# Patient Record
Sex: MALE | Race: WHITE | Employment: OTHER | URBAN - METROPOLITAN AREA
[De-identification: names, ages, dates, MRNs, and addresses within clinical notes are randomized per-mention and may not be internally consistent; named-entity substitution may affect disease eponyms.]

---

## 2019-02-08 ENCOUNTER — HOSPITAL ENCOUNTER (EMERGENCY)
Age: 74
Discharge: HOME | End: 2019-02-09
Payer: MEDICARE

## 2019-02-08 ENCOUNTER — HOSPITAL ENCOUNTER (OUTPATIENT)
Dept: MRI IMAGING | Age: 74
Discharge: HOME | End: 2019-02-08
Attending: FAMILY MEDICINE
Payer: MEDICARE

## 2019-02-08 DIAGNOSIS — I63.9 CEREBROVASCULAR ACCIDENT (CVA) DUE TO OCCLUSION (HCC): Primary | ICD-10-CM

## 2019-02-08 DIAGNOSIS — R20.0 NUMBNESS AND TINGLING IN LEFT HAND: ICD-10-CM

## 2019-02-08 DIAGNOSIS — Q67.0 FACIAL ASYMMETRY: ICD-10-CM

## 2019-02-08 DIAGNOSIS — R20.2 NUMBNESS AND TINGLING IN LEFT HAND: ICD-10-CM

## 2019-02-08 LAB
ALBUMIN SERPL-MCNC: 3.8 G/DL (ref 3.2–4.6)
ALBUMIN/GLOB SERPL: 1.1 {RATIO}
ALP SERPL-CCNC: 98 U/L (ref 50–136)
ALT SERPL-CCNC: 19 U/L (ref 12–65)
ANION GAP SERPL CALC-SCNC: 9 MMOL/L
AST SERPL-CCNC: 18 U/L (ref 15–37)
BASOPHILS # BLD: 0.1 K/UL (ref 0–0.2)
BASOPHILS NFR BLD: 1 % (ref 0–2)
BILIRUB SERPL-MCNC: 0.5 MG/DL (ref 0.2–1.1)
BUN SERPL-MCNC: 13 MG/DL (ref 8–23)
CALCIUM SERPL-MCNC: 8.4 MG/DL (ref 8.3–10.4)
CHLORIDE SERPL-SCNC: 106 MMOL/L (ref 98–107)
CO2 SERPL-SCNC: 28 MMOL/L (ref 21–32)
CREAT SERPL-MCNC: 1.1 MG/DL (ref 0.8–1.5)
DIFFERENTIAL METHOD BLD: NORMAL
EOSINOPHIL # BLD: 0.2 K/UL (ref 0–0.8)
EOSINOPHIL NFR BLD: 3 % (ref 0.5–7.8)
ERYTHROCYTE [DISTWIDTH] IN BLOOD BY AUTOMATED COUNT: 14 % (ref 11.9–14.6)
GLOBULIN SER CALC-MCNC: 3.5 G/DL (ref 2.3–3.5)
GLUCOSE SERPL-MCNC: 125 MG/DL (ref 65–100)
HCT VFR BLD AUTO: 45.7 % (ref 41.1–50.3)
HGB BLD-MCNC: 15 G/DL (ref 13.6–17.2)
IMM GRANULOCYTES # BLD AUTO: 0 K/UL (ref 0–0.5)
IMM GRANULOCYTES NFR BLD AUTO: 0 % (ref 0–5)
LYMPHOCYTES # BLD: 2.5 K/UL (ref 0.5–4.6)
LYMPHOCYTES NFR BLD: 34 % (ref 13–44)
MCH RBC QN AUTO: 29.2 PG (ref 26.1–32.9)
MCHC RBC AUTO-ENTMCNC: 32.8 G/DL (ref 31.4–35)
MCV RBC AUTO: 88.9 FL (ref 79.6–97.8)
MONOCYTES # BLD: 0.7 K/UL (ref 0.1–1.3)
MONOCYTES NFR BLD: 9 % (ref 4–12)
NEUTS SEG # BLD: 3.8 K/UL (ref 1.7–8.2)
NEUTS SEG NFR BLD: 53 % (ref 43–78)
NRBC # BLD: 0 K/UL (ref 0–0.2)
PLATELET # BLD AUTO: 252 K/UL (ref 150–450)
PMV BLD AUTO: 11.2 FL (ref 9.4–12.3)
POTASSIUM SERPL-SCNC: 3.4 MMOL/L (ref 3.5–5.1)
PROT SERPL-MCNC: 7.3 G/DL
RBC # BLD AUTO: 5.14 M/UL (ref 4.23–5.6)
SODIUM SERPL-SCNC: 143 MMOL/L (ref 136–145)
WBC # BLD AUTO: 7.2 K/UL (ref 4.3–11.1)

## 2019-02-08 PROCEDURE — 85025 COMPLETE CBC W/AUTO DIFF WBC: CPT

## 2019-02-08 PROCEDURE — 99284 EMERGENCY DEPT VISIT MOD MDM: CPT

## 2019-02-08 PROCEDURE — 93005 ELECTROCARDIOGRAM TRACING: CPT | Performed by: EMERGENCY MEDICINE

## 2019-02-08 PROCEDURE — 70551 MRI BRAIN STEM W/O DYE: CPT

## 2019-02-08 PROCEDURE — 80053 COMPREHEN METABOLIC PANEL: CPT

## 2019-02-09 VITALS
HEART RATE: 83 BPM | DIASTOLIC BLOOD PRESSURE: 91 MMHG | HEIGHT: 69 IN | SYSTOLIC BLOOD PRESSURE: 192 MMHG | WEIGHT: 259 LBS | RESPIRATION RATE: 20 BRPM | OXYGEN SATURATION: 97 % | BODY MASS INDEX: 38.36 KG/M2 | TEMPERATURE: 98.5 F

## 2019-02-09 LAB
ATRIAL RATE: 83 BPM
CALCULATED P AXIS, ECG09: 32 DEGREES
CALCULATED R AXIS, ECG10: -2 DEGREES
CALCULATED T AXIS, ECG11: 60 DEGREES
DIAGNOSIS, 93000: NORMAL
P-R INTERVAL, ECG05: 174 MS
Q-T INTERVAL, ECG07: 400 MS
QRS DURATION, ECG06: 76 MS
QTC CALCULATION (BEZET), ECG08: 470 MS
VENTRICULAR RATE, ECG03: 83 BPM

## 2019-02-09 RX ORDER — ASPIRIN 325 MG
325 TABLET ORAL DAILY
Qty: 10 TAB | Refills: 0 | Status: SHIPPED | OUTPATIENT
Start: 2019-02-09 | End: 2019-02-12 | Stop reason: SDUPTHER

## 2019-02-09 NOTE — ED NOTES
I have reviewed discharge instructions with the patient and spouse. The patient and spouse verbalized understanding. Patient left ED via Discharge Method: ambulatory to Home with spouse. Opportunity for questions and clarification provided. Patient given 1 scripts. To continue your aftercare when you leave the hospital, you may receive an automated call from our care team to check in on how you are doing. This is a free service and part of our promise to provide the best care and service to meet your aftercare needs.  If you have questions, or wish to unsubscribe from this service please call [phone]. Thank you for Choosing our [facility].

## 2019-02-09 NOTE — DISCHARGE INSTRUCTIONS
Patient Education        Learning About an Ischemic Stroke  What is an ischemic stroke? An ischemic (say \"oqd-HJX-apwq\") stroke occurs when a blood clot blocks a blood vessel in the brain. This means that blood cannot flow to some part of the brain. Without blood and the oxygen it carries, this part of the brain starts to die. The part of the body controlled by the damaged area of the brain cannot work properly. This is different from a hemorrhagic (say \"ifv-bdi-EP-jick\") stroke, which happens when a blood vessel in the brain has burst open or has started to leak. The brain damage from a stroke starts within minutes. Quick treatment can help limit damage to the brain and make recovery more likely. People who have had a stroke may have a hard time talking, understanding things, and making decisions. They may have to relearn daily activities, such as how to eat, bathe, and dress. How well someone recovers from a stroke depends on how quickly the person gets to the hospital, where in the brain the stroke happened, and how severe it was. Training and therapy also make a difference in how well people recover. What are the symptoms? If you have any of these symptoms, call 911 or other emergency services right away:  · You have symptoms of a stroke. These may include:  ? Sudden numbness, tingling, weakness, or loss of movement in your face, arm, or leg, especially on only one side of your body. ? Sudden vision changes. ? Sudden trouble speaking. ? Sudden confusion or trouble understanding simple statements. ? Sudden problems with walking or balance. ? A sudden, severe headache that is different from past headaches. See your doctor if you have symptoms that seem like a stroke, even if they go away quickly. You may have had a transient ischemic attack (TIA), sometimes called a mini-stroke. A TIA is a warning that a stroke may happen soon. Getting early treatment for a TIA can help prevent a stroke.   What causes an ischemic stroke? An ischemic stroke is caused by a blood clot that blocks blood flow in the brain. The most common causes of blood clots include:  · Hardening of the arteries (atherosclerosis). This is caused by high blood pressure, diabetes, high cholesterol, or smoking. · Atrial fibrillation. How is ischemic stroke treated? Emergency treatment may be done to restore blood flow to the brain using medicine or a procedure. You may take medicines to help prevent another stroke. Ask your doctor if a stroke rehab program is right for you. How can you prevent another stroke? · Work with your doctor to treat any health problems you have. High blood pressure, high cholesterol, atrial fibrillation, and diabetes all raise your chances of having a stroke. · Be safe with medicines. Take your medicine exactly as prescribed. Call your doctor if you think you are having a problem with your medicine. · Have a healthy lifestyle. ? Do not smoke or allow others to smoke around you. If you need help quitting, talk to your doctor about stop-smoking programs and medicines. These can increase your chances of quitting for good. Smoking makes a stroke more likely. ? Limit alcohol to 2 drinks a day for men and 1 drink a day for women. ? Lose weight if you need to. A healthy weight will help you keep your heart and body healthy. ? Be active. Ask your doctor what type and level of activity is safe for you.  ? Eat heart-healthy foods, like fruits, vegetables, and high-fiber foods. Follow-up care is a key part of your treatment and safety. Be sure to make and go to all appointments, and call your doctor if you are having problems. It's also a good idea to know your test results and keep a list of the medicines you take. Where can you learn more? Go to http://jase-princess.info/. Enter D161 in the search box to learn more about \"Learning About an Ischemic Stroke. \"  Current as of: September 26, 2018  Content Version: 11.9  © 8828-2235 [employer]. Care instructions adapted under license by [employer] (which disclaims liability or warranty for this information). If you have questions about a medical condition or this instruction, always ask your healthcare professional. [address] any warranty or liability for your use of this information.

## 2019-02-09 NOTE — ED PROVIDER NOTES
42-year-old male complaining of numbness to left hand also left perioral numbness. Onset of the symptoms were January 17, 2019. P sent him for an MRI today findings of subacute infarct. Patient was instructed by his physician to go to the emergency department. The history is provided by the patient. Numbness This is a new problem. The current episode started more than 1 week ago. The problem has not changed since onset. There was no focality noted. Pertinent negatives include no focal weakness, no slurred speech, no memory loss, no auditory change and no mental status change. There has been no fever. Pertinent negatives include no altered mental status, no confusion and no headaches. Associated medical issues do not include trauma. Past Medical History:  
Diagnosis Date  Essential hypertension, benign 1/3/2014  Mixed hyperlipidemia 1/3/2014 No past surgical history on file. No family history on file. Social History Socioeconomic History  Marital status:  Spouse name: Not on file  Number of children: Not on file  Years of education: Not on file  Highest education level: Not on file Social Needs  Financial resource strain: Not on file  Food insecurity - worry: Not on file  Food insecurity - inability: Not on file  Transportation needs - medical: Not on file  Transportation needs - non-medical: Not on file Occupational History  Not on file Tobacco Use  Smoking status: Never Smoker  Smokeless tobacco: Never Used Substance and Sexual Activity  Alcohol use: Not on file  Drug use: Not on file  Sexual activity: Not on file Other Topics Concern  Not on file Social History Narrative  Not on file ALLERGIES: Patient has no known allergies. Review of Systems Constitutional: Negative. Negative for activity change. HENT: Negative. Eyes: Negative. Respiratory: Negative. Cardiovascular: Negative. Gastrointestinal: Negative. Genitourinary: Negative. Musculoskeletal: Negative. Skin: Negative. Neurological: Positive for numbness. Negative for focal weakness and headaches. Psychiatric/Behavioral: Negative. Negative for confusion and memory loss. All other systems reviewed and are negative. Vitals:  
 02/08/19 2132 BP: (!) 199/99 Pulse: 82 Resp: 18 SpO2: 98% Weight: 117.5 kg (259 lb) Height: 5' 9\" (1.753 m) Physical Exam  
Constitutional: He is oriented to person, place, and time. He appears well-developed and well-nourished. No distress. HENT:  
Head: Normocephalic and atraumatic. Right Ear: External ear normal.  
Left Ear: External ear normal.  
Nose: Nose normal.  
Mouth/Throat: Oropharynx is clear and moist. No oropharyngeal exudate. Eyes: Conjunctivae and EOM are normal. Pupils are equal, round, and reactive to light. Right eye exhibits no discharge. Left eye exhibits no discharge. No scleral icterus. Neck: Normal range of motion. Neck supple. No JVD present. No tracheal deviation present. Cardiovascular: Normal rate, regular rhythm and intact distal pulses. Pulmonary/Chest: Effort normal and breath sounds normal. No stridor. No respiratory distress. He has no wheezes. He exhibits no tenderness. Abdominal: Soft. Bowel sounds are normal. He exhibits no distension and no mass. There is no tenderness. Musculoskeletal: Normal range of motion. He exhibits no edema or tenderness. Neurological: He is alert and oriented to person, place, and time. No cranial nerve deficit. Skin: Skin is warm and dry. No rash noted. He is not diaphoretic. No erythema. No pallor. Psychiatric: He has a normal mood and affect. His behavior is normal. Thought content normal.  
Nursing note and vitals reviewed. MDM Number of Diagnoses or Management Options Cerebrovascular accident (CVA) due to occlusion [facility]):  
 Diagnosis management comments: Minimal to no strength deficits. Subacute right thalamic infarct seen on mRI. Discussed with hospitalist for possible admission due to these non-acute finding outpatient workup was recommended by the hospitalist. 
 
  
Amount and/or Complexity of Data Reviewed Clinical lab tests: reviewed and ordered Tests in the radiology section of CPT®: ordered and reviewed Tests in the medicine section of CPT®: ordered and reviewed Risk of Complications, Morbidity, and/or Mortality Presenting problems: moderate Diagnostic procedures: moderate Management options: moderate Patient Progress Patient progress: stable Procedures

## 2019-02-09 NOTE — ED TRIAGE NOTES
Pt having  Left hand numbness x 1 month, constant x 1 week, had scheduled mri and md said to go to ed after getting results, pt has no change n0 symptoms

## 2019-02-11 NOTE — PROGRESS NOTES
Patient was sent to the emergency room to be evaluated for this he will follow-up here in the office as he needs to

## 2019-02-12 PROBLEM — I63.9 CEREBROVASCULAR ACCIDENT (CVA) DUE TO OCCLUSION (HCC): Status: ACTIVE | Noted: 2019-02-12

## 2019-02-19 ENCOUNTER — HOSPITAL ENCOUNTER (OUTPATIENT)
Dept: ULTRASOUND IMAGING | Age: 74
Discharge: HOME | End: 2019-02-19
Attending: FAMILY MEDICINE
Payer: MEDICARE

## 2019-02-19 DIAGNOSIS — I63.9 CEREBROVASCULAR ACCIDENT (CVA) DUE TO OCCLUSION (HCC): ICD-10-CM

## 2019-02-19 PROCEDURE — 93880 EXTRACRANIAL BILAT STUDY: CPT

## 2019-02-22 PROBLEM — E11.9 CONTROLLED TYPE 2 DIABETES MELLITUS WITHOUT COMPLICATION, WITHOUT LONG-TERM CURRENT USE OF INSULIN (HCC): Status: ACTIVE | Noted: 2019-02-22

## 2019-07-02 PROBLEM — R20.2 NUMBNESS AND TINGLING IN LEFT HAND: Status: ACTIVE | Noted: 2019-07-02

## 2019-07-02 PROBLEM — E66.01 SEVERE OBESITY (HCC): Status: ACTIVE | Noted: 2019-07-02

## 2019-07-02 PROBLEM — I63.81 RIGHT THALAMIC INFARCTION (HCC): Status: ACTIVE | Noted: 2019-07-02

## 2019-07-02 PROBLEM — R20.0 NUMBNESS AND TINGLING IN LEFT HAND: Status: ACTIVE | Noted: 2019-07-02

## 2020-06-08 ENCOUNTER — APPOINTMENT (OUTPATIENT)
Dept: MRI IMAGING | Age: 75
DRG: 066 | End: 2020-06-08
Attending: PSYCHIATRY & NEUROLOGY
Payer: MEDICARE

## 2020-06-08 ENCOUNTER — APPOINTMENT (OUTPATIENT)
Dept: CT IMAGING | Age: 75
DRG: 066 | End: 2020-06-08
Attending: EMERGENCY MEDICINE
Payer: MEDICARE

## 2020-06-08 ENCOUNTER — HOSPITAL ENCOUNTER (INPATIENT)
Age: 75
LOS: 6 days | Discharge: NURSING, REHAB OR LONG-TERM CARE | DRG: 066 | End: 2020-06-14
Attending: EMERGENCY MEDICINE | Admitting: INTERNAL MEDICINE
Payer: MEDICARE

## 2020-06-08 DIAGNOSIS — E11.9 CONTROLLED TYPE 2 DIABETES MELLITUS WITHOUT COMPLICATION, WITHOUT LONG-TERM CURRENT USE OF INSULIN (HCC): ICD-10-CM

## 2020-06-08 DIAGNOSIS — I63.9 ACUTE CVA (CEREBROVASCULAR ACCIDENT) (HCC): Primary | ICD-10-CM

## 2020-06-08 DIAGNOSIS — E78.2 MIXED HYPERLIPIDEMIA: ICD-10-CM

## 2020-06-08 DIAGNOSIS — E66.01 SEVERE OBESITY (HCC): ICD-10-CM

## 2020-06-08 DIAGNOSIS — R42 DIZZINESS: ICD-10-CM

## 2020-06-08 DIAGNOSIS — R33.9 URINARY RETENTION: ICD-10-CM

## 2020-06-08 DIAGNOSIS — I63.9 CEREBROVASCULAR ACCIDENT (CVA) DUE TO OCCLUSION (HCC): ICD-10-CM

## 2020-06-08 DIAGNOSIS — I10 ESSENTIAL HYPERTENSION, BENIGN: ICD-10-CM

## 2020-06-08 LAB
ALBUMIN SERPL-MCNC: 2.9 G/DL (ref 3.2–4.6)
ALBUMIN/GLOB SERPL: 0.7 {RATIO} (ref 1.2–3.5)
ALP SERPL-CCNC: 107 U/L (ref 50–136)
ALT SERPL-CCNC: 16 U/L (ref 12–65)
ANION GAP SERPL CALC-SCNC: 6 MMOL/L (ref 7–16)
AST SERPL-CCNC: 22 U/L (ref 15–37)
BASOPHILS # BLD: 0 K/UL (ref 0–0.2)
BASOPHILS NFR BLD: 1 % (ref 0–2)
BILIRUB SERPL-MCNC: 0.6 MG/DL (ref 0.2–1.1)
BUN SERPL-MCNC: 13 MG/DL (ref 8–23)
CALCIUM SERPL-MCNC: 8.1 MG/DL (ref 8.3–10.4)
CHLORIDE SERPL-SCNC: 108 MMOL/L (ref 98–107)
CO2 SERPL-SCNC: 27 MMOL/L (ref 21–32)
CREAT SERPL-MCNC: 0.96 MG/DL (ref 0.8–1.5)
DIFFERENTIAL METHOD BLD: NORMAL
EOSINOPHIL # BLD: 0.1 K/UL (ref 0–0.8)
EOSINOPHIL NFR BLD: 1 % (ref 0.5–7.8)
ERYTHROCYTE [DISTWIDTH] IN BLOOD BY AUTOMATED COUNT: 13.6 % (ref 11.9–14.6)
GLOBULIN SER CALC-MCNC: 3.9 G/DL (ref 2.3–3.5)
GLUCOSE BLD STRIP.AUTO-MCNC: 122 MG/DL (ref 65–100)
GLUCOSE BLD STRIP.AUTO-MCNC: 202 MG/DL (ref 65–100)
GLUCOSE SERPL-MCNC: 188 MG/DL (ref 65–100)
HCT VFR BLD AUTO: 44.7 % (ref 41.1–50.3)
HGB BLD-MCNC: 14.8 G/DL (ref 13.6–17.2)
IMM GRANULOCYTES # BLD AUTO: 0 K/UL (ref 0–0.5)
IMM GRANULOCYTES NFR BLD AUTO: 0 % (ref 0–5)
LYMPHOCYTES # BLD: 1.4 K/UL (ref 0.5–4.6)
LYMPHOCYTES NFR BLD: 17 % (ref 13–44)
MCH RBC QN AUTO: 30 PG (ref 26.1–32.9)
MCHC RBC AUTO-ENTMCNC: 33.1 G/DL (ref 31.4–35)
MCV RBC AUTO: 90.7 FL (ref 79.6–97.8)
MONOCYTES # BLD: 0.5 K/UL (ref 0.1–1.3)
MONOCYTES NFR BLD: 6 % (ref 4–12)
NEUTS SEG # BLD: 6.4 K/UL (ref 1.7–8.2)
NEUTS SEG NFR BLD: 75 % (ref 43–78)
NRBC # BLD: 0 K/UL (ref 0–0.2)
PLATELET # BLD AUTO: 229 K/UL (ref 150–450)
PMV BLD AUTO: 11.5 FL (ref 9.4–12.3)
POTASSIUM SERPL-SCNC: 3.9 MMOL/L (ref 3.5–5.1)
PROT SERPL-MCNC: 6.8 G/DL (ref 6.3–8.2)
RBC # BLD AUTO: 4.93 M/UL (ref 4.23–5.6)
SODIUM SERPL-SCNC: 141 MMOL/L (ref 136–145)
TROPONIN-HIGH SENSITIVITY: 8.7 PG/ML (ref 0–14)
WBC # BLD AUTO: 8.4 K/UL (ref 4.3–11.1)

## 2020-06-08 PROCEDURE — 74011636637 HC RX REV CODE- 636/637: Performed by: INTERNAL MEDICINE

## 2020-06-08 PROCEDURE — 84484 ASSAY OF TROPONIN QUANT: CPT

## 2020-06-08 PROCEDURE — 85025 COMPLETE CBC W/AUTO DIFF WBC: CPT

## 2020-06-08 PROCEDURE — 70496 CT ANGIOGRAPHY HEAD: CPT

## 2020-06-08 PROCEDURE — 81003 URINALYSIS AUTO W/O SCOPE: CPT

## 2020-06-08 PROCEDURE — 80053 COMPREHEN METABOLIC PANEL: CPT

## 2020-06-08 PROCEDURE — 82962 GLUCOSE BLOOD TEST: CPT

## 2020-06-08 PROCEDURE — 74011250636 HC RX REV CODE- 250/636: Performed by: INTERNAL MEDICINE

## 2020-06-08 PROCEDURE — 74011000258 HC RX REV CODE- 258: Performed by: EMERGENCY MEDICINE

## 2020-06-08 PROCEDURE — 93005 ELECTROCARDIOGRAM TRACING: CPT | Performed by: EMERGENCY MEDICINE

## 2020-06-08 PROCEDURE — 99223 1ST HOSP IP/OBS HIGH 75: CPT | Performed by: PSYCHIATRY & NEUROLOGY

## 2020-06-08 PROCEDURE — 74011250637 HC RX REV CODE- 250/637: Performed by: INTERNAL MEDICINE

## 2020-06-08 PROCEDURE — 65270000029 HC RM PRIVATE

## 2020-06-08 PROCEDURE — 70450 CT HEAD/BRAIN W/O DYE: CPT

## 2020-06-08 PROCEDURE — 74011636320 HC RX REV CODE- 636/320: Performed by: EMERGENCY MEDICINE

## 2020-06-08 PROCEDURE — 70551 MRI BRAIN STEM W/O DYE: CPT

## 2020-06-08 PROCEDURE — 92610 EVALUATE SWALLOWING FUNCTION: CPT

## 2020-06-08 PROCEDURE — 99284 EMERGENCY DEPT VISIT MOD MDM: CPT

## 2020-06-08 PROCEDURE — 97530 THERAPEUTIC ACTIVITIES: CPT

## 2020-06-08 PROCEDURE — 51702 INSERT TEMP BLADDER CATH: CPT

## 2020-06-08 PROCEDURE — 97161 PT EVAL LOW COMPLEX 20 MIN: CPT

## 2020-06-08 RX ORDER — ENOXAPARIN SODIUM 100 MG/ML
40 INJECTION SUBCUTANEOUS EVERY 24 HOURS
Status: DISCONTINUED | OUTPATIENT
Start: 2020-06-08 | End: 2020-06-14 | Stop reason: HOSPADM

## 2020-06-08 RX ORDER — CLOPIDOGREL BISULFATE 75 MG/1
75 TABLET ORAL DAILY
Status: DISCONTINUED | OUTPATIENT
Start: 2020-06-09 | End: 2020-06-14 | Stop reason: HOSPADM

## 2020-06-08 RX ORDER — ACETAMINOPHEN 325 MG/1
650 TABLET ORAL
Status: DISCONTINUED | OUTPATIENT
Start: 2020-06-08 | End: 2020-06-14 | Stop reason: HOSPADM

## 2020-06-08 RX ORDER — SODIUM CHLORIDE 0.9 % (FLUSH) 0.9 %
10 SYRINGE (ML) INJECTION
Status: COMPLETED | OUTPATIENT
Start: 2020-06-08 | End: 2020-06-08

## 2020-06-08 RX ORDER — SODIUM CHLORIDE 9 MG/ML
75 INJECTION, SOLUTION INTRAVENOUS CONTINUOUS
Status: DISCONTINUED | OUTPATIENT
Start: 2020-06-08 | End: 2020-06-09

## 2020-06-08 RX ORDER — INSULIN LISPRO 100 [IU]/ML
INJECTION, SOLUTION INTRAVENOUS; SUBCUTANEOUS
Status: DISCONTINUED | OUTPATIENT
Start: 2020-06-08 | End: 2020-06-14 | Stop reason: HOSPADM

## 2020-06-08 RX ORDER — GUAIFENESIN 100 MG/5ML
81 LIQUID (ML) ORAL DAILY
Status: DISCONTINUED | OUTPATIENT
Start: 2020-06-09 | End: 2020-06-14 | Stop reason: HOSPADM

## 2020-06-08 RX ORDER — SODIUM CHLORIDE 0.9 % (FLUSH) 0.9 %
5-40 SYRINGE (ML) INJECTION EVERY 8 HOURS
Status: DISCONTINUED | OUTPATIENT
Start: 2020-06-08 | End: 2020-06-14 | Stop reason: HOSPADM

## 2020-06-08 RX ORDER — ATORVASTATIN CALCIUM 40 MG/1
40 TABLET, FILM COATED ORAL DAILY
Status: DISCONTINUED | OUTPATIENT
Start: 2020-06-09 | End: 2020-06-09

## 2020-06-08 RX ORDER — CLOPIDOGREL BISULFATE 75 MG/1
300 TABLET ORAL ONCE
Status: COMPLETED | OUTPATIENT
Start: 2020-06-08 | End: 2020-06-08

## 2020-06-08 RX ORDER — LORATADINE 10 MG/1
10 TABLET ORAL DAILY
Status: DISCONTINUED | OUTPATIENT
Start: 2020-06-09 | End: 2020-06-14 | Stop reason: HOSPADM

## 2020-06-08 RX ORDER — SODIUM CHLORIDE 0.9 % (FLUSH) 0.9 %
5-40 SYRINGE (ML) INJECTION AS NEEDED
Status: DISCONTINUED | OUTPATIENT
Start: 2020-06-08 | End: 2020-06-14 | Stop reason: HOSPADM

## 2020-06-08 RX ORDER — DOXAZOSIN 4 MG/1
8 TABLET ORAL 2 TIMES DAILY
Status: DISCONTINUED | OUTPATIENT
Start: 2020-06-08 | End: 2020-06-14 | Stop reason: HOSPADM

## 2020-06-08 RX ADMIN — Medication 10 ML: at 09:10

## 2020-06-08 RX ADMIN — ENOXAPARIN SODIUM 40 MG: 40 INJECTION SUBCUTANEOUS at 15:11

## 2020-06-08 RX ADMIN — CLOPIDOGREL BISULFATE 300 MG: 75 TABLET ORAL at 15:12

## 2020-06-08 RX ADMIN — INSULIN LISPRO 4 UNITS: 100 INJECTION, SOLUTION INTRAVENOUS; SUBCUTANEOUS at 17:27

## 2020-06-08 RX ADMIN — DOXAZOSIN 8 MG: 4 TABLET ORAL at 15:10

## 2020-06-08 RX ADMIN — SODIUM CHLORIDE 75 ML/HR: 9 INJECTION, SOLUTION INTRAVENOUS at 15:01

## 2020-06-08 RX ADMIN — IOPAMIDOL 50 ML: 755 INJECTION, SOLUTION INTRAVENOUS at 09:10

## 2020-06-08 RX ADMIN — Medication 10 ML: at 14:30

## 2020-06-08 RX ADMIN — SODIUM CHLORIDE 100 ML: 900 INJECTION, SOLUTION INTRAVENOUS at 09:10

## 2020-06-08 RX ADMIN — DOXAZOSIN 8 MG: 4 TABLET ORAL at 22:34

## 2020-06-08 NOTE — PROGRESS NOTES
06/08/20 1628   NIH Stroke Scale   Interval Other (comment)  (admit to floor)   LOC 0   LOC Questions 0   LOC Commands 0   Best Gaze 0   Visual 0   Facial Palsy 1   Motor Right Arm 0   Motor Left Arm 0   Motor Right Leg 0   Motor Left Leg 0   Limb Ataxia 0   Sensory 0   Best Language 0   Dysarthria 0   Extinction and Inattention 0   Total 1   Dual [facility] with Mikki Garay, RN

## 2020-06-08 NOTE — PROGRESS NOTES
Problem: Mobility Impaired (Adult and Pediatric)  Goal: *Acute Goals and Plan of Care (Insert Text)  Description: STG:  (1.)Mr. Lani Castanon will move from supine to sit and sit to supine , scoot up and down and roll side to side with SUPERVISION within 3 treatment day(s). (2.)Mr. Lani Castanon will transfer from bed to chair and chair to bed with MINIMAL ASSIST using the least restrictive device within 3 treatment day(s). (3.)Mr. Lani Castanon will ambulate with MINIMAL ASSIST for 50 feet with the least restrictive device within 3 treatment day(s). (4.)Mr. Lani Castanon will perform standing static and dynamic balance activities x 15 minutes with MINIMAL ASSIST to improve safety within 3 day(s). LTG:  (1.)Mr. Lani Castanon will move from supine to sit and sit to supine , scoot up and down and roll side to side in bed with MODIFIED INDEPENDENCE within 7 treatment day(s). (2.)Mr. Lani Castanon will transfer from bed to chair and chair to bed with STAND BY ASSIST using the least restrictive device within 7 treatment day(s). (3.)Mr. Lani Castanon will ambulate with STAND BY ASSIST for 150 feet with the least restrictive device within 7 treatment day(s). (4.)Mr. Lani Castanon will perform standing static and dynamic balance activities x 15 minutes with STAND BY ASSIST to improve safety within 7 day(s). (5.)Mr. Lani Castanon will ascend and descend 5 stairs using 0-2 hand rail(s) with STAND BY ASSIST to improve functional mobility and safety within 7 day(s).   ________________________________________________________________________________________________     Outcome: Progressing Towards Goal     PHYSICAL THERAPY: Initial Assessment, Daily Note, and PM 6/8/2020  INPATIENT: PT Visit Days : 1  [place] / [place] / Product Type: Medicare /       NAME/AGE/GENDER: Gabriela Villanueva is a 69 y.o. male   PRIMARY DIAGNOSIS: Stroke (cerebrum) ([facility] [address] [I63.9] Cerebrovascular accident (CVA) due to occlusion [facility]) Cerebrovascular accident (CVA) due to occlusion [facility])        ICD-10: Treatment Diagnosis:    Generalized Muscle Weakness (M62.81)  Difficulty in walking, Not elsewhere classified (R26.2)  Dizziness and Giddiness (R42)   Precaution/Allergies:  Patient has no known allergies. ASSESSMENT:     Mr. Melvin Ansari is a 69 y.o. male admitted for CVA workup. Per MRI: \"Acute to early subacute infarcts in the inferior right cerebellar hemisphere and right dorsolateral medulla. \" He lives with spouse in a single story home and typically ambulates and performs ADLs independently at baseline. He is able to drive (and was driving Friday/Saturday despite dizziness symptoms), although they progressed to the point of not being able to drive. He is supine on contact, agreeable to physical therapy evaluation and treatment this PM. BP monitored throughout as patient described \"dizziness\" as lightheadedness. He required CGA to transfer to sitting, BP taken in sitting and found to be 221/68, assisted back to supine and found to be 185/75 and continued evaluation with functional strength testing of bridging in bed with decreased strength noted RLE vs. LLE. Patient reports double and blurred vision which is new (improved with one eye closed). Treatment initiated to include bed mobility and rolling for repositioning. RN alerted to double vision  and elevated BP in sitting, heading into room to provide patient with BP meds. Kelly Peña is currently functioning below his baseline and would benefit from skilled PT during acute care stay to maximize safety and independence with functional mobility.     Position BP Heartrate   Supine 181/73 54   Sitting 221/68 66   Post Activity (supine) 185/75 54       This section established at most recent assessment   PROBLEM LIST (Impairments causing functional limitations):  Decreased Strength  Decreased ADL/Functional Activities  Decreased Transfer Abilities  Decreased Ambulation Ability/Technique  Decreased Balance  Decreased Activity Tolerance  Decreased Knowledge of Precautions  Decreased [place] with Home Exercise Program   INTERVENTIONS PLANNED: (Benefits and precautions of physical therapy have been discussed with the patient.)  Balance Exercise  Bed Mobility  Family Education  Gait Training  Home Exercise Program (HEP)  Neuromuscular Re-education/Strengthening  Therapeutic Activites  Therapeutic Exercise/Strengthening  Transfer Training     TREATMENT PLAN: Frequency/Duration: 3 times a week for duration of hospital stay  Rehabilitation Potential For Stated Goals: Good     REHAB RECOMMENDATIONS (at time of discharge pending progress):    Placement: It is my opinion, based on this patient's performance to date, that Mr. Melvin Ansari may benefit from participating in 1-2 additional therapy sessions in order to continue to assess for rehab potential and then make recommendation for disposition at discharge. Equipment:   None at this time              HISTORY:   History of Present Injury/Illness (Reason for Referral):  Kelly Peña is a 69 y.o. male who came to ER due to inbility to urinate since last evening and dizziness since 3 days ago. 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 He says that he takes aspirin 325 mg po q day \"as needed\". Three days ago, he felt dizzy when trying to get up from bed. He felt weak. He rested and over the course of the day, he felt better. He was able to drive for the past 2 days. Last night, he started to have trouble urinating. He has had this before but not to the point that it got worse. This morning, he could not urinate at all. He came to ER and Mena's catheter was placed and urine of 800 mL amount was obtained. No fever. No shaking. No chills. No head injury. No loss of consciousness no abnormal movement. Code stroke was activated in ER. Patient is out of window for tPA treatment.  CT was done with showed findings below. wife states that right eyelid seems more swollen than the other eye. Hospitalist service is requested to admit the patient. Past Medical History/Comorbidities:   Mr. Venu Witt  has a past medical history of Essential hypertension, benign (1/3/2014), Mixed hyperlipidemia (1/3/2014), Numbness and tingling in left hand (7/2/2019), and Right thalamic infarction ([address] (7/2/2019). Mr. Venu Witt  has no past surgical history on file. Social History/Living Environment:   Home Environment: Private residence  # Steps to Enter: 0  One/Two Story Residence: One story  Living Alone: No  Support Systems: Family member(s)  Patient Expects to be Discharged to[de-identified] Private residence  Current DME Used/Available at Home: None  Prior Level of Function/Work/Activity:  He lives with spouse in a single story home and typically ambulates and performs ADLs independently at baseline. He is able to drive (and was driving Friday/Saturday despite dizziness symptoms), although they progressed to the point of not being able to drive. Number of Personal Factors/Comorbidities that affect the Plan of Care: 3+: HIGH COMPLEXITY   EXAMINATION:   Most Recent Physical Functioning:   Gross Assessment:  AROM: Within functional limits  PROM: Within functional limits  Strength: Generally decreased, functional               Posture:  Posture (WDL): Exceptions to WDL  Posture Assessment: Forward head, Rounded shoulders  Balance:  Sitting: Impaired  Sitting - Static: Good (unsupported)  Sitting - Dynamic: Fair (occasional)(+) Bed Mobility:  Rolling: Contact guard assistance  Supine to Sit: Contact guard assistance  Sit to Supine: Contact guard assistance  Scooting: Contact guard assistance  Wheelchair Mobility:     Transfers:     Gait:            Body Structures Involved:  Nerves  Heart  Muscles Body Functions Affected:  Sensory/Pain  Cardio  Neuromusculoskeletal  Movement Related Activities and Participation Affected:   Mobility  Self Care  Domestic Life  Interpersonal Interactions and Relationships  Community, Social and [place] [employer]   Number of elements that affect the Plan of Care: 4+: HIGH COMPLEXITY   CLINICAL PRESENTATION:   Presentation: Stable and uncomplicated: LOW COMPLEXITY   CLINICAL DECISION MAKIN [facility] Inpatient Short Form  How much difficulty does the patient currently have. .. Unable A Lot A Little None   1. Turning over in bed (including adjusting bedclothes, sheets and blankets)? [] 1   [] 2   [x] 3   [] 4   2. Sitting down on and standing up from a chair with arms ( e.g., wheelchair, bedside commode, etc.)   [x] 1   [] 2   [] 3   [] 4   3. Moving from lying on back to sitting on the side of the bed? [] 1   [] 2   [x] 3   [] 4   How much help from another person does the patient currently need. .. Total A Lot A Little None   4. Moving to and from a bed to a chair (including a wheelchair)? [] 1   [x] 2   [] 3   [] 4   5. Need to walk in hospital room? [] 1   [x] 2   [] 3   [] 4   6. Climbing 3-5 steps with a railing? [] 1   [x] 2   [] 3   [] 4   © , Trustees of [address] [place] [address] [address] [address], under license to [employer]. All rights reserved      Score:  Initial: 13 Most Recent: X (Date: -- )    Interpretation of Tool:  Represents activities that are increasingly more difficult (i.e. Bed mobility, Transfers, Gait). Medical Necessity:     Patient demonstrates   good   rehab potential due to higher previous functional level. Reason for Services/Other Comments:  Patient   continues to require modification of therapeutic interventions to increase complexity of exercises  .    Use of outcome tool(s) and clinical judgement create a POC that gives a: Clear prediction of patient's progress: LOW COMPLEXITY            TREATMENT:   (In addition to Assessment/Re-Assessment sessions the following treatments were rendered)   Pre-treatment Symptoms/Complaints:  none  Pain: Initial: Pain Intensity 1: 0  Post Session:  0     Therapeutic Activity: (    8 minutes): Therapeutic activities including Bed transfers to improve mobility, strength, and activity tolerance . Required minimal cues   to promote coordination of bilateral, lower extremity(s). Braces/Orthotics/Lines/Etc:   O2 Device: Room air  Treatment/Session Assessment:    Response to Treatment:  Patient experienced elevated BP with mobility. Interdisciplinary Collaboration:   Physical Therapist  Registered Nurse  After treatment position/precautions:   Supine in bed  Bed/Chair-wheels locked  Bed in low position  Call light within reach  RN notified  Family at bedside  Nurse at bedside   Compliance with Program/Exercises: Will assess as treatment progresses  Recommendations/Intent for next treatment session: \"Next visit will focus on advancements to more challenging activities and reduction in assistance provided\".   Total Treatment Duration:  PT Patient Time In/Time Out  Time In: 1438  Time Out: 1511    Christina Lara PT, DPT

## 2020-06-08 NOTE — ED NOTES
TRANSFER - OUT REPORT:    Verbal report given to Rica Gruber RN(name) on Chasity Gupta  being transferred to 703(unit) for routine progression of care       Report consisted of patients Situation, Background, Assessment and   Recommendations(SBAR). Information from the following report(s) ED Summary was reveiwed with the receiving nurse. Opportunity for questions and clarification was provided.       Ischemic Stroke without Activase/TIA    BP Parameters: Less Than 220/120 for 24 hours, then consult MD for parameters    Controlled With: None    Dysphagia Screen Completed: Yes: Fail  Dysphagia Screening  Vocal Quality/Secretions: Normal  History of Dysphagia: No  O2 Saturation: Normal  Alertness: Normal  Pre-Swallow Assessment Score: 0  Purees: (!) Coughing/throat clearing     NIH Stroke Scale Complete: Yes: 2    Frequency of Vital Signs: Every 2 hours    Frequency of Neuro Checks: Every 2 hours

## 2020-06-08 NOTE — DISCHARGE INSTRUCTIONS
Stroke: After Your Visit     Your Care Instructions     Risk factors for stroke include being overweight, smoking, and sedentary lifestyle. This means that the blood flow to a part of your brain was blocked for some time, which damages the nerve cells in that part of the brain. The part of your body controlled by that part of your brain may not function properly now. The brain is an amazing organ that can heal itself to some degree. The stroke you had damaged part of your brain, but other parts of your brain may take over in some way for the damaged areas. You have already started this process. Going home may be hard for you and your family. The more you can try to do for yourself, the better. Remember to take each day one at a time. Follow-up care is a key part of your treatment and safety. Be sure to make and go to all appointments, and call your doctor if you are having problems. Its also a good idea to know your test results and keep a list of the medicines you take. How can you care for yourself at home? Enter a stroke rehabilitation (rehab) program, if your doctor recommends it. Physical, speech, and occupational therapies can help you manage bathing, dressing, eating, and other basics of daily living. Eat a heart-healthy diet that is low in cholesterol, saturated fat, and salt. Eat lots of fresh fruits and vegetables and foods high in fiber. Increase your activities slowly. Take short rest breaks when you get tired. Gradually increase the amount you walk. Start out by walking a little more than you did the day before. Do not drive until your doctor says it is okay. It is normal to feel sad or depressed after a stroke. If the blues last, talk to your doctor. If you are having problems with urine leakage, go to the bathroom at regular times, including when you first wake up and at bedtime. Also, limit fluids after dinner.   If you are constipated, drink plenty of fluids, enough so that your urine is light yellow or clear like water. If you have kidney, heart, or liver disease and have to limit fluids, talk with your doctor before you increase the amount of fluids you drink. Set up a regular time for using the toilet. If you continue to have constipation, your doctor may suggest using a bulking agent, such as Metamucil, or a stool softener, laxative, or enema. Medicines  Take your medicines exactly as prescribed. Call your doctor if you think you are having a problem with your medicine. You may be taking several medicines. ACE (angiotensin-converting enzyme) inhibitors, angiotensin II receptor blockers (ARBs), beta-blockers, diuretics (water pills), and calcium channel blockers control your blood pressure. Statins help lower cholesterol. Your doctor may also prescribe medicines for depression, pain, sleep problems, anxiety, or agitation. If your doctor has given you medicine that prevents blood clots, such as warfarin (Coumadin), aspirin combined with extended-release dipyridamole (Aggrenox), clopidogrel (Plavix), or aspirin to prevent another stroke, you should:  Tell your dentist, pharmacist, and other health professionals that you take these medicines. Watch for unusual bruising or bleeding, such as blood in your urine, red or black stools, or bleeding from your nose or gums. Get regular blood tests to check your clotting time if you are taking Coumadin. Wear medical alert jewelry that says you take blood thinners. You can buy this at most drugstores. Do not take any over-the-counter medicines or herbal products without talking to your doctor first.  If you take birth control pills or hormone replacement therapy, talk to your doctor about whether they are right for you. For family members and caregivers  Make the home safe. Set up a room so that your loved one does not have to climb stairs. Be sure the bathroom is on the same floor.  Move throw rugs and furniture that could cause falls, and make sure that the lighting is good. Put grab bars and seats in tubs and showers. Find out what your loved one can do and what he or she needs help with. Try not to do things for your loved one that your loved one can do on his or her own. Help him or her learn and practice new skills. Visit and talk with your loved one often. Try doing activities together that you both enjoy, such as playing cards or board games. Keep in touch with your loved one's friends as much as you can, and encourage them to visit. Take care of yourself. Do not try to do everything yourself. Ask other family members to help. Eat well, get enough rest, and take time to do things that you enjoy. Keep up with your own doctor visits, and make sure to take your medicines regularly. Get out of the house as much as you can. Join a local support group. Find out if you qualify for home health care visits to help with rehab or for adult day care. When should you call for help? Call 911 anytime you think you may need emergency care. For example, call if:  You have signs of another stroke. These may include:  Sudden numbness, paralysis, or weakness in your face, arm, or leg, especially on only one side of your body. New problems with walking or balance. Sudden vision changes. Drooling or slurred speech. New problems speaking or understanding simple statements, or you feel confused. A sudden, severe headache that is different from past headaches. Call 911 even if these symptoms go away in a few minutes. You cough up blood. You vomit blood or what looks like coffee grounds. You pass maroon or very bloody stools. Call your doctor now or seek immediate medical care if:  You have new bruises or blood spots under your skin. You have a nosebleed. Your gums bleed when you brush your teeth. You have blood in your urine. Your stools are black and tarlike or have streaks of blood.   You have vaginal bleeding when you are not having your period, or heavy period bleeding. You have new symptoms that may be related to your stroke, such as falls or trouble swallowing. Watch closely for changes in your health, and be sure to contact your doctor if you have any problems. Where can you learn more? Go to [employer]    Enter C294  in the search box to learn more about \"Stroke: After Your Visit\". © 0587-9716 [employer], [employer]orporated. Care instructions adapted under license by [facility] (which disclaims liability or warranty for this information). This care instruction is for use with your licensed healthcare professional. If you have questions about a medical condition or this instruction, always ask your healthcare professional. Abhishek Mccord any warranty or liability for your use of this information.

## 2020-06-08 NOTE — PROGRESS NOTES
Update     I received a text message from Dr. Sharron Chopra that patient had right cerebellum/medullary CVA.

## 2020-06-08 NOTE — H&P
History and Physical    Patient: Mindy Schulz MRN: [id number]  SSN: [id number]    YOB: 1945  Age: 69 y.o. Sex: male      Subjective:      Mindy Schulz is a 69 y.o. male who came to ER due to inbility to urinate since last evening and dizziness since 3 days ago. 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 He says that he takes aspirin 325 mg po q day \"as needed\". Three days ago, he felt dizzy when trying to get up from bed. He felt weak. He rested and over the course of the day, he felt better. He was able to drive for the past 2 days. Last night, he started to have trouble urinating. He has had this before but not to the point that it got worse. This morning, he could not urinate at all. He came to ER and Mena's catheter was placed and urine of 800 mL amount was obtained. No fever. No shaking. No chills. No head injury. No loss of consciousness no abnormal movement. Code stroke was activated in ER. Patient is out of window for tPA treatment. CT was done with showed findings below. wife states that right eyelid seems more swollen than the other eye. Hospitalist service is requested to admit the patient. Past Medical History:   Diagnosis Date    Essential hypertension, benign 1/3/2014    Mixed hyperlipidemia 1/3/2014    Numbness and tingling in left hand 7/2/2019    Right thalamic infarction ([facility] [address] 7/2/2019     No past surgical history on file. No family history on file. Social History     Tobacco Use    Smoking status: Never Smoker    Smokeless tobacco: Never Used   Substance Use Topics    Alcohol use: Not on file      Prior to Admission medications    Medication Sig Start Date End Date Taking? Authorizing Provider   mupirocin (BACTROBAN) 2 % ointment Apply  to affected area daily.  1/15/20   Dragan Bailey MD   doxazosin (CARDURA) 8 mg tablet Take 1 Tab by mouth two (2) times a day. 10/10/19   Ho Aquino MD   atorvastatin (LIPITOR) 40 mg tablet Take 1 Tab by mouth daily. 10/10/19   Ho Aquino MD   loratadine (CLARITIN) 10 mg tablet Take 10 mg by mouth daily. Provider, Historical      Family history   No hereditary conditions. No Known Allergies    Review of Systems:    10-point review of systems is negative except what is mentioned in the present illness section. Objective:     Vitals:    06/08/20 0732   BP: 198/86   Pulse: (!) 57   Resp: 16   Temp: 97.9 °F (36.6 °C)   SpO2: 93%   Weight: 123.8 kg (273 lb)   Height: 5' 9.5\" (1.765 m)        Physical Exam:    General:                    The patient is a pleasant elderly male in no acute respiratory distress. he is lying flat iin bed and talking well. Wife is at bedside. Head:                                   Normocephalic/atraumatic. Eyes:                                   right eyelid and surrounding area is more swollen than the left eye. ENT:                                    External auricular and nasal exam within normal limits. Questionable left facial palsy. Mucous membranes are dry. Neck:                                   Supple, non-tender, no JVD. Lungs:                       Clear to auscultation bilaterally without wheezes or crackles. No respiratory distress or accessory muscle use. Heart:                                  Regular rate and rhythm, without murmurs, rubs, or gallops. Abdomen:                  Soft, non-tender, distended due to truncal obesity with normoactive bowel sounds. Genitourinary:           No tenderness over the bladder or bilateral CVAs. Extremities:               Without clubbing, cyanosis, or edema. Skin:                                    Normal color, texture, and turgor. No rashes, lesions, or jaundice.   Pulses:                      Radial and dorsalis pedis pulses present 2+ bilaterally. Capillary refill <2s. Neurologic:                CN II-XII grossly intact and symmetrical.                                               Moving all four extremities well with no focal deficits. Psychiatric:                Pleasant demeanor, appropriate affect. Alert and oriented x 3    Lab and data    Recent Results (from the past 24 hour(s))   EKG, 12 LEAD, INITIAL    Collection Time: 06/08/20  7:36 AM   Result Value Ref Range    Ventricular Rate 57 BPM    Atrial Rate 214 BPM    QRS Duration 78 ms    Q-T Interval 450 ms    QTC Calculation (Bezet) 438 ms    Calculated R Axis 2 degrees    Calculated T Axis 25 degrees    Diagnosis       !! AGE AND GENDER SPECIFIC ECG ANALYSIS !!  Junctional rhythm  Inferior infarct (cited on or before 08-FEB-2019)  Abnormal ECG  When compared with ECG of 08-JUN-2020 07:34,  No significant change was found     CBC WITH AUTOMATED DIFF    Collection Time: 06/08/20  7:37 AM   Result Value Ref Range    WBC 8.4 4.3 - 11.1 K/uL    RBC 4.93 4.23 - 5.6 M/uL    HGB 14.8 13.6 - 17.2 g/dL    HCT 44.7 41.1 - 50.3 %    MCV 90.7 79.6 - 97.8 FL    MCH 30.0 26.1 - 32.9 PG    MCHC 33.1 31.4 - 35.0 g/dL    RDW 13.6 11.9 - 14.6 %    PLATELET 550 501 - 023 K/uL    MPV 11.5 9.4 - 12.3 FL    ABSOLUTE NRBC 0.00 0.0 - 0.2 K/uL    DF AUTOMATED      NEUTROPHILS 75 43 - 78 %    LYMPHOCYTES 17 13 - 44 %    MONOCYTES 6 4.0 - 12.0 %    EOSINOPHILS 1 0.5 - 7.8 %    BASOPHILS 1 0.0 - 2.0 %    IMMATURE GRANULOCYTES 0 0.0 - 5.0 %    ABS. NEUTROPHILS 6.4 1.7 - 8.2 K/UL    ABS. LYMPHOCYTES 1.4 0.5 - 4.6 K/UL    ABS. MONOCYTES 0.5 0.1 - 1.3 K/UL    ABS. EOSINOPHILS 0.1 0.0 - 0.8 K/UL    ABS. BASOPHILS 0.0 0.0 - 0.2 K/UL    ABS. IMM.  GRANS. 0.0 0.0 - 0.5 K/UL   METABOLIC PANEL, COMPREHENSIVE    Collection Time: 06/08/20  7:37 AM   Result Value Ref Range    Sodium 141 136 - 145 mmol/L    Potassium 3.9 3.5 - 5.1 mmol/L    Chloride 108 (H) 98 - 107 mmol/L    CO2 27 21 - 32 mmol/L    Anion gap 6 (L) 7 - 16 mmol/L    Glucose 188 (H) 65 - 100 mg/dL    BUN 13 8 - 23 MG/DL    Creatinine 0.96 0.8 - 1.5 MG/DL    GFR est AA >60 >60 ml/min/1.73m2    GFR est non-AA >60 >60 ml/min/1.73m2    Calcium 8.1 (L) 8.3 - 10.4 MG/DL    Bilirubin, total 0.6 0.2 - 1.1 MG/DL    ALT (SGPT) 16 12 - 65 U/L    AST (SGOT) 22 15 - 37 U/L    Alk. phosphatase 107 50 - 136 U/L    Protein, total 6.8 6.3 - 8.2 g/dL    Albumin 2.9 (L) 3.2 - 4.6 g/dL    Globulin 3.9 (H) 2.3 - 3.5 g/dL    A-G Ratio 0.7 (L) 1.2 - 3.5     TROPONIN-HIGH SENSITIVITY    Collection Time: 06/08/20  7:37 AM   Result Value Ref Range    Troponin-High Sensitivity 8.7 0 - 14 pg/mL       CT head   6-8-2020  IMPRESSION:  Negative for acute intracranial abnormality. Chronic changes. CTA head neck   6-8-2020  Impression:     1. There is decreased opacification of the right PICA approximately 1 cm from  its origin. Possible occlusion or high-grade stenosis. Consider MRI for further  evaluation. 2.  Generalized atherosclerotic changes with intracranial stenoses as follows. High-grade stenosis involving a right M2/M3 branch in the sylvian fissure. Moderate stenosis in the mid left M1 segment. Multifocal moderate stenoses in  the proximal right PCA. No high-grade stenosis or occlusion in the carotid or  vertebral arteries. 3.  Right thyroid mass measuring up to 5.2 cm. Recommend follow-up thyroid  ultrasound. .     I have reviewed ECG myself.         Assessment:     Hospital Problems  Date Reviewed: 5/1/2020          Codes Class Noted POA    Urinary retention ICD-10-CM: R33.9  ICD-9-CM: 788.20  6/8/2020 Unknown        Stroke (cerebrum) ([facility] [address] ICD-10-CM: I63.9  ICD-9-CM: 434.91  6/8/2020 Unknown        Severe obesity ([facility] [address] ICD-10-CM: E66.01  ICD-9-CM: 278.01  7/2/2019 Yes        Controlled type 2 diabetes mellitus without complication, without long-term current use of insulin ([facility] ICD-10-CM: E11.9  ICD-9-CM: 250.00  2/22/2019 Yes        * (Principal) Cerebrovascular accident (CVA) due to occlusion [facility]) ICD-10-CM: I63.9  ICD-9-CM: 434.91  2/12/2019 Yes        Essential hypertension, benign ICD-10-CM: I10  ICD-9-CM: 401.1  1/3/2014 Yes        Mixed hyperlipidemia ICD-10-CM: E78.2  ICD-9-CM: 272.2  1/3/2014 Yes              Plan:     Acute stroke   As evidenced by CTA   Admit to medical floor. Neuro and vital signs monitoring. IV fluid   Aspirin   Statin   Physical therapy  Occupational therapy. Symptomatic treatments     Hypertension  Monitor blood pressure and manage accordingly. Continue home medications. Diabetes mellitus type 2  Monitor blood sugar. Cover with insulin sliding scale accordingly. Urinary retention   Will consult urology   Keep Mena's catheter now. Patient requires hospital stay as an in-patient and anticipated stay is more than 2 midnights due to the serious nature of the illness. Healthcare power of  is wife. I have discussed with patient regarding advance directive. Patient would like to have a DNR status. Wife is the witness and concurs. I have discussed the plan of care with patient and spouse at bedside.     DVT prophylaxis : SCD     Signed By: Tesfaye Silva MD     June 8, 2020

## 2020-06-08 NOTE — PROGRESS NOTES
SPEECH PATHOLOGY NOTE:    Speech therapy consult received and appreciated. Patient off floor at this time. Will re-attempt at later time/date.       [address] CCC-SLP

## 2020-06-08 NOTE — CONSULTS
Consult    Patient: Gabriela Villanueva MRN: [id number]     YOB: 1945  Age: 69 y.o. Sex: male      Subjective:      Gabriela Villanueva is a 69 y.o. male who is being seen for suspected stroke. The patient was last known well on Friday. He was having difficulty with balance, double vision, and right facial droop. Onset of symptoms was acute. Symptoms have been progressing so he came to the emergency department. His facial droop is localized to the right. No associated fevers. No headache. He is having difficulty with ambulation. Duration of symptoms has been approximately 72 hours. He denies history of stroke. Nothing is made his symptoms better or worse. No obvious triggers. Past Medical History:   Diagnosis Date    Essential hypertension, benign 1/3/2014    Mixed hyperlipidemia 1/3/2014    Numbness and tingling in left hand 7/2/2019    Right thalamic infarction ([facility] [address] 7/2/2019     No past surgical history on file. No family history on file. Social History     Tobacco Use    Smoking status: Never Smoker    Smokeless tobacco: Never Used   Substance Use Topics    Alcohol use: Not on file      Current Outpatient Medications   Medication Sig Dispense Refill    mupirocin (BACTROBAN) 2 % ointment Apply  to affected area daily. 22 g 0    doxazosin (CARDURA) 8 mg tablet Take 1 Tab by mouth two (2) times a day. 180 Tab 1    atorvastatin (LIPITOR) 40 mg tablet Take 1 Tab by mouth daily. 90 Tab 3    loratadine (CLARITIN) 10 mg tablet Take 10 mg by mouth daily. No Known Allergies    Review of Systems:  CONSTITUTIONAL: No weight loss, fever, chills, weakness or fatigue. HEENT: Eyes: No visual loss, but positive double vision. Ears, Nose, Throat: No hearing loss, sneezing, congestion, runny nose or sore throat. SKIN: No rash or itching. CARDIOVASCULAR: No chest pain, chest pressure or chest discomfort. No palpitations or edema.   RESPIRATORY: No shortness of breath, cough or sputum. GASTROINTESTINAL: No anorexia, nausea, vomiting or diarrhea. No abdominal pain or blood. GENITOURINARY: no burning with urination. NEUROLOGICAL: No headache, dizziness, syncope, paralysis, numbness or tingling in the extremities. No change in bowel or bladder control. But positive ataxia  MUSCULOSKELETAL: No muscle, back pain, joint pain or stiffness. HEMATOLOGIC: No anemia, bleeding or bruising. LYMPHATICS: No enlarged nodes. No history of splenectomy. PSYCHIATRIC: No history of depression or anxiety. ENDOCRINOLOGIC: No reports of sweating, cold or heat intolerance. No polyuria or polydipsia. ALLERGIES: No history of asthma, hives, eczema or rhinitis. Objective:     Vitals:    06/08/20 0732   BP: 198/86   Pulse: (!) 57   Resp: 16   Temp: 97.9 °F (36.6 °C)   SpO2: 93%   Weight: 273 lb (123.8 kg)   Height: 5' 9.5\" (1.765 m)        Physical Exam:  General - Well developed, well nourished, in no apparent distress. Pleasant and conversant. HEENT - Normocephalic, atraumatic. Conjunctiva, tympanic membranes, and oropharynx are clear. Neck - Supple without masses, no bruits   Cardiovascular - Regular rate and rhythm. Normal S1, S2 without murmurs, rubs, or gallops. Lungs - Clear to auscultation. Abdomen - Soft, nontender with normal bowel sounds. Extremities - Peripheral pulses intact. No edema and no rashes. Neurological examination - Comprehension, attention , memory and reasoning are intact. Language and speech are normal. On cranial nerve examination pupils are equal round and reactive to light. Fundoscopic examination is normal. Visual acuity is adequate. Visual fields are full to finger confrontation. Extraocular motility shows nystagmus during pursuits. Face is asymmetric with right facial droop. Hearing is intact to finger rustle bilaterally. Motor examination - There is normal muscle tone and bulk. Power is full throughout.  Muscle stretch reflexes are normoactive and there are no pathological reflexes present. Sensation is intact to light touch, pinprick, vibration and proprioception in all extremities. Cerebellar examination shows nystagmus and ataxia with finger-to-nose testing, worse in the left. He cannot ambulate due to unsteadiness. NIHSS   NIHSS Score: 1  1a-Level of Consciousness 0  1b-What is Month/Age 0  1c-Open/Close Eyes&Hand 0  2 -Best Gaze 0  3 -Visual Fields 0  4 -Facial Palsy 0  5a-Motor-Left Arm 0  5b-Motor-Right Arm 0  6a-Motor-Left Leg 0  6b-Motor-Right Leg 0  7 -Limb Ataxia 1  8 -Sensory 0  9 -Best Language 0  10-Dysarthria 0  11-Extinction/Inattention 0    Lab Results   Component Value Date/Time    Cholesterol, total 140 10/03/2018 09:52 AM    HDL Cholesterol 32 (L) 10/03/2018 09:52 AM    LDL, calculated 84 10/03/2018 09:52 AM    VLDL, calculated 24 10/03/2018 09:52 AM    Triglyceride 122 10/03/2018 09:52 AM        Lab Results   Component Value Date/Time    Hemoglobin A1c 6.2 (H) 10/03/2018 09:52 AM    Hemoglobin A1c (POC) 7.1 11/17/2017 09:29 AM        CT Results (most recent): Personally Reviewed   Results from Hospital Encounter encounter on 06/08/20   CTA HEAD NECK W WO CONT    Narrative Title:  CT arteriogram of the neck and head. Indication: Cerebrovascular accident (CVA). Dizziness. .    Technique: Axial images of the neck and head were obtained after the uneventful   administration of intravenous iodinated contrast media. Contrast was used to  best identify the arterial structures. Images were reviewed on a separate, free  standing, three-dimensional workstation as per the referring physicians request.       All stenosis percentages derived by comparing the narrowest segment with the  distal Internal Carotid Artery luminal diameter, as described in the [place]  American Symptomatic Carotid Endarterectomy Trial (NASCET) criteria.      All CT scans at this facility are performed using dose reduction/dose modulation  techniques, as appropriate the performed exam, including the following:   Automated Exposure Control; Adjustment of the mA and/or kV according to patient  size (this includes techniques or standardized protocols for targeted exams  where dose is matched to indication/reason for exam); and Use of Iterative  Reconstruction Technique. Comparison: None. Findings:     Lungs:  No focal consolidation or pleural effusions. No suspicious pulmonary  nodules. .    Bones:  No osseous destruction. . Multilevel degenerative changes with facet  arthropathy in the cervical spine. There is advanced degenerative disc disease  present at C5-C6. Paranasal sinuses:  Mucus retention cysts versus polyps in the bilateral  maxillary sinuses. .    Brain:  No midline shift. No enhancing mass lesion or hydrocephalus. .    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 There is a left-sided thyroid mass measuring 2 cm. .      Dural venous sinuses:  Patent. Aortic arch:  Nonaneurysmal. Mild calcific atherosclerosis. .    Right brachiocephalic artery:  No significant stenosis or occlusion. .  . Right subclavian artery:  No significant stenosis or occlusion. .  . Left subclavian artery:  No significant stenosis or occlusion. .  . Right common carotid artery:  No significant stenosis or occlusion. .  . Right external carotid artery:  No significant stenosis or occlusion. .  . Right internal carotid artery:  No significant stenosis or occlusion. . Mild  calcified atherosclerosis at the carotid bulb. Vascular calcifications along the  carotid siphon. .     Left common carotid artery: No significant stenosis or occlusion. .  . Left external carotid artery:  No significant stenosis or occlusion. .  . Left internal carotid artery:  No significant stenosis or occlusion. .  Mild  calcified atherosclerosis at the carotid bulb. Vascular calcifications along the  carotid siphon. .     Right vertebral artery:  No significant stenosis or occlusion in the right  vertebral artery. .. There is decreased opacification of the right PICA  approximately 1 cm from its origin as seen on image 387. Left vertebral artery:  No significant stenosis or occlusion. .   Basilar artery:  No significant stenosis, occlusion, or aneurysm. .      Right middle cerebral artery:  The right M1 segment is without stenosis or  occlusion. There is a high-grade stenosis involving a right M2/M3 branch in the  sylvian fissure as seen on image 477. Right anterior cerebral artery:  No significant stenosis, occlusion, or  aneurysm. Nilsa Salen Anterior communicating artery: No significant stenosis, occlusion, or aneurysm. Nilsa Devikan Left middle cerebral artery: Moderate stenosis in the mid left M1 segment  without focal occlusion or thrombus. .    Left anterior cerebral artery:  No significant stenosis, occlusion, or  aneurysm. .      Right posterior communicating artery: No significant stenosis, occlusion, or  aneurysm. Nilsa Salen Left posterior communicating artery:  No significant stenosis, occlusion, or  aneurysm. .      Right posterior cerebral artery:  Multifocal moderate stenoses in the proximal  right PCA. Nilsa Fortunen Left posterior cerebral artery:  No significant stenosis, occlusion, or  aneurysm. .        Impression Impression:    1. There is decreased opacification of the right PICA approximately 1 cm from  its origin. Possible occlusion or high-grade stenosis. Consider MRI for further  evaluation. 2.  Generalized atherosclerotic changes with intracranial stenoses as follows. High-grade stenosis involving a right M2/M3 branch in the sylvian fissure. Moderate stenosis in the mid left M1 segment. Multifocal moderate stenoses in  the proximal right PCA. No high-grade stenosis or occlusion in the carotid or  vertebral arteries. 3.  Right thyroid mass measuring up to 5.2 cm. Recommend follow-up thyroid  ultrasound. .           Results for orders placed or performed during the hospital encounter of 06/08/20   EKG, 12 LEAD, INITIAL   Result Value Ref Range    Ventricular Rate 57 BPM    Atrial Rate 214 BPM    QRS Duration 78 ms    Q-T Interval 450 ms    QTC Calculation (Bezet) 438 ms    Calculated R Axis 2 degrees    Calculated T Axis 25 degrees    Diagnosis       !! AGE AND GENDER SPECIFIC ECG ANALYSIS !!  Junctional rhythm  Inferior infarct (cited on or before 08-FEB-2019)  Abnormal ECG  When compared with ECG of 08-JUN-2020 07:34,  No significant change was found          MRI Results (most recent): Personally Reviewed  Results from Hospital Encounter encounter on 02/08/19   MRI BRAIN WO CONT    Narrative MRI BRAIN WITHOUT CONTRAST 2/8/2019    HISTORY: 73-year-old male with numbness in lips jaw and tongue since 1/17/2019. Facial melanoma. TECHNIQUE: Sagittal and axial T1-weighted, axial T2-weighted, axial and coronal  FLAIR, axial T2-weighted gradient-echo, axial diffusion weighted images with ADC  maps of the brain. COMPARISON: None available    FINDINGS: There is a small focus of restricted diffusion in the right thalamus. There is no associated hemorrhage or mass effect. On the T2-weighted and FLAIR sequences, there are scattered white matter  hyperintensities. This pattern may be present with chronic small vessel ischemic  disease or with migraine headaches. Mucosal thickening is present in the right  maxillary sinus. There is no hydrocephalus, intra-axial mass, or abnormal extra-axial fluid  collection. There is likely an old lacunar infarct in the right hypothalamus. Punctate foci  of signal loss are present on the T2*GRE sequence in the anterior right frontal  lobe. These are likely hemosiderin deposits, potentially from an old injury. Impression IMPRESSION:    1. Acute to subacute right thalamic lacunar infarct. 2. White matter findings compatible with chronic small vessel ischemic disease.       The technologist called results to Dr. Vanessa Quiroga at 2110 instructed him to refer  the patient to the emergency department. Most recent Echo  No results found for this visit on 06/08/20. Assessment:     1. Acute ischemic stroke    It is highly probable that the patient has an acute ischemic stroke. His symptoms localize to the pontomedullary region of the brainstem, on the right. CTA shows multiple stenoses in various vessels (see report). Plan:     · Start ASA 81 mg daily. Will likely load with Plavix   · Neurochecks Q4H  · BMRI ordered   · Bedside swallow test   · Labs: A1c, FLP  · Telemetry and echocardiogram with bubble study  · PT/OT/ST  · Lovenox or heparin for DVT ppx   · BP management - allow permissive HTN to 220/120, treat with labetalol 10 mg IV or nicardipine gtt  · Depression Screening       Signed By: Gayla Batista DO     June 8, 2020      Medical decision making: A high degree of medical decision making was made for this patient. He has a life-threatening condition, acute ischemic stroke. I reviewed the patient's labs, MRI of the brain, CTA of the head and neck, CT scan of the head. I coordinated care with the patient's emergency department doctor.

## 2020-06-08 NOTE — PROGRESS NOTES
TRANSFER - IN REPORT:    Verbal report received from April RN on Leandra Pyle  being received from ER for routine progression of care      Report consisted of patients Situation, Background, Assessment and   Recommendations(SBAR). Information from the following report(s) SBAR was reviewed with the receiving nurse. Opportunity for questions and clarification was provided. Assessment completed upon patients arrival to unit and care assumed.

## 2020-06-08 NOTE — PROGRESS NOTES
Primary Nurse Michaela Santana RN and Anyi Hui RN performed a dual skin assessment on this patient No impairment noted  Asif score is 15

## 2020-06-08 NOTE — PROGRESS NOTES
Problem: Dysphagia (Adult)  Goal: *Acute Goals and Plan of Care (Insert Text)  Description: LTG: Patient will tolerate least restrictive diet without overt signs or symptoms of airway compromise. STG: Patient will tolerate regular diet and thin liquids without overt signs or symptoms of airway compromise. STG: Patient will participate in modified barium swallow study as clinically indicated. Outcome: Progressing Towards Goal  STG: Patient will participate in cognitive linguistic evaluation if deemed appropriate pending imaging      SPEECH LANGUAGE PATHOLOGY: DYSPHAGIA- Initial Assessment    NAME/AGE/GENDER: Eduardo Rodriguez is a 69 y.o. male  DATE: 6/8/2020  PRIMARY DIAGNOSIS: [facility] [I63.9]       ICD-10: Treatment Diagnosis: R13.13 Dysphagia, Pharyngeal Phase    RECOMMENDATIONS   DIET:    PO:  Regular   Liquids:  regular thin    MEDICATIONS: With liquid     ASPIRATION PRECAUTIONS  · Slow rate of intake  · Small bites/sips  · Upright at 90 degrees during meal     COMPENSATORY STRATEGIES/MODIFICATIONS  · Small sips and bites  · Slow rate     EDUCATION:  · Recommendations discussed with Nursing  · Family  · Patient     CONTINUATION OF SKILLED SERVICES/MEDICAL NECESSITY:   Patient is expected to demonstrate progress in  swallow strength, swallow timeliness, swallow function, diet tolerance, and swallow safety in order to  improve swallow safety, work toward diet advancement, and decrease aspiration risk.  Patient continues to require skilled intervention due to dysphagia. RECOMMENDATIONS for CONTINUED SPEECH THERAPY:   YES: Anticipate need for ongoing speech therapy during this hospitalization and at next level of care.        ASSESSMENT   Patient presents with functional oropharyngeal swallow during evaluation, but complaints of apple sauce and banana sticking (pointing to upper esophagus) this AM. No overt s/sx airway compromise and no s/sx concerning for reduced pharyngeal clearance at bedside. Recommend continue regular diet and thin liquids. Meds with liquid rinse. Will follow up for diet tolerance and cognitive linguistic evaluation if indicated pending MRI. REHABILITATION POTENTIAL FOR STATED GOALS: Excellent    PLAN    FREQUENCY/DURATION: Continue to follow patient 2 times a week for duration of hospital stay to address above goals. - Recommendations for next treatment session: Next treatment will address diet tolerance     SUBJECTIVE   Upright in bed. Wife present. History of Present Injury/Illness: Mr. Avi Fuentes  has a past medical history of Essential hypertension, benign (1/3/2014), Mixed hyperlipidemia (1/3/2014), Numbness and tingling in left hand (7/2/2019), and Right thalamic infarction ([facility] [address] (7/2/2019). . He also  has no past surgical history on file. Problem List:  (Impairments causing functional limitations):  1. Possible pharyngeal dysphagia    Previous Dysphagia: YES patient failed STAND for throat clearing/coughing with puree. Patient also reports sticking sensation with banana brought in by his wife. Diet Prior to Evaluation: regular diet/thin liquids. Orientation:   Person  Place  Time  Situation    Pain: Pain Scale 1: Numeric (0 - 10)  Pain Intensity 1: 0    Cognitive-Linguistic Screen:   Speech Production:   o WNL   Expressive Language:  o WNL     Receptive Language:  o WNL     Cognition:   o Denies changes but may need full evaluation pending MRI    OBJECTIVE   Oral Motor:   · Labial: No impairment  · Dentition: Intact  · Oral Hygiene: Adequate  · Lingual: No impairment    Swallow evaluation:  Patient presented with thin liquid via cup and straw, puree, mixed, and solid consistencies. Audible swallow with all consistencies. Appropriate oral prep with all textures. Timely swallow initiation. Double swallow upon palpation. Adequate oral clearing. No overt signs or symptoms of airway compromise observed with liquid or solid textures.  Mild throat clear with coarse solids x1 that appeared due to dry consistency. INTERDISCIPLINARY COLLABORATION: Registered Nurse  PRECAUTIONS/ALLERGIES: Patient has no known allergies. Tool Used: Dysphagia Outcome and Severity Scale (ADINA)    Score Comments   Normal Diet  [] 7 With no strategies or extra time needed   Functional Swallow  [x] 6 May have mild oral or pharyngeal delay   Mild Dysphagia  [] 5 Which may require one diet consistency restricted    Mild-Moderate Dysphagia  [] 4 With 1-2 diet consistencies restricted   Moderate Dysphagia  [] 3 With 2 or more diet consistencies restricted   Moderate-Severe Dysphagia  [] 2 With partial PO strategies (trials with ST only)   Severe Dysphagia  [] 1 With inability to tolerate any PO safely      Score:  Initial: 6 Most Recent: x (Date 06/08/20 )   Interpretation of Tool: The Dysphagia Outcome and Severity Scale (ADINA) is a simple, easy-to-use, 7-point scale developed to systematically rate the functional severity of dysphagia based on objective assessment and make recommendations for diet level, independence level, and type of nutrition. Current Medications:   No current facility-administered medications on file prior to encounter. Current Outpatient Medications on File Prior to Encounter   Medication Sig Dispense Refill    mupirocin (BACTROBAN) 2 % ointment Apply  to affected area daily. 22 g 0    doxazosin (CARDURA) 8 mg tablet Take 1 Tab by mouth two (2) times a day. 180 Tab 1    atorvastatin (LIPITOR) 40 mg tablet Take 1 Tab by mouth daily. 90 Tab 3    loratadine (CLARITIN) 10 mg tablet Take 10 mg by mouth daily.          After treatment position/precautions:  · Upright in bed  · RN notified  · Call light within reach    Total Treatment Duration:   Time In: 9113  Time Out: [address] [facility] MEDICO DEL [facility], [facility], [address] [place] [address]

## 2020-06-08 NOTE — ED TRIAGE NOTES
Pt arrives via [facility] ems from home. Pt caleld out for dizziness since Friday. Pt states he got dizzy this morning and lowered himself to the ground. Pt had stroke one year ago and faical droop right side is a deficit. Pt a/o x 4. Pt denies pain. Pt describes dizziness as off balanced. Pt states he last urinated yesterday afternoon. Pt states he feels the need to go but is not able to urinate.  EKG SB denies cardiac hx 160/110 50's 100%RA Pt presents with 20G LAC.

## 2020-06-08 NOTE — ED PROVIDER NOTES
[facility] Geronimo Michaud is a 69 y.o. male seen on 6/8/2020 at 8:32 AM in the [facility] EMERGENCY DEPT in room ER05/05. Chief Complaint   Patient presents with    Urinary Retention     HPI: 56-year-old male presenting to the emergency department complaining of dizziness. Patient states that he woke up Friday morning with dizziness. He states that initially it was manageable. He would be able to move around, having occasional dizziness that was worse with standing up. On Saturday it seemed to worsen some, but they both note that when he would sit in a recliner he seemed better. However he would have to hold onto the walls and towel rack in sink to be able to move across through the bathroom without falling. Today he states he was driving and became so acutely off balance that he had to stop driving. He notes that his dizziness is now constant, whether he is laying or sitting moving or sitting still. He feels like he is going to fall over. He denies nausea or vomiting. He has had some droop in his right eyelid which his wife notes is possibly new. However on my exam he has some right-sided facial weakness. When asked, his wife states that she thinks that that could be old, but is not definitely sure. He denies numbness or tingling. He does have a history of old stroke with some persistent numbness in his left fingertips. Patient also notes that he feels like he has been unable to urinate since yesterday evening. He feels pressure low in his abdomen. Historian: patient, wife    REVIEW OF SYSTEMS     Review of Systems   Constitutional: Negative for fatigue and fever. HENT: Negative. Eyes: Negative. Respiratory: Negative for cough, chest tightness, shortness of breath and wheezing. Cardiovascular: Negative for chest pain. Gastrointestinal: Negative for abdominal distention, abdominal pain, diarrhea, nausea and vomiting.    Genitourinary: Negative for dysuria, flank pain, frequency, genital sores and urgency. Musculoskeletal: Negative. Skin: Negative for rash. Neurological: Positive for dizziness and light-headedness. Negative for seizures, syncope, speech difficulty, weakness, numbness and headaches. All other systems reviewed and are negative. PAST MEDICAL HISTORY     Past Medical History:   Diagnosis Date    Essential hypertension, benign 1/3/2014    Mixed hyperlipidemia 1/3/2014    Numbness and tingling in left hand 7/2/2019    Right thalamic infarction ([address] 7/2/2019     No past surgical history on file. Social History     Socioeconomic History    Marital status:      Spouse name: Not on file    Number of children: Not on file    Years of education: Not on file    Highest education level: Not on file   Tobacco Use    Smoking status: Never Smoker    Smokeless tobacco: Never Used     Prior to Admission Medications   Prescriptions Last Dose Informant Patient Reported? Taking?   atorvastatin (LIPITOR) 40 mg tablet   No No   Sig: Take 1 Tab by mouth daily. doxazosin (CARDURA) 8 mg tablet   No No   Sig: Take 1 Tab by mouth two (2) times a day. loratadine (CLARITIN) 10 mg tablet   Yes No   Sig: Take 10 mg by mouth daily. mupirocin (BACTROBAN) 2 % ointment   No No   Sig: Apply  to affected area daily. Facility-Administered Medications: None     No Known Allergies     PHYSICAL EXAM       Vitals:    06/08/20 0732   BP: 198/86   Pulse: (!) 57   Resp: 16   Temp: 97.9 °F (36.6 °C)   SpO2: 93%    Vital signs were reviewed. Physical Exam  Vitals signs reviewed. Constitutional:       General: He is not in acute distress. Appearance: He is well-developed. He is not diaphoretic. HENT:      Head: Normocephalic and atraumatic. Eyes:      General: No visual field deficit. Extraocular Movements:      Right eye: Nystagmus present. Left eye: Nystagmus present.       Pupils: Pupils are equal, round, and reactive to light.   Neck:      Musculoskeletal: Normal range of motion and neck supple. Cardiovascular:      Rate and Rhythm: Normal rate and regular rhythm. Heart sounds: Normal heart sounds. No murmur. No friction rub. No gallop. Pulmonary:      Effort: Pulmonary effort is normal. No respiratory distress. Breath sounds: Normal breath sounds. No wheezing. Abdominal:      General: Bowel sounds are normal. There is no distension. Palpations: Abdomen is soft. There is no mass. Tenderness: There is no abdominal tenderness. There is no guarding or rebound. Musculoskeletal: Normal range of motion. General: No tenderness or deformity. Skin:     General: Skin is warm. Findings: No erythema or rash. Neurological:      Mental Status: He is alert and oriented to person, place, and time. GCS: GCS eye subscore is 4. GCS verbal subscore is 5. GCS motor subscore is 6. Cranial Nerves: Facial asymmetry (R sided facial weakness) present. No dysarthria. Sensory: Sensation is intact. No sensory deficit. Motor: Motor function is intact. No weakness, tremor, atrophy or abnormal muscle tone. Coordination: Coordination abnormal. Finger-Nose-Finger Test abnormal (when finger is in L side of visual field). Heel to [facility] Test normal.      Comments: Unable to test gait or Romberg as pt is unable to sit up or stand due to severe dizziness, frontalis muscles are equal and symmetric   Psychiatric:         Behavior: Behavior normal.         Thought Content:  Thought content normal.          MEDICAL DECISION MAKING     MDM  Number of Diagnoses or Management Options     Amount and/or Complexity of Data Reviewed  Clinical lab tests: ordered and reviewed  Tests in the radiology section of CPT®: ordered and reviewed  Review and summarize past medical records: yes  Discuss the patient with other providers: yes    Risk of Complications, Morbidity, and/or Mortality  Presenting problems: high  Diagnostic procedures: high  Management options: high      Procedures    ED Course: Onset of symptoms 3 days ago, the patient is not a candidate for TPA or endovascular treatment. However I do have high suspicion for cerebellar stroke. Also the patient, by history, appears to have some urinary retention. Mena catheter will be placed. Will obtain CT and CTA head and neck    8:39 AM  Mena placed, the patient immediately had 800 mL of urine out with improvement in his discomfort. 8:52 AM  Neurology consulted, in room at this time evaluating the patient. Disposition:  Admit to the hospitalist service  Diagnosis:  Acute cva  ____________________________________________________________________  A portion of this note was generated using voice recognition dictation software. While the note has been reviewed for accuracy, please note certain words and phrases may not be transcribed as intended and some grammatical and/or typographical errors may be present.

## 2020-06-08 NOTE — PROGRESS NOTES
Problem: Falls - Risk of  Goal: *Absence of Falls  Description: Document Mar Blood Fall Risk and appropriate interventions in the flowsheet.   Outcome: Progressing Towards Goal  Note: Fall Risk Interventions:  Mobility Interventions: OT consult for ADLs, Patient to call before getting OOB, PT Consult for mobility concerns, PT Consult for assist device competence, Utilize walker, cane, or other assistive device         Medication Interventions: Assess postural VS orthostatic hypotension, Patient to call before getting OOB    Elimination Interventions: Call light in reach, Patient to call for help with toileting needs, Toileting schedule/hourly rounds              Problem: Patient Education: Go to Patient Education Activity  Goal: Patient/Family Education  Outcome: Progressing Towards Goal

## 2020-06-09 LAB
ATRIAL RATE: 214 BPM
CALCULATED R AXIS, ECG10: 2 DEGREES
CALCULATED T AXIS, ECG11: 25 DEGREES
CHOLEST SERPL-MCNC: 161 MG/DL
DIAGNOSIS, 93000: NORMAL
EST. AVERAGE GLUCOSE BLD GHB EST-MCNC: 177 MG/DL
GLUCOSE BLD STRIP.AUTO-MCNC: 127 MG/DL (ref 65–100)
GLUCOSE BLD STRIP.AUTO-MCNC: 147 MG/DL (ref 65–100)
GLUCOSE BLD STRIP.AUTO-MCNC: 153 MG/DL (ref 65–100)
GLUCOSE BLD STRIP.AUTO-MCNC: 179 MG/DL (ref 65–100)
HBA1C MFR BLD: 7.8 % (ref 4.8–6)
HDLC SERPL-MCNC: 31 MG/DL (ref 40–60)
HDLC SERPL: 5.2 {RATIO}
LDLC SERPL CALC-MCNC: 108.6 MG/DL
LIPID PROFILE,FLP: ABNORMAL
Q-T INTERVAL, ECG07: 450 MS
QRS DURATION, ECG06: 78 MS
QTC CALCULATION (BEZET), ECG08: 438 MS
TRIGL SERPL-MCNC: 107 MG/DL (ref 35–150)
VENTRICULAR RATE, ECG03: 57 BPM
VLDLC SERPL CALC-MCNC: 21.4 MG/DL (ref 6–23)

## 2020-06-09 PROCEDURE — 82962 GLUCOSE BLOOD TEST: CPT

## 2020-06-09 PROCEDURE — 74011250636 HC RX REV CODE- 250/636: Performed by: HOSPITALIST

## 2020-06-09 PROCEDURE — 74011000250 HC RX REV CODE- 250

## 2020-06-09 PROCEDURE — 36415 COLL VENOUS BLD VENIPUNCTURE: CPT

## 2020-06-09 PROCEDURE — 99232 SBSQ HOSP IP/OBS MODERATE 35: CPT | Performed by: PSYCHIATRY & NEUROLOGY

## 2020-06-09 PROCEDURE — 92526 ORAL FUNCTION THERAPY: CPT

## 2020-06-09 PROCEDURE — 65270000029 HC RM PRIVATE

## 2020-06-09 PROCEDURE — 74011000250 HC RX REV CODE- 250: Performed by: HOSPITALIST

## 2020-06-09 PROCEDURE — 92523 SPEECH SOUND LANG COMPREHEN: CPT

## 2020-06-09 PROCEDURE — 74011250637 HC RX REV CODE- 250/637: Performed by: PSYCHIATRY & NEUROLOGY

## 2020-06-09 PROCEDURE — 80061 LIPID PANEL: CPT

## 2020-06-09 PROCEDURE — C8929 TTE W OR WO FOL WCON,DOPPLER: HCPCS

## 2020-06-09 PROCEDURE — 83036 HEMOGLOBIN GLYCOSYLATED A1C: CPT

## 2020-06-09 PROCEDURE — 74011250637 HC RX REV CODE- 250/637: Performed by: INTERNAL MEDICINE

## 2020-06-09 PROCEDURE — 74011250636 HC RX REV CODE- 250/636: Performed by: INTERNAL MEDICINE

## 2020-06-09 PROCEDURE — 74011636637 HC RX REV CODE- 636/637: Performed by: INTERNAL MEDICINE

## 2020-06-09 PROCEDURE — 97166 OT EVAL MOD COMPLEX 45 MIN: CPT

## 2020-06-09 PROCEDURE — 74011250637 HC RX REV CODE- 250/637: Performed by: HOSPITALIST

## 2020-06-09 PROCEDURE — 97535 SELF CARE MNGMENT TRAINING: CPT

## 2020-06-09 RX ORDER — ATORVASTATIN CALCIUM 80 MG/1
80 TABLET, FILM COATED ORAL
Status: DISCONTINUED | OUTPATIENT
Start: 2020-06-09 | End: 2020-06-14 | Stop reason: HOSPADM

## 2020-06-09 RX ADMIN — INSULIN LISPRO 2 UNITS: 100 INJECTION, SOLUTION INTRAVENOUS; SUBCUTANEOUS at 22:17

## 2020-06-09 RX ADMIN — SODIUM CHLORIDE 75 ML/HR: 9 INJECTION, SOLUTION INTRAVENOUS at 06:34

## 2020-06-09 RX ADMIN — LORATADINE 10 MG: 10 TABLET ORAL at 09:13

## 2020-06-09 RX ADMIN — CLOPIDOGREL BISULFATE 75 MG: 75 TABLET ORAL at 09:13

## 2020-06-09 RX ADMIN — ENOXAPARIN SODIUM 40 MG: 40 INJECTION SUBCUTANEOUS at 14:56

## 2020-06-09 RX ADMIN — DOXAZOSIN 8 MG: 4 TABLET ORAL at 09:13

## 2020-06-09 RX ADMIN — Medication 5 ML: at 22:19

## 2020-06-09 RX ADMIN — INSULIN LISPRO 2 UNITS: 100 INJECTION, SOLUTION INTRAVENOUS; SUBCUTANEOUS at 11:48

## 2020-06-09 RX ADMIN — DOXAZOSIN 8 MG: 4 TABLET ORAL at 22:15

## 2020-06-09 RX ADMIN — ATORVASTATIN CALCIUM 80 MG: 80 TABLET, FILM COATED ORAL at 22:15

## 2020-06-09 RX ADMIN — PERFLUTREN 1 ML: 6.52 INJECTION, SUSPENSION INTRAVENOUS at 13:00

## 2020-06-09 RX ADMIN — ASPIRIN 81 MG 81 MG: 81 TABLET ORAL at 09:13

## 2020-06-09 NOTE — PROGRESS NOTES
History and Physical    Patient: Keshav Sanchez MRN: [id number]  SSN: [id number]    YOB: 1945  Age: 69 y.o. Sex: male      Subjective:      Keshav Sanchez is a 69 y.o. male who came to ER due to inbility to urinate since last evening and dizziness since 3 days ago. 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 He says that he takes aspirin 325 mg po q day \"as needed\". Three days ago, he felt dizzy when trying to get up from bed. He felt weak. He rested and over the course of the day, he felt better. He was able to drive for the past 2 days. Last night, he started to have trouble urinating. He has had this before but not to the point that it got worse. This morning, he could not urinate at all. He came to ER and Mena's catheter was placed and urine of 800 mL amount was obtained. No fever. No shaking. No chills. No head injury. No loss of consciousness no abnormal movement. Code stroke was activated in ER. Patient is out of window for tPA treatment. CT was done with showed findings below. wife states that right eyelid seems more swollen than the other eye. Hospitalist service is requested to admit the patient. 06/09 patient reports feeling better this morning. No headache. No tingling numbness or weakness. Past Medical History:   Diagnosis Date    Essential hypertension, benign 1/3/2014    Mixed hyperlipidemia 1/3/2014    Numbness and tingling in left hand 7/2/2019    Right thalamic infarction ([facility] [address] 7/2/2019     No past surgical history on file. No family history on file. Social History     Tobacco Use    Smoking status: Never Smoker    Smokeless tobacco: Never Used   Substance Use Topics    Alcohol use: Not on file      Prior to Admission medications    Medication Sig Start Date End Date Taking?  Authorizing Provider   mupirocin (BACTROBAN) 2 % ointment Apply  to affected area daily. 1/15/20   Hillary Murrieta MD   doxazosin (CARDURA) 8 mg tablet Take 1 Tab by mouth two (2) times a day. 10/10/19   Sanjuana Rubalcava MD   atorvastatin (LIPITOR) 40 mg tablet Take 1 Tab by mouth daily. 10/10/19   Sanjuaan Rubalcava MD   loratadine (CLARITIN) 10 mg tablet Take 10 mg by mouth daily. Provider, Historical      Family history   No hereditary conditions. No Known Allergies    Review of Systems:    10-point review of systems is negative except what is mentioned in the present illness section. Objective:     Vitals:    06/09/20 0000 06/09/20 0400 06/09/20 0800 06/09/20 1200   BP: 149/74 (!) 176/94 184/61 176/72   Pulse: 62 (!) 58 (!) 55 60   Resp: 20 20 20 19   Temp: 98 °F (36.7 °C) 98.1 °F (36.7 °C) 98 °F (36.7 °C) 97.9 °F (36.6 °C)   SpO2: 91% 92% 94% 93%   Weight:       Height:            Physical Exam:    General:                    The patient is a pleasant elderly male in no acute respiratory distress. he is lying flat iin bed and talking well. Wife is at bedside. Head:                                   Normocephalic/atraumatic. Eyes:                                   right eyelid and surrounding area is more swollen than the left eye. ENT:                                    External auricular and nasal exam within normal limits. Questionable left facial palsy. Mucous membranes are dry. Neck:                                   Supple, non-tender, no JVD. Lungs:                       Clear to auscultation bilaterally without wheezes or crackles. No respiratory distress or accessory muscle use. Heart:                                  Regular rate and rhythm, without murmurs, rubs, or gallops. Abdomen:                  Soft, non-tender, distended due to truncal obesity with normoactive bowel sounds. Genitourinary:           No tenderness over the bladder or bilateral CVAs.   Extremities: Without clubbing, cyanosis, or edema. Skin:                                    Normal color, texture, and turgor. No rashes, lesions, or jaundice. Pulses:                      Radial and dorsalis pedis pulses present 2+ bilaterally. Capillary refill <2s. Neurologic:                CN II-XII grossly intact and symmetrical.                                               Moving all four extremities well with no focal deficits. Psychiatric:                Pleasant demeanor, appropriate affect. Alert and oriented x 3    Lab and data    Recent Results (from the past 24 hour(s))   GLUCOSE, POC    Collection Time: 06/08/20  4:33 PM   Result Value Ref Range    Glucose (POC) 202 (H) 65 - 100 mg/dL   GLUCOSE, POC    Collection Time: 06/08/20 10:39 PM   Result Value Ref Range    Glucose (POC) 122 (H) 65 - 100 mg/dL   LIPID PANEL    Collection Time: 06/09/20  6:05 AM   Result Value Ref Range    LIPID PROFILE          Cholesterol, total 161 <200 MG/DL    Triglyceride 107 35 - 150 MG/DL    HDL Cholesterol 31 (L) 40 - 60 MG/DL    LDL, calculated 108.6 (H) <100 MG/DL    VLDL, calculated 21.4 6.0 - 23.0 MG/DL    CHOL/HDL Ratio 5.2     HEMOGLOBIN A1C WITH EAG    Collection Time: 06/09/20  6:05 AM   Result Value Ref Range    Hemoglobin A1c 7.8 (H) 4.8 - 6.0 %    Est. average glucose 177 mg/dL   GLUCOSE, POC    Collection Time: 06/09/20  7:28 AM   Result Value Ref Range    Glucose (POC) 127 (H) 65 - 100 mg/dL   GLUCOSE, POC    Collection Time: 06/09/20 11:46 AM   Result Value Ref Range    Glucose (POC) 179 (H) 65 - 100 mg/dL       CT head   6-8-2020  IMPRESSION:  Negative for acute intracranial abnormality. Chronic changes. CTA head neck   6-8-2020  Impression:     1. There is decreased opacification of the right PICA approximately 1 cm from  its origin. Possible occlusion or high-grade stenosis. Consider MRI for further  evaluation.   2.  Generalized atherosclerotic changes with intracranial stenoses as follows. High-grade stenosis involving a right M2/M3 branch in the sylvian fissure. Moderate stenosis in the mid left M1 segment. Multifocal moderate stenoses in  the proximal right PCA. No high-grade stenosis or occlusion in the carotid or  vertebral arteries. 3.  Right thyroid mass measuring up to 5.2 cm. Recommend follow-up thyroid  ultrasound. .     I have reviewed ECG myself. Assessment:     Hospital Problems  Date Reviewed: 5/1/2020          Codes Class Noted POA    Urinary retention ICD-10-CM: R33.9  ICD-9-CM: 788.20  6/8/2020 Unknown        Stroke (cerebrum) ([facility] ICD-10-CM: I63.9  ICD-9-CM: 434.91  6/8/2020 Unknown        Severe obesity ([facility] ICD-10-CM: E66.01  ICD-9-CM: 278.01  7/2/2019 Yes        Controlled type 2 diabetes mellitus without complication, without long-term current use of insulin ([facility] ICD-10-CM: E11.9  ICD-9-CM: 250.00  2/22/2019 Yes        * (Principal) Cerebrovascular accident (CVA) due to occlusion [facility]) ICD-10-CM: I63.9  ICD-9-CM: 434.91  2/12/2019 Yes        Essential hypertension, benign ICD-10-CM: I10  ICD-9-CM: 401.1  1/3/2014 Yes        Mixed hyperlipidemia ICD-10-CM: E78.2  ICD-9-CM: 272.2  1/3/2014 Yes              Plan:     Acute inferior right cerebellar and right dorsolateral medulla stroke POA  Neuro and vital signs monitoring. IV fluid   Aspirin, Plavix (DAPT for 90 days, then monotherapy)   Statin   Physical therapy  Occupational therapy. Symptomatic treatments     Hypertension  Monitor blood pressure and manage accordingly. Continue home medications. Diabetes mellitus type 2  Monitor blood sugar. Cover with insulin sliding scale accordingly. Urinary retention   Will consult urology   Keep Mena catheter for now. PT eval pending. May need home health on dc. Healthcare power of  is wife. I have discussed with patient regarding advance directive. Patient would like to have a DNR status.   Wife is the witness and concurs. I have discussed the plan of care with patient and spouse at bedside.     DVT prophylaxis : SCD     Signed By: Ismael Gleason MD     June 9, 2020

## 2020-06-09 NOTE — PROGRESS NOTES
Neurology Daily Progress Note     Assessment:     69year old male with acute ischemic stroke, right cerebellar hemisphere and right dorsolateral medulla. Likely secondary to intracranial atherosclerosis. Will continue DAPT with aspirin and Plavix for 90 days, then switch to monotherapy. Plan:     · Continue aspirin 81 mg and Plavix 75 mg daily for 90 days, then switch to monotherapy   · Continue statin   · Neurochecks Q4H  · Telemetry and echocardiogram with bubble study  · PT/OT/ST  · DVT prophylaxis   · BP management - normotensive, with long-term goal <130/80  · Smoking cessation if applicable   · Diabetes education if applicable   · Depression Screening prior to discharge      Subjective: Interval history:    Patient reports that symptoms are improved. He did have one episode of dizziness and nausea earlier this morning. MRI demonstrates acute/SA infarcts within the inferior right cerebellar hemisphere and right dorsolateral medulla. CTA demonstrates possible PICA occlusion or high grade stenosis and generalized atherosclerotic changes. History:    Keshav Sanchez is a 69 y.o. male who is being seen for stroke. Review of systems negative with exception of pertinent positives and negatives noted above.        Objective:     Vitals:    06/08/20 0732 06/09/20 0000 06/09/20 0400 06/09/20 0725   BP:  149/74 (!) 176/94 184/61   Pulse:  62 (!) 58 (!) 55   Resp:  20 20 20   Temp:  98 °F (36.7 °C) 98.1 °F (36.7 °C) 98 °F (36.7 °C)   SpO2:  91% 92% 94%   Weight: 273 lb (123.8 kg)      Height: 5' 9.5\" (1.765 m)             Current Facility-Administered Medications:     atorvastatin (LIPITOR) tablet 80 mg, 80 mg, Oral, QHS, Sergio Perez MD    sodium chloride (NS) flush 5-40 mL, 5-40 mL, IntraVENous, Q8H, Celia Townsend MD, 10 mL at 06/08/20 1430    sodium chloride (NS) flush 5-40 mL, 5-40 mL, IntraVENous, PRN, Celia Townsend MD    0.9% sodium chloride infusion, 75 mL/hr, IntraVENous, Jaspreet Irving MD, Last Rate: 75 mL/hr at 06/09/20 0634, 75 mL/hr at 06/09/20 0634    acetaminophen (TYLENOL) tablet 650 mg, 650 mg, Oral, Q6H PRN, Celia Townsend MD    doxazosin (CARDURA) tablet 8 mg, 8 mg, Oral, BID, Celia Townsend MD, 8 mg at 06/09/20 0913    loratadine (CLARITIN) tablet 10 mg, 10 mg, Oral, DAILY, Celia Townsend MD, 10 mg at 06/09/20 0913    aspirin chewable tablet 81 mg, 81 mg, Oral, DAILY, Derick Pastor DO, 81 mg at 06/09/20 0913    insulin lispro (HUMALOG) injection, , SubCUTAneous, AC&HS, Celia Townsend MD, Stopped at 06/08/20 2200    enoxaparin (LOVENOX) injection 40 mg, 40 mg, SubCUTAneous, Q24H, Celia Townsend MD, 40 mg at 06/08/20 1511    clopidogreL (PLAVIX) tablet 75 mg, 75 mg, Oral, DAILY, Celia Townsend MD, 75 mg at 06/09/20 2303    Recent Results (from the past 12 hour(s))   GLUCOSE, POC    Collection Time: 06/08/20 10:39 PM   Result Value Ref Range    Glucose (POC) 122 (H) 65 - 100 mg/dL   LIPID PANEL    Collection Time: 06/09/20  6:05 AM   Result Value Ref Range    LIPID PROFILE          Cholesterol, total 161 <200 MG/DL    Triglyceride 107 35 - 150 MG/DL    HDL Cholesterol 31 (L) 40 - 60 MG/DL    LDL, calculated 108.6 (H) <100 MG/DL    VLDL, calculated 21.4 6.0 - 23.0 MG/DL    CHOL/HDL Ratio 5.2     HEMOGLOBIN A1C WITH EAG    Collection Time: 06/09/20  6:05 AM   Result Value Ref Range    Hemoglobin A1c 7.8 (H) 4.8 - 6.0 %    Est. average glucose 177 mg/dL   GLUCOSE, POC    Collection Time: 06/09/20  7:28 AM   Result Value Ref Range    Glucose (POC) 127 (H) 65 - 100 mg/dL     MRI Results (most recent):  Results from [place] encounter on 06/08/20   MRI BRAIN WO CONT    Narrative MRI BRAIN WITHOUT CONTRAST 6/8/2020    HISTORY: 53-year-old male with dizziness, gait abnormality and right-sided  facial weakness.     TECHNIQUE: Sagittal and axial T1-weighted, axial T2-weighted, axial and coronal  FLAIR, axial T2-weighted gradient-echo, axial diffusion weighted images with ADC  maps of the brain. COMPARISON: 2/8/2019    FINDINGS: There are multiple foci of restricted diffusion within the inferior  right cerebellar hemisphere and within the right dorsolateral aspect of the  medulla. These areas are not FLAIR hyperintense. There is no acute intracranial hemorrhage, hydrocephalus, intra-axial mass, or  extra-axial hematoma. On the T2-weighted and FLAIR sequences, there are scattered punctate white  matter hyperintensities compatible with mild chronic small vessel ischemic  disease. Minimal nodular mucosal thickening is present in the maxillary sinuses. There are old left cerebellar lacunar infarcts and there is likely an old right  hypothalamic lacunar infarct. Old hemosiderin deposits present in the anterior right frontal lobe, possibly  from remote head trauma. Impression IMPRESSION: Acute to early subacute infarcts in the inferior right cerebellar  hemisphere and right dorsolateral medulla. 12         CT Results (most recent):  Results from Hospital Encounter encounter on 06/08/20   CTA HEAD NECK W WO CONT    Narrative Title:  CT arteriogram of the neck and head. Indication: Cerebrovascular accident (CVA). Dizziness. .    Technique: Axial images of the neck and head were obtained after the uneventful   administration of intravenous iodinated contrast media. Contrast was used to  best identify the arterial structures. Images were reviewed on a separate, free  standing, three-dimensional workstation as per the referring physicians request.       All stenosis percentages derived by comparing the narrowest segment with the  distal Internal Carotid Artery luminal diameter, as described in the [place]  American Symptomatic Carotid Endarterectomy Trial (NASCET) criteria.      All CT scans at this facility are performed using dose reduction/dose modulation  techniques, as appropriate the performed exam, including the following:   Automated Exposure Control; Adjustment of the mA and/or kV according to patient  size (this includes techniques or standardized protocols for targeted exams  where dose is matched to indication/reason for exam); and Use of Iterative  Reconstruction Technique. Comparison: None. Findings:     Lungs:  No focal consolidation or pleural effusions. No suspicious pulmonary  nodules. .    Bones:  No osseous destruction. . Multilevel degenerative changes with facet  arthropathy in the cervical spine. There is advanced degenerative disc disease  present at C5-C6. Paranasal sinuses:  Mucus retention cysts versus polyps in the bilateral  maxillary sinuses. .    Brain:  No midline shift. No enhancing mass lesion or hydrocephalus. .    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 There is a left-sided thyroid mass measuring 2 cm. .      Dural venous sinuses:  Patent. Aortic arch:  Nonaneurysmal. Mild calcific atherosclerosis. .    Right brachiocephalic artery:  No significant stenosis or occlusion. .  . Right subclavian artery:  No significant stenosis or occlusion. .  . Left subclavian artery:  No significant stenosis or occlusion. .  . Right common carotid artery:  No significant stenosis or occlusion. .  . Right external carotid artery:  No significant stenosis or occlusion. .  . Right internal carotid artery:  No significant stenosis or occlusion. . Mild  calcified atherosclerosis at the carotid bulb. Vascular calcifications along the  carotid siphon. .     Left common carotid artery: No significant stenosis or occlusion. .  . Left external carotid artery:  No significant stenosis or occlusion. .  . Left internal carotid artery:  No significant stenosis or occlusion. .  Mild  calcified atherosclerosis at the carotid bulb. Vascular calcifications along the  carotid siphon. .     Right vertebral artery:  No significant stenosis or occlusion in the right  vertebral artery. .. There is decreased opacification of the right PICA  approximately 1 cm from its origin as seen on image 387. Left vertebral artery:  No significant stenosis or occlusion. .   Basilar artery:  No significant stenosis, occlusion, or aneurysm. .      Right middle cerebral artery:  The right M1 segment is without stenosis or  occlusion. There is a high-grade stenosis involving a right M2/M3 branch in the  sylvian fissure as seen on image 477. Right anterior cerebral artery:  No significant stenosis, occlusion, or  aneurysm. Kathrene Bolk Anterior communicating artery: No significant stenosis, occlusion, or aneurysm. Kathrene Bolk Left middle cerebral artery: Moderate stenosis in the mid left M1 segment  without focal occlusion or thrombus. .    Left anterior cerebral artery:  No significant stenosis, occlusion, or  aneurysm. .      Right posterior communicating artery: No significant stenosis, occlusion, or  aneurysm. Kathrene Bolk Left posterior communicating artery:  No significant stenosis, occlusion, or  aneurysm. .      Right posterior cerebral artery:  Multifocal moderate stenoses in the proximal  right PCA. Kathrene Bolk Left posterior cerebral artery:  No significant stenosis, occlusion, or  aneurysm. .        Impression Impression:    1. There is decreased opacification of the right PICA approximately 1 cm from  its origin. Possible occlusion or high-grade stenosis. Consider MRI for further  evaluation. 2.  Generalized atherosclerotic changes with intracranial stenoses as follows. High-grade stenosis involving a right M2/M3 branch in the sylvian fissure. Moderate stenosis in the mid left M1 segment. Multifocal moderate stenoses in  the proximal right PCA. No high-grade stenosis or occlusion in the carotid or  vertebral arteries. 3.  Right thyroid mass measuring up to 5.2 cm. Recommend follow-up thyroid  ultrasound. .           Physical Exam:  General - Well developed, well nourished, in no apparent distress. Pleasant and conversent. HEENT - Normocephalic, atraumatic. Conjunctiva are clear. Neck - Supple without masses  Extremities - Peripheral pulses intact. No edema and no rashes. Neurological examination - Comprehension, attention , memory and reasoning are intact. Language and speech are normal. On cranial nerve examination pupils are equal round and reactive to light. Fundoscopic examination is normal. Visual acuity is adequate. Visual fields are full to finger confrontation. Extraocular motility shows nystagmus during pursuits. Face is asymmetric with right facial droop. Hearing is intact to finger rustle bilaterally. Motor examination - There is normal muscle tone and bulk. Power is full throughout. Muscle stretch reflexes are normoactive and there are no pathological reflexes present. Sensation is intact to light touch, pinprick, vibration and proprioception in all extremities. Cerebellar examination shows nystagmus and ataxia with finger-to-nose testing, worse in the left.      Signed By: Marilee Newman NP     June 9, 2020

## 2020-06-09 NOTE — PROGRESS NOTES
06/09/20 0734   NIH Stroke Scale   Interval Other (comment)  (Dual NIH with Junaid Wilkinson RN )   LOC 0   LOC Questions 0   LOC Commands 0   Best Gaze 0   Visual 0   Facial Palsy 1   Motor Right Arm 0   Motor Left Arm 0   Motor Right Leg 0   Motor Left Leg 0   Limb Ataxia 0   Sensory 0   Best Language 0   Dysarthria 0   Extinction and Inattention 0   Total 1

## 2020-06-09 NOTE — CONSULTS
[address] Urology Consult Note                                           06/09/20     Patient: Kelly Peña  MRN: [id number]    Admission Date:  6/8/2020, 1  Admission Diagnosis: Stroke (cerebrum) [facility]) [I63.9]  Reason for Consult: Urinary Retention     ASSESSMENT: 69 y.o.  male with a recent CVA and urinary retention who is admitted to the hospital for management of the CVA. New urinary retention likely related to the CVA. PLAN:  -Recommend keeping cochran in place until 6/16/20 and then attempting void trial.  This can be done at patient's rehab facility. If he is unable to void, then rehab facility can replace cochran and he will follow up with urology.  -Continue cardura as prescribed  -Will arrange urology follow up in 2-3 weeks for symptom/PVR check.   -Will sign off. Call with questions. __________________________________________________________________________________    HPI:     Kelly Peña is a 69 y.o. male with a recent CVA and urinary retention who is admitted to the hospital for management of the CVA. New urinary retention likely related to the CVA. He denies prior issues with voiding, urinary retention, hematuria, UTIs or other urologic concerns. No history of  surgeries. No history of BPH. States urinary retention started suddenly around the time of his CVA symptoms. He is slowly gaining strength back currently in his upper extremities but is unable to ambulate normally at this time. He is on Cardura 8 mg every day for HTN. Past Medical History:  Past Medical History:   Diagnosis Date    Essential hypertension, benign 1/3/2014    Mixed hyperlipidemia 1/3/2014    Numbness and tingling in left hand 7/2/2019    Right thalamic infarction [facility]) 7/2/2019       Past Surgical History:  No past surgical history on file. Medications:  No current facility-administered medications on file prior to encounter.       Current Outpatient Medications on File Prior to Encounter   Medication Sig Dispense Refill    mupirocin (BACTROBAN) 2 % ointment Apply  to affected area daily. 22 g 0    doxazosin (CARDURA) 8 mg tablet Take 1 Tab by mouth two (2) times a day. 180 Tab 1    atorvastatin (LIPITOR) 40 mg tablet Take 1 Tab by mouth daily. 90 Tab 3    loratadine (CLARITIN) 10 mg tablet Take 10 mg by mouth daily. Allergies:  No Known Allergies    Social History:  Social History     Socioeconomic History    Marital status:      Spouse name: Not on file    Number of children: Not on file    Years of education: Not on file    Highest education level: Not on file   Occupational History    Not on file   Social Needs    Financial resource strain: Not on file    Food insecurity     Worry: Not on file     Inability: Not on file    Transportation needs     Medical: Not on file     Non-medical: Not on file   Tobacco Use    Smoking status: Never Smoker    Smokeless tobacco: Never Used   Substance and Sexual Activity    Alcohol use: Not on file    Drug use: Not on file    Sexual activity: Not on file   Lifestyle    Physical activity     Days per week: Not on file     Minutes per session: Not on file    Stress: Not on file   Relationships    Social connections     Talks on phone: Not on file     Gets together: Not on file     Attends [employer] service: Not on file     Active member of club or organization: Not on file     Attends meetings of clubs or organizations: Not on file     Relationship status: Not on file    Intimate partner violence     Fear of current or ex partner: Not on file     Emotionally abused: Not on file     Physically abused: Not on file     Forced sexual activity: Not on file   Other Topics Concern    Not on file   Social History Narrative    Not on file       Family History:  No family history on file.     ROS:  Review of Systems - General ROS: negative for - chills, fatigue or fever  Respiratory ROS: no cough, shortness of breath, or wheezing  Cardiovascular ROS: no chest pain or dyspnea on exertion  Gastrointestinal ROS: no abdominal pain, change in bowel habits, or black or bloody stools  Musculoskeletal ROS: negative  negative for - muscular weakness    Vitals:  Visit Vitals  /62 (BP 1 Location: Right arm, BP Patient Position: At rest)   Pulse 60   Temp 97.8 °F (36.6 °C)   Resp 19   Ht 5' 9.5\" (1.765 m)   Wt 273 lb (123.8 kg)   SpO2 94%   BMI 39.74 kg/m²       Intake/Output:    Intake/Output Summary (Last 24 hours) at 6/9/2020 1919  Last data filed at 6/9/2020 1450  Gross per 24 hour   Intake 160 ml   Output 1300 ml   Net -1140 ml        Physical Exam:   Visit Vitals  /62 (BP 1 Location: Right arm, BP Patient Position: At rest)   Pulse 60   Temp 97.8 °F (36.6 °C)   Resp 19   Ht 5' 9.5\" (1.765 m)   Wt 273 lb (123.8 kg)   SpO2 94%   BMI 39.74 kg/m²        GENERAL: No acute distress, Awake, Alert, Oriented X 3  HEENT: Trachea midline, No gross visual or auditory impairments  CARDIAC: regular rate and rhythm  CHEST AND LUNG: Easy work of breathing  ABDOMEN: soft, non tender, non-distended, positive bowel sounds, no organomegaly, no palpable masses, no guarding, no rebound tenderness  : Mena in place draining clear yellow urine. SKIN: No rash, no erythema, no lacerations or abrasions, no ecchymosis    Lab/Radiology/Other Diagnostic Tests:  Recent Labs     06/08/20  0737   HGB 14.8   HCT 44.7   WBC 8.4      K 3.9   *   CO2 27   BUN 13   *   CA 8.1*   AST 22   ALT 16         Philip A. Reid Meckel, M.D.     [facility]  [facility] [address], [address]  Phone: [phone]  Fax: [phone]

## 2020-06-09 NOTE — PROGRESS NOTES
Problem: Dysphagia (Adult)  Goal: *Acute Goals and Plan of Care (Insert Text)  Description: LTG: Patient will tolerate least restrictive diet without overt signs or symptoms of airway compromise. MET 6/9/20. STG: Patient will tolerate regular diet and thin liquids without overt signs or symptoms of airway compromise. MET 6/9/20. STG: Patient will participate in modified barium swallow study as clinically indicated. Not indicated/discontinued 6/9/20. Outcome: Progressing Towards Goal    Problem: Neurolinguistics Impaired (Adult)  Goal: *Acute Goals and Plan of Care (Insert Text)  Description: LTG: Patient will increase neuro-linguistic abilities to increase safety and awareness in functional living environment   STG: Patient will participate in moderate level word finding task with 80% accuracy  STG: Patient will solve mathematical problems with 90% accuracy given minimal cueing   STG: Patient will complete moderately complex reasoning tasks with 90% accuracy given min-mod cues. STG: Patient will complete short term memory tasks with 90% accuracy given min-mod cues.    STG: Patient will utilize memory compensatory strategies to improve recall of functional list/message with 90% accuracy given minimal assistance    Outcome: Progressing Towards Goal      SPEECH LANGUAGE PATHOLOGY: DYSPHAGIA AND SPEECH-LANGUAGE/COGNITION: Initial Assessment and Daily Note    NAME/AGE/GENDER: Sully Juarez is a 69 y.o. male  DATE: 6/9/2020  PRIMARY DIAGNOSIS: [facility] [I63.9]       ICD-10: Treatment Diagnosis: R13.12 Dysphagia, Oropharyngeal Phase  R41.841 Cognitive-Communication Deficit    RECOMMENDATIONS   DIET:   continue prescribed diet  PO:  Regular  Liquids:  regular thin     MEDICATIONS: With liquid     ASPIRATION PRECAUTIONS  Slow rate of intake  Small bites/sips  Upright at 90 degrees during meal     COMPENSATORY STRATEGIES/MODIFICATIONS  Remain upright at least 30 minutes after meals EDUCATION:  Recommendations discussed with Family  Patient     CONTINUATION OF SKILLED SERVICES/MEDICAL NECESSITY:  Dysphagia goals met and discontinued. Patient is expected to demonstrate progress in expressive communication and cognitive ability to increase independence with activities of daily living and increase communication with family/caregivers. Patient continues to require skilled intervention due to cognitive linguistic deficits. RECOMMENDATIONS for CONTINUED SPEECH THERAPY: YES: Anticipate need for ongoing speech therapy during this hospitalization and at next level of care. ASSESSMENT   Swallowing: Patient presents with oropharyngeal swallow functional that is WNL. No oral or pharyngeal dysphagia symptoms identified. ? Possible esophageal dysphagia as patient reports intermittent globus sensation in upper chest. May benefit from GI consult per MD discretion during hospitalization or as an outpatient if symptoms persist. Dysphagia goals met and discontinued. Patient presents with mild cognitive linguistic impairment characterized by decreased short term memory, specific word finding, and reasoning skills. Basic word finding intact; however, difficulty with word retrieval during divergent naming task. Mild-moderate difficulty completing delayed recall task and with reasoning skills for math. Recommend ongoing cognitive linguistic treatment during acute hospitalization and at next level of care as patient is currently functioning below cognitive linguistic baseline. REHABILITATION POTENTIAL FOR STATED GOALS: Excellent    PLAN    FREQUENCY/DURATION: Continue to follow patient 2 times a week for duration of hospital stay to address above goals. - Recommendations for next treatment session: Next treatment will address cognitive linguistic treatment     SUBJECTIVE   Alert upright in bed with wife at bedside.  Patient reports he is retired, but independent with all ADLs including driving, financial management, and medication management. History of Present Injury/Illness: Mr. Jose Daniel Liang  has a past medical history of Essential hypertension, benign (1/3/2014), Mixed hyperlipidemia (1/3/2014), Numbness and tingling in left hand (2019), and Right thalamic infarction ([facility] (2019). . He also  has no past surgical history on file. Problem List:  (Impairments causing functional limitations):  Oropharyngeal dysphagia- no symptoms identified  Cognitive linguistic impairment        Orientation:   Person  Place  Time  Situation    Pain: Pain Scale 1: Numeric (0 - 10)  Pain Intensity 1: 9    OBJECTIVE       Swallow treatment   Patient consumed thin by straw/serial sips and solid consistency cracker without overt s/sx airway comproimse. Functional oral prep time and oral clearance with all trials. Delayed coughing approximately 2 minutes after intake with patient reporting mild globus sensation in upper esophagus. Cognitive Linguistic Assessment :  Duran Cognitive Assessment       Raw Score: 23/30             Visuospatial/Executive functionin/5             Orientation:              Naming: 3/3             Memory: 3/5             Language: 2/3            Attention: 3/6            Abstraction:       INTERDISCIPLINARY COLLABORATION: Registered Nurse  PRECAUTIONS/ALLERGIES: Patient has no known allergies.      Tool Used: Dysphagia Outcome and Severity Scale (ADINA)    Score Comments   Normal Diet  [] 7 With no strategies or extra time needed   Functional Swallow  [] 6 May have mild oral or pharyngeal delay   Mild Dysphagia  [] 5 Which may require one diet consistency restricted    Mild-Moderate Dysphagia  [] 4 With 1-2 diet consistencies restricted   Moderate Dysphagia  [] 3 With 2 or more diet consistencies restricted   Moderate-Severe Dysphagia  [] 2 With partial PO strategies (trials with ST only)   Severe Dysphagia  [] 1 With inability to tolerate any PO safely      Score: Initial: 6 Most Recent: 7 (Date 06/09/20 )   Interpretation of Tool: The Dysphagia Outcome and Severity Scale (ADINA) is a simple, easy-to-use, 7-point scale developed to systematically rate the functional severity of dysphagia based on objective assessment and make recommendations for diet level, independence level, and type of nutrition. Tool Used: MODIFIED PITO SCALE (mRS)   Score   No Symptoms  [] 0   No significant disability despite symptoms; able to carry out all usual duties and activities  [] 1   Slight disability; unable to carry out all previous activities but able to look after own affairs without assistance. [] 2   Moderate disability; requiring some help but able to walk without assistance  [x] 3   Moderately severe disability; unable to walk without assistance and unable to attend to own bodily needs without assistance  [] 4   Severe disability; bedridden, incontinent, and requiring constant nursing care and attention  [] 5      Score:  Initial: 3    Interpretation of Tool: The Modified Pito Scale is a 7-point scaled used to quantify level of disability as it relates to a patient's functional abilities. Current Medications:   No current facility-administered medications on file prior to encounter. Current Outpatient Medications on File Prior to Encounter   Medication Sig Dispense Refill    mupirocin (BACTROBAN) 2 % ointment Apply  to affected area daily. 22 g 0    doxazosin (CARDURA) 8 mg tablet Take 1 Tab by mouth two (2) times a day. 180 Tab 1    atorvastatin (LIPITOR) 40 mg tablet Take 1 Tab by mouth daily. 90 Tab 3    loratadine (CLARITIN) 10 mg tablet Take 10 mg by mouth daily.          SAFETY:  After treatment position/precautions:  Upright in bed  RN at bedside  Call light within reach    Total Treatment Duration:   Time In: 0336  Time Out: [address], [id number] [facility]

## 2020-06-09 NOTE — PROGRESS NOTES
Problem: Self Care Deficits Care Plan (Adult)  Goal: *Acute Goals and Plan of Care (Insert Text)  Description:   1. Patient will complete lower body bathing and dressing with Supervision and adaptive equipment as needed. 2. Patient will complete toileting with Supervision and adaptive equipment as needed. 3. Patient will complete bed mobility with Supervision and no verbal cue for placement of UE to assist.  4. Patient will complete functional transfers with Supervision and adaptive equipment as needed. 5. Patient will complete standing ADL with Supervision and good static and dynamic standing balance. Timeframe: 7 visits      Outcome: Progressing Towards Goal     OCCUPATIONAL THERAPY: Initial Assessment, Daily Note, and PM 6/9/2020  INPATIENT: OT Visit Days: 1  [place] / [place] / Product Type: Medicare /      NAME/AGE/GENDER: Leandra Pyle is a 69 y.o. male   PRIMARY DIAGNOSIS:  Stroke (cerebrum) ([facility] [I63.9] Cerebrovascular accident (CVA) due to occlusion [facility]) Cerebrovascular accident (CVA) due to occlusion ([facility]     ICD-10: Treatment Diagnosis:    · Generalized Muscle Weakness (M62.81)  · Difficulty in walking, Not elsewhere classified (R26.2)  · Other abnormalities of gait and mobility (R26.89)   Precautions/Allergies:    Patient has no known allergies. ASSESSMENT:     Mr. Pipe Shaw is a 69 y.o. male admitted for CVA workup and per MRI, pt found to have \"Acute to early subacute infarcts in the inferior right cerebellar hemisphere and right dorsolateral medulla. \" At baseline, patient lives with spouse in one story home. Pt is typically Mod I to independent for ADLs, with pt endorsing use of chair (not shower chair) to complete bathing in seated. Pt reports Mod I to independence for IADLs, including driving. Pt uses no DME for functional mobility but does endorse \"furniture walking. \" Pt does have prior history of CVA that occurred approximately one year prior with residual R sided facial droop. Renata Patricia found supine in bed and agreeable to OT evaluation. Reports no pain prior to or following functional mobility. Per chart review and interdisciplinary collaboration with PCT, pt with elevated Bp following functional transfer to seated when working with PT, thus Bp monitored and documented throughout. Pt requires Min A for supine to sit. Seated edge of bed, pt observed to have fair static and dynamic seated balance with lean to L side. Pt provided with moderate verbal and visual cueing, as well as minimal manual cueing to lean to R side and orient to midline. BUE WFL for ROM, strength, and coordination. BUE sensation to light touch intact. Pt reports no numbness or tingling at this time. Pt observed to have slight droop in R eyelid secondary to prior CVA. Pt reports increased need to perform bowel movement. Pt requires CGA to scoot to edge of bed and Min A with use of RW for sit to stand. Pt observed to have fair static and fair dynamic standing balance to begin, but presents with increased lean to L side and reports beginning to feel dizzy and requires Min A x 2 for stand to sit. Following decrease in dizziness, pt requires Min A x 2 (for safety) with RW for sit to stand and to begin transfer to [facility]. Pt with increased dizziness and requires Min A x 2 to complete transfer to [facility]. Following completion of bowel movement, pt requires Min A with increased time for sit to stand. Pt requires Total A for wiping with pt able to assist with gown management. Pt with increased dizziness, significant lean to left side, and poor dynamic standing balance during performance of wiping, resulting in Max A x 2 for stand to sit. Following rest break, pt required Min A x 2 for sit to stand and to complete stand pivot transfer from [facility] to bed. Pt was observed to have increased impulsivity when completing transfer. Pt required Min A for sit to supine.  Pt observed to have increase in droop of R eyelid and reports feeling slight tingling on R side of face and slight numbness and tingling in L 1st and 2nd digit. RN notified and in room to further assess. BUE ROM, strength, and coordination assessed and still WFL. Pt also reports that numbness/tingling in L hand is something that he has experienced in the past. Pt informed to call RN if numbness and tingling increase, as well as if pt notices decline in BUE ROM and strength. Pt left supine in bed with all needs met and within reach with wife seated adjacent. Bp  Supine                            165/62  Seated edge of bed      162/87  Standing                         153/55  Seated on BSC              150/71  Seated on BSC 2           131/68    Gabriela Villanueva is currently functioning below baseline for ADLs and functional mobility and would benefit from acute OT to increase independence and safety with ADLs an functional mobility. Will follow for duration of hospital stay to address the above goals. Patient and spouse were educated on role and plan of care of occupational therapy. This section established at most recent assessment   PROBLEM LIST (Impairments causing functional limitations):  1. Decreased ADL/Functional Activities  2. Decreased Transfer Abilities  3. Decreased Ambulation Ability/Technique  4. Decreased Balance  5. Decreased Activity Tolerance  6. Increased Fatigue   INTERVENTIONS PLANNED: (Benefits and precautions of occupational therapy have been discussed with the patient.)  1. Activities of daily living training  2. Adaptive equipment training  3. Balance training  4. Clothing management  5. Neuromuscular re-eduation  6. Re-evaluation  7. Therapeutic activity  8. Therapeutic exercise     TREATMENT PLAN: Frequency/Duration: Follow patient 3x/week to address above goals.   Rehabilitation Potential For Stated Goals: [facility] (at time of discharge pending progress):    Placement: It is my opinion, based on this patient's performance to date, that Mr. Harry Galarza may benefit from intensive therapy at a [address] [place] [address] after discharge due to the functional deficits listed above that are likely to improve with skilled rehabilitation and concerns that he/she may be unsafe to be unsupervised at home due to decreased independence, safety, balance, and activity tolerance for performance of ADLs and functional mobility. If dizziness and loss of balance following functional transfers and functional standing ADLs subsides, pt may be appropriate to return home with home health OT. Equipment:    TBD. If pt dizziness subside and pt is safe to return home pt may benefit from shower chair. OCCUPATIONAL PROFILE AND HISTORY:   History of Present Injury/Illness (Reason for Referral):  See H & P  Past Medical History/Comorbidities:   Mr. Harry Galarza  has a past medical history of Essential hypertension, benign (1/3/2014), Mixed hyperlipidemia (1/3/2014), Numbness and tingling in left hand (7/2/2019), and Right thalamic infarction ([facility] (7/2/2019). Mr. Harry Galarza  has no past surgical history on file. Social History/Living Environment:   Home Environment: Private residence  # Steps to Enter: 0  One/Two Story Residence: One story  Living Alone: No  Support Systems: Spouse/Significant Other/Partner  Patient Expects to be Discharged to[de-identified] Private residence  Current DME Used/Available at Home: None  Tub or Shower Type: Shower  Prior Level of Function/Work/Activity:  At baseline, patient lives with spouse in one story home. Pt is typically Mod I to independent for ADLs, with pt endorsing use of chair (not shower chair) to complete bathing in seated. Pt reports Mod I to independence for IADLs, including driving. Pt uses no DME for functional mobility but does endorse \"furniture walking. \" Pt does have prior history of CVA that occurred approximately one year prior with residual R sided facial droop. Personal Factors:          Sex:  male        Age:  69 y.o. Number of Personal Factors/Comorbidities that affect the Plan of Care: Extensive review of physical, cognitive, and psychosocial performance (3+):  HIGH COMPLEXITY   ASSESSMENT OF OCCUPATIONAL PERFORMANCE[de-identified]   Activities of Daily Living:   Basic ADLs (From Assessment) Complex ADLs (From Assessment)   Feeding: Setup, Stand-by assistance  Oral Facial Hygiene/Grooming: Setup, Stand-by assistance  Bathing: Moderate assistance  Upper Body Dressing: Setup, Contact guard assistance  Lower Body Dressing: Moderate assistance  Toileting: Maximum assistance Instrumental ADL  Meal Preparation: Total assistance  Homemaking: Total assistance   Grooming/Bathing/Dressing Activities of Daily Living                 Toileting  Toileting Assistance: Maximum assistance  Bowel Hygiene: Total assistance (dependent)  Clothing Management: Maximum assistance     Functional Transfers  Toilet Transfer : Minimum assistance     Bed/Mat Mobility  Supine to Sit: Minimum assistance  Sit to Supine: Minimum assistance  Sit to Stand: Minimum assistance  Stand to Sit: Minimum assistance  Scooting: Contact guard assistance     Most Recent Physical Functioning:   Gross Assessment:  AROM: Within functional limits  Strength: Within functional limits  Coordination: Within functional limits               Posture:  Posture (WDL): Exceptions to WDL  Posture Assessment:  Forward head, Rounded shoulders  Balance:  Sitting: Impaired  Sitting - Static: Fair (occasional)  Sitting - Dynamic: Fair (occasional)  Standing: Impaired  Standing - Static: Fair  Standing - Dynamic : Fair;Poor Bed Mobility:  Supine to Sit: Minimum assistance  Sit to Supine: Minimum assistance  Scooting: Contact guard assistance  Wheelchair Mobility:     Transfers:  Sit to Stand: Minimum assistance  Stand to Sit: Minimum assistance            Patient Vitals for the past 6 hrs:   BP BP Patient Position SpO2 Pulse   20 1200 176/72 At rest 93 % 60   20 1511 165/62 At rest  60   20 1600 165/62 At rest 94 % 60       Mental Status  Neurologic State: Alert  Orientation Level: Oriented X4  Cognition: Follows commands  Perception: Appears intact  Perseveration: No perseveration noted  Safety/Judgement: Good awareness of safety precautions                          Physical Skills Involved:  1. Balance  2. Activity Tolerance  3. Sensation  4. Gross Motor Control Cognitive Skills Affected (resulting in the inability to perform in a timely and safe manner):  1. Executive Function (pt with increased impulsivity) Psychosocial Skills Affected:  1. Habits/Routines  2. Environmental Adaptation   Number of elements that affect the Plan of Care: 5+:  HIGH COMPLEXITY   CLINICAL DECISION MAKIN[address] [place] [address] AM-PAC 6 Clicks   Daily Activity Inpatient Short Form  How much help from another person does the patient currently need. .. Total A Lot A Little None   1. Putting on and taking off regular lower body clothing? [] 1   [x] 2   [] 3   [] 4   2. Bathing (including washing, rinsing, drying)? [] 1   [x] 2   [] 3   [] 4   3. Toileting, which includes using toilet, bedpan or urinal?   [] 1   [x] 2   [] 3   [] 4   4. Putting on and taking off regular upper body clothing? [] 1   [] 2   [x] 3   [] 4   5. Taking care of personal grooming such as brushing teeth? [] 1   [] 2   [x] 3   [] 4   6. Eating meals? [] 1   [] 2   [x] 3   [] 4   © , Trustees of [address] [place] [address], under license to [employer]. All rights reserved      Score:  Initial: 15, completed, 2020 Most Recent: X (Date: -- )    Interpretation of Tool:  Represents activities that are increasingly more difficult (i.e. Bed mobility, Transfers, Gait). Medical Necessity:     · Skilled intervention continues to be required due to decreased independence, safety, balance, and activity tolerance for ADLs and functional mobility. .  Reason for Services/Other Comments:  · Patient continues to require skilled intervention due to medical complications and patient unable to attend/participate in therapy as expected. Use of outcome tool(s) and clinical judgement create a POC that gives a: MODERATE COMPLEXITY         TREATMENT:   (In addition to Assessment/Re-Assessment sessions the following treatments were rendered)     Pre-treatment Symptoms/Complaints:  Pt with no complaints at rest; does report increased dizziness following functional transfers/standing ADLs. Pain: Initial:   Pain Intensity 1: 0  Post Session:  Unchanged     Self Care: (25 minutes): Procedure(s) utilized to improve and/or restore self-care/home management as related to toileting. Required minimal to maximal visual, verbal, manual and tactile cueing to facilitate activities of daily living skills. Pt reports increased need to perform bowel movement. Pt requires CGA to scoot to edge of bed and Min A with use of RW for sit to stand. Pt observed to have fair static and fair dynamic standing balance to begin, but presents with increased lean to L side and reports beginning to feel dizzy and requires Min A x 2 for stand to sit. Following decrease in dizziness, pt requires Min A x 2 (for safety) with RW for sit to stand and to begin transfer to [facility]. Pt with increased dizziness and requires Min A x 2 to complete transfer to [facility]. Following completion of bowel movement, pt requires Min A with increased time for sit to stand. Pt requires Total A for wiping with pt able to assist with gown management. Pt with increased dizziness, significant lean to left side, and poor dynamic standing balance during performance of wiping, resulting in Max A x 2 for stand to sit. Following rest break, pt required Min A x 2 for sit to stand and to complete stand pivot transfer from [facility] to bed.     Braces/Orthotics/Lines/Etc:   · IV  · cochran catheter  · O2 Device: Room air  Treatment/Session Assessment: · Response to Treatment:  Pt with increased dizziness and decrease in Bp following functional transfers or performance of standing ADLs. · Interdisciplinary Collaboration:   o Physical Therapist  o Occupational Therapist  o Registered Nurse  o Certified Nursing Assistant/Patient Care Technician  · After treatment position/precautions:   o Supine in bed  o Bed/Chair-wheels locked  o Bed in low position  o Call light within reach  o RN notified  o Family at bedside   · Compliance with Program/Exercises: Compliant most of the time, Will assess as treatment progresses. · Recommendations/Intent for next treatment session: \"Next visit will focus on advancements to more challenging activities and reduction in assistance provided\".   Total Treatment Duration:  OT Patient Time In/Time Out  Time In: 1511  Time Out: 1555     JEEVAN Mina/LUPE

## 2020-06-09 NOTE — PROGRESS NOTES
Problem: Falls - Risk of  Goal: *Absence of Falls  Description: Document Alma Lucas Fall Risk and appropriate interventions in the flowsheet. Outcome: Progressing Towards Goal  Note: Fall Risk Interventions:  Mobility Interventions: Patient to call before getting OOB, Communicate number of staff needed for ambulation/transfer, Bed/chair exit alarm         Medication Interventions: Bed/chair exit alarm, Patient to call before getting OOB, Teach patient to arise slowly    Elimination Interventions: Bed/chair exit alarm, Call light in reach, Patient to call for help with toileting needs, Toilet paper/wipes in reach, Toileting schedule/hourly rounds    History of Falls Interventions: Bed/chair exit alarm         Problem: Patient Education: Go to Patient Education Activity  Goal: Patient/Family Education  Outcome: Progressing Towards Goal     Problem: Pressure Injury - Risk of  Goal: *Prevention of pressure injury  Description: Document Asif Scale and appropriate interventions in the flowsheet. Outcome: Progressing Towards Goal  Note: Pressure Injury Interventions:  Sensory Interventions: Assess changes in LOC, Discuss PT/OT consult with provider, Pressure redistribution bed/mattress (bed type)    Moisture Interventions: Absorbent underpads, Apply protective barrier, creams and emollients, Check for incontinence Q2 hours and as needed, Internal/External urinary devices, Limit adult briefs, Maintain skin hydration (lotion/cream), Moisture barrier, Offer toileting Q_hr, Minimize layers    Activity Interventions: Increase time out of bed, Pressure redistribution bed/mattress(bed type), PT/OT evaluation    Mobility Interventions: HOB 30 degrees or less, Pressure redistribution bed/mattress (bed type), PT/OT evaluation, Turn and reposition approx.  every two hours(pillow and wedges)    Nutrition Interventions: Document food/fluid/supplement intake                     Problem: Patient Education: Go to Patient Education Activity  Goal: Patient/Family Education  Outcome: Progressing Towards Goal     Problem: Diabetes Self-Management  Goal: *Disease process and treatment process  Description: Define diabetes and identify own type of diabetes; list 3 options for treating diabetes. Outcome: Progressing Towards Goal  Goal: *Incorporating nutritional management into lifestyle  Description: Describe effect of type, amount and timing of food on blood glucose; list 3 methods for planning meals. Outcome: Progressing Towards Goal  Goal: *Incorporating physical activity into lifestyle  Description: State effect of exercise on blood glucose levels. Outcome: Progressing Towards Goal  Goal: *Developing strategies to promote health/change behavior  Description: Define the ABC's of diabetes; identify appropriate screenings, schedule and personal plan for screenings. Outcome: Progressing Towards Goal  Goal: *Using medications safely  Description: State effect of diabetes medications on diabetes; name diabetes medication taking, action and side effects. Outcome: Progressing Towards Goal  Goal: *Monitoring blood glucose, interpreting and using results  Description: Identify recommended blood glucose targets  and personal targets. Outcome: Progressing Towards Goal  Goal: *Prevention, detection, treatment of acute complications  Description: List symptoms of hyper- and hypoglycemia; describe how to treat low blood sugar and actions for lowering  high blood glucose level. Outcome: Progressing Towards Goal  Goal: *Prevention, detection and treatment of chronic complications  Description: Define the natural course of diabetes and describe the relationship of blood glucose levels to long term complications of diabetes.   Outcome: Progressing Towards Goal  Goal: *Developing strategies to address psychosocial issues  Description: Describe feelings about living with diabetes; identify support needed and support network  Outcome: Progressing Towards Goal  Goal: *Patient Specific Goal (EDIT GOAL, INSERT TEXT)  Outcome: Progressing Towards Goal     Problem: Patient Education: Go to Patient Education Activity  Goal: Patient/Family Education  Outcome: Progressing Towards Goal

## 2020-06-09 NOTE — PROGRESS NOTES
SPEECH PATHOLOGY NOTE:    Attempted to see patient for diet tolerance and cognitive linguistic assessment this PM; however, patient about to have echo. Will check back at later time/date as patient is available and as schedule permits.       Rama Jo MS, CCC-SLP

## 2020-06-09 NOTE — PROGRESS NOTES
06/09/20 4414   NIH Stroke Scale   Interval Other (comment)  (Dual SHift CHange, ANA Boykin)   LOC 0   LOC Questions 0   LOC Commands 0   Best Gaze 0   Visual 0   Facial Palsy 1   Motor Right Arm 0   Motor Left Arm 0   Motor Right Leg 0   Motor Left Leg 0   Limb Ataxia 0   Sensory 0   Best Language 0   Dysarthria 0   Extinction and Inattention 0   Total 1

## 2020-06-09 NOTE — PROGRESS NOTES
Initial visit by  to convey care and concern and to explore spiritual needs. Offered spiritual interventions including prayer during the visit. Chaplains remain available for follow-up care.      Jerson Robbins 70  Board Certified

## 2020-06-09 NOTE — PROGRESS NOTES
Pt is a 70 yo male admitted due to a CVA. SW met with pt and spouse to discuss dc planning. Per hospital protocol, SW wore mask and maintained appropriate social distance; no direct physical contact. Pt confirmed that he was independent with his ADL's PTA. Pt drives and does not use DME. Demographics and insurance confirmed. SW explained the options for rehab depending on home situation, transportation and need. PT has evaluated the pt but no recommendations yet for therapy needs at dc. Pt/spouse prefer to discuss more specifics about dc needs once therapy has made recommendations. SW provided them with a list of subacute rehab facilities in the event one is needed. If HH is needed, pt expressed no preference of agency and was agreeable to [facility] [place]. Pt does not have a PPD or COVID test ordered. SW to continue to follow to assist as needed. Care Management Interventions  PCP Verified by CM: Yes(Virtual visit)  Last Visit to PCP: 05/01/20  Mode of Transport at Discharge:  Other (see comment)  Transition of Care Consult (CM Consult): Discharge Planning  Discharge Durable Medical Equipment: No  Physical Therapy Consult: Yes  Occupational Therapy Consult: Yes  Speech Therapy Consult: Yes  Current Support Network: Own Home, Lives with Spouse(pt and spouse maintain separate residences but now spouse is off for the summer so pt can stay with her (or vice versa) if needed)  Confirm Follow Up Transport: Family  Discharge Location  Discharge Placement: Unable to determine at this time

## 2020-06-10 LAB
GLUCOSE BLD STRIP.AUTO-MCNC: 117 MG/DL (ref 65–100)
GLUCOSE BLD STRIP.AUTO-MCNC: 125 MG/DL (ref 65–100)
GLUCOSE BLD STRIP.AUTO-MCNC: 139 MG/DL (ref 65–100)
GLUCOSE BLD STRIP.AUTO-MCNC: 166 MG/DL (ref 65–100)

## 2020-06-10 PROCEDURE — 97530 THERAPEUTIC ACTIVITIES: CPT

## 2020-06-10 PROCEDURE — 74011250637 HC RX REV CODE- 250/637: Performed by: HOSPITALIST

## 2020-06-10 PROCEDURE — 97112 NEUROMUSCULAR REEDUCATION: CPT

## 2020-06-10 PROCEDURE — 74011250637 HC RX REV CODE- 250/637: Performed by: PSYCHIATRY & NEUROLOGY

## 2020-06-10 PROCEDURE — 74011636637 HC RX REV CODE- 636/637: Performed by: INTERNAL MEDICINE

## 2020-06-10 PROCEDURE — 74011250637 HC RX REV CODE- 250/637: Performed by: INTERNAL MEDICINE

## 2020-06-10 PROCEDURE — 82962 GLUCOSE BLOOD TEST: CPT

## 2020-06-10 PROCEDURE — 65270000029 HC RM PRIVATE

## 2020-06-10 PROCEDURE — 74011250636 HC RX REV CODE- 250/636: Performed by: INTERNAL MEDICINE

## 2020-06-10 RX ADMIN — ENOXAPARIN SODIUM 40 MG: 40 INJECTION SUBCUTANEOUS at 14:59

## 2020-06-10 RX ADMIN — DOXAZOSIN 8 MG: 4 TABLET ORAL at 22:09

## 2020-06-10 RX ADMIN — ASPIRIN 81 MG 81 MG: 81 TABLET ORAL at 08:12

## 2020-06-10 RX ADMIN — ATORVASTATIN CALCIUM 80 MG: 80 TABLET, FILM COATED ORAL at 22:09

## 2020-06-10 RX ADMIN — DOXAZOSIN 8 MG: 4 TABLET ORAL at 08:12

## 2020-06-10 RX ADMIN — Medication 5 ML: at 22:17

## 2020-06-10 RX ADMIN — Medication 5 ML: at 06:17

## 2020-06-10 RX ADMIN — INSULIN LISPRO 2 UNITS: 100 INJECTION, SOLUTION INTRAVENOUS; SUBCUTANEOUS at 11:13

## 2020-06-10 RX ADMIN — Medication 2 SPRAY: at 16:19

## 2020-06-10 RX ADMIN — CLOPIDOGREL BISULFATE 75 MG: 75 TABLET ORAL at 08:11

## 2020-06-10 RX ADMIN — LORATADINE 10 MG: 10 TABLET ORAL at 08:11

## 2020-06-10 RX ADMIN — Medication 5 ML: at 14:59

## 2020-06-10 NOTE — PROGRESS NOTES
06/10/20 1919   NIH Stroke Scale   Interval Other (comment)  (DUal shift change,RN)   LOC 0   LOC Questions 0   LOC Commands 0   Best Gaze 0   Visual 0   Facial Palsy 1   Motor Right Arm 0   Motor Left Arm 0   Motor Right Leg 0   Motor Left Leg 0   Limb Ataxia 0   Sensory 0   Best Language 0   Dysarthria 0   Extinction and Inattention 0   Total 1

## 2020-06-10 NOTE — PROGRESS NOTES
History and Physical    Patient: Gabriela Villanueva MRN: [id number]  SSN: [id number]    YOB: 1945  Age: 69 y.o. Sex: male      Subjective:      Gabriela Villanueva is a 69 y.o. male who came to ER due to inbility to urinate since last evening and dizziness since 3 days ago. 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 He says that he takes aspirin 325 mg po q day \"as needed\". Three days ago, he felt dizzy when trying to get up from bed. He felt weak. He rested and over the course of the day, he felt better. He was able to drive for the past 2 days. Last night, he started to have trouble urinating. He has had this before but not to the point that it got worse. This morning, he could not urinate at all. He came to ER and Mena's catheter was placed and urine of 800 mL amount was obtained. No fever. No shaking. No chills. No head injury. No loss of consciousness no abnormal movement. Code stroke was activated in ER. Patient is out of window for tPA treatment. CT was done with showed findings below. wife states that right eyelid seems more swollen than the other eye. Hospitalist service is requested to admit the patient. 06/09 patient reports feeling better this morning. No headache. No tingling numbness or weakness. 06/10 patient reported he felt dizzy this morning. Particularly worse with activity. No weakness tingling or numbness. Past Medical History:   Diagnosis Date    Essential hypertension, benign 1/3/2014    Mixed hyperlipidemia 1/3/2014    Numbness and tingling in left hand 7/2/2019    Right thalamic infarction ([facility] [address] 7/2/2019     No past surgical history on file. No family history on file.   Social History     Tobacco Use    Smoking status: Never Smoker    Smokeless tobacco: Never Used   Substance Use Topics    Alcohol use: Not on file      Prior to Admission medications Medication Sig Start Date End Date Taking? Authorizing Provider   mupirocin (BACTROBAN) 2 % ointment Apply  to affected area daily. 1/15/20   Kaleigh Yepez MD   doxazosin (CARDURA) 8 mg tablet Take 1 Tab by mouth two (2) times a day. 10/10/19   Gricelda Mart MD   atorvastatin (LIPITOR) 40 mg tablet Take 1 Tab by mouth daily. 10/10/19   Gricelda Mart MD   loratadine (CLARITIN) 10 mg tablet Take 10 mg by mouth daily. Provider, Historical      Family history   No hereditary conditions. No Known Allergies    Review of Systems:    10-point review of systems is negative except what is mentioned in the present illness section. Objective:     Vitals:    06/09/20 2000 06/10/20 0000 06/10/20 0400 06/10/20 0800   BP: 192/78 157/89 160/77 148/84   Pulse: 67 67 62 74   Resp: 20 20 20 18   Temp: 97.9 °F (36.6 °C) 98.3 °F (36.8 °C) 98.2 °F (36.8 °C) 98.2 °F (36.8 °C)   SpO2: 92% 95% 92% 92%   Weight:       Height:            Physical Exam:    General:                    The patient is a pleasant elderly male in no acute respiratory distress. he is lying flat in bed and talking well. Head:                                   Normocephalic/atraumatic. Eyes:                                   right eyelid and surrounding area is more swollen than the left eye. ENT:                                    External auricular and nasal exam within normal limits. Questionable left facial palsy. Mucous membranes are dry. Neck:                                   Supple, non-tender, no JVD. Lungs:                       Clear to auscultation bilaterally without wheezes or crackles. No respiratory distress or accessory muscle use. Heart:                                  Regular rate and rhythm, without murmurs, rubs, or gallops.   Abdomen:                  Soft, non-tender, distended due to truncal obesity with normoactive bowel sounds. Genitourinary:           No tenderness over the bladder or bilateral CVAs. Extremities:               Without clubbing, cyanosis, or edema. Skin:                                    Normal color, texture, and turgor. No rashes, lesions, or jaundice. Pulses:                      Radial and dorsalis pedis pulses present 2+ bilaterally. Capillary refill <2s. Neurologic:                CN II-XII grossly intact and symmetrical.                                               Moving all four extremities well with no focal deficits. Gait Not tested due to dizziness. Psychiatric:                Pleasant demeanor, appropriate affect. Alert and oriented x 3    Lab and data    Recent Results (from the past 24 hour(s))   GLUCOSE, POC    Collection Time: 06/09/20  4:28 PM   Result Value Ref Range    Glucose (POC) 147 (H) 65 - 100 mg/dL   GLUCOSE, POC    Collection Time: 06/09/20  9:17 PM   Result Value Ref Range    Glucose (POC) 153 (H) 65 - 100 mg/dL   GLUCOSE, POC    Collection Time: 06/10/20  7:26 AM   Result Value Ref Range    Glucose (POC) 139 (H) 65 - 100 mg/dL   GLUCOSE, POC    Collection Time: 06/10/20 11:12 AM   Result Value Ref Range    Glucose (POC) 166 (H) 65 - 100 mg/dL   GLUCOSE, POC    Collection Time: 06/10/20  3:53 PM   Result Value Ref Range    Glucose (POC) 125 (H) 65 - 100 mg/dL       CT head   6-8-2020  IMPRESSION:  Negative for acute intracranial abnormality. Chronic changes. CTA head neck   6-8-2020  Impression:     1. There is decreased opacification of the right PICA approximately 1 cm from  its origin. Possible occlusion or high-grade stenosis. Consider MRI for further  evaluation. 2.  Generalized atherosclerotic changes with intracranial stenoses as follows. High-grade stenosis involving a right M2/M3 branch in the sylvian fissure. Moderate stenosis in the mid left M1 segment. Multifocal moderate stenoses in  the proximal right PCA.  No high-grade stenosis or occlusion in the carotid or  vertebral arteries. 3.  Right thyroid mass measuring up to 5.2 cm. Recommend follow-up thyroid  ultrasound. .     I have reviewed ECG myself. Assessment:     Hospital Problems  Date Reviewed: 5/1/2020          Codes Class Noted POA    Urinary retention ICD-10-CM: R33.9  ICD-9-CM: 788.20  6/8/2020 Unknown        Stroke (cerebrum) ([facility] ICD-10-CM: I63.9  ICD-9-CM: 434.91  6/8/2020 Unknown        Severe obesity ([facility] [address] ICD-10-CM: E66.01  ICD-9-CM: 278.01  7/2/2019 Yes        Controlled type 2 diabetes mellitus without complication, without long-term current use of insulin ([facility] ICD-10-CM: E11.9  ICD-9-CM: 250.00  2/22/2019 Yes        * (Principal) Cerebrovascular accident (CVA) due to occlusion [facility]) ICD-10-CM: I63.9  ICD-9-CM: 434.91  2/12/2019 Yes        Essential hypertension, benign ICD-10-CM: I10  ICD-9-CM: 401.1  1/3/2014 Yes        Mixed hyperlipidemia ICD-10-CM: E78.2  ICD-9-CM: 272.2  1/3/2014 Yes              Plan:     Acute inferior right cerebellar and right dorsolateral medulla stroke POA  Neuro and vital signs monitoring. Aspirin, Plavix (DAPT for 90 days, then monotherapy)   Statin   Physical therapy  Occupational therapy. Symptomatic treatments     Hypertension  Monitor blood pressure and manage accordingly. Add Amlodipine 5mg. Diabetes mellitus type 2  Monitor blood sugar. Cover with insulin sliding scale accordingly. Urinary retention   Urology consulted. Plan to continue Mena until 06/16. Then attempt voiding trial.  Outpatient follow-up with urology in 2 to 3 weeks. PT eval pending. May need home health vs SNF on dc. Healthcare power of  is wife. Code status: DNR     I have discussed the plan of care with patient and spouse.      DVT prophylaxis : SCD     Signed By: Bobo Garrett MD     Aniya 10, 2020

## 2020-06-10 NOTE — PROGRESS NOTES
06/10/20 0725   NIH Stroke Scale   Interval Other (comment)  (Dual [facility] with Paulina PEREZ )   LOC 0   LOC Questions 0   LOC Commands 0   Best Gaze 0   Visual 0   Facial Palsy 1   Motor Right Arm 0   Motor Left Arm 0   Motor Right Leg 0   Motor Left Leg 0   Limb Ataxia 0   Sensory 0   Best Language 0   Dysarthria 0   Extinction and Inattention 0   Total 1

## 2020-06-10 NOTE — PROGRESS NOTES
Problem: Mobility Impaired (Adult and Pediatric)  Goal: *Acute Goals and Plan of Care (Insert Text)  Description: STG:  (1.)Mr. Hong Cain will move from supine to sit and sit to supine , scoot up and down and roll side to side with SUPERVISION within 3 treatment day(s). (2.)Mr. Hong Cain will transfer from bed to chair and chair to bed with MINIMAL ASSIST using the least restrictive device within 3 treatment day(s). (3.)Mr. Hong Cain will ambulate with MINIMAL ASSIST for 50 feet with the least restrictive device within 3 treatment day(s). (4.)Mr. Hong Cain will perform standing static and dynamic balance activities x 15 minutes with MINIMAL ASSIST to improve safety within 3 day(s). LTG:  (1.)Mr. Hong Cain will move from supine to sit and sit to supine , scoot up and down and roll side to side in bed with MODIFIED INDEPENDENCE within 7 treatment day(s). (2.)Mr. Hong Cain will transfer from bed to chair and chair to bed with STAND BY ASSIST using the least restrictive device within 7 treatment day(s). (3.)Mr. Hong Cain will ambulate with STAND BY ASSIST for 150 feet with the least restrictive device within 7 treatment day(s). (4.)Mr. Hong Cain will perform standing static and dynamic balance activities x 15 minutes with STAND BY ASSIST to improve safety within 7 day(s). (5.)Mr. Hong Cain will ascend and descend 5 stairs using 0-2 hand rail(s) with STAND BY ASSIST to improve functional mobility and safety within 7 day(s).   ________________________________________________________________________________________________     Outcome: Progressing Towards Goal     PHYSICAL THERAPY: Daily Note and PM 6/10/2020  INPATIENT: PT Visit Days : 2  [place] / [place] / Product Type: Medicare /       NAME/AGE/GENDER: Palmira Gunter is a 69 y.o. male   PRIMARY DIAGNOSIS: Stroke (cerebrum) ([facility] [address] [I63.9] Cerebrovascular accident (CVA) due to occlusion [facility] Cerebrovascular accident (CVA) due to occlusion [facility])       ICD-10: Treatment Diagnosis:    · Generalized Muscle Weakness (M62.81)  · Difficulty in walking, Not elsewhere classified (R26.2)  · Dizziness and Giddiness (R42)   Precaution/Allergies:  Patient has no known allergies. ASSESSMENT:     Mr. Manfred Mcdonald is a 69 y.o. male admitted for CVA workup. Per MRI: \"Acute to early subacute infarcts in the inferior right cerebellar hemisphere and right dorsolateral medulla. \" He lives with spouse in a single story home and typically ambulates and performs ADLs independently at baseline. He is able to drive (and was driving Friday/Saturday despite dizziness symptoms), although they progressed to the point of not being able to drive. The patient is supine in bed upon contact and agreeable/eager to PT treatment. He performed bed mobility and supine to sit with min A and verbal cues for technique. He demonstrated good sitting balance at the edge of the bed during neuro re-education with cues for head turns, looking, and balance challenges. BP taken in sitting: 160/79. RN stated BP had been stable and does not seem to be associated with dizziness. He then performed sit to stand with min A and verbal cues for hand placement and technique. He stood with the RW for ~1 min before reporting onset of dizziness and returning to sitting suddenly and to left sidelying despite max A and max cues. After a few minutes in sidelying, patient reported dizziness had passed and returned to sitting with min A. Again he was able to sit without dizziness for several minutes. The patient performed sit to stand again with the same. Returning to sitting with the report of dizziness, but this time able to maintain sitting with mod A until dizziness passed. The patient then stood a final time and ambulated 2' to the recliner chair with min Ax2 and sat with min A. He reported dizziness only after he had returned to sitting in the chair, which passed quickly.   The patient was then positioned for comfort with needs in reach and patient education on return to bed later and notifying nursing. Overall the patient is making good progress toward therapy goals as indicated by increased activity tolerance. Will continue skilled PT treatment as patient is still below functional baseline. This section established at most recent assessment   PROBLEM LIST (Impairments causing functional limitations):  1. Decreased Strength  2. Decreased ADL/Functional Activities  3. Decreased Transfer Abilities  4. Decreased Ambulation Ability/Technique  5. Decreased Balance  6. Decreased Activity Tolerance  7. Decreased Knowledge of Precautions  8. Decreased [place] with Home Exercise Program   INTERVENTIONS PLANNED: (Benefits and precautions of physical therapy have been discussed with the patient.)  1. Balance Exercise  2. Bed Mobility  3. Family Education  4. Gait Training  5. Home Exercise Program (HEP)  6. Neuromuscular Re-education/Strengthening  7. Therapeutic Activites  8. Therapeutic Exercise/Strengthening  9. Transfer Training     TREATMENT PLAN: Frequency/Duration: 3 times a week for duration of hospital stay  Rehabilitation Potential For Stated Goals: Good     REHAB RECOMMENDATIONS (at time of discharge pending progress):    Placement: It is my opinion, based on this patient's performance to date, that Mr. Leandra Washburn may benefit from intensive therapy at an [address] [place] [address] after discharge due to a probable need for multiple therapy disciplines and potential to make ongoing and sustainable functional improvement that is of practical value. .  Equipment:    None at this time              HISTORY:   History of Present Injury/Illness (Reason for Referral):  Scooter Rodrigez is a 69 y.o. male who came to ER due to inbility to urinate since last evening and dizziness since 3 days ago.       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 He says that he takes aspirin 325 mg po q day \"as needed\". Three days ago, he felt dizzy when trying to get up from bed. He felt weak. He rested and over the course of the day, he felt better. He was able to drive for the past 2 days. Last night, he started to have trouble urinating. He has had this before but not to the point that it got worse. This morning, he could not urinate at all. He came to ER and Mena's catheter was placed and urine of 800 mL amount was obtained. No fever. No shaking. No chills. No head injury. No loss of consciousness no abnormal movement. Code stroke was activated in ER. Patient is out of window for tPA treatment. CT was done with showed findings below. wife states that right eyelid seems more swollen than the other eye. Hospitalist service is requested to admit the patient. Past Medical History/Comorbidities:   Mr. Peg Zamora  has a past medical history of Essential hypertension, benign (1/3/2014), Mixed hyperlipidemia (1/3/2014), Numbness and tingling in left hand (7/2/2019), and Right thalamic infarction ([facility] [address] (7/2/2019). Mr. Peg Zamora  has no past surgical history on file. Social History/Living Environment:   Home Environment: Private residence  # Steps to Enter: 0  One/Two Story Residence: One story  Living Alone: No  Support Systems: Spouse/Significant Other/Partner  Patient Expects to be Discharged to[de-identified] Private residence  Current DME Used/Available at Home: None  Tub or Shower Type: Shower  Prior Level of Function/Work/Activity:  He lives with spouse in a single story home and typically ambulates and performs ADLs independently at baseline. He is able to drive (and was driving Friday/Saturday despite dizziness symptoms), although they progressed to the point of not being able to drive.      Number of Personal Factors/Comorbidities that affect the Plan of Care: 3+: HIGH COMPLEXITY   EXAMINATION:   Most Recent Physical Functioning:   Gross Assessment:                  Posture:     Balance:  Sitting: Impaired  Sitting - Static: Good (unsupported)  Sitting - Dynamic: Fair (occasional)  Standing: Impaired  Standing - Static: Fair;Good(Occasional Poor due to dizziness)  Standing - Dynamic : Fair(occasionally poor with strong \"fall\" to the left) Bed Mobility:  Rolling: Contact guard assistance  Supine to Sit: Minimum assistance  Scooting: Contact guard assistance  Wheelchair Mobility:     Transfers:  Sit to Stand: Minimum assistance  Stand to Sit: Minimum assistance  Gait:     Base of Support: Widened;Center of gravity altered  Speed/Landy: Slow;Shuffled  Step Length: Right shortened;Left shortened  Gait Abnormalities: Decreased step clearance;Trunk sway increased  Distance (ft): 2 Feet (ft)  Assistive Device: Walker, rolling;Gait belt  Ambulation - Level of Assistance: Minimal assistance;Assist x2  Interventions: Verbal cues; Safety awareness training;Manual cues; Tactile cues      Body Structures Involved:  1. Nerves  2. Heart  3. Muscles Body Functions Affected:  1. Sensory/Pain  2. Cardio  3. Neuromusculoskeletal  4. Movement Related Activities and Participation Affected:  1. Mobility  2. Self Care  3. Domestic Life  4. Interpersonal Interactions and Relationships  5. Community, Social and [place]   Number of elements that affect the Plan of Care: 4+: HIGH COMPLEXITY   CLINICAL PRESENTATION:   Presentation: Stable and uncomplicated: LOW COMPLEXITY   CLINICAL DECISION MAKIN [facility] Inpatient Short Form  How much difficulty does the patient currently have. .. Unable A Lot A Little None   1. Turning over in bed (including adjusting bedclothes, sheets and blankets)? [] 1   [] 2   [x] 3   [] 4   2. Sitting down on and standing up from a chair with arms ( e.g., wheelchair, bedside commode, etc.)   [x] 1   [] 2   [] 3   [] 4   3. Moving from lying on back to sitting on the side of the bed?    [] 1   [] 2 [x] 3   [] 4   How much help from another person does the patient currently need. .. Total A Lot A Little None   4. Moving to and from a bed to a chair (including a wheelchair)? [] 1   [x] 2   [] 3   [] 4   5. Need to walk in hospital room? [] 1   [x] 2   [] 3   [] 4   6. Climbing 3-5 steps with a railing? [] 1   [x] 2   [] 3   [] 4   © 2007, Trustees of [address] [place] [address] [address] [id number], under license to [employer]. All rights reserved      Score:  Initial: 13 Most Recent: X (Date: -- )    Interpretation of Tool:  Represents activities that are increasingly more difficult (i.e. Bed mobility, Transfers, Gait). Medical Necessity:     · Patient demonstrates   · good  ·  rehab potential due to higher previous functional level. Reason for Services/Other Comments:  · Patient   · continues to require modification of therapeutic interventions to increase complexity of exercises  · . Use of outcome tool(s) and clinical judgement create a POC that gives a: Clear prediction of patient's progress: LOW COMPLEXITY            TREATMENT:   (In addition to Assessment/Re-Assessment sessions the following treatments were rendered)   Pre-treatment Symptoms/Complaints:  none  Pain: Initial:   Pain Intensity 1: 0  Post Session:  0     Therapeutic Activity: (    25 minutes): Therapeutic activities including Bed transfers, bed mobility, chair transfers, sit to stand transfer training, and ambulation on level surfaces to improve mobility, strength, and activity tolerance . Required minimal cues Verbal cues; Safety awareness training;Manual cues; Tactile cues to promote coordination of bilateral, lower extremity(s). Neuromuscular Re-education: ( 15 min):  Exercise/activities including sitting balance/standing balance, and patient education for maintaining midline improve balance, coordination, kinesthetic sense, posture and proprioception.   Required minimal visual, verbal, manual and tactile cues to promote static and dynamic balance in standing. Braces/Orthotics/Lines/Etc:   · O2 Device: Room air  Treatment/Session Assessment:    · Response to Treatment:  Improved, still dizzy  · Interdisciplinary Collaboration:   o Physical Therapy Assistant  o Registered Nurse  o Rehabilitation Attendant  · After treatment position/precautions:   o Up in chair  o Bed/Chair-wheels locked  o Bed in low position  o Call light within reach  o RN notified   · Compliance with Program/Exercises: Will assess as treatment progresses  · Recommendations/Intent for next treatment session: \"Next visit will focus on advancements to more challenging activities and reduction in assistance provided\".   Total Treatment Duration:  PT Patient Time In/Time Out  Time In: 1543  Time Out: [address], [facility]

## 2020-06-10 NOTE — PROGRESS NOTES
Problem: Falls - Risk of  Goal: *Absence of Falls  Description: Document Alma Lucas Fall Risk and appropriate interventions in the flowsheet. Outcome: Progressing Towards Goal  Note: Fall Risk Interventions:  Mobility Interventions: Bed/chair exit alarm, Communicate number of staff needed for ambulation/transfer, Patient to call before getting OOB         Medication Interventions: Bed/chair exit alarm, Teach patient to arise slowly, Patient to call before getting OOB    Elimination Interventions: Bed/chair exit alarm, Call light in reach, Patient to call for help with toileting needs, Toilet paper/wipes in reach, Toileting schedule/hourly rounds    History of Falls Interventions: Bed/chair exit alarm         Problem: Patient Education: Go to Patient Education Activity  Goal: Patient/Family Education  Outcome: Progressing Towards Goal     Problem: Pressure Injury - Risk of  Goal: *Prevention of pressure injury  Description: Document Asif Scale and appropriate interventions in the flowsheet. Outcome: Progressing Towards Goal  Note: Pressure Injury Interventions:  Sensory Interventions: Assess changes in LOC, Discuss PT/OT consult with provider, Pressure redistribution bed/mattress (bed type)    Moisture Interventions: Absorbent underpads    Activity Interventions: Increase time out of bed, Pressure redistribution bed/mattress(bed type), PT/OT evaluation    Mobility Interventions: Float heels, HOB 30 degrees or less, Pressure redistribution bed/mattress (bed type), PT/OT evaluation, Turn and reposition approx.  every two hours(pillow and wedges)    Nutrition Interventions: Document food/fluid/supplement intake                     Problem: Patient Education: Go to Patient Education Activity  Goal: Patient/Family Education  Outcome: Progressing Towards Goal     Problem: Diabetes Self-Management  Goal: *Disease process and treatment process  Description: Define diabetes and identify own type of diabetes; list 3 options for treating diabetes. Outcome: Progressing Towards Goal  Goal: *Incorporating nutritional management into lifestyle  Description: Describe effect of type, amount and timing of food on blood glucose; list 3 methods for planning meals. Outcome: Progressing Towards Goal  Goal: *Incorporating physical activity into lifestyle  Description: State effect of exercise on blood glucose levels. Outcome: Progressing Towards Goal  Goal: *Developing strategies to promote health/change behavior  Description: Define the ABC's of diabetes; identify appropriate screenings, schedule and personal plan for screenings. Outcome: Progressing Towards Goal  Goal: *Using medications safely  Description: State effect of diabetes medications on diabetes; name diabetes medication taking, action and side effects. Outcome: Progressing Towards Goal  Goal: *Monitoring blood glucose, interpreting and using results  Description: Identify recommended blood glucose targets  and personal targets. Outcome: Progressing Towards Goal  Goal: *Prevention, detection, treatment of acute complications  Description: List symptoms of hyper- and hypoglycemia; describe how to treat low blood sugar and actions for lowering  high blood glucose level. Outcome: Progressing Towards Goal  Goal: *Prevention, detection and treatment of chronic complications  Description: Define the natural course of diabetes and describe the relationship of blood glucose levels to long term complications of diabetes.   Outcome: Progressing Towards Goal  Goal: *Developing strategies to address psychosocial issues  Description: Describe feelings about living with diabetes; identify support needed and support network  Outcome: Progressing Towards Goal  Goal: *Patient Specific Goal (EDIT GOAL, INSERT TEXT)  Outcome: Progressing Towards Goal     Problem: Patient Education: Go to Patient Education Activity  Goal: Patient/Family Education  Outcome: Progressing Towards Goal     Problem: Patient Education: Go to Patient Education Activity  Goal: Patient/Family Education  Outcome: Progressing Towards Goal

## 2020-06-11 LAB
ANION GAP SERPL CALC-SCNC: 3 MMOL/L (ref 7–16)
BASOPHILS # BLD: 0 K/UL (ref 0–0.2)
BASOPHILS NFR BLD: 1 % (ref 0–2)
BUN SERPL-MCNC: 15 MG/DL (ref 8–23)
CALCIUM SERPL-MCNC: 8 MG/DL (ref 8.3–10.4)
CHLORIDE SERPL-SCNC: 110 MMOL/L (ref 98–107)
CO2 SERPL-SCNC: 31 MMOL/L (ref 21–32)
CREAT SERPL-MCNC: 0.97 MG/DL (ref 0.8–1.5)
DIFFERENTIAL METHOD BLD: NORMAL
EOSINOPHIL # BLD: 0.2 K/UL (ref 0–0.8)
EOSINOPHIL NFR BLD: 2 % (ref 0.5–7.8)
ERYTHROCYTE [DISTWIDTH] IN BLOOD BY AUTOMATED COUNT: 13.8 % (ref 11.9–14.6)
GLUCOSE BLD STRIP.AUTO-MCNC: 134 MG/DL (ref 65–100)
GLUCOSE BLD STRIP.AUTO-MCNC: 137 MG/DL (ref 65–100)
GLUCOSE BLD STRIP.AUTO-MCNC: 161 MG/DL (ref 65–100)
GLUCOSE BLD STRIP.AUTO-MCNC: 181 MG/DL (ref 65–100)
GLUCOSE SERPL-MCNC: 141 MG/DL (ref 65–100)
HCT VFR BLD AUTO: 41.6 % (ref 41.1–50.3)
HGB BLD-MCNC: 13.6 G/DL (ref 13.6–17.2)
IMM GRANULOCYTES # BLD AUTO: 0 K/UL (ref 0–0.5)
IMM GRANULOCYTES NFR BLD AUTO: 1 % (ref 0–5)
LYMPHOCYTES # BLD: 1.5 K/UL (ref 0.5–4.6)
LYMPHOCYTES NFR BLD: 19 % (ref 13–44)
MCH RBC QN AUTO: 30.2 PG (ref 26.1–32.9)
MCHC RBC AUTO-ENTMCNC: 32.7 G/DL (ref 31.4–35)
MCV RBC AUTO: 92.2 FL (ref 79.6–97.8)
MONOCYTES # BLD: 0.7 K/UL (ref 0.1–1.3)
MONOCYTES NFR BLD: 9 % (ref 4–12)
NEUTS SEG # BLD: 5.6 K/UL (ref 1.7–8.2)
NEUTS SEG NFR BLD: 70 % (ref 43–78)
NRBC # BLD: 0 K/UL (ref 0–0.2)
PLATELET # BLD AUTO: 222 K/UL (ref 150–450)
PMV BLD AUTO: 11.7 FL (ref 9.4–12.3)
POTASSIUM SERPL-SCNC: 3.4 MMOL/L (ref 3.5–5.1)
RBC # BLD AUTO: 4.51 M/UL (ref 4.23–5.6)
SODIUM SERPL-SCNC: 144 MMOL/L (ref 136–145)
WBC # BLD AUTO: 8 K/UL (ref 4.3–11.1)

## 2020-06-11 PROCEDURE — 36415 COLL VENOUS BLD VENIPUNCTURE: CPT

## 2020-06-11 PROCEDURE — 80048 BASIC METABOLIC PNL TOTAL CA: CPT

## 2020-06-11 PROCEDURE — 85025 COMPLETE CBC W/AUTO DIFF WBC: CPT

## 2020-06-11 PROCEDURE — 74011250636 HC RX REV CODE- 250/636: Performed by: INTERNAL MEDICINE

## 2020-06-11 PROCEDURE — 74011636637 HC RX REV CODE- 636/637: Performed by: INTERNAL MEDICINE

## 2020-06-11 PROCEDURE — 65270000029 HC RM PRIVATE

## 2020-06-11 PROCEDURE — 82962 GLUCOSE BLOOD TEST: CPT

## 2020-06-11 PROCEDURE — 74011250637 HC RX REV CODE- 250/637: Performed by: PSYCHIATRY & NEUROLOGY

## 2020-06-11 PROCEDURE — 99221 1ST HOSP IP/OBS SF/LOW 40: CPT | Performed by: PHYSICAL MEDICINE & REHABILITATION

## 2020-06-11 PROCEDURE — 92507 TX SP LANG VOICE COMM INDIV: CPT

## 2020-06-11 PROCEDURE — 74011250637 HC RX REV CODE- 250/637: Performed by: INTERNAL MEDICINE

## 2020-06-11 PROCEDURE — 74011250637 HC RX REV CODE- 250/637: Performed by: HOSPITALIST

## 2020-06-11 PROCEDURE — 74011000302 HC RX REV CODE- 302: Performed by: HOSPITALIST

## 2020-06-11 PROCEDURE — 86580 TB INTRADERMAL TEST: CPT | Performed by: HOSPITALIST

## 2020-06-11 RX ORDER — AMLODIPINE BESYLATE 5 MG/1
5 TABLET ORAL DAILY
Status: DISCONTINUED | OUTPATIENT
Start: 2020-06-11 | End: 2020-06-14 | Stop reason: HOSPADM

## 2020-06-11 RX ORDER — POTASSIUM CHLORIDE 20 MEQ/1
40 TABLET, EXTENDED RELEASE ORAL
Status: COMPLETED | OUTPATIENT
Start: 2020-06-11 | End: 2020-06-11

## 2020-06-11 RX ADMIN — LORATADINE 10 MG: 10 TABLET ORAL at 10:15

## 2020-06-11 RX ADMIN — DOXAZOSIN 8 MG: 4 TABLET ORAL at 10:14

## 2020-06-11 RX ADMIN — POTASSIUM CHLORIDE 40 MEQ: 20 TABLET, EXTENDED RELEASE ORAL at 10:16

## 2020-06-11 RX ADMIN — ENOXAPARIN SODIUM 40 MG: 40 INJECTION SUBCUTANEOUS at 15:07

## 2020-06-11 RX ADMIN — TUBERCULIN PURIFIED PROTEIN DERIVATIVE 5 UNITS: 5 INJECTION, SOLUTION INTRADERMAL at 10:24

## 2020-06-11 RX ADMIN — DOXAZOSIN 8 MG: 4 TABLET ORAL at 21:38

## 2020-06-11 RX ADMIN — ATORVASTATIN CALCIUM 80 MG: 80 TABLET, FILM COATED ORAL at 21:38

## 2020-06-11 RX ADMIN — INSULIN LISPRO 2 UNITS: 100 INJECTION, SOLUTION INTRAVENOUS; SUBCUTANEOUS at 12:18

## 2020-06-11 RX ADMIN — AMLODIPINE BESYLATE 5 MG: 5 TABLET ORAL at 15:08

## 2020-06-11 RX ADMIN — Medication 10 ML: at 15:08

## 2020-06-11 RX ADMIN — Medication 10 ML: at 21:48

## 2020-06-11 RX ADMIN — ASPIRIN 81 MG 81 MG: 81 TABLET ORAL at 10:14

## 2020-06-11 RX ADMIN — Medication 10 ML: at 06:35

## 2020-06-11 RX ADMIN — CLOPIDOGREL BISULFATE 75 MG: 75 TABLET ORAL at 10:15

## 2020-06-11 RX ADMIN — INSULIN LISPRO 2 UNITS: 100 INJECTION, SOLUTION INTRAVENOUS; SUBCUTANEOUS at 17:26

## 2020-06-11 NOTE — PROGRESS NOTES
06/11/20 0713   NIH Stroke Scale   Interval Other (comment)  (Dual NIH with Myla George RN)   LOC 0   LOC Questions 0   LOC Commands 0   Best Gaze 0   Visual 0   Facial Palsy 1   Motor Right Arm 0   Motor Left Arm 0   Motor Right Leg 0   Motor Left Leg 0   Limb Ataxia 0   Sensory 0   Best Language 0   Dysarthria 0   Extinction and Inattention 0   Total 1

## 2020-06-11 NOTE — PROGRESS NOTES
NIH performed with Jose Cruz Barlow RN     06/11/20 1945   NIH Stroke Scale   Interval Other (comment)   LOC 0   LOC Questions 0   LOC Commands 0   Best Gaze 0   Visual 0   Facial Palsy 0   Motor Right Arm 0   Motor Left Arm 0   Motor Right Leg 0   Motor Left Leg 0   Limb Ataxia 0   Sensory 0   Best Language 0   Dysarthria 0   Extinction and Inattention 0   Total 0

## 2020-06-11 NOTE — PROGRESS NOTES
History and Physical    Patient: Scooter Rodrigez MRN: [id number]  SSN: [id number]    YOB: 1945  Age: 69 y.o. Sex: male      Subjective:      Scooter Rodrigez is a 69 y.o. male who came to ER due to inbility to urinate since last evening and dizziness since 3 days ago. 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 He says that he takes aspirin 325 mg po q day \"as needed\". Three days ago, he felt dizzy when trying to get up from bed. He felt weak. He rested and over the course of the day, he felt better. He was able to drive for the past 2 days. Last night, he started to have trouble urinating. He has had this before but not to the point that it got worse. This morning, he could not urinate at all. He came to ER and Mena's catheter was placed and urine of 800 mL amount was obtained. No fever. No shaking. No chills. No head injury. No loss of consciousness no abnormal movement. Code stroke was activated in ER. Patient is out of window for tPA treatment. CT was done with showed findings below. wife states that right eyelid seems more swollen than the other eye. Hospitalist service is requested to admit the patient. 06/09 patient reports feeling better this morning. No headache. No tingling numbness or weakness. 06/10 patient reported he felt dizzy this morning. Particularly worse with activity. No weakness tingling or numbness. 06/11 patient states he feels better today. No tingling numbness or weakness of extremities. Dizziness improved. No chest pain no palpitations. Past Medical History:   Diagnosis Date    Essential hypertension, benign 1/3/2014    Mixed hyperlipidemia 1/3/2014    Numbness and tingling in left hand 7/2/2019    Right thalamic infarction ([facility] [address] 7/2/2019     No past surgical history on file. No family history on file.   Social History     Tobacco Use    Smoking status: Never Smoker    Smokeless tobacco: Never Used   Substance Use Topics    Alcohol use: Not on file      Prior to Admission medications    Medication Sig Start Date End Date Taking? Authorizing Provider   mupirocin (BACTROBAN) 2 % ointment Apply  to affected area daily. 1/15/20   Mai Tomas MD   doxazosin (CARDURA) 8 mg tablet Take 1 Tab by mouth two (2) times a day. 10/10/19   Des Ramos MD   atorvastatin (LIPITOR) 40 mg tablet Take 1 Tab by mouth daily. 10/10/19   Des Ramos MD   loratadine (CLARITIN) 10 mg tablet Take 10 mg by mouth daily. Provider, Historical      Family history   No hereditary conditions. No Known Allergies    Review of Systems:    10-point review of systems is negative except what is mentioned in the present illness section. Objective:     Vitals:    06/11/20 0400 06/11/20 0800 06/11/20 1030 06/11/20 1200   BP: 161/80 178/79 160/79 156/79   Pulse: 75 72  69   Resp: 20 18  18   Temp: 98.8 °F (37.1 °C) 98 °F (36.7 °C)  97.7 °F (36.5 °C)   SpO2: 92% 92%  91%   Weight:       Height:            Physical Exam:    General:                    The patient is a pleasant elderly male in no acute respiratory distress. he is lying flat in bed and talking well. Head:                                   Normocephalic/atraumatic. Eyes:                                   right eyelid and surrounding area is more swollen than the left eye. ENT:                                    External auricular and nasal exam within normal limits. Questionable left facial palsy. Mucous membranes are dry. Neck:                                   Supple, non-tender, no JVD. Lungs:                       Clear to auscultation bilaterally without wheezes or crackles. No respiratory distress or accessory muscle use.   Heart:                                  Regular rate and rhythm, without murmurs, rubs, or gallops. Abdomen:                  Soft, non-tender, distended due to truncal obesity with normoactive bowel sounds. Genitourinary:           No tenderness over the bladder or bilateral CVAs. Extremities:               Without clubbing, cyanosis, or edema. Skin:                                    Normal color, texture, and turgor. No rashes, lesions, or jaundice. Pulses:                      Radial and dorsalis pedis pulses present 2+ bilaterally. Capillary refill <2s. Neurologic:                CN II-XII grossly intact and symmetrical.                                               Moving all four extremities well with no focal deficits. Gait Not tested due to dizziness. Psychiatric:                Pleasant demeanor, appropriate affect. Alert and oriented x 3    Lab and data    Recent Results (from the past 24 hour(s))   GLUCOSE, POC    Collection Time: 06/10/20  3:53 PM   Result Value Ref Range    Glucose (POC) 125 (H) 65 - 100 mg/dL   GLUCOSE, POC    Collection Time: 06/10/20 10:16 PM   Result Value Ref Range    Glucose (POC) 117 (H) 65 - 100 mg/dL   CBC WITH AUTOMATED DIFF    Collection Time: 06/11/20  5:49 AM   Result Value Ref Range    WBC 8.0 4.3 - 11.1 K/uL    RBC 4.51 4.23 - 5.6 M/uL    HGB 13.6 13.6 - 17.2 g/dL    HCT 41.6 41.1 - 50.3 %    MCV 92.2 79.6 - 97.8 FL    MCH 30.2 26.1 - 32.9 PG    MCHC 32.7 31.4 - 35.0 g/dL    RDW 13.8 11.9 - 14.6 %    PLATELET 898 749 - 583 K/uL    MPV 11.7 9.4 - 12.3 FL    ABSOLUTE NRBC 0.00 0.0 - 0.2 K/uL    DF AUTOMATED      NEUTROPHILS 70 43 - 78 %    LYMPHOCYTES 19 13 - 44 %    MONOCYTES 9 4.0 - 12.0 %    EOSINOPHILS 2 0.5 - 7.8 %    BASOPHILS 1 0.0 - 2.0 %    IMMATURE GRANULOCYTES 1 0.0 - 5.0 %    ABS. NEUTROPHILS 5.6 1.7 - 8.2 K/UL    ABS. LYMPHOCYTES 1.5 0.5 - 4.6 K/UL    ABS. MONOCYTES 0.7 0.1 - 1.3 K/UL    ABS. EOSINOPHILS 0.2 0.0 - 0.8 K/UL    ABS. BASOPHILS 0.0 0.0 - 0.2 K/UL    ABS. IMM.  GRANS. 0.0 0.0 - 0.5 K/UL   METABOLIC PANEL, BASIC    Collection Time: 06/11/20  5:49 AM   Result Value Ref Range    Sodium 144 136 - 145 mmol/L    Potassium 3.4 (L) 3.5 - 5.1 mmol/L    Chloride 110 (H) 98 - 107 mmol/L    CO2 31 21 - 32 mmol/L    Anion gap 3 (L) 7 - 16 mmol/L    Glucose 141 (H) 65 - 100 mg/dL    BUN 15 8 - 23 MG/DL    Creatinine 0.97 0.8 - 1.5 MG/DL    GFR est AA >60 >60 ml/min/1.73m2    GFR est non-AA >60 >60 ml/min/1.73m2    Calcium 8.0 (L) 8.3 - 10.4 MG/DL   GLUCOSE, POC    Collection Time: 06/11/20  7:15 AM   Result Value Ref Range    Glucose (POC) 134 (H) 65 - 100 mg/dL   GLUCOSE, POC    Collection Time: 06/11/20 11:10 AM   Result Value Ref Range    Glucose (POC) 161 (H) 65 - 100 mg/dL       CT head   6-8-2020  IMPRESSION:  Negative for acute intracranial abnormality. Chronic changes. CTA head neck   6-8-2020  Impression:     1. There is decreased opacification of the right PICA approximately 1 cm from  its origin. Possible occlusion or high-grade stenosis. Consider MRI for further  evaluation. 2.  Generalized atherosclerotic changes with intracranial stenoses as follows. High-grade stenosis involving a right M2/M3 branch in the sylvian fissure. Moderate stenosis in the mid left M1 segment. Multifocal moderate stenoses in  the proximal right PCA. No high-grade stenosis or occlusion in the carotid or  vertebral arteries. 3.  Right thyroid mass measuring up to 5.2 cm. Recommend follow-up thyroid  ultrasound. .     I have reviewed ECG myself.         Assessment:     Hospital Problems  Date Reviewed: 5/1/2020          Codes Class Noted POA    Urinary retention ICD-10-CM: R33.9  ICD-9-CM: 788.20  6/8/2020 Unknown        Stroke (cerebrum) ([facility] [address] ICD-10-CM: I63.9  ICD-9-CM: 434.91  6/8/2020 Unknown        Severe obesity ([facility] [address] ICD-10-CM: E66.01  ICD-9-CM: 278.01  7/2/2019 Yes        Controlled type 2 diabetes mellitus without complication, without long-term current use of insulin ([facility] ICD-10-CM: E11.9  ICD-9-CM: 250.00  2/22/2019 Yes        * (Principal) Cerebrovascular accident (CVA) due to occlusion [facility]) ICD-10-CM: I63.9  ICD-9-CM: 434.91  2/12/2019 Yes        Essential hypertension, benign ICD-10-CM: I10  ICD-9-CM: 401.1  1/3/2014 Yes        Mixed hyperlipidemia ICD-10-CM: E78.2  ICD-9-CM: 272.2  1/3/2014 Yes              Plan:     Acute inferior right cerebellar and right dorsolateral medulla stroke POA  Neuro and vital signs monitoring. Aspirin, Plavix (DAPT for 90 days, then monotherapy)   Statin   Physical therapy  Occupational therapy. Symptomatic treatments     Hypertension  Monitor blood pressure and manage accordingly. Add Amlodipine 5mg. Diabetes mellitus type 2  Monitor blood sugar. Cover with insulin sliding scale accordingly. Urinary retention   Urology consulted. Plan to continue Mena until 06/16. Then attempt voiding trial.  Outpatient follow-up with urology in 2 to 3 weeks. PT eval pending. May need home health vs SNF on dc. Healthcare power of  is wife. Code status: DNR     I have discussed the plan of care with patient and spouse.      DVT prophylaxis : SCD     Signed By: Jeny Raygoza MD     June 11, 2020

## 2020-06-11 NOTE — CONSULTS
PM&R Rehab Consult    Subjective:     Date of Consultation:  June 11, 2020    Referring Physician: Dr Anneliese Gay    Patient is a 69 y.o. male who is being seen for rehab recommendations s/p Cerebellar stroke    Principal Problem:    Cerebrovascular accident (CVA) due to occlusion ([address] (2/12/2019)    Active Problems:    Essential hypertension, benign (1/3/2014)      Mixed hyperlipidemia (1/3/2014)      Controlled type 2 diabetes mellitus without complication, without long-term current use of insulin ([address] (2/22/2019)      Severe obesity ([address] (7/2/2019)      Urinary retention (6/8/2020)      Stroke (cerebrum) ([address] (6/8/2020)    HPI; Mr Isis Myers is a pleasant, previously functionally independent , right hand dominant 68 yo male with a PMH of HTN, HLD, DM2, obesity,hx of right thalmic stroke in July 2019 with numbness and tingling of the left hand who presented to [facility] ED on 6/8 with inability to void wince the previous evening as well as a 3d hx of dizziness and lack of balance. Three days prior, he felt dizzy when trying to get up from bed. He felt weak. He rested and over the course of the day, he felt better. He was able to drive for the past 2 days prior to presentation. In the ED , a cochran was placed with an 800ml yield of urine. Code stroke was activated in ER. Patient is out of window for tPA treatment. CT was done with showed findings consistent with Moderate stenosis in the mid left M1 segment. Multifocal moderate stenoses in the proximal right PCA. Incidentally, a Right thyroid mass measuring up to 5.2 cm was identified. MRI confirmed an acute ischemic stroke, right cerebellar hemisphere and right dorsolateral medulla. Per Neuro, thought likely secondary to intracranial atherosclerosis. Plan is to continue DAPT with aspirin and Plavix for 90 days, then switch to monotherapy. He is on HI intensity statin, with Lipitor 80mg dalily. Pts hgbA1c elevated at 7.8 indicating poor blood sugar control at home. . 6. HDL 31.  2D ECHO; Systolic function was normal. Ejection fraction was estimated in the range of 55 % to 60 %. There were no regional wall motion abnormalities. Contrast injection was performed. There was no right-to-leftshunt, with provocative maneuvers to increase right atrial pressure. He was seen in consultation by Dr Reid Meckel with regards to urinary retention. This is thought to be related to the stroke itself. Plan is to keep cochran in until 6/16 and perform a voiding trial. He is on cardura. Pt can f/u as an outpt. wife stated that right eyelid seemed more swollen than the other eye.  this is improving. Denies pain or drainage. No vision loss. + diplopia at times    Functionally, with OT  Feeding: Setup, Stand-by assistance  Oral Facial Hygiene/Grooming: Setup, Stand-by assistance  Bathing: Moderate assistance  Upper Body Dressing: Setup, Contact guard assistance  Lower Body Dressing: Moderate assistance  Toileting: Maximum assistance    Per PT  Posture:  Balance:  Sitting: Impaired  Sitting - Static: Good (unsupported)  Sitting - Dynamic: Fair (occasional)  Standing: Impaired  Standing - Static: Fair;Good(Occasional Poor due to dizziness)  Standing - Dynamic : Fair(occasionally poor with strong \"fall\" to the left) Bed Mobility:  Rolling: Contact guard assistance  Supine to Sit: Minimum assistance  Scooting: Contact guard assistance  Wheelchair Mobility:  Transfers:  Sit to Stand: Minimum assistance  Stand to Sit: Minimum assistance  Gait:  Base of Support: Widened;Center of gravity altered  Speed/Landy: Slow;Shuffled  Step Length: Right shortened;Left shortened  Gait Abnormalities: Decreased step clearance;Trunk sway increased  Distance (ft): 2 Feet (ft)  Assistive Device: Walker, rolling;Gait belt  Ambulation - Level of Assistance: Minimal assistance;Assist x2  Interventions: Verbal cues; Safety awareness training;Manual cues; Tactile cues       Past Medical History:   Diagnosis Date    Essential hypertension, benign 1/3/2014    Mixed hyperlipidemia 1/3/2014    Numbness and tingling in left hand 7/2/2019    Right thalamic infarction ([address] 7/2/2019      No family history on file. Social History     Tobacco Use    Smoking status: Never Smoker    Smokeless tobacco: Never Used   Substance Use Topics    Alcohol use: Not on file   Home Environment: Private residence  # Steps to Enter: 0  One/Two Story Residence: One story  Living Alone: No  Support Systems: Spouse/Significant Other/Partner  Patient Expects to be Discharged to[de-identified] Private residence  Current DME Used/Available at Home: None  Tub or Shower Type: Shower  Prior Level of Function/Work/Activity:  At baseline, patient lives with spouse in one story home. Pt is typically Mod I to independent for ADLs, with pt endorsing use of chair (not shower chair) to complete bathing in seated. Pt reports Mod I to independence for IADLs, including driving. Pt uses no DME for functional mobility but does endorse \"furniture walking. \" Pt does have prior history of CVA that occurred approximately one year prior with residual L sided facial droop. No past surgical history on file. Prior to Admission medications    Medication Sig Start Date End Date Taking? Authorizing Provider   mupirocin (BACTROBAN) 2 % ointment Apply  to affected area daily. 1/15/20   Rex Duggan MD   doxazosin (CARDURA) 8 mg tablet Take 1 Tab by mouth two (2) times a day. 10/10/19   Sultana Hogan MD   atorvastatin (LIPITOR) 40 mg tablet Take 1 Tab by mouth daily. 10/10/19   Sultana Hogan MD   loratadine (CLARITIN) 10 mg tablet Take 10 mg by mouth daily.     Provider, Historical     No Known Allergies     Review of Systems:  A comprehensive review of systems was negative except for: Eyes: positive for visual disturbance and irritation  Gastrointestinal: positive for constipation  Genitourinary: positive for hesitancy and urinary retention  Musculoskeletal: positive for stiff joints and muscle weakness  Neurological: positive for dizziness, paresthesia, coordination problems, gait problems and weakness    Objective:     Vitals:  Blood pressure 160/79, pulse 72, temperature 98 °F (36.7 °C), resp. rate 18, height 5' 9.5\" (1.765 m), weight 273 lb (123.8 kg), SpO2 92 %. Temp (24hrs), Av.4 °F (36.9 °C), Min:98 °F (36.7 °C), Max:98.8 °F (37.1 °C)      Intake and Output:   1901 -  0700  In: -   Out: 9742 [[id number]]    Physical Exam:  General:  Alert, oriented and mood affect appropriate   Lungs:   Clear to auscultation bilaterally. Heart:  Regular rate and rhythm, S1, S2 stable, no murmur, click, rub or gallop. Abdomen:   Soft, non-tender. Bowel sounds present. No masses,  No organomegaly. obese   Genitourinary: cochran catheter   Neuro Muscular: PERRLA EOMI, horizontal nystagmus when looking to the right  Right facial droop is appreciated  Nl muscle bulk and tone. 5/5 throughout  No dysmetria  + ataxia L>R  Follows commands, speech clear     Skin:  No rashes, lesions, or signs/symptoms or infection.  No edema       Labs/Studies:  Recent Results (from the past 72 hour(s))   GLUCOSE, POC    Collection Time: 20  4:33 PM   Result Value Ref Range    Glucose (POC) 202 (H) 65 - 100 mg/dL   GLUCOSE, POC    Collection Time: 20 10:39 PM   Result Value Ref Range    Glucose (POC) 122 (H) 65 - 100 mg/dL   LIPID PANEL    Collection Time: 20  6:05 AM   Result Value Ref Range    LIPID PROFILE          Cholesterol, total 161 <200 MG/DL    Triglyceride 107 35 - 150 MG/DL    HDL Cholesterol 31 (L) 40 - 60 MG/DL    LDL, calculated 108.6 (H) <100 MG/DL    VLDL, calculated 21.4 6.0 - 23.0 MG/DL    CHOL/HDL Ratio 5.2     HEMOGLOBIN A1C WITH EAG    Collection Time: 20  6:05 AM   Result Value Ref Range    Hemoglobin A1c 7.8 (H) 4.8 - 6.0 %    Est. average glucose 177 mg/dL   GLUCOSE, POC    Collection Time: 20  7:28 AM   Result Value Ref Range    Glucose (POC) 127 (H) 65 - 100 mg/dL   GLUCOSE, POC    Collection Time: 06/09/20 11:46 AM   Result Value Ref Range    Glucose (POC) 179 (H) 65 - 100 mg/dL   GLUCOSE, POC    Collection Time: 06/09/20  4:28 PM   Result Value Ref Range    Glucose (POC) 147 (H) 65 - 100 mg/dL   GLUCOSE, POC    Collection Time: 06/09/20  9:17 PM   Result Value Ref Range    Glucose (POC) 153 (H) 65 - 100 mg/dL   GLUCOSE, POC    Collection Time: 06/10/20  7:26 AM   Result Value Ref Range    Glucose (POC) 139 (H) 65 - 100 mg/dL   GLUCOSE, POC    Collection Time: 06/10/20 11:12 AM   Result Value Ref Range    Glucose (POC) 166 (H) 65 - 100 mg/dL   GLUCOSE, POC    Collection Time: 06/10/20  3:53 PM   Result Value Ref Range    Glucose (POC) 125 (H) 65 - 100 mg/dL   GLUCOSE, POC    Collection Time: 06/10/20 10:16 PM   Result Value Ref Range    Glucose (POC) 117 (H) 65 - 100 mg/dL   CBC WITH AUTOMATED DIFF    Collection Time: 06/11/20  5:49 AM   Result Value Ref Range    WBC 8.0 4.3 - 11.1 K/uL    RBC 4.51 4.23 - 5.6 M/uL    HGB 13.6 13.6 - 17.2 g/dL    HCT 41.6 41.1 - 50.3 %    MCV 92.2 79.6 - 97.8 FL    MCH 30.2 26.1 - 32.9 PG    MCHC 32.7 31.4 - 35.0 g/dL    RDW 13.8 11.9 - 14.6 %    PLATELET 389 904 - 315 K/uL    MPV 11.7 9.4 - 12.3 FL    ABSOLUTE NRBC 0.00 0.0 - 0.2 K/uL    DF AUTOMATED      NEUTROPHILS 70 43 - 78 %    LYMPHOCYTES 19 13 - 44 %    MONOCYTES 9 4.0 - 12.0 %    EOSINOPHILS 2 0.5 - 7.8 %    BASOPHILS 1 0.0 - 2.0 %    IMMATURE GRANULOCYTES 1 0.0 - 5.0 %    ABS. NEUTROPHILS 5.6 1.7 - 8.2 K/UL    ABS. LYMPHOCYTES 1.5 0.5 - 4.6 K/UL    ABS. MONOCYTES 0.7 0.1 - 1.3 K/UL    ABS. EOSINOPHILS 0.2 0.0 - 0.8 K/UL    ABS. BASOPHILS 0.0 0.0 - 0.2 K/UL    ABS. IMM.  GRANS. 0.0 0.0 - 0.5 K/UL   METABOLIC PANEL, BASIC    Collection Time: 06/11/20  5:49 AM   Result Value Ref Range    Sodium 144 136 - 145 mmol/L    Potassium 3.4 (L) 3.5 - 5.1 mmol/L    Chloride 110 (H) 98 - 107 mmol/L    CO2 31 21 - 32 mmol/L    Anion gap 3 (L) 7 - 16 mmol/L    Glucose 141 (H) 65 - 100 mg/dL BUN 15 8 - 23 MG/DL    Creatinine 0.97 0.8 - 1.5 MG/DL    GFR est AA >60 >60 ml/min/1.73m2    GFR est non-AA >60 >60 ml/min/1.73m2    Calcium 8.0 (L) 8.3 - 10.4 MG/DL   GLUCOSE, POC    Collection Time: 06/11/20  7:15 AM   Result Value Ref Range    Glucose (POC) 134 (H) 65 - 100 mg/dL       Functional Assessment:  Functional Assessment  Fall in Past 12 Months: No  Decline in Gait/Transfer/Balance: Yes (comment)  Decline in Capacity to Feed/Dress/Bathe: Yes (comment)  Developmental Delay: No  Chewing/Swallowing Problems: No  Difficulty with Secretions: No  Speech Slurred/Thick/Garbled: No       Speech Assessment:    Dysphagia Screening  Vocal Quality/Secretions: Normal  History of Dysphagia: No  O2 Saturation: Normal  Alertness: Normal  Pre-Swallow Assessment Score: 0  Purees: No difficulty noted  Water by Cup: No difficulty noted  Water by Straw: No difficulty noted                Ambulation:  Activity and Safety  Activity Level: Up with Assistance  Ambulate: No (Comment)  Activity: In bed, Talking on phone  Activity Assistance: Partial (two people)  Weight Bearing Status: WBAT (Weight Bearing as Tolerated)  Mode of Transportation: Wheelchair, IV equipment  Repositioned: Head of bed elevated (degrees)  Patient Turned: Turns self  Head of Bed Elevated: HOB 30  Activity Response:  Tolerated well  Assistive Device: Fall prevention device  Safety Measures: Bed/Chair alarm on, Bed/Chair-Wheels locked, Bed in low position, Call light within reach, Fall prevention (comment), Gripper socks, Side rails X 3     Impression/Plan:     Principal Problem:    Cerebrovascular accident (CVA) due to occlusion ([address] (2/12/2019)    Active Problems:    Essential hypertension, benign (1/3/2014)      Mixed hyperlipidemia (1/3/2014)      Controlled type 2 diabetes mellitus without complication, without long-term current use of insulin ([address] (2/22/2019)      Severe obesity ([address] (7/2/2019)      Urinary retention (6/8/2020)      Stroke (cerebrum) ([facility] (6/8/2020)      Right PCA moderate stenosis, Acute to early subacute infarcts in the inferior right cerebellar hemisphere and right dorsolateral medulla. Recommendations: Continue Acute Rehab Program  Coordination of rehab/medical care  Counseling of PM & R care issues management  Monitoring and management of medical conditions per plan of care/orders   -hi risk for stroke given hx of poorly controlled DM, hyperlipidemia, HTN, hx of prior CVA. -excellent rehab potential. Will benefit greatly form an interdisc aggressive rehab approach to maximize functional mobility and self care with the goal of returning home with his supportive wife  -cont DAPT x 90d, hi intensity statin  -dietary education re; HLD, HTN andDM  -cont PT/OT/ST  - [facility] Friday pending medical clearance.   Discussion with patient/Staff  Reviewed Therapies/Labs/Meds/Records  Thank you for this consultation  Michael Peters MD  Medical director [facility]

## 2020-06-11 NOTE — PROGRESS NOTES
Problem: Dysphagia (Adult)  Goal: *Acute Goals and Plan of Care (Insert Text)  Description: LTG: Patient will tolerate least restrictive diet without overt signs or symptoms of airway compromise. MET 6/9/20. STG: Patient will tolerate regular diet and thin liquids without overt signs or symptoms of airway compromise. MET 6/9/20. STG: Patient will participate in modified barium swallow study as clinically indicated. Not indicated/discontinued 6/9/20. Outcome: Progressing Towards Goal    Problem: Neurolinguistics Impaired (Adult)  Goal: *Acute Goals and Plan of Care (Insert Text)  Description: LTG: Patient will increase neuro-linguistic abilities to increase safety and awareness in functional living environment   STG: Patient will participate in moderate level word finding task with 80% accuracy  STG: Patient will solve mathematical problems with 90% accuracy given minimal cueing   STG: Patient will complete moderately complex reasoning tasks with 90% accuracy given min-mod cues. STG: Patient will complete short term memory tasks with 90% accuracy given min-mod cues.    STG: Patient will utilize memory compensatory strategies to improve recall of functional list/message with 90% accuracy given minimal assistance    Outcome: Progressing Towards Goal      SPEECH LANGUAGE PATHOLOGY: DYSPHAGIA AND SPEECH-LANGUAGE/COGNITION: Initial Assessment and Daily Note    NAME/AGE/GENDER: Willy Napier is a 69 y.o. male  DATE: 6/11/2020  PRIMARY DIAGNOSIS: Stroke (ce[facility]br[facility]) ([facility] [I63.9]      ICD-10: Treatment Diagnosis: R13.12 Dysphagia, Oropharyngeal Phase  R41.841 Cognitive-Communication Deficit    RECOMMENDATIONS   DIET:    continue prescribed diet   PO:  Regular   Liquids:  regular thin     MEDICATIONS: With liquid     ASPIRATION PRECAUTIONS  · Slow rate of intake  · Small bites/sips  · Upright at 90 degrees during meal     COMPENSATORY STRATEGIES/MODIFICATIONS  · Remain upright at least 30 minutes after meals EDUCATION:  · Recommendations discussed with Family  · Patient     CONTINUATION OF SKILLED SERVICES/MEDICAL NECESSITY:   Dysphagia goals met and discontinued.  Patient is expected to demonstrate progress in expressive communication and cognitive ability to increase independence with activities of daily living and increase communication with family/caregivers.  Patient continues to require skilled intervention due to cognitive linguistic deficits. RECOMMENDATIONS for CONTINUED SPEECH THERAPY: YES: Anticipate need for ongoing speech therapy during this hospitalization and at next level of care. ASSESSMENT   Pt did well with daily problem solving and STM tasks this date. REHABILITATION POTENTIAL FOR STATED GOALS: Excellent    PLAN    FREQUENCY/DURATION: Continue to follow patient 2 times a week for duration of hospital stay to address above goals. - Recommendations for next treatment session: Next treatment will address cognitive linguistic treatment     SUBJECTIVE   Cooperative. Pt stated, \"I'm going to therapy tomorrow-upstairs\". History of Present Injury/Illness: Mr. Leticia Baca  has a past medical history of Essential hypertension, benign (1/3/2014), Mixed hyperlipidemia (1/3/2014), Numbness and tingling in left hand (2019), and Right thalamic infarction ([facility] (2019). . He also  has no past surgical history on file. Problem List:  (Impairments causing functional limitations):  1. Oropharyngeal dysphagia- no symptoms identified  2. Cognitive linguistic impairment      Pain: Pain Scale 1: Numeric (0 - 10)  Pain Intensity 1: 0    OBJECTIVE     Neurolinguistic:  Daily problem solvin%. STM:  Recalled 3/5 words from [facility] yesterday. Knew 1 of the other words was a flower but stated o[place] vs [place]. Also recalled and described all the visuospatial/executive function tasks from the [facility].       INTERDISCIPLINARY COLLABORATION: Registered Nurse  PRECAUTIONS/ALLERGIES: Patient has no known allergies. Tool Used: Dysphagia Outcome and Severity Scale (ADINA)    Score Comments   Normal Diet  [] 7 With no strategies or extra time needed   Functional Swallow  [] 6 May have mild oral or pharyngeal delay   Mild Dysphagia  [] 5 Which may require one diet consistency restricted    Mild-Moderate Dysphagia  [] 4 With 1-2 diet consistencies restricted   Moderate Dysphagia  [] 3 With 2 or more diet consistencies restricted   Moderate-Severe Dysphagia  [] 2 With partial PO strategies (trials with ST only)   Severe Dysphagia  [] 1 With inability to tolerate any PO safely      Score:  Initial: 6 Most Recent: 7 (Date  )   Interpretation of Tool: The Dysphagia Outcome and Severity Scale (ADINA) is a simple, easy-to-use, 7-point scale developed to systematically rate the functional severity of dysphagia based on objective assessment and make recommendations for diet level, independence level, and type of nutrition. Tool Used: MODIFIED PITO SCALE (mRS)   Score   No Symptoms  [] 0   No significant disability despite symptoms; able to carry out all usual duties and activities  [] 1   Slight disability; unable to carry out all previous activities but able to look after own affairs without assistance. [] 2   Moderate disability; requiring some help but able to walk without assistance  [x] 3   Moderately severe disability; unable to walk without assistance and unable to attend to own bodily needs without assistance  [] 4   Severe disability; bedridden, incontinent, and requiring constant nursing care and attention  [] 5      Score:  Initial: 3    Interpretation of Tool: The Modified Pito Scale is a 7-point scaled used to quantify level of disability as it relates to a patient's functional abilities. Current Medications:   No current facility-administered medications on file prior to encounter.       Current Outpatient Medications on File Prior to Encounter   Medication Sig Dispense Refill    mupirocin (BACTROBAN) 2 % ointment Apply  to affected area daily. 22 g 0    doxazosin (CARDURA) 8 mg tablet Take 1 Tab by mouth two (2) times a day. 180 Tab 1    atorvastatin (LIPITOR) 40 mg tablet Take 1 Tab by mouth daily. 90 Tab 3    loratadine (CLARITIN) 10 mg tablet Take 10 mg by mouth daily.          SAFETY:  After treatment position/precautions:  · Upright in bed  · RN at bedside  · Call light within reach    Total Treatment Duration:   Time In: 2848  Time Out: [address] CCC-SLP

## 2020-06-11 NOTE — PROGRESS NOTES
Pt has been evaluated by physiatrist and accepted for admission to [facility]. Pt can transfer on Friday if he is medically cleared for discharge. VM message left with pt's spouse notifying her of this information. SW remains available to assist as needed.

## 2020-06-12 LAB
GLUCOSE BLD STRIP.AUTO-MCNC: 133 MG/DL (ref 65–100)
GLUCOSE BLD STRIP.AUTO-MCNC: 134 MG/DL (ref 65–100)
GLUCOSE BLD STRIP.AUTO-MCNC: 155 MG/DL (ref 65–100)
GLUCOSE BLD STRIP.AUTO-MCNC: 187 MG/DL (ref 65–100)
MM INDURATION POC: 0 MM (ref 0–5)
PPD POC: NEGATIVE NEGATIVE

## 2020-06-12 PROCEDURE — 74011250636 HC RX REV CODE- 250/636: Performed by: INTERNAL MEDICINE

## 2020-06-12 PROCEDURE — 82962 GLUCOSE BLOOD TEST: CPT

## 2020-06-12 PROCEDURE — 99232 SBSQ HOSP IP/OBS MODERATE 35: CPT | Performed by: PHYSICIAN ASSISTANT

## 2020-06-12 PROCEDURE — 74011250637 HC RX REV CODE- 250/637: Performed by: HOSPITALIST

## 2020-06-12 PROCEDURE — 97530 THERAPEUTIC ACTIVITIES: CPT

## 2020-06-12 PROCEDURE — 74011250637 HC RX REV CODE- 250/637: Performed by: INTERNAL MEDICINE

## 2020-06-12 PROCEDURE — 97110 THERAPEUTIC EXERCISES: CPT

## 2020-06-12 PROCEDURE — 65270000029 HC RM PRIVATE

## 2020-06-12 PROCEDURE — 97112 NEUROMUSCULAR REEDUCATION: CPT

## 2020-06-12 PROCEDURE — 74011636637 HC RX REV CODE- 636/637: Performed by: INTERNAL MEDICINE

## 2020-06-12 PROCEDURE — 74011250637 HC RX REV CODE- 250/637: Performed by: PSYCHIATRY & NEUROLOGY

## 2020-06-12 RX ORDER — CLOPIDOGREL BISULFATE 75 MG/1
75 TABLET ORAL DAILY
Qty: 30 TAB | Refills: 0 | Status: ON HOLD
Start: 2020-06-13 | End: 2020-06-30 | Stop reason: SDUPTHER

## 2020-06-12 RX ORDER — INSULIN LISPRO 100 [IU]/ML
INJECTION, SOLUTION INTRAVENOUS; SUBCUTANEOUS
Qty: 1 VIAL | Refills: 0 | Status: SHIPPED
Start: 2020-06-12 | End: 2020-07-01

## 2020-06-12 RX ORDER — ATORVASTATIN CALCIUM 80 MG/1
80 TABLET, FILM COATED ORAL
Qty: 30 TAB | Refills: 0 | Status: SHIPPED
Start: 2020-06-12 | End: 2020-07-01

## 2020-06-12 RX ORDER — GUAIFENESIN 100 MG/5ML
81 LIQUID (ML) ORAL DAILY
Qty: 30 TAB | Refills: 0 | Status: ON HOLD
Start: 2020-06-13 | End: 2020-06-30 | Stop reason: SDUPTHER

## 2020-06-12 RX ORDER — AMLODIPINE BESYLATE 5 MG/1
5 TABLET ORAL DAILY
Qty: 30 TAB | Refills: 0 | Status: SHIPPED
Start: 2020-06-13 | End: 2020-07-01

## 2020-06-12 RX ADMIN — ENOXAPARIN SODIUM 40 MG: 40 INJECTION SUBCUTANEOUS at 15:58

## 2020-06-12 RX ADMIN — Medication 10 ML: at 16:11

## 2020-06-12 RX ADMIN — INSULIN LISPRO 2 UNITS: 100 INJECTION, SOLUTION INTRAVENOUS; SUBCUTANEOUS at 12:31

## 2020-06-12 RX ADMIN — ATORVASTATIN CALCIUM 80 MG: 80 TABLET, FILM COATED ORAL at 21:18

## 2020-06-12 RX ADMIN — ASPIRIN 81 MG 81 MG: 81 TABLET ORAL at 09:05

## 2020-06-12 RX ADMIN — AMLODIPINE BESYLATE 5 MG: 5 TABLET ORAL at 09:05

## 2020-06-12 RX ADMIN — ACETAMINOPHEN 650 MG: 325 TABLET, FILM COATED ORAL at 09:05

## 2020-06-12 RX ADMIN — Medication 5 ML: at 21:21

## 2020-06-12 RX ADMIN — DOXAZOSIN 8 MG: 4 TABLET ORAL at 09:05

## 2020-06-12 RX ADMIN — Medication 10 ML: at 06:25

## 2020-06-12 RX ADMIN — DOXAZOSIN 8 MG: 4 TABLET ORAL at 21:18

## 2020-06-12 RX ADMIN — CLOPIDOGREL BISULFATE 75 MG: 75 TABLET ORAL at 09:05

## 2020-06-12 RX ADMIN — INSULIN LISPRO 2 UNITS: 100 INJECTION, SOLUTION INTRAVENOUS; SUBCUTANEOUS at 16:11

## 2020-06-12 RX ADMIN — LORATADINE 10 MG: 10 TABLET ORAL at 09:05

## 2020-06-12 NOTE — PROGRESS NOTES
[facility] requires a resulted COVID test prior to admission. Test ordered yesterday and has not yet resulted. Pt can transfer to [facility] once the test has resulted and he is medically cleared for discharge.

## 2020-06-12 NOTE — DISCHARGE SUMMARY
Hospitalist Discharge Summary     Admit Date:  2020  7:28 AM   Name:  Chasity Gupat   Age:  69 y.o.  :  1945   MRN:  [id number]   PCP:  Shala Ghosh MD  Treatment Team: Attending Provider: Luis Carlos Wills MD; Utilization Review: Leonard Aguirre; Care Manager: Adrien Worley LMSW; Consulting Provider: Meron Christiansen MD; Charge Nurse: Maico Hdz; Physical Therapy Assistant: Yanci Brandon PTA    Problem List for this Hospitalization:  Hospital Problems as of 2020 Date Reviewed: 2020          Codes Class Noted - Resolved POA    Urinary retention ICD-10-CM: R33.9  ICD-9-CM: 788.20  2020 - Present Unknown        Stroke (cerebrum) ([facility] ICD-10-CM: I63.9  ICD-9-CM: 434.91  2020 - Present Unknown        Severe obesity ([facility] ICD-10-CM: E66.01  ICD-9-CM: 278.01  2019 - Present Yes        Controlled type 2 diabetes mellitus without complication, without long-term current use of insulin ([facility] ICD-10-CM: E11.9  ICD-9-CM: 250.00  2019 - Present Yes        * (Principal) Cerebrovascular accident (CVA) due to occlusion [facility]) ICD-10-CM: I63.9  ICD-9-CM: 434.91  2019 - Present Yes        Essential hypertension, benign ICD-10-CM: I10  ICD-9-CM: 401.1  1/3/2014 - Present Yes        Mixed hyperlipidemia ICD-10-CM: E78.2  ICD-9-CM: 272.2  1/3/2014 - Present Yes                Admission HPI from 2020: This is a 69 y.o. male who came to ER due to inbility to urinate since last evening and dizziness since 3 days ago.      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 He says that he takes aspirin 325 mg po q day \"as needed\".      Three days ago, he felt dizzy when trying to get up from bed. He felt weak. He rested and over the course of the day, he felt better. He was able to drive for the past 2 days.      Last night, he started to have trouble urinating.  He has had this before but not to the point that it got worse. This morning, he could not urinate at all. He came to ER and Cochran's catheter was placed and urine of 800 mL amount was obtained.      No fever. No shaking. No chills. No head injury. No loss of consciousness no abnormal movement.      Code stroke was activated in ER. Patient is out of window for tPA treatment. CT was done with showed findings below.      wife states that right eyelid seems more swollen than the other eye.       Hospitalist service is requested to admit the patient. Hospital Course:    During the hospital admission, the following problems were taken care of:    Acute inferior right cerebellar and right dorsolateral medulla stroke POA  Neurology consulted. Aspirin, Plavix (DAPT for 90 days, then monotherapy)   Statin   Physical therapy  Occupational therapy. Symptomatic treatments      Hypertension  Amlodipine 5mg.      Diabetes mellitus type 2  Monitor blood sugar. Cover with insulin sliding scale accordingly.       Urinary retention   Urology consulted. Plan to continue Cochran until 06/16. Then attempt voiding trial.If he is unable to void, then rehab facility can replace cochran and he will need follow up with urology in 2 to 3 weeks. Disposition: Rehab Facility  Activity: as tolerated  Diet: DIET DIABETIC WITH OPTIONS Consistent Carb 1200kcal; Regular    Follow up instructions, discharge meds at bottom of this note. Plan was discussed with the pt. All questions answered. Patient was stable at time of discharge. Patient will call a physician or return if any concerns. Diagnostic Imaging/Tests:   Mri Brain Wo Cont    Result Date: 6/8/2020  MRI BRAIN WITHOUT CONTRAST 6/8/2020 HISTORY: 43-year-old male with dizziness, gait abnormality and right-sided facial weakness. TECHNIQUE: Sagittal and axial T1-weighted, axial T2-weighted, axial and coronal FLAIR, axial T2-weighted gradient-echo, axial diffusion weighted images with ADC maps of the brain.    COMPARISON: 2/8/2019 FINDINGS: There are multiple foci of restricted diffusion within the inferior right cerebellar hemisphere and within the right dorsolateral aspect of the medulla. These areas are not FLAIR hyperintense. There is no acute intracranial hemorrhage, hydrocephalus, intra-axial mass, or extra-axial hematoma. On the T2-weighted and FLAIR sequences, there are scattered punctate white matter hyperintensities compatible with mild chronic small vessel ischemic disease. Minimal nodular mucosal thickening is present in the maxillary sinuses. There are old left cerebellar lacunar infarcts and there is likely an old right hypothalamic lacunar infarct. Old hemosiderin deposits present in the anterior right frontal lobe, possibly from remote head trauma. IMPRESSION: Acute to early subacute infarcts in the inferior right cerebellar hemisphere and right dorsolateral medulla. [facility]    Result Date: 6/8/2020  CT HEAD WITHOUT CONTRAST. INDICATION: Dizziness. COMPARISON: Brain MRI from February 2019  TECHNIQUE:   5 mm axial scans from the skull base to the vertex. Our CT scanners use one or more of the following:  Automated exposure control, adjustment of the mA and or kV according to patient size, iterative reconstruction. FINDINGS:  No acute intraparenchymal hemorrhage or abnormal extra-axial fluid collection. The ventricles are normal size. Mild white matter low attenuation is present, nonspecific, likely chronic small vessel disease. Scattered white matter low attenuation is present, nonspecific, likely chronic small vessel disease. No mass effect. Prominent perivascular space right basal ganglia. Included portion of the paranasal sinuses and orbits grossly unremarkable. IMPRESSION:  Negative for acute intracranial abnormality. Chronic changes. Cta Head Neck W Wo Cont    Result Date: 6/8/2020  Title:  CT arteriogram of the neck and head. Indication: Cerebrovascular accident (CVA). Dizziness. Echo [place] Technique: Axial images of the neck and head were obtained after the uneventful administration of intravenous iodinated contrast media. Contrast was used to best identify the arterial structures. Images were reviewed on a separate, free standing, three-dimensional workstation as per the referring physicians request.  All stenosis percentages derived by comparing the narrowest segment with the distal Internal Carotid Artery luminal diameter, as described in the [place] [employer] Symptomatic Carotid Endarterectomy Trial (NASCET) criteria. All CT scans at this facility are performed using dose reduction/dose modulation techniques, as appropriate the performed exam, including the following: Automated Exposure Control; Adjustment of the mA and/or kV according to patient size (this includes techniques or standardized protocols for targeted exams where dose is matched to indication/reason for exam); and Use of Iterative Reconstruction Technique. Comparison: None. Findings: Lungs:  No focal consolidation or pleural effusions. No suspicious pulmonary nodules. .  Bones:  No osseous destruction. . Multilevel degenerative changes with facet arthropathy in the cervical spine. There is advanced degenerative disc disease present at C5-C6. Paranasal sinuses:  Mucus retention cysts versus polyps in the bilateral maxillary sinuses. .  Brain:  No midline shift. No enhancing mass lesion or hydrocephalus. .  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 There is a left-sided thyroid mass measuring 2 cm. .  Dural venous sinuses:  Patent. Aortic arch:  Nonaneurysmal. Mild calcific atherosclerosis. .  Right brachiocephalic artery:  No significant stenosis or occlusion. .  . Right subclavian artery:  No significant stenosis or occlusion. .  . Left subclavian artery:  No significant stenosis or occlusion. .  . Right common carotid artery:  No significant stenosis or occlusion. .  .   Right external carotid artery:  No significant stenosis or occlusion. .  . Right internal carotid artery:  No significant stenosis or occlusion. . Mild calcified atherosclerosis at the carotid bulb. Vascular calcifications along the carotid siphon. . Left common carotid artery: No significant stenosis or occlusion. .  . Left external carotid artery:  No significant stenosis or occlusion. .  . Left internal carotid artery:  No significant stenosis or occlusion. .  Mild calcified atherosclerosis at the carotid bulb. Vascular calcifications along the carotid siphon. . Right vertebral artery:  No significant stenosis or occlusion in the right vertebral artery. .. There is decreased opacification of the right PICA approximately 1 cm from its origin as seen on image 387. Left vertebral artery:  No significant stenosis or occlusion. . Basilar artery:  No significant stenosis, occlusion, or aneurysm. .  Right middle cerebral artery:  The right M1 segment is without stenosis or occlusion. There is a high-grade stenosis involving a right M2/M3 branch in the sylvian fissure as seen on image 477. Right anterior cerebral artery:  No significant stenosis, occlusion, or aneurysm. Darlynn Magic Anterior communicating artery: No significant stenosis, occlusion, or aneurysm. Darlynn Magic Left middle cerebral artery: Moderate stenosis in the mid left M1 segment without focal occlusion or thrombus. .  Left anterior cerebral artery:  No significant stenosis, occlusion, or aneurysm. .  Right posterior communicating artery: No significant stenosis, occlusion, or aneurysm. Darlynn Magic Left posterior communicating artery:  No significant stenosis, occlusion, or aneurysm. .  Right posterior cerebral artery:  Multifocal moderate stenoses in the proximal right PCA. Darlynn Magic Left posterior cerebral artery:  No significant stenosis, occlusion, or aneurysm. .      Impression: 1. There is decreased opacification of the right PICA approximately 1 cm from its origin. Possible occlusion or high-grade stenosis.  Consider MRI for further evaluation. 2.  Generalized atherosclerotic changes with intracranial stenoses as follows. High-grade stenosis involving a right M2/M3 branch in the sylvian fissure. Moderate stenosis in the mid left M1 segment. Multifocal moderate stenoses in the proximal right PCA. No high-grade stenosis or occlusion in the carotid or vertebral arteries. 3.  Right thyroid mass measuring up to 5.2 cm. Recommend follow-up thyroid ultrasound. .       Echocardiogram results:  Results for orders placed or performed during the hospital encounter of 20   2D ECHO COMPLETE ADULT (TTE) W OR WO CONTR    Narrative    [facility]  [address] [facility], [address]  [phone]    Transthoracic Echocardiogram  2D, M-mode, Doppler, and Color Doppler    Patient: Irina Manrique  MR #: [id number]  : [id number]  Age: 69 years  Gender: Male  Study date: 2020  Account #: [de-identified]  Height: 69.7 in  Weight: 272.4 lb  BSA: 2.37 mï¾²  Status:Routine  Location: [facility]  BP: 184/ 61    Allergies: NO KNOWN ALLERGENS    Sonographer:  Radha Hyde RD  Group:  [id number] Cardiology  Referring Physician:  Tamara  Reading Physician:  Monica Goddard MD    INDICATIONS: Stroke. *Poor parasternal windows. *    PROCEDURE: This was a routine study. A transthoracic echocardiogram was  performed. The study included complete 2D imaging, M-mode, complete spectral  Doppler, and color Doppler. Intravenous contrast (Definity) was administered. Intravenous contrast (agitated saline) was administered. Echocardiographic  views were limited by poor acoustic window availability. The parasternal   window  was low, thus no M-mode measurements were recorded. Image quality was   adequate. LEFT VENTRICLE: Size was normal. Systolic function was normal. Ejection  fraction was estimated in the range of 55 % to 60 %. There were no regional  wall motion abnormalities. Wall thickness was mildly increased. The E/e'   ratio  was 15.6. Diastolic function was indeterminate. RIGHT VENTRICLE: The size was normal. Systolic function was normal. The  tricuspid jet envelope definition was inadequate for estimation of RV   systolic  pressure. LEFT ATRIUM: Size was normal.    ATRIAL SEPTUM: Contrast injection was performed. There was no right-to-left  shunt, with provocative maneuvers to increase right atrial pressure. RIGHT ATRIUM: Size was normal.    SYSTEMIC VEINS: IVC: The inferior vena cava was not well visualized. AORTIC VALVE: The valve was probably trileaflet. The valve was not well  visualized. There was no evidence for stenosis. There was no insufficiency. MITRAL VALVE: Valve structure was normal. There was no evidence for stenosis. There was no regurgitation. TRICUSPID VALVE: The valve structure was normal. There was no evidence for  stenosis. There was trace regurgitation. PULMONIC VALVE: Not well visualized. There was no evidence for stenosis. There  was no insufficiency. PERICARDIUM: There was no pericardial effusion. AORTA: The root exhibited normal size. SUMMARY:    -  Left ventricle: Systolic function was normal. Ejection fraction was  estimated in the range of 55 % to 60 %. There were no regional wall motion  abnormalities. Wall thickness was mildly increased. The E/e' ratio was 15.6. Diastolic function was indeterminate. -  Atrial septum: Contrast injection was performed. There was no   right-to-left  shunt, with provocative maneuvers to increase right atrial pressure.     SYSTEM MEASUREMENT TABLES    2D mode  AoR Diam (2D): 2.9 cm  Left Atrium Systolic Volume Index; Method of Disks, Biplane; 2D mode;: 31.3  ml/m2  IVS/LVPW (2D): 1  IVSd (2D): 1.2 cm  LVIDd (2D): 5.2 cm  LVIDs (2D): 2.9 cm  LVOT Area (2D): 3.5 cm2  LVPWd (2D): 1.2 cm    Tissue Doppler Imaging  LV Peak Early Lind Tissue Jorge; Lateral MA (TDI): 6.6 cm/s  LV Peak Early Lind Tissue Jorge; Medial MA (TDI): 8.2 cm/s    Unspecified Scan Mode  Peak Grad; Mean; Antegrade Flow: 15 mm[Hg]  Vmax; Antegrade Flow: 185 cm/s  LVOT Diam: 2.1 cm  MV Peak Jorge/LV Peak Tissue Jorge E-Wave; Lateral MA: 17.3  MV Peak Jorge/LV Peak Tissue Jorge E-Wave; Medial MA: 13.9    Prepared and signed by    Daniel Redding MD  Signed 09-Jun-2020 16:45:37         Procedures done this admission:  * No surgery found *    All Micro Results     Procedure Component Value Units Date/Time    EMERGENT DISEASE PANEL [[id number]] Collected:  06/11/20 1817    Order Status:  Completed Updated:  06/11/20 1926    EMERGENT DISEASE PANEL [[id number]]     Order Status:  Canceled           Labs: Results:       BMP, Mg, Phos Recent Labs     06/11/20  0549      K 3.4*   *   CO2 31   AGAP 3*   BUN 15   CREA 0.97   CA 8.0*   *      CBC Recent Labs     06/11/20  0549   WBC 8.0   RBC 4.51   HGB 13.6   HCT 41.6      GRANS 70   LYMPH 19   EOS 2   MONOS 9   BASOS 1   IG 1   ANEU 5.6   ABL 1.5   LEYT 0.2   ABM 0.7   ABB 0.0   AIG 0.0      LFT No results for input(s): ALT, TBIL, AP, TP, ALB, GLOB, AGRAT in the last 72 hours.     No lab exists for component: SGOT, GPT   Cardiac Testing No results found for: BNPP, BNP, CPK, RCK1, RCK2, RCK3, RCK4, CKMB, CKNDX, CKND1, TROPT, TROIQ   Coagulation Tests No results found for: PTP, INR, APTT, INREXT   A1c Lab Results   Component Value Date/Time    Hemoglobin A1c 7.8 (H) 06/09/2020 06:05 AM    Hemoglobin A1c 6.2 (H) 10/03/2018 09:52 AM    Hemoglobin A1c 7.9 (H) 06/11/2018 10:24 AM      Lipid Panel Lab Results   Component Value Date/Time    Cholesterol, total 161 06/09/2020 06:05 AM    HDL Cholesterol 31 (L) 06/09/2020 06:05 AM    LDL, calculated 108.6 (H) 06/09/2020 06:05 AM    VLDL, calculated 21.4 06/09/2020 06:05 AM    Triglyceride 107 06/09/2020 06:05 AM    CHOL/HDL Ratio 5.2 06/09/2020 06:05 AM      Thyroid Panel Lab Results   Component Value Date/Time    TSH 2.750 12/07/2016 09:49 AM    TSH 2.450 11/19/2015 10:24 AM Most Recent UA No results found for: COLOR, APPRN, REFSG, DOMINIC, PROTU, GLUCU, KETU, BILU, BLDU, UROU, RUSSELL, LEUKU, WBCU, RBCU, UEPI, BACTU, CASTS, UCRY, MUCUS, UCOM     No Known Allergies  Immunization History   Administered Date(s) Administered    Influenza High Dose Vaccine PF 11/12/2016    Influenza Vaccine (Tri) Adjuvanted 10/10/2019    TB Skin Test (PPD) Intradermal 06/11/2020    Zoster Vaccine, Live 12/13/2016       All Labs from Last 24 Hrs:  Recent Results (from the past 24 hour(s))   GLUCOSE, POC    Collection Time: 06/11/20 11:10 AM   Result Value Ref Range    Glucose (POC) 161 (H) 65 - 100 mg/dL   GLUCOSE, POC    Collection Time: 06/11/20  3:50 PM   Result Value Ref Range    Glucose (POC) 181 (H) 65 - 100 mg/dL   GLUCOSE, POC    Collection Time: 06/11/20  9:43 PM   Result Value Ref Range    Glucose (POC) 137 (H) 65 - 100 mg/dL   GLUCOSE, POC    Collection Time: 06/12/20  7:16 AM   Result Value Ref Range    Glucose (POC) 133 (H) 65 - 100 mg/dL       Current Med List in Hospital:   Current Facility-Administered Medications   Medication Dose Route Frequency    amLODIPine (NORVASC) tablet 5 mg  5 mg Oral DAILY    sodium chloride (OCEAN) 0.65 % nasal squeeze bottle 2 Spray  2 Spray Both Nostrils Q2H PRN    atorvastatin (LIPITOR) tablet 80 mg  80 mg Oral QHS    sodium chloride (NS) flush 5-40 mL  5-40 mL IntraVENous Q8H    sodium chloride (NS) flush 5-40 mL  5-40 mL IntraVENous PRN    acetaminophen (TYLENOL) tablet 650 mg  650 mg Oral Q6H PRN    doxazosin (CARDURA) tablet 8 mg  8 mg Oral BID    loratadine (CLARITIN) tablet 10 mg  10 mg Oral DAILY    aspirin chewable tablet 81 mg  81 mg Oral DAILY    insulin lispro (HUMALOG) injection   SubCUTAneous AC&HS    enoxaparin (LOVENOX) injection 40 mg  40 mg SubCUTAneous Q24H    clopidogreL (PLAVIX) tablet 75 mg  75 mg Oral DAILY       Discharge Exam:  Patient Vitals for the past 24 hrs:   Temp Pulse Resp BP SpO2   06/12/20 0800 97.9 °F (36.6 °C) 74 19 149/74 90 %   06/12/20 0400 98.2 °F (36.8 °C) 71 20 159/78 90 %   06/12/20 0000 98.2 °F (36.8 °C) 67 20 156/67 94 %   06/11/20 2000 98.3 °F (36.8 °C) 69 20 182/73 95 %   06/11/20 1600 98.3 °F (36.8 °C) 67 20 175/77 92 %   06/11/20 1200 97.7 °F (36.5 °C) 69 18 156/79 91 %     Oxygen Therapy  O2 Sat (%): 90 % (06/12/20 0800)  Pulse via Oximetry: 56 beats per minute (06/08/20 1200)  O2 Device: Room air (06/08/20 0732)    Intake/Output Summary (Last 24 hours) at 6/12/2020 1034  Last data filed at 6/12/2020 0441  Gross per 24 hour   Intake 240 ml   Output 1450 ml   Net -1210 ml       *Note that automatically entered I/Os may not be accurate; dependent on patient compliance with collection and accurate  by assistants. General:    Well nourished. Alert. Eyes:   Normal sclerae. Extraocular movements intact. ENT:  Normocephalic, atraumatic. Moist mucous membranes  CV:   Regular rate and rhythm. No murmur, rub, or gallop. Lungs:  Clear to auscultation bilaterally. No wheezing, rhonchi, or rales. Abdomen: Soft, nontender, nondistended. Bowel sounds normal.   Extremities: Warm and dry. No cyanosis or edema. Neurologic: AAO X 3, CN II-XII grossly intact. Strength 5/5 B/L Upper and lower extremities. Sensation intact. Skin:     No rashes or jaundice. Psych:  Normal mood and affect. Discharge Info:   Current Discharge Medication List      START taking these medications    Details   amLODIPine (NORVASC) 5 mg tablet Take 1 Tab by mouth daily for 30 days. Qty: 30 Tab, Refills: 0      aspirin 81 mg chewable tablet Take 1 Tab by mouth daily for 30 days. Qty: 30 Tab, Refills: 0      clopidogreL (PLAVIX) 75 mg tab Take 1 Tab by mouth daily for 30 days.   Qty: 30 Tab, Refills: 0      insulin lispro (HUMALOG) 100 unit/mL injection INITIATE INSULIN CORRECTIVE PROTOCOL:  Normal Insulin Sensitivity   For Blood Sugar (mg/dL) of:     Less than 150 =   0 units           150 -199 =   2 units  200 -249 = 4 units  250 -299 =   6 units  300 -349 =   8 units  350 and above = 10 units and Call Physician     Initiate Hypoglycemia protocol if blood glucose is <70 mg/dL Fast Acting - Administer Immediately - or within 15 minutes of start of meal, if mealtime coverage. Qty: 1 Vial, Refills: 0         CONTINUE these medications which have CHANGED    Details   atorvastatin (LIPITOR) 80 mg tablet Take 1 Tab by mouth nightly for 30 days. Qty: 30 Tab, Refills: 0         CONTINUE these medications which have NOT CHANGED    Details   mupirocin (BACTROBAN) 2 % ointment Apply  to affected area daily. Qty: 22 g, Refills: 0    Associated Diagnoses: Skin cyst; Folliculitis      doxazosin (CARDURA) 8 mg tablet Take 1 Tab by mouth two (2) times a day. Qty: 180 Tab, Refills: 1    Associated Diagnoses: Essential hypertension, benign      loratadine (CLARITIN) 10 mg tablet Take 10 mg by mouth daily. Follow Up Orders: Follow-up Appointments   Procedures    FOLLOW UP VISIT Appointment in: 3 - 5 Days F/u with PCP in 3-5 days F/u with Urology in 2 weeks if pt fails voiding trial on 06/16     F/u with PCP in 3-5 days  F/u with Urology in 2 weeks if pt fails voiding trial on 06/16     Standing Status:   Standing     Number of Occurrences:   1     Order Specific Question:   Appointment in     Answer:   3 - 5 Days       Follow-up Information     Follow up With Specialties Details Why Contact Info    Jose Elias Peralta MD [address] [place] [facility]  [phone]            Time spent in patient discharge planning and coordination 42 minutes.     Signed:  Jeny Raygoza MD

## 2020-06-12 NOTE — PROGRESS NOTES
06/12/20 1906   NIH Stroke Scale   Interval Other (comment)   LOC 0   LOC Questions 0   LOC Commands 0   Best Gaze 0   Visual 0   Facial Palsy 0   Motor Right Arm 0   Motor Left Arm 0   Motor Right Leg 0   Motor Left Leg 0   Limb Ataxia 0   Sensory 0   Best Language 0   Dysarthria 0   Extinction and Inattention 0   Total 0

## 2020-06-12 NOTE — PROGRESS NOTES
Jw Ponce MD  Medical Director  [address] [facility], [address] [facility]  Tel: [phone]       Physical Medicine & Rehab Consult Progress Note      Patient: Veronica Bustillos Date: 6/8/2020  Admit Diagnosis: Stroke (cerebrum) [facility]) [I63.9]    Recommendations:   Continue acute rehabilitation program  Coordination of rehab / medical care  Counseling of PM&R care issues management    Acute ischemic stroke, right cerebellar hemisphere, right dorsolateral medulla    History / Subjective / Complaint:     Patient seen and examined, interim EMR reviewed. Patient provides good description of events prompting ED encounter, hospital admission. States wife wants to be clear that his left lower lip lag is from previous skin cancer excision and resulting scarring. Participating in acute therapies, eager to begin in [facility]. Awaiting COVID-19 results.     No Known Allergies  Current Facility-Administered Medications   Medication Dose Route Frequency    amLODIPine (NORVASC) tablet 5 mg  5 mg Oral DAILY    sodium chloride (OCEAN) 0.65 % nasal squeeze bottle 2 Spray  2 Spray Both Nostrils Q2H PRN    atorvastatin (LIPITOR) tablet 80 mg  80 mg Oral QHS    sodium chloride (NS) flush 5-40 mL  5-40 mL IntraVENous Q8H    sodium chloride (NS) flush 5-40 mL  5-40 mL IntraVENous PRN    acetaminophen (TYLENOL) tablet 650 mg  650 mg Oral Q6H PRN    doxazosin (CARDURA) tablet 8 mg  8 mg Oral BID    loratadine (CLARITIN) tablet 10 mg  10 mg Oral DAILY    aspirin chewable tablet 81 mg  81 mg Oral DAILY    insulin lispro (HUMALOG) injection   SubCUTAneous AC&HS    enoxaparin (LOVENOX) injection 40 mg  40 mg SubCUTAneous Q24H    clopidogreL (PLAVIX) tablet 75 mg  75 mg Oral DAILY       Objective:     Vitals:  Patient Vitals for the past 8 hrs:   BP Temp Pulse Resp SpO2   06/12/20 1600 149/75 97 °F (36.1 °C) 65 18 95 %   06/12/20 1200 160/75 97.9 °F (36.6 °C) 68 18 90 % Intake and Output:  06/10 1901 - 06/12 0700  In: 240 [P.O.:240]  Out: 1950 [Urine:1950]    Physical Exam:  General: Alert, oriented, cooperative WM patient in no acute distress   Skin:  Warm, moist with texture appropriate for age  Eyes:   Sclera are clear, extraocular muscles intact  HENT:  Normocephalic; nares patent; neck supple; trachea midline  Respiratory: Lungs clear to auscultation bilaterally, breathing is non-labored   Chest:  Symmetric throughout one respiratory excursion; no supraclavicular lymphadenopathy  CV:  Regular rate and rhythm, soft rolling BRIAN  Abdomen: Soft, non-tender, protuberant but non-distended; bowel sounds normoactive   Extremities: No cyanosis, clubbing or edema  Neurological: EOM intact, tongue midline, mild right facial droop, left lower lip lag with grin, facial symmetry noted to cheek puff / brow furrow, symmetric shoulder shrug, no drift, no dysmetria, mild coordination issues with JAK, heel-to-shin; B UE and B LE strength 5/5 throughout; speech clear, thoughts cogent, follows commands          Pain 1  Pain Scale 1: Numeric (0 - 10) (06/11/20 2000)  Pain Intensity 1: 0 (06/11/20 2000)  Patient Stated Pain Goal: 0 (06/11/20 2000)     Functional Assessment:  Gross Assessment  AROM: Within functional limits (06/09/20 1511)  PROM: Within functional limits (06/08/20 1600)  Strength: Within functional limits (06/09/20 1511)  Coordination: Within functional limits (06/09/20 1511)     Gait  Base of Support: Widened;Center of gravity altered (06/10/20 1543)  Speed/Landy: Slow;Shuffled (06/10/20 1543)  Step Length: Right shortened;Left shortened (06/10/20 1543)  Gait Abnormalities: Decreased step clearance;Trunk sway increased (06/10/20 1543)  Ambulation - Level of Assistance: Minimal assistance;Assist x2 (06/10/20 1543)  Distance (ft): 2 Feet (ft) (06/10/20 1543)  Assistive Device: [place], rolling;Gait belt (06/10/20 1543)  Interventions: Verbal cues; Safety awareness training;Manual cues;Tactile cues (06/10/20 1543)     Bed Mobility  Rolling: Contact guard assistance (06/10/20 1543)  Supine to Sit: Moderate assistance (06/12/20 1400)  Sit to Supine: Minimum assistance (06/09/20 1511)  Scooting: Contact guard assistance (06/12/20 1400)     Balance  Sitting: Impaired (06/10/20 1543)  Sitting - Static: Good (unsupported) (06/12/20 1400)  Sitting - Dynamic: Fair (occasional) (06/12/20 1400)  Standing: Impaired (06/10/20 1543)  Standing - Static: Fair (06/12/20 1400)  Standing - Dynamic : Fair (06/12/20 1400)     Toileting  Toileting Assistance: Maximum assistance (06/09/20 1511)  Bowel Hygiene: Total assistance (dependent) (06/09/20 1511)  Clothing Management: Maximum assistance (06/09/20 1511)     Bed/Mat Mobility  Rolling: Contact guard assistance (06/10/20 1543)  Supine to Sit: Moderate assistance (06/12/20 1400)  Sit to Supine: Minimum assistance (06/09/20 1511)  Sit to Stand: Minimum assistance; Moderate assistance (06/12/20 1400)  Stand to Sit: Minimum assistance (06/12/20 1400)  Scooting: Contact guard assistance (06/12/20 1400)     Labs/Studies:  Recent Results (from the past 72 hour(s))   GLUCOSE, POC    Collection Time: 06/09/20  9:17 PM   Result Value Ref Range    Glucose (POC) 153 (H) 65 - 100 mg/dL   GLUCOSE, POC    Collection Time: 06/10/20  7:26 AM   Result Value Ref Range    Glucose (POC) 139 (H) 65 - 100 mg/dL   GLUCOSE, POC    Collection Time: 06/10/20 11:12 AM   Result Value Ref Range    Glucose (POC) 166 (H) 65 - 100 mg/dL   GLUCOSE, POC    Collection Time: 06/10/20  3:53 PM   Result Value Ref Range    Glucose (POC) 125 (H) 65 - 100 mg/dL   GLUCOSE, POC    Collection Time: 06/10/20 10:16 PM   Result Value Ref Range    Glucose (POC) 117 (H) 65 - 100 mg/dL   CBC WITH AUTOMATED DIFF    Collection Time: 06/11/20  5:49 AM   Result Value Ref Range    WBC 8.0 4.3 - 11.1 K/uL    RBC 4.51 4.23 - 5.6 M/uL    HGB 13.6 13.6 - 17.2 g/dL    HCT 41.6 41.1 - 50.3 %    MCV 92.2 79.6 - 97.8 FL MCH 30.2 26.1 - 32.9 PG    MCHC 32.7 31.4 - 35.0 g/dL    RDW 13.8 11.9 - 14.6 %    PLATELET 963 604 - 238 K/uL    MPV 11.7 9.4 - 12.3 FL    ABSOLUTE NRBC 0.00 0.0 - 0.2 K/uL    DF AUTOMATED      NEUTROPHILS 70 43 - 78 %    LYMPHOCYTES 19 13 - 44 %    MONOCYTES 9 4.0 - 12.0 %    EOSINOPHILS 2 0.5 - 7.8 %    BASOPHILS 1 0.0 - 2.0 %    IMMATURE GRANULOCYTES 1 0.0 - 5.0 %    ABS. NEUTROPHILS 5.6 1.7 - 8.2 K/UL    ABS. LYMPHOCYTES 1.5 0.5 - 4.6 K/UL    ABS. MONOCYTES 0.7 0.1 - 1.3 K/UL    ABS. EOSINOPHILS 0.2 0.0 - 0.8 K/UL    ABS. BASOPHILS 0.0 0.0 - 0.2 K/UL    ABS. IMM.  GRANS. 0.0 0.0 - 0.5 K/UL   METABOLIC PANEL, BASIC    Collection Time: 06/11/20  5:49 AM   Result Value Ref Range    Sodium 144 136 - 145 mmol/L    Potassium 3.4 (L) 3.5 - 5.1 mmol/L    Chloride 110 (H) 98 - 107 mmol/L    CO2 31 21 - 32 mmol/L    Anion gap 3 (L) 7 - 16 mmol/L    Glucose 141 (H) 65 - 100 mg/dL    BUN 15 8 - 23 MG/DL    Creatinine 0.97 0.8 - 1.5 MG/DL    GFR est AA >60 >60 ml/min/1.73m2    GFR est non-AA >60 >60 ml/min/1.73m2    Calcium 8.0 (L) 8.3 - 10.4 MG/DL   GLUCOSE, POC    Collection Time: 06/11/20  7:15 AM   Result Value Ref Range    Glucose (POC) 134 (H) 65 - 100 mg/dL   GLUCOSE, POC    Collection Time: 06/11/20 11:10 AM   Result Value Ref Range    Glucose (POC) 161 (H) 65 - 100 mg/dL   GLUCOSE, POC    Collection Time: 06/11/20  3:50 PM   Result Value Ref Range    Glucose (POC) 181 (H) 65 - 100 mg/dL   GLUCOSE, POC    Collection Time: 06/11/20  9:43 PM   Result Value Ref Range    Glucose (POC) 137 (H) 65 - 100 mg/dL   GLUCOSE, POC    Collection Time: 06/12/20  7:16 AM   Result Value Ref Range    Glucose (POC) 133 (H) 65 - 100 mg/dL   GLUCOSE, POC    Collection Time: 06/12/20 11:22 AM   Result Value Ref Range    Glucose (POC) 155 (H) 65 - 100 mg/dL   GLUCOSE, POC    Collection Time: 06/12/20  3:32 PM   Result Value Ref Range    Glucose (POC) 187 (H) 65 - 100 mg/dL        Assessment:     Principal Problem:    Cerebrovascular accident (CVA) due to occlusion ([facility] (2/12/2019)    Active Problems:    Essential hypertension, benign (1/3/2014)      Mixed hyperlipidemia (1/3/2014)      Controlled type 2 diabetes mellitus without complication, without long-term current use of insulin ([facility] (2/22/2019)      Severe obesity ([address] (7/2/2019)      Urinary retention (6/8/2020)      Stroke (cerebrum) ([facility] (6/8/2020)        Plan / Recommendations / Medical Decision Making:     Recommendations:   Continue acute rehabilitation program  Coordination of rehab / medical care  Counseling of PM&R care issues management  Monitoring and management of medical conditions per plan of care / orders    · Patient appropriate candidate for [facility]   · Awaiting negative COVID-19 status    Discussion with Family / Caregiver / Staff    Reviewed Therapies / Lissa Porteous / Medications / Records        Frandy Fajardo PA-C  Physician Assistant with [address].  Lise Byrnes MD, Medical Director

## 2020-06-12 NOTE — PROGRESS NOTES
Problem: Mobility Impaired (Adult and Pediatric)  Goal: *Acute Goals and Plan of Care (Insert Text)  Description: STG:  (1.)Mr. Chrys Cranker will move from supine to sit and sit to supine , scoot up and down and roll side to side with SUPERVISION within 3 treatment day(s). (2.)Mr. Chrys Cranker will transfer from bed to chair and chair to bed with MINIMAL ASSIST using the least restrictive device within 3 treatment day(s). (3.)Mr. Chrys Cranker will ambulate with MINIMAL ASSIST for 50 feet with the least restrictive device within 3 treatment day(s). (4.)Mr. Chrys Cranker will perform standing static and dynamic balance activities x 15 minutes with MINIMAL ASSIST to improve safety within 3 day(s). LTG:  (1.)Mr. Chrys Cranker will move from supine to sit and sit to supine , scoot up and down and roll side to side in bed with MODIFIED INDEPENDENCE within 7 treatment day(s). (2.)Mr. Chrys Cranker will transfer from bed to chair and chair to bed with STAND BY ASSIST using the least restrictive device within 7 treatment day(s). (3.)Mr. Chrys Cranker will ambulate with STAND BY ASSIST for 150 feet with the least restrictive device within 7 treatment day(s). (4.)Mr. Chrys Cranker will perform standing static and dynamic balance activities x 15 minutes with STAND BY ASSIST to improve safety within 7 day(s). (5.)Mr. Chrys Cranker will ascend and descend 5 stairs using 0-2 hand rail(s) with STAND BY ASSIST to improve functional mobility and safety within 7 day(s).   ________________________________________________________________________________________________     Outcome: Progressing Towards Goal     PHYSICAL THERAPY: Daily Note and PM 6/12/2020  INPATIENT: PT Visit Days : 3  [place] / [place] / Product Type: Medicare /       NAME/AGE/GENDER: Aria Genera is a 69 y.o. male   PRIMARY DIAGNOSIS: Stroke (cerebrum) ([facility] [I63.9] Cerebrovascular accident (CVA) due to occlusion [facility]) Cerebrovascular accident (CVA) due to occlusion [facility])       ICD-10: Treatment Diagnosis:    · Generalized Muscle Weakness (M62.81)  · Difficulty in walking, Not elsewhere classified (R26.2)  · Dizziness and Giddiness (R42)   Precaution/Allergies:  Patient has no known allergies. ASSESSMENT:     Mr. Elizabeth Jackson is a 69 y.o. male admitted for CVA workup. Per MRI: \"Acute to early subacute infarcts in the inferior right cerebellar hemisphere and right dorsolateral medulla. \" He lives with spouse in a single story home and typically ambulates and performs ADLs independently at baseline. He is able to drive (and was driving Friday/Saturday despite dizziness symptoms), although they progressed to the point of not being able to drive. The patient is supine in bed upon contact and agreeable to PT treatment. He performed bed mobility and supine to sit with min/mod A and verbal cues for technique. He demonstrated good sitting balance at the edge of the bed. Stood and transferred to  [facility] with min a x 2. From [facility] he stood to  and worked on  neuro re-education with cues for head turns, looking up and down, marching in place and other balance challenges. BSC was moved and chair pulled up behind pt. He sat and after a minute to rest, patient performed therapeutic strengthening exercises to B LE as listed below to improve endurance, balance and functional strength for transfers, gait, coordination and overall mobility. Patient required cues to perform exercises correctly. He reported minimal dizziness which passed quickly. The patient was then positioned for comfort with needs in reach in chair. Overall the patient is making good progress toward therapy goals as indicated by increased activity tolerance. Will continue skilled PT treatment as patient is still below functional baseline. This section established at most recent assessment   PROBLEM LIST (Impairments causing functional limitations):  1. Decreased Strength  2.  Decreased ADL/Functional Activities  3. Decreased Transfer Abilities  4. Decreased Ambulation Ability/Technique  5. Decreased Balance  6. Decreased Activity Tolerance  7. Decreased Knowledge of Precautions  8. Decreased [place] with Home Exercise Program   INTERVENTIONS PLANNED: (Benefits and precautions of physical therapy have been discussed with the patient.)  1. Balance Exercise  2. Bed Mobility  3. Family Education  4. Gait Training  5. Home Exercise Program (HEP)  6. Neuromuscular Re-education/Strengthening  7. Therapeutic Activites  8. Therapeutic Exercise/Strengthening  9. Transfer Training     TREATMENT PLAN: Frequency/Duration: 3 times a week for duration of hospital stay  Rehabilitation Potential For Stated Goals: Good     REHAB RECOMMENDATIONS (at time of discharge pending progress):    Placement: It is my opinion, based on this patient's performance to date, that Mr. Jose Daniel Liang may benefit from intensive therapy at an [address] [place] [address] after discharge due to a probable need for multiple therapy disciplines and potential to make ongoing and sustainable functional improvement that is of practical value. .  Equipment:    None at this time              HISTORY:   History of Present Injury/Illness (Reason for Referral):  Chani Hussein is a 69 y.o. male who came to ER due to inbility to urinate since last evening and dizziness since 3 days ago. 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 He says that he takes aspirin 325 mg po q day \"as needed\". Three days ago, he felt dizzy when trying to get up from bed. He felt weak. He rested and over the course of the day, he felt better. He was able to drive for the past 2 days. Last night, he started to have trouble urinating. He has had this before but not to the point that it got worse. This morning, he could not urinate at all.  He came to ER and Mena's catheter was placed and urine of 800 mL amount was obtained. No fever. No shaking. No chills. No head injury. No loss of consciousness no abnormal movement. Code stroke was activated in ER. Patient is out of window for tPA treatment. CT was done with showed findings below. wife states that right eyelid seems more swollen than the other eye. Hospitalist service is requested to admit the patient. Past Medical History/Comorbidities:   Mr. Miguel Melendez  has a past medical history of Essential hypertension, benign (1/3/2014), Mixed hyperlipidemia (1/3/2014), Numbness and tingling in left hand (7/2/2019), and Right thalamic infarction ([facility] (7/2/2019). Mr. Miguel Melendez  has no past surgical history on file. Social History/Living Environment:   Home Environment: Private residence  # Steps to Enter: 0  One/Two Story Residence: One story  Living Alone: No  Support Systems: Spouse/Significant Other/Partner  Patient Expects to be Discharged to[de-identified] Private residence  Current DME Used/Available at Home: None  Tub or Shower Type: Shower  Prior Level of Function/Work/Activity:  He lives with spouse in a single story home and typically ambulates and performs ADLs independently at baseline. He is able to drive (and was driving Friday/Saturday despite dizziness symptoms), although they progressed to the point of not being able to drive. Number of Personal Factors/Comorbidities that affect the Plan of Care: 3+: HIGH COMPLEXITY   EXAMINATION:   Most Recent Physical Functioning:   Gross Assessment:                  Posture:     Balance:  Sitting - Static: Good (unsupported)  Sitting - Dynamic: Fair (occasional)  Standing - Static: Fair  Standing - Dynamic : Fair Bed Mobility:  Supine to Sit: Moderate assistance  Scooting: Contact guard assistance  Wheelchair Mobility:     Transfers:  Sit to Stand: Minimum assistance; Moderate assistance  Stand to Sit: Minimum assistance  Gait:            Body Structures Involved:  1. Nerves  2. Heart  3.  Muscles Body Functions Affected:  1. Sensory/Pain  2. Cardio  3. Neuromusculoskeletal  4. Movement Related Activities and Participation Affected:  1. Mobility  2. Self Care  3. Domestic Life  4. Interpersonal Interactions and Relationships  5. Community, Social and [place]   Number of elements that affect the Plan of Care: 4+: HIGH COMPLEXITY   CLINICAL PRESENTATION:   Presentation: Stable and uncomplicated: LOW COMPLEXITY   CLINICAL DECISION MAKIN [facility] Inpatient Short Form  How much difficulty does the patient currently have. .. Unable A Lot A Little None   1. Turning over in bed (including adjusting bedclothes, sheets and blankets)? [] 1   [] 2   [x] 3   [] 4   2. Sitting down on and standing up from a chair with arms ( e.g., wheelchair, bedside commode, etc.)   [x] 1   [] 2   [] 3   [] 4   3. Moving from lying on back to sitting on the side of the bed? [] 1   [] 2   [x] 3   [] 4   How much help from another person does the patient currently need. .. Total A Lot A Little None   4. Moving to and from a bed to a chair (including a wheelchair)? [] 1   [x] 2   [] 3   [] 4   5. Need to walk in hospital room? [] 1   [x] 2   [] 3   [] 4   6. Climbing 3-5 steps with a railing? [] 1   [x] 2   [] 3   [] 4   © , Trustees of [address] [place] [address], under license to [employer]. All rights reserved      Score:  Initial: 13 Most Recent: X (Date: -- )    Interpretation of Tool:  Represents activities that are increasingly more difficult (i.e. Bed mobility, Transfers, Gait). Medical Necessity:     · Patient demonstrates   · good  ·  rehab potential due to higher previous functional level. Reason for Services/Other Comments:  · Patient   · continues to require modification of therapeutic interventions to increase complexity of exercises  · .    Use of outcome tool(s) and clinical judgement create a POC that gives a: Clear prediction of patient's progress: LOW COMPLEXITY TREATMENT:   (In addition to Assessment/Re-Assessment sessions the following treatments were rendered)   Pre-treatment Symptoms/Complaints:  none  Pain: Initial:      Post Session:  0     Therapeutic Activity: (    20 minutes): Therapeutic activities including Bed transfers, bed mobility, chair transfers, sit to stand transfer training, and ambulation on level surfaces to improve mobility, strength, and activity tolerance . Required minimal cues   to promote coordination of bilateral, lower extremity(s). Neuromuscular Re-education: ( 10 min):  Exercise/activities including sitting balance/standing balance, and patient education for maintaining midline improve balance, coordination, kinesthetic sense, posture and proprioception. Required minimal visual, verbal, manual and tactile cues to promote static and dynamic balance in standing. Therapeutic Exercise: (  10 min):  Exercises per grid below to improve mobility, strength and balance. Required min visual and verbal cues to promote proper body alignment and promote proper body posture. Date:  6/12/20 Date:   Date:     Activity/Exercise Seated Parameters Parameters Parameters   Heel raises X 20 B     Toe raises X 20 B     LAQ's X 20 B     Hip Flex X 20 B     Hip ABD X 20 B                             Braces/Orthotics/Lines/Etc:   · O2 Device: Room air  Treatment/Session Assessment:    · Response to Treatment:  Improved. Very pleasant  · Interdisciplinary Collaboration:   o Physical Therapy Assistant  o Registered Nurse  o Rehabilitation Attendant  · After treatment position/precautions:   o Up in chair  o Bed/Chair-wheels locked  o Bed in low position  o Call light within reach  o RN notified   · Compliance with Program/Exercises: Will assess as treatment progresses  · Recommendations/Intent for next treatment session: \"Next visit will focus on advancements to more challenging activities and reduction in assistance provided\".   Total Treatment Duration:  PT Patient Time In/Time Out  Time In: 1342  Time Out: [address], PTA

## 2020-06-12 NOTE — PROGRESS NOTES
06/12/20 0738   NIH Stroke Scale   Interval Other (comment)  (Dual NIH with Al RN)   LOC 0   LOC Questions 0   LOC Commands 0   Best Gaze 0   Visual 0   Facial Palsy 0   Motor Right Arm 0   Motor Left Arm 0   Motor Right Leg 0   Motor Left Leg 0   Limb Ataxia 0   Sensory 0   Best Language 0   Dysarthria 0   Extinction and Inattention 0   Total 0

## 2020-06-13 LAB
GLUCOSE BLD STRIP.AUTO-MCNC: 133 MG/DL (ref 65–100)
GLUCOSE BLD STRIP.AUTO-MCNC: 140 MG/DL (ref 65–100)
GLUCOSE BLD STRIP.AUTO-MCNC: 147 MG/DL (ref 65–100)
GLUCOSE BLD STRIP.AUTO-MCNC: 150 MG/DL (ref 65–100)
GLUCOSE BLD STRIP.AUTO-MCNC: 152 MG/DL (ref 65–100)
MAGNESIUM SERPL-MCNC: 2.3 MG/DL (ref 1.8–2.4)
POTASSIUM SERPL-SCNC: 3.8 MMOL/L (ref 3.5–5.1)

## 2020-06-13 PROCEDURE — 82962 GLUCOSE BLOOD TEST: CPT

## 2020-06-13 PROCEDURE — 74011250636 HC RX REV CODE- 250/636: Performed by: INTERNAL MEDICINE

## 2020-06-13 PROCEDURE — 74011250637 HC RX REV CODE- 250/637: Performed by: INTERNAL MEDICINE

## 2020-06-13 PROCEDURE — 74011250637 HC RX REV CODE- 250/637: Performed by: PSYCHIATRY & NEUROLOGY

## 2020-06-13 PROCEDURE — 74011250637 HC RX REV CODE- 250/637: Performed by: HOSPITALIST

## 2020-06-13 PROCEDURE — 83735 ASSAY OF MAGNESIUM: CPT

## 2020-06-13 PROCEDURE — 84132 ASSAY OF SERUM POTASSIUM: CPT

## 2020-06-13 PROCEDURE — 36415 COLL VENOUS BLD VENIPUNCTURE: CPT

## 2020-06-13 PROCEDURE — 65270000029 HC RM PRIVATE

## 2020-06-13 RX ORDER — HYDRALAZINE HYDROCHLORIDE 20 MG/ML
10 INJECTION INTRAMUSCULAR; INTRAVENOUS
Status: DISCONTINUED | OUTPATIENT
Start: 2020-06-13 | End: 2020-06-14 | Stop reason: HOSPADM

## 2020-06-13 RX ADMIN — DOXAZOSIN 8 MG: 4 TABLET ORAL at 08:38

## 2020-06-13 RX ADMIN — ASPIRIN 81 MG 81 MG: 81 TABLET ORAL at 08:37

## 2020-06-13 RX ADMIN — ACETAMINOPHEN 650 MG: 325 TABLET, FILM COATED ORAL at 08:38

## 2020-06-13 RX ADMIN — ENOXAPARIN SODIUM 40 MG: 40 INJECTION SUBCUTANEOUS at 15:45

## 2020-06-13 RX ADMIN — ATORVASTATIN CALCIUM 80 MG: 80 TABLET, FILM COATED ORAL at 22:11

## 2020-06-13 RX ADMIN — Medication 10 ML: at 15:46

## 2020-06-13 RX ADMIN — DOXAZOSIN 8 MG: 4 TABLET ORAL at 22:11

## 2020-06-13 RX ADMIN — Medication 5 ML: at 06:00

## 2020-06-13 RX ADMIN — CLOPIDOGREL BISULFATE 75 MG: 75 TABLET ORAL at 08:38

## 2020-06-13 RX ADMIN — AMLODIPINE BESYLATE 5 MG: 5 TABLET ORAL at 08:38

## 2020-06-13 RX ADMIN — LORATADINE 10 MG: 10 TABLET ORAL at 08:38

## 2020-06-13 RX ADMIN — Medication 5 ML: at 22:12

## 2020-06-13 NOTE — PROGRESS NOTES
History and Physical    Patient: Leyla Donis MRN: [id number]  SSN: [id number]    YOB: 1945  Age: 69 y.o. Sex: male      Subjective:      Leyla Donis is a 69 y.o. male who came to ER due to inbility to urinate since last evening and dizziness since 3 days ago. 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 He says that he takes aspirin 325 mg po q day \"as needed\". Three days ago, he felt dizzy when trying to get up from bed. He felt weak. He rested and over the course of the day, he felt better. He was able to drive for the past 2 days. Last night, he started to have trouble urinating. He has had this before but not to the point that it got worse. This morning, he could not urinate at all. He came to ER and Mena's catheter was placed and urine of 800 mL amount was obtained. No fever. No shaking. No chills. No head injury. No loss of consciousness no abnormal movement. Code stroke was activated in ER. Patient is out of window for tPA treatment. CT was done with showed findings below. wife states that right eyelid seems more swollen than the other eye. Hospitalist service is requested to admit the patient. 06/09 patient reports feeling better this morning. No headache. No tingling numbness or weakness. 06/10 patient reported he felt dizzy this morning. Particularly worse with activity. No weakness tingling or numbness. 06/11 patient states he feels better today. No tingling numbness or weakness of extremities. Dizziness improved. No chest pain no palpitations. 06/13 patient is doing well this morning. He states that his dizziness has significantly improved since admission. No tingling numbness or weakness of extremities. He had bowel movement yesterday.     Past Medical History:   Diagnosis Date    Essential hypertension, benign 1/3/2014    Mixed hyperlipidemia 1/3/2014  Numbness and tingling in left hand 7/2/2019    Right thalamic infarction ([address] 7/2/2019     No past surgical history on file. No family history on file. Social History     Tobacco Use    Smoking status: Never Smoker    Smokeless tobacco: Never Used   Substance Use Topics    Alcohol use: Not on file      Prior to Admission medications    Medication Sig Start Date End Date Taking? Authorizing Provider   atorvastatin (LIPITOR) 80 mg tablet Take 1 Tab by mouth nightly for 30 days. 6/12/20 7/12/20 Yes Amparo Perez MD   amLODIPine (NORVASC) 5 mg tablet Take 1 Tab by mouth daily for 30 days. 6/13/20 7/13/20 Yes Amparo Perez MD   aspirin 81 mg chewable tablet Take 1 Tab by mouth daily for 30 days. 6/13/20 7/13/20 Yes Amparo Perez MD   clopidogreL (PLAVIX) 75 mg tab Take 1 Tab by mouth daily for 30 days. 6/13/20 7/13/20 Yes Amparo Perez MD   insulin lispro (HUMALOG) 100 unit/mL injection INITIATE INSULIN CORRECTIVE PROTOCOL:  Normal Insulin Sensitivity   For Blood Sugar (mg/dL) of:     Less than 150 =   0 units           150 -199 =   2 units  200 -249 =   4 units  250 -299 =   6 units  300 -349 =   8 units  350 and above = 10 units and Call Physician     Initiate Hypoglycemia protocol if blood glucose is <70 mg/dL Fast Acting - Administer Immediately - or within 15 minutes of start of meal, if mealtime coverage. 6/12/20  Yes Amparo Perez MD   mupirocin (BACTROBAN) 2 % ointment Apply  to affected area daily. 1/15/20   Steve Ellis MD   doxazosin (CARDURA) 8 mg tablet Take 1 Tab by mouth two (2) times a day. 10/10/19   Jose Elias Peralta MD   atorvastatin (LIPITOR) 40 mg tablet Take 1 Tab by mouth daily. 10/10/19   Jose Elias Peralta MD   loratadine (CLARITIN) 10 mg tablet Take 10 mg by mouth daily. Provider, Historical      Family history   No hereditary conditions. No Known Allergies    Review of Systems:    10-point review of systems is negative except what is mentioned in the present illness section. Objective:     Vitals:    06/12/20 2040 06/13/20 0000 06/13/20 0400 06/13/20 0800   BP: 167/72 173/74 141/70 156/83   Pulse:  64 64 61   Resp:  17 17 16   Temp:  97.9 °F (36.6 °C) 97.8 °F (36.6 °C) 97.8 °F (36.6 °C)   SpO2:  93% 93% 90%   Weight:       Height:            Physical Exam:    General:                    The patient is a pleasant elderly male in no acute respiratory distress. he is lying flat in bed and talking well. Head:                                   Normocephalic/atraumatic. Eyes:                                   right eyelid and surrounding area is more swollen than the left eye. ENT:                                    External auricular and nasal exam within normal limits. Questionable left facial palsy. Mucous membranes are dry. Neck:                                   Supple, non-tender, no JVD. Lungs:                       Clear to auscultation bilaterally without wheezes or crackles. No respiratory distress or accessory muscle use. Heart:                                  Regular rate and rhythm, without murmurs, rubs, or gallops. Abdomen:                  Soft, non-tender, distended due to truncal obesity with normoactive bowel sounds. Genitourinary:           No tenderness over the bladder or bilateral CVAs. Extremities:               Without clubbing, cyanosis, or edema. Skin:                                    Normal color, texture, and turgor. No rashes, lesions, or jaundice. Pulses:                      Radial and dorsalis pedis pulses present 2+ bilaterally. Capillary refill <2s. Neurologic:                CN II-XII grossly intact and symmetrical.                                               Moving all four extremities well with no focal deficits. Gait Not tested due to dizziness.    Psychiatric:                Pleasant demeanor, appropriate affect. Alert and oriented x 3    Lab and data    Recent Results (from the past 24 hour(s))   GLUCOSE, POC    Collection Time: 06/12/20 11:22 AM   Result Value Ref Range    Glucose (POC) 155 (H) 65 - 100 mg/dL   GLUCOSE, POC    Collection Time: 06/12/20  3:32 PM   Result Value Ref Range    Glucose (POC) 187 (H) 65 - 100 mg/dL   GLUCOSE, POC    Collection Time: 06/12/20  9:03 PM   Result Value Ref Range    Glucose (POC) 134 (H) 65 - 100 mg/dL   GLUCOSE, POC    Collection Time: 06/13/20  6:56 AM   Result Value Ref Range    Glucose (POC) 133 (H) 65 - 100 mg/dL   POTASSIUM    Collection Time: 06/13/20  8:19 AM   Result Value Ref Range    Potassium 3.8 3.5 - 5.1 mmol/L   MAGNESIUM    Collection Time: 06/13/20  8:19 AM   Result Value Ref Range    Magnesium 2.3 1.8 - 2.4 mg/dL       CT head   6-8-2020  IMPRESSION:  Negative for acute intracranial abnormality. Chronic changes. CTA head neck   6-8-2020  Impression:     1. There is decreased opacification of the right PICA approximately 1 cm from  its origin. Possible occlusion or high-grade stenosis. Consider MRI for further  evaluation. 2.  Generalized atherosclerotic changes with intracranial stenoses as follows. High-grade stenosis involving a right M2/M3 branch in the sylvian fissure. Moderate stenosis in the mid left M1 segment. Multifocal moderate stenoses in  the proximal right PCA. No high-grade stenosis or occlusion in the carotid or  vertebral arteries. 3.  Right thyroid mass measuring up to 5.2 cm. Recommend follow-up thyroid  ultrasound. .     I have reviewed ECG myself.         Assessment:     Hospital Problems  Date Reviewed: 5/1/2020          Codes Class Noted POA    Urinary retention ICD-10-CM: R33.9  ICD-9-CM: 788.20  6/8/2020 Unknown        Stroke (cerebrum) ([facility] [address] ICD-10-CM: I63.9  ICD-9-CM: 434.91  6/8/2020 Unknown        Severe obesity ([facility] [address] ICD-10-CM: E66.01  ICD-9-CM: 278.01  7/2/2019 Yes        Controlled type 2 diabetes mellitus without complication, without long-term current use of insulin ([facility] [address] ICD-10-CM: E11.9  ICD-9-CM: 250.00  2/22/2019 Yes        * (Principal) Cerebrovascular accident (CVA) due to occlusion [facility]) ICD-10-CM: I63.9  ICD-9-CM: 434.91  2/12/2019 Yes        Essential hypertension, benign ICD-10-CM: I10  ICD-9-CM: 401.1  1/3/2014 Yes        Mixed hyperlipidemia ICD-10-CM: E78.2  ICD-9-CM: 272.2  1/3/2014 Yes              Plan:     Acute inferior right cerebellar and right dorsolateral medulla stroke POA  Neuro and vital signs monitoring. Aspirin, Plavix (DAPT for 90 days, then monotherapy)   Statin   Physical therapy  Occupational therapy. Symptomatic treatments     Hypertension  Monitor blood pressure and manage accordingly. Add Amlodipine 5mg. Diabetes mellitus type 2  Monitor blood sugar. Cover with insulin sliding scale accordingly. Urinary retention   Urology consulted. Plan to continue Mena until 06/16. Then attempt voiding trial.  If unsuccessful replace Mena catheter and outpatient follow-up with urology in 2 to 3 weeks. Patient was accepted to inpatient rehab. Currently awaiting COVID test results. Healthcare power of  is wife. Code status: DNR     I have discussed the plan of care with patient and spouse.      DVT prophylaxis : SCD     Signed By: Antonette Beasley MD     June 13, 2020

## 2020-06-13 NOTE — PROGRESS NOTES
Problem: Falls - Risk of  Goal: *Absence of Falls  Description: Document Ervin Carpio Fall Risk and appropriate interventions in the flowsheet. Outcome: Progressing Towards Goal  Note: Fall Risk Interventions:  Mobility Interventions: Bed/chair exit alarm, Communicate number of staff needed for ambulation/transfer, OT consult for ADLs, Patient to call before getting OOB, PT Consult for mobility concerns         Medication Interventions: Bed/chair exit alarm, Patient to call before getting OOB, Teach patient to arise slowly    Elimination Interventions: Bed/chair exit alarm, Call light in reach, Stay With Me (per policy), Patient to call for help with toileting needs, Toilet paper/wipes in reach, Toileting schedule/hourly rounds    History of Falls Interventions: Bed/chair exit alarm         Problem: Patient Education: Go to Patient Education Activity  Goal: Patient/Family Education  Outcome: Progressing Towards Goal     Problem: Pressure Injury - Risk of  Goal: *Prevention of pressure injury  Description: Document Asif Scale and appropriate interventions in the flowsheet.   Outcome: Progressing Towards Goal  Note: Pressure Injury Interventions:  Sensory Interventions: Assess changes in LOC, Float heels, Keep linens dry and wrinkle-free, Maintain/enhance activity level, Minimize linen layers, Monitor skin under medical devices, Pressure redistribution bed/mattress (bed type)    Moisture Interventions: Absorbent underpads    Activity Interventions: Increase time out of bed, Pressure redistribution bed/mattress(bed type), PT/OT evaluation    Mobility Interventions: HOB 30 degrees or less, Pressure redistribution bed/mattress (bed type), PT/OT evaluation    Nutrition Interventions: Document food/fluid/supplement intake                     Problem: Patient Education: Go to Patient Education Activity  Goal: Patient/Family Education  Outcome: Progressing Towards Goal     Problem: Patient Education: Go to Patient Education Activity  Goal: Patient/Family Education  Outcome: Progressing Towards Goal     Problem: Diabetes Self-Management  Goal: *Disease process and treatment process  Description: Define diabetes and identify own type of diabetes; list 3 options for treating diabetes. Outcome: Progressing Towards Goal  Goal: *Incorporating nutritional management into lifestyle  Description: Describe effect of type, amount and timing of food on blood glucose; list 3 methods for planning meals. Outcome: Progressing Towards Goal  Goal: *Incorporating physical activity into lifestyle  Description: State effect of exercise on blood glucose levels. Outcome: Progressing Towards Goal  Goal: *Developing strategies to promote health/change behavior  Description: Define the ABC's of diabetes; identify appropriate screenings, schedule and personal plan for screenings. Outcome: Progressing Towards Goal  Goal: *Using medications safely  Description: State effect of diabetes medications on diabetes; name diabetes medication taking, action and side effects. Outcome: Progressing Towards Goal  Goal: *Monitoring blood glucose, interpreting and using results  Description: Identify recommended blood glucose targets  and personal targets. Outcome: Progressing Towards Goal  Goal: *Prevention, detection, treatment of acute complications  Description: List symptoms of hyper- and hypoglycemia; describe how to treat low blood sugar and actions for lowering  high blood glucose level. Outcome: Progressing Towards Goal  Goal: *Prevention, detection and treatment of chronic complications  Description: Define the natural course of diabetes and describe the relationship of blood glucose levels to long term complications of diabetes.   Outcome: Progressing Towards Goal  Goal: *Developing strategies to address psychosocial issues  Description: Describe feelings about living with diabetes; identify support needed and support network  Outcome: Progressing Towards Goal  Goal: *Insulin pump training  Outcome: Progressing Towards Goal  Goal: *Sick day guidelines  Outcome: Progressing Towards Goal  Goal: *Patient Specific Goal (EDIT GOAL, INSERT TEXT)  Outcome: Progressing Towards Goal     Problem: Patient Education: Go to Patient Education Activity  Goal: Patient/Family Education  Outcome: Progressing Towards Goal     Problem: Ischemic Stroke: Discharge Outcomes  Goal: *Verbalizes anxiety and depression are reduced or absent  Outcome: Progressing Towards Goal  Goal: *Verbalize understanding of risk factor modification(Stroke Metric)  Outcome: Progressing Towards Goal  Goal: *Hemodynamically stable  Outcome: Progressing Towards Goal  Goal: *Absence of aspiration pneumonia  Outcome: Progressing Towards Goal  Goal: *Aware of needed dietary changes  Outcome: Progressing Towards Goal  Goal: *Verbalize understanding of prescribed medications including anti-coagulants, anti-lipid, and/or anti-platelets(Stroke Metric)  Outcome: Progressing Towards Goal  Goal: *Tolerating diet  Outcome: Progressing Towards Goal  Goal: *Aware of follow-up diagnostics related to anticoagulants  Outcome: Progressing Towards Goal  Goal: *Ability to perform ADLs and demonstrates progressive mobility and function  Outcome: Progressing Towards Goal  Goal: *Absence of DVT(Stroke Metric)  Outcome: Progressing Towards Goal  Goal: *Absence of aspiration  Outcome: Progressing Towards Goal  Goal: *Optimal pain control at patient's stated goal  Outcome: Progressing Towards Goal  Goal: *Home safety concerns addressed  Outcome: Progressing Towards Goal  Goal: *Describes available resources and support systems  Outcome: Progressing Towards Goal  Goal: *Verbalizes understanding of activation of EMS(911) for stroke symptoms(Stroke Metric)  Outcome: Progressing Towards Goal  Goal: *Understands and describes signs and symptoms to report to providers(Stroke Metric)  Outcome: Progressing Towards Goal  Goal: *Neurolgocially stable (absence of additional neurological deficits)  Outcome: Progressing Towards Goal  Goal: *Verbalizes importance of follow-up with primary care physician(Stroke Metric)  Outcome: Progressing Towards Goal  Goal: *Smoking cessation discussed,if applicable(Stroke Metric)  Outcome: Progressing Towards Goal  Goal: *Depression screening completed(Stroke Metric)  Outcome: Progressing Towards Goal     Problem: Patient Education: Go to Patient Education Activity  Goal: Patient/Family Education  Outcome: Progressing Towards Goal     Problem: Patient Education: Go to Patient Education Activity  Goal: Patient/Family Education  Outcome: Progressing Towards Goal

## 2020-06-13 NOTE — PROGRESS NOTES
06/13/20 1917   NIH Stroke Scale   Interval Other (comment)   LOC 0   LOC Questions 0   LOC Commands 0   Best Gaze 0   Visual 0   Facial Palsy 0   Motor Right Arm 0   Motor Left Arm 0   Motor Right Leg 0   Motor Left Leg 0   Limb Ataxia 0   Sensory 0   Best Language 0   Dysarthria 0   Extinction and Inattention 0   Total 0

## 2020-06-13 NOTE — PROGRESS NOTES
Notified Hospitalist that pt BP is 190/98. Received order to recheck in both arms. BP is LUE is 167/72 and BP is RUE is 182/77. No new orders received.

## 2020-06-13 NOTE — PROGRESS NOTES
06/13/20 0701   NIH Stroke Scale   Interval Other (comment)   LOC 0   LOC Questions 0   LOC Commands 0   Best Gaze 0   Visual 0   Facial Palsy 0   Motor Right Arm 0   Motor Left Arm 0   Motor Right Leg 0   Motor Left Leg 0   Limb Ataxia 0   Sensory 0   Best Language 0   Dysarthria 0   Extinction and Inattention 0   Total 0   dual nih with al RN

## 2020-06-14 ENCOUNTER — HOSPITAL ENCOUNTER (INPATIENT)
Age: 75
LOS: 17 days | Discharge: HOME HEALTH | DRG: 057 | End: 2020-07-01
Attending: PHYSICAL MEDICINE & REHABILITATION | Admitting: PHYSICAL MEDICINE & REHABILITATION
Payer: MEDICARE

## 2020-06-14 VITALS
DIASTOLIC BLOOD PRESSURE: 79 MMHG | HEIGHT: 70 IN | TEMPERATURE: 98.3 F | WEIGHT: 273 LBS | SYSTOLIC BLOOD PRESSURE: 154 MMHG | HEART RATE: 78 BPM | RESPIRATION RATE: 17 BRPM | OXYGEN SATURATION: 95 % | BODY MASS INDEX: 39.08 KG/M2

## 2020-06-14 DIAGNOSIS — I63.9 CEREBROVASCULAR ACCIDENT (CVA) DUE TO OCCLUSION (HCC): ICD-10-CM

## 2020-06-14 DIAGNOSIS — E66.01 SEVERE OBESITY (HCC): ICD-10-CM

## 2020-06-14 DIAGNOSIS — R33.8 ACUTE URINARY RETENTION: ICD-10-CM

## 2020-06-14 DIAGNOSIS — R31.0 GROSS HEMATURIA: ICD-10-CM

## 2020-06-14 DIAGNOSIS — G46.4: ICD-10-CM

## 2020-06-14 DIAGNOSIS — E78.2 MIXED HYPERLIPIDEMIA: ICD-10-CM

## 2020-06-14 DIAGNOSIS — R42 DIZZINESS: ICD-10-CM

## 2020-06-14 DIAGNOSIS — R33.9 URINARY RETENTION: ICD-10-CM

## 2020-06-14 DIAGNOSIS — I10 ESSENTIAL HYPERTENSION, BENIGN: ICD-10-CM

## 2020-06-14 DIAGNOSIS — I63.9 CEREBELLAR STROKE (HCC): ICD-10-CM

## 2020-06-14 DIAGNOSIS — I63.81 RIGHT THALAMIC INFARCTION (HCC): ICD-10-CM

## 2020-06-14 DIAGNOSIS — E11.9 CONTROLLED TYPE 2 DIABETES MELLITUS WITHOUT COMPLICATION, WITHOUT LONG-TERM CURRENT USE OF INSULIN (HCC): ICD-10-CM

## 2020-06-14 LAB
EMERGENT DISEASE PANEL, EDPR: NOT DETECTED
GLUCOSE BLD STRIP.AUTO-MCNC: 141 MG/DL (ref 65–100)
GLUCOSE BLD STRIP.AUTO-MCNC: 142 MG/DL (ref 65–100)
GLUCOSE BLD STRIP.AUTO-MCNC: 150 MG/DL (ref 65–100)
MM INDURATION POC: 0 MM (ref 0–5)
PPD POC: NEGATIVE NEGATIVE

## 2020-06-14 PROCEDURE — 74011250636 HC RX REV CODE- 250/636: Performed by: PHYSICIAN ASSISTANT

## 2020-06-14 PROCEDURE — 82962 GLUCOSE BLOOD TEST: CPT

## 2020-06-14 PROCEDURE — 74011250637 HC RX REV CODE- 250/637: Performed by: PHYSICIAN ASSISTANT

## 2020-06-14 PROCEDURE — 99223 1ST HOSP IP/OBS HIGH 75: CPT | Performed by: PHYSICIAN ASSISTANT

## 2020-06-14 PROCEDURE — 74011250637 HC RX REV CODE- 250/637: Performed by: HOSPITALIST

## 2020-06-14 PROCEDURE — 74011636637 HC RX REV CODE- 636/637: Performed by: INTERNAL MEDICINE

## 2020-06-14 PROCEDURE — 94762 N-INVAS EAR/PLS OXIMTRY CONT: CPT

## 2020-06-14 PROCEDURE — 65310000000 HC RM PRIVATE REHAB

## 2020-06-14 PROCEDURE — 74011250637 HC RX REV CODE- 250/637: Performed by: INTERNAL MEDICINE

## 2020-06-14 PROCEDURE — 74011250637 HC RX REV CODE- 250/637: Performed by: PSYCHIATRY & NEUROLOGY

## 2020-06-14 RX ORDER — ENOXAPARIN SODIUM 100 MG/ML
40 INJECTION SUBCUTANEOUS EVERY 24 HOURS
Status: CANCELLED | OUTPATIENT
Start: 2020-06-14

## 2020-06-14 RX ORDER — CLOPIDOGREL BISULFATE 75 MG/1
75 TABLET ORAL DAILY
Status: DISCONTINUED | OUTPATIENT
Start: 2020-06-15 | End: 2020-07-01 | Stop reason: HOSPADM

## 2020-06-14 RX ORDER — AMLODIPINE BESYLATE 5 MG/1
5 TABLET ORAL DAILY
Status: CANCELLED | OUTPATIENT
Start: 2020-06-15

## 2020-06-14 RX ORDER — GUAIFENESIN 100 MG/5ML
81 LIQUID (ML) ORAL DAILY
Status: DISCONTINUED | OUTPATIENT
Start: 2020-06-15 | End: 2020-07-01 | Stop reason: HOSPADM

## 2020-06-14 RX ORDER — ACETAMINOPHEN 325 MG/1
650 TABLET ORAL
Status: CANCELLED | OUTPATIENT
Start: 2020-06-14

## 2020-06-14 RX ORDER — FACIAL-BODY WIPES
10 EACH TOPICAL DAILY PRN
Status: DISCONTINUED | OUTPATIENT
Start: 2020-06-14 | End: 2020-07-01 | Stop reason: HOSPADM

## 2020-06-14 RX ORDER — ENOXAPARIN SODIUM 100 MG/ML
40 INJECTION SUBCUTANEOUS EVERY 24 HOURS
Status: DISCONTINUED | OUTPATIENT
Start: 2020-06-14 | End: 2020-06-25

## 2020-06-14 RX ORDER — CLOPIDOGREL BISULFATE 75 MG/1
75 TABLET ORAL DAILY
Status: CANCELLED | OUTPATIENT
Start: 2020-06-15

## 2020-06-14 RX ORDER — DOXAZOSIN 4 MG/1
8 TABLET ORAL 2 TIMES DAILY
Status: DISCONTINUED | OUTPATIENT
Start: 2020-06-14 | End: 2020-07-01 | Stop reason: HOSPADM

## 2020-06-14 RX ORDER — GUAIFENESIN 100 MG/5ML
81 LIQUID (ML) ORAL DAILY
Status: CANCELLED | OUTPATIENT
Start: 2020-06-15

## 2020-06-14 RX ORDER — INSULIN LISPRO 100 [IU]/ML
INJECTION, SOLUTION INTRAVENOUS; SUBCUTANEOUS
Status: CANCELLED | OUTPATIENT
Start: 2020-06-14

## 2020-06-14 RX ORDER — ACETAMINOPHEN 325 MG/1
650 TABLET ORAL
Status: DISCONTINUED | OUTPATIENT
Start: 2020-06-14 | End: 2020-07-01 | Stop reason: HOSPADM

## 2020-06-14 RX ORDER — LORATADINE 10 MG/1
10 TABLET ORAL DAILY
Status: DISCONTINUED | OUTPATIENT
Start: 2020-06-15 | End: 2020-07-01 | Stop reason: HOSPADM

## 2020-06-14 RX ORDER — ATORVASTATIN CALCIUM 80 MG/1
80 TABLET, FILM COATED ORAL
Status: CANCELLED | OUTPATIENT
Start: 2020-06-14

## 2020-06-14 RX ORDER — POLYETHYLENE GLYCOL 3350 17 G/17G
17 POWDER, FOR SOLUTION ORAL DAILY
Status: DISCONTINUED | OUTPATIENT
Start: 2020-06-15 | End: 2020-07-01 | Stop reason: HOSPADM

## 2020-06-14 RX ORDER — AMLODIPINE BESYLATE 5 MG/1
5 TABLET ORAL DAILY
Status: DISCONTINUED | OUTPATIENT
Start: 2020-06-15 | End: 2020-06-24

## 2020-06-14 RX ORDER — LORATADINE 10 MG/1
10 TABLET ORAL DAILY
Status: CANCELLED | OUTPATIENT
Start: 2020-06-15

## 2020-06-14 RX ORDER — ADHESIVE BANDAGE
30 BANDAGE TOPICAL DAILY PRN
Status: DISCONTINUED | OUTPATIENT
Start: 2020-06-14 | End: 2020-07-01 | Stop reason: HOSPADM

## 2020-06-14 RX ORDER — DOXAZOSIN 4 MG/1
8 TABLET ORAL 2 TIMES DAILY
Status: CANCELLED | OUTPATIENT
Start: 2020-06-14

## 2020-06-14 RX ORDER — BISACODYL 5 MG
5 TABLET, DELAYED RELEASE (ENTERIC COATED) ORAL DAILY PRN
Status: DISCONTINUED | OUTPATIENT
Start: 2020-06-14 | End: 2020-07-01 | Stop reason: HOSPADM

## 2020-06-14 RX ORDER — ATORVASTATIN CALCIUM 80 MG/1
80 TABLET, FILM COATED ORAL
Status: DISCONTINUED | OUTPATIENT
Start: 2020-06-14 | End: 2020-07-01 | Stop reason: HOSPADM

## 2020-06-14 RX ORDER — INSULIN LISPRO 100 [IU]/ML
INJECTION, SOLUTION INTRAVENOUS; SUBCUTANEOUS
Status: DISCONTINUED | OUTPATIENT
Start: 2020-06-14 | End: 2020-06-27

## 2020-06-14 RX ORDER — OXYMETAZOLINE HCL 0.05 %
2 SPRAY, NON-AEROSOL (ML) NASAL
Status: COMPLETED | OUTPATIENT
Start: 2020-06-14 | End: 2020-06-18

## 2020-06-14 RX ADMIN — AMLODIPINE BESYLATE 5 MG: 5 TABLET ORAL at 08:46

## 2020-06-14 RX ADMIN — ASPIRIN 81 MG 81 MG: 81 TABLET ORAL at 08:46

## 2020-06-14 RX ADMIN — LORATADINE 10 MG: 10 TABLET ORAL at 08:46

## 2020-06-14 RX ADMIN — DOXAZOSIN 8 MG: 4 TABLET ORAL at 08:46

## 2020-06-14 RX ADMIN — ACETAMINOPHEN 650 MG: 325 TABLET, FILM COATED ORAL at 08:46

## 2020-06-14 RX ADMIN — INSULIN LISPRO 2 UNITS: 100 INJECTION, SOLUTION INTRAVENOUS; SUBCUTANEOUS at 07:46

## 2020-06-14 RX ADMIN — ACETAMINOPHEN 650 MG: 325 TABLET, FILM COATED ORAL at 21:03

## 2020-06-14 RX ADMIN — ATORVASTATIN CALCIUM 80 MG: 80 TABLET, FILM COATED ORAL at 21:03

## 2020-06-14 RX ADMIN — ENOXAPARIN SODIUM 40 MG: 40 INJECTION SUBCUTANEOUS at 15:27

## 2020-06-14 RX ADMIN — DOXAZOSIN MESYLATE 8 MG: 4 TABLET ORAL at 21:03

## 2020-06-14 RX ADMIN — CLOPIDOGREL BISULFATE 75 MG: 75 TABLET ORAL at 08:46

## 2020-06-14 RX ADMIN — OXYMETAZOLINE HCL 2 SPRAY: 0.05 SPRAY NASAL at 21:04

## 2020-06-14 NOTE — PROGRESS NOTES
Patient is up for discharge to the 9th floor today. CM spoke with the 9th floor who advised that the patient will be coming to room 908 and to have him there by 11:00. CM relayed information to the  on the 7th floor. Care Management Interventions  PCP Verified by CM: Yes(Virtual visit)  Last Visit to PCP: 05/01/20  Mode of Transport at Discharge:  Other (see comment)  Transition of Care Consult (CM Consult): Discharge Planning  Discharge Durable Medical Equipment: No  Physical Therapy Consult: Yes  Occupational Therapy Consult: Yes  Speech Therapy Consult: Yes  Current Support Network: Own Home, Lives with Spouse(pt and spouse maintain separate residences but now spouse is off for the summer so pt can stay with her (or vice versa) if needed)  Confirm Follow Up Transport: Family  The Plan for Transition of Care is Related to the Following Treatment Goals : Acute rehab to improve pt's functional abilities for a safe transition to home  The Patient and/or Patient Representative was Provided with a Choice of Provider and Agrees with the Discharge Plan?: Yes  Discharge Location  Discharge Placement: Rehab hospital/unit acute([facility])

## 2020-06-14 NOTE — H&P
Imer Coleman MD  Medical Director  [address] [facility], [address] [facility]  Tel: [phone]       Admission History and Physical Exam  [address] Date: 6/14/2020  Primary Care Provider: Zachary Alejandro MD  Specialty Group / Referring Service: Hospitalist / Neurology    Chief Complaint : \"I am ready to get better. 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) 7/2/2019       Date of Evaluation: June 14, 2020    HPI: Rhina Cushing is a right-hand dominant 69 y.o. white male patient at [facility] who was admitted to [address] [place] [address] on 6/14/2020 by Imer Coleman MD of Physical Medicine and Rehabilitation service with below-mentioned medical history. HPI: Mr. Sharmaine Boxer is a pleasant, previously functionally independent male with PMH of HTN, HL, DM 2, obesity, right thalamic stroke 7/2019, left carpal tunnel syndrome. He presented to the ED 6/8/2020 with urinary retention and 3d history of dizziness and uncoordination. ED placed Mena catheter, yielding 800 mL urine. Code S was activated, patient outside TPA treatment window. Head CT without acute changes.  CTA with generalized atherosclerotic changes and intracranial stenoses involving right M2/M3 (high-grade), mid left M1 (moderate), proximal right PCA (moderate) and concern for occlusion/high-grade stenosis of right PICA. Patient was admitted for medical management of acute stroke, urinary retention and chronic medical issues; specialists including Neurology and Urology were consulted. Brain MRI confirmed inferior right cerebellar hemisphere and right dorsolateral medulla infarcts. DAPT was initiated with Neurology recommendation to switch to monotherapy at 90 days. 2D TTE with normal systolic function, EF 76-36%, no wall motion abnormalities, no PFO. Urology felt urinary retention was related to stroke and recommended Mena remain until 6/16, when patient can undergo voiding trial. Physical and occupational therapies were initiated, and patient started to mobilize. He was found to have significant mobility and self-care impairments. Physical Medicine and Rehabilitation service was consulted, and patient was initially evaluated by Dr. Andrea Turner on 6/11. She determined he would benefit from comprehensive acute inpatient rehabilitation, continued close medical supervision and management prior to returning home. During reevaluation yesterday, patient showed improvement from initial consult 2 days prior but still showed significant functional deficits. We recommend inpatient hospital rehabilitation as reasonable and necessary due to ongoing acute medical issues which have not changed since initial pre-admission evaluation. We reviewed the preadmission screening and have approved the patient for admission to the [facility]. Attending service cleared patient for transfer to rehab today. COVID-19 testing resulted negative. Patient continues to have ongoing medical issues outlined above requiring physician / PA medical supervision and has functional deficits which would benefit from continued intensive therapies.        Current Level of Function: (evaluated by acute therapy staff, with bed mobility, transfers, balance personally observed post-admission in the IRF setting minutes prior to submission of document) Patient with fair static and dynamic standing balance, requires moderate assistance with supine to sit and sit to stand. Patient requires moderate assistance with bathing and lower body dressing, maximum assistance with toileting, total assistance with bowel hygiene. Prior Level of Function / Home Situation:   Home Environment: Private residence  # Steps to Enter: 0  One/Two Story Residence: One story  Living Alone: No  Support Systems: Spouse/Significant Other/Partner  Patient Expects to be Discharged to[de-identified] Private residence  Current DME Used/Available at Home: None  Tub or Shower Type: Shower      Previous Level of Function / Work / Activity:   At baseline, patient lives with spouse in one story home. Pt is typically Mod I to independent for ADLs, with pt endorsing use of chair (not shower chair) to complete bathing in seated. Pt reports Mod I to independence for IADLs, including driving. Pt uses no DME for functional mobility but does endorse \"furniture walking. \" Pt does have prior history of CVA that occurred 7/2019 with residual R sided facial droop. Past Medical History:   Diagnosis Date    Essential hypertension, benign 1/3/2014    Mixed hyperlipidemia 1/3/2014    Numbness and tingling in left hand 7/2/2019    Right thal[address] [address] 7/2/2019      Past surgical history:  · Basal cell carcinoma excision, face/neck (11/2015)  · Nasal fracture repair (7/2015)  · Unspecified nose surgery (1985)    No Known Allergies     No family history on file. Social History     Tobacco Use    Smoking status: Never Smoker    Smokeless tobacco: Never Used   Substance Use Topics    Alcohol use: Not on file      Prior to Admission Medications   Prescriptions Last Dose Informant Patient Reported? Taking? amLODIPine (NORVASC) 5 mg tablet 6/14/2020 at Unknown time  No Yes   Sig: Take 1 Tab by mouth daily for 30 days.    aspirin 81 mg chewable tablet 6/14/2020 at Unknown time  No Yes   Sig: Take 1 Tab by mouth daily for 30 days. atorvastatin (LIPITOR) 80 mg tablet 6/14/2020 at Unknown time  No Yes   Sig: Take 1 Tab by mouth nightly for 30 days. clopidogreL (PLAVIX) 75 mg tab 6/14/2020 at Unknown time  No Yes   Sig: Take 1 Tab by mouth daily for 30 days. doxazosin (CARDURA) 8 mg tablet 6/14/2020 at Unknown time  No Yes   Sig: Take 1 Tab by mouth two (2) times a day. insulin lispro (HUMALOG) 100 unit/mL injection 6/14/2020 at Unknown time  No Yes   Sig: INITIATE INSULIN CORRECTIVE PROTOCOL:  Normal Insulin Sensitivity   For Blood Sugar (mg/dL) of:     Less than 150 =   0 units           150 -199 =   2 units  200 -249 =   4 units  250 -299 =   6 units  300 -349 =   8 units  350 and above = 10 units and Call Physician     Initiate Hypoglycemia protocol if blood glucose is <70 mg/dL Fast Acting - Administer Immediately - or within 15 minutes of start of meal, if mealtime coverage. loratadine (CLARITIN) 10 mg tablet Unknown at Unknown time  Yes No   Sig: Take 10 mg by mouth daily. mupirocin (BACTROBAN) 2 % ointment Unknown at Unknown time  No No   Sig: Apply  to affected area daily.       Facility-Administered Medications: None     Current Facility-Administered Medications   Medication Dose Route Frequency    acetaminophen (TYLENOL) tablet 650 mg  650 mg Oral Q6H PRN    [START ON 6/15/2020] amLODIPine (NORVASC) tablet 5 mg  5 mg Oral DAILY    [START ON 6/15/2020] aspirin chewable tablet 81 mg  81 mg Oral DAILY    atorvastatin (LIPITOR) tablet 80 mg  80 mg Oral QHS    [START ON 6/15/2020] clopidogreL (PLAVIX) tablet 75 mg  75 mg Oral DAILY    doxazosin (CARDURA) tablet 8 mg  8 mg Oral BID    enoxaparin (LOVENOX) injection 40 mg  40 mg SubCUTAneous Q24H    insulin lispro (HUMALOG) injection   SubCUTAneous AC&HS    [START ON 6/15/2020] loratadine (CLARITIN) tablet 10 mg  10 mg Oral DAILY    sodium chloride (OCEAN) 0.65 % nasal squeeze bottle 2 Spray  2 Spray Both Nostrils Q2H PRN    pneumococcal 23-valent (PNEUMOVAX 23) injection 0.5 mL  0.5 mL IntraMUSCular PRIOR TO DISCHARGE       Review of Systems:  Admits: malaise; Denies: fevers, chills, sweats, fatigue, anorexia, weight loss   Admits: right diplopia, blurred vision, irritation; Denies: loss of vision, eye pain, photophobia   Denies: hearing loss, ringing in the ears, earache, epistaxis   Denies: chest pain, palpitations, syncope, orthopnea, paroxysmal nocturnal dyspnea, claudication   Denies: dysphagia, odynophagia, nausea, vomiting, diarrhea, constipation, abdominal pain, jaundice, melena   Admits: urinary retention; Denies: frequency, dysuria, nocturia, urinary incontinence, stones, hematuria   Denies: polydipsia / polyuria, skin changes, temperature intolerance, unexpected weight gain   Admits: joint stiffness, muscle weakness; Denies: back pain, joint pain, joint swelling, muscle pain   Denies: bleeding problems, blood transfusions, bruising, pallor, swollen lymph nodes   Admits: dizziness when sitting from lying c/w orthostatic hypotension, previous right thalamic stroke 7/2019; left radial hand / distal forearm dysesthesia, uncoordination, gait problems, generalized weakness; Denies: headache, dysarthria, seizure      Objective:     Visit Vitals  /63   Pulse 75   Temp 97.9 °F (36.6 °C)   Resp 16   SpO2 93%      Intake and Output:  No intake/output data recorded. Physical Exam:   General:  Alert, appears stated age, appropriately oriented. No acute distress. HEENT:  Normocephalic, EOM intact, mild right facial droop. Nares patent, oral mucosa moist without lesions. Lungs:  Clear to auscultation bilaterally. Respirations even and unlabored. Heart:  Regular rate and underlying rhythm. No appreciable murmur. Genitourinary: Mena catheter in place. Abdomen:  Soft, non-tender, obese and very protuberant but not distended. Bowel sounds normoactive.   No obvious masses or organomegaly, but proper exam hindered by obesity. Neuromuscular:  Normal muscle bulk and tone, strength 5/5 throughout. No dysmetria, no drift, JAK intact. Follows commands, speech clear, thoughts cogent. Skin:  Intact, dry, good skin turgor, age-related changes present. No edema. Psych: Patient was oriented to person, place, and time. Without hallucinations, abnormal affect or abnormal behaviors during the examination.           Recent Results (from the past 72 hour(s))   GLUCOSE, POC    Collection Time: 06/11/20  3:50 PM   Result Value Ref Range    Glucose (POC) 181 (H) 65 - 100 mg/dL   EMERGENT DISEASE PANEL    Collection Time: 06/11/20  6:17 PM   Result Value Ref Range    Emergent disease panel Not detected NOTD     GLUCOSE, POC    Collection Time: 06/11/20  9:43 PM   Result Value Ref Range    Glucose (POC) 137 (H) 65 - 100 mg/dL   GLUCOSE, POC    Collection Time: 06/12/20  7:16 AM   Result Value Ref Range    Glucose (POC) 133 (H) 65 - 100 mg/dL   PLEASE READ & DOCUMENT PPD TEST IN 24 HRS    Collection Time: 06/12/20  9:00 AM   Result Value Ref Range    PPD Negative Negative    mm Induration 0 0 - 5 mm   GLUCOSE, POC    Collection Time: 06/12/20 11:22 AM   Result Value Ref Range    Glucose (POC) 155 (H) 65 - 100 mg/dL   GLUCOSE, POC    Collection Time: 06/12/20  3:32 PM   Result Value Ref Range    Glucose (POC) 187 (H) 65 - 100 mg/dL   GLUCOSE, POC    Collection Time: 06/12/20  9:03 PM   Result Value Ref Range    Glucose (POC) 134 (H) 65 - 100 mg/dL   GLUCOSE, POC    Collection Time: 06/13/20  6:56 AM   Result Value Ref Range    Glucose (POC) 133 (H) 65 - 100 mg/dL   POTASSIUM    Collection Time: 06/13/20  8:19 AM   Result Value Ref Range    Potassium 3.8 3.5 - 5.1 mmol/L   MAGNESIUM    Collection Time: 06/13/20  8:19 AM   Result Value Ref Range    Magnesium 2.3 1.8 - 2.4 mg/dL   GLUCOSE, POC    Collection Time: 06/13/20 11:10 AM   Result Value Ref Range    Glucose (POC) 147 (H) 65 - 100 mg/dL   GLUCOSE, POC    Collection Time: 06/13/20 4:50 PM   Result Value Ref Range    Glucose (POC) 150 (H) 65 - 100 mg/dL   GLUCOSE, POC    Collection Time: 06/13/20  9:30 PM   Result Value Ref Range    Glucose (POC) 140 (H) 65 - 100 mg/dL   GLUCOSE, POC    Collection Time: 06/13/20 10:13 PM   Result Value Ref Range    Glucose (POC) 152 (H) 65 - 100 mg/dL   GLUCOSE, POC    Collection Time: 06/14/20  7:29 AM   Result Value Ref Range    Glucose (POC) 150 (H) 65 - 100 mg/dL   PLEASE READ & DOCUMENT PPD TEST IN 72 HRS    Collection Time: 06/14/20 11:00 AM   Result Value Ref Range    PPD Negative Negative    mm Induration 0 0 - 5 mm         Condition on Admission: Good        Assessment:     The Post Assessment Physician Evaluation (ROE) found the current functional status to be comparable with the pre-admission screening. The patient is a good candidate for acute inpatient rehabilitation. Nothing since the pre-admission screen has changed that determination. Rehabilitation Plan  The patient has shown the ability to tolerate and benefit from 3 hours of therapy daily and is being admitted to a comprehensive acute inpatient rehabilitation program consisting of at least 3 hours of combined physical and occupational therapies. - Begin intensive Physical Therapy for a minimum of 1.5 hours a day, at least 5 out of 7 days per week to address bed mobility, transfers, ambulation, strengthening, balance, and endurance. - Begin intensive Occupational Therapy for a minimum of 1.5 hours a day, at least 5 out of 7 days per week to address ADLs (bathing, LE dressing, toileting) and adaptive equipment as needed. - Continue Speech Therapy for: dysarthria, impaired communication skills. The patient will also require 24-hour skilled rehabilitation nursing for bowel and bladder management, skin care for decubitus ulcer prevention, pain management and ongoing medication administration.         Plan / Recommendations / Medical Decision Making:     Continue daily physician / PA medical management:    New ischemic stroke, right cerebellar hemisphere, right dorsolateral medulla; secondary stroke prevention - DAPT, high-intensity statin (Lipitor), BP management    Hypertension / hypotension - patient on doxazosin 8mg twice daily from PCP, now additionally on amlodipine 5mg. /63, P 75 on admission here; patient gives pretty clear history of brief orthostatic hypotension symptoms when sitting from supine prior to meals. Staff to perform OH maneuvers, may hold amlodipine. Diabetes mellitus - uncontrolled / poor glycemic control, HgbA1c 7.8% (6/9/2020). Will require daily, close FSG monitoring and medication adjustment to optimize glycemic control in setting of acute illness and hospitalization. POC blood glucose around 130s-150s. Currently on Humalog SSI; will monitor and manage. Urinary retention - cochran catheter in place since ED 6/8; Urologist suspects urinary retention part of stroke syndrome. Catheter remains until 6/16, at which time it will be removed for voiding trial.    Hyperlipidemia - on high-intensity statin. Electrolyte abnormality - previous hypoK+, current BMP normal; monitor. Pain control - stable, mild-to-moderate joint symptoms intermittently, reasonably well controlled by PRN meds. Will require regular pain assessment and comprenhensive pain management. Bowel program - at risk for constipation due to lack of mobility. MiraLAX daily for regularity, Meghan-Colace for stool softener. PRN MOM, bisacodyl suppository or tablets for constipation. Depression - no previous history; at risk of post-stroke depression. Monitor. Pneumonia prophylaxis - incentive spirometer every hour while awake. DVT risk / DVT prophylaxis - will require daily physician / PA exam to assess for signs and symptoms as patient is at increased risk for of thromboembolism. Mobilize as tolerated.  Sequential pneumatic compression devices (SCDs) when in bed; thigh-high or knee-high thromboembolic deterrent hose when out of bed. On DAPT; Lovenox subcutaneously daily. Wound care - monitor general skin wound status daily per staff and physician / PA. At risk for failure. Will require 24/7 rehab nursing. No current wounds. GERD - at times may need antacids, Maalox prn. The patient's prognosis for significant practical improvement within a reasonable period of time appears good and the estimated length of stay is 10 days; patient is expected to return home with spouse / family supervision and continued rehabilitation with home health therapy. Given the patient's complex neurologic / medical condition, risks of further medical complications include but are not limited to: thromboembolism / pulmonary embolism, skin breakdown, pneumonia due to decreased mobility, CVA, MI, cardiac arrhythmias due to HTN, postural hypotension. For these ongoing medical issues, rehabilitation services could not be safely provided at a lower level of care such as a skilled nursing facility or nursing home. Signed By: Le Cristina PA-C    June 14, 2020      Physician Assistant with [facility]  Lilian Karimi MD, Medical Director

## 2020-06-14 NOTE — DISCHARGE SUMMARY
Hospitalist Discharge Summary     Admit Date:  2020  7:28 AM   Name:  Jigna Jane   Age:  69 y.o.  :  1945   MRN:  [id number]   PCP:  Asuncion Hickey MD  Treatment Team: Attending Provider: Rock Shaw MD; Utilization Review: Santos Walsh; Care Manager: Lopez Leonardo LMSW; Consulting Provider: Kesha Dwyer MD; Charge Nurse: Kaci Wong    Problem List for this Hospitalization:  Hospital Problems as of 2020 Date Reviewed: 2020          Codes Class Noted - Resolved POA    Urinary retention ICD-10-CM: R33.9  ICD-9-CM: 788.20  2020 - Present Unknown        Stroke (cerebr[facility]) ([facility] ICD-10-CM: I63.9  ICD-9-CM: 434.91  2020 - Present Unknown        Severe obesity ([facility] ICD-10-CM: E66.01  ICD-9-CM: 278.01  2019 - Present Yes        Controlled type 2 diabetes mellitus without complication, without long-term current use of insulin ([facility] ICD-10-CM: E11.9  ICD-9-CM: 250.00  2019 - Present Yes        * (Principal) Cerebrovascular accident (CVA) due to occlusion [facility]) ICD-10-CM: I63.9  ICD-9-CM: 434.91  2019 - Present Yes        Essential hypertension, benign ICD-10-CM: I10  ICD-9-CM: 401.1  1/3/2014 - Present Yes        Mixed hyperlipidemia ICD-10-CM: E78.2  ICD-9-CM: 272.2  1/3/2014 - Present Yes                Admission HPI from 2020: This is a 69 y.o. male who came to ER due to inbility to urinate since last evening and dizziness since 3 days ago.      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 He says that he takes aspirin 325 mg po q day \"as needed\".      Three days ago, he felt dizzy when trying to get up from bed. He felt weak. He rested and over the course of the day, he felt better. He was able to drive for the past 2 days.      Last night, he started to have trouble urinating. He has had this before but not to the point that it got worse.  This morning, he could not urinate at all. He came to ER and Cochran's catheter was placed and urine of 800 mL amount was obtained.      No fever. No shaking. No chills. No head injury. No loss of consciousness no abnormal movement.      Code stroke was activated in ER. Patient is out of window for tPA treatment. CT was done with showed findings below.      wife states that right eyelid seems more swollen than the other eye.       Hospitalist service is requested to admit the patient. Hospital Course:    During the hospital admission, the following problems were taken care of:    Acute inferior right cerebellar and right dorsolateral medulla stroke POA  Neurology consulted. Aspirin, Plavix (DAPT for 90 days, then monotherapy)   Statin   Physical therapy  Occupational therapy. Symptomatic treatments      Hypertension  Amlodipine 5mg.      Diabetes mellitus type 2  Monitor blood sugar. Cover with insulin sliding scale accordingly.       Urinary retention   Urology consulted. Plan to continue Cochran until 06/16. Then attempt voiding trial.If he is unable to void, then rehab facility can replace cochran and he will need follow up with urology in 2 to 3 weeks. COVID test negative and pt discharged to Inpatient rehab today myesha stable condition. Disposition: Rehab Facility  Activity: as tolerated  Diet: DIET DIABETIC WITH OPTIONS Consistent Carb 1200kcal; Regular    Follow up instructions, discharge meds at bottom of this note. Plan was discussed with the pt. All questions answered. Patient was stable at time of discharge. Patient will call a physician or return if any concerns. Diagnostic Imaging/Tests:   Mri Brain Wo Cont    Result Date: 6/8/2020  MRI BRAIN WITHOUT CONTRAST 6/8/2020 HISTORY: 72-year-old male with dizziness, gait abnormality and right-sided facial weakness.  TECHNIQUE: Sagittal and axial T1-weighted, axial T2-weighted, axial and coronal FLAIR, axial T2-weighted gradient-echo, axial diffusion weighted images with ADC maps of the brain. COMPARISON: 2/8/2019 FINDINGS: There are multiple foci of restricted diffusion within the inferior right cerebellar hemisphere and within the right dorsolateral aspect of the medulla. These areas are not FLAIR hyperintense. There is no acute intracranial hemorrhage, hydrocephalus, intra-axial mass, or extra-axial hematoma. On the T2-weighted and FLAIR sequences, there are scattered punctate white matter hyperintensities compatible with mild chronic small vessel ischemic disease. Minimal nodular mucosal thickening is present in the maxillary sinuses. There are old left cerebellar lacunar infarcts and there is likely an old right hypothalamic lacunar infarct. Old hemosiderin deposits present in the anterior right frontal lobe, possibly from remote head trauma. IMPRESSION: Acute to early subacute infarcts in the inferior right cerebellar hemisphere and right dorsolateral medulla. [facility]    Result Date: 6/8/2020  CT HEAD WITHOUT CONTRAST. INDICATION: Dizziness. COMPARISON: Brain MRI from February 2019  TECHNIQUE:   5 mm axial scans from the skull base to the vertex. Our CT scanners use one or more of the following:  Automated exposure control, adjustment of the mA and or kV according to patient size, iterative reconstruction. FINDINGS:  No acute intraparenchymal hemorrhage or abnormal extra-axial fluid collection. The ventricles are normal size. Mild white matter low attenuation is present, nonspecific, likely chronic small vessel disease. Scattered white matter low attenuation is present, nonspecific, likely chronic small vessel disease. No mass effect. Prominent perivascular space right basal ganglia. Included portion of the paranasal sinuses and orbits grossly unremarkable. IMPRESSION:  Negative for acute intracranial abnormality. Chronic changes. Cta Head Neck W Wo Cont    Result Date: 6/8/2020  Title:  CT arteriogram of the neck and head.   Indication: Cerebrovascular accident (CVA). Dizziness. . Technique: Axial images of the neck and head were obtained after the uneventful administration of intravenous iodinated contrast media. Contrast was used to best identify the arterial structures. Images were reviewed on a separate, free standing, three-dimensional workstation as per the referring physicians request.  All stenosis percentages derived by comparing the narrowest segment with the distal Internal Carotid Artery luminal diameter, as described in the [place] [employer] Symptomatic Carotid Endarterectomy Trial (NASCET) criteria. All CT scans at this facility are performed using dose reduction/dose modulation techniques, as appropriate the performed exam, including the following: Automated Exposure Control; Adjustment of the mA and/or kV according to patient size (this includes techniques or standardized protocols for targeted exams where dose is matched to indication/reason for exam); and Use of Iterative Reconstruction Technique. Comparison: None. Findings: Lungs:  No focal consolidation or pleural effusions. No suspicious pulmonary nodules. .  Bones:  No osseous destruction. . Multilevel degenerative changes with facet arthropathy in the cervical spine. There is advanced degenerative disc disease present at C5-C6. Paranasal sinuses:  Mucus retention cysts versus polyps in the bilateral maxillary sinuses. .  Brain:  No midline shift. No enhancing mass lesion or hydrocephalus. .  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 There is a left-sided thyroid mass measuring 2 cm. .  Dural venous sinuses:  Patent. Aortic arch:  Nonaneurysmal. Mild calcific atherosclerosis. .  Right brachiocephalic artery:  No significant stenosis or occlusion. .  . Right subclavian artery:  No significant stenosis or occlusion. .  . Left subclavian artery:  No significant stenosis or occlusion. .  .   Right common carotid artery:  No significant stenosis or occlusion. .  . Right external carotid artery:  No significant stenosis or occlusion. .  . Right internal carotid artery:  No significant stenosis or occlusion. . Mild calcified atherosclerosis at the carotid bulb. Vascular calcifications along the carotid siphon. . Left common carotid artery: No significant stenosis or occlusion. .  . Left external carotid artery:  No significant stenosis or occlusion. .  . Left internal carotid artery:  No significant stenosis or occlusion. .  Mild calcified atherosclerosis at the carotid bulb. Vascular calcifications along the carotid siphon. . Right vertebral artery:  No significant stenosis or occlusion in the right vertebral artery. .. There is decreased opacification of the right PICA approximately 1 cm from its origin as seen on image 387. Left vertebral artery:  No significant stenosis or occlusion. . Basilar artery:  No significant stenosis, occlusion, or aneurysm. .  Right middle cerebral artery:  The right M1 segment is without stenosis or occlusion. There is a high-grade stenosis involving a right M2/M3 branch in the sylvian fissure as seen on image 477. Right anterior cerebral artery:  No significant stenosis, occlusion, or aneurysm. [facility] Anterior communicating artery: No significant stenosis, occlusion, or aneurysm. [facility] Left middle cerebral artery: Moderate stenosis in the mid left M1 segment without focal occlusion or thrombus. .  Left anterior cerebral artery:  No significant stenosis, occlusion, or aneurysm. .  Right posterior communicating artery: No significant stenosis, occlusion, or aneurysm. [facility] Left posterior communicating artery:  No significant stenosis, occlusion, or aneurysm. .  Right posterior cerebral artery:  Multifocal moderate stenoses in the proximal right PCA. [facility] Left posterior cerebral artery:  No significant stenosis, occlusion, or aneurysm. .      Impression: 1. There is decreased opacification of the right PICA approximately 1 cm from its origin.  Possible occlusion or high-grade stenosis. Consider MRI for further evaluation. 2.  Generalized atherosclerotic changes with intracranial stenoses as follows. High-grade stenosis involving a right M2/M3 branch in the sylvian fissure. Moderate stenosis in the mid left M1 segment. Multifocal moderate stenoses in the proximal right PCA. No high-grade stenosis or occlusion in the carotid or vertebral arteries. 3.  Right thyroid mass measuring up to 5.2 cm. Recommend follow-up thyroid ultrasound. .       Echocardiogram results:  Results for orders placed or performed during the hospital encounter of 20   2D ECHO COMPLETE ADULT (TTE) W OR WO CONTR    Narrative    Trip  [address] [facility], [address] [facility]  [phone]    Transthoracic Echocardiogram  2D, M-mode, Doppler, and Color Doppler    Patient: Maria L Quinonez  MR #: [id number]  : [id number]  Age: 69 years  Gender: Male  Study date: 2020  Account #: [de-identified]  Height: 69.7 in  Weight: 272.4 lb  BSA: 2.37 mï¾²  Status:Routine  Location: 703  BP: 184/ 61    Allergies: NO KNOWN ALLERGENS    Sonographer:  Inez Anne RD  Group:  [id number] Cardiology  Referring Physician:  Kate Correa  Reading Physician:  Nikolay Shine MD    INDICATIONS: Stroke. *Poor parasternal windows. *    PROCEDURE: This was a routine study. A transthoracic echocardiogram was  performed. The study included complete 2D imaging, M-mode, complete spectral  Doppler, and color Doppler. Intravenous contrast (Definity) was administered. Intravenous contrast (agitated saline) was administered. Echocardiographic  views were limited by poor acoustic window availability. The parasternal   window  was low, thus no M-mode measurements were recorded. Image quality was   adequate. LEFT VENTRICLE: Size was normal. Systolic function was normal. Ejection  fraction was estimated in the range of 55 % to 60 %.  There were no regional  wall motion abnormalities. Wall thickness was mildly increased. The E/e'   ratio  was 15.6. Diastolic function was indeterminate. RIGHT VENTRICLE: The size was normal. Systolic function was normal. The  tricuspid jet envelope definition was inadequate for estimation of RV   systolic  pressure. LEFT ATRIUM: Size was normal.    ATRIAL SEPTUM: Contrast injection was performed. There was no right-to-left  shunt, with provocative maneuvers to increase right atrial pressure. RIGHT ATRIUM: Size was normal.    SYSTEMIC VEINS: IVC: The inferior vena cava was not well visualized. AORTIC VALVE: The valve was probably trileaflet. The valve was not well  visualized. There was no evidence for stenosis. There was no insufficiency. MITRAL VALVE: Valve structure was normal. There was no evidence for stenosis. There was no regurgitation. TRICUSPID VALVE: The valve structure was normal. There was no evidence for  stenosis. There was trace regurgitation. PULMONIC VALVE: Not well visualized. There was no evidence for stenosis. There  was no insufficiency. PERICARDIUM: There was no pericardial effusion. AORTA: The root exhibited normal size. SUMMARY:    -  Left ventricle: Systolic function was normal. Ejection fraction was  estimated in the range of 55 % to 60 %. There were no regional wall motion  abnormalities. Wall thickness was mildly increased. The E/e' ratio was 15.6. Diastolic function was indeterminate. -  Atrial septum: Contrast injection was performed. There was no   right-to-left  shunt, with provocative maneuvers to increase right atrial pressure.     SYSTEM MEASUREMENT TABLES    2D mode  AoR Diam (2D): 2.9 cm  Left Atrium Systolic Volume Index; Method of Disks, Biplane; 2D mode;: 31.3  ml/m2  IVS/LVPW (2D): 1  IVSd (2D): 1.2 cm  LVIDd (2D): 5.2 cm  LVIDs (2D): 2.9 cm  LVOT Area (2D): 3.5 cm2  LVPWd (2D): 1.2 cm    Tissue Doppler Imaging  LV Peak Early Lind Tissue Jorge; Lateral MA (TDI): 6.6 cm/s  LV Peak Early Lind Tissue Jorge; Medial MA (TDI): 8.2 cm/s    Unspecified Scan Mode  Peak Grad; Mean; Antegrade Flow: 15 mm[Hg]  Vmax; Antegrade Flow: 185 cm/s  LVOT Diam: 2.1 cm  MV Peak Jorge/LV Peak Tissue Jorge E-Wave; Lateral MA: 17.3  MV Peak Jorge/LV Peak Tissue Jorge E-Wave; Medial MA: 13.9    Prepared and signed by    Joseph Rodriguez MD  Signed 09-Jun-2020 16:45:37         Procedures done this admission:  * No surgery found *    All Micro Results     Procedure Component Value Units Date/Time    EMERGENT DISEASE PANEL [[id number]] Collected:  06/11/20 1817    Order Status:  Completed Specimen:  Not Specified Updated:  06/14/20 2719     Emergent disease panel Not detected        Comment: Performed at [address]  06/14/20, [facility]         EMERGENT DISEASE PANEL [[id number]]     Order Status:  Canceled           Labs: Results:       BMP, Mg, Phos Recent Labs     06/13/20  0819   K 3.8   MG 2.3      CBC No results for input(s): WBC, RBC, HGB, HCT, PLT, GRANS, LYMPH, EOS, MONOS, BASOS, IG, ANEU, ABL, LETY, ABM, ABB, AIG, HGBEXT, HCTEXT, PLTEXT, HGBEXT, HCTEXT, PLTEXT in the last 72 hours. LFT No results for input(s): ALT, TBIL, AP, TP, ALB, GLOB, AGRAT in the last 72 hours.     No lab exists for component: SGOT, GPT   Cardiac Testing No results found for: BNPP, BNP, CPK, RCK1, RCK2, RCK3, RCK4, CKMB, CKNDX, CKND1, TROPT, TROIQ   Coagulation Tests No results found for: PTP, INR, APTT, INREXT, INREXT   A1c Lab Results   Component Value Date/Time    Hemoglobin A1c 7.8 (H) 06/09/2020 06:05 AM    Hemoglobin A1c 6.2 (H) 10/03/2018 09:52 AM    Hemoglobin A1c 7.9 (H) 06/11/2018 10:24 AM      Lipid Panel Lab Results   Component Value Date/Time    Cholesterol, total 161 06/09/2020 06:05 AM    HDL Cholesterol 31 (L) 06/09/2020 06:05 AM    LDL, calculated 108.6 (H) 06/09/2020 06:05 AM    VLDL, calculated 21.4 06/09/2020 06:05 AM    Triglyceride 107 06/09/2020 06:05 AM    CHOL/HDL Ratio 5.2 06/09/2020 06:05 AM Thyroid Panel Lab Results   Component Value Date/Time    TSH 2.750 12/07/2016 09:49 AM    TSH 2.450 11/19/2015 10:24 AM        Most Recent UA No results found for: COLOR, APPRN, REFSG, DOMINIC, PROTU, GLUCU, KETU, BILU, BLDU, UROU, RUSSELL, LEUKU, WBCU, RBCU, UEPI, BACTU, CASTS, UCRY, MUCUS, UCOM     No Known Allergies  Immunization History   Administered Date(s) Administered    Influenza High Dose Vaccine PF 11/12/2016    Influenza Vaccine (Tri) Adjuvanted 10/10/2019    TB Skin Test (PPD) Intradermal 06/11/2020    Zoster Vaccine, Live 12/13/2016       All Labs from Last 24 Hrs:  Recent Results (from the past 24 hour(s))   GLUCOSE, POC    Collection Time: 06/13/20 11:10 AM   Result Value Ref Range    Glucose (POC) 147 (H) 65 - 100 mg/dL   GLUCOSE, POC    Collection Time: 06/13/20  4:50 PM   Result Value Ref Range    Glucose (POC) 150 (H) 65 - 100 mg/dL   GLUCOSE, POC    Collection Time: 06/13/20  9:30 PM   Result Value Ref Range    Glucose (POC) 140 (H) 65 - 100 mg/dL   GLUCOSE, POC    Collection Time: 06/13/20 10:13 PM   Result Value Ref Range    Glucose (POC) 152 (H) 65 - 100 mg/dL   GLUCOSE, POC    Collection Time: 06/14/20  7:29 AM   Result Value Ref Range    Glucose (POC) 150 (H) 65 - 100 mg/dL       Current Med List in Hospital:   Current Facility-Administered Medications   Medication Dose Route Frequency    hydrALAZINE (APRESOLINE) 20 mg/mL injection 10 mg  10 mg IntraVENous Q6H PRN    amLODIPine (NORVASC) tablet 5 mg  5 mg Oral DAILY    sodium chloride (OCEAN) 0.65 % nasal squeeze bottle 2 Spray  2 Spray Both Nostrils Q2H PRN    atorvastatin (LIPITOR) tablet 80 mg  80 mg Oral QHS    sodium chloride (NS) flush 5-40 mL  5-40 mL IntraVENous Q8H    sodium chloride (NS) flush 5-40 mL  5-40 mL IntraVENous PRN    acetaminophen (TYLENOL) tablet 650 mg  650 mg Oral Q6H PRN    doxazosin (CARDURA) tablet 8 mg  8 mg Oral BID    loratadine (CLARITIN) tablet 10 mg  10 mg Oral DAILY    aspirin chewable tablet 81 mg  81 mg Oral DAILY    insulin lispro (HUMALOG) injection   SubCUTAneous AC&HS    enoxaparin (LOVENOX) injection 40 mg  40 mg SubCUTAneous Q24H    clopidogreL (PLAVIX) tablet 75 mg  75 mg Oral DAILY       Discharge Exam:  Patient Vitals for the past 24 hrs:   Temp Pulse Resp BP SpO2   06/14/20 0800 98.3 °F (36.8 °C) 78 17 154/79 95 %   06/14/20 0400 98.3 °F (36.8 °C) 75 17 159/74 94 %   06/14/20 0000 97.9 °F (36.6 °C) 67 17 165/73 95 %   06/13/20 2000 98 °F (36.7 °C) 71 18 171/76 97 %   06/13/20 1600 97.9 °F (36.6 °C) 64 20 155/73 96 %   06/13/20 1200 98.1 °F (36.7 °C) (!) 59 16 155/69 93 %     Oxygen Therapy  O2 Sat (%): 95 % (06/14/20 0800)  Pulse via Oximetry: 56 beats per minute (06/08/20 1200)  O2 Device: Room air (06/08/20 0732)    Intake/Output Summary (Last 24 hours) at 6/14/2020 1025  Last data filed at 6/14/2020 0327  Gross per 24 hour   Intake    Output 1100 ml   Net -1100 ml       *Note that automatically entered I/Os may not be accurate; dependent on patient compliance with collection and accurate  by assistants. General:    Well nourished. Alert. Eyes:   Normal sclerae. Extraocular movements intact. ENT:  Normocephalic, atraumatic. Moist mucous membranes  CV:   Regular rate and rhythm. No murmur, rub, or gallop. Lungs:  Clear to auscultation bilaterally. No wheezing, rhonchi, or rales. Abdomen: Soft, nontender, nondistended. Bowel sounds normal.   Extremities: Warm and dry. No cyanosis or edema. Neurologic: AAO X 3, CN II-XII grossly intact. Strength 5/5 B/L Upper and lower extremities. Sensation intact. Skin:     No rashes or jaundice. Psych:  Normal mood and affect. Discharge Info:   Current Discharge Medication List      START taking these medications    Details   amLODIPine (NORVASC) 5 mg tablet Take 1 Tab by mouth daily for 30 days. Qty: 30 Tab, Refills: 0      aspirin 81 mg chewable tablet Take 1 Tab by mouth daily for 30 days.   Qty: 30 Tab, Refills: 0 clopidogreL (PLAVIX) 75 mg tab Take 1 Tab by mouth daily for 30 days. Qty: 30 Tab, Refills: 0      insulin lispro (HUMALOG) 100 unit/mL injection INITIATE INSULIN CORRECTIVE PROTOCOL:  Normal Insulin Sensitivity   For Blood Sugar (mg/dL) of:     Less than 150 =   0 units           150 -199 =   2 units  200 -249 =   4 units  250 -299 =   6 units  300 -349 =   8 units  350 and above = 10 units and Call Physician     Initiate Hypoglycemia protocol if blood glucose is <70 mg/dL Fast Acting - Administer Immediately - or within 15 minutes of start of meal, if mealtime coverage. Qty: 1 Vial, Refills: 0         CONTINUE these medications which have CHANGED    Details   atorvastatin (LIPITOR) 80 mg tablet Take 1 Tab by mouth nightly for 30 days. Qty: 30 Tab, Refills: 0         CONTINUE these medications which have NOT CHANGED    Details   mupirocin (BACTROBAN) 2 % ointment Apply  to affected area daily. Qty: 22 g, Refills: 0    Associated Diagnoses: Skin cyst; Folliculitis      doxazosin (CARDURA) 8 mg tablet Take 1 Tab by mouth two (2) times a day. Qty: 180 Tab, Refills: 1    Associated Diagnoses: Essential hypertension, benign      loratadine (CLARITIN) 10 mg tablet Take 10 mg by mouth daily. Follow Up Orders: Follow-up Appointments   Procedures    FOLLOW UP VISIT Appointment in: 3 - 5 Days F/u with PCP in 3-5 days F/u with Urology in 2 weeks if pt fails voiding trial on 06/16     F/u with PCP in 3-5 days  F/u with Urology in 2 weeks if pt fails voiding trial on 06/16     Standing Status:   Standing     Number of Occurrences:   1     Order Specific Question:   Appointment in     Answer:   3 - 5 Days       Follow-up Information     Follow up With Specialties Details Why Contact Info    Asuncion Hickey MD [address] [place] [facility]  [phone]            Time spent in patient discharge planning and coordination 39 minutes.     Signed:  Phoenix Mario MD

## 2020-06-14 NOTE — PROGRESS NOTES
06/14/20 0711   NIH Stroke Scale   Interval Other (comment)  (dual NIH with Martin QUICK, RN )   LOC 0   LOC Questions 0   LOC Commands 0   Best Gaze 0   Visual 0   Facial Palsy 0   Motor Right Arm 0   Motor Left Arm 0   Motor Right Leg 0   Motor Left Leg 0   Limb Ataxia 0   Sensory 0   Best Language 0   Dysarthria 0   Extinction and Inattention 0   Total 0

## 2020-06-14 NOTE — PROGRESS NOTES
Problem: Falls - Risk of  Goal: *Absence of Falls  Description: Document Patrick Fleming Fall Risk and appropriate interventions in the flowsheet.   Outcome: Progressing Towards Goal  Note: Fall Risk Interventions:  Mobility Interventions: OT consult for ADLs, Patient to call before getting OOB, PT Consult for mobility concerns, PT Consult for assist device competence, Utilize walker, cane, or other assistive device         Medication Interventions: Patient to call before getting OOB    Elimination Interventions: Call light in reach, Patient to call for help with toileting needs, Toileting schedule/hourly rounds              Problem: Patient Education: Go to Patient Education Activity  Goal: Patient/Family Education  Outcome: Progressing Towards Goal

## 2020-06-14 NOTE — PROGRESS NOTES
Primary Nurse Yobany Meyer RN and Dionisio Hernandez RN performed a dual skin assessment on this patient No impairment noted except reddened area on sacrum.   Aisf score is 19

## 2020-06-14 NOTE — PROGRESS NOTES
TRANSFER - IN REPORT:    Verbal report received from Al RN on Silvia Roth  being received from 7th floor for routine progression of care      Report consisted of patients Situation, Background, Assessment and   Recommendations(SBAR). Information from the following report(s) SBAR was reviewed with the receiving nurse. Opportunity for questions and clarification was provided. Assessment completed upon patients arrival to unit and care assumed.

## 2020-06-15 ENCOUNTER — PATIENT OUTREACH (OUTPATIENT)
Dept: CASE MANAGEMENT | Age: 75
End: 2020-06-15

## 2020-06-15 ENCOUNTER — APPOINTMENT (OUTPATIENT)
Dept: GENERAL RADIOLOGY | Age: 75
DRG: 057 | End: 2020-06-15
Attending: PHYSICAL MEDICINE & REHABILITATION
Payer: MEDICARE

## 2020-06-15 LAB
GLUCOSE BLD STRIP.AUTO-MCNC: 142 MG/DL (ref 65–100)
GLUCOSE BLD STRIP.AUTO-MCNC: 152 MG/DL (ref 65–100)
GLUCOSE BLD STRIP.AUTO-MCNC: 167 MG/DL (ref 65–100)
GLUCOSE BLD STRIP.AUTO-MCNC: 182 MG/DL (ref 65–100)

## 2020-06-15 PROCEDURE — 97530 THERAPEUTIC ACTIVITIES: CPT

## 2020-06-15 PROCEDURE — 74011250637 HC RX REV CODE- 250/637: Performed by: PHYSICIAN ASSISTANT

## 2020-06-15 PROCEDURE — 97535 SELF CARE MNGMENT TRAINING: CPT

## 2020-06-15 PROCEDURE — 92523 SPEECH SOUND LANG COMPREHEN: CPT

## 2020-06-15 PROCEDURE — 74011250637 HC RX REV CODE- 250/637: Performed by: PHYSICAL MEDICINE & REHABILITATION

## 2020-06-15 PROCEDURE — 97116 GAIT TRAINING THERAPY: CPT

## 2020-06-15 PROCEDURE — 97110 THERAPEUTIC EXERCISES: CPT

## 2020-06-15 PROCEDURE — 71045 X-RAY EXAM CHEST 1 VIEW: CPT

## 2020-06-15 PROCEDURE — 74011250636 HC RX REV CODE- 250/636: Performed by: PHYSICAL MEDICINE & REHABILITATION

## 2020-06-15 PROCEDURE — 97162 PT EVAL MOD COMPLEX 30 MIN: CPT

## 2020-06-15 PROCEDURE — 65310000000 HC RM PRIVATE REHAB

## 2020-06-15 PROCEDURE — 82962 GLUCOSE BLOOD TEST: CPT

## 2020-06-15 PROCEDURE — 74011636637 HC RX REV CODE- 636/637: Performed by: PHYSICIAN ASSISTANT

## 2020-06-15 PROCEDURE — 74011250636 HC RX REV CODE- 250/636: Performed by: PHYSICIAN ASSISTANT

## 2020-06-15 PROCEDURE — 99232 SBSQ HOSP IP/OBS MODERATE 35: CPT | Performed by: PHYSICAL MEDICINE & REHABILITATION

## 2020-06-15 PROCEDURE — 97166 OT EVAL MOD COMPLEX 45 MIN: CPT

## 2020-06-15 RX ORDER — AMOXICILLIN 250 MG
1 CAPSULE ORAL DAILY
Status: DISCONTINUED | OUTPATIENT
Start: 2020-06-15 | End: 2020-07-01 | Stop reason: HOSPADM

## 2020-06-15 RX ORDER — MECLIZINE HYDROCHLORIDE 25 MG/1
25 TABLET ORAL
Status: DISCONTINUED | OUTPATIENT
Start: 2020-06-15 | End: 2020-07-01 | Stop reason: HOSPADM

## 2020-06-15 RX ORDER — HYDROCORTISONE 1 %
CREAM (GRAM) TOPICAL 2 TIMES DAILY
Status: DISCONTINUED | OUTPATIENT
Start: 2020-06-15 | End: 2020-06-17

## 2020-06-15 RX ORDER — ONDANSETRON 4 MG/1
4 TABLET, ORALLY DISINTEGRATING ORAL
Status: DISCONTINUED | OUTPATIENT
Start: 2020-06-15 | End: 2020-07-01 | Stop reason: HOSPADM

## 2020-06-15 RX ADMIN — AMLODIPINE BESYLATE 5 MG: 5 TABLET ORAL at 08:01

## 2020-06-15 RX ADMIN — ACETAMINOPHEN 650 MG: 325 TABLET, FILM COATED ORAL at 22:15

## 2020-06-15 RX ADMIN — CLOPIDOGREL BISULFATE 75 MG: 75 TABLET ORAL at 08:01

## 2020-06-15 RX ADMIN — ANTI-PRURITIC: 1 CREAM TOPICAL at 16:59

## 2020-06-15 RX ADMIN — ASPIRIN 81 MG 81 MG: 81 TABLET ORAL at 08:01

## 2020-06-15 RX ADMIN — INSULIN LISPRO 2 UNITS: 100 INJECTION, SOLUTION INTRAVENOUS; SUBCUTANEOUS at 12:44

## 2020-06-15 RX ADMIN — MAGNESIUM HYDROXIDE 30 ML: 2400 SUSPENSION ORAL at 10:19

## 2020-06-15 RX ADMIN — DOXAZOSIN MESYLATE 8 MG: 4 TABLET ORAL at 08:01

## 2020-06-15 RX ADMIN — ATORVASTATIN CALCIUM 80 MG: 80 TABLET, FILM COATED ORAL at 22:16

## 2020-06-15 RX ADMIN — ONDANSETRON 4 MG: 4 TABLET, ORALLY DISINTEGRATING ORAL at 10:19

## 2020-06-15 RX ADMIN — MECLIZINE HYDROCHLORIDE 25 MG: 25 TABLET ORAL at 14:03

## 2020-06-15 RX ADMIN — OXYMETAZOLINE HCL 2 SPRAY: 0.05 SPRAY NASAL at 22:00

## 2020-06-15 RX ADMIN — INSULIN LISPRO 4 UNITS: 100 INJECTION, SOLUTION INTRAVENOUS; SUBCUTANEOUS at 16:30

## 2020-06-15 RX ADMIN — POLYETHYLENE GLYCOL 3350 17 G: 17 POWDER, FOR SOLUTION ORAL at 08:01

## 2020-06-15 RX ADMIN — MECLIZINE HYDROCHLORIDE 25 MG: 25 TABLET ORAL at 22:15

## 2020-06-15 RX ADMIN — DOXAZOSIN MESYLATE 8 MG: 4 TABLET ORAL at 22:15

## 2020-06-15 RX ADMIN — SENNOSIDES AND DOCUSATE SODIUM 1 TABLET: 8.6; 5 TABLET ORAL at 08:12

## 2020-06-15 RX ADMIN — LORATADINE 10 MG: 10 TABLET ORAL at 08:01

## 2020-06-15 RX ADMIN — ENOXAPARIN SODIUM 40 MG: 40 INJECTION SUBCUTANEOUS at 16:56

## 2020-06-15 RX ADMIN — INSULIN LISPRO 2 UNITS: 100 INJECTION, SOLUTION INTRAVENOUS; SUBCUTANEOUS at 22:21

## 2020-06-15 NOTE — PROGRESS NOTES
OT Daily Note  Time In 1116   Time Out 1204     Subjective: \"I'm on oxygen now and feel better. \"  Pain: not expressed  Education: continued OT eval  Interdisciplinary Communication: with PT regarding pt's OT session  Precautions: fall risk, cochran, O2 on 1.5 L   Patient Vitals for the past 12 hrs:   Temp Pulse Resp BP SpO2   06/15/20 0930 -- 73 -- 133/68 --   06/15/20 0740 98.3 °F (36.8 °C) 77 18 (!) 160/93 91 %         Mobility   Score Comments   Sit to Stand 4: Supervision or touching A    Transfer Assist 4: Supervision or touching A Transfer Type: SPT   Equipment: Rolling Walker   Comments: CGA, assist to manage O2 cord and cochran     Self-Care Daily Assessment   Pt completed SPT using grab bars with CGA from wc <> raised toilet seat with cues for sequencing transfer. Pt required total assist for toileting hygiene and clothing management while pt stood with UB support on grab bars. Pt was able to have BM and noted he had not had one since Friday. Pt reporting relief in abdomen following toileting. Pt washed hands while seated at sink with supervision. Cognition Daily Assessment   Pt completed IRF-ANIYAH with a score of 12 out of 15. Pt was able to recall 2 out of 3 items without cueing for short term memory testing. Pt was unable to recall 3rd item despite cueing. Pt reports increased \"fogginess\" and decreased short term memory and processing following CVA. Pt also with raspy voice quality this session. Coordination Daily Assessment   Pt completed 9 Hole Peg test with the following results:  R (dominant) hand: 43 seconds  Norm: 25 seconds  Indication: Impaired from previous stroke    L hand: 48 seconds  Norm: 26 seconds  Indication: Impaired from current stroke  *result of L hand may be inaccurate due to second testing after pt dropped peg under table. Assessment: Pt with continued nausea and dizziness during transfers. Pt is inconsistent with is report of vision.  Completed vision screen with intact tracking. Pt reporting no double vision or blurriness at the current time, however, increased diplopia and blurry vision over the weekend. Pt with impaired [facility] and would benefit from coordination activities. Plan: Continue OT POC with focus on ADL/IADL skills, functional transfers, functional mobility, coordination, strength, static and dynamic balance, and activity tolerance to maximize safety and independence with ADLs and functional transfers.      Antonio Mcdermott, OTR/L  6/15/2020

## 2020-06-15 NOTE — PROGRESS NOTES
OT Daily Note  Time In 1346   Time Out 1442     Subjective: \"I was able to eat my whole lunch after all. \"  Pain: not expressed  Education: cognitive and visual perceptual activities  Interdisciplinary Communication: with PT during handoff  Precautions: fall risk, 1 L of O2  Patient Vitals for the past 12 hrs:   Temp Pulse Resp BP SpO2   06/15/20 0930 -- 73 -- 133/68 --   06/15/20 0740 98.3 °F (36.8 °C) 77 18 (!) 160/93 91 %       Mobility   Score Comments   Sit to Stand 4: Supervision or touching A Min assist   Transfer Assist 4: Supervision or touching A Transfer Type: SPT   Equipment: Rolling Walker   Comments: CGA     Cognition Daily Assessment   Patient completed medication management simulation to sort \"pills\" (beads) from 4 medication bottles into pillboxes (AM and PM respectively) according to printed instructions on pill bottles. Patient completed 2/4 medications accurately, required mod cues to problem solve twice daily and prn prescriptions. Patient completed [facility] task to replace beads into medication bottles according to bead color upon completion of task. Coordination Daily Assessment   Pt completed bilateral coordination and visual perceptual task replicating moderate complexity visual design onto peg board using large colorful pegs. Pt with 1 error and able to self correct provided extra time and without cuing. Pt used dominant R hand as fine motor assist and L hand as stabilizing assist on peg board. Pt with appropriate hand strength during task. Pt noting minimal blurriness initially to be able to see visual design and holes in pegboard, however, functionally able to compensate for diplopia and problem solve appropriately.       Strengthening Daily Assessment   Pt completed the following exercises with 7 lb arelis to promote UB strength, activity tolerance, and shoulder stabilization for integration into functional reaching activities:  Exercise Reps Comments   Protraction/Retraction 20 Abduction/Adduction 20    Vs 20    Pt demonstrated good quality during all exercises. Assessment: Making good progress. Pt's O2 remained at 95% on 1 L of O2 throughout session with no drops. Pt tolerating nausea well after meal.   Plan: Continue OT POC with focus on ADL/IADL skills, functional transfers, functional mobility, coordination, strength, static and dynamic balance, and activity tolerance to maximize safety and independence with ADLs and functional transfers.      Antonio Mcdermott, OTR/L  6/15/2020

## 2020-06-15 NOTE — PROGRESS NOTES
CM met with patient at bedside. Patient is alert and oriented. Patient and spouse maintain separate residence, but wife can assist patient at discharge. He lives in one level home, 1STE. Independent in ADL's before admission, driving, no DME at home. Prescriptions affordable with insurance. Demographics, PCP and insurance confirmed by patient. CM will continue to follow.      Care Management Interventions  PCP Verified by CM: Yes(Dr. Aliya Pisano, Virtual Visit in May)  Current Support Network: Own Home, Lives Alone(patient and spouse live separately)  The Patient and/or Patient Representative was Provided with a Choice of Provider and Agrees with the Discharge Plan?: Yes  Freedom of Choice List was Provided with Basic Dialogue that Supports the Patient's Individualized Plan of Care/Goals, Treatment Preferences and Shares the Quality Data Associated with the Providers?: Yes  [place] Resource Information Provided?: No(Medicare [employer])  Discharge Location  Discharge Placement: Home with family assistance'

## 2020-06-15 NOTE — PROGRESS NOTES
This note will not be viewable in [address] [facility] outreach postponed for 7 days due to discharge to [address] [place] [address].

## 2020-06-15 NOTE — PROGRESS NOTES
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 Vss. No acute complaints. PT, OT well tolerated. Steady gains made. No new barrier to progress noted. Hypoxia. Said he doesn't like CPAP bc the \"buzzer goes off too much\". Per nursing, if pt turns onto side, he desats. Pt asympt. Denies cp or sob. Denies SHAH. He is worried about his bowels today. No bm x 2-3d.  Normally goes every other day. + flatus  Objective:     Current Facility-Administered Medications   Medication Dose Route Frequency    senna-docusate (PERICOLACE) 8.6-50 mg per tablet 1 Tab  1 Tab Oral DAILY    ondansetron (ZOFRAN ODT) tablet 4 mg  4 mg Oral Q6H PRN    acetaminophen (TYLENOL) tablet 650 mg  650 mg Oral Q6H PRN    amLODIPine (NORVASC) tablet 5 mg  5 mg Oral DAILY    aspirin chewable tablet 81 mg  81 mg Oral DAILY    atorvastatin (LIPITOR) tablet 80 mg  80 mg Oral QHS    clopidogreL (PLAVIX) tablet 75 mg  75 mg Oral DAILY    doxazosin (CARDURA) tablet 8 mg  8 mg Oral BID    enoxaparin (LOVENOX) injection 40 mg  40 mg SubCUTAneous Q24H    insulin lispro (HUMALOG) injection   SubCUTAneous AC&HS    loratadine (CLARITIN) tablet 10 mg  10 mg Oral DAILY    sodium chloride (OCEAN) 0.65 % nasal squeeze bottle 2 Spray  2 Spray Both Nostrils Q2H PRN    pneumococcal 23-valent (PNEUMOVAX 23) injection 0.5 mL  0.5 mL IntraMUSCular PRIOR TO DISCHARGE    oxymetazoline (AFRIN) 0.05 % nasal spray 2 Spray  2 Spray Both Nostrils QHS    polyethylene glycol (MIRALAX) packet 17 g  17 g Oral DAILY    bisacodyL (DULCOLAX) tablet 5 mg  5 mg Oral DAILY PRN    Or    bisacodyL (DULCOLAX) suppository 10 mg  10 mg Rectal DAILY PRN    Or    magnesium hydroxide (MILK OF MAGNESIA) 400 mg/5 mL oral suspension 30 mL  30 mL Oral DAILY PRN       Review of Systems:   Denies chest pain, shortness of breath, cough, headache, visual problems, abdominal pain, dysuria, calf pain. Pertinent positives are as noted in the HPI, ROS unremarkable otherwise. Increased blurred vision and dizziness with positional changes. + diplopia in the right eye  + nausea when sitting up  Visit Vitals  /68 (BP Patient Position: Sitting)   Pulse 73   Temp 98.3 °F (36.8 °C)   Resp 18   Ht 5' 9\" (1.753 m)   Wt 271 lb 12.8 oz (123.3 kg)   SpO2 91%   BMI 40.14 kg/m²        Physical Exam:   General: Alert and age appropriately oriented. No acute cardiorespiratory distress. HEENT: Normocephalic, no scleral icterus. Oral mucosa moist without cyanosis. Lungs: Clear to auscultation bilaterally. Respiration even and unlabored. Heart: Regular rate and rhythm, S1, S2. No murmurs, clicks, rub or gallops. Abdomen: Soft, non-tender, mildly distended. Bowel sounds normoactive. No organomegaly. Genitourinary: Deferred. Neuromuscular:      LUE grossly 4/5, vs right 4+  LEs grossly 4- prox, intact distally  No drift or dysmetria  fcs well   Skin/extremity: No rashes, no erythema. No calf tenderness B LE. No edema                                                                              Functional Assessment:          Balance  Sitting - Static: Good (unsupported) (06/15/20 1200)  Sitting - Dynamic: Fair (occasional) (06/15/20 1200)  Standing - Static: Constant support; Fair (06/15/20 1200)                     Juan Ramon Madhu Fall Risk Assessment:  Juan Ramon Madhu Fall Risk  Mobility: Ambulates or transfers with assist devices or assistance (06/15/20 0700)  Mobility Interventions: Utilize walker, cane, or other assistive device (06/14/20 1945)  Mentation: Alert, oriented x 3 (06/15/20 0700)  Medication: Patient receiving anticonvulsants, sedatives(tranquilizers), psychotropics or hypnotics, hypoglycemics, narcotics, sleep aids, antihypertensives, laxatives, or diuretics (06/15/20 0700)  Medication Interventions: Patient to call before getting OOB (06/14/20 1945)  Elimination: Needs assistance with toileting (06/15/20 0700)  Elimination Interventions: Call light in reach (06/14/20 1945)  Prior Fall History: No (06/15/20 0700)  Total Score: 3 (06/15/20 0700)  High Fall Risk: Yes (06/15/20 0700)     Speech Assessment:         Ambulation:  Gait  Distance (ft): 5 Feet (ft) (06/15/20 1200)  Assistive Device: Gait belt;Walker, rolling (06/15/20 1200)     Labs/Studies:  Recent Results (from the past 72 hour(s))   GLUCOSE, POC    Collection Time: 06/12/20  3:32 PM   Result Value Ref Range    Glucose (POC) 187 (H) 65 - 100 mg/dL   GLUCOSE, POC    Collection Time: 06/12/20  9:03 PM   Result Value Ref Range    Glucose (POC) 134 (H) 65 - 100 mg/dL   GLUCOSE, POC    Collection Time: 06/13/20  6:56 AM   Result Value Ref Range    Glucose (POC) 133 (H) 65 - 100 mg/dL   POTASSIUM    Collection Time: 06/13/20  8:19 AM   Result Value Ref Range    Potassium 3.8 3.5 - 5.1 mmol/L   MAGNESIUM    Collection Time: 06/13/20  8:19 AM   Result Value Ref Range    Magnesium 2.3 1.8 - 2.4 mg/dL   GLUCOSE, POC    Collection Time: 06/13/20 11:10 AM   Result Value Ref Range    Glucose (POC) 147 (H) 65 - 100 mg/dL   GLUCOSE, POC    Collection Time: 06/13/20  4:50 PM   Result Value Ref Range    Glucose (POC) 150 (H) 65 - 100 mg/dL   GLUCOSE, POC    Collection Time: 06/13/20  9:30 PM   Result Value Ref Range    Glucose (POC) 140 (H) 65 - 100 mg/dL   GLUCOSE, POC    Collection Time: 06/13/20 10:13 PM   Result Value Ref Range    Glucose (POC) 152 (H) 65 - 100 mg/dL   GLUCOSE, POC    Collection Time: 06/14/20  7:29 AM   Result Value Ref Range    Glucose (POC) 150 (H) 65 - 100 mg/dL   PLEASE READ & DOCUMENT PPD TEST IN 72 HRS    Collection Time: 06/14/20 11:00 AM   Result Value Ref Range    PPD Negative Negative    mm Induration 0 0 - 5 mm   GLUCOSE, POC    Collection Time: 06/14/20  4:59 PM   Result Value Ref Range    Glucose (POC) 141 (H) 65 - 100 mg/dL   GLUCOSE, POC    Collection Time: 06/14/20  8:19 PM   Result Value Ref Range    Glucose (POC) 142 (H) 65 - 100 mg/dL   GLUCOSE, POC    Collection Time: 06/15/20  7:07 AM   Result Value Ref Range    Glucose (POC) 142 (H) 65 - 100 mg/dL   GLUCOSE, POC    Collection Time: 06/15/20 11:15 AM   Result Value Ref Range    Glucose (POC) 152 (H) 65 - 100 mg/dL       Assessment:     Problem List as of 6/15/2020 Date Reviewed: 5/1/2020          Codes Class Noted - Resolved    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  ICD-9-CM: 434.91  6/14/2020 - Present        Cerebellar stroke ([facility] ICD-10-CM: I63.9  ICD-9-CM: 434.91  6/14/2020 - Present        Urinary retention ICD-10-CM: R33.9  ICD-9-CM: 788.20  6/8/2020 - Present        Stroke (cerebrum) (HCC) ICD-10-CM: I63.9  ICD-9-CM: 434.91  6/8/2020 - Present        Severe obesity ([facility] ICD-10-CM: E66.01  ICD-9-CM: 278.01  7/2/2019 - Present        Right thalamic infarction [facility]) ICD-10-CM: I63.9  ICD-9-CM: 434.91  7/2/2019 - Present        Numbness and tingling in left hand ICD-10-CM: R20.0, R20.2  ICD-9-CM: 782.0  7/2/2019 - Present        Controlled type 2 diabetes mellitus without complication, without long-term current use of insulin ([facility] ICD-10-CM: E11.9  ICD-9-CM: 250.00  2/22/2019 - Present        Cerebrovascular accident (CVA) due to occlusion [facility]) ICD-10-CM: I63.9  ICD-9-CM: 434.91  2/12/2019 - Present        Essential hypertension, benign ICD-10-CM: I10  ICD-9-CM: 401.1  1/3/2014 - Present        Mixed hyperlipidemia ICD-10-CM: E78.2  ICD-9-CM: 272.2  1/3/2014 - Present            Plan / Recommendations / Medical Decision Making:      Continue daily physician / PA medical management:     New ischemic stroke, right cerebellar hemisphere, right dorsolateral medulla; secondary stroke prevention - DAPT, high-intensity statin (Lipitor), BP management  -dizziness, nausea, intermittent diplopia; prn meclizine     Hypertension / hypotension - patient on doxazosin 8mg twice daily from PCP, now additionally on amlodipine 5mg. /63, P 75 on admission here; patient gives pretty clear history of brief orthostatic hypotension symptoms when sitting from supine prior to meals. Staff to perform OH maneuvers, may hold amlodipine.       Diabetes mellitus - uncontrolled / poor glycemic control, HgbA1c 7.8% (6/9/2020). Will require daily, close FSG monitoring and medication adjustment to optimize glycemic control in setting of acute illness and hospitalization. POC blood glucose around 130s-150s. Currently on Humalog SSI; will monitor and manage.  -6/15 fair control at this time. Consider low dose antihyperglycemic     Urinary retention - cochran catheter in place since ED 6/8; Urologist suspects urinary retention part of stroke syndrome. Catheter remains until 6/16, at which time it will be removed for voiding trial.  -pt on Cardura 8mg. Check UA     Hyperlipidemia - on high-intensity statin.     Electrolyte abnormality - previous hypoK+, current BMP normal; monitor.     Pain control - stable, mild-to-moderate joint symptoms intermittently, reasonably well controlled by PRN meds. Will require regular pain assessment and comprenhensive pain management.     Bowel program - at risk for constipation due to lack of mobility. MiraLAX daily for regularity, Meghan-Colace for stool softener prn. PRN MOM, bisacodyl suppository or tablets for constipation.  -6/15 normally has bm every other day. Will add daily senna.      Depression - no previous history; at risk of post-stroke depression. Monitor.     Pneumonia prophylaxis - incentive spirometer every hour while awake. Newly diagnosed NELL; pt sets alarm off if not supine. If he rolls side to side , he desats. CXR shows atelectasis; enc IS     DVT risk / DVT prophylaxis - will require daily physician / PA exam to assess for signs and symptoms as patient is at increased risk for of thromboembolism. Mobilize as tolerated. Sequential pneumatic compression devices (SCDs) when in bed; thigh-high or knee-high thromboembolic deterrent hose when out of bed. On DAPT; Lovenox subcutaneously daily.      Wound care - monitor general skin wound status daily per staff and physician / PA. At risk for failure. Will require 24/7 rehab nursing. No current wounds.     GERD - at times may need antacids, Maalox prn.            Time spent was 25 minutes with over 1/2 in direct patient care/examination, consultation and coordination of care.      Signed By: Magy Nichole MD     Aniya 15, 2020

## 2020-06-15 NOTE — PROGRESS NOTES
This note will not be viewable in [address] Opened chart r/t [facility] outreach, patient noted to be current inpatient at this time. CC will be removed from care team and the episode will be closed.

## 2020-06-15 NOTE — PROGRESS NOTES
[facility] OCCUPATIONAL THERAPY INITIAL EVALUATION    Time In: 0830  Time Out: 4190    Precautions: Falls and Mena    Pain: Patient had no complaint of pain. History of Presenting Illness (per previous reports): Tamiko Campbell is a right-hand dominant 69 y.o. white male patient at [facility] who was admitted to [address] [place] [address] on 6/14/2020 by Magy Nichole MD of Physical Medicine and Rehabilitation service with below-mentioned medical history.     HPI: Mr. Charles Inman is a pleasant, previously functionally independent male with PMH of HTN, HL, DM 2, obesity, right thalamic stroke 7/2019, left carpal tunnel syndrome. He presented to the ED 6/8/2020 with urinary retention and 3d history of dizziness and uncoordination. ED placed Mena catheter, yielding 800 mL urine. Code S was activated, patient outside TPA treatment window. Head CT without acute changes. CTA with generalized atherosclerotic changes and intracranial stenoses involving right M2/M3 (high-grade), mid left M1 (moderate), proximal right PCA (moderate) and concern for occlusion/high-grade stenosis of right PICA. Patient was admitted for medical management of acute stroke, urinary retention and chronic medical issues; specialists including Neurology and Urology were consulted. Brain MRI confirmed inferior right cerebellar hemisphere and right dorsolateral medulla infarcts. DAPT was initiated with Neurology recommendation to switch to monotherapy at 90 days. 2D TTE with normal systolic function, EF 49-66%, no wall motion abnormalities, no PFO. Urology felt urinary retention was related to stroke and recommended Mena remain until 6/16, when patient can undergo voiding trial. Physical and occupational therapies were initiated, and patient started to mobilize. He was found to have significant mobility and self-care impairments.  Physical Medicine and Rehabilitation service was consulted, and patient was initially evaluated by  Raul Tate on 6/11. She determined he would benefit from comprehensive acute inpatient rehabilitation, continued close medical supervision and management prior to returning home. Past Medical History/ Co-morbidities:   Past Medical History:   Diagnosis Date    Essential hypertension, benign 1/3/2014    Mixed hyperlipidemia 1/3/2014    Numbness and tingling in left hand 7/2/2019    Right thalamic infarction [facility]) 7/2/2019       Patient's Goal: \"to get rid of my dizziness. \"    Previous Level of Function: Pt reporting previously mod I with ADLs, driving, and ambulating in community without a device    [address] Private residence   Lives Alone No   [address]   Current DME None   Lift Chair     Stairs to Enter 0      Rails        W/C Ramp     Interior Steps       Upper Extremity Function   [facility] Impaired Decreased, but pre-morbid   [facility] Intact Intact   Light Touch       Sharp/Dull Discrimination       Hot/Cold       Proprioception       Stereognosis       9 Hole Peg Test          [facility]   General Evalutaion       Shoulder Flexion 4 4+   Shoulder Extension  4 4   Shoulder Abduction 4 4+   Shoulder Adduction 4 4   Elbow Flexion 4 4+   Elbow Extension  4 4+    4 4+   0/5 No palpable muscle contraction  1/5 Palpable muscle contraction, no joint movement  2-/5 Less than full range of motion in gravity eliminated position  2/5 Able to complete full range of motion in gravity eliminated position  2+/5 Able to initiate movement against gravity  3-/5 More than half but not full range of motion against gravity  3/5 Able to complete full range of motion against gravity  3+/5 Completes full range of motion against gravity with minimal resistance  4-/5 Completes full range of motion against gravity with minimal-moderate resistance  4/5 Completes full range of motion against gravity with moderate resistance  4+/5 Completes full range of motion against gravity with moderate-maximum resistance  5/5 Completes full range of motion against gravity with maximum resistance      Functional Mobility   Score Comments   Rolling Not Tested: Not attempted due to environmental concerns: Time Side: Bilateral     Supine to Sit 3: Partial/Moderate A    Sit to Supine 4: Supervision or touching A Min assist   Sit to Stand 4: Supervision or touching A Min assist   Transfer Assist 4: Supervision or touching A Transfer Type: SPT   Equipment: N/A   Comments: min assist      Activities of Daily Living    Score Comments   Eating Not Tested: Not attempted due to environmental concerns: Time    Oral Hyigene 5: S/U or clean-up assist    Bathing 3: Partial/Moderate A Type of Shower: Shower  Position: Supported sitting and Standing PRN   Adaptive  Equipment: W/C  Comments: Assist to wash bottom in stance and LLE   Upper Body  Dressing 4: Supervision or touching A Items Applied: Pullover  Position: Supported Sitting  Comments: assist to pull down posteriorly   Lower Body Dressing 2: Substantial/Maximal A Items Applied: Underwear and Elastic pants  Position: Supported sitting and Standing PRN  Adaptive Equipment: W/C  Comments: Assist to manage cochran catheter, to thread LLE through clothing, and for clothing management over waist   Donning/[place] Footwear 1: Dependent Items Applied: Socks  Adaptive Equipment: N/A  Comments: Toilet Transfer Not Tested: Not attempted due to environmental concerns: Time Transfer Type: SPT   Equipment: N/A   Comments: Toileting Hygiene Not Tested: Not attempted due to environmental concerns: Time Output: 75 ml of urine via cochran catheter  Comments:      Cognition: Pt was alert and oriented x4 upon arrival. Will complete IRF-ANIYAH in future session. Pt verbalizes deficits and appropriately reports symptoms of dizziness, nausea, and needs.      Vision/Perception: Pt reports increased blurriness and double vision when looking in superior visual fields such as at the TV. Pt notes blurry vision is both at rest and when he has a dizzy spell. Double vision appears related to R eye. Formal testing to be completed in future session. Session: Pt was friendly and engaging when greeted at bedside. Pt participated in bath from seated wc position at sink. See quality indicators above. Pt reports he lives with his wife and is retired. Pt notes increased \"fogginess\" cognitively, affecting his memory and processing. Pt had previous stroke in 2019 affecting his RUE. Reports increased nausea and dizziness upon transfers and even while at rest. Completed MMT, ROM, ADL, and informal OT interview with details noted above. Pt also states he has carpal tunnel syndrome in L hand. Interdisciplinary Communication: Collaborated with PT and nursing for current level of function, plan of care, and measures to assure safety during their stay. Updated board with current level of function to increase carryover between disciplines. Patient/Family Education: Patient was/were educated On the role of OT, On POC and On IRC expectations. Problem List: [address] [place], Activity Tolerance, Visual Perceptual , Safety Awareness, Strength, Pain, Sitting Balance, Standing Balance and Cognition    Functional Limitations: ADL, IADL, Functional Transfers and Functional Mobility    Goals: Please see Care Plan    OT order received and chart reviewed. OT orders have been acknowledged. Patient will benefit from skilled OT services to address ADL, functional transfers, UE strength, UE coordination, balance, cognition and activity tolerance to maximize functional performance with daily self-care tasks and functional mobility. Treatment is likely to include ADL, Balance, Strength, Activity tolerance, Neuro-muscular re-education, Visual perceptual, Cognitive, DME, AE, Family  and Safety awareness training to increase independence with self-care.  Patient will be seen for 1.5-2 hours of skilled OT services 5-6 days a week as appropriate. Initate POC.      Antonio Anna, OTR/L  6/15/2020

## 2020-06-15 NOTE — PROGRESS NOTES
INITIAL COGNITIVE ASSESSMENT     06/15/20 1127   Time Spent With Patient   Time In 1038   Time Out 1118   Mental Status   Neurologic State Alert   Orientation Level Oriented X4   Cognition Memory loss;Decreased attention/concentration   Perception Appears intact   Perseveration No perseveration noted   Safety/Judgement Awareness of environment   Psychosocial   Patient Behaviors Calm; Cooperative   Pt Identified Daily Priority Clinical issues (comment)   Reading Comprehension   Pre-Morbid Reading Status Literate   Sentence No Impairment        06/15/20 1128   Verbal Expression   Primary Mode of Expression Verbal   Automatic Speech Task No impairment   Naming No impairment   Sentence Formulation No impairment   Conversation No impairment   Speech Characteristics Word retrieval  (decreased divergent naming)   Interfering Components Attention   Oral-Motor Structure/Motor Speech   Dysarthric Characteristics None   Neuro-Linguistics/Cognitive Function   Reasoning  Divergent thinking;Executive function;Deductive reasoning   Organizational Sequencing   Attention  Divided attention   SCCAN percentiles   Oral expression 84th   Orientation 100th   Memory 79th   Speech Comprehension 92nd   Attention 56th    Problem [place] 83rd     Patient participated with a cognitive assessment with the Scales of Cognitive and Communicative Ability for Neurorehabilitation. Patient was living with his wife and independent with ADLs prior to CVA. Dysphonia with hoarse vocal quality noted during conversational speech. Reports being \"pretty sharp\" prior to CVA and noting increased difficulty with recall since admission. Breakdown of results outlined above with attention most significantly impaired followed by short-term memory. Mild deficit noted in the areas of problem solving and oral expression due to decreased divergent naming. Orientation and speech comprehension are grossly within normal limits.   Recommend continued therapy to address cognition.     [place] MS, CCC-SLP

## 2020-06-15 NOTE — PROGRESS NOTES
Problem: Falls - Risk of  Goal: *Absence of Falls  Description: Document Donata Carrel Fall Risk and appropriate interventions in the flowsheet.   Outcome: Progressing Towards Goal  Note: Fall Risk Interventions:  Mobility Interventions: Utilize walker, cane, or other assistive device         Medication Interventions: Patient to call before getting OOB    Elimination Interventions: Call light in reach              Problem: Patient Education: Go to Patient Education Activity  Goal: Patient/Family Education  Outcome: Progressing Towards Goal

## 2020-06-15 NOTE — PROGRESS NOTES
PHYSICAL THERAPY DAILY NOTE  Time In: 5100  Time Out: 1345  Patient Seen For: PM;Gait training;Patient education; Therapeutic exercise;Transfer training; Wheelchair mobility    Subjective: I didn't think I would eat lunch but I did. Pt stated that he was feeling nauseous prior to lunch. Objective: Other (comment)(fall risk)   Vital Signs:  Visit Vitals  /68 (BP Patient Position: Sitting)   Pulse 73   Temp 98.3 °F (36.8 °C)   Resp 18   Ht 5' 9\" (1.753 m)   Wt 123.3 kg (271 lb 12.8 oz)   SpO2 91%   BMI 40.14 kg/m²       Pain level: 0/10  Pain location: no pain   Pain interventions: Medication per order, rest, positioning,relaxation,   Patient education:Bed mobility training,transfer training, gait training, fall precautions, activity pacing, w/c mobility and parts management    Interdisciplinary Communication: RN stated that pt is now on O2 at 1L. GROSS ASSESSMENT Daily Assessment    AROM: Within functional limits  Strength: Generally decreased, functional       COGNITION Daily Assessment    Pt was able to follow commands 100% of time with increased time needed due to dizziness. BED/MAT MOBILITY Daily Assessment    Rolling Right : 0 (Not tested)  Rolling Left : 0 (Not tested)  Supine to Sit : 0 (Not tested)  Sit to Supine : 0 (Not tested)       TRANSFERS Daily Assessment   Pt requires verbal cues and increased time. Pt was assisted from recliner to W/C using roller walker with minX1 assist and verbal cues for sequencing. Transfer Type: SPT with walker  Transfer Assistance : 4 (Minimal assistance)  Sit to Stand Assistance: Minimal assistance  Car Transfers: Not tested       GAIT Daily Assessment    Amount of Assistance: 4 (Minimal assistance)  Distance (ft): 25 Feet (ft)  Assistive Device: Gait belt;Walker, rolling   Pt was able to ambulate 15ft X1 & 25ft X1 using roller walker with min X1 assist for safety due to increase c/o dizziness.  Pt was noted to have some slight ataxic movements with R LE during ambulation. STEPS or STAIRS Daily Assessment    Steps/Stairs Ambulated (#): 0       BALANCE Daily Assessment    Sitting - Static: Good (unsupported)  Sitting - Dynamic: Fair (occasional)  Standing - Static: Constant support; Fair       WHEELCHAIR MOBILITY Daily Assessment    Able to Propel (ft): 50 feet  Curbs/Ramps Assist Required (FIM Score): 0 (Not tested)  Wheelchair Setup Assist Required : 5 (Supervision/setup)  Wheelchair Management: Manages left brake;Manages right brake       LOWER EXTREMITY EXERCISES Daily Assessment    Extremity: Both  Exercise Type #1: Standing lower extremity strengthening  Sets Performed: 1  Reps Performed: 15  Level of Assist: Contact guard assistance  Exercise Type #2: Other (comment)(NuStep x10 min level 2)          Assessment: Pt demonstrates increased dizziness with change of positions. Pt had two episodes of increased dizziness and c/o nausea. RN was present during one episode and aware of pt's complaints. Dizziness usually dissipates after short period of sitting. Overall pt tolerated increased activity well. O2 sat stayed above 93% during activity with no c/o SOB. Pt will benefit from continued skilled PT to maximize functional mobility. Patient return to room at end of treatment and remained up in wheelchair with needs in reach. Plan of Care: Continue with POC and progress as tolerated.        Jameel Méndez, PT  6/15/2020

## 2020-06-15 NOTE — PROGRESS NOTES
PHYSICAL THERAPY EXAMINATION    Patient Name: Harley Calloway  Patient Age: 69 y.o.    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]  Urinary retention [R33.9] <principal problem not specified>    Precautions at Admission: Other (comment)(Fall risk )    Therapy Diagnosis:   Difficulty with bed mobility  [x]     Difficulty with functional transfers  [x]     Difficulty with ambulation  [x]     Difficulty with stair negotiations  [x]       Problem List:    Decreased strength B LE  [x]     Decreased strength trunk/core  [x]     Decreased AROM   []     Decreased PROM  []    Decreased endurance  [x]     Decreased balance sitting  [x]     Decreased balance standing  [x]     Pain   []     Slow ambulation velocity  [x]    Decreased coordination  [x]    Decreased safety awareness  [x]      Functional Limitations:   Decreased independence with bed mobility  [x]     Decreased independence with functional transfers  [x]     Decreased independence with ambulation  [x]     Decreased independence with stair negotiation  [x]       Previous Functional Level: Pt reporting previously mod I with ADLs, driving, and ambulating in community without a device    Home Environment: Home Environment: Private residence  # Steps to Enter: 0  One/Two Story Residence: One story  Living Alone: No  Support Systems: Family member(s)  Patient Expects to be Discharged to[de-identified] Private residence  Current DME Used/Available at Home: None  Tub or Shower Type: Shower         Outcome Measures:   Vital Signs:  Visit Vitals  /68 (BP Patient Position: Sitting)   Pulse 73   Temp 98.3 °F (36.8 °C)   Resp 18   Ht 5' 9\" (1.753 m)   Wt 123.3 kg (271 lb 12.8 oz)   SpO2 91%   BMI 40.14 kg/m²       Pain level: 0/10  Pain interventions: Rest, pain medication as needed, & positioning. Patient education:Bed mobility training,transfer training, gait training, fall precautions, activity pacing, precautions, w/c mobility and parts management      Interdisciplinary Communication: Spoke with OT about pt's potential and prior level of function. RN stated that pt was going to be placed on O2. Cognition: Pt is alert and oriented X4. Pt was able to follow commands 100% of time. MMT Initial Asssessment   Right Lower Extremity Left Lower Extremity   Hip Flexion 4+ 5   Knee Extension 4+ 5   Knee Flexion 4 4+   Ankle Dorsiflexion 4+ 5   0/5 No palpable muscle contraction  1/5 Palpable muscle contraction, no joint movement  2-/5 Less than full range of motion in gravity eliminated position  2/5 Able to complete full range of motion in gravity eliminated position  2+/5 Able to initiate movement against gravity  3-/5 More than half but not full range of motion against gravity  3/5 Able to complete full range of motion against gravity  3+/5 Completes full range of motion against gravity with minimal resistance  4-/5 Completes full range of motion against gravity with minimal-moderate resistance  4/5 Completes full range of motion against gravity with moderate resistance  4+/5 Completes full range of motion against gravity with moderate-maximum resistance  5/5 Completes full range of motion against gravity with maximum resistance     AROM: WFL    PRIMARY MODE OF LOCOMOTION: limited ambulation due to increase dizziness.        BED/CHAIR/WHEELCHAIR TRANSFERS Initial Assessment   Rolling Right 4 (Minimal assistance)   Rolling Left 0 (Not tested)   Supine to Sit 4 (Minimal assistance)   Sit to Stand Minimal assistance   Sit to Supine 4 (Minimal assistance) Transfer Type SPT with walker   Comments  Pt required increased time due to feeling dizzy. Car Transfer  Not Tested   Car Type         WHEELCHAIR MOBILITY/MANAGEMENT Initial Assessment   Able to Propel 100 feet   W/C Assistance     Curbs/ramps assistance required     Wheelchair set up assistance required 4 (Minimal assistance)   Wheelchair management Manages left brake, Manages right brake       WALKING INDEPENDENCE Initial Assessment   Assistive device Gait belt, Walker, rolling   Ambulation assistance - level surface 4 (Minimal assistance)   Distance 5 Feet (ft)   Comments Pt c/o dizziness with ambulation and was unable to continue with ambulation. O2 sat dropped to 80% with ambulation.     Ambulation assistance - unlevel surface 0 (Not tested)       STEPS/STAIRS Initial Assessment   Steps/Stairs ambulated 0   Stairs Assistance     Rail Use     Comments     Curbs/Ramps         QUALITY INDICATOR ASSIST COMMENTS   Roll right (&return to back) 3: Partial/Moderate A    Roll left (& return to back) 3: Partial/Moderate A    Supine to sit 3: Partial/Moderate A    Sit to stand 3: Partial/Moderate A    Chair/bed-to-chair transfer 3: Partial/Moderate A    Walk 10 feet Not Tested: Not attempted due to increased dizziness    Walk 50 feet with 2 turns Not Tested: Not attempted due to medical concerns    Walk 150 feet Not Tested: Not attempted due to medical concerns    Walk 10 feet on uneven  Not Tested: Not attempted due to medical concerns    1 step/curb Not Tested: Not attempted due to medical concerns    4 steps Not Tested: Not attempted due to medical concerns    12 steps Not Tested: Not attempted due to medical concerns     object Not Tested: Not attempted due to dizziness and safety    Wheel 50' w/2 turns Not Tested: Not attempted due to endurance    Wheel 150' Not Tested: Not attempted due to endurance    Car Transfer Not Tested: Not attempted due to medical concerns                 PHYSICAL THERAPY PLAN OF CARE    Therapy Diagnosis:   Please see table above    Order received from MD for physical therapy services and chart reviewed. Pt to be seen at least 5 times per week for at least 1.5 hours of physical therapy per day for 10 days. Thank you for the referral.    LTGs: Please refer to POC for details. Pt would benefit from skilled physical therapy in order to improve independent functional mobility within the home. Interventions may include range of motion (AROM, PROM B LE/trunk), motor function (B LE/trunk strengthening/coordination), activity tolerance (vitals, oxygen saturation levels), bed mobility training, balance activities, gait training (progressive ambulation program), and functional transfer training. Patient return to room at end of treatment and remained up in wheelchair with needs in reach. Pt c/o increased dizziness during treatment requiring increased rest breaks. O2 sat was monitored during treatment with decrease O2 sat noted with increased activity. Please see IRC; Interdisciplinary Eval, Care Plan, and Patient Education for further information regarding physical therapy examination and plan of care.      Alesha Carter, PT  6/15/2020

## 2020-06-16 LAB
APPEARANCE UR: ABNORMAL
BACTERIA URNS QL MICRO: ABNORMAL /HPF
BILIRUB UR QL: NEGATIVE
COLOR UR: YELLOW
EPI CELLS #/AREA URNS HPF: ABNORMAL /HPF
GLUCOSE BLD STRIP.AUTO-MCNC: 133 MG/DL (ref 65–100)
GLUCOSE BLD STRIP.AUTO-MCNC: 135 MG/DL (ref 65–100)
GLUCOSE BLD STRIP.AUTO-MCNC: 147 MG/DL (ref 65–100)
GLUCOSE BLD STRIP.AUTO-MCNC: 188 MG/DL (ref 65–100)
GLUCOSE UR STRIP.AUTO-MCNC: NEGATIVE MG/DL
HGB UR QL STRIP: ABNORMAL
KETONES UR QL STRIP.AUTO: NEGATIVE MG/DL
LEUKOCYTE ESTERASE UR QL STRIP.AUTO: ABNORMAL
NITRITE UR QL STRIP.AUTO: NEGATIVE
OTHER OBSERVATIONS,UCOM: ABNORMAL
PH UR STRIP: 5.5 [PH] (ref 5–9)
PROT UR STRIP-MCNC: 30 MG/DL
RBC #/AREA URNS HPF: ABNORMAL /HPF
SP GR UR REFRACTOMETRY: 1.02 (ref 1–1.02)
UROBILINOGEN UR QL STRIP.AUTO: 1 EU/DL (ref 0.2–1)
WBC URNS QL MICRO: ABNORMAL /HPF

## 2020-06-16 PROCEDURE — 77030040830 HC CATH URETH FOL MDII -A

## 2020-06-16 PROCEDURE — 97116 GAIT TRAINING THERAPY: CPT

## 2020-06-16 PROCEDURE — 97110 THERAPEUTIC EXERCISES: CPT

## 2020-06-16 PROCEDURE — 74011250637 HC RX REV CODE- 250/637: Performed by: PHYSICAL MEDICINE & REHABILITATION

## 2020-06-16 PROCEDURE — 77010033678 HC OXYGEN DAILY

## 2020-06-16 PROCEDURE — 97530 THERAPEUTIC ACTIVITIES: CPT

## 2020-06-16 PROCEDURE — 99232 SBSQ HOSP IP/OBS MODERATE 35: CPT | Performed by: PHYSICAL MEDICINE & REHABILITATION

## 2020-06-16 PROCEDURE — 74011000258 HC RX REV CODE- 258: Performed by: PHYSICAL MEDICINE & REHABILITATION

## 2020-06-16 PROCEDURE — 74011636637 HC RX REV CODE- 636/637: Performed by: PHYSICIAN ASSISTANT

## 2020-06-16 PROCEDURE — 94760 N-INVAS EAR/PLS OXIMETRY 1: CPT

## 2020-06-16 PROCEDURE — 81001 URINALYSIS AUTO W/SCOPE: CPT

## 2020-06-16 PROCEDURE — 65310000000 HC RM PRIVATE REHAB

## 2020-06-16 PROCEDURE — 74011250637 HC RX REV CODE- 250/637: Performed by: PHYSICIAN ASSISTANT

## 2020-06-16 PROCEDURE — 97535 SELF CARE MNGMENT TRAINING: CPT

## 2020-06-16 PROCEDURE — 74011250636 HC RX REV CODE- 250/636: Performed by: PHYSICAL MEDICINE & REHABILITATION

## 2020-06-16 PROCEDURE — 87088 URINE BACTERIA CULTURE: CPT

## 2020-06-16 PROCEDURE — 92507 TX SP LANG VOICE COMM INDIV: CPT

## 2020-06-16 PROCEDURE — 87086 URINE CULTURE/COLONY COUNT: CPT

## 2020-06-16 PROCEDURE — 87186 SC STD MICRODIL/AGAR DIL: CPT

## 2020-06-16 PROCEDURE — 82962 GLUCOSE BLOOD TEST: CPT

## 2020-06-16 PROCEDURE — 74011250636 HC RX REV CODE- 250/636: Performed by: PHYSICIAN ASSISTANT

## 2020-06-16 RX ORDER — SULFAMETHOXAZOLE AND TRIMETHOPRIM 800; 160 MG/1; MG/1
1 TABLET ORAL EVERY 12 HOURS
Status: DISPENSED | OUTPATIENT
Start: 2020-06-16 | End: 2020-06-21

## 2020-06-16 RX ORDER — GLIPIZIDE 5 MG/1
2.5 TABLET ORAL
Status: DISCONTINUED | OUTPATIENT
Start: 2020-06-17 | End: 2020-06-19

## 2020-06-16 RX ADMIN — Medication 1 EACH: at 21:15

## 2020-06-16 RX ADMIN — INSULIN LISPRO 2 UNITS: 100 INJECTION, SOLUTION INTRAVENOUS; SUBCUTANEOUS at 21:14

## 2020-06-16 RX ADMIN — CEFTRIAXONE SODIUM 1 G: 1 INJECTION, POWDER, FOR SOLUTION INTRAMUSCULAR; INTRAVENOUS at 20:25

## 2020-06-16 RX ADMIN — ENOXAPARIN SODIUM 40 MG: 40 INJECTION SUBCUTANEOUS at 14:37

## 2020-06-16 RX ADMIN — DOXAZOSIN MESYLATE 8 MG: 4 TABLET ORAL at 20:21

## 2020-06-16 RX ADMIN — MECLIZINE HYDROCHLORIDE 25 MG: 25 TABLET ORAL at 08:24

## 2020-06-16 RX ADMIN — LORATADINE 10 MG: 10 TABLET ORAL at 08:18

## 2020-06-16 RX ADMIN — SENNOSIDES AND DOCUSATE SODIUM 1 TABLET: 8.6; 5 TABLET ORAL at 08:18

## 2020-06-16 RX ADMIN — OXYMETAZOLINE HCL 2 SPRAY: 0.05 SPRAY NASAL at 22:00

## 2020-06-16 RX ADMIN — POLYETHYLENE GLYCOL 3350 17 G: 17 POWDER, FOR SOLUTION ORAL at 08:18

## 2020-06-16 RX ADMIN — AMLODIPINE BESYLATE 5 MG: 5 TABLET ORAL at 08:18

## 2020-06-16 RX ADMIN — CLOPIDOGREL BISULFATE 75 MG: 75 TABLET ORAL at 08:18

## 2020-06-16 RX ADMIN — ASPIRIN 81 MG 81 MG: 81 TABLET ORAL at 08:18

## 2020-06-16 RX ADMIN — SULFAMETHOXAZOLE AND TRIMETHOPRIM 1 TABLET: 800; 160 TABLET ORAL at 17:33

## 2020-06-16 RX ADMIN — ATORVASTATIN CALCIUM 80 MG: 80 TABLET, FILM COATED ORAL at 20:21

## 2020-06-16 RX ADMIN — DOXAZOSIN MESYLATE 8 MG: 4 TABLET ORAL at 08:18

## 2020-06-16 NOTE — PROGRESS NOTES
Time In 0701   Time Out 0751     Mobility   Score Comments   Supine to Sit 4: Supervision or touching A supervision   Sit to Stand 4: Supervision or touching A CGA > SBA   Transfer Assist 4: Supervision or touching A Transfer Type: SPT   Equipment: Rolling Walker   Comments: CGA     Activities of Daily Living    Score Comments   Eating 6: Independent    Oral Hyigene 6: Independent    Bathing 4: Supervision or touching A Type of Shower: Shower  Position: Supported sitting and Standing PRN   Adaptive  Equipment: Tub Transfer Bench and Grab Bars  Comments: Cues to cross legs to wash B feet, CGA in stance for washing bottom   Upper Body  Dressing 5: S/U or clean-up assist Items Applied: Pullover  Position: Supported Sitting  Comments:    Lower Body Dressing 4: Supervision or touching A Items Applied: Underwear and Elastic pants  Position: Supported sitting and Standing PRN  Adaptive Equipment: W/C  Comments: CGA for standing balance with partial assist pulling pants up posteriorly    Donning/[place] Footwear 3: Partial/Moderate A Items Applied: Socks and Slip-on shoes  Adaptive Equipment: N/A  Comments: Assist with L sock and shoe   Education  ADL and transfer techniques       S: \"My ankles seem more swollen today. \" Agreeable to therapy. Patient was able to ambulate ~7 feet using a RW with CGA. Pain not expressed. Pt noted dizziness when leaning forward with dressing. Patient Vitals for the past 12 hrs:   SpO2   06/16/20 0754 97 %       Collaborated with RN regarding patient performance and POC. Patient tolerated session well, but activity tolerance, balance, functional mobility, strength, coordination, cognition are still below baseline and require skilled facilitation to successfully and safely complete ADLs and transfers. Patient ended session in recliner with breakfast, call remote, and phone within reach.        Antonio Mcdermott, DANNYR/L  6/16/2020

## 2020-06-16 NOTE — PROGRESS NOTES
PHYSICAL THERAPY DAILY NOTE  Time In: 1346  Time Out: 1426  Patient Seen For: PM;Patient education; Therapeutic exercise;Transfer training    Subjective: Pt reports he's doing well this afternoon after lunch. Says his wife is coming in later today. Objective: Other (comment)(fall risk)  GROSS ASSESSMENT Daily Assessment    AROM: Within functional limits  PROM: Within functional limits  Strength: Generally decreased, functional  Coordination: Generally decreased, functional       COGNITION Daily Assessment    Pt is alert and oriented and following commands appropriately 100% of the time. Pt communicates verbally. Pt has a pleasant affect. Pt is willing to participate in therapy. Pt demonstrates decreased safety with transfers. BED/MAT MOBILITY Daily Assessment    Rolling Right : 0 (Not tested)  Rolling Left : 0 (Not tested)  Supine to Sit : 4 (Minimal assistance)  Sit to Supine : 4 (Minimal assistance)       TRANSFERS Daily Assessment   SPT/LPT without device training with mod cues for arm placement and pivot direction from WC to mat Transfer Type: SPT without device  Transfer Assistance : 3 (Moderate assistance )  Sit to Stand Assistance: Minimal assistance  Car Transfers: Not tested       GAIT Daily Assessment    Amount of Assistance: 0 (Not tested)  Distance (ft): 0 Feet (ft)  Assistive Device: Gait belt;Walker, rolling       STEPS or STAIRS Daily Assessment    Steps/Stairs Ambulated (#): 0  Level of Assist : 0 (Not tested)       BALANCE Daily Assessment    Sitting - Static: Good (unsupported)  Sitting - Dynamic: Fair (occasional)  Standing - Static: Constant support; Fair  Standing - Dynamic : Impaired       WHEELCHAIR MOBILITY Daily Assessment    Able to Propel (ft): 0 feet  Curbs/Ramps Assist Required (FIM Score): 0 (Not tested)  Wheelchair Setup Assist Required : 5 (Supervision/setup)  Wheelchair Management: Manages left brake;Manages right brake     Therapeutic Interventions:  LTR 2 x 10  Simulated leg press 2 x 12 with exercise ball  NuStep level 1 x 12 min -- 93-94% after 5 min; 97% after 12 min  Transfer training    /73   Pulse 71   Temp 98.5 °F (36.9 °C)   Resp 16   Ht 5' 9\" (1.753 m)   Wt 123.3 kg (271 lb 12.8 oz)   SpO2 94%   BMI 40.14 kg/m²      Pain level: no complaints  Pain location: N/A  Pain interventions: N/A    Patient education: Pt educated on how to transfer using SPT/LPT without a device. Interdisciplinary Communication: Collaborated with RN regarding weaning pt off of oxygen trial during PT session     Pt left in bedside chair in room with call bell and needs in reach. Pt in NAD. Assessment: Pt tolerated therapy well today. Pt demonstrates good activity tolerance and performance during exercises this afternoon. Pt not on supplemental oxygen this afternoon to assess response to activity. Pt's oxygen levels stayed above 93% throughout the session with no reports of SOB or evidence of dyspnea. PT discussed oxygen levels with pt's RN. Pt would benefit from skilled physical therapy to maximize independence, increase activity tolerance, and improve functional mobility. Plan of Care: Continue with POC and progress as tolerated.       Lupe Gross, PT  6/16/2020

## 2020-06-16 NOTE — PROGRESS NOTES
Mena catheter inserted with return of bloody colored urine to bedside drainage bag, tolerated well. Dr. Camden Emerson notified of change in color of urine. Order received for Rocephin 1 gm IV x 1 dose.

## 2020-06-16 NOTE — PROGRESS NOTES
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 Vss. No acute complaints. PT, OT well tolerated. Steady gains made. No new barrier to progress noted. + BM, pt much more comfortable. No complaints.  Good spirits  Objective:     Current Facility-Administered Medications   Medication Dose Route Frequency    senna-docusate (PERICOLACE) 8.6-50 mg per tablet 1 Tab  1 Tab Oral DAILY    ondansetron (ZOFRAN ODT) tablet 4 mg  4 mg Oral Q6H PRN    meclizine (ANTIVERT) tablet 25 mg  25 mg Oral Q6H PRN    hydrocortisone (CORTAID) 1 % cream   Topical BID    acetaminophen (TYLENOL) tablet 650 mg  650 mg Oral Q6H PRN    amLODIPine (NORVASC) tablet 5 mg  5 mg Oral DAILY    aspirin chewable tablet 81 mg  81 mg Oral DAILY    atorvastatin (LIPITOR) tablet 80 mg  80 mg Oral QHS    clopidogreL (PLAVIX) tablet 75 mg  75 mg Oral DAILY    doxazosin (CARDURA) tablet 8 mg  8 mg Oral BID    enoxaparin (LOVENOX) injection 40 mg  40 mg SubCUTAneous Q24H    insulin lispro (HUMALOG) injection   SubCUTAneous AC&HS    loratadine (CLARITIN) tablet 10 mg  10 mg Oral DAILY    sodium chloride (OCEAN) 0.65 % nasal squeeze bottle 2 Spray  2 Spray Both Nostrils Q2H PRN    pneumococcal 23-valent (PNEUMOVAX 23) injection 0.5 mL  0.5 mL IntraMUSCular PRIOR TO DISCHARGE    oxymetazoline (AFRIN) 0.05 % nasal spray 2 Spray  2 Spray Both Nostrils QHS    polyethylene glycol (MIRALAX) packet 17 g  17 g Oral DAILY    bisacodyL (DULCOLAX) tablet 5 mg  5 mg Oral DAILY PRN    Or    bisacodyL (DULCOLAX) suppository 10 mg  10 mg Rectal DAILY PRN    Or    magnesium hydroxide (MILK OF MAGNESIA) 400 mg/5 mL oral suspension 30 mL  30 mL Oral DAILY PRN       Review of Systems:   Denies chest pain, shortness of breath, cough, headache, visual problems, abdominal pain, dysuria, calf pain. Pertinent positives are as noted in the HPI, ROS unremarkable otherwise. Visit Vitals  /73   Pulse 71   Temp 98.5 °F (36.9 °C)   Resp 16   Ht 5' 9\" (1.753 m)   Wt 271 lb 12.8 oz (123.3 kg)   SpO2 94%   BMI 40.14 kg/m²        Physical Exam:   General: Alert and age appropriately oriented. No acute cardiorespiratory distress. HEENT: Normocephalic, no scleral icterus. Oral mucosa moist without cyanosis. Lungs: Clear to auscultation bilaterally. Respiration even and unlabored. Heart: Regular rate and rhythm, S1, S2. No murmurs, clicks, rub or gallops. Abdomen: Soft, non-tender, not distended. Bowel sounds normoactive. No organomegaly. Genitourinary: Deferred. Neuromuscular:      LUE grossly 4/5, vs right 4+  LEs grossly 4- prox, intact distally  No drift or dysmetria  fcs well   Skin/extremity: No rashes, no erythema. No calf tenderness B LE. No edema                                                                              Functional Assessment:  Gross Assessment  AROM: Within functional limits (06/15/20 1400)  Strength: Generally decreased, functional (06/15/20 1400)       Balance  Sitting - Static: Good (unsupported) (06/15/20 1400)  Sitting - Dynamic: Fair (occasional) (06/15/20 1400)  Standing - Static: Constant support; Fair (06/15/20 1400)                     Marco Peterson Fall Risk Assessment:  Marco Peterson Fall Risk  Mobility: Ambulates or transfers with assist devices or assistance (06/15/20 1940)  Mobility Interventions: Patient to call before getting OOB; Strengthening exercises (ROM-active/passive); Utilize walker, cane, or other assistive device (06/15/20 1940)  Mentation: Alert, oriented x 3 (06/15/20 1940)  Medication: Patient receiving anticonvulsants, sedatives(tranquilizers), psychotropics or hypnotics, hypoglycemics, narcotics, sleep aids, antihypertensives, laxatives, or diuretics (06/15/20 1940)  Medication Interventions: Patient to call before getting OOB; Teach patient to arise slowly (06/15/20 1940)  Elimination: Needs assistance with toileting (06/15/20 1940)  Elimination Interventions: Call light in reach; Patient to call for help with toileting needs; Toileting schedule/hourly rounds (06/15/20 1940)  Prior Fall History: No (06/15/20 1940)  History of Falls Interventions: Door open when patient unattended;Utilize gait belt for transfer/ambulation (06/15/20 1940)  Total Score: 3 (06/15/20 1940)  High Fall Risk: Yes (06/15/20 1940)     Speech Assessment:         Ambulation:  Gait  Distance (ft): 25 Feet (ft) (06/15/20 1400)  Assistive Device: Gait belt;Walker, rolling (06/15/20 1400)     Labs/Studies:  Recent Results (from the past 72 hour(s))   GLUCOSE, POC    Collection Time: 06/13/20 11:10 AM   Result Value Ref Range    Glucose (POC) 147 (H) 65 - 100 mg/dL   GLUCOSE, POC    Collection Time: 06/13/20  4:50 PM   Result Value Ref Range    Glucose (POC) 150 (H) 65 - 100 mg/dL   GLUCOSE, POC    Collection Time: 06/13/20  9:30 PM   Result Value Ref Range    Glucose (POC) 140 (H) 65 - 100 mg/dL   GLUCOSE, POC    Collection Time: 06/13/20 10:13 PM   Result Value Ref Range    Glucose (POC) 152 (H) 65 - 100 mg/dL   GLUCOSE, POC    Collection Time: 06/14/20  7:29 AM   Result Value Ref Range    Glucose (POC) 150 (H) 65 - 100 mg/dL   PLEASE READ & DOCUMENT PPD TEST IN 72 HRS    Collection Time: 06/14/20 11:00 AM   Result Value Ref Range    PPD Negative Negative    mm Induration 0 0 - 5 mm   GLUCOSE, POC    Collection Time: 06/14/20  4:59 PM   Result Value Ref Range    Glucose (POC) 141 (H) 65 - 100 mg/dL   GLUCOSE, POC    Collection Time: 06/14/20  8:19 PM   Result Value Ref Range    Glucose (POC) 142 (H) 65 - 100 mg/dL   GLUCOSE, POC    Collection Time: 06/15/20  7:07 AM   Result Value Ref Range    Glucose (POC) 142 (H) 65 - 100 mg/dL   GLUCOSE, POC    Collection Time: 06/15/20 11:15 AM   Result Value Ref Range    Glucose (POC) 152 (H) 65 - 100 mg/dL   GLUCOSE, POC    Collection Time: 06/15/20  3:51 PM   Result Value Ref Range    Glucose (POC) 182 (H) 65 - 100 mg/dL   GLUCOSE, POC    Collection Time: 06/15/20 10:15 PM   Result Value Ref Range    Glucose (POC) 167 (H) 65 - 100 mg/dL   URINALYSIS W/ RFLX MICROSCOPIC    Collection Time: 06/16/20  5:57 AM   Result Value Ref Range    Color YELLOW      Appearance CLOUDY      Specific gravity 1.022 1.001 - 1.023      pH (UA) 5.5 5.0 - 9.0      Protein 30 (A) NEG mg/dL    Glucose Negative mg/dL    Ketone Negative NEG mg/dL    Bilirubin Negative NEG      Blood LARGE (A) NEG      Urobilinogen 1.0 0.2 - 1.0 EU/dL    Nitrites Negative NEG      Leukocyte Esterase MODERATE (A) NEG     GLUCOSE, POC    Collection Time: 06/16/20  7:50 AM   Result Value Ref Range    Glucose (POC) 147 (H) 65 - 100 mg/dL       Assessment:     Problem List as of 6/16/2020 Date Reviewed: 5/1/2020          Codes Class Noted - Resolved    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  ICD-9-CM: 434.91  6/14/2020 - Present        Cerebellar stroke (HCC) ICD-10-CM: I63.9  ICD-9-CM: 434.91  6/14/2020 - Present        Urinary retention ICD-10-CM: R33.9  ICD-9-CM: 788.20  6/8/2020 - Present        Stroke (cerebrum) (HCC) ICD-10-CM: I63.9  ICD-9-CM: 434.91  6/8/2020 - Present        Severe obesity ([address] ICD-10-CM: E66.01  ICD-9-CM: 278.01  7/2/2019 - Present        Right thalamic infarction [facility]) ICD-10-CM: I63.9  ICD-9-CM: 434.91  7/2/2019 - Present        Numbness and tingling in left hand ICD-10-CM: R20.0, R20.2  ICD-9-CM: 782.0  7/2/2019 - Present        Controlled type 2 diabetes mellitus without complication, without long-term current use of insulin ([facility] ICD-10-CM: E11.9  ICD-9-CM: 250.00  2/22/2019 - Present Cerebrovascular accident (CVA) due to occlusion [facility]) ICD-10-CM: I63.9  ICD-9-CM: 434.91  2/12/2019 - Present        Essential hypertension, benign ICD-10-CM: I10  ICD-9-CM: 401.1  1/3/2014 - Present        Mixed hyperlipidemia ICD-10-CM: E78.2  ICD-9-CM: 272.2  1/3/2014 - Present                   Plan / Recommendations / Medical Decision Making:      Continue daily physician / PA medical management:     New ischemic stroke, right cerebellar hemisphere, right dorsolateral medulla; secondary stroke prevention - DAPT, high-intensity statin (Lipitor), BP management  -dizziness, nausea, intermittent diplopia; prn meclizine     Hypertension / hypotension - patient on doxazosin 8mg twice daily from PCP, now additionally on amlodipine 5mg. /63, P 75 on admission here; patient gives pretty clear history of brief orthostatic hypotension symptoms when sitting from supine prior to meals. Staff to perform OH maneuvers, may hold amlodipine.        Diabetes mellitus - uncontrolled / poor glycemic control, HgbA1c 7.8% (6/9/2020). Will require daily, close FSG monitoring and medication adjustment to optimize glycemic control in setting of acute illness and hospitalization. POC blood glucose around 130s-150s. Currently on Humalog SSI; will monitor and manage.  -6/15 fair control at this time. Consider low dose antihyperglycemic  -6/16 bs 147 this a.m., 167 at bedtime, 182 at supper, 152 at lunch; start glucotrol 2.5 mg     Urinary retention - cochran catheter in place since ED 6/8; Urologist suspects urinary retention part of stroke syndrome. Catheter remains until 6/16, at which time it will be removed for voiding trial.  -pt on Cardura 8mg. Check UA; lg blood, lg leuk; pt didn't have micro done.  Asymptomatic  Cochran discontinued this a.m; await results     Hyperlipidemia - on high-intensity statin.     Electrolyte abnormality - previous hypoK+, current BMP normal; monitor.     Pain control - stable, mild-to-moderate joint symptoms intermittently, reasonably well controlled by PRN meds. Will require regular pain assessment and comprenhensive pain management.     Bowel program - at risk for constipation due to lack of mobility. MiraLAX daily for regularity, Meghan-Colace for stool softener prn. PRN MOM, bisacodyl suppository or tablets for constipation.  -6/15 normally has bm every other day. Will add daily senna.   -6/16 good results     Depression - no previous history; at risk of post-stroke depression. Monitor.     Pneumonia prophylaxis - incentive spirometer every hour while awake.     Newly diagnosed NELL; pt sets alarm off if not supine. If he rolls side to side , he desats. CXR shows atelectasis; enc IS  -6/16 not tolerating CPAP; O2 NC at noc at 1L, sats 91-98%     DVT risk / DVT prophylaxis - will require daily physician / PA exam to assess for signs and symptoms as patient is at increased risk for of thromboembolism. Mobilize as tolerated. Sequential pneumatic compression devices (SCDs) when in bed; thigh-high or knee-high thromboembolic deterrent hose when out of bed. On DAPT; Lovenox subcutaneously daily.      Wound care - monitor general skin wound status daily per staff and physician / PA. At risk for failure. Will require 24/7 rehab nursing. No current wounds.     GERD - at times may need antacids, Maalox prn.                    Time spent was 25 minutes with over 1/2 in direct patient care/examination, consultation and coordination of care.      Signed By: Becky Lott MD     June 16, 2020

## 2020-06-16 NOTE — PROGRESS NOTES
Patient unable to void, in/out cath with return of 300 mls of dark yellow hazy/malodorous urine. Dr. Jordi David notified, order received to place urinary catheter.

## 2020-06-16 NOTE — PROGRESS NOTES
OT Daily Note  Time In 1116   Time Out 1209     Subjective: \"My dizziness and nausea is a lot better after taking that medication. \"  Pain: not expressed  Education: visual strategies  Interdisciplinary Communication: NA  Precautions: fall risk  Patient Vitals for the past 12 hrs:   Temp Pulse Resp BP SpO2   06/16/20 0813 98.5 °F (36.9 °C) 71 16 131/73 94 %   06/16/20 0754 -- -- -- -- 97 %         Mobility   Score Comments   Sit to Stand 4: Supervision or touching A CGA   Transfer Assist 4: Supervision or touching A Transfer Type: SPT   Equipment: Rolling Walker   Comments: CGA     Self-Care Daily Assessment   Toilet transfer: [facility]  Toileting: max assist for toileting hygiene and assist pulling clothes up posteriorly. Small BM noted but unable to void at this time. Pt washed hands at sink while seated in  with supervision. Visual-Perceptual Daily Assessment   Pt completed numbers portion of [employer] board working on visual perceptual skills and bilateral hand coordination skills. Pt with 90% accuracy in ordering numbers 1-60, with 6 cues required throughout task. Pt omitted 1 number with min assist to problem solve correcting error. Pt wore eye patch on unaffected L eye during task working on strengthening R oculomotor muscles. Pt noting resolved diplopia with eye patch. Discussed eye patch wear, purpose, and benefits. Assessment: Decreased nausea and dizziness throughout session. Improving strength during transfers. Plan: Continue OT POC with focus on ADL/IADL skills, functional transfers, functional mobility, coordination, strength, static and dynamic balance, and activity tolerance to maximize safety and independence with ADLs and functional transfers.      JEEVAN Olivia/LUPE  6/16/2020

## 2020-06-16 NOTE — PROGRESS NOTES
PHYSICAL THERAPY DAILY NOTE  Time In: 0830  Time Out: 4000  Patient Seen For: AM;Gait training;Patient education; Therapeutic exercise    Subjective: Pt denies reports of pain, but complains of mild dizziness. States he took something for dizziness right before PT session. Agreeable to therapy. Objective: Other (comment)(fall risk); 1 L of oxygen via nasal cannula    GROSS ASSESSMENT Daily Assessment    AROM: Within functional limits  PROM: Within functional limits  Strength: Generally decreased, functional  Coordination: Generally decreased, functional       COGNITION Daily Assessment    Pt is alert and oriented and following commands appropriately >90% of the time. Pt communicates verbally. Pt has a pleasant affect. Pt is willing to participate in therapy. Pt demonstrates decreased safety awareness with transfers and gait at times. BED/MAT MOBILITY Daily Assessment    Rolling Right : 0 (Not tested)  Rolling Left : 0 (Not tested)  Supine to Sit : 4 (Minimal assistance)  Sit to Supine : 4 (Minimal assistance)       TRANSFERS Daily Assessment   Increased time & effort to complete with min cueing for safety Transfer Type: SPT with walker  Transfer Assistance : 4 (Minimal assistance)  Sit to Stand Assistance: Minimal assistance  Car Transfers: Not tested       GAIT Daily Assessment   Ambulated 175 ft x 1; 60 ft x 1 with RW    Much improved endurance & tolerance compared to yesterday's session(s)    Needs cueing to stay within RW   Amount of Assistance: 4 (Contact guard assistance)  Distance (ft): 175 Feet (ft)  Assistive Device: Gait belt;Walker, rolling       STEPS or STAIRS Daily Assessment    Steps/Stairs Ambulated (#): 0  Level of Assist : 0 (Not tested)       BALANCE Daily Assessment    Sitting - Static: Good (unsupported)  Sitting - Dynamic: Fair (occasional)  Standing - Static: Constant support; Fair  Standing - Dynamic : Impaired       WHEELCHAIR MOBILITY Daily Assessment    Able to Propel (ft): 150 feet  Curbs/Ramps Assist Required (FIM Score): 0 (Not tested)  Wheelchair Setup Assist Required : 5 (Supervision/setup)  Wheelchair Management: Manages left brake;Manages right brake     Therapeutic Interventions:  Glute sets 2 x 10 x 3 sec  Ankle pumps 2 x 20 B  Isometric hip ADD 2 x 10 x 5 sec  SAQ 2 x 10 x 2 sec B  Supine hip ABD 2 x 12 B  Moto[employer] level 1 x 10 min  Gait training with RW    /73   Pulse 71   Temp 98.5 °F (36.9 °C)   Resp 16   Ht 5' 9\" (1.753 m)   Wt 123.3 kg (271 lb 12.8 oz)   SpO2 94%   BMI 40.14 kg/m²      Gait training   Trial 1: 60 ft; 97% oxygen sat; 81 bpm  Trial 2: 175 ft; 96% oxygen sat; 85 bpm     Pain level: no complaints  Pain location: N/A  Pain interventions: N/A    Patient education: Pt educated on how to perform exercises with appropriate technique. Interdisciplinary Communication: Collaborated with OT regarding pt's progress     Pt left in [facility] in room with call bell and needs in reach. Pt in NAD. Assessment: Pt tolerated therapy well today. Pt demonstrates improved activity tolerance and endurance compared to yesterday's session. Pt demonstrated no LOB during transfers or gait, but complained of \"vague lightheadedness\" throughout the session. He reported mild dizziness and fatigue were his reasons for needing to take a rest break following gait training. Pt is engaged and participates fully in therapy. Pt would benefit from skilled physical therapy to maximize independence, increase activity tolerance, and improve functional mobility. Plan of Care: Continue with POC and progress as tolerated.       Gema Galvan, PT  6/16/2020

## 2020-06-16 NOTE — PROGRESS NOTES
06/16/20 1112   Time Spent With Patient   Time In 1030   Time Out 1111   Patient Seen For: AM   Mental Status   Neurologic State Alert   Orientation Level Oriented X4   Cognition Memory loss;Decreased attention/concentration   Safety/Judgement Home safety        06/16/20 1113   Verbal Organization Exercises   Functional Sequencing Accuracy (%) 25 %  (1 of 4 written sequencing tasks completed independently)  Required moderate cues to determine errors and rearrange in the appropriate order  Skipped a step leaving it blank    Neuro-Linguistic Exercises   Visual Problem Solving Impaired; answering questions related to a menu and completing basic calculations with 70% accuracy   Verbal Organization Impaired   Memory Impaired; recalled the four activities related to sequencing task with cues to write them down and categorize  Recalled treating therapists names with association strategy   Attention  Impaired     Patient participated with cognitive therapy. Reading comprehension and problem solving task to sequence five written steps to complete an activity completed 1 of 4 independently. Required moderate cues to determine errors and rearrange in the appropriate order. Also with decreased attention noted with patient skipping over steps. Patient reports eating many of his meal outside of the home. Functional reading and reasoning task to answer questions related to a menu and complete basic calculations completed with 70% accuracy and additional time needed. Recalled the four activities related to [id number] task from the beginning of the session with initial cues to write them down and categorize. Recalled treating therapists names with use of association strategy. Recommend continued therapy to address attention, memory, and problem solving.     Zay Michelle MS, CCC-SLP

## 2020-06-16 NOTE — PROGRESS NOTES
Urine for urinalysis and culture sent to lab(obtained from [facility] using asceptic technique). Mena d/c'd per order Dr Delisa Christensen. Encouraged to drink fluids. urinal placed at bedside.

## 2020-06-17 LAB
ANION GAP SERPL CALC-SCNC: 9 MMOL/L (ref 7–16)
BUN SERPL-MCNC: 17 MG/DL (ref 8–23)
CALCIUM SERPL-MCNC: 8 MG/DL (ref 8.3–10.4)
CHLORIDE SERPL-SCNC: 108 MMOL/L (ref 98–107)
CO2 SERPL-SCNC: 26 MMOL/L (ref 21–32)
CREAT SERPL-MCNC: 1.06 MG/DL (ref 0.8–1.5)
ERYTHROCYTE [DISTWIDTH] IN BLOOD BY AUTOMATED COUNT: 13.9 % (ref 11.9–14.6)
GLUCOSE BLD STRIP.AUTO-MCNC: 124 MG/DL (ref 65–100)
GLUCOSE BLD STRIP.AUTO-MCNC: 135 MG/DL (ref 65–100)
GLUCOSE BLD STRIP.AUTO-MCNC: 200 MG/DL (ref 65–100)
GLUCOSE BLD STRIP.AUTO-MCNC: 93 MG/DL (ref 65–100)
GLUCOSE SERPL-MCNC: 135 MG/DL (ref 65–100)
HCT VFR BLD AUTO: 38.5 % (ref 41.1–50.3)
HGB BLD-MCNC: 12.8 G/DL (ref 13.6–17.2)
MCH RBC QN AUTO: 30.7 PG (ref 26.1–32.9)
MCHC RBC AUTO-ENTMCNC: 33.2 G/DL (ref 31.4–35)
MCV RBC AUTO: 92.3 FL (ref 79.6–97.8)
NRBC # BLD: 0 K/UL (ref 0–0.2)
PLATELET # BLD AUTO: 257 K/UL (ref 150–450)
PMV BLD AUTO: 11.6 FL (ref 9.4–12.3)
POTASSIUM SERPL-SCNC: 4.1 MMOL/L (ref 3.5–5.1)
RBC # BLD AUTO: 4.17 M/UL (ref 4.23–5.6)
SODIUM SERPL-SCNC: 143 MMOL/L (ref 136–145)
WBC # BLD AUTO: 8.3 K/UL (ref 4.3–11.1)

## 2020-06-17 PROCEDURE — 92507 TX SP LANG VOICE COMM INDIV: CPT

## 2020-06-17 PROCEDURE — 97530 THERAPEUTIC ACTIVITIES: CPT

## 2020-06-17 PROCEDURE — 74011636637 HC RX REV CODE- 636/637: Performed by: PHYSICIAN ASSISTANT

## 2020-06-17 PROCEDURE — 80048 BASIC METABOLIC PNL TOTAL CA: CPT

## 2020-06-17 PROCEDURE — 74011250637 HC RX REV CODE- 250/637: Performed by: PHYSICIAN ASSISTANT

## 2020-06-17 PROCEDURE — 97110 THERAPEUTIC EXERCISES: CPT

## 2020-06-17 PROCEDURE — 0T9B70Z DRAINAGE OF BLADDER WITH DRAINAGE DEVICE, VIA NATURAL OR ARTIFICIAL OPENING: ICD-10-PCS | Performed by: NURSE PRACTITIONER

## 2020-06-17 PROCEDURE — 77030018836 HC SOL IRR NACL ICUM -A

## 2020-06-17 PROCEDURE — 74011250636 HC RX REV CODE- 250/636: Performed by: PHYSICAL MEDICINE & REHABILITATION

## 2020-06-17 PROCEDURE — 65310000000 HC RM PRIVATE REHAB

## 2020-06-17 PROCEDURE — 85027 COMPLETE CBC AUTOMATED: CPT

## 2020-06-17 PROCEDURE — 74011250637 HC RX REV CODE- 250/637: Performed by: PHYSICAL MEDICINE & REHABILITATION

## 2020-06-17 PROCEDURE — 99233 SBSQ HOSP IP/OBS HIGH 50: CPT | Performed by: PHYSICAL MEDICINE & REHABILITATION

## 2020-06-17 PROCEDURE — 82962 GLUCOSE BLOOD TEST: CPT

## 2020-06-17 PROCEDURE — 51700 IRRIGATION OF BLADDER: CPT

## 2020-06-17 PROCEDURE — 97535 SELF CARE MNGMENT TRAINING: CPT

## 2020-06-17 PROCEDURE — 36415 COLL VENOUS BLD VENIPUNCTURE: CPT

## 2020-06-17 RX ORDER — HYDROCORTISONE 1 %
CREAM (GRAM) TOPICAL
Status: DISCONTINUED | OUTPATIENT
Start: 2020-06-17 | End: 2020-07-01 | Stop reason: HOSPADM

## 2020-06-17 RX ADMIN — AMLODIPINE BESYLATE 5 MG: 5 TABLET ORAL at 08:34

## 2020-06-17 RX ADMIN — LORATADINE 10 MG: 10 TABLET ORAL at 08:33

## 2020-06-17 RX ADMIN — MECLIZINE HYDROCHLORIDE 25 MG: 25 TABLET ORAL at 21:52

## 2020-06-17 RX ADMIN — SENNOSIDES AND DOCUSATE SODIUM 1 TABLET: 8.6; 5 TABLET ORAL at 08:33

## 2020-06-17 RX ADMIN — ATORVASTATIN CALCIUM 80 MG: 80 TABLET, FILM COATED ORAL at 21:52

## 2020-06-17 RX ADMIN — SULFAMETHOXAZOLE AND TRIMETHOPRIM 1 TABLET: 800; 160 TABLET ORAL at 05:33

## 2020-06-17 RX ADMIN — GLIPIZIDE 2.5 MG: 5 TABLET ORAL at 08:34

## 2020-06-17 RX ADMIN — ASPIRIN 81 MG 81 MG: 81 TABLET ORAL at 08:34

## 2020-06-17 RX ADMIN — OXYMETAZOLINE HCL 2 SPRAY: 0.05 SPRAY NASAL at 22:00

## 2020-06-17 RX ADMIN — SULFAMETHOXAZOLE AND TRIMETHOPRIM 1 TABLET: 800; 160 TABLET ORAL at 17:44

## 2020-06-17 RX ADMIN — DOXAZOSIN MESYLATE 8 MG: 4 TABLET ORAL at 08:34

## 2020-06-17 RX ADMIN — DOXAZOSIN MESYLATE 8 MG: 4 TABLET ORAL at 21:52

## 2020-06-17 RX ADMIN — INSULIN LISPRO 4 UNITS: 100 INJECTION, SOLUTION INTRAVENOUS; SUBCUTANEOUS at 17:44

## 2020-06-17 NOTE — PROGRESS NOTES
Cochran catheter clotted off, irrigated with NS x 4 times, with several ( too many to count) small-medium blood clots returned to cochran catheter. Cochran catheter now patent/returning bloody colored urine to bedside bag. Will continue to monitor. Patient sitting up in bed eating breakfast, no complaints voiced.

## 2020-06-17 NOTE — PROGRESS NOTES
PHYSICAL THERAPY DAILY NOTE  Time In: 1310  Time Out: 0272  Patient Seen For: PM;Patient education; Therapeutic exercise    Subjective: Pt reports he feels sick and tired. Says he had \"high hopes\" for today in regards to therapy. Objective: Other (comment)(fall risk)    COGNITION Daily Assessment    Pt is alert and oriented and following commands appropriately >80% of the time. Pt communicates verbally. Pt has a pleasant affect. Pt is willing to participate in therapy. Pt demonstrates the need for min cueing for proper technique and performance of exercises. BED/MAT MOBILITY Daily Assessment    Rolling Right : 0 (Not tested)  Rolling Left : 0 (Not tested)  Supine to Sit : 0 (Not tested)  Sit to Supine : 0 (Not tested)       TRANSFERS Daily Assessment    Transfer Assistance : 0 (Not tested)  Car Transfers: Not tested       GAIT Daily Assessment    Amount of Assistance: 0 (Not tested)  Distance (ft): 0 Feet (ft)       STEPS or STAIRS Daily Assessment    Steps/Stairs Ambulated (#): 0  Level of Assist : 0 (Not tested)       BALANCE Daily Assessment    Sitting - Static: Not tested  Sitting - Dynamic: Not tested  Standing - Static: Not tested       [facility] MOBILITY Daily Assessment    Able to Propel (ft): 0 feet  Curbs/Ramps Assist Required (FIM Score): 0 (Not tested)  Wheelchair Setup Assist Required : 0 (Not tested)     Therapeutic Interventions:  Isometric hip ADD 2 x 10 x 3 sec  Glute sets 2 x 10 x 3 sec  Heel slides 2 x 10 B  HL clam shells GTB 3 x 8    /73   Pulse 71   Temp 98 °F (36.7 °C)   Resp 16   Ht 5' 9\" (1.753 m)   Wt 123.3 kg (271 lb 12.8 oz)   SpO2 96%   BMI 40.14 kg/m²      Pain level: 2/10  Pain location: low back  Pain interventions: changed positions    Patient education: Pt educated on how to perform exercises with appropriate technique. Interdisciplinary Communication: Collaborated with RN regarding pt's status     Pt left in bed with call bell and needs in reach.  Pt in NAD. Assessment: Pt tolerated therapy fairly today. Pt demonstrates fatigue and lethargy this afternoon. Pt and OT reported pt feeling ill & dizzy when attempting to sit EOB earlier in the afternoon. Pt is disappointed with how he is feeling today and was hoping to do better in therapies. Awaiting urology consult to better understand pt's presentation. Pt would benefit from skilled physical therapy to maximize independence, increase activity tolerance, and improve functional mobility. Plan of Care: Continue with POC and progress as tolerated.       Sandip Galarza, PT  6/17/2020

## 2020-06-17 NOTE — PROGRESS NOTES
Mena catheter irrigated with 150 ml of NS using asceptic technique per order of Dr Seema Abbott. 225 ml of zainab blood returned with medium sized clots noted. Pt denies pain or discomfort during and after procedure.

## 2020-06-17 NOTE — PROGRESS NOTES
Resting in bed, no distress noted. Mena catheter patent/returning cherry colored blood to bedside drainage bag, CBI at moderate gtt. Call bell within reach, hourly rounds completed this shift.

## 2020-06-17 NOTE — PROGRESS NOTES
Time In 0955   Time Out 1120     Mobility   Score Comments   Supine to Sit 4: Supervision or touching A Min assist   Sit to Supine 4: Supervision or touching A SBA     Activities of Daily Living    Score Comments   Eating 6: Independent    Oral Hyigene 5: S/U or clean-up assist    Bathing 4: Supervision or touching A Type of Shower: Bath Pack  Position: Supine and sidelying   Adaptive  Equipment: N/A  Comments: assist to wash bottom in sidelying   Upper Body  Dressing 5: S/U or clean-up assist Items Applied: Pullover  Position: Supported Sitting  Comments:    Lower Body Dressing Not Tested: Not attempted due to environmental concerns: RN requesting gown due to cochran care Items Applied: gown  Position: Supine  Adaptive Equipment: N/A  Comments:    Education  Bed bath techniques and log rolling       S: \"I feel dizzy again. \" Agreeable to therapy. Patient was completed bed bath due to increased pain and fatigue related to cochran catheter bleeding. Pt requesting to have BM on toilet, completed supine > sit transfer with min assist for trunk coming upright but pt noting severe dizziness and lightheadedness and requested to lay back down. Pt remained in bed with HOB at ~60 degrees while completed grooming tasks. Assist to apply hydrocortisone to back. See chart above for ADL quality indicator scores. Pt completed 50% of shape portion of Gravitant. Pt with visual perceptual deficits noted to rotate pieces and to identify differences in shape sizing. Max cues throughout with assist to break down into one shape at a time working from R > L side of board. Activity terminated due time and pt reporting increased dizziness, HOB lowered to ~30 degrees for resting position and with patient reporting relief. Pain indicated but not quantified on pain scale.   Patient Vitals for the past 12 hrs:   Temp Pulse Resp BP SpO2   06/17/20 0808 98 °F (36.7 °C) 71 16 129/73 96 %       Collaborated with RN and IP team regarding patient performance and POC. Patient tolerated session well, but activity tolerance, balance, functional mobility, strength, coordination, cognition are still below baseline and require skilled facilitation to successfully and safely complete ADLs and transfers. Patient ended session in bed with call remote and phone within reach.        Antonio Mcdermott, OTR/LUPE  6/17/2020

## 2020-06-17 NOTE — PROGRESS NOTES
In bed resting quietly. Pt expelled two large blood clots around the Mena catheter now clots noted at groin area. Meghan care given. Mena remains patent, draining moderate amounts of bloody urine. Will continue to monitor for patency. VSS. Sats at 91% RA. O2 replaced at 1 liter via n/c overnight.

## 2020-06-17 NOTE — PROGRESS NOTES
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 Vss. No acute complaints. PT, OT well tolerated. Steady gains made. No new barrier to progress noted. Gross /zainab hematuria when cochran placed late yesterday afternoon. Cochran was removed yesterday a.m and pt unable to void. CIC with no difficulty but small amt of blood noted then.  Denies pain, f/c, light headedness  Objective:     Current Facility-Administered Medications   Medication Dose Route Frequency    glipiZIDE (GLUCOTROL) tablet 2.5 mg  2.5 mg Oral ACB    trimethoprim-sulfamethoxazole (BACTRIM DS, SEPTRA DS) 160-800 mg per tablet 1 Tab  1 Tab Oral Q12H    lip protectant (BLISTEX) ointment 1 Each  1 Each Topical PRN    senna-docusate (PERICOLACE) 8.6-50 mg per tablet 1 Tab  1 Tab Oral DAILY    ondansetron (ZOFRAN ODT) tablet 4 mg  4 mg Oral Q6H PRN    meclizine (ANTIVERT) tablet 25 mg  25 mg Oral Q6H PRN    hydrocortisone (CORTAID) 1 % cream   Topical BID    acetaminophen (TYLENOL) tablet 650 mg  650 mg Oral Q6H PRN    amLODIPine (NORVASC) tablet 5 mg  5 mg Oral DAILY    aspirin chewable tablet 81 mg  81 mg Oral DAILY    atorvastatin (LIPITOR) tablet 80 mg  80 mg Oral QHS    [Held by provider] clopidogreL (PLAVIX) tablet 75 mg  75 mg Oral DAILY    doxazosin (CARDURA) tablet 8 mg  8 mg Oral BID    [Held by provider] enoxaparin (LOVENOX) injection 40 mg  40 mg SubCUTAneous Q24H    insulin lispro (HUMALOG) injection   SubCUTAneous AC&HS    loratadine (CLARITIN) tablet 10 mg  10 mg Oral DAILY    sodium chloride (OCEAN) 0.65 % nasal squeeze bottle 2 Spray  2 Spray Both Nostrils Q2H PRN    pneumococcal 23-valent (PNEUMOVAX 23) injection 0.5 mL  0.5 mL IntraMUSCular PRIOR TO DISCHARGE    oxymetazoline (AFRIN) 0.05 % nasal spray 2 Spray  2 Spray Both Nostrils QHS    polyethylene glycol (MIRALAX) packet 17 g  17 g Oral DAILY    bisacodyL (DULCOLAX) tablet 5 mg  5 mg Oral DAILY PRN    Or    bisacodyL (DULCOLAX) suppository 10 mg  10 mg Rectal DAILY PRN    Or    magnesium hydroxide (MILK OF MAGNESIA) 400 mg/5 mL oral suspension 30 mL  30 mL Oral DAILY PRN       Review of Systems:   Denies chest pain, shortness of breath, cough, headache, visual problems, abdominal pain, dysuria, calf pain. Pertinent positives are as noted in the HPI, ROS unremarkable otherwise.   + urinary retention \"look at that blood in my bag\"  Visit Vitals  /69 (BP 1 Location: Left arm, BP Patient Position: Supine)   Pulse 71   Temp 98.4 °F (36.9 °C)   Resp 16   Ht 5' 9\" (1.753 m)   Wt 271 lb 12.8 oz (123.3 kg)   SpO2 91%   BMI 40.14 kg/m²        Physical Exam:   General: Alert and age appropriately oriented. No acute cardiorespiratory distress. HEENT: Normocephalic, no scleral icterus. Oral mucosa moist without cyanosis. Lungs: Clear to auscultation bilaterally. Respiration even and unlabored. Heart: Regular rate and rhythm, S1, S2. No murmurs, clicks, rub or gallops. Abdomen: Soft, non-tender, mildly distended. Bowel sounds normoactive. No organomegaly. Genitourinary: Mena with zainab hematuria; few clots   Neuromuscular:      LUE grossly 4/5, vs right 4+  LEs grossly 4- prox, intact distally  No drift or dysmetria  fcs well; dec attn, dec recall/memory   Skin/extremity: No rashes, no erythema. No calf tenderness B LE.   No edema                                                                              Functional Assessment:  Gross Assessment  AROM: Within functional limits (06/16/20 1100)  PROM: Within functional limits (06/16/20 1100)  Strength: Generally decreased, functional (06/16/20 1100)  Coordination: Generally decreased, functional (06/16/20 1100)       Balance  Sitting - Static: Good (unsupported) (06/16/20 1500)  Sitting - Dynamic: Fair (occasional) (06/16/20 1500)  Standing - Static: Constant support; Fair (06/16/20 1500)  Standing - Dynamic : Impaired (06/16/20 1500)                     Wendy Hammre Fall Risk Assessment:  Wendy Hammer Fall Risk  Mobility: Ambulates or transfers with assist devices or assistance (06/16/20 1948)  Mobility Interventions: Patient to call before getting OOB (06/16/20 0800)  Mentation: Alert, oriented x 3 (06/16/20 1948)  Medication: Patient receiving anticonvulsants, sedatives(tranquilizers), psychotropics or hypnotics, hypoglycemics, narcotics, sleep aids, antihypertensives, laxatives, or diuretics (06/16/20 1948)  Medication Interventions: Patient to call before getting OOB (06/16/20 0800)  Elimination: Needs assistance with toileting (06/16/20 1948)  Elimination Interventions: Patient to call for help with toileting needs (06/16/20 0800)  Prior Fall History: No (06/16/20 1948)  History of Falls Interventions: Door open when patient unattended;Evaluate medications/consider consulting pharmacy (06/16/20 0800)  Total Score: 3 (06/16/20 1948)  High Fall Risk: Yes (06/16/20 1948)     Speech Assessment:         Ambulation:  Gait  Distance (ft): 0 Feet (ft) (06/16/20 1500)  Assistive Device: Gait belt;Walker, rolling (06/16/20 1100)     Labs/Studies:  Recent Results (from the past 72 hour(s))   GLUCOSE, POC    Collection Time: 06/14/20  7:29 AM   Result Value Ref Range    Glucose (POC) 150 (H) 65 - 100 mg/dL   PLEASE READ & DOCUMENT PPD TEST IN 72 HRS    Collection Time: 06/14/20 11:00 AM   Result Value Ref Range    PPD Negative Negative    mm Induration 0 0 - 5 mm   GLUCOSE, POC    Collection Time: 06/14/20  4:59 PM   Result Value Ref Range    Glucose (POC) 141 (H) 65 - 100 mg/dL   GLUCOSE, POC Collection Time: 06/14/20  8:19 PM   Result Value Ref Range    Glucose (POC) 142 (H) 65 - 100 mg/dL   GLUCOSE, POC    Collection Time: 06/15/20  7:07 AM   Result Value Ref Range    Glucose (POC) 142 (H) 65 - 100 mg/dL   GLUCOSE, POC    Collection Time: 06/15/20 11:15 AM   Result Value Ref Range    Glucose (POC) 152 (H) 65 - 100 mg/dL   GLUCOSE, POC    Collection Time: 06/15/20  3:51 PM   Result Value Ref Range    Glucose (POC) 182 (H) 65 - 100 mg/dL   GLUCOSE, POC    Collection Time: 06/15/20 10:15 PM   Result Value Ref Range    Glucose (POC) 167 (H) 65 - 100 mg/dL   URINALYSIS W/ RFLX MICROSCOPIC    Collection Time: 06/16/20  5:57 AM   Result Value Ref Range    Color YELLOW      Appearance CLOUDY      Specific gravity 1.022 1.001 - 1.023      pH (UA) 5.5 5.0 - 9.0      Protein 30 (A) NEG mg/dL    Glucose Negative mg/dL    Ketone Negative NEG mg/dL    Bilirubin Negative NEG      Blood LARGE (A) NEG      Urobilinogen 1.0 0.2 - 1.0 EU/dL    Nitrites Negative NEG      Leukocyte Esterase MODERATE (A) NEG      WBC 5-10 0 /hpf    RBC 20-50 0 /hpf    Epithelial cells 0-3 0 /hpf    Bacteria 3+ (H) 0 /hpf    Other observations moderate amount of sperm present    CULTURE, URINE    Collection Time: 06/16/20  5:58 AM   Result Value Ref Range    Special Requests: NO SPECIAL REQUESTS      Culture result: (A)       >100,000 COLONIES/mL GRAM NEGATIVE RODS IDENTIFICATION AND SUSCEPTIBILITY TO FOLLOW   GLUCOSE, POC    Collection Time: 06/16/20  7:50 AM   Result Value Ref Range    Glucose (POC) 147 (H) 65 - 100 mg/dL   GLUCOSE, POC    Collection Time: 06/16/20 11:32 AM   Result Value Ref Range    Glucose (POC) 133 (H) 65 - 100 mg/dL   GLUCOSE, POC    Collection Time: 06/16/20  3:44 PM   Result Value Ref Range    Glucose (POC) 135 (H) 65 - 100 mg/dL   GLUCOSE, POC    Collection Time: 06/16/20  8:53 PM   Result Value Ref Range    Glucose (POC) 188 (H) 65 - 100 mg/dL       Assessment:     Problem List as of 6/17/2020 Date Reviewed: 5/1/2020          Codes Class Noted - Resolved    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 ICD-10-CM: I63.9  ICD-9-CM: 434.91  6/14/2020 - Present        Urinary retention ICD-10-CM: R33.9  ICD-9-CM: 788.20  6/8/2020 - Present        Stroke (cerebrum) ([facility] ICD-10-CM: I63.9  ICD-9-CM: 434.91  6/8/2020 - Present        Severe obesity ([facility] ICD-10-CM: E66.01  ICD-9-CM: 278.01  7/2/2019 - Present        Right thalamic infarction [facility]) ICD-10-CM: I63.9  ICD-9-CM: 434.91  7/2/2019 - Present        Numbness and tingling in left hand ICD-10-CM: R20.0, R20.2  ICD-9-CM: 782.0  7/2/2019 - Present        Controlled type 2 diabetes mellitus without complication, without long-term current use of insulin ([facility] ICD-10-CM: E11.9  ICD-9-CM: 250.00  2/22/2019 - Present        Cerebrovascular accident (CVA) due to occlusion [facility]) ICD-10-CM: I63.9  ICD-9-CM: 434.91  2/12/2019 - Present        Essential hypertension, benign ICD-10-CM: I10  ICD-9-CM: 401.1  1/3/2014 - Present        Mixed hyperlipidemia ICD-10-CM: E78.2  ICD-9-CM: 272.2  1/3/2014 - Present                     Plan / Recommendations / Medical Decision Making:      Continue daily physician / PA medical management:     New ischemic stroke, right cerebellar hemisphere, right dorsolateral medulla; secondary stroke prevention - DAPT, high-intensity statin (Lipitor), BP management  -dizziness, nausea, intermittent diplopia; prn meclizine     Hypertension / hypotension - patient on doxazosin 8mg twice daily from PCP, now additionally on amlodipine 5mg. /63, P 75 on admission here; patient gives pretty clear history of brief orthostatic hypotension symptoms when sitting from supine prior to meals. Staff to perform OH maneuvers, may hold amlodipine.  -6/17 bp 142/69; no orthostatics; cont norvasc and cardura        Diabetes mellitus - uncontrolled / poor glycemic control, HgbA1c 7.8% (6/9/2020). Will require daily, close FSG monitoring and medication adjustment to optimize glycemic control in setting of acute illness and hospitalization. POC blood glucose around 130s-150s. Currently on Humalog SSI; will monitor and manage.  -6/15 fair control at this time. Consider low dose antihyperglycemic  -6/16 bs 147 this a.m., 167 at bedtime, 182 at supper, 152 at lunch; start glucotrol 2.5 mg  -6/17 124 this a.m; 188 at bedtime, 135 with supper, 133 with lunch; cont Glucotrol 2.5 mg, increase to 5mg if bs not consistently below 120-130     Urinary retention - cochran catheter in place since ED 6/8; Urologist suspects urinary retention part of stroke syndrome. Catheter remains until 6/16, at which time it will be removed for voiding trial.  -pt on Cardura 8mg. Check UA; lg blood, lg leuk; pt didn't have micro done. Asymptomatic  Cochran discontinued this a.m; await results  -6/17 cochran replaced due to hematuria and inability to void. Small amt of hematuria after one CIC; when cochran replaced; zainab blood, continues this a.m. lg amt of blood. Check CBC, irrigated without improvement, Urology Consulted. ? Continuous irrigation/3-way cochran. ?cysto; hold plavix and Lovenox. No hx of retention or hematuria.; +>100K gram neg rods s/p one dose Rocephing 1g IV, on septra, day 2; await ID/sens.      Hyperlipidemia - on high-intensity statin.     Electrolyte abnormality - previous hypoK+, current BMP normal; monitor.     Pain control - stable, mild-to-moderate joint symptoms intermittently, reasonably well controlled by PRN meds. Will require regular pain assessment and comprenhensive pain management.     Bowel program - at risk for constipation due to lack of mobility. MiraLAX daily for regularity, Meghan-Colace for stool softener prn. PRN MOM, bisacodyl suppository or tablets for constipation.  -6/15 normally has bm every other day.  Will add daily senna.   -6/16 good results     Depression - no previous history; at risk of post-stroke depression. Monitor.     Pneumonia prophylaxis - incentive spirometer every hour while awake.     Newly diagnosed NELL; pt sets alarm off if not supine. If he rolls side to side , he desats. CXR shows atelectasis; enc IS  -6/16 not tolerating CPAP; O2 NC at noc at 1L, sats 91-98%  -6/17 weaned off O2 during day yesterday. Will get nocturnal pulse ox study closer to dc. Reports no hx of NELL , wife reported no snoring but breaths \"heavy\"     DVT risk / DVT prophylaxis - will require daily physician / PA exam to assess for signs and symptoms as patient is at increased risk for of thromboembolism. Mobilize as tolerated. Sequential pneumatic compression devices (SCDs) when in bed; thigh-high or knee-high thromboembolic deterrent hose when out of bed. On DAPT; Lovenox subcutaneously daily.   -6/17 hold Lovenox due to zainab hematuria     Wound care - monitor general skin wound status daily per staff and physician / PA. At risk for failure. Will require 24/7 rehab nursing. No current wounds.     GERD - at times may need antacids, Maalox prn.           Time spent was 35 minutes with over 1/2 in direct patient care/examination, consultation and coordination of care.      Signed By: Nori Montez MD     June 17, 2020

## 2020-06-17 NOTE — PROGRESS NOTES
PHYSICAL THERAPY DAILY NOTE  Time In: 0831  Time Out: 0063  Patient Seen For: AM;Patient education; Therapeutic exercise    Subjective: Pt reports he's unable to tell if he's in pain or not. Says he's tired because he did not get any sleep last night. Objective: Other (comment)(fall risk) 2 L oxygen via nasal cannula    Pt presents with catheter bag filled with hematuria. RN reports having to irrigate pt every so often due to coagulation of blood. COGNITION Daily Assessment    Pt is alert and oriented and following commands appropriately >90% of the time. Pt communicates verbally. Pt has a pleasant affect. Pt is willing to participate in therapy. Pt demonstrates the need for min-mod cueing for proper performance during exercises. BED/MAT MOBILITY Daily Assessment    Rolling Right : 0 (Not tested)  Rolling Left : 0 (Not tested)  Supine to Sit : 0 (Not tested)  Sit to Supine : 0 (Not tested)       TRANSFERS Daily Assessment            GAIT Daily Assessment    Amount of Assistance: 0 (Not tested)  Distance (ft): 0 Feet (ft)       STEPS or STAIRS Daily Assessment    Steps/Stairs Ambulated (#): 0  Level of Assist : 0 (Not tested)       BALANCE Daily Assessment    Sitting - Static: Not tested  Sitting - Dynamic: Not tested  Standing - Static: Not tested       [facility] MOBILITY Daily Assessment    Able to Propel (ft): 0 feet  Curbs/Ramps Assist Required (FIM Score): 0 (Not tested)  Wheelchair Setup Assist Required : 5 (Supervision/setup)     Therapeutic Interventions: Ankle pumps 2 x 20 B  Isometric hip ADD 3 x 10 x 3 sec  Glute sets 2 x 15 x 3 sec  SAQ 3 x 10 B    /73   Pulse 71   Temp 98 °F (36.7 °C)   Resp 16   Ht 5' 9\" (1.753 m)   Wt 123.3 kg (271 lb 12.8 oz)   SpO2 96%   BMI 40.14 kg/m²      Pain level: no complaints  Pain location: N/A  Pain interventions: N/A    Patient education: Pt educated on how to perform exercises with appropriate technique.     Interdisciplinary Communication: Collaborated with RN and MD regarding pt's hematuria     Pt left in bed with call bell and needs in reach. Pt in NAD. Assessment: Pt tolerated therapy fairly today. Pt demonstrates good performance of exercises this morning; however, he reports feeling \"drained and tired\". Pt was scheduled for back-to-back sessions this AM; however, pt voiced he would not be able to tolerate due to fatigue. Pt's RN reported speaking with urology about a consult for the pt. Pt would benefit from skilled physical therapy to maximize independence, increase activity tolerance, and improve functional mobility. Plan of Care: Continue with POC and progress as tolerated.       Maritza Wu, PT  6/17/2020

## 2020-06-17 NOTE — PROGRESS NOTES
06/17/20 1147   Time Spent With Patient   Time In 1117   Time Out 1146   Patient Seen For: AM   Mental Status   Neurologic State Alert   Cognition Memory loss;Decreased attention/concentration;Poor safety awareness   Perseveration Perseverates during conversation   Safety/Judgement Home safety; Fall prevention        06/17/20 1147   Neuro-Linguistic Exercises   Verbal Problem Solving Impaired; 60% accuracy with functional money management with decreased attention and recall of instructions   Memory Impaired; required increased time to recall names of familiar staff   Attention  Impaired; calendar activity   Decreased attention and immediate recall to determine availability and what/where to write verbal instructions related to appointments/plans     Patient participated with cognitive therapy. Reports feeling tired with very little sleep last night and becoming nauseated earlier today but agreeable to therapy while in the bed. Completed functional money management at 60% accuracy with decreased attention and recall of instructions. Decreased insight into extent of difficulty with the task. Discussed cognitive processed impacted by CVA related to attention and short-term memory and how this will impact ability to complete ADLs at home. Calendar activity to determine availability and immediate recall to determine what/where to write information completed with 80% accuracy with perseveration on previous information. Recommend continued therapy to address attention, problem solving, and memory.     Adwoa Yang MS, CCC-SLP

## 2020-06-17 NOTE — CONSULTS
Urology Consult    Subjective:     Date of Consultation:  June 17, 2020    Referring Physician: Salbador Dick MD    Reason for Consultation:  Urinary retention/gross hematuria    History of Present Illness:   Marissa Silva is a 69 y.o.male seen 6/9 in consultation by Dr. Jack Schwartz for urinary retention after CVA. No past urological hx. On cardura for BP. Plan was to keep cochran until 6/16. Cochran removed yesterday and pt unable to void. I&O cath done and upon catheter removal, blood was noted in tubing. Pt still unable to void and indwelling cochran placed with no resistance and no immediate bleeding. Shortly after, urine became blood-tinged (dark blood/clots). Nurses have been irrigating catheter with return of dark clots. Pt has had 900 ml UOP. Plavix/asa currently held. 22F Jeaneth hematuria catheter placed with no difficulty, catheter irrigated with return of dark clots, urine now flowing and is light pink. CBI started at medium rate. Past Medical History:   Diagnosis Date    Essential hypertension, benign 1/3/2014    Mixed hyperlipidemia 1/3/2014    Numbness and tingling in left hand 7/2/2019    Right thalamic infarction ([address] 7/2/2019      No past surgical history on file. No family history on file. Social History     Tobacco Use    Smoking status: Never Smoker    Smokeless tobacco: Never Used   Substance Use Topics    Alcohol use: Not on file     No Known Allergies   Prior to Admission medications    Medication Sig Start Date End Date Taking? Authorizing Provider   atorvastatin (LIPITOR) 80 mg tablet Take 1 Tab by mouth nightly for 30 days. 6/12/20 7/12/20 Yes Jose Perez MD   amLODIPine (NORVASC) 5 mg tablet Take 1 Tab by mouth daily for 30 days. 6/13/20 7/13/20 Yes Jose Perez MD   aspirin 81 mg chewable tablet Take 1 Tab by mouth daily for 30 days. 6/13/20 7/13/20 Yes Jose Perez MD   clopidogreL (PLAVIX) 75 mg tab Take 1 Tab by mouth daily for 30 days.  6/13/20 20 Yes Ala, Mardell Hammans, MD   insulin lispro (HUMALOG) 100 unit/mL injection INITIATE INSULIN CORRECTIVE PROTOCOL:  Normal Insulin Sensitivity   For Blood Sugar (mg/dL) of:     Less than 150 =   0 units           150 -199 =   2 units  200 -249 =   4 units  250 -299 =   6 units  300 -349 =   8 units  350 and above = 10 units and Call Physician     Initiate Hypoglycemia protocol if blood glucose is <70 mg/dL Fast Acting - Administer Immediately - or within 15 minutes of start of meal, if mealtime coverage. 20  Yes Ala, Mardell Hammans, MD   doxazosin (CARDURA) 8 mg tablet Take 1 Tab by mouth two (2) times a day. 10/10/19  Yes Alex Partida MD   mupirocin (BACTROBAN) 2 % ointment Apply  to affected area daily. 1/15/20   Sharifa Orantes MD   loratadine (CLARITIN) 10 mg tablet Take 10 mg by mouth daily. Provider, Historical         Review of Systems:  A comprehensive review of systems was negative except for that written in the HPI. Objective:     Patient Vitals for the past 8 hrs:   BP Temp Pulse Resp SpO2   20 0808 129/73 98 °F (36.7 °C) 71 16 96 %     Temp (24hrs), Av °F (36.7 °C), Min:97.7 °F (36.5 °C), Max:98.4 °F (36.9 °C)      Intake and Output:   06/15 1901 -  0700  In: 411 [P.O.:720]  Out: 2479 [Urine:3175]    Physical Exam:            General:    alert, cooperative, no distress                     Skin:  no rash or abnormalities                HEENT:  PERRLA        Throat/Neck:  neck supple                     Lungs:  clear to auscultation bilaterally      Cardiovascular:  RRR, S1 S2             Abdomen[de-identified]  soft, non-tender             :  cochran in place, urine clear/pink, penis uncircumcised           Extremities:  peripheral pulses 2+ and symmetric       Assessment:     Active Problems:    Urinary retention (2020)      Dorsolateral medullary syndrome (2020)      Cerebellar stroke ([facility] [address] (2020)      Gross hematuria/urinary retention    Plan:     Continue CBI.   Titrate drip to keep clear and manually irrigate as needed for clot retention. Will observe urine color overnight. Pt will need cystoscopy in the future. Thank you for the opportunity to assist in the care of this patient.             Signed By: Brigette Folwer NP

## 2020-06-17 NOTE — PROGRESS NOTES
OT Daily Note  Time In 1359   Time Out 1423     Subjective: \"I felt pretty bad all morning. \"  Pain: indicated but not rated on pain scale  Education: visual perceptual strategies  Interdisciplinary Communication: with RN regarding cochran check  Precautions: fall risk  Patient Vitals for the past 12 hrs:   Temp Pulse Resp BP SpO2   06/17/20 0808 98 °F (36.7 °C) 71 16 129/73 96 %         Visual-Perceptual Daily Assessment   Pt completed remainder of visual perceptual task utilizing [employer] Pt required only 3 verbal cues throughout task for appropriate orientation of shapes. Pt with improved perceptual, problem solving, and alertness during task. Pt noting that this morning he felt very groggy. Feels better after lunch but upon attempting to sit EOB, noting severe dizziness and transitioned to sitting with HOB elevated to ~60 degrees. Pt completed task with improved perception and did not require task broken down into 1-step actions. Assessment: Continued fatigue and dizziness upon change in position, however, improved problem solving and perception during visual integration task. Plan: Continue OT POC with focus on ADL/IADL skills, functional transfers, functional mobility, coordination, strength, static and dynamic balance, and activity tolerance to maximize safety and independence with ADLs and functional transfers.      JEEVAN Olivia/LUPE  6/17/2020

## 2020-06-17 NOTE — PROGRESS NOTES
Problem: Diabetes Self-Management  Goal: *Disease process and treatment process  Description: Define diabetes and identify own type of diabetes; list 3 options for treating diabetes. Outcome: Progressing Towards Goal  Goal: *Incorporating nutritional management into lifestyle  Description: Describe effect of type, amount and timing of food on blood glucose; list 3 methods for planning meals. Outcome: Progressing Towards Goal  Goal: *Incorporating physical activity into lifestyle  Description: State effect of exercise on blood glucose levels. Outcome: Progressing Towards Goal  Goal: *Developing strategies to promote health/change behavior  Description: Define the ABC's of diabetes; identify appropriate screenings, schedule and personal plan for screenings. Outcome: Progressing Towards Goal  Goal: *Using medications safely  Description: State effect of diabetes medications on diabetes; name diabetes medication taking, action and side effects. Outcome: Progressing Towards Goal  Goal: *Monitoring blood glucose, interpreting and using results  Description: Identify recommended blood glucose targets  and personal targets. Outcome: Progressing Towards Goal  Goal: *Prevention, detection, treatment of acute complications  Description: List symptoms of hyper- and hypoglycemia; describe how to treat low blood sugar and actions for lowering  high blood glucose level. Outcome: Progressing Towards Goal  Goal: *Prevention, detection and treatment of chronic complications  Description: Define the natural course of diabetes and describe the relationship of blood glucose levels to long term complications of diabetes.   Outcome: Progressing Towards Goal  Goal: *Developing strategies to address psychosocial issues  Description: Describe feelings about living with diabetes; identify support needed and support network  Outcome: Progressing Towards Goal  Goal: *Sick day guidelines  Outcome: Progressing Towards Goal     Problem: Patient Education: Go to Patient Education Activity  Goal: Patient/Family Education  Outcome: Progressing Towards Goal     Problem: Falls - Risk of  Goal: *Absence of Falls  Description: Document Ananya Parmar Fall Risk and appropriate interventions in the flowsheet.   Outcome: Progressing Towards Goal  Note: Fall Risk Interventions:  Mobility Interventions: Patient to call before getting OOB         Medication Interventions: Patient to call before getting OOB    Elimination Interventions: Patient to call for help with toileting needs    History of Falls Interventions: Door open when patient unattended         Problem: Patient Education: Go to Patient Education Activity  Goal: Patient/Family Education  Outcome: Progressing Towards Goal     Problem: Inpatient Rehab (Adult)  Goal: *LTG: Avoids injury/falls 100% of time related to deficits  Outcome: Progressing Towards Goal  Goal: *LTG: Avoids infection 100% of time related to deficits  Outcome: Progressing Towards Goal  Goal: *LTG: Verbalize understanding of diagnosis and risk factors for recurring stroke  Outcome: Progressing Towards Goal  Goal: *LTG: Absence of DVT during hospitalization  Outcome: Progressing Towards Goal  Goal: *LTG: Maintains Skin Integrity With No Evidence of Pressure Injury 100% of Time  Outcome: Progressing Towards Goal  Goal: Interventions  Outcome: Progressing Towards Goal     Problem: Patient Education: Go to Patient Education Activity  Goal: Patient/Family Education  Outcome: Progressing Towards Goal     Problem: Infection - Risk of, Urinary Catheter-Associated Urinary Tract Infection  Goal: *Absence of infection signs and symptoms  Outcome: Progressing Towards Goal

## 2020-06-18 LAB
BACTERIA SPEC CULT: ABNORMAL
GLUCOSE BLD STRIP.AUTO-MCNC: 120 MG/DL (ref 65–100)
GLUCOSE BLD STRIP.AUTO-MCNC: 151 MG/DL (ref 65–100)
GLUCOSE BLD STRIP.AUTO-MCNC: 169 MG/DL (ref 65–100)
GLUCOSE BLD STRIP.AUTO-MCNC: 174 MG/DL (ref 65–100)
HCT VFR BLD AUTO: 38.7 % (ref 41.1–50.3)
HGB BLD-MCNC: 12.1 G/DL (ref 13.6–17.2)
SERVICE CMNT-IMP: ABNORMAL

## 2020-06-18 PROCEDURE — 82962 GLUCOSE BLOOD TEST: CPT

## 2020-06-18 PROCEDURE — 97110 THERAPEUTIC EXERCISES: CPT

## 2020-06-18 PROCEDURE — 74011250636 HC RX REV CODE- 250/636: Performed by: PHYSICAL MEDICINE & REHABILITATION

## 2020-06-18 PROCEDURE — 74011250637 HC RX REV CODE- 250/637: Performed by: PHYSICAL MEDICINE & REHABILITATION

## 2020-06-18 PROCEDURE — 97535 SELF CARE MNGMENT TRAINING: CPT

## 2020-06-18 PROCEDURE — 74011250637 HC RX REV CODE- 250/637: Performed by: PHYSICIAN ASSISTANT

## 2020-06-18 PROCEDURE — 77030018836 HC SOL IRR NACL ICUM -A

## 2020-06-18 PROCEDURE — 97530 THERAPEUTIC ACTIVITIES: CPT

## 2020-06-18 PROCEDURE — 92507 TX SP LANG VOICE COMM INDIV: CPT

## 2020-06-18 PROCEDURE — 85018 HEMOGLOBIN: CPT

## 2020-06-18 PROCEDURE — 99232 SBSQ HOSP IP/OBS MODERATE 35: CPT | Performed by: PHYSICAL MEDICINE & REHABILITATION

## 2020-06-18 PROCEDURE — 65310000000 HC RM PRIVATE REHAB

## 2020-06-18 PROCEDURE — 36415 COLL VENOUS BLD VENIPUNCTURE: CPT

## 2020-06-18 PROCEDURE — 74011636637 HC RX REV CODE- 636/637: Performed by: PHYSICIAN ASSISTANT

## 2020-06-18 RX ADMIN — AMLODIPINE BESYLATE 5 MG: 5 TABLET ORAL at 08:10

## 2020-06-18 RX ADMIN — LORATADINE 10 MG: 10 TABLET ORAL at 08:10

## 2020-06-18 RX ADMIN — INSULIN LISPRO 2 UNITS: 100 INJECTION, SOLUTION INTRAVENOUS; SUBCUTANEOUS at 12:41

## 2020-06-18 RX ADMIN — MECLIZINE HYDROCHLORIDE 25 MG: 25 TABLET ORAL at 08:10

## 2020-06-18 RX ADMIN — GLIPIZIDE 2.5 MG: 5 TABLET ORAL at 08:11

## 2020-06-18 RX ADMIN — ATORVASTATIN CALCIUM 80 MG: 80 TABLET, FILM COATED ORAL at 21:22

## 2020-06-18 RX ADMIN — DOXAZOSIN MESYLATE 8 MG: 4 TABLET ORAL at 21:22

## 2020-06-18 RX ADMIN — ASPIRIN 81 MG 81 MG: 81 TABLET ORAL at 08:11

## 2020-06-18 RX ADMIN — ACETAMINOPHEN 650 MG: 325 TABLET, FILM COATED ORAL at 21:22

## 2020-06-18 RX ADMIN — OXYMETAZOLINE HCL 2 SPRAY: 0.05 SPRAY NASAL at 22:00

## 2020-06-18 RX ADMIN — INSULIN LISPRO 2 UNITS: 100 INJECTION, SOLUTION INTRAVENOUS; SUBCUTANEOUS at 21:23

## 2020-06-18 RX ADMIN — SULFAMETHOXAZOLE AND TRIMETHOPRIM 1 TABLET: 800; 160 TABLET ORAL at 08:11

## 2020-06-18 RX ADMIN — SULFAMETHOXAZOLE AND TRIMETHOPRIM 1 TABLET: 800; 160 TABLET ORAL at 21:23

## 2020-06-18 RX ADMIN — DOXAZOSIN MESYLATE 8 MG: 4 TABLET ORAL at 08:11

## 2020-06-18 RX ADMIN — SENNOSIDES AND DOCUSATE SODIUM 1 TABLET: 8.6; 5 TABLET ORAL at 08:10

## 2020-06-18 RX ADMIN — INSULIN LISPRO 2 UNITS: 100 INJECTION, SOLUTION INTRAVENOUS; SUBCUTANEOUS at 17:36

## 2020-06-18 NOTE — PROGRESS NOTES
Milton Lazaro MD  Medical Director  [address] [facility], [address] [facility]  Tel: [phone]       [facility] PROGRESS NOTE    Jared Lanza  Admit Date: 6/14/2020  Admit Diagnosis:   Cerebellar stroke ([address] [I63.9]; Dorsolateral medullary syndrome [G46.4]; Urinary retention [R33.9]; Cerebellar stroke ([address] [I63.9]; Dorsolateral medullary syndrome [G46.4]; Urinary retention [R33.9]    Subjective     Patient seen and examined. Vss. No acute complaints. PT, OT well tolerated. Steady gains made. No new barrier to progress noted. Says he feels better today. Requiring 2L O2, especially at noc. Denies hx of NELL or snoring. Has CBI going.   Objective:     Current Facility-Administered Medications   Medication Dose Route Frequency    hydrocortisone (CORTAID) 1 % cream   Topical BID PRN    glipiZIDE (GLUCOTROL) tablet 2.5 mg  2.5 mg Oral ACB    trimethoprim-sulfamethoxazole (BACTRIM DS, SEPTRA DS) 160-800 mg per tablet 1 Tab  1 Tab Oral Q12H    lip protectant (BLISTEX) ointment 1 Each  1 Each Topical PRN    senna-docusate (PERICOLACE) 8.6-50 mg per tablet 1 Tab  1 Tab Oral DAILY    ondansetron (ZOFRAN ODT) tablet 4 mg  4 mg Oral Q6H PRN    meclizine (ANTIVERT) tablet 25 mg  25 mg Oral Q6H PRN    acetaminophen (TYLENOL) tablet 650 mg  650 mg Oral Q6H PRN    amLODIPine (NORVASC) tablet 5 mg  5 mg Oral DAILY    aspirin chewable tablet 81 mg  81 mg Oral DAILY    atorvastatin (LIPITOR) tablet 80 mg  80 mg Oral QHS    [Held by provider] clopidogreL (PLAVIX) tablet 75 mg  75 mg Oral DAILY    doxazosin (CARDURA) tablet 8 mg  8 mg Oral BID    [Held by provider] enoxaparin (LOVENOX) injection 40 mg  40 mg SubCUTAneous Q24H    insulin lispro (HUMALOG) injection   SubCUTAneous AC&HS    loratadine (CLARITIN) tablet 10 mg  10 mg Oral DAILY    sodium chloride (OCEAN) 0.65 % nasal squeeze bottle 2 Spray  2 Spray Both Nostrils Q2H PRN    pneumococcal 23-valent (PNEUMOVAX 23) injection 0.5 mL  0.5 mL IntraMUSCular PRIOR TO DISCHARGE    oxymetazoline (AFRIN) 0.05 % nasal spray 2 Spray  2 Spray Both Nostrils QHS    polyethylene glycol (MIRALAX) packet 17 g  17 g Oral DAILY    bisacodyL (DULCOLAX) tablet 5 mg  5 mg Oral DAILY PRN    Or    bisacodyL (DULCOLAX) suppository 10 mg  10 mg Rectal DAILY PRN    Or    magnesium hydroxide (MILK OF MAGNESIA) 400 mg/5 mL oral suspension 30 mL  30 mL Oral DAILY PRN       Review of Systems:   Denies chest pain, shortness of breath, cough, headache, visual problems, abdominal pain,calf pain. Pertinent positives are as noted in the HPI, ROS unremarkable otherwise. Visit Vitals  /89 (BP 1 Location: Right arm)   Pulse 79   Temp 98.1 °F (36.7 °C)   Resp 16   Ht 5' 9\" (1.753 m)   Wt 271 lb 12.8 oz (123.3 kg)   SpO2 94%   BMI 40.14 kg/m²        Physical Exam:   General: Alert and age appropriately oriented. No acute cardiorespiratory distress. HEENT: Normocephalic, no scleral icterus. Oral mucosa moist without cyanosis. Lungs: Clear to auscultation bilaterally. Dec bases  Respiration even and unlabored. Heart: Regular rate and rhythm, S1, S2. No murmurs, clicks, rub or gallops. Abdomen: Soft, non-tender, not distended. Bowel sounds normoactive. No organomegaly. obese   Genitourinary: Deferred. Neuromuscular:      LUE grossly 4/5, vs right 4+  LEs grossly 4- prox, intact distally  No drift or dysmetria  fcs well; dec attn, dec recall/memory   Skin/extremity: No rashes, no erythema. No calf tenderness B LE.   Trace edema                                                                              Functional Assessment:  Gross Assessment  AROM: Within functional limits (06/16/20 1100)  PROM: Within functional limits (06/16/20 1100)  Strength: Generally decreased, functional (06/16/20 1100)  Coordination: Generally decreased, functional (06/16/20 1100)       Balance  Sitting - Static: Not tested (06/17/20 1500)  Sitting - Dynamic: Not tested (06/17/20 1500)  Standing - Static: Not tested (06/17/20 1500)  Standing - Dynamic : Impaired (06/16/20 1500)                     Juan Ramon Madhu Fall Risk Assessment:  Juan Ramon Madhu Fall Risk  Mobility: Ambulates or transfers with assist devices or assistance (06/18/20 0700)  Mobility Interventions: Utilize walker, cane, or other assistive device (06/17/20 2009)  Mentation: Alert, oriented x 3 (06/17/20 2009)  Medication: Patient receiving anticonvulsants, sedatives(tranquilizers), psychotropics or hypnotics, hypoglycemics, narcotics, sleep aids, antihypertensives, laxatives, or diuretics (06/17/20 2009)  Medication Interventions: Teach patient to arise slowly (06/17/20 2009)  Elimination: Needs assistance with toileting (06/17/20 2009)  Elimination Interventions: Bed/chair exit alarm;Call light in reach (06/17/20 2009)  Prior Fall History: No (06/17/20 2009)  History of Falls Interventions: Door open when patient unattended (06/17/20 0800)  Total Score: 3 (06/17/20 2009)  High Fall Risk: Yes (06/17/20 2009)     Speech Assessment:         Ambulation:  Gait  Distance (ft): 0 Feet (ft) (06/17/20 1000)  Assistive Device: Gait belt;Walker, rolling (06/16/20 1100)     Labs/Studies:  Recent Results (from the past 72 hour(s))   GLUCOSE, POC    Collection Time: 06/15/20 11:15 AM   Result Value Ref Range    Glucose (POC) 152 (H) 65 - 100 mg/dL   GLUCOSE, POC    Collection Time: 06/15/20  3:51 PM   Result Value Ref Range    Glucose (POC) 182 (H) 65 - 100 mg/dL   GLUCOSE, POC    Collection Time: 06/15/20 10:15 PM   Result Value Ref Range    Glucose (POC) 167 (H) 65 - 100 mg/dL   URINALYSIS W/ RFLX MICROSCOPIC    Collection Time: 06/16/20  5:57 AM   Result Value Ref Range    Color YELLOW      Appearance CLOUDY      Specific gravity 1.022 1.001 - 1.023      pH (UA) 5.5 5.0 - 9.0      Protein 30 (A) NEG mg/dL    Glucose Negative mg/dL    Ketone Negative NEG mg/dL    Bilirubin Negative NEG      Blood LARGE (A) NEG      Urobilinogen 1.0 0.2 - 1.0 EU/dL    Nitrites Negative NEG      Leukocyte Esterase MODERATE (A) NEG      WBC 5-10 0 /hpf    RBC 20-50 0 /hpf    Epithelial cells 0-3 0 /hpf    Bacteria 3+ (H) 0 /hpf    Other observations moderate amount of sperm present    CULTURE, URINE    Collection Time: 06/16/20  5:58 AM   Result Value Ref Range    Special Requests: NO SPECIAL REQUESTS      Culture result: >100,000 COLONIES/mL ESCHERICHIA COLI (A)         Susceptibility    Escherichia coli - TRISTA     Trimeth-Sulfamethoxa <=0.5/9.5 Susceptible ug/mL     Ampicillin ($) <=2 Susceptible ug/mL     Gentamicin ($) <=1 Susceptible ug/mL     Tobramycin ($) 1 Susceptible ug/mL     Ampicillin/sulbactam ($) 4/2 Susceptible ug/mL     Cefazolin ($) 2 Susceptible ug/mL     Ceftriaxone ($) <=0.5 Susceptible ug/mL     Cefoxitin <=4 Susceptible ug/mL     Aztreonam ($$$$) <=1 Susceptible ug/mL     Cefepime ($$) <=0.5 Susceptible ug/mL     Piperacillin/Tazobac ($) <=2/4 Susceptible ug/mL     Nitrofurantoin <=16 Susceptible ug/mL   GLUCOSE, POC    Collection Time: 06/16/20  7:50 AM   Result Value Ref Range    Glucose (POC) 147 (H) 65 - 100 mg/dL   GLUCOSE, POC    Collection Time: 06/16/20 11:32 AM   Result Value Ref Range    Glucose (POC) 133 (H) 65 - 100 mg/dL   GLUCOSE, POC    Collection Time: 06/16/20  3:44 PM   Result Value Ref Range    Glucose (POC) 135 (H) 65 - 100 mg/dL   GLUCOSE, POC    Collection Time: 06/16/20  8:53 PM   Result Value Ref Range    Glucose (POC) 188 (H) 65 - 100 mg/dL   CBC W/O DIFF    Collection Time: 06/17/20  6:54 AM   Result Value Ref Range    WBC 8.3 4.3 - 11.1 K/uL    RBC 4.17 (L) 4.23 - 5.6 M/uL    HGB 12.8 (L) 13.6 - 17.2 g/dL    HCT 38.5 (L) 41.1 - 50.3 %    MCV 92.3 79.6 - 97.8 FL    MCH 30.7 26.1 - 32.9 PG    MCHC 33.2 31.4 - 35.0 g/dL    RDW 13.9 11.9 - 14.6 %    PLATELET 475 129 - 416 K/uL    MPV 11.6 9.4 - 12.3 FL    ABSOLUTE NRBC 0.00 0.0 - 0.2 K/uL   METABOLIC PANEL, BASIC    Collection Time: 06/17/20  6:54 AM   Result Value Ref Range    Sodium 143 136 - 145 mmol/L    Potassium 4.1 3.5 - 5.1 mmol/L    Chloride 108 (H) 98 - 107 mmol/L    CO2 26 21 - 32 mmol/L    Anion gap 9 7 - 16 mmol/L    Glucose 135 (H) 65 - 100 mg/dL    BUN 17 8 - 23 MG/DL    Creatinine 1.06 0.8 - 1.5 MG/DL    GFR est AA >60 >60 ml/min/1.73m2    GFR est non-AA >60 >60 ml/min/1.73m2    Calcium 8.0 (L) 8.3 - 10.4 MG/DL   GLUCOSE, POC    Collection Time: 06/17/20  7:06 AM   Result Value Ref Range    Glucose (POC) 124 (H) 65 - 100 mg/dL   GLUCOSE, POC    Collection Time: 06/17/20 11:06 AM   Result Value Ref Range    Glucose (POC) 93 65 - 100 mg/dL   GLUCOSE, POC    Collection Time: 06/17/20  4:19 PM   Result Value Ref Range    Glucose (POC) 200 (H) 65 - 100 mg/dL   GLUCOSE, POC    Collection Time: 06/17/20  8:22 PM   Result Value Ref Range    Glucose (POC) 135 (H) 65 - 100 mg/dL   HGB & HCT    Collection Time: 06/18/20  5:56 AM   Result Value Ref Range    HGB 12.1 (L) 13.6 - 17.2 g/dL    HCT 38.7 (L) 41.1 - 50.3 %   GLUCOSE, POC    Collection Time: 06/18/20  6:32 AM   Result Value Ref Range    Glucose (POC) 120 (H) 65 - 100 mg/dL       Assessment:     Problem List as of 6/18/2020 Date Reviewed: 5/1/2020          Codes Class Noted - Resolved    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  ICD-9-CM: 434.91  6/14/2020 - Present        Cerebellar stroke (HCC) ICD-10-CM: I63.9  ICD-9-CM: 434.91  6/14/2020 - Present        Urinary retention ICD-10-CM: R33.9  ICD-9-CM: 788.20  6/8/2020 - Present        Stroke (cerebrum) (HCC) ICD-10-CM: I63.9  ICD-9-CM: 434.91  6/8/2020 - Present        Severe obesity (HCC) ICD-10-CM: E66.01  ICD-9-CM: 278.01  7/2/2019 - Present        Right thalamic infarction [facility]) ICD-10-CM: I63.9  ICD-9-CM: 434.91  7/2/2019 - Present        Numbness and tingling in left hand ICD-10-CM: R20.0, R20.2  ICD-9-CM: 782.0  7/2/2019 - Present        Controlled type 2 diabetes mellitus without complication, without long-term current use of insulin ([facility] [address] ICD-10-CM: E11.9  ICD-9-CM: 250.00  2/22/2019 - Present        Cerebrovascular accident (CVA) due to occlusion [facility]) ICD-10-CM: I63.9  ICD-9-CM: 434.91  2/12/2019 - Present        Essential hypertension, benign ICD-10-CM: I10  ICD-9-CM: 401.1  1/3/2014 - Present        Mixed hyperlipidemia ICD-10-CM: E78.2  ICD-9-CM: 272.2  1/3/2014 - Present                    Plan / Recommendations / Medical Decision Making:      Continue daily physician / PA medical management:     New ischemic stroke, right cerebellar hemisphere, right dorsolateral medulla; secondary stroke prevention - DAPT, high-intensity statin (Lipitor), BP management  -dizziness, nausea, intermittent diplopia; prn meclizine     Hypertension / hypotension - patient on doxazosin 8mg twice daily from PCP, now additionally on amlodipine 5mg. /63, P 75 on admission here; patient gives pretty clear history of brief orthostatic hypotension symptoms when sitting from supine prior to meals. Staff to perform OH maneuvers, may hold amlodipine.  -6/17 bp 142/69; no orthostatics; cont norvasc and cardura  -6/18 bp 152/89 (129-165), no reported orthostatics; cont current meds        Diabetes mellitus - uncontrolled / poor glycemic control, HgbA1c 7.8% (6/9/2020). Will require daily, close FSG monitoring and medication adjustment to optimize glycemic control in setting of acute illness and hospitalization. POC blood glucose around 130s-150s. Currently on Humalog SSI; will monitor and manage.  -6/15 fair control at this time.  Consider low dose antihyperglycemic  -6/16 bs 147 this a.m., 167 at bedtime, 182 at supper, 152 at lunch; start glucotrol 2.5 mg  -6/17 124 this a.m; 188 at bedtime, 135 with supper, 133 with lunch; cont Glucotrol 2.5 mg, increase to 5mg if bs not consistently below 120-130  -6/18 bs 120 this a.m, 135 last pm, 200 at supper, 93 at lunch, still not consistent     Urinary retention - cochran catheter in place since ED 6/8; Urologist suspects urinary retention part of stroke syndrome. Catheter remains until 6/16, at which time it will be removed for voiding trial.  -pt on Cardura 8mg. Check UA; lg blood, lg leuk; pt didn't have micro done. Asymptomatic  Cochran discontinued this a.m; await results  -6/17 cochran replaced due to hematuria and inability to void. Small amt of hematuria after one CIC; when cochran replaced; zainab blood, continues this a.m. lg amt of blood. Check CBC, irrigated without improvement, Urology Consulted. ? Continuous irrigation/3-way cochran. ?cysto; hold plavix and Lovenox. No hx of retention or hematuria.; +>100K gram neg rods s/p one dose Rocephing 1g IV, on septra, day 2; await ID/sens.   -E coli UTI; sensitive to Septra, day 3/7 . CIB per Urology. Appreciate assistance. hgb down one gram but stable 12.1; urine is getting pinker ; no clots noted in bag. continue     Hyperlipidemia - on high-intensity statin.     Electrolyte abnormality - previous hypoK+, current BMP normal; monitor.     Pain control - stable, mild-to-moderate joint symptoms intermittently, reasonably well controlled by PRN meds. Will require regular pain assessment and comprenhensive pain management.     Bowel program - at risk for constipation due to lack of mobility. MiraLAX daily for regularity, Meghan-Colace for stool softener prn. PRN MOM, bisacodyl suppository or tablets for constipation.  -6/15 normally has bm every other day. Will add daily senna.   -6/16 good results  -no complaints     Depression - no previous history; at risk of post-stroke depression. Monitor.     Pneumonia prophylaxis - incentive spirometer every hour while awake.     Newly diagnosed NELL; pt sets alarm off if not supine. If he rolls side to side , he desats. CXR shows atelectasis; enc IS  -6/16 not tolerating CPAP; O2 NC at noc at 1L, sats 91-98%  -6/17 weaned off O2 during day yesterday. Will get nocturnal pulse ox study closer to dc.  Reports no hx of NELL , wife reported no snoring but breaths \"heavy\"     DVT risk / DVT prophylaxis - will require daily physician / PA exam to assess for signs and symptoms as patient is at increased risk for of thromboembolism. Mobilize as tolerated. Sequential pneumatic compression devices (SCDs) when in bed; thigh-high or knee-high thromboembolic deterrent hose when out of bed. On DAPT; Lovenox subcutaneously daily.   -6/17 hold Lovenox due to zainab hematuria     Wound care - monitor general skin wound status daily per staff and physician / PA. At risk for failure. Will require 24/7 rehab nursing. No current wounds.     GERD - at times may need antacids, Maalox prn.                    Time spent was 25 minutes with over 1/2 in direct patient care/examination, consultation and coordination of care.      Signed By: Parris Zepeda MD     June 18, 2020

## 2020-06-18 NOTE — PROGRESS NOTES
Problem: Falls - Risk of  Goal: *Absence of Falls  Description: Document Donata Carrel Fall Risk and appropriate interventions in the flowsheet.   Outcome: Progressing Towards Goal  Note: Fall Risk Interventions:  Mobility Interventions: Utilize walker, cane, or other assistive device         Medication Interventions: Teach patient to arise slowly    Elimination Interventions: Bed/chair exit alarm, Call light in reach    History of Falls Interventions: Door open when patient unattended         Problem: Patient Education: Go to Patient Education Activity  Goal: Patient/Family Education  Outcome: Progressing Towards Goal

## 2020-06-18 NOTE — PROGRESS NOTES
The patient's urine is currently clear on minimal CBI but the nurses had to irrigate out a few clots earlier today. We will continue CBI another 24 hours.

## 2020-06-18 NOTE — PROGRESS NOTES
PHYSICAL THERAPY DAILY NOTE  Time In: 1033  Time Out: 4910  Patient Seen For: AM;Patient education; Therapeutic exercise;Transfer training    Subjective: Pt reports he's been feeling dizzy and that his RN gave him meds around 8:00 this morning. Pt states that he wishes he could do more, but says he doesn't think he can leave the bedside at this time. Objective: Other (comment)(fall risk)    COGNITION Daily Assessment    Pt is alert and oriented and following commands appropriately >80% of the time. Pt communicates verbally. Pt has a pleasant affect. Pt is willing to participate in therapy. Pt demonstrates decreased safety awareness with transfers at times. BED/MAT MOBILITY Daily Assessment    Rolling Right : 0 (Not tested)  Rolling Left : 0 (Not tested)  Supine to Sit : 4 (Minimal assistance)  Sit to Supine : 5 (Supervision)       TRANSFERS Daily Assessment   Improved to CGA from min A with sit <> stand at RW    Sit <> stand with RW x 3 Transfer Assistance : 0 (Not tested)  Sit to Stand Assistance: Contact guard assistance  Car Transfers: Not tested       GAIT Daily Assessment    Amount of Assistance: 0 (Not tested)       STEPS or STAIRS Daily Assessment    Level of Assist : 0 (Not tested)       BALANCE Daily Assessment    Sitting - Static: Good (unsupported)  Sitting - Dynamic: Fair (occasional)  Standing - Static: Fair  Standing - Dynamic : Impaired       WHEELCHAIR MOBILITY Daily Assessment    Curbs/Ramps Assist Required (FIM Score): 0 (Not tested)  Wheelchair Setup Assist Required : 0 (Not tested)     Therapeutic Interventions:  Sit / stand EOB x 3  Glute sets 2 x 10 x 3  Iso hip ADD 2 x 12 x 3  Assessing BP / catheter blockage    /52 (BP 1 Location: Right arm, BP Patient Position: Sitting; Other (comment)) Comment (BP Patient Position): EOB  Pulse 79   Temp 98.1 °F (36.7 °C)   Resp 16   Ht 5' 9\" (1.753 m)   Wt 123.3 kg (271 lb 12.8 oz)   SpO2 94%   BMI 40.14 kg/m²      Extended / Orthostatic Vitals:  Patient Position 2: Standing  BP 2: 115/66  Pulse 2: 75    Pain level: mild complaints of burning sensation  Pain location: genitals  Pain interventions: called in RN to assess catheter    Patient education: Pt educated on potential for OH to be reason of his dizziness. Educated on how to perform exercises with appropriate technique. Interdisciplinary Communication: Collaborated with RN regarding pt's status and blocked catheter drain     Pt left in bed with call bell and needs in reach. Pt in NAD. Assessment: Pt tolerated therapy fairly today. Pt reports significant bouts of dizziness when transferring to sitting and standing EOB. Pt did not exhibit OH after assessing BP with changes in position, but continued to report 8/10 dizziness when transitioning to standing and 4/10 when sitting EOB. Pt reported a \"burning sensation\" near the beginning of the session, which was likely due to pt's blocked catheter drain. PT called in RN to assess situation and was able to flush catheter tubing. Around 10 min later, the catheter tube was blocked again, which PT called in RN a second time to assist. After flushing, pt stated the burning sensation was relieved. Pt states that he wishes he could do more during the therapy sessions and hates that he's sidelined in the bed. PT will discuss with MD/PA about pt's recurrent bouts of dizziness. Pt would benefit from skilled physical therapy to maximize independence, increase activity tolerance, and improve functional mobility. Plan of Care: Continue with POC and progress as tolerated.       Lonny Leyva, PT  6/18/2020

## 2020-06-18 NOTE — PROGRESS NOTES
Time In 0743   Time Out 0832     Mobility   Score Comments   Rolling to Right 4: Supervision or touching A S   Rolling to Left 4: Supervision or touching A S     Activities of Daily Living    Score Comments   Eating 6: Independent    Oral [place]ig[place] 5: S/U or clean-up assist    Bathing 4: Supervision or touching A Type of Shower: Bath Pack  Position: Supine and sidelying   Adaptive  Equipment: Long Handled Sponge  Comments: Assist to wash bottom in sidelying. Able to reach B feet using LHS   Upper Body  Dressing 5: S/U or clean-up assist Items Applied: Pullover  Position: semi fowlers  Comments:    Lower Body Dressing Not Tested: Not attempted due to environmental concerns: Equipment Items Applied: NA  Position: Supine  Adaptive Equipment: N/A  Comments:    [place] Footwear 1: Dependent Items Applied: Socks  Adaptive Equipment: N/A  Comments:    Education  ADL training from bed       S: \"I feel a little better today than I did yesterday. \" Agreeable to therapy. Patient completed ADLs with quality indicators reflected in the chart above. RN drained cochran with details recorded in flowsheet. Pain not expressed. Pt reporting increased dizziness when sitting upright in bed. RN provided meds. Patient Vitals for the past 12 hrs:   Temp Pulse Resp BP SpO2   06/18/20 0727 98.1 °F (36.7 °C) 79 16 152/89 94 %       Collaborated with RN regarding patient performance and POC. Patient tolerated session well, but activity tolerance, balance, functional mobility, strength, coordination, cognition are still below baseline and require skilled facilitation to successfully and safely complete ADLs and transfers. Patient ended session in bed with call remote and phone within reach.        Antonio Mcdermott OTR/LUPE  6/18/2020

## 2020-06-18 NOTE — PROGRESS NOTES
Slept at long intervals. Mena with pink tinged urine. Some clots noted. CBI slow drip. O2 @ 2L NC. Antivert given for dizziness. No c/o of pain.

## 2020-06-18 NOTE — PROGRESS NOTES
PHYSICAL THERAPY DAILY NOTE  Time In: 2184  Time Out: 1512  Patient Seen For: PM;Patient education; Therapeutic exercise    Subjective: Pt agreeable to therapy this afternoon. Says he's been having discomfort due to the catheter. Objective: Other (comment)(fall risk)    COGNITION Daily Assessment    Pt is alert and oriented and following commands appropriately 100% of the time. Pt communicates verbally. Pt has a pleasant affect. Pt is willing to participate in therapy. BED/MAT MOBILITY Daily Assessment    Rolling Right : 0 (Not tested)  Rolling Left : 0 (Not tested)  Supine to Sit : 0 (Not tested)  Sit to Supine : 5 (Supervision)       TRANSFERS Daily Assessment   Increased time & effort to complete with cues needed at times for safety Transfer Type: SPT without device  Transfer Assistance : 4 (Minimal assistance)  Sit to Stand Assistance: Contact guard assistance  Car Transfers: Not tested       GAIT Daily Assessment    Amount of Assistance: 0 (Not tested)       STEPS or STAIRS Daily Assessment    Level of Assist : 0 (Not tested)       BALANCE Daily Assessment    Sitting - Static: Good (unsupported)  Sitting - Dynamic: Fair (occasional)  Standing - Static: Fair  Standing - Dynamic : Impaired       WHEELCHAIR MOBILITY Daily Assessment    Curbs/Ramps Assist Required (FIM Score): 0 (Not tested)  Wheelchair Setup Assist Required : 0 (Not tested)     Therapeutic Interventions:  Heel raises x 20  Toe raises x 20  Seated marches 2 x 10 B 1.5#  LAQ 1.5# 2 x 10 B  HS curls GTB 2 x 10 B  Attempted [employer], but could not tolerate due to pain    /52 (BP 1 Location: Right arm, BP Patient Position: Sitting; Other (comment)) Comment (BP Patient Position): EOB  Pulse 79   Temp 98.1 °F (36.7 °C)   Resp 16   Ht 5' 9\" (1.753 m)   Wt 123.3 kg (271 lb 12.8 oz)   SpO2 94%   BMI 40.14 kg/m²      Pain level: 8-9/10  Pain location: genitals (with movement due to catheter)  Pain interventions: changed activity/position; discussed with RN    Patient education: Pt educated on how to perform exercises with appropriate technique. Interdisciplinary Communication: Collaborated with RN regarding pt's complaints of pain; spoke with MD about pt's dizziness     Pt left in bed with call bell and needs in reach. Pt in NAD and pt's wife & RN present in room. Assessment: Pt tolerated therapy fairly today. Pt was not able to tolerate several exercises this PM due to significant discomfort with the catheter insert with movement. Pt also reported moderate amounts of dizziness while seated in his WC without changing positions, so pt was brought back to room to lie supine in the bed to assess if the position changed aided with dizziness. PT spoke with RN and MD about pt's dizziness, as well as pt's discomfort with the catheter. Pt would benefit from skilled physical therapy to maximize independence, increase activity tolerance, and improve functional mobility. Plan of Care: Continue with POC and progress as tolerated.       Russ Madrid, PT  6/18/2020

## 2020-06-18 NOTE — PROGRESS NOTES
OT Daily Note  Time In 1340   Time Out 1430     Pain: Patient had no complaint of pain. Functional Mobility      Score Comments   Supine to Sit 4: Supervision or touching A S   Sit to Stand 4: Supervision or touching A CGA   Transfer Assist 4: Supervision or touching A Transfer Type: SPT   Equipment: N/A   Comments: CGA        Self-Care   Pt donned shirt with S. Cueing to pull down in the front. Pt was removed from O2 with saturation remaining in low 90s when spot checked throughout the session. Pt donned eye patch on R eye to strengthen L eye with good ability. Pt reports double vision is decreasing, but the world is still blurry. Activity Tolerance   Pt completed 5 minutes on the ergometer frontwards and backwards with moderate resistance to increase UB strength and activity tolerance for integration into ADL. Pt completed prolonged shoulder stabilization task to promote UB strength and activity tolerance for integration into functional mobility. Education   O2 level     Interdisciplinary Communication: ANA Tineo and Dr. Rula Pritchard about removing irrigation. Plan: Continue with POC. Pt was left with PT Jack.      Onelia Major OTR/L  6/18/2020

## 2020-06-18 NOTE — PROGRESS NOTES
06/18/20 1206   Time Spent With Patient   Time In 1116   Time Out 1154   Patient Seen For: AM   Mental Status   Neurologic State Alert   Orientation Level Oriented X4   Cognition Memory loss;Decreased attention/concentration   Safety/Judgement Home safety        06/18/20 1208   Organization Exercises   Visual reasoning  related to [facility] schedule 85% accuracy   Written Functional Sequencing Accuracy (%  80 % with a field of 4  Decreased attention with patient at times stating the correct answer and then marking a different one  Lost track of the concept and wrote \"1\" next to all 4 of the steps midway through the task  Overall, less cues to complete versus last attempt though   Neuro-Linguistic Exercises   Verbal Problem Solving Impaired   Verbal Organization Impaired   Memory Impaired; pt did recall discussion and activity completed with ST previous session   Attention  Impaired; answering questions related to a medication label 6/8 independently     Patient participated with cognitive therapy. Reports feeling much better compared to yesterday. Visual reasoning to answer questions related to [facility] schedule completed with 85% accuracy. Improved ability to complete basic written sequencing task in a field of 4 compared with previous attempt at 80% accuracy but still with decreased attention at times and verbal cues needed. Patient lost track of the concept midway through and began to mauricio \"1\" next to all of the choices without recognizing until ST provided cues. Answering questions related to a medication label 6/8 with additional time. Recommend continued therapy to address cognitive function and reach highest level of independence at discharge.     Dmitri Amaral MS, CCC-SLP

## 2020-06-19 LAB
GLUCOSE BLD STRIP.AUTO-MCNC: 118 MG/DL (ref 65–100)
GLUCOSE BLD STRIP.AUTO-MCNC: 126 MG/DL (ref 65–100)
GLUCOSE BLD STRIP.AUTO-MCNC: 139 MG/DL (ref 65–100)
GLUCOSE BLD STRIP.AUTO-MCNC: 89 MG/DL (ref 65–100)

## 2020-06-19 PROCEDURE — 99232 SBSQ HOSP IP/OBS MODERATE 35: CPT | Performed by: PHYSICIAN ASSISTANT

## 2020-06-19 PROCEDURE — 74011250636 HC RX REV CODE- 250/636: Performed by: PHYSICAL MEDICINE & REHABILITATION

## 2020-06-19 PROCEDURE — 97530 THERAPEUTIC ACTIVITIES: CPT

## 2020-06-19 PROCEDURE — 74011250637 HC RX REV CODE- 250/637: Performed by: PHYSICIAN ASSISTANT

## 2020-06-19 PROCEDURE — 97535 SELF CARE MNGMENT TRAINING: CPT

## 2020-06-19 PROCEDURE — 97112 NEUROMUSCULAR REEDUCATION: CPT

## 2020-06-19 PROCEDURE — 92507 TX SP LANG VOICE COMM INDIV: CPT

## 2020-06-19 PROCEDURE — 97110 THERAPEUTIC EXERCISES: CPT

## 2020-06-19 PROCEDURE — 74011250637 HC RX REV CODE- 250/637: Performed by: PHYSICAL MEDICINE & REHABILITATION

## 2020-06-19 PROCEDURE — 82962 GLUCOSE BLOOD TEST: CPT

## 2020-06-19 PROCEDURE — 65310000000 HC RM PRIVATE REHAB

## 2020-06-19 RX ORDER — GLIPIZIDE 5 MG/1
5 TABLET ORAL
Status: DISCONTINUED | OUTPATIENT
Start: 2020-06-20 | End: 2020-07-01 | Stop reason: HOSPADM

## 2020-06-19 RX ORDER — SCOLOPAMINE TRANSDERMAL SYSTEM 1 MG/1
1 PATCH, EXTENDED RELEASE TRANSDERMAL
Status: DISCONTINUED | OUTPATIENT
Start: 2020-06-19 | End: 2020-07-01 | Stop reason: HOSPADM

## 2020-06-19 RX ADMIN — LORATADINE 10 MG: 10 TABLET ORAL at 08:23

## 2020-06-19 RX ADMIN — GLIPIZIDE 2.5 MG: 5 TABLET ORAL at 08:23

## 2020-06-19 RX ADMIN — SULFAMETHOXAZOLE AND TRIMETHOPRIM 1 TABLET: 800; 160 TABLET ORAL at 08:23

## 2020-06-19 RX ADMIN — DOXAZOSIN MESYLATE 8 MG: 4 TABLET ORAL at 08:23

## 2020-06-19 RX ADMIN — AMLODIPINE BESYLATE 5 MG: 5 TABLET ORAL at 08:23

## 2020-06-19 RX ADMIN — SENNOSIDES AND DOCUSATE SODIUM 1 TABLET: 8.6; 5 TABLET ORAL at 08:23

## 2020-06-19 RX ADMIN — MECLIZINE HYDROCHLORIDE 25 MG: 25 TABLET ORAL at 10:54

## 2020-06-19 RX ADMIN — ATORVASTATIN CALCIUM 80 MG: 80 TABLET, FILM COATED ORAL at 21:18

## 2020-06-19 RX ADMIN — ASPIRIN 81 MG 81 MG: 81 TABLET ORAL at 08:23

## 2020-06-19 RX ADMIN — MECLIZINE HYDROCHLORIDE 25 MG: 25 TABLET ORAL at 05:22

## 2020-06-19 RX ADMIN — DOXAZOSIN MESYLATE 8 MG: 4 TABLET ORAL at 21:18

## 2020-06-19 RX ADMIN — POLYETHYLENE GLYCOL 3350 17 G: 17 POWDER, FOR SOLUTION ORAL at 08:23

## 2020-06-19 RX ADMIN — SULFAMETHOXAZOLE AND TRIMETHOPRIM 1 TABLET: 800; 160 TABLET ORAL at 21:18

## 2020-06-19 NOTE — PROGRESS NOTES
Patient given antivert 25 mg po in preparation for therapy . Patient in no distress. 3 way cochran remain patent. CBI dripping at slow steady rate. Contents in cochran pain clear yellow.

## 2020-06-19 NOTE — PROGRESS NOTES
06/19/20 1128   Time Spent With Patient   Time In 0920   Time Out 1000   Patient Seen For: AM   Mental Status   Neurologic State Alert   Orientation Level Oriented to person;Oriented X4   Cognition Memory loss;Decreased attention/concentration   Perseveration No perseveration noted   Safety/Judgement Home safety        06/19/20 1129   Attention Exercises Performed   Accuracy (%) 75 % accuracy   Neuro-Linguistic Exercises   Memory Impaired   Attention  Impaired; category inclusion with a word list of 4 completed with 75% accuracy   Immediate recall   Reading comprehension Answering yes/no questions related to a brief paragraph with 80% accuracy   Recall of moderate level directions to a destination 60% accuracy with use of compensatory memory strategy to write down information; cues to only write key words      Patient participated with cognitive therapy. Category inclusion with a word list of four completed with 75% accuracy. Answering yes/no questions from paragraph level reading material completed with 80% accuracy. 60% accuracy with attention and immediate recall to follow directions to reach a destination with use of compensatory memory strategies to write down information. Required cues to write down key words only to improve efficiency with the task. Patient's short-term memory for recall of tasks completed previous session and discussions fairly intact but with decreased attention and immediate recall a barrier. Recommend continued therapy to address cognitive function and increase independence and safety at discharge.     Alma Holbrook MS, CCC-SLP

## 2020-06-19 NOTE — PROGRESS NOTES
Veronique Ortega MD  Medical Director  [address] [facility], [address] 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 Unable to continue therapy due to dizziness this AM, reports same symptoms as before but worse than episodes of last 2d. O2 sats dropping into high 80s during PT, patient symptomatic, supplemental O2 applied. Expresses dismay and mild frustration that \"I'm not improving like everyone else up here. \" Has had more issues with catheter: sleep interrupted by flushing overnight, Stat-lock tether with too-short tubing tugging penis with stationary bike. Encouraged him to notify staff of these issues, any other complaints. Teary, states, \"I don't want to be a burden. \" Much reassurance given. Scopolamine patch applied.     Objective:     Current Facility-Administered Medications   Medication Dose Route Frequency    scopolamine (TRANSDERM-SCOP) 1 mg over 3 days 1 Patch  1 Patch TransDERmal Q72H    hydrocortisone (CORTAID) 1 % cream   Topical BID PRN    glipiZIDE (GLUCOTROL) tablet 2.5 mg  2.5 mg Oral ACB    trimethoprim-sulfamethoxazole (BACTRIM DS, SEPTRA DS) 160-800 mg per tablet 1 Tab  1 Tab Oral Q12H    lip protectant (BLISTEX) ointment 1 Each  1 Each Topical PRN    senna-docusate (PERICOLACE) 8.6-50 mg per tablet 1 Tab  1 Tab Oral DAILY    ondansetron (ZOFRAN ODT) tablet 4 mg  4 mg Oral Q6H PRN    meclizine (ANTIVERT) tablet 25 mg  25 mg Oral Q6H PRN    acetaminophen (TYLENOL) tablet 650 mg  650 mg Oral Q6H PRN    amLODIPine (NORVASC) tablet 5 mg  5 mg Oral DAILY    aspirin chewable tablet 81 mg  81 mg Oral DAILY    atorvastatin (LIPITOR) tablet 80 mg  80 mg Oral QHS    [Held by provider] clopidogreL (PLAVIX) tablet 75 mg  75 mg Oral DAILY    doxazosin (CARDURA) tablet 8 mg  8 mg Oral BID    [Held by provider] enoxaparin (LOVENOX) injection 40 mg  40 mg SubCUTAneous Q24H    insulin lispro (HUMALOG) injection   SubCUTAneous AC&HS    loratadine (CLARITIN) tablet 10 mg  10 mg Oral DAILY    sodium chloride (OCEAN) 0.65 % nasal squeeze bottle 2 Spray  2 Spray Both Nostrils Q2H PRN    pneumococcal 23-valent (PNEUMOVAX 23) injection 0.5 mL  0.5 mL IntraMUSCular PRIOR TO DISCHARGE    polyethylene glycol (MIRALAX) packet 17 g  17 g Oral DAILY    bisacodyL (DULCOLAX) tablet 5 mg  5 mg Oral DAILY PRN    Or    bisacodyL (DULCOLAX) suppository 10 mg  10 mg Rectal DAILY PRN    Or    magnesium hydroxide (MILK OF MAGNESIA) 400 mg/5 mL oral suspension 30 mL  30 mL Oral DAILY PRN       Review of Systems:   Denies chest pain, shortness of breath, cough, headache, visual problems, abdominal pain, dysuria, calf pain. Pertinent positives are as noted in the HPI, ROS unremarkable otherwise. Visit Vitals  /75 (BP Patient Position: At rest)   Pulse 65   Temp 97.8 °F (36.6 °C)   Resp 18   Ht 5' 9\" (1.753 m)   Wt 271 lb 12.8 oz (123.3 kg)   SpO2 95%   BMI 40.14 kg/m²        Physical Exam:   General: Alert, appropriately oriented. Teary at times, no acute cardiorespiratory distress. HEENT: Normocephalic, EOM intact. Oral mucosa moist.   Lungs: Clear to auscultation bilaterally. Respirations even and unlabored. Heart: Regular rate and underlying rhythm. No appreciable murmurs. Abdomen: Soft, non-tender, obese and very protuberant but not distended. Bowel sounds normoactive. Genitourinary: Deferred. Neuromuscular:      No pronator drift, no dysmetria. UE biceps 4-/5 (R), 4+/5 (L), remaining UE 4/5 and symmetric. Left B LE off footrest against gravity, HF 4-/5 (B). Skin/extremity: No rashes, no erythema. Calves soft, non-tender B LE. Functional Assessment:  Gross Assessment  AROM: Within functional limits (06/16/20 1100)  PROM: Within functional limits (06/16/20 1100)  Strength: Generally decreased, functional (06/16/20 1100)  Coordination: Generally decreased, functional (06/16/20 1100)       Balance  Sitting - Static: Good (unsupported) (06/18/20 1500)  Sitting - Dynamic: Fair (occasional) (06/18/20 1500)  Standing - Static: Fair (06/18/20 1500)  Standing - Dynamic : Impaired (06/18/20 1500)           Patrick Fleming Fall Risk Assessment:  Patrick Fleming Fall Risk  Mobility: Ambulates or transfers with assist devices or assistance (06/19/20 0741)  Mobility Interventions: Patient to call before getting OOB;Utilize walker, cane, or other assistive device (06/19/20 0741)  Mentation: Alert, oriented x 3 (06/19/20 0741)  Medication: Patient receiving anticonvulsants, sedatives(tranquilizers), psychotropics or hypnotics, hypoglycemics, narcotics, sleep aids, antihypertensives, laxatives, or diuretics (06/19/20 0741)  Medication Interventions: Patient to call before getting OOB (06/19/20 0741)  Elimination: Needs assistance with toileting (06/19/20 0741)  Elimination Interventions: Call light in reach; Patient to call for help with toileting needs; Toileting schedule/hourly rounds (06/19/20 0741)  Prior Fall History: No (06/19/20 0741)  History of Falls Interventions: Door open when patient unattended (06/19/20 0741)  Total Score: 3 (06/19/20 0741)  High Fall Risk: Yes (06/19/20 0741)     Ambulation:  Gait  Distance (ft): 0 Feet (ft) (06/17/20 1000)  Assistive Device: Gait belt;Walker, rolling (06/16/20 1100)     Labs/Studies:  Recent Results (from the past 72 hour(s))   GLUCOSE, POC    Collection Time: 06/16/20 11:32 AM   Result Value Ref Range    Glucose (POC) 133 (H) 65 - 100 mg/dL   GLUCOSE, POC    Collection Time: 06/16/20  3:44 PM   Result Value Ref Range Glucose (POC) 135 (H) 65 - 100 mg/dL   GLUCOSE, POC    Collection Time: 06/16/20  8:53 PM   Result Value Ref Range    Glucose (POC) 188 (H) 65 - 100 mg/dL   CBC W/O DIFF    Collection Time: 06/17/20  6:54 AM   Result Value Ref Range    WBC 8.3 4.3 - 11.1 K/uL    RBC 4.17 (L) 4.23 - 5.6 M/uL    HGB 12.8 (L) 13.6 - 17.2 g/dL    HCT 38.5 (L) 41.1 - 50.3 %    MCV 92.3 79.6 - 97.8 FL    MCH 30.7 26.1 - 32.9 PG    MCHC 33.2 31.4 - 35.0 g/dL    RDW 13.9 11.9 - 14.6 %    PLATELET 117 475 - 031 K/uL    MPV 11.6 9.4 - 12.3 FL    ABSOLUTE NRBC 0.00 0.0 - 0.2 K/uL   METABOLIC PANEL, BASIC    Collection Time: 06/17/20  6:54 AM   Result Value Ref Range    Sodium 143 136 - 145 mmol/L    Potassium 4.1 3.5 - 5.1 mmol/L    Chloride 108 (H) 98 - 107 mmol/L    CO2 26 21 - 32 mmol/L    Anion gap 9 7 - 16 mmol/L    Glucose 135 (H) 65 - 100 mg/dL    BUN 17 8 - 23 MG/DL    Creatinine 1.06 0.8 - 1.5 MG/DL    GFR est AA >60 >60 ml/min/1.73m2    GFR est non-AA >60 >60 ml/min/1.73m2    Calcium 8.0 (L) 8.3 - 10.4 MG/DL   GLUCOSE, POC    Collection Time: 06/17/20  7:06 AM   Result Value Ref Range    Glucose (POC) 124 (H) 65 - 100 mg/dL   GLUCOSE, POC    Collection Time: 06/17/20 11:06 AM   Result Value Ref Range    Glucose (POC) 93 65 - 100 mg/dL   GLUCOSE, POC    Collection Time: 06/17/20  4:19 PM   Result Value Ref Range    Glucose (POC) 200 (H) 65 - 100 mg/dL   GLUCOSE, POC    Collection Time: 06/17/20  8:22 PM   Result Value Ref Range    Glucose (POC) 135 (H) 65 - 100 mg/dL   HGB & HCT    Collection Time: 06/18/20  5:56 AM   Result Value Ref Range    HGB 12.1 (L) 13.6 - 17.2 g/dL    HCT 38.7 (L) 41.1 - 50.3 %   GLUCOSE, POC    Collection Time: 06/18/20  6:32 AM   Result Value Ref Range    Glucose (POC) 120 (H) 65 - 100 mg/dL   GLUCOSE, POC    Collection Time: 06/18/20 11:28 AM   Result Value Ref Range    Glucose (POC) 151 (H) 65 - 100 mg/dL   GLUCOSE, POC    Collection Time: 06/18/20  4:01 PM   Result Value Ref Range    Glucose (POC) 169 (H) 65 - 100 mg/dL   GLUCOSE, POC    Collection Time: 06/18/20  8:35 PM   Result Value Ref Range    Glucose (POC) 174 (H) 65 - 100 mg/dL   GLUCOSE, POC    Collection Time: 06/19/20  6:35 AM   Result Value Ref Range    Glucose (POC) 126 (H) 65 - 100 mg/dL       Assessment:     Problem List as of 6/19/2020 Date Reviewed: 5/1/2020          Codes Class Noted - Resolved    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  ICD-9-CM: 434.91  6/14/2020 - Present        Cerebellar stroke ([facility] ICD-10-CM: I63.9  ICD-9-CM: 434.91  6/14/2020 - Present        Urinary retention ICD-10-CM: R33.9  ICD-9-CM: 788.20  6/8/2020 - Present        Stroke (cerebrum) (HCC) ICD-10-CM: I63.9  ICD-9-CM: 434.91  6/8/2020 - Present        Severe obesity ([facility] ICD-10-CM: E66.01  ICD-9-CM: 278.01  7/2/2019 - Present        Right thalamic infarction [facility]) ICD-10-CM: I63.9  ICD-9-CM: 434.91  7/2/2019 - Present        Numbness and tingling in left hand ICD-10-CM: R20.0, R20.2  ICD-9-CM: 782.0  7/2/2019 - Present        Controlled type 2 diabetes mellitus without complication, without long-term current use of insulin ([facility] ICD-10-CM: E11.9  ICD-9-CM: 250.00  2/22/2019 - Present        Cerebrovascular accident (CVA) due to occlusion [facility]) ICD-10-CM: I63.9  ICD-9-CM: 434.91  2/12/2019 - Present        Essential hypertension, benign ICD-10-CM: I10  ICD-9-CM: 401.1  1/3/2014 - Present        Mixed hyperlipidemia ICD-10-CM: E78.2  ICD-9-CM: 272.2  1/3/2014 - Present              Plan / Recommendations / Medical Decision Making:     Continue daily physician / PA medical management:    New ischemic stroke, right cerebellar hemisphere, right dorsolateral medulla; secondary stroke prevention - DAPT, high-intensity statin (Lipitor), BP management.  -dizziness, nausea, intermittent diplopia; prn meclizine  -6/19 scopolamine patch applied     Hypertension / hypotension - patient on doxazosin 8mg twice daily from PCP, now additionally on amlodipine 5mg.  /63, P 75 on admission here; patient gives pretty clear history of brief orthostatic hypotension symptoms when sitting from supine prior to meals. Staff to perform OH maneuvers, may hold amlodipine.  -6/17 bp 142/69; no orthostatics; cont norvasc and cardura  -6/18 bp 152/89 (129-165), no reported orthostatics; cont current meds     Diabetes mellitus - uncontrolled / poor glycemic control, HgbA1c 7.8% (6/9/2020). Will require daily, close FSG monitoring and medication adjustment to optimize glycemic control in setting of acute illness and hospitalization. POC blood glucose around 130s-150s. Currently on Humalog SSI; will monitor and manage.  -6/15 fair control at this time. Consider low dose antihyperglycemic  -6/16 bs 147 this a.m., 167 at bedtime, 182 at supper, 152 at lunch; start glucotrol 2.5 mg  -6/17 124 this a.m; 188 at bedtime, 135 with supper, 133 with lunch; cont Glucotrol 2.5 mg, increase to 5mg if bs not consistently below 120-130  -6/18 bs 120 this a.m, 135 last pm, 200 at supper, 93 at lunch, still not consistent  -6/19 -174, increase glipizide to 5mg     Urinary retention - cochran catheter in place since ED 6/8; Urologist suspects urinary retention part of stroke syndrome. Catheter remains until 6/16, at which time it will be removed for voiding trial.  -pt on Cardura 8mg. Check UA; lg blood, lg leuk; pt didn't have micro done. Asymptomatic  Cochran discontinued this a.m; await results  -6/17 cochran replaced due to hematuria and inability to void. Small amt of hematuria after one CIC; when cochran replaced; zainab blood, continues this a.m. lg amt of blood. Check CBC, irrigated without improvement, Urology Consulted. ? Continuous irrigation/3-way cochran. ?cysto; hold plavix and Lovenox. No hx of retention or hematuria.; +>100K gram neg rods s/p one dose Rocephing 1g IV, on septra, day 2; await ID/sens.   -E coli UTI; sensitive to Septra, day 3/7. CIB per Urology, appreciate assistance.  Hgb down one gram but stable 12.1; urine is getting pinker; no clots noted in bag. Continue  -6/19 Urologist D/Cing cochran irrigation; will ask NP to address Stat-lock / tethering issue.     Hyperlipidemia - on high-intensity statin.     Electrolyte abnormality - previous hypoK+, current BMP normal; monitor.     Pain control - stable, mild-to-moderate joint symptoms intermittently, reasonably well controlled by PRN meds. Will require regular pain assessment and comprenhensive pain management.     Bowel program - at risk for constipation due to lack of mobility. MiraLAX daily for regularity, Mgehan-Colace for stool softener prn. PRN MOM, bisacodyl suppository or tablets for constipation.  -6/15 normally has bm every other day. Will add daily senna.   -6/16 good results  -no complaints     Depression - no previous history; at risk of post-stroke depression. Monitor.     Pneumonia prophylaxis - incentive spirometer every hour while awake.     Newly diagnosed NELL; pt sets alarm off if not supine. If he rolls side to side , he desats. CXR shows atelectasis; enc IS  -6/16 not tolerating CPAP; O2 NC at noc at 1L, sats 91-98%  -6/17 weaned off O2 during day yesterday. Will get nocturnal pulse ox study closer to dc. Reports no hx of NELL , wife reported no snoring but breaths \"heavy\"  -6/19 required O2 during PT     DVT risk / DVT prophylaxis - will require daily physician / PA exam to assess for signs and symptoms as patient is at increased risk for of thromboembolism. Mobilize as tolerated. Sequential pneumatic compression devices (SCDs) when in bed; thigh-high or knee-high thromboembolic deterrent hose when out of bed. On DAPT; Lovenox subcutaneously daily.   -6/17 hold Lovenox due to zainab hematuria     Wound care - monitor general skin wound status daily per staff and physician / PA. At risk for failure. Will require 24/7 rehab nursing. No current wounds.     GERD - at times may need antacids, Maalox prn.           Time spent was 25 minutes with over 1/2 in direct patient care/examination, consultation and coordination of care. Signed By: Earnestine Felder PA-C    June 19, 2020      Physician Assistant with [facility]  Lilian Marin MD, Medical Director

## 2020-06-19 NOTE — PROGRESS NOTES
Problem: Falls - Risk of  Goal: *Absence of Falls  Description: Document Leticia Dear Fall Risk and appropriate interventions in the flowsheet.   Outcome: Progressing Towards Goal  Note: Fall Risk Interventions:  Mobility Interventions: Patient to call before getting OOB, Utilize walker, cane, or other assistive device         Medication Interventions: Patient to call before getting OOB    Elimination Interventions: Call light in reach, Patient to call for help with toileting needs, Toileting schedule/hourly rounds    History of Falls Interventions: Door open when patient unattended         Problem: Patient Education: Go to Patient Education Activity  Goal: Patient/Family Education  Outcome: Progressing Towards Goal

## 2020-06-19 NOTE — PROGRESS NOTES
Time In 0745   Time Out 0833     Mobility   Score Comments   Sit to Stand 4: Supervision or touching A SBA   Transfer Assist 4: Supervision or touching A Transfer Type: SPT   Equipment: Rolling Walker   Comments: CGA     Activities of Daily Living    Score Comments   Eating 6: Independent    Oral Hyigene 5: S/U or clean-up assist    Bathing 4: Supervision or touching A Type of Shower: Shower  Position: Standing PRN and Unsupported Sitting   Adaptive  Equipment: Tub Transfer Bench and Grab Bars  Comments: Assist to wash bottom in stance   Upper Body  Dressing 5: S/U or clean-up assist Items Applied: Pullover  Position: Supported Sitting  Comments:    Lower Body Dressing 4: Supervision or touching A Items Applied: Underwear and Elastic pants  Position: Supported sitting and Standing PRN  Adaptive Equipment: RW and W/C  Comments: Assist to manage cochran catheter through clothing, steady assist in stance for clothing management    Donning/[place] Footwear 1: Dependent Items Applied: Socks  Adaptive Equipment: N/A  Comments: Toilet Transfer 4: Supervision or touching A Transfer Type: SPT   Equipment: Rolling Walker   Comments: CGA   Toileting Hygiene 2: Substantial/Maximal A Output: BM  Comments: Assist to help pull clothes over hips, assist for hygiene   Education  Cochran management during ADLs       S: \"I didn't take my miralax last night, do you think I should have? \" Agreeable to therapy. Patient was able to ambulate ~10 feet using a RW with CGA. Pain indicated in cochran insertion site, removed from leg tape and re-taped. Notified RN. Patient Vitals for the past 12 hrs:   Temp Pulse Resp BP SpO2   06/19/20 0715 97.8 °F (36.6 °C) 65 18 166/75 95 %       Collaborated with RN and PT regarding patient performance and POC.    Patient tolerated session well, but activity tolerance, balance, functional mobility, strength, coordination, cognition are still below baseline and require skilled facilitation to successfully and safely complete ADLs and transfers. Patient ended session in wc with PT assuming care.         Antonio Mcdermott, OTR/L  6/19/2020

## 2020-06-19 NOTE — PROGRESS NOTES
OT Daily Note  Time In 1115   Time Out 1200     Pain: Patient had no complaint of pain. Functional Mobility      Score Comments   Sit to Stand 4: Supervision or touching A CGA   Transfer Assist 4: Supervision or touching A Transfer Type: SPT   Equipment: N/A   Comments: CGA        Activity Tolerance   Pt completed 5 minutes on the ergometer frontwards and with moderate resistance to increase UB strength and activity tolerance for integration into ADL. Neuro-Muscular Re-Education   Went over gaze stabilization and saccades exercise with just L eye and B eyes with good performance. Completed sit to stand. Pt stood for 2 minutes holding onto bar. Took BP. Pt does not appear to be orthostatic 133/72 sitting, 125/55 standing after 90 seconds, and 116/61 after sitting for a minute. Education   Cerebellum part of the brain. Interdisciplinary Communication: PT Jack on performance    Plan: Continue with POC. Pt was left in recliner with all needs within reach.      Armando Staley OTR/L  6/19/2020

## 2020-06-19 NOTE — PROGRESS NOTES
Problem: Falls - Risk of  Goal: *Absence of Falls  Description: Document [facility] Fall Risk and appropriate interventions in the flowsheet.   Outcome: Progressing Towards Goal  Note: Fall Risk Interventions:  Mobility Interventions: Communicate number of staff needed for ambulation/transfer, OT consult for ADLs, Patient to call before getting OOB, PT Consult for mobility concerns, Strengthening exercises (ROM-active/passive), Utilize walker, cane, or other assistive device         Medication Interventions: Assess postural VS orthostatic hypotension, Evaluate medications/consider consulting pharmacy, Patient to call before getting OOB, Teach patient to arise slowly    Elimination Interventions: Bed/chair exit alarm, Call light in reach, Patient to call for help with toileting needs, Urinal in reach    History of Falls Interventions: Door open when patient unattended

## 2020-06-19 NOTE — PROGRESS NOTES
PHYSICAL THERAPY DAILY NOTE  Time In: 2976  Time Out: 3418  Patient Seen For: AM;Patient education;Transfer training; Therapeutic exercise    Subjective: Pt reports he's doing well this morning. Agreeable to therapy. Objective: Other (comment)(fall risk); 2 L oxygen via nasal cannula    COGNITION Daily Assessment    Pt is alert and oriented and following commands appropriately 100% of the time. Pt communicates verbally. Pt has a pleasant affect. Pt is willing to participate in therapy.        BED/MAT MOBILITY Daily Assessment    Rolling Right : 0 (Not tested)  Rolling Left : 0 (Not tested)  Supine to Sit : 4 (Minimal assistance)  Sit to Supine : 5 (Supervision)       TRANSFERS Daily Assessment   LPT and SPT with min A from WC <> bed    Cues for hand placement for transfer and to use arm rests Transfer Type: SPT without device  Transfer Assistance : 4 (Minimal assistance)  Sit to Stand Assistance: Contact guard assistance  Car Transfers: Not tested       GAIT Daily Assessment    Amount of Assistance: 0 (Not tested)       STEPS or STAIRS Daily Assessment    Level of Assist : 0 (Not tested)       BALANCE Daily Assessment    Sitting - Static: Good (unsupported)  Sitting - Dynamic: Fair (occasional)  Standing - Static: Fair  Standing - Dynamic : Impaired       WHEELCHAIR MOBILITY Daily Assessment    Curbs/Ramps Assist Required (FIM Score): 0 (Not tested)  Wheelchair Setup Assist Required : 0 (Not tested)     Therapeutic Interventions:  NuStep level 1 x 10 min  Aiding with pt's reports of dizziness and nausea    /75 (BP Patient Position: At rest)   Pulse 65   Temp 97.8 °F (36.6 °C)   Resp 18   Ht 5' 9\" (1.753 m)   Wt 123.3 kg (271 lb 12.8 oz)   SpO2 95%   BMI 40.14 kg/m²      88% oxygen sat on room air - seated; immediately after bike; 8/10 dizziness  93-95% oxygen sat 2 L via NC - supine on mat 5 min post-activity  Continuous complaints of dizziness and nausea for remainder of session    Pain level: no complaints  Pain location: N/A  Pain interventions: N/A    Patient education: Pt educated on potential causes of his nausea and dizziness. Interdisciplinary Communication: Collaborated with PA and RN regarding pt's dizziness and nausea    Pt left in bedside chair with call bell and needs in reach. Pt in NAD. Assessment: Pt tolerated therapy poorly today. Pt continues to report significant bouts of dizziness and nausea both with positional changes and static postures. Pt tolerated exercises well at the beginning of the session on room air, but immediately following the activity, pt reported significant dizziness and demonstrated 88% oxygen sat. Pt moved to sitting in WC and to supine on mat without changes in reports of dizziness. PT placed pt back on 2 L oxygen via NC, which improved his oxygen readings. PT collaborated with PA and RN, which supplied pt with patch to reduce dizziness. Pt unable to tolerate activities without continuous reports of dizziness. Pt is very hard on himself and is worried he's not \"performing as well as others\" in the [facility]. Pt encouraged from PT and PA that he's doing his best and that he'll continue to improve. Pt would benefit from skilled physical therapy to maximize independence, increase activity tolerance, and improve functional mobility. Plan of Care: Continue with POC and progress as tolerated.       Aaron Contreras, PT  6/19/2020

## 2020-06-19 NOTE — PROGRESS NOTES
PHYSICAL THERAPY DAILY NOTE  Time In: 1030  Time Out: 5132  Patient Seen For: AM;Balance activities; Patient education; Therapeutic exercise;Transfer training    Subjective: Pt reports he's still feeling dizzy this morning and that he doesn't think the patch has started working fully yet. Objective: Other (comment)(fall risk); 2 L oxygen via NC    COGNITION Daily Assessment    Pt is alert and oriented and following commands appropriately >80% of the time. Pt communicates verbally. Pt has a pleasant affect. Pt is willing to participate in therapy.        BED/MAT MOBILITY Daily Assessment    Rolling Right : 0 (Not tested)  Rolling Left : 0 (Not tested)  Supine to Sit : 0 (Not tested)  Sit to Supine : 0 (Not tested)       TRANSFERS Daily Assessment   Increased time & effort to complete Transfer Type: SPT without device  Transfer Assistance : 4 (Minimal assistance)  Sit to Stand Assistance: Minimal assistance  Car Transfers: Not tested       GAIT Daily Assessment    Amount of Assistance: 0 (Not tested)       STEPS or STAIRS Daily Assessment    Level of Assist : 0 (Not tested)       BALANCE Daily Assessment   Min A for standing balance while PA was assessing and intervening with pt's catheter tubing Sitting - Static: Good (unsupported)  Sitting - Dynamic: Fair (occasional)  Standing - Static: Fair  Standing - Dynamic : Impaired       WHEELCHAIR MOBILITY Daily Assessment    Curbs/Ramps Assist Required (FIM Score): 0 (Not tested)  Wheelchair Setup Assist Required : 0 (Not tested)     Therapeutic Interventions:  Standing balance  Glute sets 2 x 10 x 3  LAQ 2 x 10 B  Ankle pumps x 20 B  Seated iso hip ADD 3 x 8 x 5  Seated hip ABD 2 x 10 x 3    /75 (BP Patient Position: At rest)   Pulse 65   Temp 97.8 °F (36.6 °C)   Resp 18   Ht 5' 9\" (1.753 m)   Wt 123.3 kg (271 lb 12.8 oz)   SpO2 95%   BMI 40.14 kg/m²      Pain level: no complaints  Pain location: N/A  Pain interventions: N/A    Patient education: Pt educated on how to perform exercises with appropriate technique. Interdisciplinary Communication: Collaborated with PA regarding pt's leaking catheter and RN regarding pt's reports of dizziness     Pt left in bedside chair with call bell and needs in reach. Pt in NAD. Assessment: Pt tolerated therapy well today. Pt continues to report bouts of dizziness and nausea. Pt was given scopolamine patch around 9:10 AM this morning, so may not have taken full effect by 10:30 AM for second session of PT. After standing to assess tolerance and possibility of walking pt, pt stated that he needed to sit down due to dizziness. (RN was informed and gave pt Antivert around 11:00 AM.) While standing, pt reported that his catheter was leaking and that his PA was supposed to be returning soon to help fix it. PT collaborated with PA to assess catheter site and informed RN. Pt performed exercises within his tolerance well this morning. PT will continue to progress as appropriate with pt's reports of dizziness & nausea playing a significant role in pt's progression towards gains in functional mobility. Pt would benefit from skilled physical therapy to maximize independence, increase activity tolerance, and improve functional mobility. Plan of Care: Continue with POC and progress as tolerated.       Elif Guajardo, PT  6/19/2020

## 2020-06-20 LAB
GLUCOSE BLD STRIP.AUTO-MCNC: 123 MG/DL (ref 65–100)
GLUCOSE BLD STRIP.AUTO-MCNC: 125 MG/DL (ref 65–100)
GLUCOSE BLD STRIP.AUTO-MCNC: 127 MG/DL (ref 65–100)
GLUCOSE BLD STRIP.AUTO-MCNC: 84 MG/DL (ref 65–100)

## 2020-06-20 PROCEDURE — 97110 THERAPEUTIC EXERCISES: CPT

## 2020-06-20 PROCEDURE — 74011250637 HC RX REV CODE- 250/637: Performed by: PHYSICIAN ASSISTANT

## 2020-06-20 PROCEDURE — 82962 GLUCOSE BLOOD TEST: CPT

## 2020-06-20 PROCEDURE — 51798 US URINE CAPACITY MEASURE: CPT

## 2020-06-20 PROCEDURE — 77030019905 HC CATH URETH INTMIT MDII -A

## 2020-06-20 PROCEDURE — 74011250637 HC RX REV CODE- 250/637: Performed by: PHYSICAL MEDICINE & REHABILITATION

## 2020-06-20 PROCEDURE — 65310000000 HC RM PRIVATE REHAB

## 2020-06-20 PROCEDURE — 97530 THERAPEUTIC ACTIVITIES: CPT

## 2020-06-20 RX ADMIN — ASPIRIN 81 MG 81 MG: 81 TABLET ORAL at 08:37

## 2020-06-20 RX ADMIN — AMLODIPINE BESYLATE 5 MG: 5 TABLET ORAL at 08:37

## 2020-06-20 RX ADMIN — SULFAMETHOXAZOLE AND TRIMETHOPRIM 1 TABLET: 800; 160 TABLET ORAL at 22:09

## 2020-06-20 RX ADMIN — DOXAZOSIN MESYLATE 8 MG: 4 TABLET ORAL at 22:08

## 2020-06-20 RX ADMIN — ATORVASTATIN CALCIUM 80 MG: 80 TABLET, FILM COATED ORAL at 22:09

## 2020-06-20 RX ADMIN — GLIPIZIDE 5 MG: 5 TABLET ORAL at 08:37

## 2020-06-20 RX ADMIN — POLYETHYLENE GLYCOL 3350 17 G: 17 POWDER, FOR SOLUTION ORAL at 08:36

## 2020-06-20 RX ADMIN — SENNOSIDES AND DOCUSATE SODIUM 1 TABLET: 8.6; 5 TABLET ORAL at 08:37

## 2020-06-20 RX ADMIN — LORATADINE 10 MG: 10 TABLET ORAL at 08:37

## 2020-06-20 RX ADMIN — DOXAZOSIN MESYLATE 8 MG: 4 TABLET ORAL at 08:37

## 2020-06-20 RX ADMIN — SULFAMETHOXAZOLE AND TRIMETHOPRIM 1 TABLET: 800; 160 TABLET ORAL at 08:37

## 2020-06-20 NOTE — PROGRESS NOTES
Problem: Falls - Risk of  Goal: *Absence of Falls  Description: Document Rebecca Ramos Fall Risk and appropriate interventions in the flowsheet.   Outcome: Progressing Towards Goal  Note: Fall Risk Interventions:  Mobility Interventions: Communicate number of staff needed for ambulation/transfer, Patient to call before getting OOB, PT Consult for mobility concerns, PT Consult for assist device competence, Strengthening exercises (ROM-active/passive), Utilize walker, cane, or other assistive device         Medication Interventions: Evaluate medications/consider consulting pharmacy, Patient to call before getting OOB, Teach patient to arise slowly    Elimination Interventions: Call light in reach, Patient to call for help with toileting needs, Toilet paper/wipes in reach    History of Falls Interventions: Consult care management for discharge planning, Door open when patient unattended

## 2020-06-20 NOTE — PROGRESS NOTES
Sitting up in recliner, wife at bedside. No complaints voiced. Call bell within reach, hourly rounds completed this shift.

## 2020-06-20 NOTE — PROGRESS NOTES
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 Unable to continue therapy due to dizziness this AM, reports same symptoms as before but worse than episodes of last 2d. O2 sats dropping into high 80s during PT, patient symptomatic, supplemental O2 applied. Expresses dismay and mild frustration that \"I'm not improving like everyone else up here. \" Has had more issues with catheter: sleep interrupted by flushing overnight, Stat-lock tether with too-short tubing tugging penis with stationary bike. Encouraged him to notify staff of these issues, any other complaints. Teary, states, \"I don't want to be a burden. \" Much reassurance given. Scopolamine patch applied. Patient seen and examined. Reports decreased dizziness and improving mobility over the last few days. Requesting to have cochran d/samuel. Denies pain, lightheadedness, palpations, SOB or nausea. Participating in therapy.      Objective:     Current Facility-Administered Medications   Medication Dose Route Frequency    scopolamine (TRANSDERM-SCOP) 1 mg over 3 days 1 Patch  1 Patch TransDERmal Q72H    glipiZIDE (GLUCOTROL) tablet 5 mg  5 mg Oral ACB    hydrocortisone (CORTAID) 1 % cream   Topical BID PRN    trimethoprim-sulfamethoxazole (BACTRIM DS, SEPTRA DS) 160-800 mg per tablet 1 Tab  1 Tab Oral Q12H    lip protectant (BLISTEX) ointment 1 Each  1 Each Topical PRN    senna-docusate (PERICOLACE) 8.6-50 mg per tablet 1 Tab  1 Tab Oral DAILY    ondansetron (ZOFRAN ODT) tablet 4 mg  4 mg Oral Q6H PRN  meclizine (ANTIVERT) tablet 25 mg  25 mg Oral Q6H PRN    acetaminophen (TYLENOL) tablet 650 mg  650 mg Oral Q6H PRN    amLODIPine (NORVASC) tablet 5 mg  5 mg Oral DAILY    aspirin chewable tablet 81 mg  81 mg Oral DAILY    atorvastatin (LIPITOR) tablet 80 mg  80 mg Oral QHS    [Held by provider] clopidogreL (PLAVIX) tablet 75 mg  75 mg Oral DAILY    doxazosin (CARDURA) tablet 8 mg  8 mg Oral BID    [Held by provider] enoxaparin (LOVENOX) injection 40 mg  40 mg SubCUTAneous Q24H    insulin lispro (HUMALOG) injection   SubCUTAneous AC&HS    loratadine (CLARITIN) tablet 10 mg  10 mg Oral DAILY    sodium chloride (OCEAN) 0.65 % nasal squeeze bottle 2 Spray  2 Spray Both Nostrils Q2H PRN    pneumococcal 23-valent (PNEUMOVAX 23) injection 0.5 mL  0.5 mL IntraMUSCular PRIOR TO DISCHARGE    polyethylene glycol (MIRALAX) packet 17 g  17 g Oral DAILY    bisacodyL (DULCOLAX) tablet 5 mg  5 mg Oral DAILY PRN    Or    bisacodyL (DULCOLAX) suppository 10 mg  10 mg Rectal DAILY PRN    Or    magnesium hydroxide (MILK OF MAGNESIA) 400 mg/5 mL oral suspension 30 mL  30 mL Oral DAILY PRN       Review of Systems:   Denies chest pain, shortness of breath, cough, headache, visual problems, abdominal pain, dysuria, calf pain. Pertinent positives are as noted in the HPI, ROS unremarkable otherwise. Visit Vitals  /72   Pulse 73   Temp 98.4 °F (36.9 °C)   Resp 18   Ht 5' 9\" (1.753 m)   Wt 123.3 kg (271 lb 12.8 oz)   SpO2 96%   BMI 40.14 kg/m²        Physical Exam:   General: Alert, appropriately oriented. Teary at times, no acute cardiorespiratory distress. HEENT: Normocephalic, EOM intact. Oral mucosa moist.   Lungs: Clear to auscultation bilaterally. Respirations even and unlabored. Heart: Regular rate and underlying rhythm. No appreciable murmurs. Abdomen: Soft, non-tender, obese and very protuberant but not distended. Bowel sounds normoactive.    Genitourinary:  cochran clear, no hematuria Neuromuscular:      No pronator drift, no dysmetria. UE biceps 4-/5 (R), 4+/5 (L), remaining UE 4/5 and symmetric. Left B LE off footrest against gravity, HF 4-/5 (B). Skin/extremity: No rashes, no erythema. Calves soft, non-tender B LE. Functional Assessment:  Gross Assessment  AROM: Within functional limits (06/16/20 1100)  PROM: Within functional limits (06/16/20 1100)  Strength: Generally decreased, functional (06/16/20 1100)  Coordination: Generally decreased, functional (06/16/20 1100)       Balance  Sitting - Static: Good (unsupported) (06/19/20 1200)  Sitting - Dynamic: Fair (occasional) (06/19/20 1200)  Standing - Static: Fair (06/19/20 1200)  Standing - Dynamic : Impaired (06/19/20 1200)           [facility] Fall Risk Assessment:  [facility] Fall Risk  Mobility: Ambulates or transfers with assist devices or assistance (06/20/20 0319)  Mobility Interventions: Communicate number of staff needed for ambulation/transfer;Patient to call before getting OOB;PT Consult for mobility concerns;PT Consult for assist device competence;Strengthening exercises (ROM-active/passive); Utilize walker, cane, or other assistive device (06/20/20 0319)  Mentation: Alert, oriented x 3 (06/20/20 0319)  Medication: Patient receiving anticonvulsants, sedatives(tranquilizers), psychotropics or hypnotics, hypoglycemics, narcotics, sleep aids, antihypertensives, laxatives, or diuretics (06/20/20 0319)  Medication Interventions: Evaluate medications/consider consulting pharmacy; Patient to call before getting OOB; Teach patient to arise slowly (06/20/20 0319)  Elimination: Needs assistance with toileting (06/20/20 0319)  Elimination Interventions: Call light in reach; Patient to call for help with toileting needs; Toilet paper/wipes in reach (06/20/20 0319)  Prior Fall History: No (06/20/20 0319)  History of Falls Interventions: Consult care management for discharge planning;Door open when patient unattended (06/20/20 0319)  Total Score: 3 (06/20/20 0319)  High Fall Risk: Yes (06/20/20 0319)     Ambulation:  Gait  Distance (ft): 0 Feet (ft) (06/17/20 1000)  Assistive Device: Gait belt;Walker, rolling (06/16/20 1100)     Labs/Studies:  Recent Results (from the past 72 hour(s))   GLUCOSE, POC    Collection Time: 06/17/20 11:06 AM   Result Value Ref Range    Glucose (POC) 93 65 - 100 mg/dL   GLUCOSE, POC    Collection Time: 06/17/20  4:19 PM   Result Value Ref Range    Glucose (POC) 200 (H) 65 - 100 mg/dL   GLUCOSE, POC    Collection Time: 06/17/20  8:22 PM   Result Value Ref Range    Glucose (POC) 135 (H) 65 - 100 mg/dL   HGB & HCT    Collection Time: 06/18/20  5:56 AM   Result Value Ref Range    HGB 12.1 (L) 13.6 - 17.2 g/dL    HCT 38.7 (L) 41.1 - 50.3 %   GLUCOSE, POC    Collection Time: 06/18/20  6:32 AM   Result Value Ref Range    Glucose (POC) 120 (H) 65 - 100 mg/dL   GLUCOSE, POC    Collection Time: 06/18/20 11:28 AM   Result Value Ref Range    Glucose (POC) 151 (H) 65 - 100 mg/dL   GLUCOSE, POC    Collection Time: 06/18/20  4:01 PM   Result Value Ref Range    Glucose (POC) 169 (H) 65 - 100 mg/dL   GLUCOSE, POC    Collection Time: 06/18/20  8:35 PM   Result Value Ref Range    Glucose (POC) 174 (H) 65 - 100 mg/dL   GLUCOSE, POC    Collection Time: 06/19/20  6:35 AM   Result Value Ref Range    Glucose (POC) 126 (H) 65 - 100 mg/dL   GLUCOSE, POC    Collection Time: 06/19/20 11:41 AM   Result Value Ref Range    Glucose (POC) 118 (H) 65 - 100 mg/dL   GLUCOSE, POC    Collection Time: 06/19/20  4:58 PM   Result Value Ref Range    Glucose (POC) 89 65 - 100 mg/dL   GLUCOSE, POC    Collection Time: 06/19/20  8:27 PM   Result Value Ref Range    Glucose (POC) 139 (H) 65 - 100 mg/dL   GLUCOSE, POC    Collection Time: 06/20/20  7:32 AM   Result Value Ref Range    Glucose (POC) 127 (H) 65 - 100 mg/dL       Assessment:     Problem List as of 6/20/2020 Date Reviewed: 5/1/2020          Codes Class Noted - Resolved    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  ICD-9-CM: 434.91  6/14/2020 - Present        Cerebellar stroke ([facility] ICD-10-CM: I63.9  ICD-9-CM: 434.91  6/14/2020 - Present        Urinary retention ICD-10-CM: R33.9  ICD-9-CM: 788.20  6/8/2020 - Present        Stroke (cerebrum) ([facility] ICD-10-CM: I63.9  ICD-9-CM: 434.91  6/8/2020 - Present        Severe obesity ([facility] ICD-10-CM: E66.01  ICD-9-CM: 278.01  7/2/2019 - Present        Right thalamic infarction [facility]) ICD-10-CM: I63.9  ICD-9-CM: 434.91  7/2/2019 - Present        Numbness and tingling in left hand ICD-10-CM: R20.0, R20.2  ICD-9-CM: 782.0  7/2/2019 - Present        Controlled type 2 diabetes mellitus without complication, without long-term current use of insulin ([facility] ICD-10-CM: E11.9  ICD-9-CM: 250.00  2/22/2019 - Present        Cerebrovascular accident (CVA) due to occlusion [facility]) ICD-10-CM: I63.9  ICD-9-CM: 434.91  2/12/2019 - Present        Essential hypertension, benign ICD-10-CM: I10  ICD-9-CM: 401.1  1/3/2014 - Present        Mixed hyperlipidemia ICD-10-CM: E78.2  ICD-9-CM: 272.2  1/3/2014 - Present              Plan / Recommendations / Medical Decision Making:     Continue daily physician / PA medical management:    New ischemic stroke, right cerebellar hemisphere, right dorsolateral medulla; secondary stroke prevention - DAPT, high-intensity statin (Lipitor), BP management.  -dizziness, nausea, intermittent diplopia; prn meclizine  -6/19 scopolamine patch applied     Hypertension / hypotension - patient on doxazosin 8mg twice daily from PCP, now additionally on amlodipine 5mg. /63, P 75 on admission here; patient gives pretty clear history of brief orthostatic hypotension symptoms when sitting from supine prior to meals.  Staff to perform OH maneuvers, may hold amlodipine.  -6/17 bp 142/69; no orthostatics; cont norvasc and cardura  -6/18 bp 152/89 (129-165), no reported orthostatics; cont current meds  -6/20 BP - 146/72, allow for permissive HTN     Diabetes mellitus - uncontrolled / poor glycemic control, HgbA1c 7.8% (6/9/2020). Will require daily, close FSG monitoring and medication adjustment to optimize glycemic control in setting of acute illness and hospitalization. POC blood glucose around 130s-150s. Currently on Humalog SSI; will monitor and manage.  -6/15 fair control at this time. Consider low dose antihyperglycemic  -6/16 bs 147 this a.m., 167 at bedtime, 182 at supper, 152 at lunch; start glucotrol 2.5 mg  -6/17 124 this a.m; 188 at bedtime, 135 with supper, 133 with lunch; cont Glucotrol 2.5 mg, increase to 5mg if bs not consistently below 120-130  -6/18 bs 120 this a.m, 135 last pm, 200 at supper, 93 at lunch, still not consistent  -6/19 -174, increase glipizide to 5mg  - 6/20 BG-127, BG levels < 140      Urinary retention - cochran catheter in place since ED 6/8; Urologist suspects urinary retention part of stroke syndrome. Catheter remains until 6/16, at which time it will be removed for voiding trial.  -pt on Cardura 8mg. Check UA; lg blood, lg leuk; pt didn't have micro done. Asymptomatic  Cochran discontinued this a.m; await results  -6/17 cochran replaced due to hematuria and inability to void. Small amt of hematuria after one CIC; when cochran replaced; zainab blood, continues this a.m. lg amt of blood. Check CBC, irrigated without improvement, Urology Consulted. ? Continuous irrigation/3-way cochran. ?cysto; hold plavix and Lovenox. No hx of retention or hematuria.; +>100K gram neg rods s/p one dose Rocephing 1g IV, on septra, day 2; await ID/sens.   -E coli UTI; sensitive to Septra, day 3/7. CIB per Urology, appreciate assistance. Hgb down one gram but stable 12.1; urine is getting pinker; no clots noted in bag. Continue  -6/19 Urologist D/Cing cochran irrigation; will ask NP to address Stat-lock / tethering issue. - 6/20 hematuria resolved, will d/c cochran, begin voiding trial, bladder scan monitoring.  If unable to void, will replace with 22 gauge coude cath     Hyperlipidemia - on high-intensity statin.     Electrolyte abnormality - previous hypoK+, current BMP normal; monitor.     Pain control - stable, mild-to-moderate joint symptoms intermittently, reasonably well controlled by PRN meds. Will require regular pain assessment and comprenhensive pain management.     Bowel program - at risk for constipation due to lack of mobility. MiraLAX daily for regularity, Meghan-Colace for stool softener prn. PRN MOM, bisacodyl suppository or tablets for constipation.  -6/15 normally has bm every other day. Will add daily senna.   -6/16 good results  -no complaints     Depression - no previous history; at risk of post-stroke depression. Monitor.     Pneumonia prophylaxis - incentive spirometer every hour while awake.     Newly diagnosed NELL; pt sets alarm off if not supine. If he rolls side to side , he desats. CXR shows atelectasis; enc IS  -6/16 not tolerating CPAP; O2 NC at noc at 1L, sats 91-98%  -6/17 weaned off O2 during day yesterday. Will get nocturnal pulse ox study closer to dc. Reports no hx of NELL , wife reported no snoring but breaths \"heavy\"  -6/19 required O2 during PT     DVT risk / DVT prophylaxis - will require daily physician / PA exam to assess for signs and symptoms as patient is at increased risk for of thromboembolism. Mobilize as tolerated. Sequential pneumatic compression devices (SCDs) when in bed; thigh-high or knee-high thromboembolic deterrent hose when out of bed. On DAPT; Lovenox subcutaneously daily.   -6/17 hold Lovenox due to zainab hematuria     Wound care - monitor general skin wound status daily per staff and physician / PA. At risk for failure. Will require 24/7 rehab nursing. No current wounds.     GERD - at times may need antacids, Maalox prn. Time spent was 25 minutes with over 1/2 in direct patient care/examination, consultation and coordination of care.      Signed By: Renita Rios MD Mel-C    June 20, 2020      Physician Assistant with [facility]  Lilian Larose MD, Medical Director

## 2020-06-20 NOTE — PROGRESS NOTES
PHYSICAL THERAPY DAILY NOTE  Time In: 1332  Time Out: 1421  Patient Seen For: PM;Balance activities;Gait training;Patient education; Therapeutic exercise;Transfer training; Wheelchair mobility    Subjective: Pt stated that he felt better yesterday after they put his patch on. Pt stated that he still feels dizzy today. Objective: Other (comment)(fall risk)   Visit Vitals  /72   Pulse 73   Temp 98.4 °F (36.9 °C)   Resp 18   Ht 5' 9\" (1.753 m)   Wt 123.3 kg (271 lb 12.8 oz)   SpO2 96%   BMI 40.14 kg/m²   Pain level: 0/10  Pain interventions: no pain intervention needed during session today. Patient education:Bed mobility training,transfer training, gait training, fall precautions, activity pacing, w/c mobility and parts management  Interdisciplinary Communication: Spoke with RN about pt's c/o feeling dizzy this afternoon. GROSS ASSESSMENT Daily Assessment    AROM: Within functional limits       COGNITION Daily Assessment    Alert and oriented. Pt is able to consistently follow commands. Pt is very cheerful and agreeable to treatment. BED/MAT MOBILITY Daily Assessment    Rolling Right : 0 (Not tested)  Rolling Left : 0 (Not tested)  Supine to Sit : 0 (Not tested)  Sit to Supine : 0 (Not tested)       TRANSFERS Daily Assessment   Worked on sit<>stand x10 reps with CGA with no increase c/o dizziness. Pt did require multiple rest breaks during activity. Transfer Type: SPT with walker  Transfer Assistance : 4 (Minimal assistance)  Sit to Stand Assistance: Minimal assistance  Car Transfers: Not tested       GAIT Daily Assessment   Pt was unable to ambulate further due to increase c/o dizziness with ambulation. Pt continues to demonstrate ataxic gt pattern.   Amount of Assistance: 4 (Minimal assistance)  Distance (ft): 15 Feet (ft)  Assistive Device: Walker, rolling       STEPS or STAIRS Daily Assessment    Steps/Stairs Ambulated (#): 0  Level of Assist : 0 (Not tested)       BALANCE Daily Assessment   Pt worked on standing static and dynamic balance X10 min activity with fair endurance and no increase c/o dizziness. Sitting - Static: Good (unsupported)  Sitting - Dynamic: Good (unsupported)  Standing - Static: Constant support;Good  Standing - Dynamic : Impaired       WHEELCHAIR MOBILITY Daily Assessment   Verbal cues to navigate obstacles and perform turns. Able to Propel (ft): 100 feet  Curbs/Ramps Assist Required (FIM Score): 0 (Not tested)  Wheelchair Setup Assist Required : 4 (Minimal assistance)  Wheelchair Management: Manages left brake;Manages right brake       LOWER EXTREMITY EXERCISES Daily Assessment   NuStep x10 min level 2 Extremity: Both  Exercise Type #1: Seated lower extremity strengthening  Sets Performed: 1  Reps Performed: 15  Level of Assist: Stand-by assistance          Assessment: Pt tolerated treatment well with increase c/o dizziness with ambulation. Pt voiced his frustration with not being able to ambulate without increase dizziness. Pt stated that he feels this is limiting his progress. RN was made aware of patient's increase dizziness. Pt will benefit from continued skilled PT at this time. Patient returned to room at end of treatment and assisted to recliner with LEs elevated and with needs in reach. Wife was present in room. Plan of Care: Continue with POC and progress as tolerated.        Jh Michel, PT  6/20/2020

## 2020-06-21 LAB
GLUCOSE BLD STRIP.AUTO-MCNC: 111 MG/DL (ref 65–100)
GLUCOSE BLD STRIP.AUTO-MCNC: 111 MG/DL (ref 65–100)
GLUCOSE BLD STRIP.AUTO-MCNC: 145 MG/DL (ref 65–100)
GLUCOSE BLD STRIP.AUTO-MCNC: 151 MG/DL (ref 65–100)

## 2020-06-21 PROCEDURE — 65310000000 HC RM PRIVATE REHAB

## 2020-06-21 PROCEDURE — 74011250637 HC RX REV CODE- 250/637: Performed by: PHYSICIAN ASSISTANT

## 2020-06-21 PROCEDURE — 77030019905 HC CATH URETH INTMIT MDII -A

## 2020-06-21 PROCEDURE — 82962 GLUCOSE BLOOD TEST: CPT

## 2020-06-21 PROCEDURE — 77030040830 HC CATH URETH FOL MDII -A

## 2020-06-21 PROCEDURE — 74011636637 HC RX REV CODE- 636/637: Performed by: PHYSICIAN ASSISTANT

## 2020-06-21 PROCEDURE — 74011250637 HC RX REV CODE- 250/637: Performed by: PHYSICAL MEDICINE & REHABILITATION

## 2020-06-21 RX ORDER — CLONIDINE HYDROCHLORIDE 0.1 MG/1
0.1 TABLET ORAL
Status: DISCONTINUED | OUTPATIENT
Start: 2020-06-21 | End: 2020-07-01 | Stop reason: HOSPADM

## 2020-06-21 RX ADMIN — DOXAZOSIN MESYLATE 8 MG: 4 TABLET ORAL at 19:43

## 2020-06-21 RX ADMIN — GLIPIZIDE 5 MG: 5 TABLET ORAL at 09:56

## 2020-06-21 RX ADMIN — DOXAZOSIN MESYLATE 8 MG: 4 TABLET ORAL at 09:56

## 2020-06-21 RX ADMIN — SENNOSIDES AND DOCUSATE SODIUM 1 TABLET: 8.6; 5 TABLET ORAL at 09:56

## 2020-06-21 RX ADMIN — ASPIRIN 81 MG 81 MG: 81 TABLET ORAL at 09:56

## 2020-06-21 RX ADMIN — INSULIN LISPRO 2 UNITS: 100 INJECTION, SOLUTION INTRAVENOUS; SUBCUTANEOUS at 21:14

## 2020-06-21 RX ADMIN — ATORVASTATIN CALCIUM 80 MG: 80 TABLET, FILM COATED ORAL at 19:42

## 2020-06-21 RX ADMIN — LORATADINE 10 MG: 10 TABLET ORAL at 09:56

## 2020-06-21 RX ADMIN — AMLODIPINE BESYLATE 5 MG: 5 TABLET ORAL at 09:56

## 2020-06-21 RX ADMIN — ACETAMINOPHEN 650 MG: 325 TABLET, FILM COATED ORAL at 00:47

## 2020-06-21 NOTE — PROGRESS NOTES
Problem: Diabetes Self-Management  Goal: *Disease process and treatment process  Description: Define diabetes and identify own type of diabetes; list 3 options for treating diabetes. Outcome: Progressing Towards Goal  Goal: *Incorporating nutritional management into lifestyle  Description: Describe effect of type, amount and timing of food on blood glucose; list 3 methods for planning meals. Outcome: Progressing Towards Goal  Goal: *Incorporating physical activity into lifestyle  Description: State effect of exercise on blood glucose levels. Outcome: Progressing Towards Goal  Goal: *Developing strategies to promote health/change behavior  Description: Define the ABC's of diabetes; identify appropriate screenings, schedule and personal plan for screenings. Outcome: Progressing Towards Goal  Goal: *Using medications safely  Description: State effect of diabetes medications on diabetes; name diabetes medication taking, action and side effects. Outcome: Progressing Towards Goal  Goal: *Monitoring blood glucose, interpreting and using results  Description: Identify recommended blood glucose targets  and personal targets. Outcome: Progressing Towards Goal  Goal: *Prevention, detection, treatment of acute complications  Description: List symptoms of hyper- and hypoglycemia; describe how to treat low blood sugar and actions for lowering  high blood glucose level. Outcome: Progressing Towards Goal  Goal: *Prevention, detection and treatment of chronic complications  Description: Define the natural course of diabetes and describe the relationship of blood glucose levels to long term complications of diabetes.   Outcome: Progressing Towards Goal  Goal: *Developing strategies to address psychosocial issues  Description: Describe feelings about living with diabetes; identify support needed and support network  Outcome: Progressing Towards Goal  Goal: *Sick day guidelines  Outcome: Progressing Towards Goal     Problem: Patient Education: Go to Patient Education Activity  Goal: Patient/Family Education  Outcome: Progressing Towards Goal     Problem: Falls - Risk of  Goal: *Absence of Falls  Description: Document Murraynaveed Ramireznce Fall Risk and appropriate interventions in the flowsheet.   Outcome: Progressing Towards Goal  Note: Fall Risk Interventions:  Mobility Interventions: Communicate number of staff needed for ambulation/transfer, Patient to call before getting OOB         Medication Interventions: Evaluate medications/consider consulting pharmacy    Elimination Interventions: Call light in reach    History of Falls Interventions: Door open when patient unattended         Problem: Patient Education: Go to Patient Education Activity  Goal: Patient/Family Education  Outcome: Progressing Towards Goal     Problem: Inpatient Rehab (Adult)  Goal: *LTG: Avoids injury/falls 100% of time related to deficits  Outcome: Progressing Towards Goal  Goal: *LTG: Avoids infection 100% of time related to deficits  Outcome: Progressing Towards Goal  Goal: *LTG: Verbalize understanding of diagnosis and risk factors for recurring stroke  Outcome: Progressing Towards Goal  Goal: *LTG: Absence of DVT during hospitalization  Outcome: Progressing Towards Goal  Goal: *LTG: Maintains Skin Integrity With No Evidence of Pressure Injury 100% of Time  Outcome: Progressing Towards Goal  Goal: Interventions  Outcome: Progressing Towards Goal     Problem: Patient Education: Go to Patient Education Activity  Goal: Patient/Family Education  Outcome: Progressing Towards Goal

## 2020-06-21 NOTE — PROGRESS NOTES
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 Unable to continue therapy due to dizziness this AM, reports same symptoms as before but worse than episodes of last 2d. O2 sats dropping into high 80s during PT, patient symptomatic, supplemental O2 applied. Expresses dismay and mild frustration that \"I'm not improving like everyone else up here. \" Has had more issues with catheter: sleep interrupted by flushing overnight, Stat-lock tether with too-short tubing tugging penis with stationary bike. Encouraged him to notify staff of these issues, any other complaints. Teary, states, \"I don't want to be a burden. \" Much reassurance given. Scopolamine patch applied. Patient seen and examined. Unsuccessful voiding trial yesterday/today. Cathed for 640, 722 ml with hematuria returning. Reports bladder fullness sensation. No pain complaints. Denies lightheadedness, palpations, SOB, nausea, constipation or dysuria. Participating in therapy.      Objective:     Current Facility-Administered Medications   Medication Dose Route Frequency    scopolamine (TRANSDERM-SCOP) 1 mg over 3 days 1 Patch  1 Patch TransDERmal Q72H    glipiZIDE (GLUCOTROL) tablet 5 mg  5 mg Oral ACB    hydrocortisone (CORTAID) 1 % cream   Topical BID PRN    lip protectant (BLISTEX) ointment 1 Each  1 Each Topical PRN    senna-docusate (PERICOLACE) 8.6-50 mg per tablet 1 Tab  1 Tab Oral DAILY    ondansetron (ZOFRAN ODT) tablet 4 mg  4 mg Oral Q6H PRN    meclizine (ANTIVERT) tablet 25 mg  25 mg Oral Q6H PRN    acetaminophen (TYLENOL) tablet 650 mg  650 mg Oral Q6H PRN    amLODIPine (NORVASC) tablet 5 mg  5 mg Oral DAILY    aspirin chewable tablet 81 mg  81 mg Oral DAILY    atorvastatin (LIPITOR) tablet 80 mg  80 mg Oral QHS    [Held by provider] clopidogreL (PLAVIX) tablet 75 mg  75 mg Oral DAILY    doxazosin (CARDURA) tablet 8 mg  8 mg Oral BID    [Held by provider] enoxaparin (LOVENOX) injection 40 mg  40 mg SubCUTAneous Q24H    insulin lispro (HUMALOG) injection   SubCUTAneous AC&HS    loratadine (CLARITIN) tablet 10 mg  10 mg Oral DAILY    sodium chloride (OCEAN) 0.65 % nasal squeeze bottle 2 Spray  2 Spray Both Nostrils Q2H PRN    pneumococcal 23-valent (PNEUMOVAX 23) injection 0.5 mL  0.5 mL IntraMUSCular PRIOR TO DISCHARGE    polyethylene glycol (MIRALAX) packet 17 g  17 g Oral DAILY    bisacodyL (DULCOLAX) tablet 5 mg  5 mg Oral DAILY PRN    Or    bisacodyL (DULCOLAX) suppository 10 mg  10 mg Rectal DAILY PRN    Or    magnesium hydroxide (MILK OF MAGNESIA) 400 mg/5 mL oral suspension 30 mL  30 mL Oral DAILY PRN     Review of Systems:   Denies chest pain, shortness of breath, cough, headache, visual problems, abdominal pain, dysuria, calf pain. Pertinent positives are as noted in the HPI, ROS unremarkable otherwise. Visit Vitals  /75   Pulse 61   Temp 97.7 °F (36.5 °C)   Resp 18   Ht 5' 9\" (1.753 m)   Wt 123.3 kg (271 lb 12.8 oz)   SpO2 93%   BMI 40.14 kg/m²        Physical Exam:   General: Alert, appropriately oriented. Teary at times, no acute cardiorespiratory distress. HEENT: Normocephalic, EOM intact. Oral mucosa moist.   Lungs: Clear to auscultation bilaterally. Respirations even and unlabored. Heart: Regular rate and underlying rhythm. No appreciable murmurs. Abdomen: Soft, non-tender, obese and very protuberant but not distended. Bowel sounds normoactive. Genitourinary:  cochran, hematuria +   Neuromuscular:      No pronator drift, no dysmetria. UE biceps 4-/5 (R), 4+/5 (L), remaining UE 4/5 and symmetric. Left B LE off footrest against gravity, HF 4-/5 (B). Skin/extremity: No rashes, no erythema. Calves soft, non-tender B LE.                                                                                Functional Assessment:  Gross Assessment  AROM: Within functional limits (06/20/20 1400)  PROM: Within functional limits (06/16/20 1100)  Strength: Generally decreased, functional (06/16/20 1100)  Coordination: Generally decreased, functional (06/16/20 1100)       Balance  Sitting - Static: Good (unsupported) (06/20/20 1400)  Sitting - Dynamic: Good (unsupported) (06/20/20 1400)  Standing - Static: Constant support;Good (06/20/20 1400)  Standing - Dynamic : Impaired (06/20/20 1400)           Ananya Parmar Fall Risk Assessment:  Ananya Parmar Fall Risk  Mobility: Ambulates or transfers with assist devices or assistance (06/21/20 0911)  Mobility Interventions: Communicate number of staff needed for ambulation/transfer;Patient to call before getting OOB (06/21/20 0911)  Mentation: Alert, oriented x 3 (06/21/20 0911)  Medication: Patient receiving anticonvulsants, sedatives(tranquilizers), psychotropics or hypnotics, hypoglycemics, narcotics, sleep aids, antihypertensives, laxatives, or diuretics (06/21/20 0911)  Medication Interventions: Evaluate medications/consider consulting pharmacy (06/21/20 0911)  Elimination: Needs assistance with toileting (06/21/20 0911)  Elimination Interventions: Call light in reach (06/21/20 0911)  Prior Fall History: No (06/21/20 0911)  History of Falls Interventions: Door open when patient unattended (06/21/20 0911)  Total Score: 3 (06/21/20 0911)  High Fall Risk: Yes (06/21/20 0911)     Ambulation:  Gait  Distance (ft): 15 Feet (ft) (06/20/20 1400)  Assistive Device: Walker, rolling (06/20/20 1400)     Labs/Studies:  Recent Results (from the past 72 hour(s))   GLUCOSE, POC    Collection Time: 06/18/20 11:28 AM   Result Value Ref Range    Glucose (POC) 151 (H) 65 - 100 mg/dL   GLUCOSE, POC    Collection Time: 06/18/20  4:01 PM   Result Value Ref Range    Glucose (POC) 169 (H) 65 - 100 mg/dL   GLUCOSE, POC    Collection Time: 06/18/20  8:35 PM   Result Value Ref Range    Glucose (POC) 174 (H) 65 - 100 mg/dL   GLUCOSE, POC    Collection Time: 06/19/20  6:35 AM   Result Value Ref Range    Glucose (POC) 126 (H) 65 - 100 mg/dL   GLUCOSE, POC    Collection Time: 06/19/20 11:41 AM   Result Value Ref Range    Glucose (POC) 118 (H) 65 - 100 mg/dL   GLUCOSE, POC    Collection Time: 06/19/20  4:58 PM   Result Value Ref Range    Glucose (POC) 89 65 - 100 mg/dL   GLUCOSE, POC    Collection Time: 06/19/20  8:27 PM   Result Value Ref Range    Glucose (POC) 139 (H) 65 - 100 mg/dL   GLUCOSE, POC    Collection Time: 06/20/20  7:32 AM   Result Value Ref Range    Glucose (POC) 127 (H) 65 - 100 mg/dL   GLUCOSE, POC    Collection Time: 06/20/20 11:31 AM   Result Value Ref Range    Glucose (POC) 125 (H) 65 - 100 mg/dL   GLUCOSE, POC    Collection Time: 06/20/20  4:38 PM   Result Value Ref Range    Glucose (POC) 84 65 - 100 mg/dL   GLUCOSE, POC    Collection Time: 06/20/20  9:17 PM   Result Value Ref Range    Glucose (POC) 123 (H) 65 - 100 mg/dL   GLUCOSE, POC    Collection Time: 06/21/20  7:28 AM   Result Value Ref Range    Glucose (POC) 111 (H) 65 - 100 mg/dL       Assessment:     Problem List as of 6/21/2020 Date Reviewed: 5/1/2020          Codes Class Noted - Resolved    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  ICD-9-CM: 434.91  6/14/2020 - Present        Cerebellar stroke ([facility] ICD-10-CM: I63.9  ICD-9-CM: 434.91  6/14/2020 - Present        Urinary retention ICD-10-CM: R33.9  ICD-9-CM: 788.20  6/8/2020 - Present        Stroke (cerebrum) ([facility]) ICD-10-CM: I63.9  ICD-9-CM: 434.91  6/8/2020 - Present        Severe obesity ([facility] ICD-10-CM: E66.01  ICD-9-CM: 278.01  7/2/2019 - Present        Right thalamic infarction ([facility] ICD-10-CM: I63.9  ICD-9-CM: 434.91  7/2/2019 - Present        Numbness and tingling in left hand ICD-10-CM: R20.0, R20.2  ICD-9-CM: 782.0  7/2/2019 - Present        Controlled type 2 diabetes mellitus without complication, without long-term current use of insulin (HCC) ICD-10-CM: E11.9  ICD-9-CM: 250.00  2/22/2019 - Present        Cerebrovascular accident (CVA) due to occlusion [facility]) ICD-10-CM: I63.9  ICD-9-CM: 434.91  2/12/2019 - Present        Essential hypertension, benign ICD-10-CM: I10  ICD-9-CM: 401.1  1/3/2014 - Present        Mixed hyperlipidemia ICD-10-CM: E78.2  ICD-9-CM: 272.2  1/3/2014 - Present              Plan / Recommendations / Medical Decision Making:     Continue daily physician / PA medical management:    New ischemic stroke, right cerebellar hemisphere, right dorsolateral medulla; secondary stroke prevention - DAPT, high-intensity statin (Lipitor), BP management.  -dizziness, nausea, intermittent diplopia; prn meclizine  -6/19 scopolamine patch applied     Hypertension / hypotension - patient on doxazosin 8mg twice daily from PCP, now additionally on amlodipine 5mg. /63, P 75 on admission here; patient gives pretty clear history of brief orthostatic hypotension symptoms when sitting from supine prior to meals. Staff to perform OH maneuvers, may hold amlodipine.  -6/17 bp 142/69; no orthostatics; cont norvasc and cardura  -6/18 bp 152/89 (129-165), no reported orthostatics; cont current meds  -6/21 BP - 158/75, continues to fluctuate     Diabetes mellitus - uncontrolled / poor glycemic control, HgbA1c 7.8% (6/9/2020). Will require daily, close FSG monitoring and medication adjustment to optimize glycemic control in setting of acute illness and hospitalization. POC blood glucose around 130s-150s. Currently on Humalog SSI; will monitor and manage.  -6/15 fair control at this time.  Consider low dose antihyperglycemic  -6/16 bs 147 this a.m., 167 at bedtime, 182 at supper, 152 at lunch; start glucotrol 2.5 mg  -6/17 124 this a.m; 188 at bedtime, 135 with supper, 133 with lunch; cont Glucotrol 2.5 mg, increase to 5mg if bs not consistently below 120-130  -6/18 bs 120 this a.m, 135 last pm, 200 at supper, 93 at lunch, still not consistent  -6/19 -174, increase glipizide to 5mg  - 6/21 BG-111, BG levels < 130      Urinary retention - cochran catheter in place since ED 6/8; Urologist suspects urinary retention part of stroke syndrome. Catheter remains until 6/16, at which time it will be removed for voiding trial.  -pt on Cardura 8mg. Check UA; lg blood, lg leuk; pt didn't have micro done. Asymptomatic  Cochran discontinued this a.m; await results  -6/17 cochran replaced due to hematuria and inability to void. Small amt of hematuria after one CIC; when cochran replaced; zainab blood, continues this a.m. lg amt of blood. Check CBC, irrigated without improvement, Urology Consulted. ? Continuous irrigation/3-way cochran. ?cysto; hold plavix and Lovenox. No hx of retention or hematuria.; +>100K gram neg rods s/p one dose Rocephing 1g IV, on septra, day 2; await ID/sens.   -E coli UTI; sensitive to Septra, day 3/7. CIB per Urology, appreciate assistance. Hgb down one gram but stable 12.1; urine is getting pinker; no clots noted in bag. Continue  -6/19 Urologist D/Cing cochran irrigation; will ask NP to address Stat-lock / tethering issue. - 6/21 unsuccessful voiding trial yesterday/today with hematuria returning after catheterization, cochran reinserted, continue to irrigate, cochran functioning well    Hyperlipidemia - on high-intensity statin.     Electrolyte abnormality - previous hypoK+, current BMP normal; monitor.     Pain control - stable, mild-to-moderate joint symptoms intermittently, reasonably well controlled by PRN meds. Will require regular pain assessment and comprenhensive pain management.     Bowel program - at risk for constipation due to lack of mobility. MiraLAX daily for regularity, Meghan-Colace for stool softener prn.  PRN MOM, bisacodyl suppository or tablets for constipation.  -6/15 normally has bm every other day. Will add daily senna.   -6/16 good results  -no complaints     Depression - no previous history; at risk of post-stroke depression. Monitor.     Pneumonia prophylaxis - incentive spirometer every hour while awake.     Newly diagnosed NELL; pt sets alarm off if not supine. If he rolls side to side , he desats. CXR shows atelectasis; enc IS  -6/16 not tolerating CPAP; O2 NC at noc at 1L, sats 91-98%  -6/17 weaned off O2 during day yesterday. Will get nocturnal pulse ox study closer to [place]. Reports no hx of NELL , wife reported no snoring but breaths \"heavy\"  -6/19 required O2 during PT     DVT risk / DVT prophylaxis - will require daily physician / PA exam to assess for signs and symptoms as patient is at increased risk for of thromboembolism. Mobilize as tolerated. Sequential pneumatic compression devices (SCDs) when in bed; thigh-high or knee-high thromboembolic deterrent hose when out of bed. On DAPT; Lovenox subcutaneously daily.   -6/17 hold Lovenox due to zainab hematuria     Wound care - monitor general skin wound status daily per staff and physician / PA. At risk for failure. Will require 24/7 rehab nursing. No current wounds.     GERD - at times may need antacids, Maalox prn. Time spent was 25 minutes with over 1/2 in direct patient care/examination, consultation and coordination of care. Signed By: CRISTOBAL Fajardo    June 21, 2020      Physician Assistant with [facility]  Lilian Dial MD, Medical Director

## 2020-06-21 NOTE — PROGRESS NOTES
Sitting up in recliner, cochran catheter patent/returning cherry colored blood to bedside drainage bag. No complaints voiced. Call bell within reach, hourly rounds completed this shift.

## 2020-06-21 NOTE — PROGRESS NOTES
Patient was not able to void. Patient bladder scanned for Greater than 633 ml. Straight cath for 725 ml hazy burgundy colored urine.

## 2020-06-21 NOTE — PROGRESS NOTES
Mena catheter inserted per MD order/failed voiding trial. [address] catheter inserted without difficulty. Mena catheter patent/returning bloody colored urine to bedside drainage bag. Patient tolerated well.

## 2020-06-22 LAB
GLUCOSE BLD STRIP.AUTO-MCNC: 117 MG/DL (ref 65–100)
GLUCOSE BLD STRIP.AUTO-MCNC: 125 MG/DL (ref 65–100)
GLUCOSE BLD STRIP.AUTO-MCNC: 166 MG/DL (ref 65–100)
GLUCOSE BLD STRIP.AUTO-MCNC: 167 MG/DL (ref 65–100)

## 2020-06-22 PROCEDURE — 99232 SBSQ HOSP IP/OBS MODERATE 35: CPT | Performed by: PHYSICAL MEDICINE & REHABILITATION

## 2020-06-22 PROCEDURE — 82962 GLUCOSE BLOOD TEST: CPT

## 2020-06-22 PROCEDURE — 74011636637 HC RX REV CODE- 636/637: Performed by: PHYSICIAN ASSISTANT

## 2020-06-22 PROCEDURE — 74011250637 HC RX REV CODE- 250/637: Performed by: PHYSICAL MEDICINE & REHABILITATION

## 2020-06-22 PROCEDURE — 97112 NEUROMUSCULAR REEDUCATION: CPT

## 2020-06-22 PROCEDURE — 74011250637 HC RX REV CODE- 250/637: Performed by: PHYSICIAN ASSISTANT

## 2020-06-22 PROCEDURE — 97110 THERAPEUTIC EXERCISES: CPT

## 2020-06-22 PROCEDURE — 65310000000 HC RM PRIVATE REHAB

## 2020-06-22 PROCEDURE — 74011250636 HC RX REV CODE- 250/636: Performed by: PHYSICAL MEDICINE & REHABILITATION

## 2020-06-22 PROCEDURE — 97535 SELF CARE MNGMENT TRAINING: CPT

## 2020-06-22 PROCEDURE — 97530 THERAPEUTIC ACTIVITIES: CPT

## 2020-06-22 PROCEDURE — 92507 TX SP LANG VOICE COMM INDIV: CPT

## 2020-06-22 PROCEDURE — 51798 US URINE CAPACITY MEASURE: CPT

## 2020-06-22 RX ORDER — MECLIZINE HYDROCHLORIDE 25 MG/1
25 TABLET ORAL DAILY
Status: DISCONTINUED | OUTPATIENT
Start: 2020-06-23 | End: 2020-06-23

## 2020-06-22 RX ADMIN — AMLODIPINE BESYLATE 5 MG: 5 TABLET ORAL at 08:51

## 2020-06-22 RX ADMIN — ATORVASTATIN CALCIUM 80 MG: 80 TABLET, FILM COATED ORAL at 21:17

## 2020-06-22 RX ADMIN — ASPIRIN 81 MG 81 MG: 81 TABLET ORAL at 08:51

## 2020-06-22 RX ADMIN — MECLIZINE HYDROCHLORIDE 25 MG: 25 TABLET ORAL at 08:57

## 2020-06-22 RX ADMIN — POLYETHYLENE GLYCOL 3350 17 G: 17 POWDER, FOR SOLUTION ORAL at 08:57

## 2020-06-22 RX ADMIN — GLIPIZIDE 5 MG: 5 TABLET ORAL at 08:51

## 2020-06-22 RX ADMIN — INSULIN LISPRO 2 UNITS: 100 INJECTION, SOLUTION INTRAVENOUS; SUBCUTANEOUS at 17:51

## 2020-06-22 RX ADMIN — SENNOSIDES AND DOCUSATE SODIUM 1 TABLET: 8.6; 5 TABLET ORAL at 08:51

## 2020-06-22 RX ADMIN — LORATADINE 10 MG: 10 TABLET ORAL at 08:51

## 2020-06-22 RX ADMIN — DOXAZOSIN MESYLATE 8 MG: 4 TABLET ORAL at 08:51

## 2020-06-22 RX ADMIN — DOXAZOSIN MESYLATE 8 MG: 4 TABLET ORAL at 21:16

## 2020-06-22 RX ADMIN — INSULIN LISPRO 2 UNITS: 100 INJECTION, SOLUTION INTRAVENOUS; SUBCUTANEOUS at 21:17

## 2020-06-22 RX ADMIN — ONDANSETRON 4 MG: 4 TABLET, ORALLY DISINTEGRATING ORAL at 13:25

## 2020-06-22 NOTE — PROGRESS NOTES
PHYSICAL THERAPY DAILY NOTE  Time In: 0421  Time Out: 5410  Patient Seen For: AM;Transfer training; Other (see progress notes)(vestibular rehab)    Subjective: Pt. Reports he had a good weekend. States that he got very dizzy this morning upon getting out of bed & that it has not resolved. Currently describes dizzy as constant & rates as a 5/10. 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 Reported blurriness & difficulty reading. L eye then patched with pt. Reporting no blurriness. R eye patched w/ pt. Reporting mild blurriness. COGNITION Daily Assessment    Pt. Verbalizes good understanding of information as evidenced by appropriate questions       BED/MAT MOBILITY Daily Assessment    Rolling Right : 0 (Not tested)  Rolling Left : 0 (Not tested)  Supine to Sit : 0 (Not tested)  Sit to Supine : 0 (Not tested)       TRANSFERS Daily Assessment    Transfer Type: SPT with walker  Transfer Assistance : 4 (Minimal assistance)  Sit to Stand Assistance: Minimal assistance  Car Transfers: Not tested       GAIT Daily Assessment    Amount of Assistance: 0 (Not tested)  Distance (ft): 0 Feet (ft)       STEPS or STAIRS Daily Assessment    Steps/Stairs Ambulated (#): 0  Level of Assist : 0 (Not tested)       BALANCE Daily Assessment    Sitting - Static: Good (unsupported)  Sitting - Dynamic: Good (unsupported)  Standing - Static: Fair  Standing - Dynamic : Impaired       WHEELCHAIR MOBILITY Daily Assessment    Able to Propel (ft): 0 feet  Curbs/Ramps Assist Required (FIM Score): 0 (Not tested)  Wheelchair Setup Assist Required : 0 (Not tested)       LOWER EXTREMITY EXERCISES Daily Assessment    Pt.  Performed gaze stabilization exercises:  Horizontal saccades x60 seconds w/ conjugate gaze. Vital Signs: /82   Pulse 61   Temp 98.5 °F (36.9 °C)   Resp 16   Ht 5' 9\" (1.753 m)   Wt 123.3 kg (271 lb 12.8 oz)   SpO2 93%   BMI 40.14 kg/m²     Pain level: no c/o pain    Patient education: Provided education @ length with patient using verbal instruction, demonstration, model of brain, & pictures. Information included:  Pathogenesis of CVA, basic anatomy & connection between pt's infarcts & current symptoms, neuroplasticity, gaze stabilization exercise, & eye patch recommendations. Pt. Given handout of exercises w/ instructions to perform 2-3 reps 5-6x/day. Pt. Marylin Vee understanding. Interdisciplinary Communication:  SLP informed about eye patch use during reading activities; Discussed medication administration w/ PA    Pt. Left in recliner w/ SLP for next therapy session. Assessment: Pt. Presenting w/ saccadic dysmetria as well as vestibular dysfunction s/p CVA. Pt. Thomas Puentes increased vertigo this AM.  Recommend pt. Schedule meclizine in early AM to decrease vertigo upon arising in the AM to improve  Pt's tolerance of movement as well as participation/tolerance of therapies. Pt. Also benefits from gaze stabilization exercises as well as ongoing PT to provide instruction on ways to minimize vertigo during functional tasks. Will cont. To address. Plan of Care: Continue with POC and progress as tolerated.      Niyah Ayers, PT  6/22/2020

## 2020-06-22 NOTE — PROGRESS NOTES
Hourly rounds completed, patient awake with no needs, call light in reach, will continue to monitor until report with day shift nurse.

## 2020-06-22 NOTE — PROGRESS NOTES
OT WEEKLY PROGRESS NOTE    Time In: 0740  Time Out: 0830    Mobility   Score Comments   Rolling 6: Independent Side: Right     Supine to Sit 6: Independent    Sit to Stand 4: Supervision or touching A SBA   Transfer Assist 4: Supervision or touching A Transfer Type: SPT   Equipment: Rolling Walker   Comments: CGA     Activities of Daily Living    Score Comments   Eating 6: Independent    Oral Hyigene 5: S/U or clean-up assist    Bathing 4: Supervision or touching A Type of Shower: Shower  Position: Standing PRN and Unsupported Sitting   Adaptive  Equipment: Tub Transfer Bench and Grab Bars  Comments: Assist to wash bottom in stance   Upper Body  Dressing 5: S/U or clean-up assist Items Applied: Pullover  Position: Supported Sitting  Comments:    Lower Body Dressing 4: Supervision or touching A Items Applied: Underwear and Elastic pants  Position: Supported sitting and Standing PRN  Adaptive Equipment: Reacher, RW and W/C  Comments: Demonstrated use of reacher, pt able to thread B feet through clothing with verbal cues for managing cochran catheter. Partial assist to pull clothing over waist posteriorly   Donning/Big Pool Footwear 5: S/U or clean-up assist Items Applied: Socks  Adaptive Equipment: Sock Aide  Comments: Demonstrated use of sock aide, pt able to complete following education   Toilet Transfer 4: Supervision or touching A Transfer Type: SPT   Equipment: grab bars   Comments: [address] 4: Supervision or touching A Output: BM  Comments: Assist for toileting hygiene, pt able to complete clothing management   Education  AE use during dressing       Plan of Care: Please see Care Plan for updated LTGs. Family Training: to be completed closer to discharge date. Summary of Progress: Patient demonstrates good progress with strength, coordination, balance, and mobility for performance of ADL and functional transfers, see above for details.   Patient continues to require skilled OT services to address deficits, provide education, and progress towards goals as stated in POC. Summary of Session:   S: \"I sat up in the chair all day yesterday and I wasn't dizzy. \" Agreeable to therapy. Focus of session was on progress assessment. Patient was able to ambulate ~5 feet using a RW with CGA. Pain not expressed. Collaborated with PT and confirmed patient is on track to reach goals as documented in the care plan. Continue OT POC with focus on ADL/IADL skills, functional transfers, functional mobility, coordination, strength, static and dynamic balance, and activity tolerance to maximize safety and independence with ADLs and functional transfers  Patient ended session in wc with PT assuming care.        Antonio Renae, OTR/L  6/22/2020

## 2020-06-22 NOTE — PROGRESS NOTES
06/22/20 1207   Time Spent With Patient   Time In 0921   Time Out 1000   Patient Seen For: AM   Mental Status   Neurologic State Alert   Orientation Level Oriented X4   Cognition Memory loss;Decreased attention/concentration   Perception Visual   Safety/Judgement Home safety        06/22/20 1208   Memory Exercises   Oral Paragraph Accuracy (%) 80 % immediate recall of a brief paragraph due to decreased recall of details   Attention Exercises Performed   Accuracy (%) 75 %  category inclusion activitiy in a field of 4   Neuro-Linguistic Exercises   Memory Impaired; discussed compensatory memory strategies and provided examples of appropriate use   Attention  Impaired; writing short hand instructions to navigate to a destination completed with patient able to retell instructions with 80% accuracy     Patient participated with cognitive therapy. PT provided patient with patch to assist with visual deficits. Patient recalled instructions for placement over left eye during reading tasks. Immediate recall of paragraph level reading material completed with 80% accuracy with decreased recall of more detailed information. Attention based category inclusion task in a field of 4 completed with 75% accuracy with improved accuracy with cues for repetition. Writing short hand instructions to navigate to a destination completed with patient able to retell instructions accurately 80% of the time. Discussed compensatory memory strategies and provided examples of appropriate situations to implement. Recommend continued therapy to address attention, memory, and reasoning.     Mikayla Lindsey MS, CCC-SLP

## 2020-06-22 NOTE — PROGRESS NOTES
Patient slept well through the night. Mena was emptied at 2300 with 700 ml of bloody urine draining and at 0500 with 300 ml of bloody urine with some small clots. Patient comfortable, no pain but bladder scanned to double check catheter flow. Bladder scan >21ml of urine. Patient drinking water presently. Call light in reach and patient understands to call with any needs.

## 2020-06-22 NOTE — PROGRESS NOTES
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 Vss. No acute complaints. PT, OT well tolerated. Steady gains made. No new barrier to progress noted. -feels well. No complaints. Glad to be getting back to regular therapy schedule; cochran reinserted yesterday. \" my wife isnt happy about that! \"  Objective:     Current Facility-Administered Medications   Medication Dose Route Frequency    [START ON 6/23/2020] meclizine (ANTIVERT) tablet 25 mg  25 mg Oral DAILY    cloNIDine HCL (CATAPRES) tablet 0.1 mg  0.1 mg Oral Q6H PRN    scopolamine (TRANSDERM-SCOP) 1 mg over 3 days 1 Patch  1 Patch TransDERmal Q72H    glipiZIDE (GLUCOTROL) tablet 5 mg  5 mg Oral ACB    hydrocortisone (CORTAID) 1 % cream   Topical BID PRN    lip protectant (BLISTEX) ointment 1 Each  1 Each Topical PRN    senna-docusate (PERICOLACE) 8.6-50 mg per tablet 1 Tab  1 Tab Oral DAILY    ondansetron (ZOFRAN ODT) tablet 4 mg  4 mg Oral Q6H PRN    meclizine (ANTIVERT) tablet 25 mg  25 mg Oral Q6H PRN    acetaminophen (TYLENOL) tablet 650 mg  650 mg Oral Q6H PRN    amLODIPine (NORVASC) tablet 5 mg  5 mg Oral DAILY    aspirin chewable tablet 81 mg  81 mg Oral DAILY    atorvastatin (LIPITOR) tablet 80 mg  80 mg Oral QHS    [Held by provider] clopidogreL (PLAVIX) tablet 75 mg  75 mg Oral DAILY    doxazosin (CARDURA) tablet 8 mg  8 mg Oral BID    [Held by provider] enoxaparin (LOVENOX) injection 40 mg  40 mg SubCUTAneous Q24H    insulin lispro (HUMALOG) injection   SubCUTAneous AC&HS    loratadine (CLARITIN) tablet 10 mg  10 mg Oral DAILY    sodium chloride (OCEAN) 0.65 % nasal squeeze bottle 2 Spray  2 Spray Both Nostrils Q2H PRN    pneumococcal 23-valent (PNEUMOVAX 23) injection 0.5 mL  0.5 mL IntraMUSCular PRIOR TO DISCHARGE    polyethylene glycol (MIRALAX) packet 17 g  17 g Oral DAILY    bisacodyL (DULCOLAX) tablet 5 mg  5 mg Oral DAILY PRN    Or    bisacodyL (DULCOLAX) suppository 10 mg  10 mg Rectal DAILY PRN    Or    magnesium hydroxide (MILK OF MAGNESIA) 400 mg/5 mL oral suspension 30 mL  30 mL Oral DAILY PRN       Review of Systems:   Denies chest pain, shortness of breath, cough, headache, visual problems, abdominal pain, dysuria, calf pain. Pertinent positives are as noted in the HPI, ROS unremarkable otherwise. Visit Vitals  /82   Pulse 61   Temp 98.5 °F (36.9 °C)   Resp 16   Ht 5' 9\" (1.753 m)   Wt 271 lb 12.8 oz (123.3 kg)   SpO2 93%   BMI 40.14 kg/m²        Physical Exam:   General: Alert and age appropriately oriented. No acute cardiorespiratory distress. HEENT: Normocephalic, no scleral icterus. Oral mucosa moist without cyanosis. Lungs: Clear to auscultation bilaterally. Respiration even and unlabored. Heart: Regular rate and rhythm, S1, S2. No murmurs, clicks, rub or gallops. Abdomen: Soft, non-tender, not distended. Bowel sounds normoactive. No organomegaly. obese   Genitourinary: Mena; hematuria   Neuromuscular:           No pronator drift, no dysmetria. UE biceps 4-/5 (R), 4+/5 (L), remaining UE 4/5 and symmetric. Left B LE off footrest against gravity, HF 4-/5 (B). Skin/extremity: No rashes, no erythema. No calf tenderness B LE.   No edema                                                                              Functional Assessment:  Gross Assessment  AROM: Within functional limits (06/20/20 1400)  PROM: Within functional limits (06/16/20 1100)  Strength: Generally decreased, functional (06/16/20 1100)  Coordination: Generally decreased, functional (06/16/20 1100)       Balance  Sitting - Static: Good (unsupported) (06/20/20 1400)  Sitting - Dynamic: Good (unsupported) (06/20/20 1400)  Standing - Static: Constant support;Good (06/20/20 1400)  Standing - Dynamic : Impaired (06/20/20 1400)                     Sulaiman Abdullahi Fall Risk Assessment:  Sulaiman Abdullahi Fall Risk  Mobility: Ambulates or transfers with assist devices or assistance (06/22/20 0715)  Mobility Interventions: Communicate number of staff needed for ambulation/transfer;Patient to call before getting OOB;Utilize walker, cane, or other assistive device (06/22/20 0715)  Mentation: Alert, oriented x 3 (06/22/20 0715)  Medication: Patient receiving anticonvulsants, sedatives(tranquilizers), psychotropics or hypnotics, hypoglycemics, narcotics, sleep aids, antihypertensives, laxatives, or diuretics (06/22/20 0715)  Medication Interventions: Evaluate medications/consider consulting pharmacy; Patient to call before getting OOB (06/22/20 0715)  Elimination: Needs assistance with toileting (06/22/20 0715)  Elimination Interventions: Call light in reach; Patient to call for help with toileting needs (06/22/20 0715)  Prior Fall History: No (06/22/20 0715)  History of Falls Interventions: Door open when patient unattended (06/21/20 0911)  Total Score: 3 (06/22/20 0715)  High Fall Risk: Yes (06/22/20 0715)     Speech Assessment:         Ambulation:  Gait  Distance (ft): 15 Feet (ft) (06/20/20 1400)  Assistive Device: Walker, rolling (06/20/20 1400)     Labs/Studies:  Recent Results (from the past 72 hour(s))   GLUCOSE, POC    Collection Time: 06/19/20 11:41 AM   Result Value Ref Range    Glucose (POC) 118 (H) 65 - 100 mg/dL   GLUCOSE, POC    Collection Time: 06/19/20  4:58 PM   Result Value Ref Range    Glucose (POC) 89 65 - 100 mg/dL   GLUCOSE, POC    Collection Time: 06/19/20  8:27 PM   Result Value Ref Range    Glucose (POC) 139 (H) 65 - 100 mg/dL   GLUCOSE, POC    Collection Time: 06/20/20  7:32 AM   Result Value Ref Range Glucose (POC) 127 (H) 65 - 100 mg/dL   GLUCOSE, POC    Collection Time: 06/20/20 11:31 AM   Result Value Ref Range    Glucose (POC) 125 (H) 65 - 100 mg/dL   GLUCOSE, POC    Collection Time: 06/20/20  4:38 PM   Result Value Ref Range    Glucose (POC) 84 65 - 100 mg/dL   GLUCOSE, POC    Collection Time: 06/20/20  9:17 PM   Result Value Ref Range    Glucose (POC) 123 (H) 65 - 100 mg/dL   GLUCOSE, POC    Collection Time: 06/21/20  7:28 AM   Result Value Ref Range    Glucose (POC) 111 (H) 65 - 100 mg/dL   GLUCOSE, POC    Collection Time: 06/21/20 11:41 AM   Result Value Ref Range    Glucose (POC) 145 (H) 65 - 100 mg/dL   GLUCOSE, POC    Collection Time: 06/21/20  4:45 PM   Result Value Ref Range    Glucose (POC) 111 (H) 65 - 100 mg/dL   GLUCOSE, POC    Collection Time: 06/21/20  8:20 PM   Result Value Ref Range    Glucose (POC) 151 (H) 65 - 100 mg/dL   GLUCOSE, POC    Collection Time: 06/22/20  5:02 AM   Result Value Ref Range    Glucose (POC) 125 (H) 65 - 100 mg/dL       Assessment:     Problem List as of 6/22/2020 Date Reviewed: 5/1/2020          Codes Class Noted - Resolved    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  ICD-9-CM: 434.91  6/14/2020 - Present        Cerebellar stroke ([facility] ICD-10-CM: I63.9  ICD-9-CM: 434.91  6/14/2020 - Present        Urinary retention ICD-10-CM: R33.9  ICD-9-CM: 788.20  6/8/2020 - Present        Stroke (cerebrum) (HCC) ICD-10-CM: I63.9  ICD-9-CM: 434.91  6/8/2020 - Present        Severe obesity ([facility] ICD-10-CM: E66.01  ICD-9-CM: 278.01  7/2/2019 - Present        Right thalamic infarction [facility]) ICD-10-CM: I63.9  ICD-9-CM: 434.91  7/2/2019 - Present        Numbness and tingling in left hand ICD-10-CM: R20.0, R20.2  ICD-9-CM: 782.0  7/2/2019 - Present        Controlled type 2 diabetes mellitus without complication, without long-term current use of insulin ([facility] ICD-10-CM: E11.9  ICD-9-CM: 250.00  2/22/2019 - Present        Cerebrovascular accident (CVA) due to occlusion ([facility] ICD-10-CM: I63.9  ICD-9-CM: 434.91  2/12/2019 - Present        Essential hypertension, benign ICD-10-CM: I10  ICD-9-CM: 401.1  1/3/2014 - Present        Mixed hyperlipidemia ICD-10-CM: E78.2  ICD-9-CM: 272.2  1/3/2014 - Present                 Plan / Recommendations / Medical Decision Making:      Continue daily physician / PA medical management:     New ischemic stroke, right cerebellar hemisphere, right dorsolateral medulla; secondary stroke prevention - DAPT, high-intensity statin (Lipitor), BP management.  -dizziness, nausea, intermittent diplopia; prn meclizine  -6/19 scopolamine patch applied  -6/22 added meclizine 6/19, remains dizzy when OOB. not orthrostatic     Hypertension / hypotension - patient on doxazosin 8mg twice daily from PCP, now additionally on amlodipine 5mg. /63, P 75 on admission here; patient gives pretty clear history of brief orthostatic hypotension symptoms when sitting from supine prior to meals. Staff to perform OH maneuvers, may hold amlodipine.  -6/17 bp 142/69; no orthostatics; cont norvasc and cardura  -6/18 bp 152/89 (129-165), no reported orthostatics; cont current meds  -6/21 BP - 158/75, continues to fluctuate  -6/22 122//71 fluctuates making med mgt difficult     Diabetes mellitus - uncontrolled / poor glycemic control, HgbA1c 7.8% (6/9/2020). Will require daily, close FSG monitoring and medication adjustment to optimize glycemic control in setting of acute illness and hospitalization. POC blood glucose around 130s-150s. Currently on Humalog SSI; will monitor and manage.  -6/15 fair control at this time.  Consider low dose antihyperglycemic  -6/16 bs 147 this a.m., 167 at bedtime, 182 at supper, 152 at lunch; start glucotrol 2.5 mg  -6/17 124 this a.m; 188 at bedtime, 135 with supper, 133 with lunch; cont Glucotrol 2.5 mg, increase to 5mg if bs not consistently below 120-130  -6/18 bs 120 this a.m, 135 last pm, 200 at supper, 93 at lunch, still not consistent  -6/19 -174, increase glipizide to 5mg  - 6/21 BG-111, BG levels < 130   -6/22 bs stable     Urinary retention - cochran catheter in place since ED 6/8; Urologist suspects urinary retention part of stroke syndrome. Catheter remains until 6/16, at which time it will be removed for voiding trial.  -pt on Cardura 8mg. Check UA; lg blood, lg leuk; pt didn't have micro done. Asymptomatic  Cochran discontinued this a.m; await results  -6/17 cochran replaced due to hematuria and inability to void. Small amt of hematuria after one CIC; when cochran replaced; zainab blood, continues this a.m. lg amt of blood. Check CBC, irrigated without improvement, Urology Consulted. ? Continuous irrigation/3-way cochran. ?cysto; hold plavix and Lovenox. No hx of retention or hematuria.; +>100K gram neg rods s/p one dose Rocephing 1g IV, on septra, day 2; await ID/sens.   -E coli UTI; sensitive to Septra, day 3/7. CIB per Urology, appreciate assistance. Hgb down one gram but stable 12.1; urine is getting pinker; no clots noted in bag. Continue  -6/19 Urologist D/Cing cochran irrigation; will ask NP to address Stat-lock / tethering issue. - 6/21 unsuccessful voiding trial yesterday/today with hematuria returning after catheterization, cochran reinserted, continue to irrigate, cochran functioning well  -6/22 cochran reinserted due to inability to void and inc hematuria. Pt is ok with this and understands reasoning. States wife upset with this, will d/w her. Likely to go with cochran     Hyperlipidemia - on high-intensity statin.     Electrolyte abnormality - previous hypoK+, current BMP normal; monitor.     Pain control - stable, mild-to-moderate joint symptoms intermittently, reasonably well controlled by PRN meds. Will require regular pain assessment and comprenhensive pain management.     Bowel program - at risk for constipation due to lack of mobility. MiraLAX daily for regularity, Meghan-Colace for stool softener prn.  PRN MOM, bisacodyl suppository or tablets for constipation.  -6/15 normally has bm every other day. Will add daily senna.   -6/16 good results  -no complaints     Depression - no previous history; at risk of post-stroke depression. Monitor.     Pneumonia prophylaxis - incentive spirometer every hour while awake.     Newly diagnosed NELL; pt sets alarm off if not supine. If he rolls side to side , he desats. CXR shows atelectasis; enc IS  -6/16 not tolerating CPAP; O2 NC at noc at 1L, sats 91-98%  -6/17 weaned off O2 during day yesterday. Will get nocturnal pulse ox study closer to dc. Reports no hx of NELL , wife reported no snoring but breaths \"heavy\"  -6/19 required O2 during PT; 6/22 reports no O2 with therapy but using at noc     DVT risk / DVT prophylaxis - will require daily physician / PA exam to assess for signs and symptoms as patient is at increased risk for of thromboembolism. Mobilize as tolerated. Sequential pneumatic compression devices (SCDs) when in bed; thigh-high or knee-high thromboembolic deterrent hose when out of bed. On DAPT; Lovenox subcutaneously daily.   -6/17 hold Lovenox due to zainab hematuria; 6/22 cont to hold     Wound care - monitor general skin wound status daily per staff and physician / PA. At risk for failure. Will require 24/7 rehab nursing. No current wounds.     GERD - at times may need antacids, Maalox prn. Time spent was 25 minutes with over 1/2 in direct patient care/examination, consultation and coordination of care.      Signed By: Jw Castillo MD     June 22, 2020

## 2020-06-22 NOTE — PROGRESS NOTES
OT Daily Note  Time In 1117   Time Out 1201     Subjective: \"I know this is my second stroke, I was in denial after my first stroke. \"  Pain: not expressed  Education: stroke education  Interdisciplinary Communication: with PT regarding vision and vertigo  Precautions: fall risk  Patient Vitals for the past 12 hrs:   Temp Pulse Resp BP SpO2   06/22/20 0756 98.5 °F (36.9 °C) 61 16 122/82 93 %       Neuro-Muscular Re-Education Daily Assessment   Completed stroke education with patient according to hospital-approved printed handout. Patient verbalized understanding for basic stroke etiology, warning signs, emergency treatment, risk factors, hypertension management and target values, basic nutrition, diabetes management, cholesterol and target values, exercise recommendations, medications and management, deficits related to infererior cerebellar and dorsolateral medulla stroke, rehab process, and resources available. Printed handout provided to patient, patient with no further questions at this time. Completed half of stroke education notebook, will complete remainder in future session. Assessment: Pt verbalizes areas of change including diet, weight, and exercises. Will complete remainder of notebook as well as diabetic management in future session. Plan: Continue OT POC with focus on ADL/IADL skills, functional transfers, functional mobility, coordination, strength, static and dynamic balance, and activity tolerance to maximize safety and independence with ADLs and functional transfers.      Antonio Mcdermott OTR/L  6/22/2020

## 2020-06-22 NOTE — PROGRESS NOTES
PHYSICAL THERAPY WEEKLY PROGRESS NOTE  Time In: 1300  Time Out: 1123  Patient Seen For: PM;Transfer training; Therapeutic exercise    Subjective: \"I feel terrible. \"  Pt reports increased vertigo, starting 10-15 minutes after last session. Describes vertigo as constant, ranging from 7/10 @ worst & 4/10 @ best.         Objective: Other (comment)(fall risk)  GROSS ASSESSMENT Daily Assessment    No nystagmus appreciated. Utilized gaze fixation strategy along w/ deep breathing to assist w/ vertigo management        COGNITION Daily Assessment    Pt. W/ decreased short term recall. BED/MAT MOBILITY Daily Assessment    Rolling Right : 0 (Not tested)  Rolling Left : 0 (Not tested)  Supine to Sit : 0 (Not tested)  Sit to Supine : 0 (Not tested)       TRANSFERS Daily Assessment   Performed on sit to/from stand & was preparing to progress to marching in place w/ pt.  Needed to sit suddenly w/ increased vertigo as well as N&V Transfer Type: SPT with walker  Transfer Assistance : 4 (Minimal assistance)  Sit to Stand Assistance: Minimal assistance  Car Transfers: Not tested       GAIT Daily Assessment   Deferred secondary to increased vertigo Amount of Assistance: 0 (Not tested)  Distance (ft): 0 Feet (ft)       STEPS or STAIRS Daily Assessment    Steps/Stairs Ambulated (#): 0  Level of Assist : 0 (Not tested)       BALANCE Daily Assessment    Sitting - Static: Good (unsupported)  Sitting - Dynamic: Good (unsupported)  Standing - Static: Fair  Standing - Dynamic : Impaired       WHEELCHAIR MOBILITY Daily Assessment    Able to Propel (ft): 0 feet  Curbs/Ramps Assist Required (FIM Score): 0 (Not tested)  Wheelchair Setup Assist Required : 0 (Not tested)       LOWER EXTREMITY EXERCISES Daily Assessment    Extremity: Both  Exercise Type #1: Seated lower extremity strengthening  Sets Performed: 1  Reps Performed: 15  Level of Assist: Supervision     SEATED EXERCISES Sets Reps Comments   Ankle Pumps 1 15    Hip Flexion 1 15 Long Arc Quads 1 15    Hip Adduction/Ball Squeeze 1 15    Hip Abduction with green Theraband 1 15    Hamstring Curls with green Theraband 1 15    Hip Extension with green Theraband 1 15      Vital Signs: /82   Pulse 61   Temp 98.5 °F (36.9 °C)   Resp 16   Ht 5' 9\" (1.753 m)   Wt 123.3 kg (271 lb 12.8 oz)   SpO2 93%   BMI 40.14 kg/m²     Pain level: no c/o pain    Patient education: pt. Educated on gaze fixation strategies, habituation, & avoiding sudden head or positional movements for vertigo management    Interdisciplinary Communication: RN contacted to provide anti-nausea medication after emesis episode;  PA made awre of increased vertigo this PM    Pt. Left up in recliner in NAD, call bell in reach. Assessment:  Pt. W/ increased vertigo this PM.  Attempted to mobilize using vertigo compensation techniques. However, upon coming to stand (slowly), pt. Experienced sudden increase in vertigo, necessitating sitting followed quickly by emesis. Pt. Was then able to tolerate seated exercises, but unable to tolerate any further standing or gait activities. Pt. Has not met goals set @ initial evaluation due to ongoing vertigo, but cont. To benefit from PT services as he remains well below his baseline. Will cont. W/ PT to address mobility deficits. Plan of Care: Continue with POC and progress as tolerated.      Carlos Fitzgerald, PT  6/22/2020

## 2020-06-23 LAB
ANION GAP SERPL CALC-SCNC: 5 MMOL/L (ref 7–16)
BUN SERPL-MCNC: 19 MG/DL (ref 8–23)
CALCIUM SERPL-MCNC: 8.1 MG/DL (ref 8.3–10.4)
CHLORIDE SERPL-SCNC: 110 MMOL/L (ref 98–107)
CO2 SERPL-SCNC: 30 MMOL/L (ref 21–32)
CREAT SERPL-MCNC: 1.15 MG/DL (ref 0.8–1.5)
GLUCOSE BLD STRIP.AUTO-MCNC: 130 MG/DL (ref 65–100)
GLUCOSE BLD STRIP.AUTO-MCNC: 130 MG/DL (ref 65–100)
GLUCOSE BLD STRIP.AUTO-MCNC: 141 MG/DL (ref 65–100)
GLUCOSE BLD STRIP.AUTO-MCNC: 98 MG/DL (ref 65–100)
GLUCOSE SERPL-MCNC: 109 MG/DL (ref 65–100)
POTASSIUM SERPL-SCNC: 4.6 MMOL/L (ref 3.5–5.1)
SODIUM SERPL-SCNC: 145 MMOL/L (ref 136–145)

## 2020-06-23 PROCEDURE — 97116 GAIT TRAINING THERAPY: CPT

## 2020-06-23 PROCEDURE — 82962 GLUCOSE BLOOD TEST: CPT

## 2020-06-23 PROCEDURE — 74011250637 HC RX REV CODE- 250/637: Performed by: PHYSICAL MEDICINE & REHABILITATION

## 2020-06-23 PROCEDURE — 36415 COLL VENOUS BLD VENIPUNCTURE: CPT

## 2020-06-23 PROCEDURE — 97530 THERAPEUTIC ACTIVITIES: CPT

## 2020-06-23 PROCEDURE — 97535 SELF CARE MNGMENT TRAINING: CPT

## 2020-06-23 PROCEDURE — 97112 NEUROMUSCULAR REEDUCATION: CPT

## 2020-06-23 PROCEDURE — 65310000000 HC RM PRIVATE REHAB

## 2020-06-23 PROCEDURE — 74011250636 HC RX REV CODE- 250/636: Performed by: PHYSICIAN ASSISTANT

## 2020-06-23 PROCEDURE — 92507 TX SP LANG VOICE COMM INDIV: CPT

## 2020-06-23 PROCEDURE — 74011250637 HC RX REV CODE- 250/637: Performed by: PHYSICIAN ASSISTANT

## 2020-06-23 PROCEDURE — 97110 THERAPEUTIC EXERCISES: CPT

## 2020-06-23 PROCEDURE — 80048 BASIC METABOLIC PNL TOTAL CA: CPT

## 2020-06-23 PROCEDURE — 74011250636 HC RX REV CODE- 250/636: Performed by: PHYSICAL MEDICINE & REHABILITATION

## 2020-06-23 PROCEDURE — 99232 SBSQ HOSP IP/OBS MODERATE 35: CPT | Performed by: PHYSICAL MEDICINE & REHABILITATION

## 2020-06-23 RX ORDER — MECLIZINE HYDROCHLORIDE 25 MG/1
25 TABLET ORAL 3 TIMES DAILY
Status: DISCONTINUED | OUTPATIENT
Start: 2020-06-23 | End: 2020-06-24

## 2020-06-23 RX ADMIN — GLIPIZIDE 5 MG: 5 TABLET ORAL at 08:43

## 2020-06-23 RX ADMIN — DOXAZOSIN MESYLATE 8 MG: 4 TABLET ORAL at 21:51

## 2020-06-23 RX ADMIN — ATORVASTATIN CALCIUM 80 MG: 80 TABLET, FILM COATED ORAL at 22:00

## 2020-06-23 RX ADMIN — DOXAZOSIN MESYLATE 8 MG: 4 TABLET ORAL at 08:43

## 2020-06-23 RX ADMIN — MECLIZINE HYDROCHLORIDE 25 MG: 25 TABLET ORAL at 17:22

## 2020-06-23 RX ADMIN — MECLIZINE HYDROCHLORIDE 25 MG: 25 TABLET ORAL at 21:50

## 2020-06-23 RX ADMIN — AMLODIPINE BESYLATE 5 MG: 5 TABLET ORAL at 08:43

## 2020-06-23 RX ADMIN — ASPIRIN 81 MG 81 MG: 81 TABLET ORAL at 08:43

## 2020-06-23 RX ADMIN — LORATADINE 10 MG: 10 TABLET ORAL at 08:43

## 2020-06-23 RX ADMIN — SENNOSIDES AND DOCUSATE SODIUM 1 TABLET: 8.6; 5 TABLET ORAL at 08:43

## 2020-06-23 RX ADMIN — MECLIZINE HYDROCHLORIDE 25 MG: 25 TABLET ORAL at 05:37

## 2020-06-23 NOTE — PROGRESS NOTES
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 Vss. No acute complaints. PT, OT well tolerated. Steady gains made. No new barrier to progress noted. Still having dizziness and nausea with activity. + emesis yesterday. No nausea since lunchtime yesterday.  No symptoms when lying down  Objective:     Current Facility-Administered Medications   Medication Dose Route Frequency    meclizine (ANTIVERT) tablet 25 mg  25 mg Oral DAILY    cloNIDine HCL (CATAPRES) tablet 0.1 mg  0.1 mg Oral Q6H PRN    scopolamine (TRANSDERM-SCOP) 1 mg over 3 days 1 Patch  1 Patch TransDERmal Q72H    glipiZIDE (GLUCOTROL) tablet 5 mg  5 mg Oral ACB    hydrocortisone (CORTAID) 1 % cream   Topical BID PRN    lip protectant (BLISTEX) ointment 1 Each  1 Each Topical PRN    senna-docusate (PERICOLACE) 8.6-50 mg per tablet 1 Tab  1 Tab Oral DAILY    ondansetron (ZOFRAN ODT) tablet 4 mg  4 mg Oral Q6H PRN    meclizine (ANTIVERT) tablet 25 mg  25 mg Oral Q6H PRN    acetaminophen (TYLENOL) tablet 650 mg  650 mg Oral Q6H PRN    amLODIPine (NORVASC) tablet 5 mg  5 mg Oral DAILY    aspirin chewable tablet 81 mg  81 mg Oral DAILY    atorvastatin (LIPITOR) tablet 80 mg  80 mg Oral QHS    [Held by provider] clopidogreL (PLAVIX) tablet 75 mg  75 mg Oral DAILY    doxazosin (CARDURA) tablet 8 mg  8 mg Oral BID    [Held by provider] enoxaparin (LOVENOX) injection 40 mg  40 mg SubCUTAneous Q24H    insulin lispro (HUMALOG) injection   SubCUTAneous AC&HS    loratadine (CLARITIN) tablet 10 mg  10 mg Oral DAILY    sodium chloride (OCEAN) 0.65 % nasal squeeze bottle 2 Spray  2 Spray Both Nostrils Q2H PRN    pneumococcal 23-valent (PNEUMOVAX 23) injection 0.5 mL  0.5 mL IntraMUSCular PRIOR TO DISCHARGE    polyethylene glycol (MIRALAX) packet 17 g  17 g Oral DAILY    bisacodyL (DULCOLAX) tablet 5 mg  5 mg Oral DAILY PRN    Or    bisacodyL (DULCOLAX) suppository 10 mg  10 mg Rectal DAILY PRN    Or    magnesium hydroxide (MILK OF MAGNESIA) 400 mg/5 mL oral suspension 30 mL  30 mL Oral DAILY PRN       Review of Systems:   Denies chest pain, shortness of breath, cough, headache,  abdominal pain, dysuria, calf pain. Pertinent positives are as noted in the HPI, ROS unremarkable otherwise. Still with visual difficulties right eye, vertigo described  Visit Vitals  /69   Pulse 90   Temp 98.1 °F (36.7 °C)   Resp 16   Ht 5' 9\" (1.753 m)   Wt 271 lb 12.8 oz (123.3 kg)   SpO2 93%   BMI 40.14 kg/m²        Physical Exam:   General: Alert and age appropriately oriented. No acute cardiorespiratory distress. HEENT: Normocephalic, no scleral icterus. Oral mucosa moist without cyanosis. NO nystagmus noted   Lungs: Clear to auscultation bilaterally. Respiration even and unlabored. Heart: Regular rate and rhythm, S1, S2. No murmurs, clicks, rub or gallops. Abdomen: Soft, non-tender, not distended. Bowel sounds normoactive. No organomegaly. Morbidly obese   Genitourinary: Deferred. Neuromuscular:      Right ptosis unchanged. ? Diplopia  cogn dysfxn in executive skills  No focal motor deficits  fcs    Skin/extremity: No rashes, no erythema. No calf tenderness B LE.   No edema                                                                              Functional Assessment:  Gross Assessment  AROM: Within functional limits (06/20/20 1400)  PROM: Within functional limits (06/16/20 1100)  Strength: Generally decreased, functional (06/16/20 1100)  Coordination: Generally decreased, functional (06/16/20 1100) Balance  Sitting - Static: Good (unsupported) (06/22/20 1500)  Sitting - Dynamic: Good (unsupported) (06/22/20 1500)  Standing - Static: Fair (06/22/20 1500)  Standing - Dynamic : Impaired (06/22/20 1500)                     Rhina Plum Fall Risk Assessment:  Rhina Plum Fall Risk  Mobility: Ambulates or transfers with assist devices or assistance (06/23/20 0710)  Mobility Interventions: Communicate number of staff needed for ambulation/transfer;Patient to call before getting OOB;Utilize walker, cane, or other assistive device (06/23/20 0710)  Mentation: Alert, oriented x 3 (06/23/20 0710)  Medication: Patient receiving anticonvulsants, sedatives(tranquilizers), psychotropics or hypnotics, hypoglycemics, narcotics, sleep aids, antihypertensives, laxatives, or diuretics (06/23/20 0710)  Medication Interventions: Evaluate medications/consider consulting pharmacy; Patient to call before getting OOB (06/23/20 0710)  Elimination: Needs assistance with toileting (06/23/20 0710)  Elimination Interventions: Call light in reach; Patient to call for help with toileting needs (06/23/20 0710)  Prior Fall History: No (06/23/20 0710)  History of Falls Interventions: Door open when patient unattended (06/21/20 0911)  Total Score: 3 (06/23/20 0710)  High Fall Risk: Yes (06/23/20 0710)     Speech Assessment:         Ambulation:  Gait  Distance (ft): 0 Feet (ft) (06/22/20 1500)  Assistive Device: Walker, rolling (06/20/20 1400)     Labs/Studies:  Recent Results (from the past 72 hour(s))   GLUCOSE, POC    Collection Time: 06/20/20 11:31 AM   Result Value Ref Range    Glucose (POC) 125 (H) 65 - 100 mg/dL   GLUCOSE, POC    Collection Time: 06/20/20  4:38 PM   Result Value Ref Range    Glucose (POC) 84 65 - 100 mg/dL   GLUCOSE, POC    Collection Time: 06/20/20  9:17 PM   Result Value Ref Range    Glucose (POC) 123 (H) 65 - 100 mg/dL   GLUCOSE, POC    Collection Time: 06/21/20  7:28 AM   Result Value Ref Range    Glucose (POC) 111 (H) 65 - 100 mg/dL   GLUCOSE, POC    Collection Time: 06/21/20 11:41 AM   Result Value Ref Range    Glucose (POC) 145 (H) 65 - 100 mg/dL   GLUCOSE, POC    Collection Time: 06/21/20  4:45 PM   Result Value Ref Range    Glucose (POC) 111 (H) 65 - 100 mg/dL   GLUCOSE, POC    Collection Time: 06/21/20  8:20 PM   Result Value Ref Range    Glucose (POC) 151 (H) 65 - 100 mg/dL   GLUCOSE, POC    Collection Time: 06/22/20  5:02 AM   Result Value Ref Range    Glucose (POC) 125 (H) 65 - 100 mg/dL   GLUCOSE, POC    Collection Time: 06/22/20 11:16 AM   Result Value Ref Range    Glucose (POC) 117 (H) 65 - 100 mg/dL   GLUCOSE, POC    Collection Time: 06/22/20  4:48 PM   Result Value Ref Range    Glucose (POC) 166 (H) 65 - 100 mg/dL   GLUCOSE, POC    Collection Time: 06/22/20  8:37 PM   Result Value Ref Range    Glucose (POC) 167 (H) 65 - 100 mg/dL   GLUCOSE, POC    Collection Time: 06/23/20  6:16 AM   Result Value Ref Range    Glucose (POC) 130 (H) 65 - 100 mg/dL       Assessment:     Problem List as of 6/23/2020 Date Reviewed: 5/1/2020          Codes Class Noted - Resolved    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  ICD-9-CM: 434.91  6/14/2020 - Present        Cerebellar stroke ([facility] ICD-10-CM: I63.9  ICD-9-CM: 434.91  6/14/2020 - Present        Urinary retention ICD-10-CM: R33.9  ICD-9-CM: 788.20  6/8/2020 - Present        Stroke (cerebrum) (HCC) ICD-10-CM: I63.9  ICD-9-CM: 434.91  6/8/2020 - Present        Severe obesity ([facility] [address] ICD-10-CM: E66.01  ICD-9-CM: 278.01  7/2/2019 - Present        Right thalamic infarction [facility]) ICD-10-CM: I63.9  ICD-9-CM: 434.91  7/2/2019 - Present        Numbness and tingling in left hand ICD-10-CM: R20.0, R20.2  ICD-9-CM: 782.0  7/2/2019 - Present        Controlled type 2 diabetes mellitus without complication, without long-term current use of insulin (HCC) ICD-10-CM: E11.9  ICD-9-CM: 250.00  2/22/2019 - Present        Cerebrovascular accident (CVA) due to occlusion [facility]) ICD-10-CM: I63.9  ICD-9-CM: 434.91  2/12/2019 - Present        Essential hypertension, benign ICD-10-CM: I10  ICD-9-CM: 401.1  1/3/2014 - Present        Mixed hyperlipidemia ICD-10-CM: E78.2  ICD-9-CM: 272.2  1/3/2014 - Present             Dorsal Medullary syndrome/Cerebellar stroke  Plan / Recommendations / Medical Decision Making:      Continue daily physician / PA medical management:     New ischemic stroke, right cerebellar hemisphere, right dorsolateral medulla; secondary stroke prevention - DAPT, high-intensity statin (Lipitor), BP management.  -dizziness, nausea, intermittent diplopia; prn meclizine  -6/19 scopolamine patch applied  -6/22 added meclizine 6/19, remains dizzy when OOB. not orthostatic  -6/23 vertigo remains a significant issue despite meclizine and scopolomine; could try phenergan or valium but sedation main issue of concern. Will adjust meclizine dosing. Will check bmp to f/u on fluid status. Push po fluids (uop is good)     Hypertension / hypotension - patient on doxazosin 8mg twice daily from PCP, now additionally on amlodipine 5mg. /63, P 75 on admission here; patient gives pretty clear history of brief orthostatic hypotension symptoms when sitting from supine prior to meals. Staff to perform OH maneuvers, may hold amlodipine.  -6/17 bp 142/69; no orthostatics; cont norvasc and cardura  -6/18 bp 152/89 (129-165), no reported orthostatics; cont current meds  -6/21 BP - 158/75, continues to fluctuate  -6/22 122//71 fluctuates making med mgt difficult  -6/23 147/69      Diabetes mellitus - uncontrolled / poor glycemic control, HgbA1c 7.8% (6/9/2020). Will require daily, close FSG monitoring and medication adjustment to optimize glycemic control in setting of acute illness and hospitalization. POC blood glucose around 130s-150s. Currently on Humalog SSI; will monitor and manage.  -6/15 fair control at this time.  Consider low dose antihyperglycemic  -6/16 bs 147 this a.m., 167 at bedtime, 182 at supper, 152 at lunch; start glucotrol 2.5 mg  -6/17 124 this a.m; 188 at bedtime, 135 with supper, 133 with lunch; cont Glucotrol 2.5 mg, increase to 5mg if bs not consistently below 120-130  -6/18 bs 120 this a.m, 135 last pm, 200 at supper, 93 at lunch, still not consistent  -6/19 -174, increase glipizide to 5mg  - 6/21 BG-111, BG levels < 130   -6/23 bs stable     Urinary retention - cochran catheter in place since ED 6/8; Urologist suspects urinary retention part of stroke syndrome. Catheter remains until 6/16, at which time it will be removed for voiding trial.  -pt on Cardura 8mg. Check UA; lg blood, lg leuk; pt didn't have micro done. Asymptomatic  Cochran discontinued this a.m; await results  -6/17 cochran replaced due to hematuria and inability to void. Small amt of hematuria after one CIC; when cochran replaced; zainab blood, continues this a.m. lg amt of blood. Check CBC, irrigated without improvement, Urology Consulted. ? Continuous irrigation/3-way cochran. ?cysto; hold plavix and Lovenox. No hx of retention or hematuria.; +>100K gram neg rods s/p one dose Rocephing 1g IV, on septra, day 2; await ID/sens.   -E coli UTI; sensitive to Septra, day 3/7. CIB per Urology, appreciate assistance. Hgb down one gram but stable 12.1; urine is getting pinker; no clots noted in bag. Continue  -6/19 Urologist D/Cing cochran irrigation; will ask NP to address Stat-lock / tethering issue. - 6/21 unsuccessful voiding trial yesterday/today with hematuria returning after catheterization, cochran reinserted, continue to irrigate, cochran functioning well  -6/22 cochran reinserted due to inability to void and inc hematuria. Pt is ok with this and understands reasoning. States wife upset with this, will d/w her. Likely to go with cochran; 6/23 less hematuria, few clots, urine looking better  -6/23 abx have finished.  Dec hematuria; at some point will need to restart Plavix     Hyperlipidemia - on high-intensity statin.     Electrolyte abnormality - previous hypoK+, current BMP normal; monitor.     Pain control - stable, mild-to-moderate joint symptoms intermittently, reasonably well controlled by PRN meds. Will require regular pain assessment and comprenhensive pain management.     Bowel program - at risk for constipation due to lack of mobility. MiraLAX daily for regularity, Meghan-Colace for stool softener prn. PRN MOM, bisacodyl suppository or tablets for constipation.  -6/15 normally has bm every other day. Will add daily senna.   -6/16 good results  -no complaints     Depression - no previous history; at risk of post-stroke depression. Monitor.     Pneumonia prophylaxis - incentive spirometer every hour while awake.     Newly diagnosed NELL; pt sets alarm off if not supine. If he rolls side to side , he desats. CXR shows atelectasis; enc IS  -6/16 not tolerating CPAP; O2 NC at noc at 1L, sats 91-98%  -6/17 weaned off O2 during day yesterday. Will get nocturnal pulse ox study closer to dc. Reports no hx of NELL , wife reported no snoring but breaths \"heavy\"  -6/19 required O2 during PT; 6/22 reports no O2 with therapy but using at noc     DVT risk / DVT prophylaxis - will require daily physician / PA exam to assess for signs and symptoms as patient is at increased risk for of thromboembolism. Mobilize as tolerated. Sequential pneumatic compression devices (SCDs) when in bed; thigh-high or knee-high thromboembolic deterrent hose when out of bed. On DAPT; Lovenox subcutaneously daily.   -6/17 hold Lovenox due to zainab hematuria; 6/22 cont to hold     Wound care - monitor general skin wound status daily per staff and physician / PA. At risk for failure. Will require 24/7 rehab nursing. No current wounds.     GERD - at times may need antacids, Maalox prn.                Time spent was 25 minutes with over 1/2 in direct patient care/examination, consultation and coordination of care.      Signed By: Nori Montez MD     June 23, 2020

## 2020-06-23 NOTE — PROGRESS NOTES
PHYSICAL THERAPY DAILY NOTE  Time In: 0830  Time Out: 7148  Patient Seen For: AM;Balance activities;Gait training;Transfer training    Subjective: Pt. Reports feeling much better this morning. States he got Antivert early this morning & has not had any severe vertigo. Objective: Other (comment)(fall risk)    COGNITION Daily Assessment    Pt. W/ decreased short term recall, occasionally calls PT by the wrong name. BED/MAT MOBILITY Daily Assessment    Rolling Right : 0 (Not tested)  Rolling Left : 0 (Not tested)  Supine to Sit : 0 (Not tested)  Sit to Supine : 0 (Not tested)       TRANSFERS Daily Assessment    Transfer Type: SPT with walker  Transfer Assistance : 4 (Minimal assistance)  Sit to Stand Assistance: Minimal assistance  Car Transfers: Not tested       GAIT Daily Assessment   First gait trial pt. W/ short, shuffling steps performing w/ rapid saravanan yet low foot clearance & mild ataxia. Second gait trial added 2# ankle weights - pt. Initially w/ improved step length & foot clearance, but with fatigue gait become more shuffled again. Amount of Assistance: 4 (Minimal assistance)  Distance (ft): 75 Feet (ft)(x2)  Assistive Device: Walker, rolling       STEPS or STAIRS Daily Assessment    Steps/Stairs Ambulated (#): 0  Level of Assist : 0 (Not tested)       BALANCE Daily Assessment   Pt. Worked on standing reach & retrieval activity, working on moving laterally out of JAQUELIN & focusing on improving weight shift.  Sitting - Static: Good (unsupported)  Sitting - Dynamic: Good (unsupported)  Standing - Static: Fair  Standing - Dynamic : Impaired       WHEELCHAIR MOBILITY Daily Assessment    Able to Propel (ft): 0 feet  Curbs/Ramps Assist Required (FIM Score): 0 (Not tested)  Wheelchair Setup Assist Required : 0 (Not tested)     Vital Signs: /69   Pulse 90   Temp 98.1 °F (36.7 °C)   Resp 16   Ht 5' 9\" (1.753 m)   Wt 123.3 kg (271 lb 12.8 oz)   SpO2 93%   BMI 40.14 kg/m²     Pain level: no c/o pain    Patient education: pt. Educated on gait technique w/ poor carry over noted    Interdisciplinary Communication: collaborated w/ OT regarding pt's progress    Pt. Left up in recliner in NAD, call bell in reach. Assessment: Pt. W/ much less vertigo today & able to progress balance activities. Pt. Does endorse increased vertigo w/ standing or gait activities, but ir mostly resolves w/ rest & focused gaze activities. Pt. Exhibits some ataxia & decreased postural control w/ gait & remains @ increased risk of falls & benefits from cont. PT services to address. Plan of Care: Continue with POC and progress as tolerated.      Jl Grey, PT  6/23/2020

## 2020-06-23 NOTE — PROGRESS NOTES
OT Daily Note  Time In 1309   Time Out 1428     Subjective: \"I can eat a whole thing of chocolate chip cookies. \"  Pain: not expressed   Education: stroke education and diabetic management  Interdisciplinary Communication: NA  Precautions: fall risk  Patient Vitals for the past 12 hrs:   Temp Pulse Resp BP SpO2   06/23/20 0719 98.1 °F (36.7 °C) 90 16 147/69 93 %         Education Daily Assessment   Completed stroke education with patient according to hospital-approved printed handout. Patient verbalized understanding for basic stroke etiology, warning signs, emergency treatment, risk factors, hypertension management and target values, basic nutrition, diabetes management, cholesterol and target values, exercise recommendations, medications and management, deficits related to cerebellar and medulla stroke, rehab process, and resources available. Printed handout provided to patient, patient with no further questions at this time. Provided diabetic management education on the three management methods including the food plate, carb counting, and carb choice. Additionally discussed monitoring glucose levels prior to breakfast and dinner. Discussed portion sizes and educated regarding lists of food charts in packet as reference for portion size and carbohydrate counts. Completed meal planning activity with patient developing breakfast, lunch, and dinner options within carb allowance. Finished stroke education book. Pt notes he will show notebook to his wife tonight when she visits. Pt with good questions regarding carb choices for favorite and typical meals at home. According to self report, pt typically eats ~1 to 2 car choices for breakfast, 2+ carb choices for lunch, and ~7 carb choices for dinner. In addition pt reports eating cup of ice cream after dinner.  Discussed consistent carb diet and sugar spikes resulting from over 3 to 4 carb choices per meal. Pt verbalizing understanding of decreasing portion sizes, especially in regards to sweets. Assessment: Pt with good participation in stroke notebook and diabetic education. Pt noting he is aware that he needs to make changes in his diet and lifestyle. Will follow up with patient's wife in the future regarding education. Plan: Continue OT POC with focus on ADL/IADL skills, functional transfers, functional mobility, coordination, strength, static and dynamic balance, and activity tolerance to maximize safety and independence with ADLs and functional transfers.      Antonio Mcdermott OTR/L  6/23/2020

## 2020-06-23 NOTE — PROGRESS NOTES
PHYSICAL THERAPY DAILY NOTE  Time In: 1115  Time Out: 0475  Patient Seen For: AM;Gait training; Therapeutic exercise;Transfer training    Subjective: Pt. Reports vertigo is still minimal.  Rates @ 2-3/10 @ rest & 6-7/10 w/ activity. Objective: Other (comment)(fall risk)  GROSS ASSESSMENT Daily Assessment    No nystagmus appreciated       COGNITION Daily Assessment    Pt. W/ pleasant demeanor & follows commands consistently. Decreased insight into deficits noted. BED/MAT MOBILITY Daily Assessment    Rolling Right : 0 (Not tested)  Rolling Left : 0 (Not tested)  Supine to Sit : 0 (Not tested)  Sit to Supine : 0 (Not tested)       TRANSFERS Daily Assessment    Transfer Type: SPT with walker  Transfer Assistance : 4 (Minimal assistance)  Sit to Stand Assistance: Minimal assistance  Car Transfers: Not tested       GAIT Daily Assessment   Performed gait training in parallel bars using visual cues on floor to focus on maintaining large step lengths. Pt. Performed w/ CGA. Pt. Then transitioned to gait w/ RW to work on carry over w/ A/D. Pt. W/ w/ decreased step length L LE as well as R sided LOB, requiring min A-mod A to correct. Amount of Assistance: 4 (Minimal assistance)  Distance (ft): 40 Feet (ft)  Assistive Device: Walker, rolling       STEPS or STAIRS Daily Assessment    Steps/Stairs Ambulated (#): 0  Level of Assist : 0 (Not tested)       BALANCE Daily Assessment    Sitting - Static: Good (unsupported)  Sitting - Dynamic: Good (unsupported)  Standing - Static: Fair  Standing - Dynamic : Impaired       WHEELCHAIR MOBILITY Daily Assessment    Able to Propel (ft): 0 feet  Curbs/Ramps Assist Required (FIM Score): 0 (Not tested)  Wheelchair Setup Assist Required : 0 (Not tested)       LOWER EXTREMITY EXERCISES Daily Assessment    Pt. Performed NuStep @ resistance level 1 x10 minutes to increase strength & endurance B LEs.      Vital Signs: /69   Pulse 90   Temp 98.1 °F (36.7 °C)   Resp 16 Ht 5' 9\" (1.753 m)   Wt 123.3 kg (271 lb 12.8 oz)   SpO2 93%   BMI 40.14 kg/m²     Pain level: no c/o pain    Patient education: provided gait training education this session    Interdisciplinary Communication: collaborated w/ OT regarding pt's progress    Pt. Left up in recliner in NAD, call bell in reach. Assessment: Pt's gait quality much improved w/ stability of parallel bars using cues on floor. However, once transitioned to less restrictive A/D (RW), pt. Had difficulty maintaining long step length, especially on L LE w/ additional R sided lean observed. Pt. Cont. To benefit from PT to address dynamic standing balance, motor control, & gait to increase independence w/ mobility. Plan of Care: Continue with POC and progress as tolerated.      Hung Azevedo, PT  6/23/2020

## 2020-06-23 NOTE — PROGRESS NOTES
Patient had a restful night, cochran is draining more yellow urine with some blood clots still in urine. . No complaints, call light in reach. Hourly rounds completed for this shift. Will continue to monitor until shift report.

## 2020-06-23 NOTE — PROGRESS NOTES
Time In 0804   Time Out 0833     Mobility   Score Comments   Supine to Sit 6: Independent    Sit to Stand 4: Supervision or touching A Close SBA   Transfer Assist 4: Supervision or touching A Transfer Type: SPT   Equipment: Rolling Walker   Comments: CGA     Activities of Daily Living    Score Comments   Eating 6: Independent    Oral Hyigene 5: S/U or clean-up assist    Bathing 4: Supervision or touching A Type of Shower: Bath Pack  Position: Supported sitting and Standing PRN   Adaptive  Equipment: W/C and Long Handled Sponge  Comments: Assist to wash bottom in stance   Upper Body  Dressing 5: S/U or clean-up assist Items Applied: Pullover  Position: Supported Sitting  Comments:    Lower Body Dressing 4: Supervision or touching A Items Applied: Underwear and Elastic pants  Position: Supported sitting and Standing PRN  Adaptive Equipment: Reacher, RW and W/C  Comments: Partial assist to pull clothing over waist posteriorly, assist to manage cochran catheter   Donning/[place] [employer]wear 5: S/U or clean-up assist Items Applied: Socks  Adaptive Equipment: Sock Aide  Comments:    Education  ADL techniques, transfer training       S: \"They were working on me a lot this morning. \" Agreeable to therapy. Patient was able to ambulate ~5 feet using a RW with CGA. Pt completed morning ADLs with quality indicators noted above. Pain not expressed. Patient Vitals for the past 12 hrs:   Temp Pulse Resp BP SpO2   06/23/20 0719 98.1 °F (36.7 °C) 90 16 147/69 93 %       Collaborated with PT regarding patient performance and POC. Patient tolerated session well, but activity tolerance, balance, functional mobility, strength, coordination, cognition are still below baseline and require skilled facilitation to successfully and safely complete ADLs and transfers. Patient ended session in wc with PT assuming care.         Antonio Mcdermott OTR/LUPE  6/23/2020

## 2020-06-24 ENCOUNTER — HOME HEALTH ADMISSION (OUTPATIENT)
Dept: HOME HEALTH SERVICES | Facility: HOME HEALTH | Age: 75
End: 2020-06-24
Payer: MEDICARE

## 2020-06-24 LAB
GLUCOSE BLD STRIP.AUTO-MCNC: 118 MG/DL (ref 65–100)
GLUCOSE BLD STRIP.AUTO-MCNC: 124 MG/DL (ref 65–100)
GLUCOSE BLD STRIP.AUTO-MCNC: 142 MG/DL (ref 65–100)
GLUCOSE BLD STRIP.AUTO-MCNC: 79 MG/DL (ref 65–100)

## 2020-06-24 PROCEDURE — 97535 SELF CARE MNGMENT TRAINING: CPT

## 2020-06-24 PROCEDURE — 97110 THERAPEUTIC EXERCISES: CPT

## 2020-06-24 PROCEDURE — 97530 THERAPEUTIC ACTIVITIES: CPT

## 2020-06-24 PROCEDURE — 97112 NEUROMUSCULAR REEDUCATION: CPT

## 2020-06-24 PROCEDURE — 99232 SBSQ HOSP IP/OBS MODERATE 35: CPT | Performed by: PHYSICAL MEDICINE & REHABILITATION

## 2020-06-24 PROCEDURE — 74011250637 HC RX REV CODE- 250/637: Performed by: PHYSICIAN ASSISTANT

## 2020-06-24 PROCEDURE — 92507 TX SP LANG VOICE COMM INDIV: CPT

## 2020-06-24 PROCEDURE — 97116 GAIT TRAINING THERAPY: CPT

## 2020-06-24 PROCEDURE — 74011250637 HC RX REV CODE- 250/637: Performed by: PHYSICAL MEDICINE & REHABILITATION

## 2020-06-24 PROCEDURE — 65310000000 HC RM PRIVATE REHAB

## 2020-06-24 PROCEDURE — 74011250636 HC RX REV CODE- 250/636: Performed by: PHYSICAL MEDICINE & REHABILITATION

## 2020-06-24 PROCEDURE — 82962 GLUCOSE BLOOD TEST: CPT

## 2020-06-24 RX ORDER — AMLODIPINE BESYLATE 5 MG/1
7.5 TABLET ORAL DAILY
Status: DISCONTINUED | OUTPATIENT
Start: 2020-06-25 | End: 2020-06-25

## 2020-06-24 RX ORDER — MECLIZINE HYDROCHLORIDE 25 MG/1
25 TABLET ORAL 3 TIMES DAILY
Status: DISCONTINUED | OUTPATIENT
Start: 2020-06-24 | End: 2020-07-01 | Stop reason: HOSPADM

## 2020-06-24 RX ADMIN — ATORVASTATIN CALCIUM 80 MG: 80 TABLET, FILM COATED ORAL at 21:04

## 2020-06-24 RX ADMIN — DOXAZOSIN MESYLATE 8 MG: 4 TABLET ORAL at 20:58

## 2020-06-24 RX ADMIN — SENNOSIDES AND DOCUSATE SODIUM 1 TABLET: 8.6; 5 TABLET ORAL at 08:01

## 2020-06-24 RX ADMIN — MECLIZINE HYDROCHLORIDE 25 MG: 25 TABLET ORAL at 06:44

## 2020-06-24 RX ADMIN — MECLIZINE HYDROCHLORIDE 25 MG: 25 TABLET ORAL at 12:48

## 2020-06-24 RX ADMIN — AMLODIPINE BESYLATE 5 MG: 5 TABLET ORAL at 08:00

## 2020-06-24 RX ADMIN — MECLIZINE HYDROCHLORIDE 25 MG: 25 TABLET ORAL at 17:06

## 2020-06-24 RX ADMIN — ASPIRIN 81 MG 81 MG: 81 TABLET ORAL at 08:01

## 2020-06-24 RX ADMIN — LORATADINE 10 MG: 10 TABLET ORAL at 08:00

## 2020-06-24 RX ADMIN — POLYETHYLENE GLYCOL 3350 17 G: 17 POWDER, FOR SOLUTION ORAL at 08:01

## 2020-06-24 RX ADMIN — GLIPIZIDE 5 MG: 5 TABLET ORAL at 08:01

## 2020-06-24 RX ADMIN — DOXAZOSIN MESYLATE 8 MG: 4 TABLET ORAL at 08:00

## 2020-06-24 NOTE — PROGRESS NOTES
Team conference held today. Patient will discharge on 7/1/2020 with [facility], [place], operated by [facility]. CM will contact wife to arrange family training. CM will continue to follow.

## 2020-06-24 NOTE — PROGRESS NOTES
Problem: Falls - Risk of  Goal: *Absence of Falls  Description: Document Ricardo Newman Fall Risk and appropriate interventions in the flowsheet.   Outcome: Progressing Towards Goal  Note: Fall Risk Interventions:  Mobility Interventions: Communicate number of staff needed for ambulation/transfer, Patient to call before getting OOB, Utilize walker, cane, or other assistive device         Medication Interventions: Assess postural VS orthostatic hypotension, Evaluate medications/consider consulting pharmacy, Patient to call before getting OOB, Teach patient to arise slowly    Elimination Interventions: Call light in reach, Patient to call for help with toileting needs, Toilet paper/wipes in reach    History of Falls Interventions: Door open when patient unattended

## 2020-06-24 NOTE — PROGRESS NOTES
PHYSICAL THERAPY DAILY NOTE  Time In: 6587  Time Out: 0965  Patient Seen For: AM;Balance activities;Gait training;Transfer training    Subjective: Pt. Reports a good night last night. No c/o vertigo @ this moment. Objective: Other (comment)(fall risk)    COGNITION Daily Assessment    Pt. Alert & follows commands consistently. Decreased short term recall. Mildly impulsive. BED/MAT MOBILITY Daily Assessment    Rolling Right : 0 (Not tested)  Rolling Left : 0 (Not tested)  Supine to Sit : 0 (Not tested)  Sit to Supine : 0 (Not tested)       TRANSFERS Daily Assessment   Focusing on maintaining midline & slowing pace down Transfer Type: SPT with walker  Transfer Assistance : 4 (Minimal assistance)  Sit to Stand Assistance: Minimal assistance  Car Transfers: Not tested       GAIT Daily Assessment   Focused on maintaining midline & keeping step length long. Amount of Assistance: 4 (Minimal assistance)  Distance (ft): 50 Feet (ft)  Assistive Device: Walker, rolling       STEPS or STAIRS Daily Assessment    Steps/Stairs Ambulated (#): 0  Level of Assist : 0 (Not tested)       BALANCE Daily Assessment   Pt. Performed dynamic standing balance w/ B UE support. Worked on single LE stepping on to targets out of JAQUELIN, focusing on maintaining midline & coordination. Required min A for LOB.  Sitting - Static: Good (unsupported)  Sitting - Dynamic: Good (unsupported)  Standing - Static: Fair  Standing - Dynamic : Impaired       WHEELCHAIR MOBILITY Daily Assessment    Able to Propel (ft): 0 feet  Curbs/Ramps Assist Required (FIM Score): 0 (Not tested)  Wheelchair Setup Assist Required : 0 (Not tested)     Vital Signs: /73   Pulse 67   Temp 98 °F (36.7 °C)   Resp 16   Ht 5' 9\" (1.753 m)   Wt 123.3 kg (271 lb 12.8 oz)   SpO2 92%   BMI 40.14 kg/m²     Pain level: no c/o pain    Patient education: pt. Educated on purpose of balance activity    Interdisciplinary Communication: collaborated w/ OT regarding pt's progress    Pt. Left up in recliner in NAD, call bell in reach. Assessment: Pt. W/ improved midiline alignment as during dynamic standing balance training as well as during gait. Pt. Progressing towards CGA level w/ transfers & gait this session. Pt. Cont. To benefit from PT to increase stability w/ transfers & gait. Plan of Care: Continue with POC and progress as tolerated.      Mandy Bolivar, PT  6/24/2020

## 2020-06-24 NOTE — PROGRESS NOTES
06/24/20 0959   Time Spent With Patient   Time In [facility] 26   Time Out 0957   Patient Seen For: AM   Mental Status   Neurologic State Alert   Orientation Level Oriented X4   Cognition Memory loss;Decreased attention/concentration   Perception Verbal   Safety/Judgement Home safety        06/24/20 0959   Neuro-Linguistic Exercises   Visual Problem Solving Impaired; interpreting and answering questions related to his daily schedule with 75% accuracy   Memory Impaired; recall of upcoming therapy sessions and general time frames with 75% accuracy   Attention  Impaired  Reading comprehension activity to answer questions requiring intferencing skills completed with Megha     Patient participated with cognitive therapy. Visual reasoning for interpreting and answering questions related to his daily schedule with 75% accuracy. Recall of upcoming therapy sessions, breaks, and general time frames with 75% accuracy later in the session. Reading comprehension activity to answer questions requiring inferencing skills completed with Megha. Recommend continued therapy to address attention, reasoning, and short-term robert.     Romina Ornelas MS, CCC-SLP

## 2020-06-24 NOTE — PROGRESS NOTES
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 Vss. No acute complaints. PT, OT well tolerated. Steady gains made. No new barrier to progress noted. Not on O2 this a.m or overnoc.  Denies cp or sob  Objective:     Current Facility-Administered Medications   Medication Dose Route Frequency    meclizine (ANTIVERT) tablet 25 mg  25 mg Oral TID    cloNIDine HCL (CATAPRES) tablet 0.1 mg  0.1 mg Oral Q6H PRN    scopolamine (TRANSDERM-SCOP) 1 mg over 3 days 1 Patch  1 Patch TransDERmal Q72H    glipiZIDE (GLUCOTROL) tablet 5 mg  5 mg Oral ACB    hydrocortisone (CORTAID) 1 % cream   Topical BID PRN    lip protectant (BLISTEX) ointment 1 Each  1 Each Topical PRN    senna-docusate (PERICOLACE) 8.6-50 mg per tablet 1 Tab  1 Tab Oral DAILY    ondansetron (ZOFRAN ODT) tablet 4 mg  4 mg Oral Q6H PRN    meclizine (ANTIVERT) tablet 25 mg  25 mg Oral Q6H PRN    acetaminophen (TYLENOL) tablet 650 mg  650 mg Oral Q6H PRN    amLODIPine (NORVASC) tablet 5 mg  5 mg Oral DAILY    aspirin chewable tablet 81 mg  81 mg Oral DAILY    atorvastatin (LIPITOR) tablet 80 mg  80 mg Oral QHS    [Held by provider] clopidogreL (PLAVIX) tablet 75 mg  75 mg Oral DAILY    doxazosin (CARDURA) tablet 8 mg  8 mg Oral BID    [Held by provider] enoxaparin (LOVENOX) injection 40 mg  40 mg SubCUTAneous Q24H    insulin lispro (HUMALOG) injection   SubCUTAneous AC&HS    loratadine (CLARITIN) tablet 10 mg  10 mg Oral DAILY    sodium chloride (OCEAN) 0.65 % nasal squeeze bottle 2 Spray  2 Spray Both Nostrils Q2H PRN    pneumococcal 23-valent (PNEUMOVAX 23) injection 0.5 mL  0.5 mL IntraMUSCular PRIOR TO DISCHARGE    polyethylene glycol (MIRALAX) packet 17 g  17 g Oral DAILY    bisacodyL (DULCOLAX) tablet 5 mg  5 mg Oral DAILY PRN    Or    bisacodyL (DULCOLAX) suppository 10 mg  10 mg Rectal DAILY PRN    Or    magnesium hydroxide (MILK OF MAGNESIA) 400 mg/5 mL oral suspension 30 mL  30 mL Oral DAILY PRN       Review of Systems:   Denies chest pain, shortness of breath, cough, headache, visual problems, abdominal pain, dysuria, calf pain. Pertinent positives are as noted in the HPI, ROS unremarkable otherwise. Visit Vitals  /73   Pulse 67   Temp 98 °F (36.7 °C)   Resp 16   Ht 5' 9\" (1.753 m)   Wt 271 lb 12.8 oz (123.3 kg)   SpO2 92%   BMI 40.14 kg/m²        Physical Exam:   General: Alert and age appropriately oriented. No acute cardiorespiratory distress. HEENT: Normocephalic, no scleral icterus. Right ptosis  Oral mucosa moist without cyanosis. Lungs: Clear to auscultation bilaterally. Respiration even and unlabored. Heart: Regular rate and rhythm, S1, S2. No murmurs, clicks, rub or gallops. Abdomen: Soft, non-tender, not distended. Bowel sounds normoactive. No organomegaly. obese   Genitourinary: Deferred. Neuromuscular:      Ptosis right eye. EOMI  No focal motor deficits   Skin/extremity: No rashes, no erythema. No calf tenderness B LE.   No edema                                                                              Functional Assessment:  Gross Assessment  AROM: Within functional limits (06/20/20 1400)  PROM: Within functional limits (06/16/20 1100)  Strength: Generally decreased, functional (06/16/20 1100)  Coordination: Generally decreased, functional (06/16/20 1100)       Balance  Sitting - Static: Good (unsupported) (06/23/20 1500)  Sitting - Dynamic: Good (unsupported) (06/23/20 1500)  Standing - Static: Fair (06/23/20 1500)  Standing - Dynamic : Impaired (06/23/20 1500)                     Sharmila Fall Risk Assessment:  Rebecca Ramos Fall Risk  Mobility: Ambulates or transfers with assist devices or assistance (06/24/20 0856)  Mobility Interventions: Communicate number of staff needed for ambulation/transfer;Patient to call before getting OOB (06/24/20 0856)  Mentation: Alert, oriented x 3 (06/24/20 0856)  Medication: Patient receiving anticonvulsants, sedatives(tranquilizers), psychotropics or hypnotics, hypoglycemics, narcotics, sleep aids, antihypertensives, laxatives, or diuretics (06/24/20 0856)  Medication Interventions: Assess postural VS orthostatic hypotension; Patient to call before getting OOB; Teach patient to arise slowly (06/24/20 0788)  Elimination: Needs assistance with toileting (06/24/20 0856)  Elimination Interventions: Call light in reach; Patient to call for help with toileting needs (06/24/20 0856)  Prior Fall History: No (06/24/20 0856)  History of Falls Interventions: Door open when patient unattended (06/21/20 0911)  Total Score: 3 (06/24/20 0856)  High Fall Risk: Yes (06/24/20 0346)     Speech Assessment:         Ambulation:  Gait  Distance (ft): 40 Feet (ft) (06/23/20 1500)  Assistive Device: Walker, rolling (06/23/20 1500)     Labs/Studies:  Recent Results (from the past 72 hour(s))   GLUCOSE, POC    Collection Time: 06/21/20 11:41 AM   Result Value Ref Range    Glucose (POC) 145 (H) 65 - 100 mg/dL   GLUCOSE, POC    Collection Time: 06/21/20  4:45 PM   Result Value Ref Range    Glucose (POC) 111 (H) 65 - 100 mg/dL   GLUCOSE, POC    Collection Time: 06/21/20  8:20 PM   Result Value Ref Range    Glucose (POC) 151 (H) 65 - 100 mg/dL   GLUCOSE, POC    Collection Time: 06/22/20  5:02 AM   Result Value Ref Range    Glucose (POC) 125 (H) 65 - 100 mg/dL   GLUCOSE, POC    Collection Time: 06/22/20 11:16 AM   Result Value Ref Range    Glucose (POC) 117 (H) 65 - 100 mg/dL   GLUCOSE, POC    Collection Time: 06/22/20  4:48 PM   Result Value Ref Range    Glucose (POC) 166 (H) 65 - 100 mg/dL   GLUCOSE, POC    Collection Time: 06/22/20  8:37 PM   Result Value Ref Range    Glucose (POC) 167 (H) 65 - 100 mg/dL   GLUCOSE, POC    Collection Time: 06/23/20  6:16 AM   Result Value Ref Range    Glucose (POC) 130 (H) 65 - 100 mg/dL   GLUCOSE, POC    Collection Time: 06/23/20 11:14 AM   Result Value Ref Range    Glucose (POC) 130 (H) 65 - 708 mg/dL   METABOLIC PANEL, BASIC    Collection Time: 06/23/20 11:22 AM   Result Value Ref Range    Sodium 145 136 - 145 mmol/L    Potassium 4.6 3.5 - 5.1 mmol/L    Chloride 110 (H) 98 - 107 mmol/L    CO2 30 21 - 32 mmol/L    Anion gap 5 (L) 7 - 16 mmol/L    Glucose 109 (H) 65 - 100 mg/dL    BUN 19 8 - 23 MG/DL    Creatinine 1.15 0.8 - 1.5 MG/DL    GFR est AA >60 >60 ml/min/1.73m2    GFR est non-AA >60 >60 ml/min/1.73m2    Calcium 8.1 (L) 8.3 - 10.4 MG/DL   GLUCOSE, POC    Collection Time: 06/23/20  4:22 PM   Result Value Ref Range    Glucose (POC) 98 65 - 100 mg/dL   GLUCOSE, POC    Collection Time: 06/23/20  9:26 PM   Result Value Ref Range    Glucose (POC) 141 (H) 65 - 100 mg/dL   GLUCOSE, POC    Collection Time: 06/24/20  7:00 AM   Result Value Ref Range    Glucose (POC) 118 (H) 65 - 100 mg/dL       Assessment:     Problem List as of 6/24/2020 Date Reviewed: 5/1/2020          Codes Class Noted - Resolved    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  ICD-9-CM: 434.91  6/14/2020 - Present        Cerebellar stroke (HCC) ICD-10-CM: I63.9  ICD-9-CM: 434.91  6/14/2020 - Present        Urinary retention ICD-10-CM: R33.9  ICD-9-CM: 788.20  6/8/2020 - Present        Stroke (cerebrum) (HCC) ICD-10-CM: I63.9  ICD-9-CM: 434.91  6/8/2020 - Present        Severe obesity (HCC) ICD-10-CM: E66.01  ICD-9-CM: 278.01  7/2/2019 - Present        Right thalamic infarction [facility]) ICD-10-CM: I63.9  ICD-9-CM: 434.91  7/2/2019 - Present        Numbness and tingling in left hand ICD-10-CM: R20.0, R20.2  ICD-9-CM: 782.0  7/2/2019 - Present        Controlled type 2 diabetes mellitus without complication, without long-term current use of insulin ([facility] [address] ICD-10-CM: E11.9  ICD-9-CM: 250.00  2/22/2019 - Present        Cerebrovascular accident (CVA) due to occlusion [facility]) ICD-10-CM: I63.9  ICD-9-CM: 434.91  2/12/2019 - Present        Essential hypertension, benign ICD-10-CM: I10  ICD-9-CM: 401.1  1/3/2014 - Present        Mixed hyperlipidemia ICD-10-CM: E78.2  ICD-9-CM: 272.2  1/3/2014 - Present                   Dorsal Medullary syndrome/Cerebellar stroke  Plan / Recommendations / Medical Decision Making:      Continue daily physician / PA medical management:     New ischemic stroke, right cerebellar hemisphere, right dorsolateral medulla; secondary stroke prevention - DAPT, high-intensity statin (Lipitor), BP management.  -dizziness, nausea, intermittent diplopia; prn meclizine  -6/19 scopolamine patch applied  -6/22 added meclizine 6/19, remains dizzy when OOB. not orthostatic  -6/23 vertigo remains a significant issue despite meclizine and scopolomine; could try phenergan or valium but sedation main issue of concern. Will adjust meclizine dosing. Will check bmp to f/u on fluid status. Push po fluids (uop is good)  -6/24 did better with scheduled Meclizine but need to adjust pharmacies dose times so that he gets it before am. therapies     Hypertension / hypotension - patient on doxazosin 8mg twice daily from PCP, now additionally on amlodipine 5mg. /63, P 75 on admission here; patient gives pretty clear history of brief orthostatic hypotension symptoms when sitting from supine prior to meals.  Staff to perform OH maneuvers, may hold amlodipine.  -6/17 bp 142/69; no orthostatics; cont norvasc and cardura  -6/18 bp 152/89 (129-165), no reported orthostatics; cont current meds  -6/21 BP - 158/75, continues to fluctuate  -6/22 122//71 fluctuates making med mgt difficult  -6/23 147/69 ; 6/24 160/73, range 132-160/54-73; no orthostatics documented; consider increasing Norvasc to 7.5 mg; goal SBP<  140     Diabetes mellitus - uncontrolled / poor glycemic control, HgbA1c 7.8% (6/9/2020). Will require daily, close FSG monitoring and medication adjustment to optimize glycemic control in setting of acute illness and hospitalization. POC blood glucose around 130s-150s. Currently on Humalog SSI; will monitor and manage.  -6/15 fair control at this time. Consider low dose antihyperglycemic  -6/16 bs 147 this a.m., 167 at bedtime, 182 at supper, 152 at lunch; start glucotrol 2.5 mg  -6/17 124 this a.m; 188 at bedtime, 135 with supper, 133 with lunch; cont Glucotrol 2.5 mg, increase to 5mg if bs not consistently below 120-130  -6/18 bs 120 this a.m, 135 last pm, 200 at supper, 93 at lunch, still not consistent  -6/19 -174, increase glipizide to 5mg  - 6/21 BG-111, BG levels < 130   -6/24 bs remain acceptable/ stable on Glipizide 5mg     Urinary retention - cochran catheter in place since ED 6/8; Urologist suspects urinary retention part of stroke syndrome. Catheter remains until 6/16, at which time it will be removed for voiding trial.  -pt on Cardura 8mg. Check UA; lg blood, lg leuk; pt didn't have micro done. Asymptomatic  Cochran discontinued this a.m; await results  -6/17 cochran replaced due to hematuria and inability to void. Small amt of hematuria after one CIC; when cochran replaced; zainab blood, continues this a.m. lg amt of blood. Check CBC, irrigated without improvement, Urology Consulted. ? Continuous irrigation/3-way cochran. ?cysto; hold plavix and Lovenox. No hx of retention or hematuria.; +>100K gram neg rods s/p one dose Rocephing 1g IV, on septra, day 2; await ID/sens.   -E coli UTI; sensitive to Septra, day 3/7. CIB per Urology, appreciate assistance. Hgb down one gram but stable 12.1; urine is getting pinker; no clots noted in bag. Continue  -6/19 Urologist D/Cing cochran irrigation; will ask NP to address Stat-lock / tethering issue.   - 6/21 unsuccessful voiding trial yesterday/today with hematuria returning after catheterization, cochran reinserted, continue to irrigate, cochran functioning well  -6/22 cochran reinserted due to inability to void and inc hematuria. Pt is ok with this and understands reasoning. States wife upset with this, will d/w her. Likely to go with cochran; 6/23 less hematuria, few clots, urine looking better  -6/23 abx have finished. Dec hematuria; at some point will need to restart Plavix  -6/24 if urine stays clear , will restart Plavix tomorrow. Cont to hold Lovenox; cont cochran     Hyperlipidemia - on high-intensity statin.     Electrolyte abnormality - previous hypoK+, current BMP normal; monitor.     Pain control - stable, mild-to-moderate joint symptoms intermittently, reasonably well controlled by PRN meds. Will require regular pain assessment and comprenhensive pain management.     Bowel program - at risk for constipation due to lack of mobility. MiraLAX daily for regularity, Meghan-Colace for stool softener prn. PRN MOM, bisacodyl suppository or tablets for constipation.  -6/15 normally has bm every other day. Will add daily senna.   -6/16 good results  -no complaints     Depression - no previous history; at risk of post-stroke depression. Monitor.     Pneumonia prophylaxis - incentive spirometer every hour while awake.     Newly diagnosed NELL; pt sets alarm off if not supine. If he rolls side to side , he desats. CXR shows atelectasis; enc IS  -6/16 not tolerating CPAP; O2 NC at noc at 1L, sats 91-98%  -6/17 weaned off O2 during day yesterday. Will get nocturnal pulse ox study closer to dc. Reports no hx of NELL , wife reported no snoring but breaths \"heavy\"  -6/19 required O2 during PT; 6/22 reports no O2 with therapy but using at noc  -6/24 has weaned off of O2, monitoring sats. Denies any sob     DVT risk / DVT prophylaxis - will require daily physician / PA exam to assess for signs and symptoms as patient is at increased risk for of thromboembolism. Mobilize as tolerated. Sequential pneumatic compression devices (SCDs) when in bed; thigh-high or knee-high thromboembolic deterrent hose when out of bed. On DAPT; Lovenox subcutaneously daily.   -6/17 hold Lovenox due to zainab hematuria; 6/22 cont to hold  -6/24 urine yellow.      Wound care - monitor general skin wound status daily per staff and physician / PA. At risk for failure. Will require 24/7 rehab nursing. No current wounds.     GERD - at times may need antacids, Maalox prn.                    Time spent was 25 minutes with over 1/2 in direct patient care/examination, consultation and coordination of care.      Signed By: Chandu Ramos MD     June 24, 2020

## 2020-06-24 NOTE — PROGRESS NOTES
Patient SAO2 on room air at 2105 and 0500 were 96 % and 94 % respectively. No change in patient condition during nursing rounds.

## 2020-06-24 NOTE — PROGRESS NOTES
PHYSICAL THERAPY DAILY NOTE  Time In: 5504  Time Out: 7595  Patient Seen For: AM;Gait training; Therapeutic exercise;Transfer training    Subjective: \"I've had a good day today. \"         Objective: Other (comment)(fall risk)    COGNITION Daily Assessment    Pt. Pleasant & motivated. Decreased short term recall noted. BED/MAT MOBILITY Daily Assessment    Rolling Right : 0 (Not tested)  Rolling Left : 0 (Not tested)  Supine to Sit : 0 (Not tested)  Sit to Supine : 0 (Not tested)       TRANSFERS Daily Assessment   Pt. Able to performed 50% transfers w/ CGA, requires min A 50% due to R sided LOB Transfer Type: SPT with walker  Transfer Assistance : 4 (Minimal assistance)  Sit to Stand Assistance: Minimal assistance  Car Transfers: Not tested       GAIT Daily Assessment   First attempt trialed abdominal binder to address R trunk ataxia. However, pt. W/ increased R sided lean. Pt. Then performed gait training using mirror for visual feedback, focusing on maintaining midline. Amount of Assistance: 4 (Minimal assistance)  Distance (ft): 50 Feet (ft)(x1, 40'x2)  Assistive Device: Walker, rolling       STEPS or STAIRS Daily Assessment    Steps/Stairs Ambulated (#): 0  Level of Assist : 0 (Not tested)       BALANCE Daily Assessment    Sitting - Static: Good (unsupported)  Sitting - Dynamic: Good (unsupported)  Standing - Static: Fair  Standing - Dynamic : Impaired       WHEELCHAIR MOBILITY Daily Assessment   Placed towel roll under R hip to wedge cushion as well as recliner to improve midline alignment during sitting activities Able to Propel (ft): 0 feet  Curbs/Ramps Assist Required (FIM Score): 0 (Not tested)  Wheelchair Setup Assist Required : 0 (Not tested)       LOWER EXTREMITY EXERCISES Daily Assessment    Pt.  Performed NuStep @ resistance level 1 x10 minutes to increase strength & endurance B LEs     Vital Signs: /73   Pulse 67   Temp 98 °F (36.7 °C)   Resp 16   Ht 5' 9\" (1.753 m)   Wt 123.3 kg (271 lb 12.8 oz)   SpO2 92%   BMI 40.14 kg/m²     Pain level: no c/o pain    Patient education: pt. Educated on purpose of using mirror for gait training    Interdisciplinary Communication: collaborated w/ OT regarding pt's progress    Pt. Left in recliner in NAD, call bell in reach. Assessment: Pt. W/ increased R sided LOB this session, increased w/ fatigue. Abdominal binder not effective in improving midline alignment. Will wedge w/c & recliner to improve midline positioning during prolonged static periods of sitting. Benefits from cont. PT services to improve dynamic standing balance & gait & decrease falls risk. Plan of Care: Continue with POC and progress as tolerated.      Edvin Solorio, PT  6/24/2020

## 2020-06-24 NOTE — PROGRESS NOTES
OT Daily Note  Time In 1033   Time Out 1117     Subjective: \"I forgot to ask the doctor about my eye. \"  Pain: not expressed  Education: reaching and coordination activities  Interdisciplinary Communication: with IP team during conference  Precautions: fall risk  Patient Vitals for the past 12 hrs:   Temp Pulse Resp BP SpO2   06/24/20 0702 98 °F (36.7 °C) 67 16 160/73 92 %   06/24/20 0500 -- -- -- -- 94 %       Mobility   Score Comments   Sit to Stand 4: Supervision or touching A Supervision   Transfer Assist 4: Supervision or touching A Transfer Type: SPT   Equipment: Rolling Walker   Comments: CGA, while wearing abdominal binder for balance     Reaching Daily Assessment   Patient performed vertical ring ladder reaching activity using different vertical heights and small rings with BUEs, working on shoulder stabilization for overhead reaching and  strengthening. Completed task in seated  position. Patient used medium resistance clothespin in B hands to move x 10 plastic rings up and down vertical ladder. Pt completed 5 vertical level heights to promote shoulder flexion and abduction and 5 diagonal patterns to promote crossing midline. Patient required 0 rest breaks throughout activity. Improved reaching overhead and shoulder height with only one ring drop. Coordination Daily Assessment   Pt completed coordination and dexterity task utilizing shoe string and lacing card. Omitted 2 holes in lacing cards with verbal and visual cues required to correct. Able to complete dexterity task with increased time. Functional mobility Daily Assessment   Pt ambulated ~15 ft using RW and with abdominal binder donned with CGA for improved functional mobility. Assessment: Pt notes vision is slowly improving, observed less frequent visual/perceptual mistakes during functional activities at table top.    Plan: Continue OT POC with focus on ADL/IADL skills, functional transfers, functional mobility, coordination, strength, static and dynamic balance, and activity tolerance to maximize safety and independence with ADLs and functional transfers.      Antonio Mcdermott, OTR/L  6/24/2020

## 2020-06-24 NOTE — PROGRESS NOTES
Time In 0700   Time Out 0745     Mobility   Score Comments   Supine to Sit 6: Independent    Sit to Stand 4: Supervision or touching A SBA   Transfer Assist 4: Supervision or touching A Transfer Type: SPT   Equipment: Rolling Walker   Comments: CGA     Activities of Daily Living    Score Comments   Eating 6: Independent    Oral Hyigene 5: S/U or clean-up assist    Bathing 4: Supervision or touching A Type of Shower: Shower  Position: Standing PRN and Unsupported Sitting   Adaptive  Equipment: Tub Transfer Bench, Grab Bars and Long Handled Sponge  Comments: Assist to wash bottom in stance   Upper Body  Dressing 5: S/U or clean-up assist Items Applied: Pullover  Position: Supported Sitting  Comments:    Lower Body Dressing 4: Supervision or touching A Items Applied: Underwear and Elastic pants  Position: Standing PRN and Unsupported Sitting  Adaptive Equipment: RW and W/C  Comments: SBA   Donning/[place] Footwear 5: S/U or clean-up assist Items Applied: Socks  Adaptive Equipment: Sock Aide  Comments: Toilet Transfer 4: Supervision or touching A Transfer Type: SPT   Equipment: Rolling Walker   Comments: Toileting Hygiene 4: Supervision or touching A Output: BM  Comments: Assist with hygiene thoroughness   Education  Attempting to wash bottom both after toileting and in shower       S: \"I can barely reach it (regardinging toileting hygiene). \" Agreeable to therapy. Patient was able to ambulate ~10 feet using a RW with CGA. Pain not expressed. Pt reporting increased dizziness with toilet and shower transfers. Patient Vitals for the past 12 hrs:   Temp Pulse Resp BP SpO2   06/24/20 0702 98 °F (36.7 °C) 67 16 160/73 92 %   06/24/20 0500 -- -- -- -- 94 %       Collaborated with RN regarding patient performance and POC.    Patient tolerated session well, but activity tolerance, balance, functional mobility, strength, coordination, cognition are still below baseline and require skilled facilitation to successfully and safely complete ADLs and transfers. Patient ended session in recliner with breakfast, call remote, and phone within reach.        Antonio Mcdermott, OTR/L  6/24/2020

## 2020-06-25 LAB
GLUCOSE BLD STRIP.AUTO-MCNC: 106 MG/DL (ref 65–100)
GLUCOSE BLD STRIP.AUTO-MCNC: 122 MG/DL (ref 65–100)
GLUCOSE BLD STRIP.AUTO-MCNC: 174 MG/DL (ref 65–100)
GLUCOSE BLD STRIP.AUTO-MCNC: 94 MG/DL (ref 65–100)

## 2020-06-25 PROCEDURE — 97530 THERAPEUTIC ACTIVITIES: CPT

## 2020-06-25 PROCEDURE — 74011250637 HC RX REV CODE- 250/637: Performed by: PHYSICAL MEDICINE & REHABILITATION

## 2020-06-25 PROCEDURE — 92507 TX SP LANG VOICE COMM INDIV: CPT

## 2020-06-25 PROCEDURE — 97116 GAIT TRAINING THERAPY: CPT

## 2020-06-25 PROCEDURE — 97535 SELF CARE MNGMENT TRAINING: CPT

## 2020-06-25 PROCEDURE — 99232 SBSQ HOSP IP/OBS MODERATE 35: CPT | Performed by: PHYSICAL MEDICINE & REHABILITATION

## 2020-06-25 PROCEDURE — 82962 GLUCOSE BLOOD TEST: CPT

## 2020-06-25 PROCEDURE — 74011250636 HC RX REV CODE- 250/636: Performed by: PHYSICAL MEDICINE & REHABILITATION

## 2020-06-25 PROCEDURE — 74011636637 HC RX REV CODE- 636/637: Performed by: PHYSICIAN ASSISTANT

## 2020-06-25 PROCEDURE — 97110 THERAPEUTIC EXERCISES: CPT

## 2020-06-25 PROCEDURE — 74011250637 HC RX REV CODE- 250/637: Performed by: PHYSICIAN ASSISTANT

## 2020-06-25 PROCEDURE — 65310000000 HC RM PRIVATE REHAB

## 2020-06-25 RX ORDER — AMLODIPINE BESYLATE 10 MG/1
10 TABLET ORAL DAILY
Status: DISCONTINUED | OUTPATIENT
Start: 2020-06-25 | End: 2020-07-01 | Stop reason: HOSPADM

## 2020-06-25 RX ADMIN — ATORVASTATIN CALCIUM 80 MG: 80 TABLET, FILM COATED ORAL at 22:51

## 2020-06-25 RX ADMIN — MECLIZINE HYDROCHLORIDE 25 MG: 25 TABLET ORAL at 18:01

## 2020-06-25 RX ADMIN — GLIPIZIDE 5 MG: 5 TABLET ORAL at 08:03

## 2020-06-25 RX ADMIN — LORATADINE 10 MG: 10 TABLET ORAL at 08:03

## 2020-06-25 RX ADMIN — DOXAZOSIN MESYLATE 8 MG: 4 TABLET ORAL at 08:03

## 2020-06-25 RX ADMIN — CLOPIDOGREL BISULFATE 75 MG: 75 TABLET ORAL at 08:03

## 2020-06-25 RX ADMIN — ASPIRIN 81 MG 81 MG: 81 TABLET ORAL at 08:03

## 2020-06-25 RX ADMIN — AMLODIPINE BESYLATE 10 MG: 10 TABLET ORAL at 08:03

## 2020-06-25 RX ADMIN — SENNOSIDES AND DOCUSATE SODIUM 1 TABLET: 8.6; 5 TABLET ORAL at 08:03

## 2020-06-25 RX ADMIN — MECLIZINE HYDROCHLORIDE 25 MG: 25 TABLET ORAL at 05:13

## 2020-06-25 RX ADMIN — INSULIN LISPRO 2 UNITS: 100 INJECTION, SOLUTION INTRAVENOUS; SUBCUTANEOUS at 22:53

## 2020-06-25 RX ADMIN — DOXAZOSIN MESYLATE 8 MG: 4 TABLET ORAL at 22:51

## 2020-06-25 NOTE — PROGRESS NOTES
Nutrition Assessment for:   Length of Stay     Assessment: Pt admitted to inpatient rehab center with gait impairment, debility, and deconditioning. Hx DM, stroke, HTN. Spoke with pt at bedside. Denies any changes in appetite or weight during admission or prior to admission. Pt denies any questions/concerns regarding nutrition at this time. Plan for pt to d/c 7/1 with [facility]. DIET DIABETIC WITH OPTIONS Consistent Carb 1200kcal; Regular      Anthropometrics:  Height: 5' 9\" (175.3 cm), Weight: 123.3 kg (271 lb 12.8 oz),  , Body mass index is 40.14 kg/m². BMI class of extreme obesity. Macronutrient needs: (using Listed body weight 123 kg)  EER: 9593-0284 kcal/day (MSJ x1-1.2)  EPR:  g/day (20% of kcals)    Nutrition Diagnosis:  No nutrition diagnosis at this time. Nutrition Intervention:  Meals and snacks:  Continue current diet. Discharge Nutrition Plan: No discharge needs at this time.      Kay, [address] [address]

## 2020-06-25 NOTE — PROGRESS NOTES
PHYSICAL THERAPY DAILY NOTE  Time In: 0830  Time Out: 0915  Patient Seen For: AM;Gait training; Therapeutic exercise;Transfer training    Subjective: \"My head just seems karyn foggy. \"         Objective:   Other (comment)(fall risk)    COGNITION Daily Assessment    Pt. impulsive during functitonal tasks, needs frequent safety cues       BED/MAT MOBILITY Daily Assessment    Rolling Right : 0 (Not tested)  Rolling Left : 0 (Not tested)  Supine to Sit : 0 (Not tested)  Sit to Supine : 0 (Not tested)       TRANSFERS Daily Assessment   Focusing on maintaining midline & slowing pace during pivots Transfer Type: SPT with walker  Transfer Assistance : 4 (Minimal assistance)  Sit to Stand Assistance: Contact guard assistance  Car Transfers: Not tested       GAIT Daily Assessment   VCs to enhance L sided weight shift during R LE swing phase as well as to maintain long step lengths B Amount of Assistance: 4 (Minimal assistance)  Distance (ft): 75 Feet (ft)  Assistive Device: Walker, rolling       STEPS or STAIRS Daily Assessment    Steps/Stairs Ambulated (#): 0  Level of Assist : 0 (Not tested)       BALANCE Daily Assessment    Sitting - Static: Good (unsupported)  Sitting - Dynamic: Good (unsupported)  Standing - Static: Fair  Standing - Dynamic : Impaired       WHEELCHAIR MOBILITY Daily Assessment    Able to Propel (ft): 0 feet  Curbs/Ramps Assist Required (FIM Score): 0 (Not tested)  Wheelchair Setup Assist Required : 0 (Not tested)       LOWER EXTREMITY EXERCISES Daily Assessment   Performed w/ B UE support as well as facilitatory cueing R side during L LE single stance exercises Extremity: Both  Exercise Type #1: Standing lower extremity strengthening  Sets Performed: 1  Reps Performed: 10  Level of Assist: Minimal assistance     STANDING EXERCISES Sets Reps Comments   Heel Raises 1 10    Toe Raises 1 10    Hip Flexion/Marching 1 10    Hamstring Curls 1 10    Hip Abduction 1 10    Hip Extension 1 10    Mini Squats 1 10 Vital Signs: /78   Pulse 74   Temp 98.4 °F (36.9 °C)   Resp 16   Ht 5' 9\" (1.753 m)   Wt 123.3 kg (271 lb 12.8 oz)   SpO2 91%   BMI 40.14 kg/m²     Pain level: no c/o pain    Patient education: pt. Educated on standing exercises    Interdisciplinary Communication: collaborated w/ OT regarding pt's progress    Pt. Left up in recliner in NAD, call bell in reach. Assessment: Pt. Tolerated standing exercises well w/ improved midline awareness as well as able to increase gait distance. Does cont. To have difficulty w/ unilateral stance L LE & remains impulsive during transfers, so benefits from cont. PT services to address. Plan of Care: Continue with POC and progress as tolerated.      Piper Woo, PT  6/25/2020

## 2020-06-25 NOTE — PROGRESS NOTES
Patient SAO2 93% and 94% on room air  at 1851 and 0337 respectively overnight. Patient in no acute distress during hourly nursing rounds.

## 2020-06-25 NOTE — PROGRESS NOTES
Irma Corona MD  Medical Director  [address] [facility], [address] 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 Vss. No acute complaints. PT, OT well tolerated. Steady gains made. No new barrier to progress noted. Discussed the need to dc home with cochran. Will f/u with urology outpt for cysto and voiding trial. Urine yellow , no zainab blood. Expressed understanding.  Off oxygen all noc and sats 93, 94%  Asking for assistance finding new PCP; CM to assist.   Objective:     Current Facility-Administered Medications   Medication Dose Route Frequency    meclizine (ANTIVERT) tablet 25 mg  25 mg Oral TID    amLODIPine (NORVASC) tablet 7.5 mg  7.5 mg Oral DAILY    cloNIDine HCL (CATAPRES) tablet 0.1 mg  0.1 mg Oral Q6H PRN    scopolamine (TRANSDERM-SCOP) 1 mg over 3 days 1 Patch  1 Patch TransDERmal Q72H    glipiZIDE (GLUCOTROL) tablet 5 mg  5 mg Oral ACB    hydrocortisone (CORTAID) 1 % cream   Topical BID PRN    lip protectant (BLISTEX) ointment 1 Each  1 Each Topical PRN    senna-docusate (PERICOLACE) 8.6-50 mg per tablet 1 Tab  1 Tab Oral DAILY    ondansetron (ZOFRAN ODT) tablet 4 mg  4 mg Oral Q6H PRN    meclizine (ANTIVERT) tablet 25 mg  25 mg Oral Q6H PRN    acetaminophen (TYLENOL) tablet 650 mg  650 mg Oral Q6H PRN    aspirin chewable tablet 81 mg  81 mg Oral DAILY    atorvastatin (LIPITOR) tablet 80 mg  80 mg Oral QHS    [Held by provider] clopidogreL (PLAVIX) tablet 75 mg  75 mg Oral DAILY    doxazosin (CARDURA) tablet 8 mg  8 mg Oral BID    [Held by provider] enoxaparin (LOVENOX) injection 40 mg  40 mg SubCUTAneous Q24H    insulin lispro (HUMALOG) injection   SubCUTAneous AC&HS    loratadine (CLARITIN) tablet 10 mg  10 mg Oral DAILY    sodium chloride (OCEAN) 0.65 % nasal squeeze bottle 2 Spray  2 Spray Both Nostrils Q2H PRN    pneumococcal 23-valent (PNEUMOVAX 23) injection 0.5 mL  0.5 mL IntraMUSCular PRIOR TO DISCHARGE    polyethylene glycol (MIRALAX) packet 17 g  17 g Oral DAILY    bisacodyL (DULCOLAX) tablet 5 mg  5 mg Oral DAILY PRN    Or    bisacodyL (DULCOLAX) suppository 10 mg  10 mg Rectal DAILY PRN    Or    magnesium hydroxide (MILK OF MAGNESIA) 400 mg/5 mL oral suspension 30 mL  30 mL Oral DAILY PRN       Review of Systems:   Denies chest pain, shortness of breath, cough, headache, visual problems, abdominal pain, dysuria, calf pain. Pertinent positives are as noted in the HPI, ROS unremarkable otherwise. Visit Vitals  /71   Pulse 69   Temp 98.2 °F (36.8 °C)   Resp 16   Ht 5' 9\" (1.753 m)   Wt 271 lb 12.8 oz (123.3 kg)   SpO2 94%   BMI 40.14 kg/m²        Physical Exam:   General: Alert and age appropriately oriented. No acute cardiorespiratory distress. HEENT: Normocephalic, no scleral icterus. Right ptosis  Oral mucosa moist without cyanosis. Lungs: Clear to auscultation bilaterally. Respiration even and unlabored. Heart: Regular rate and rhythm, S1, S2. No murmurs, clicks, rub or gallops. Abdomen: Soft, non-tender, not distended. Bowel sounds normoactive. No organomegaly. obese   Genitourinary: Deferred. Neuromuscular:      No focal motor deficits  No dysmetria or drift  Speech clear, short term memory impaired. Skin/extremity: No rashes, no erythema. No calf tenderness B LE. Wound covered.                                                                               Functional Assessment:  Gross Assessment  AROM: Within functional limits (06/20/20 1400)  PROM: Within functional limits (06/16/20 1100)  Strength: Generally decreased, functional (06/16/20 1100)  Coordination: Generally decreased, functional (06/16/20 1100)       Balance  Sitting - Static: Good (unsupported) (06/24/20 1600)  Sitting - Dynamic: Good (unsupported) (06/24/20 1600)  Standing - Static: Fair (06/24/20 1600)  Standing - Dynamic : Impaired (06/24/20 1600)                     Rl Mars Fall Risk Assessment:  Rl Mars Fall Risk  Mobility: Ambulates or transfers with assist devices or assistance (06/25/20 0305)  Mobility Interventions: Communicate number of staff needed for ambulation/transfer;OT consult for ADLs; Patient to call before getting OOB;PT Consult for mobility concerns;Utilize walker, cane, or other assistive device (06/25/20 0305)  Mentation: Alert, oriented x 3 (06/25/20 0305)  Medication: Patient receiving anticonvulsants, sedatives(tranquilizers), psychotropics or hypnotics, hypoglycemics, narcotics, sleep aids, antihypertensives, laxatives, or diuretics (06/25/20 0305)  Medication Interventions: Assess postural VS orthostatic hypotension; Evaluate medications/consider consulting pharmacy; Patient to call before getting OOB; Teach patient to arise slowly (06/25/20 0305)  Elimination: Needs assistance with toileting (06/25/20 0305)  Elimination Interventions: Call light in reach; Patient to call for help with toileting needs;Stay With Me (per policy); Toilet paper/wipes in reach (06/25/20 0305)  Prior Fall History: No (06/25/20 0305)  History of Falls Interventions: Door open when patient unattended (06/21/20 0911)  Total Score: 3 (06/25/20 0305)  High Fall Risk: Yes (06/25/20 0305)     Speech Assessment:         Ambulation:  Gait  Distance (ft): 50 Feet (ft)(x1, 40'x2) (06/24/20 1600)  Assistive Device: Walker, rolling (06/24/20 1600)     Labs/Studies:  Recent Results (from the past 72 hour(s))   GLUCOSE, POC    Collection Time: 06/22/20 11:16 AM   Result Value Ref Range    Glucose (POC) 117 (H) 65 - 100 mg/dL   GLUCOSE, POC    Collection Time: 06/22/20  4:48 PM   Result Value Ref Range    Glucose (POC) 166 (H) 65 - 100 mg/dL   GLUCOSE, POC    Collection Time: 06/22/20  8:37 PM   Result Value Ref Range    Glucose (POC) 167 (H) 65 - 100 mg/dL   GLUCOSE, POC    Collection Time: 06/23/20  6:16 AM   Result Value Ref Range    Glucose (POC) 130 (H) 65 - 100 mg/dL   GLUCOSE, POC    Collection Time: 06/23/20 11:14 AM   Result Value Ref Range    Glucose (POC) 130 (H) 65 - 144 mg/dL   METABOLIC PANEL, BASIC    Collection Time: 06/23/20 11:22 AM   Result Value Ref Range    Sodium 145 136 - 145 mmol/L    Potassium 4.6 3.5 - 5.1 mmol/L    Chloride 110 (H) 98 - 107 mmol/L    CO2 30 21 - 32 mmol/L    Anion gap 5 (L) 7 - 16 mmol/L    Glucose 109 (H) 65 - 100 mg/dL    BUN 19 8 - 23 MG/DL    Creatinine 1.15 0.8 - 1.5 MG/DL    GFR est AA >60 >60 ml/min/1.73m2    GFR est non-AA >60 >60 ml/min/1.73m2    Calcium 8.1 (L) 8.3 - 10.4 MG/DL   GLUCOSE, POC    Collection Time: 06/23/20  4:22 PM   Result Value Ref Range    Glucose (POC) 98 65 - 100 mg/dL   GLUCOSE, POC    Collection Time: 06/23/20  9:26 PM   Result Value Ref Range    Glucose (POC) 141 (H) 65 - 100 mg/dL   GLUCOSE, POC    Collection Time: 06/24/20  7:00 AM   Result Value Ref Range    Glucose (POC) 118 (H) 65 - 100 mg/dL   GLUCOSE, POC    Collection Time: 06/24/20 11:14 AM   Result Value Ref Range    Glucose (POC) 124 (H) 65 - 100 mg/dL   GLUCOSE, POC    Collection Time: 06/24/20  4:44 PM   Result Value Ref Range    Glucose (POC) 79 65 - 100 mg/dL   GLUCOSE, POC    Collection Time: 06/24/20  8:57 PM   Result Value Ref Range    Glucose (POC) 142 (H) 65 - 100 mg/dL       Assessment:     Problem List as of 6/25/2020 Date Reviewed: 5/1/2020          Codes Class Noted - Resolved    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  ICD-9-CM: 434.91  6/14/2020 - Present        Cerebellar stroke (HCC) ICD-10-CM: I63.9  ICD-9-CM: 434.91  6/14/2020 - Present        Urinary retention ICD-10-CM: R33.9  ICD-9-CM: 788.20  6/8/2020 - Present        Stroke (cerebrum) (HCC) ICD-10-CM: I63.9  ICD-9-CM: 434.91  6/8/2020 - Present        Severe obesity ([facility] [address] ICD-10-CM: E66.01  ICD-9-CM: 278.01  7/2/2019 - Present        Right thalamic infarction [facility]) ICD-10-CM: I63.9  ICD-9-CM: 434.91  7/2/2019 - Present        Numbness and tingling in left hand ICD-10-CM: R20.0, R20.2  ICD-9-CM: 782.0  7/2/2019 - Present        Controlled type 2 diabetes mellitus without complication, without long-term current use of insulin ([facility] [address] ICD-10-CM: E11.9  ICD-9-CM: 250.00  2/22/2019 - Present        Cerebrovascular accident (CVA) due to occlusion [facility]) ICD-10-CM: I63.9  ICD-9-CM: 434.91  2/12/2019 - Present        Essential hypertension, benign ICD-10-CM: I10  ICD-9-CM: 401.1  1/3/2014 - Present        Mixed hyperlipidemia ICD-10-CM: E78.2  ICD-9-CM: 272.2  1/3/2014 - Present                     Dorsal Medullary syndrome/Cerebellar stroke  Plan / Recommendations / Medical Decision Making:      Continue daily physician / PA medical management:     New ischemic stroke, right cerebellar hemisphere, right dorsolateral medulla; secondary stroke prevention - DAPT, high-intensity statin (Lipitor), BP management.  -dizziness, nausea, intermittent diplopia; prn meclizine  -6/19 scopolamine patch applied  -6/22 added meclizine 6/19, remains dizzy when OOB. not orthostatic  -6/23 vertigo remains a significant issue despite meclizine and scopolomine; could try phenergan or valium but sedation main issue of concern. Will adjust meclizine dosing. Will check bmp to f/u on fluid status. Push po fluids (uop is good)  -6/24 did better with scheduled Meclizine but need to adjust pharmacies dose times so that he gets it before am. Therapies; 6/25 improved with scheduled meclizine and scopolomine patch; continue same. Cont vestibular therapies     Hypertension / hypotension - patient on doxazosin 8mg twice daily from PCP, now additionally on amlodipine 5mg.  /63, P 75 on admission here; patient gives pretty clear history of brief orthostatic hypotension symptoms when sitting from supine prior to meals. Staff to perform OH maneuvers, may hold amlodipine.  -6/17 bp 142/69; no orthostatics; cont norvasc and cardura  -6/18 bp 152/89 (129-165), no reported orthostatics; cont current meds  -6/21 BP - 158/75, continues to fluctuate  -6/22 122//71 fluctuates making med mgt difficult  -6/23 147/69 ; 6/24 160/73, range 132-160/54-73; no orthostatics documented; consider increasing Norvasc to 7.5 mg; goal SBP<  140  -6/25 bp had inc Norvasc, SBP in the 160s, no orthostatics. Inc Norvasc to 10 mg, monitor for [facility]     Diabetes mellitus - uncontrolled / poor glycemic control, HgbA1c 7.8% (6/9/2020). Will require daily, close FSG monitoring and medication adjustment to optimize glycemic control in setting of acute illness and hospitalization. POC blood glucose around 130s-150s. Currently on Humalog SSI; will monitor and manage.  -6/15 fair control at this time. Consider low dose antihyperglycemic  -6/16 bs 147 this a.m., 167 at bedtime, 182 at supper, 152 at lunch; start glucotrol 2.5 mg  -6/17 124 this a.m; 188 at bedtime, 135 with supper, 133 with lunch; cont Glucotrol 2.5 mg, increase to 5mg if bs not consistently below 120-130  -6/18 bs 120 this a.m, 135 last pm, 200 at supper, 93 at lunch, still not consistent  -6/19 -174, increase glipizide to 5mg  - 6/21 BG-111, BG levels < 130   -6/24 bs remain acceptable/ stable on Glipizide 5mg  -6/15 controlled with Glipizide and diet     Urinary retention - cochran catheter in place since ED 6/8; Urologist suspects urinary retention part of stroke syndrome. Catheter remains until 6/16, at which time it will be removed for voiding trial.  -pt on Cardura 8mg. Check UA; lg blood, lg leuk; pt didn't have micro done. Asymptomatic  Cochran discontinued this a.m; await results  -6/17 cochran replaced due to hematuria and inability to void.  Small amt of hematuria after one CIC; when cochran replaced; zainab blood, continues this a.m. lg amt of blood. Check CBC, irrigated without improvement, Urology Consulted. ? Continuous irrigation/3-way cochran. ?cysto; hold plavix and Lovenox. No hx of retention or hematuria.; +>100K gram neg rods s/p one dose Rocephing 1g IV, on septra, day 2; await ID/sens.   -E coli UTI; sensitive to Septra, day 3/7. CIB per Urology, appreciate assistance. Hgb down one gram but stable 12.1; urine is getting pinker; no clots noted in bag. Continue  -6/19 Urologist D/Cing cochran irrigation; will ask NP to address Stat-lock / tethering issue. - 6/21 unsuccessful voiding trial yesterday/today with hematuria returning after catheterization, cochran reinserted, continue to irrigate, cochran functioning well  -6/22 cochran reinserted due to inability to void and inc hematuria. Pt is ok with this and understands reasoning. States wife upset with this, will d/w her. Likely to go with cochran; 6/23 less hematuria, few clots, urine looking better  -6/23 abx have finished. Dec hematuria; at some point will need to restart Plavix  -6/24 if urine stays clear , will restart Plavix tomorrow. Cont to hold Lovenox; cont cochran  -6/25 restart plavix and monitor. Will not resume Lovenox. Mobilizing well. Will need cysto as outpt per my d/w urology. Would not be able to self cath due to body habitus. Cont cardura 8mg bid     Hyperlipidemia - on high-intensity statin.     Electrolyte abnormality - previous hypoK+, current BMP normal; monitor.     Pain control - stable, mild-to-moderate joint symptoms intermittently, reasonably well controlled by PRN meds. Will require regular pain assessment and comprenhensive pain management.     Bowel program - at risk for constipation due to lack of mobility. MiraLAX daily for regularity, Meghan-Colace for stool softener prn. PRN MOM, bisacodyl suppository or tablets for constipation.  -6/15 normally has bm every other day.  Will add daily senna.   -6/16 good results  -no complaints     Depression - no previous history; at risk of post-stroke depression. Monitor.     Pneumonia prophylaxis - incentive spirometer every hour while awake.     Newly diagnosed NELL; pt sets alarm off if not supine. If he rolls side to side , he desats. CXR shows atelectasis; enc IS  -6/16 not tolerating CPAP; O2 NC at [facility] at 1L, sats 91-98%  -6/17 weaned off O2 during day yesterday. Will get nocturnal pulse ox study closer to dc. Reports no hx of NELL , wife reported no snoring but breaths \"heavy\"  -6/19 required O2 during PT; 6/22 reports no O2 with therapy but using at noc  -6/24 has weaned off of O2, monitoring sats. Denies any sob  -6/25 remains off O2, no sob. sats > 92%     DVT risk / DVT prophylaxis - will require daily physician / PA exam to assess for signs and symptoms as patient is at increased risk for of thromboembolism. Mobilize as tolerated. Sequential pneumatic compression devices (SCDs) when in bed; thigh-high or knee-high thromboembolic deterrent hose when out of bed. On DAPT; Lovenox subcutaneously daily.   -6/17 hold Lovenox due to zainab hematuria; 6/22 cont to hold  -6/24 urine yellow.      Wound care - monitor general skin wound status daily per staff and physician / PA. At risk for failure. Will require 24/7 rehab nursing. No current wounds.     GERD - at times may need antacids, Maalox prn.       Time spent was 25 minutes with over 1/2 in direct patient care/examination, consultation and coordination of care.      Signed By: Andrea Salcedo MD     June 25, 2020

## 2020-06-25 NOTE — PROGRESS NOTES
06/25/20 1122   Time Spent With Patient   Time In 0920   Time Out 0958   Patient Seen For: AM   Mental Status   Neurologic State Alert   Orientation Level Oriented X4   Cognition Memory loss;Decreased attention/concentration   Perception Visual  (left eye patch in place)   Perseveration Verbal cues provided;Visual cues provided   Safety/Judgement Home safety        06/25/20 1123   Memory Exercises   Functional Tasks Memory related to schedule and details from [facility] stay at 80% accuracy   Attention Exercises Performed   Accuracy (%) 90 %  Basic sequencing of three words   Neuro-Linguistic Exercises   Visual Problem Solving Impaired  answering questions related to a hospital directory with 60% accuracy; decreased recalled of specific instructions at times with repetition needed   Memory Impaired   Attention  Impaired     Patient participated with cognitive therapy. Memory related to schedule and details from [facility] stay at 80% accuracy. 90 % accuracy with basic sequencing of three words during attention task. Increased cues to answer questions related to a hospital directory with 60% accuracy; decreased retention of specific instructions while working toward finding the information on the map with repetition needed. Left eye patch used during the activity with patient able to correctly identify items on the key. Recommend continued therapy to address problem solving, attention, and reasoning.     Jennifer Stroud MS, CCC-SLP

## 2020-06-25 NOTE — PROGRESS NOTES
Sitting up in recliner, states \"had a good workout today\". Call bell within reach, hourly rounds completed this shift.

## 2020-06-25 NOTE — PROGRESS NOTES
Problem: Diabetes Self-Management  Goal: *Disease process and treatment process  Description: Define diabetes and identify own type of diabetes; list 3 options for treating diabetes. Outcome: Progressing Towards Goal  Goal: *Incorporating nutritional management into lifestyle  Description: Describe effect of type, amount and timing of food on blood glucose; list 3 methods for planning meals. Outcome: Progressing Towards Goal  Goal: *Incorporating physical activity into lifestyle  Description: State effect of exercise on blood glucose levels. Outcome: Progressing Towards Goal  Goal: *Developing strategies to promote health/change behavior  Description: Define the ABC's of diabetes; identify appropriate screenings, schedule and personal plan for screenings. Outcome: Progressing Towards Goal  Goal: *Using medications safely  Description: State effect of diabetes medications on diabetes; name diabetes medication taking, action and side effects. Outcome: Progressing Towards Goal  Goal: *Monitoring blood glucose, interpreting and using results  Description: Identify recommended blood glucose targets  and personal targets. Outcome: Progressing Towards Goal  Goal: *Prevention, detection, treatment of acute complications  Description: List symptoms of hyper- and hypoglycemia; describe how to treat low blood sugar and actions for lowering  high blood glucose level. Outcome: Progressing Towards Goal  Goal: *Prevention, detection and treatment of chronic complications  Description: Define the natural course of diabetes and describe the relationship of blood glucose levels to long term complications of diabetes.   Outcome: Progressing Towards Goal  Goal: *Developing strategies to address psychosocial issues  Description: Describe feelings about living with diabetes; identify support needed and support network  Outcome: Progressing Towards Goal  Goal: *Sick day guidelines  Outcome: Progressing Towards Goal  Goal: *Patient Specific Goal (EDIT GOAL, INSERT TEXT)  Outcome: Progressing Towards Goal     Problem: Patient Education: Go to Patient Education Activity  Goal: Patient/Family Education  Outcome: Progressing Towards Goal     Problem: Falls - Risk of  Goal: *Absence of Falls  Description: Document [place] Fall Risk and appropriate interventions in the flowsheet.   Outcome: Progressing Towards Goal  Note: Fall Risk Interventions:  Mobility Interventions: Communicate number of staff needed for ambulation/transfer         Medication Interventions: Patient to call before getting OOB    Elimination Interventions: Call light in reach    History of Falls Interventions: Door open when patient unattended         Problem: Inpatient Rehab (Adult)  Goal: *LTG: Avoids injury/falls 100% of time related to deficits  Outcome: Progressing Towards Goal  Goal: *LTG: Avoids infection 100% of time related to deficits  Outcome: Progressing Towards Goal  Goal: *LTG: Verbalize understanding of diagnosis and risk factors for recurring stroke  Outcome: Progressing Towards Goal  Goal: *LTG: Absence of DVT during hospitalization  Outcome: Progressing Towards Goal  Goal: *LTG: Maintains Skin Integrity With No Evidence of Pressure Injury 100% of Time  Outcome: Progressing Towards Goal  Goal: Interventions  Outcome: Progressing Towards Goal     Problem: Patient Education: Go to Patient Education Activity  Goal: Patient/Family Education  Outcome: Progressing Towards Goal     Problem: Infection - Risk of, Urinary Catheter-Associated Urinary Tract Infection  Goal: *Absence of infection signs and symptoms  Outcome: Progressing Towards Goal

## 2020-06-25 NOTE — PROGRESS NOTES
PHYSICAL THERAPY DAILY NOTE  Time In: 0524  Time Out: 1600  Patient Seen For: PM;Gait training; Therapeutic exercise;Transfer training    Subjective: Pt. Reports he was feeling discouraged @ lunch time, but is feeling better about his prognosis now. Denies dizziness this afternoon. Objective: Other (comment)(fall risk)    COGNITION Daily Assessment    Decreased short term recall @ times requiring min VCs for sequencing & technique       BED/MAT MOBILITY Daily Assessment    Rolling Right : 0 (Not tested)  Rolling Left : 0 (Not tested)  Supine to Sit : 0 (Not tested)  Sit to Supine : 0 (Not tested)       TRANSFERS Daily Assessment    Transfer Type: SPT with walker  Transfer Assistance : 4 (Contact guard assistance)  Sit to Stand Assistance: Contact guard assistance  Car Transfers: Not tested       GAIT Daily Assessment   Worked on ambulating around obstacles w/ RW on level surfaces, requiring min A for R sided jb during R turns w/ mod VCs for midline alignment Amount of Assistance: 4 (Minimal assistance)  Distance (ft): 30 Feet (ft)(x2)  Assistive Device: Walker, rolling       STEPS or STAIRS Daily Assessment   Pt. Ascended/descended 7' ramp w/ RW min A for R sided LOB Steps/Stairs Ambulated (#): 4  Level of Assist : 4 (Minimal assistance)  Rail Use: Both   Step to gait pattern       BALANCE Daily Assessment   Pt. Exhibits decreased insight into perturbations to R w/ delayed correction of LOB Sitting - Static: Good (unsupported)  Sitting - Dynamic: Good (unsupported)  Standing - Static: Fair  Standing - Dynamic : Impaired       WHEELCHAIR MOBILITY Daily Assessment    Able to Propel (ft): 0 feet  Curbs/Ramps Assist Required (FIM Score): 0 (Not tested)  Wheelchair Setup Assist Required : 0 (Not tested)       LOWER EXTREMITY EXERCISES Daily Assessment    Pt.  Performed NuStep @ resistance level 1 x10 minutes to increase strength & endurance B LEs     Vital Signs: /78   Pulse 74   Temp 98.4 °F (36.9 °C) Resp 16   Ht 5' 9\" (1.753 m)   Wt 123.3 kg (271 lb 12.8 oz)   SpO2 91%   BMI 40.14 kg/m²      Pain level: no c/o pain    Patient education: pt. Educated on how to manage stairs & ramp - will cont. To practice prior to d/c    Interdisciplinary Communication: collaborated w/ OT regarding pt's progress    Pt. Left up in recliner in NAD, call bell in reach         Assessment: Pt. Able to progress to stair & ramp training this session. Had more difficulty w/ unlevel terrain of ramp. Pt. Also better able to navigate turns using RW, but cont. To exhibit delayed postural reactions w/ decreased awareness of LOB to R side. Benefits from cont. PT services to address. Plan of Care: Continue with POC and progress as tolerated.      Niyah Ayers, PT  6/25/2020

## 2020-06-25 NOTE — PROGRESS NOTES
OT Daily Note  Time In 1032   Time Out 1122     Subjective: \"I think my medicines are making me feel not right, just sort of tired feeling. \"  Pain: not expressed  Education: visual perceptual strategies  Interdisciplinary Communication: NA  Precautions: fall risk  Patient Vitals for the past 12 hrs:   Temp Pulse Resp BP SpO2   06/25/20 0800 98.4 °F (36.9 °C) 74 16 152/78 91 %   06/25/20 0337 -- -- -- -- 94 %         Mobility   Score Comments   Sit to Stand 4: Supervision or touching A SBA   Transfer Assist 4: Supervision or touching A Transfer Type: SPT   Equipment: Rolling Walker   Comments: CGA     Strengthening Daily Assessment   Patient completed x 12 minutes UBE on medium heavy resistance using BUEs seated in wheelchair. Patient completed x 12 minutes in forward rotation. Visual-Perceptual Daily Assessment   Pt completed visual perceptual and coordination activity replicating 2 designs (one simple and one moderate complexity) using rubber band board. Pt required min assist to identify 4 errors in simple design and mod assist throughout with use of covering portion of design as anchor and simplification. Coordination Daily Assessment   Medium resistance theraputty used to find 20 beads in various methods to increase bilateral UE/ strength and activity tolerance. Assessment: Pt with fair visual perceptual skills on rubber band board, requires assistance for attention to detail and maintaining working attention throughout task. Plan: Continue OT POC with focus on ADL/IADL skills, functional transfers, functional mobility, coordination, strength, static and dynamic balance, and activity tolerance to maximize safety and independence with ADLs and functional transfers.      Antonio Mcdermott OTR/L  6/25/2020

## 2020-06-25 NOTE — PROGRESS NOTES
Time In 0702   Time Out 0744     Mobility   Score Comments   Supine to Sit 6: Independent    Sit to Stand 4: Supervision or touching A SBA   Transfer Assist 4: Supervision or touching A Transfer Type: SPT   Equipment: Rolling Walker   Comments: CGA     Activities of Daily Living    Score Comments   Eating 6: Independent    Oral Hyigene 5: S/U or clean-up assist    Bathing 4: Supervision or touching A Type of Shower: Shower  Position: Standing PRN and Unsupported Sitting   Adaptive  Equipment: Tub Transfer Bench, Grab Bars and Long Handled Sponge  Comments: assist to wash bottom in stance   Upper Body  Dressing 5: S/U or clean-up assist Items Applied: Pullover  Position: Supported Sitting  Comments:    Lower Body Dressing 4: Supervision or touching A Items Applied: Underwear and Elastic pants  Position: Supported sitting and Standing PRN  Adaptive Equipment: Reacher and RW  Comments: SBA   Donning/[place] Footwear 5: S/U or clean-up assist Items Applied: Socks  Adaptive Equipment: Sock Aide  Comments:    Education  Use of reacher       S: \"My wife helped me with shaving and brushing my teeth last night. \" Agreeable to therapy. Patient was able to ambulate ~10 feet using a RW with CGA. Pain not expressed. Patient Vitals for the past 12 hrs:   Temp Pulse Resp BP SpO2   06/25/20 0800 98.4 °F (36.9 °C) 74 16 152/78 91 %   06/25/20 0337 -- -- -- -- 94 %       Collaborated with RN regarding patient performance and POC. Patient tolerated session well, but activity tolerance, balance, functional mobility, strength, coordination, cognition are still below baseline and require skilled facilitation to successfully and safely complete ADLs and transfers. Patient ended session in recliner with call remote and phone within reach.        Antonio Mcdermott, DANNYR/LUPE  6/25/2020

## 2020-06-26 LAB
GLUCOSE BLD STRIP.AUTO-MCNC: 103 MG/DL (ref 65–100)
GLUCOSE BLD STRIP.AUTO-MCNC: 142 MG/DL (ref 65–100)
GLUCOSE BLD STRIP.AUTO-MCNC: 87 MG/DL (ref 65–100)
GLUCOSE BLD STRIP.AUTO-MCNC: 98 MG/DL (ref 65–100)

## 2020-06-26 PROCEDURE — 97112 NEUROMUSCULAR REEDUCATION: CPT

## 2020-06-26 PROCEDURE — 74011250637 HC RX REV CODE- 250/637: Performed by: PHYSICIAN ASSISTANT

## 2020-06-26 PROCEDURE — 92507 TX SP LANG VOICE COMM INDIV: CPT

## 2020-06-26 PROCEDURE — 99232 SBSQ HOSP IP/OBS MODERATE 35: CPT | Performed by: PHYSICAL MEDICINE & REHABILITATION

## 2020-06-26 PROCEDURE — 74011250637 HC RX REV CODE- 250/637: Performed by: PHYSICAL MEDICINE & REHABILITATION

## 2020-06-26 PROCEDURE — 82962 GLUCOSE BLOOD TEST: CPT

## 2020-06-26 PROCEDURE — 97116 GAIT TRAINING THERAPY: CPT

## 2020-06-26 PROCEDURE — 97535 SELF CARE MNGMENT TRAINING: CPT

## 2020-06-26 PROCEDURE — 97530 THERAPEUTIC ACTIVITIES: CPT

## 2020-06-26 PROCEDURE — 74011250636 HC RX REV CODE- 250/636: Performed by: PHYSICAL MEDICINE & REHABILITATION

## 2020-06-26 PROCEDURE — 65310000000 HC RM PRIVATE REHAB

## 2020-06-26 PROCEDURE — 97110 THERAPEUTIC EXERCISES: CPT

## 2020-06-26 RX ADMIN — MECLIZINE HYDROCHLORIDE 25 MG: 25 TABLET ORAL at 12:00

## 2020-06-26 RX ADMIN — ASPIRIN 81 MG 81 MG: 81 TABLET ORAL at 08:13

## 2020-06-26 RX ADMIN — POLYETHYLENE GLYCOL 3350 17 G: 17 POWDER, FOR SOLUTION ORAL at 08:13

## 2020-06-26 RX ADMIN — MECLIZINE HYDROCHLORIDE 25 MG: 25 TABLET ORAL at 17:58

## 2020-06-26 RX ADMIN — MECLIZINE HYDROCHLORIDE 25 MG: 25 TABLET ORAL at 05:07

## 2020-06-26 RX ADMIN — ATORVASTATIN CALCIUM 80 MG: 80 TABLET, FILM COATED ORAL at 21:21

## 2020-06-26 RX ADMIN — GLIPIZIDE 5 MG: 5 TABLET ORAL at 08:13

## 2020-06-26 RX ADMIN — SENNOSIDES AND DOCUSATE SODIUM 1 TABLET: 8.6; 5 TABLET ORAL at 08:13

## 2020-06-26 RX ADMIN — DOXAZOSIN MESYLATE 8 MG: 4 TABLET ORAL at 08:13

## 2020-06-26 RX ADMIN — LORATADINE 10 MG: 10 TABLET ORAL at 08:13

## 2020-06-26 RX ADMIN — DOXAZOSIN MESYLATE 8 MG: 4 TABLET ORAL at 21:21

## 2020-06-26 RX ADMIN — AMLODIPINE BESYLATE 10 MG: 10 TABLET ORAL at 08:13

## 2020-06-26 RX ADMIN — CLOPIDOGREL BISULFATE 75 MG: 75 TABLET ORAL at 08:13

## 2020-06-26 NOTE — PROGRESS NOTES
06/26/20 1025   Time Spent With Patient   Time In 0922   Time Out 1000   Patient Seen For: AM   Mental Status   Neurologic State Alert   Orientation Level Oriented X4   Cognition Memory loss;Decreased attention/concentration   Safety/Judgement Home safety        06/26/20 1025   Reasoning Task Exercises-Categorical Exclusion/Analogy   Categorical Exclusion Accuracy (%) 60 %   Categorical Exclusion Rationale Accuracy (%) 50 %   Verbal Organization Exercises   Functional Sequencing Accuracy (%) 80 %   Neuro-Linguistic Exercises   Verbal Reasoning Tasks Impaired   Verbal Problem Solving Impaired   Verbal Organization Impaired     Patient participated with cognitive therapy. Answering questions related to his daily therapy schedule completed with decreased errors at 80% accuracy. Stating missing items needed to complete familiar tasks at home completed with Megha. Decreased attention at times with patient repeating an item already named by clinician. Abstract category exclusion activity in a field of 4 completed with 50-60% accuracy; requested frequent repetitions of the items and decreased abstract reasoning noted. Will continue to follow to address cognitive function and increase independence at discharge.     E[place] MS, CCC-SLP

## 2020-06-26 NOTE — PROGRESS NOTES
Time In 0700   Time Out 0754     Mobility    Score Comments   Supine to Sit 6: Independent     Sit to Stand 4: Supervision or touching A SBA   Transfer Assist 4: Supervision or touching A Transfer Type: SPT   Equipment: Rolling Walker   Comments: CGA      Activities of Daily Living     Score Comments   Eating 6: Independent     Oral Hyigene 5: S/U or clean-up assist     Bathing 4: Supervision or touching A Type of Shower: Shower  Position: Standing PRN and Unsupported Sitting   Adaptive  Equipment: Tub Transfer Bench, Grab Bars and Long Handled Sponge  Comments: assist to wash bottom in stance   Upper Body  Dressing 5: S/U or clean-up assist Items Applied: Pullover  Position: Supported Sitting  Comments:    Lower Body Dressing 4: Supervision or touching A Items Applied: Underwear and Elastic pants  Position: Supported sitting and Standing PRN  Adaptive Equipment: Reacher and RW  Comments: SBA   Donning/[place] Footwear 5: S/U or clean-up assist Items Applied: Socks  Adaptive Equipment: Sock Aide  Comments:    Education   Use of reacher and cochran management       S: \"My cochran is really hurting me right now. \" Agreeable to therapy. Patient was able to ambulate ~10 feet using a RW with CGA. Pain indicated in sammie, RN removed leg component of cochran and noted will replace following shower. Patient Vitals for the past 12 hrs:   Temp Pulse Resp BP SpO2   06/26/20 0811 98.2 °F (36.8 °C) 74 18 135/71 92 %   06/26/20 0500 -- -- -- -- 94 %       Collaborated with RN regarding patient performance and POC. Patient tolerated session well, but activity tolerance, balance, functional mobility, strength, coordination, cognition are still below baseline and require skilled facilitation to successfully and safely complete ADLs and transfers. Patient ended session in recliner with call remote and phone within reach.        Antonio Mcdermott, DANNYR/L  6/26/2020

## 2020-06-26 NOTE — PROGRESS NOTES
OT Daily Note  Time In 1032   Time Out 1118     Subjective: \"I didn't do very good in PT this morning, I kept losing my balance to my left. I mean my right. \"  Pain: not expressed  Education: coordination activities  Interdisciplinary Communication: with PT during handoff  Precautions: fall risk  Patient Vitals for the past 12 hrs:   Temp Pulse Resp BP SpO2   06/26/20 0811 98.2 °F (36.8 °C) 74 18 135/71 92 %   06/26/20 0500 -- -- -- -- 94 %         Coordination Daily Assessment   Pt completed two coordination and problem solving activities. See below for details:  Pt connected ball and sock and saddle pieces together using bilateral hands. Appropriate hand strength noted with min cues for problem solving towards end of task. Pt completed 100% of task with additional time provided. Pt completed bolts, washer, and screw activity assembling all three pieces working on following 3 step task and rotational coordination in bilateral hands. Able to completed x8 sets. Strengthening Daily Assessment   Patient completed x 10 minutes UBE on medium heavy resistance using BUEs seated in wheelchair. Patient completed x 5 minutes forward rotation, x 5 minutes backward rotation. No rest breaks required. Assessment: Pt with improving bilateral coordination skills including rotation and pincer grasp. Slowly improved basic problem solving and visual perceptual skills provided min cuing. Plan: Continue OT POC with focus on ADL/IADL skills, functional transfers, functional mobility, coordination, strength, static and dynamic balance, and activity tolerance to maximize safety and independence with ADLs and functional transfers.      JEEVAN Olivia/LUPE  6/26/2020

## 2020-06-26 NOTE — PROGRESS NOTES
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 Vss. No acute complaints. PT, OT well tolerated. Steady gains made. No new barrier to progress noted. Great spirits.  No vertigo or dizziness yesterday afternoon, still occurs during a.m therapies especially with initial ADLs  Objective:     Current Facility-Administered Medications   Medication Dose Route Frequency    amLODIPine (NORVASC) tablet 10 mg  10 mg Oral DAILY    meclizine (ANTIVERT) tablet 25 mg  25 mg Oral TID    cloNIDine HCL (CATAPRES) tablet 0.1 mg  0.1 mg Oral Q6H PRN    scopolamine (TRANSDERM-SCOP) 1 mg over 3 days 1 Patch  1 Patch TransDERmal Q72H    glipiZIDE (GLUCOTROL) tablet 5 mg  5 mg Oral ACB    hydrocortisone (CORTAID) 1 % cream   Topical BID PRN    lip protectant (BLISTEX) ointment 1 Each  1 Each Topical PRN    senna-docusate (PERICOLACE) 8.6-50 mg per tablet 1 Tab  1 Tab Oral DAILY    ondansetron (ZOFRAN ODT) tablet 4 mg  4 mg Oral Q6H PRN    meclizine (ANTIVERT) tablet 25 mg  25 mg Oral Q6H PRN    acetaminophen (TYLENOL) tablet 650 mg  650 mg Oral Q6H PRN    aspirin chewable tablet 81 mg  81 mg Oral DAILY    atorvastatin (LIPITOR) tablet 80 mg  80 mg Oral QHS    clopidogreL (PLAVIX) tablet 75 mg  75 mg Oral DAILY    doxazosin (CARDURA) tablet 8 mg  8 mg Oral BID    insulin lispro (HUMALOG) injection   SubCUTAneous AC&HS    loratadine (CLARITIN) tablet 10 mg  10 mg Oral DAILY    sodium chloride (OCEAN) 0.65 % nasal squeeze bottle 2 Spray  2 Spray Both Nostrils Q2H PRN    pneumococcal 23-valent (PNEUMOVAX 23) injection 0.5 mL  0.5 mL IntraMUSCular PRIOR TO DISCHARGE    polyethylene glycol (MIRALAX) packet 17 g  17 g Oral DAILY    bisacodyL (DULCOLAX) tablet 5 mg  5 mg Oral DAILY PRN    Or    bisacodyL (DULCOLAX) suppository 10 mg  10 mg Rectal DAILY PRN    Or    magnesium hydroxide (MILK OF MAGNESIA) 400 mg/5 mL oral suspension 30 mL  30 mL Oral DAILY PRN       Review of Systems:   Denies chest pain, shortness of breath, cough, headache, visual problems, abdominal pain, dysuria, calf pain. Pertinent positives are as noted in the HPI, ROS unremarkable otherwise. Visit Vitals  /71   Pulse 74   Temp 98.2 °F (36.8 °C)   Resp 18   Ht 5' 9\" (1.753 m)   Wt 271 lb 12.8 oz (123.3 kg)   SpO2 92%   BMI 40.14 kg/m²        Physical Exam:   General: Alert and age appropriately oriented. No acute cardiorespiratory distress. HEENT: Normocephalic, no scleral icterus. Oral mucosa moist without cyanosis. Lungs: Clear to auscultation bilaterally. Respiration even and unlabored. Heart: Regular rate and rhythm, S1, S2. No murmurs, clicks, rub or gallops. Abdomen: Soft, non-tender, not distended. Bowel sounds normoactive. No organomegaly. obese   Genitourinary: Deferred. Mena; urine yellow   Neuromuscular:      No gross focal motor deficits noted. No cerebellar signs. Decreased recall     Skin/extremity: No rashes, no erythema. No calf tenderness B LE.   No edema                                                                              Functional Assessment:  Gross Assessment  AROM: Within functional limits (06/20/20 1400)  PROM: Within functional limits (06/16/20 1100)  Strength: Generally decreased, functional (06/16/20 1100)  Coordination: Generally decreased, functional (06/16/20 1100)       Balance  Sitting - Static: Good (unsupported) (06/25/20 1600)  Sitting - Dynamic: Good (unsupported) (06/25/20 1600)  Standing - Static: Fair (06/25/20 1600)  Standing - Dynamic : Impaired (06/25/20 1600)                     [facility] Fall Risk Assessment:  [facility] Fall Risk  Mobility: Ambulates or transfers with assist devices or assistance (06/26/20 0350)  Mobility Interventions: Communicate number of staff needed for ambulation/transfer;Patient to call before getting OOB;PT Consult for mobility concerns;Strengthening exercises (ROM-active/passive); Utilize walker, cane, or other assistive device (06/26/20 0350)  Mentation: Alert, oriented x 3 (06/26/20 0350)  Medication: Patient receiving anticonvulsants, sedatives(tranquilizers), psychotropics or hypnotics, hypoglycemics, narcotics, sleep aids, antihypertensives, laxatives, or diuretics (06/26/20 0350)  Medication Interventions: Evaluate medications/consider consulting pharmacy; Patient to call before getting OOB; Teach patient to arise slowly (06/26/20 0350)  Elimination: Needs assistance with toileting (06/26/20 0350)  Elimination Interventions: Call light in reach;Elevated toilet seat;Patient to call for help with toileting needs; Toilet paper/wipes in reach (06/26/20 0350)  Prior Fall History: No (06/26/20 0350)  History of Falls Interventions: Door open when patient unattended (06/21/20 0911)  Total Score: 3 (06/26/20 0350)  High Fall Risk: Yes (06/26/20 0350)     Speech Assessment:         Ambulation:  Gait  Distance (ft): 30 Feet (ft)(x2) (06/25/20 1600)  Assistive Device: Walker, rolling (06/25/20 1600)  Rail Use: Both (06/25/20 1600)     Labs/Studies:  Recent Results (from the past 72 hour(s))   GLUCOSE, POC    Collection Time: 06/23/20 11:14 AM   Result Value Ref Range    Glucose (POC) 130 (H) 65 - 089 mg/dL   METABOLIC PANEL, BASIC    Collection Time: 06/23/20 11:22 AM   Result Value Ref Range    Sodium 145 136 - 145 mmol/L    Potassium 4.6 3.5 - 5.1 mmol/L    Chloride 110 (H) 98 - 107 mmol/L    CO2 30 21 - 32 mmol/L    Anion gap 5 (L) 7 - 16 mmol/L    Glucose 109 (H) 65 - 100 mg/dL    BUN 19 8 - 23 MG/DL    Creatinine 1.15 0.8 - 1.5 MG/DL    GFR est AA >60 >60 ml/min/1.73m2    GFR est non-AA >60 >60 ml/min/1.73m2    Calcium 8.1 (L) 8.3 - 10.4 MG/DL   GLUCOSE, POC    Collection Time: 06/23/20  4:22 PM   Result Value Ref Range    Glucose (POC) 98 65 - 100 mg/dL   GLUCOSE, POC    Collection Time: 06/23/20  9:26 PM   Result Value Ref Range    Glucose (POC) 141 (H) 65 - 100 mg/dL   GLUCOSE, POC    Collection Time: 06/24/20  7:00 AM   Result Value Ref Range    Glucose (POC) 118 (H) 65 - 100 mg/dL   GLUCOSE, POC    Collection Time: 06/24/20 11:14 AM   Result Value Ref Range    Glucose (POC) 124 (H) 65 - 100 mg/dL   GLUCOSE, POC    Collection Time: 06/24/20  4:44 PM   Result Value Ref Range    Glucose (POC) 79 65 - 100 mg/dL   GLUCOSE, POC    Collection Time: 06/24/20  8:57 PM   Result Value Ref Range    Glucose (POC) 142 (H) 65 - 100 mg/dL   GLUCOSE, POC    Collection Time: 06/25/20  6:59 AM   Result Value Ref Range    Glucose (POC) 106 (H) 65 - 100 mg/dL   GLUCOSE, POC    Collection Time: 06/25/20 11:08 AM   Result Value Ref Range    Glucose (POC) 94 65 - 100 mg/dL   GLUCOSE, POC    Collection Time: 06/25/20  4:18 PM   Result Value Ref Range    Glucose (POC) 122 (H) 65 - 100 mg/dL   GLUCOSE, POC    Collection Time: 06/25/20  9:13 PM   Result Value Ref Range    Glucose (POC) 174 (H) 65 - 100 mg/dL   GLUCOSE, POC    Collection Time: 06/26/20  6:53 AM   Result Value Ref Range    Glucose (POC) 103 (H) 65 - 100 mg/dL       Assessment:     Problem List as of 6/26/2020 Date Reviewed: 5/1/2020          Codes Class Noted - Resolved    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  ICD-9-CM: 434.91  6/14/2020 - Present        Cerebellar stroke ([facility] [address] ICD-10-CM: I63.9  ICD-9-CM: 434.91  6/14/2020 - Present        Urinary retention ICD-10-CM: R33.9  ICD-9-CM: 788.20  6/8/2020 - Present        Stroke (cerebrum) (HCC) ICD-10-CM: I63.9  ICD-9-CM: 434.91  6/8/2020 - Present        Severe obesity ([address] ICD-10-CM: E66.01  ICD-9-CM: 278.01  7/2/2019 - Present Right thalamic infarction [facility]) ICD-10-CM: I63.9  ICD-9-CM: 434.91  7/2/2019 - Present        Numbness and tingling in left hand ICD-10-CM: R20.0, R20.2  ICD-9-CM: 782.0  7/2/2019 - Present        Controlled type 2 diabetes mellitus without complication, without long-term current use of insulin ([facility] [address] ICD-10-CM: E11.9  ICD-9-CM: 250.00  2/22/2019 - Present        Cerebrovascular accident (CVA) due to occlusion [facility]) ICD-10-CM: I63.9  ICD-9-CM: 434.91  2/12/2019 - Present        Essential hypertension, benign ICD-10-CM: I10  ICD-9-CM: 401.1  1/3/2014 - Present        Mixed hyperlipidemia ICD-10-CM: E78.2  ICD-9-CM: 272.2  1/3/2014 - Present             Dorsal Medullary syndrome/Cerebellar stroke  Plan / Recommendations / Medical Decision Making:      Continue daily physician / PA medical management:     New ischemic stroke, right cerebellar hemisphere, right dorsolateral medulla; secondary stroke prevention - DAPT, high-intensity statin (Lipitor), BP management.  -dizziness, nausea, intermittent diplopia; prn meclizine  -6/19 scopolamine patch applied  -6/22 added meclizine 6/19, remains dizzy when OOB. not orthostatic  -6/23 vertigo remains a significant issue despite meclizine and scopolomine; could try phenergan or valium but sedation main issue of concern. Will adjust meclizine dosing. Will check bmp to f/u on fluid status. Push po fluids (uop is good)  -6/24 did better with scheduled Meclizine but need to adjust pharmacies dose times so that he gets it before am. Therapies; 6/25 improved with scheduled meclizine and scopolomine patch; continue same. Cont vestibular therapies     Hypertension / hypotension - patient on doxazosin 8mg twice daily from PCP, now additionally on amlodipine 5mg. /63, P 75 on admission here; patient gives pretty clear history of brief orthostatic hypotension symptoms when sitting from supine prior to meals.  Staff to perform OH maneuvers, may hold amlodipine.  -6/17 bp 142/69; no orthostatics; cont norvasc and cardura  -6/18 bp 152/89 (129-165), no reported orthostatics; cont current meds  -6/21 BP - 158/75, continues to fluctuate  -6/22 122//71 fluctuates making med mgt difficult  -6/23 147/69 ; 6/24 160/73, range 132-160/54-73; no orthostatics documented; consider increasing Norvasc to 7.5 mg; goal SBP<  140  -6/25 bp had inc Norvasc, SBP in the 160s, no orthostatics. Inc Norvasc to 10 mg, monitor for [place]  -6/26 bp 135/71 this a.m. Improved with increased norvasc     Diabetes mellitus - uncontrolled / poor glycemic control, HgbA1c 7.8% (6/9/2020). Will require daily, close FSG monitoring and medication adjustment to optimize glycemic control in setting of acute illness and hospitalization. POC blood glucose around 130s-150s. Currently on Humalog SSI; will monitor and manage.  -6/15 fair control at this time. Consider low dose antihyperglycemic  -6/16 bs 147 this a.m., 167 at bedtime, 182 at supper, 152 at lunch; start glucotrol 2.5 mg  -6/17 124 this a.m; 188 at bedtime, 135 with supper, 133 with lunch; cont Glucotrol 2.5 mg, increase to 5mg if bs not consistently below 120-130  -6/18 bs 120 this a.m, 135 last pm, 200 at supper, 93 at lunch, still not consistent  -6/19 -174, increase glipizide to 5mg  - 6/21 BG-111, BG levels < 130   -6/24 bs remain acceptable/ stable on Glipizide 5mg  -6/26 controlled with Glipizide and diet     Urinary retention - cochran catheter in place since ED 6/8; Urologist suspects urinary retention part of stroke syndrome. Catheter remains until 6/16, at which time it will be removed for voiding trial.  -pt on Cardura 8mg. Check UA; lg blood, lg leuk; pt didn't have micro done. Asymptomatic  Cochran discontinued this a.m; await results  -6/17 cochran replaced due to hematuria and inability to void. Small amt of hematuria after one CIC; when cochran replaced; zainab blood, continues this a.m. lg amt of blood.  Check CBC, irrigated without improvement, Urology Consulted. ? Continuous irrigation/3-way cochran. ?cysto; hold plavix and Lovenox. No hx of retention or hematuria.; +>100K gram neg rods s/p one dose Rocephing 1g IV, on septra, day 2; await ID/sens.   -E coli UTI; sensitive to Septra, day 3/7. CIB per Urology, appreciate assistance. Hgb down one gram but stable 12.1; urine is getting pinker; no clots noted in bag. Continue  -6/19 Urologist D/Cing cochran irrigation; will ask NP to address Stat-lock / tethering issue. - 6/21 unsuccessful voiding trial yesterday/today with hematuria returning after catheterization, cochran reinserted, continue to irrigate, cochran functioning well  -6/22 cochran reinserted due to inability to void and inc hematuria. Pt is ok with this and understands reasoning. States wife upset with this, will d/w her. Likely to go with cochran; 6/23 less hematuria, few clots, urine looking better  -6/23 abx have finished. Dec hematuria; at some point will need to restart Plavix  -6/24 if urine stays clear , will restart Plavix tomorrow. Cont to hold Lovenox; cont cochran  -6/25 restart plavix and monitor. Will not resume Lovenox. Mobilizing well. Will need cysto as outpt per my d/w urology. Would not be able to self cath due to body habitus. Cont cardura 8mg bid  -6/26 no hematuria; cochran with yellow urine; monitor back on Plavix     Hyperlipidemia - on high-intensity statin.     Electrolyte abnormality - previous hypoK+, current BMP normal; monitor.  -6/26 recheck labs 6/29     Pain control - stable, mild-to-moderate joint symptoms intermittently, reasonably well controlled by PRN meds. Will require regular pain assessment and comprenhensive pain management.     Bowel program - at risk for constipation due to lack of mobility. MiraLAX daily for regularity, Meghan-Colace for stool softener prn. PRN MOM, bisacodyl suppository or tablets for constipation.  -6/15 normally has bm every other day.  Will add daily senna.   -6/16 good results  -no complaints     Depression - no previous history; at risk of post-stroke depression. Monitor.     Pneumonia prophylaxis - incentive spirometer every hour while awake.     Newly diagnosed NELL; pt sets alarm off if not supine. If he rolls side to side , he desats. CXR shows atelectasis; enc IS  -6/16 not tolerating CPAP; O2 NC at [facility] at 1L, sats 91-98%  -6/17 weaned off O2 during day yesterday. Will get nocturnal pulse ox study closer to [place]. Reports no hx of NELL , wife reported no snoring but breaths \"heavy\"  -6/19 required O2 during PT; 6/22 reports no O2 with therapy but using at noc  -6/24 has weaned off of O2, monitoring sats. Denies any sob  -6/25 remains off O2, no sob. sats > 92%; not using our CPAP at this time     DVT risk / DVT prophylaxis - will require daily physician / PA exam to assess for signs and symptoms as patient is at increased risk for of thromboembolism. Mobilize as tolerated. Sequential pneumatic compression devices (SCDs) when in bed; thigh-high or knee-high thromboembolic deterrent hose when out of bed. On DAPT; Lovenox subcutaneously daily.   -6/17 hold Lovenox due to zainab hematuria; 6/22 cont to hold  -6/24 urine yellow.      Wound care - monitor general skin wound status daily per staff and physician / PA. At risk for failure. Will require 24/7 rehab nursing. No current wounds.     GERD - at times may need antacids, Maalox prn.       Time spent was 25 minutes with over 1/2 in direct patient care/examination, consultation and coordination of care.      Signed By: Orion Bird MD     June 26, 2020

## 2020-06-26 NOTE — PROGRESS NOTES
PHYSICAL THERAPY DAILY NOTE  Time In: 1115  Time Out: 3867  Patient Seen For: AM;Gait training; Therapeutic exercise;Transfer training    Subjective: Pt. Pleased that he ambulated further today. Objective: Other (comment)(fall risk)    COGNITION Daily Assessment    Decreased short term recall       BED/MAT MOBILITY Daily Assessment    Rolling Right : 0 (Not tested)  Rolling Left : 0 (Not tested)  Supine to Sit : 0 (Not tested)  Sit to Supine : 0 (Not tested)       TRANSFERS Daily Assessment    Transfer Type: SPT with walker  Transfer Assistance : 4 (Contact guard assistance)  Sit to Stand Assistance: Contact guard assistance  Car Transfers: Not tested       GAIT Daily Assessment   Utilized grocery cart as gait training aide to enhance lateral weight sift, focusing on improving L sided weight shift & increased R sided step length. Requires mod-max VCs as well as manual facilitatory cues for weight shifting Amount of Assistance: 4 (Minimal assistance)  Distance (ft): 150 Feet (ft)(x2)  Assistive Device: Gait belt; Other (comment)(pushing shopping cart)       STEPS or STAIRS Daily Assessment    Steps/Stairs Ambulated (#): 0  Level of Assist : 0 (Not tested)       BALANCE Daily Assessment    Sitting - Static: Good (unsupported)  Sitting - Dynamic: Good (unsupported)  Standing - Static: Fair  Standing - Dynamic : Impaired       WHEELCHAIR MOBILITY Daily Assessment    Able to Propel (ft): 0 feet  Curbs/Ramps Assist Required (FIM Score): 0 (Not tested)  Wheelchair Setup Assist Required : 0 (Not tested)       LOWER EXTREMITY EXERCISES Daily Assessment    Pt.  Performed NuStep @ resistance level 1 x11 minutes to increase strength & endurance B LEs     Vital Signs: /71   Pulse 74   Temp 98.2 °F (36.8 °C)   Resp 18   Ht 5' 9\" (1.753 m)   Wt 123.3 kg (271 lb 12.8 oz)   SpO2 92%   BMI 40.14 kg/m²     Pain level: no c/o pain    Patient education: pt. Educated on purpose of using grocery cart as a gait training tool    Interdisciplinary Communication: collaborated w/ OT regarding pt's progress    Pt. Left up in recliner in NAD, call bell in reach. Assessment: Pt. Slowly progressing w/ gait, making excursion from w/c to recliner w/ CGA consistently. However, with longer distance gait training, pt. Has difficulty maintaining L sided weight shift w/ increased R sided lean/LOB noted. Benefits from cont. PT services to address. Plan of Care: Continue with POC and progress as tolerated.      Marco Grace, PT  6/26/2020

## 2020-06-26 NOTE — PROGRESS NOTES
PHYSICAL THERAPY DAILY NOTE  Time In: 0830  Time Out: 0916  Patient Seen For: AM;Therapeutic exercise;Gait training;Transfer training;Balance activities    Subjective: \"My wife was surprised that I was able to do those stairs yesterday. \"         Objective: Other (comment)(fall risk)    COGNITION Daily Assessment    Pt. W/ decreased short term recall, especially w/ divided attention. BED/MAT MOBILITY Daily Assessment    Rolling Right : 0 (Not tested)  Rolling Left : 0 (Not tested)  Supine to Sit : 0 (Not tested)  Sit to Supine : 0 (Not tested)       TRANSFERS Daily Assessment   Max VCs to slow pace & maintain erect posture Transfer Type: SPT with walker  Transfer Assistance : 4 (Contact guard assistance)  Sit to Stand Assistance: Contact guard assistance  Car Transfers: Not tested       GAIT Daily Assessment   VCs to enhance L weight shift Amount of Assistance: 4 (Contact guard assistance)  Distance (ft): 10 Feet (ft)  Assistive Device: Walker, rolling       STEPS or STAIRS Daily Assessment    Steps/Stairs Ambulated (#): 0  Level of Assist : 0 (Not tested)       BALANCE Daily Assessment   Worked on static & dynamic standing balance using Litegait & no UE support to improve midline alignment as well as L sided weight shift. Utilized visual cues to find midline & Pt. Then progressed to R LE stepping, focusing on L sided weight shift.  Sitting - Static: Good (unsupported)  Sitting - Dynamic: Good (unsupported)  Standing - Static: Fair  Standing - Dynamic : Impaired       WHEELCHAIR MOBILITY Daily Assessment    Able to Propel (ft): 0 feet  Curbs/Ramps Assist Required (FIM Score): 0 (Not tested)  Wheelchair Setup Assist Required : 0 (Not tested)     Vital Signs: /71   Pulse 74   Temp 98.2 °F (36.8 °C)   Resp 18   Ht 5' 9\" (1.753 m)   Wt 123.3 kg (271 lb 12.8 oz)   SpO2 92%   BMI 40.14 kg/m²     Pain level: no c/o pain    Patient education: pt. Educated on purpose of Litegait    Interdisciplinary Communication: collaborated w/ OT regarding pt's progress    Pt. Left up in recliner in NAD, call bell in reach. Assessment: Pt. Became more confident shifting weight to L side during dynamic standing balance task. However, upon attempting second trial of dynamic balance tasks, pt. C/o increased vertigo & continued balance training terminated. Vertigo was resolved using compensatory techniques in sitting position. Pt. Cont. To benefit from PT services to address balance & mobilize impairments s/p CVA. Plan of Care: Continue with POC and progress as tolerated.      [place] , PT  6/26/2020

## 2020-06-26 NOTE — PROGRESS NOTES
OT Daily Note  Time In 1304   Time Out 1401     Subjective: \"I liked those blocks. \"  Pain: not expressed, pt noted decreased dizziness while ambulating with donned eye patch  Education: theraband exercises  Interdisciplinary Communication: with PT regarding eye patch findings  Precautions: fall risk  Patient Vitals for the past 12 hrs:   Temp Pulse Resp BP SpO2   06/26/20 0811 98.2 °F (36.8 °C) 74 18 135/71 92 %   06/26/20 0500 -- -- -- -- 94 %       Mobility   Score Comments   Sit to Stand 4: Supervision or touching A SBA   Transfer Assist 4: Supervision or touching A Transfer Type: SPT   Equipment: Rolling Walker   Comments: CGA. Pt ambulated ~10 ft while wearing eye patch and denied symptoms of dizziness. Strengthening Daily Assessment   Patient educated regarding Karen Anders for BUE seated in wheelchair. Patient verbalized and demonstrated understanding for 11 BUE therapeutic exercises included in HEP utilizing green Theraband. Patient completed 1 set x 10 to 15 reps of scapular retraction, shoulder flexion, shoulder scaption, shoulder extension, shoulder horizontal abduction, latissimus press, anterior punch, elbow flexion, and elbow extension exercises. Printed copy of HEP and green Theraband provided to patient. Initial decreased motor planning noted with max cues for setup and replication of motor movements. Able to follow with demonstration and verbal cues only towards middle of exercises. Balance Daily Assessment   Patient stood for 1.30 minutes at standing table with constant one hand(s) support on table during visual perceptual activity, in order to improve activity tolerance, standing balance, weight shifting, weight bearing, posture, and functional mobility. Facilitated weight shifts and midline posture with min assist. Provided verbal cues for upright posture in order to increase balance and stance for integration into functional activities.      Pt reporting increased dizziness during stance, returned to seated position and completed remainder of visual perceptual activity from . Visual-Perceptual Daily Assessment   Pt completed visual perceptual activity replicating 2 simple and 3 complex visual designs with 6 sided blocks. Min cues throughout and towards end of session, able to replicated complex 9-block design without any verbal cues and without errors. Assessment: Making good progress with visual perceptual skills with clearer vision. Pt with improved motor planning and motor execution with tasks broken down into step by step cues. Pt reported no dizziness while wearing eye patch during ambulation at end of session. Plan: Continue OT POC with focus on ADL/IADL skills, functional transfers, functional mobility, coordination, strength, static and dynamic balance, and activity tolerance to maximize safety and independence with ADLs and functional transfers.      Antonio Mcdermott, OTR/L  6/26/2020

## 2020-06-26 NOTE — PROGRESS NOTES
Problem: Diabetes Self-Management  Goal: *Disease process and treatment process  Description: Define diabetes and identify own type of diabetes; list 3 options for treating diabetes. Outcome: Progressing Towards Goal  Goal: *Incorporating nutritional management into lifestyle  Description: Describe effect of type, amount and timing of food on blood glucose; list 3 methods for planning meals. Outcome: Progressing Towards Goal  Goal: *Incorporating physical activity into lifestyle  Description: State effect of exercise on blood glucose levels. Outcome: Progressing Towards Goal  Goal: *Developing strategies to promote health/change behavior  Description: Define the ABC's of diabetes; identify appropriate screenings, schedule and personal plan for screenings. Outcome: Progressing Towards Goal  Goal: *Using medications safely  Description: State effect of diabetes medications on diabetes; name diabetes medication taking, action and side effects. Outcome: Progressing Towards Goal  Goal: *Monitoring blood glucose, interpreting and using results  Description: Identify recommended blood glucose targets  and personal targets. Outcome: Progressing Towards Goal  Goal: *Prevention, detection, treatment of acute complications  Description: List symptoms of hyper- and hypoglycemia; describe how to treat low blood sugar and actions for lowering  high blood glucose level. Outcome: Progressing Towards Goal  Goal: *Prevention, detection and treatment of chronic complications  Description: Define the natural course of diabetes and describe the relationship of blood glucose levels to long term complications of diabetes.   Outcome: Progressing Towards Goal  Goal: *Developing strategies to address psychosocial issues  Description: Describe feelings about living with diabetes; identify support needed and support network  Outcome: Progressing Towards Goal  Goal: *Insulin pump training  Outcome: Progressing Towards Goal  Goal: *Sick day guidelines  Outcome: Progressing Towards Goal  Goal: *Patient Specific Goal (EDIT GOAL, INSERT TEXT)  Outcome: Progressing Towards Goal     Problem: Patient Education: Go to Patient Education Activity  Goal: Patient/Family Education  Outcome: Progressing Towards Goal     Problem: Falls - Risk of  Goal: *Absence of Falls  Description: Document Murray Hollingsworth Fall Risk and appropriate interventions in the flowsheet.   Outcome: Progressing Towards Goal  Note: Fall Risk Interventions:  Mobility Interventions: Communicate number of staff needed for ambulation/transfer, Patient to call before getting OOB         Medication Interventions: Evaluate medications/consider consulting pharmacy, Patient to call before getting OOB    Elimination Interventions: Call light in reach    History of Falls Interventions: Door open when patient unattended         Problem: Patient Education: Go to Patient Education Activity  Goal: Patient/Family Education  Outcome: Progressing Towards Goal     Problem: Inpatient Rehab (Adult)  Goal: *LTG: Avoids injury/falls 100% of time related to deficits  Outcome: Progressing Towards Goal  Goal: *LTG: Avoids infection 100% of time related to deficits  Outcome: Progressing Towards Goal  Goal: *LTG: Verbalize understanding of diagnosis and risk factors for recurring stroke  Outcome: Progressing Towards Goal  Goal: *LTG: Absence of DVT during hospitalization  Outcome: Progressing Towards Goal  Goal: *LTG: Maintains Skin Integrity With No Evidence of Pressure Injury 100% of Time  Outcome: Progressing Towards Goal  Goal: *Nursing Goal (Insert Text)  Outcome: Progressing Towards Goal  Goal: *Nursing Goal (Insert Text)  Outcome: Progressing Towards Goal  Goal: Interventions  Outcome: Progressing Towards Goal     Problem: Patient Education: Go to Patient Education Activity  Goal: Patient/Family Education  Outcome: Progressing Towards Goal     Problem: Patient Education: Go to Patient Education Activity  Goal: Patient/Family Education  Description: Patient / Patient's family will verbalize understanding of PT safety recommendations, demonstrate appropriate assist for current functional mobility status, safety, and home exercise program by time of discharge. Outcome: Progressing Towards Goal     Problem: Patient Education: Go to Patient Education Activity  Goal: Patient/Family Education  Description: Pt/caregiver will demonstrate and verbalize good understanding of OT recommendations regarding ADL status, stroke education, functional transfer status, home safety, DME, AE, energy conservation techniques, follow-up therapy, for increasing safety with functional tasks upon discharge.      Outcome: Progressing Towards Goal     Problem: Patient Education: Go to Patient Education Activity  Goal: Patient/Family Education  Outcome: Progressing Towards Goal     Problem: Infection - Risk of, Urinary Catheter-Associated Urinary Tract Infection  Goal: *Absence of infection signs and symptoms  Outcome: Progressing Towards Goal

## 2020-06-26 NOTE — PROGRESS NOTES
Problem: Falls - Risk of  Goal: *Absence of Falls  Description: Document Roseanne Mccabe Fall Risk and appropriate interventions in the flowsheet.   Outcome: Progressing Towards Goal  Note: Fall Risk Interventions:  Mobility Interventions: Communicate number of staff needed for ambulation/transfer, OT consult for ADLs, Patient to call before getting OOB, PT Consult for mobility concerns, Utilize walker, cane, or other assistive device         Medication Interventions: Evaluate medications/consider consulting pharmacy, Patient to call before getting OOB, Teach patient to arise slowly    Elimination Interventions: Call light in reach, Patient to call for help with toileting needs, Toilet paper/wipes in reach    History of Falls Interventions: Door open when patient unattended

## 2020-06-27 LAB
GLUCOSE BLD STRIP.AUTO-MCNC: 107 MG/DL (ref 65–100)
GLUCOSE BLD STRIP.AUTO-MCNC: 137 MG/DL (ref 65–100)
GLUCOSE BLD STRIP.AUTO-MCNC: 95 MG/DL (ref 65–100)

## 2020-06-27 PROCEDURE — 99232 SBSQ HOSP IP/OBS MODERATE 35: CPT | Performed by: PHYSICAL MEDICINE & REHABILITATION

## 2020-06-27 PROCEDURE — 74011250637 HC RX REV CODE- 250/637: Performed by: PHYSICIAN ASSISTANT

## 2020-06-27 PROCEDURE — 74011250637 HC RX REV CODE- 250/637: Performed by: PHYSICAL MEDICINE & REHABILITATION

## 2020-06-27 PROCEDURE — 97530 THERAPEUTIC ACTIVITIES: CPT

## 2020-06-27 PROCEDURE — 82962 GLUCOSE BLOOD TEST: CPT

## 2020-06-27 PROCEDURE — 65310000000 HC RM PRIVATE REHAB

## 2020-06-27 PROCEDURE — 97110 THERAPEUTIC EXERCISES: CPT

## 2020-06-27 PROCEDURE — 97116 GAIT TRAINING THERAPY: CPT

## 2020-06-27 PROCEDURE — 74011250636 HC RX REV CODE- 250/636: Performed by: PHYSICAL MEDICINE & REHABILITATION

## 2020-06-27 RX ORDER — INSULIN LISPRO 100 [IU]/ML
INJECTION, SOLUTION INTRAVENOUS; SUBCUTANEOUS
Status: DISCONTINUED | OUTPATIENT
Start: 2020-06-28 | End: 2020-07-01 | Stop reason: HOSPADM

## 2020-06-27 RX ADMIN — AMLODIPINE BESYLATE 10 MG: 10 TABLET ORAL at 08:22

## 2020-06-27 RX ADMIN — POLYETHYLENE GLYCOL 3350 17 G: 17 POWDER, FOR SOLUTION ORAL at 08:22

## 2020-06-27 RX ADMIN — DOXAZOSIN MESYLATE 8 MG: 4 TABLET ORAL at 08:23

## 2020-06-27 RX ADMIN — MECLIZINE HYDROCHLORIDE 25 MG: 25 TABLET ORAL at 05:56

## 2020-06-27 RX ADMIN — ATORVASTATIN CALCIUM 80 MG: 80 TABLET, FILM COATED ORAL at 21:27

## 2020-06-27 RX ADMIN — MECLIZINE HYDROCHLORIDE 25 MG: 25 TABLET ORAL at 17:50

## 2020-06-27 RX ADMIN — ASPIRIN 81 MG 81 MG: 81 TABLET ORAL at 08:23

## 2020-06-27 RX ADMIN — SENNOSIDES AND DOCUSATE SODIUM 1 TABLET: 8.6; 5 TABLET ORAL at 08:22

## 2020-06-27 RX ADMIN — CLOPIDOGREL BISULFATE 75 MG: 75 TABLET ORAL at 08:23

## 2020-06-27 RX ADMIN — LORATADINE 10 MG: 10 TABLET ORAL at 08:22

## 2020-06-27 RX ADMIN — DOXAZOSIN MESYLATE 8 MG: 4 TABLET ORAL at 21:27

## 2020-06-27 RX ADMIN — GLIPIZIDE 5 MG: 5 TABLET ORAL at 08:23

## 2020-06-27 RX ADMIN — MECLIZINE HYDROCHLORIDE 25 MG: 25 TABLET ORAL at 12:40

## 2020-06-27 NOTE — PROGRESS NOTES
OT Daily Note  Time In 0905   Time Out 0951     Subjective: Patient stated, \"I use to work for [employer] and I've been retired for 10 years now. I love it. \"  Pain: none reported  Education: benefits of therapy  Interdisciplinary Communication: handoff from PT, discussed POC  Precautions: Other (comment)(Fall risk )  Location on arrival: therapy gym    Visual-Perceptual Daily Assessment   Completed 2  tasks including a shape puzzle, matching outer, inner, and shape. He completed within a reasonable amount of time with 2 verbal prompts to fix placement of puzzle piece. Continue with copying task, grading task up. Patient able to completed more challenging copying of shapes with only minimal cues for slight errors in connecting lines for design/shapes. Good cooperation and willingness to complete task     Coordination Daily Assessment   Completed a grasping task using tongs to  and place small objects into a box. Completed task 30x with good pincer grasp using RUE. Minimal cues for hand placement at the beginning of task. Completed while standing at table. Will continue to benefit from grasping and coordination for FM skills       Patient was left in room seated in recliner with call light/all needs in reach and in no distress. Assessment: Good session for patient today addressing functional skills for coordination and vision. Patient participated well in activities and was very motivated. He will continue to benefit from skilled services. Plan: Continue OT POC with focus on ADL/IADL skills, functional transfers, functional mobility, coordination, strength, static and dynamic balance, and activity tolerance to maximize safety and independence with ADLs and functional transfers.      Tereso Tomas, DANNYD, OTR/L  6/27/2020        Note may contain the following abbreviations:   Abbreviation Explanation   AROM Active range of motion   PROM Passive range of motion   SPT Stand pivot transfer   LPT Lateral pivot transfer   WC wheelchair   RW Rolling walker    BUE Bilateral upper extremities   BLE Bilateral lower extremities    WBAT Weight bearing as tolerated    TTWB Toe-touch weight bearing    AD Adaptive device   AE Adaptive equipment    NMES Neuro muscular electrical stimulation   UBE Upper body ergometer

## 2020-06-27 NOTE — PROGRESS NOTES
Problem: Falls - Risk of  Goal: *Absence of Falls  Description: Document Rl Mars Fall Risk and appropriate interventions in the flowsheet.   6/27/2020 0110 by Janneth Pool RN  Outcome: Progressing Towards Goal  Note: Fall Risk Interventions:  Mobility Interventions: Utilize walker, cane, or other assistive device         Medication Interventions: Patient to call before getting OOB    Elimination Interventions: Call light in reach    History of Falls Interventions: Door open when patient unattended      6/27/2020 0105 by Janneth Pool RN  Outcome: Progressing Towards Goal  Note: Fall Risk Interventions:  Mobility Interventions: Utilize walker, cane, or other assistive device         Medication Interventions: Patient to call before getting OOB    Elimination Interventions: Call light in reach    History of Falls Interventions: Door open when patient unattended         Problem: Patient Education: Go to Patient Education Activity  Goal: Patient/Family Education  6/27/2020 0110 by Janneth Pool RN  Outcome: Progressing Towards Goal  6/27/2020 0105 by Janneth Pool RN  Outcome: Progressing Towards Goal

## 2020-06-27 NOTE — PROGRESS NOTES
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 Vss. No acute complaints. PT, OT well tolerated. Steady gains made. No new barrier to progress noted. Feels very well today . No dizziness at all.  No dbl vision   Objective:     Current Facility-Administered Medications   Medication Dose Route Frequency    amLODIPine (NORVASC) tablet 10 mg  10 mg Oral DAILY    meclizine (ANTIVERT) tablet 25 mg  25 mg Oral TID    cloNIDine HCL (CATAPRES) tablet 0.1 mg  0.1 mg Oral Q6H PRN    scopolamine (TRANSDERM-SCOP) 1 mg over 3 days 1 Patch  1 Patch TransDERmal Q72H    glipiZIDE (GLUCOTROL) tablet 5 mg  5 mg Oral ACB    hydrocortisone (CORTAID) 1 % cream   Topical BID PRN    lip protectant (BLISTEX) ointment 1 Each  1 Each Topical PRN    senna-docusate (PERICOLACE) 8.6-50 mg per tablet 1 Tab  1 Tab Oral DAILY    ondansetron (ZOFRAN ODT) tablet 4 mg  4 mg Oral Q6H PRN    meclizine (ANTIVERT) tablet 25 mg  25 mg Oral Q6H PRN    acetaminophen (TYLENOL) tablet 650 mg  650 mg Oral Q6H PRN    aspirin chewable tablet 81 mg  81 mg Oral DAILY    atorvastatin (LIPITOR) tablet 80 mg  80 mg Oral QHS    clopidogreL (PLAVIX) tablet 75 mg  75 mg Oral DAILY    doxazosin (CARDURA) tablet 8 mg  8 mg Oral BID    insulin lispro (HUMALOG) injection   SubCUTAneous AC&HS    loratadine (CLARITIN) tablet 10 mg  10 mg Oral DAILY    sodium chloride (OCEAN) 0.65 % nasal squeeze bottle 2 Spray  2 Spray Both Nostrils Q2H PRN    pneumococcal 23-valent (PNEUMOVAX 23) injection 0.5 mL  0.5 mL IntraMUSCular PRIOR TO DISCHARGE    polyethylene glycol (MIRALAX) packet 17 g  17 g Oral DAILY    bisacodyL (DULCOLAX) tablet 5 mg  5 mg Oral DAILY PRN    Or    bisacodyL (DULCOLAX) suppository 10 mg  10 mg Rectal DAILY PRN    Or    magnesium hydroxide (MILK OF MAGNESIA) 400 mg/5 mL oral suspension 30 mL  30 mL Oral DAILY PRN       Review of Systems:   Denies chest pain, shortness of breath, cough, headache, visual problems, abdominal pain, dysuria, calf pain. Pertinent positives are as noted in the HPI, ROS unremarkable otherwise. Visit Vitals  BP (!) 144/103 (BP 1 Location: Right arm, BP Patient Position: At rest)   Pulse 68   Temp 98.2 °F (36.8 °C)   Resp 18   Ht 5' 9\" (1.753 m)   Wt 271 lb 12.8 oz (123.3 kg)   SpO2 94%   BMI 40.14 kg/m²    REPEAT /68    Physical Exam:   General: Alert and age appropriately oriented. No acute cardiorespiratory distress. HEENT: Normocephalic, no scleral icterus. Oral mucosa moist without cyanosis. Lungs: Clear to auscultation bilaterally. Respiration even and unlabored. Heart: Regular rate and rhythm, S1, S2. No murmurs, clicks, rub or gallops. Abdomen: Soft, non-tender, not distended. Bowel sounds normoactive. No organomegaly. Genitourinary: Deferred. Neuromuscular:      No gross focal motor deficits noted. Ankle dorsiflexion 5/5. Ankle plantarflexion 5/5. No sensory deficits distally. Skin/extremity: No rashes, no erythema. No calf tenderness B LE.   Trace edema                                                                              Functional Assessment:  Gross Assessment  AROM: Within functional limits (06/20/20 1400)  PROM: Within functional limits (06/16/20 1100)  Strength: Generally decreased, functional (06/16/20 1100)  Coordination: Generally decreased, functional (06/16/20 1100)       Balance  Sitting - Static: Good (unsupported) (06/26/20 1600)  Sitting - Dynamic: Good (unsupported) (06/26/20 1600)  Standing - Static: Fair (06/26/20 1600)  Standing - Dynamic : Impaired (06/26/20 1600)                     [facility] Fall Risk Assessment:  [facility] Fall Risk  Mobility: Ambulates or transfers with assist devices or assistance (06/27/20 0730)  Mobility Interventions: Patient to call before getting OOB;Utilize walker, cane, or other assistive device (06/27/20 0730)  Mentation: Alert, oriented x 3 (06/27/20 0730)  Medication: Patient receiving anticonvulsants, sedatives(tranquilizers), psychotropics or hypnotics, hypoglycemics, narcotics, sleep aids, antihypertensives, laxatives, or diuretics (06/27/20 0730)  Medication Interventions: Patient to call before getting OOB (06/27/20 0730)  Elimination: Needs assistance with toileting (06/27/20 0730)  Elimination Interventions: Call light in reach; Patient to call for help with toileting needs (06/27/20 0730)  Prior Fall History: No (06/27/20 0730)  History of Falls Interventions: Door open when patient unattended (06/21/20 0911)  Total Score: 3 (06/27/20 0730)  High Fall Risk: Yes (06/27/20 0730)     Speech Assessment:         Ambulation:  Gait  Distance (ft): 150 Feet (ft)(x2) (06/26/20 1600)  Assistive Device: Gait belt; Other (comment)(pushing shopping cart) (06/26/20 1600)  Rail Use: Both (06/25/20 1600)     Labs/Studies:  Recent Results (from the past 72 hour(s))   GLUCOSE, POC    Collection Time: 06/24/20 11:14 AM   Result Value Ref Range    Glucose (POC) 124 (H) 65 - 100 mg/dL   GLUCOSE, POC    Collection Time: 06/24/20  4:44 PM   Result Value Ref Range    Glucose (POC) 79 65 - 100 mg/dL   GLUCOSE, POC    Collection Time: 06/24/20  8:57 PM   Result Value Ref Range    Glucose (POC) 142 (H) 65 - 100 mg/dL   GLUCOSE, POC    Collection Time: 06/25/20  6:59 AM   Result Value Ref Range    Glucose (POC) 106 (H) 65 - 100 mg/dL   GLUCOSE, POC    Collection Time: 06/25/20 11:08 AM   Result Value Ref Range    Glucose (POC) 94 65 - 100 mg/dL   GLUCOSE, POC    Collection Time: 06/25/20  4:18 PM   Result Value Ref Range    Glucose (POC) 122 (H) 65 - 100 mg/dL   GLUCOSE, POC    Collection Time: 06/25/20  9:13 PM   Result Value Ref Range    Glucose (POC) 174 (H) 65 - 100 mg/dL   GLUCOSE, POC    Collection Time: 06/26/20  6:53 AM   Result Value Ref Range    Glucose (POC) 103 (H) 65 - 100 mg/dL   GLUCOSE, POC    Collection Time: 06/26/20 11:16 AM   Result Value Ref Range    Glucose (POC) 98 65 - 100 mg/dL   GLUCOSE, POC    Collection Time: 06/26/20  4:06 PM   Result Value Ref Range    Glucose (POC) 87 65 - 100 mg/dL   GLUCOSE, POC    Collection Time: 06/26/20  9:23 PM   Result Value Ref Range    Glucose (POC) 142 (H) 65 - 100 mg/dL   GLUCOSE, POC    Collection Time: 06/27/20  6:54 AM   Result Value Ref Range    Glucose (POC) 107 (H) 65 - 100 mg/dL       Assessment:     Problem List as of 6/27/2020 Date Reviewed: 5/1/2020          Codes Class Noted - Resolved    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 [address] ICD-10-CM: I63.9  ICD-9-CM: 434.91  6/14/2020 - Present        Urinary retention ICD-10-CM: R33.9  ICD-9-CM: 788.20  6/8/2020 - Present        Stroke (cerebrum) (HCC) ICD-10-CM: I63.9  ICD-9-CM: 434.91  6/8/2020 - Present        Severe obesity ([facility] [address] ICD-10-CM: E66.01  ICD-9-CM: 278.01  7/2/2019 - Present        Right thalamic infarction [facility]) ICD-10-CM: I63.9  ICD-9-CM: 434.91  7/2/2019 - Present        Numbness and tingling in left hand ICD-10-CM: R20.0, R20.2  ICD-9-CM: 782.0  7/2/2019 - Present        Controlled type 2 diabetes mellitus without complication, without long-term current use of insulin (HCC) ICD-10-CM: E11.9  ICD-9-CM: 250.00  2/22/2019 - Present        Cerebrovascular accident (CVA) due to occlusion [facility]) ICD-10-CM: I63.9  ICD-9-CM: 434.91  2/12/2019 - Present        Essential hypertension, benign ICD-10-CM: I10  ICD-9-CM: 401.1  1/3/2014 - Present        Mixed hyperlipidemia ICD-10-CM: E78.2  ICD-9-CM: 272.2  1/3/2014 - Present     Dorsal Medullary syndrome/Cerebellar stroke  Plan / Recommendations / Medical Decision Making:      Continue daily physician / PA medical management:     New ischemic stroke, right cerebellar hemisphere, right dorsolateral medulla; secondary stroke prevention - DAPT, high-intensity statin (Lipitor), BP management.  -dizziness, nausea, intermittent diplopia; prn meclizine  -6/19 scopolamine patch applied  -6/22 added meclizine 6/19, remains dizzy when OOB. not orthostatic  -6/23 vertigo remains a significant issue despite meclizine and scopolomine; could try phenergan or valium but sedation main issue of concern. Will adjust meclizine dosing. Will check bmp to f/u on fluid status. Push po fluids (uop is good)  -6/24 did better with scheduled Meclizine but need to adjust pharmacies dose times so that he gets it before am. Therapies; 6/25 improved with scheduled meclizine and scopolomine patch; continue same. Cont vestibular therapies     Hypertension / hypotension - patient on doxazosin 8mg twice daily from PCP, now additionally on amlodipine 5mg. /63, P 75 on admission here; patient gives pretty clear history of brief orthostatic hypotension symptoms when sitting from supine prior to meals. Staff to perform OH maneuvers, may hold amlodipine.  -6/17 bp 142/69; no orthostatics; cont norvasc and cardura  -6/18 bp 152/89 (129-165), no reported orthostatics; cont current meds  -6/21 BP - 158/75, continues to fluctuate  -6/22 122//71 fluctuates making med mgt difficult  -6/23 147/69 ; 6/24 160/73, range 132-160/54-73; no orthostatics documented; consider increasing Norvasc to 7.5 mg; goal SBP<  140  -6/25 bp had inc Norvasc, SBP in the 160s, no orthostatics. Inc Norvasc to 10 mg, monitor for [place]  -6/26 bp 135/71 this a.m. Improved with increased norvasc  -6/27 erroneous bp this a.m, 135/68 on recheck.  Cont current meds     Diabetes mellitus - uncontrolled / poor glycemic control, HgbA1c 7.8% (6/9/2020). Will require daily, close FSG monitoring and medication adjustment to optimize glycemic control in setting of acute illness and hospitalization. POC blood glucose around 130s-150s. Currently on Humalog SSI; will monitor and manage.  -6/15 fair control at this time. Consider low dose antihyperglycemic  -6/16 bs 147 this a.m., 167 at bedtime, 182 at supper, 152 at lunch; start glucotrol 2.5 mg  -6/17 124 this a.m; 188 at bedtime, 135 with supper, 133 with lunch; cont Glucotrol 2.5 mg, increase to 5mg if bs not consistently below 120-130  -6/18 bs 120 this a.m, 135 last pm, 200 at supper, 93 at lunch, still not consistent  -6/19 -174, increase glipizide to 5mg  - 6/21 BG-111, BG levels < 130   -6/24 bs remain acceptable/ stable on Glipizide 5mg  -6/27 controlled with Glipizide and diet     Urinary retention - cochran catheter in place since ED 6/8; Urologist suspects urinary retention part of stroke syndrome. Catheter remains until 6/16, at which time it will be removed for voiding trial.  -pt on Cardura 8mg. Check UA; lg blood, lg leuk; pt didn't have micro done. Asymptomatic  Cochran discontinued this a.m; await results  -6/17 cochran replaced due to hematuria and inability to void. Small amt of hematuria after one CIC; when cochran replaced; zainab blood, continues this a.m. lg amt of blood. Check CBC, irrigated without improvement, Urology Consulted. ? Continuous irrigation/3-way cochran. ?cysto; hold plavix and Lovenox. No hx of retention or hematuria.; +>100K gram neg rods s/p one dose Rocephing 1g IV, on septra, day 2; await ID/sens.   -E coli UTI; sensitive to Septra, day 3/7. CIB per Urology, appreciate assistance. Hgb down one gram but stable 12.1; urine is getting pinker; no clots noted in bag. Continue  -6/19 Urologist D/Cing cochran irrigation; will ask NP to address Stat-lock / tethering issue.   - 6/21 unsuccessful voiding trial yesterday/today with hematuria returning after catheterization, cochran reinserted, continue to irrigate, cochran functioning well  -6/22 cochran reinserted due to inability to void and inc hematuria. Pt is ok with this and understands reasoning. States wife upset with this, will d/w her. Likely to go with cochran; 6/23 less hematuria, few clots, urine looking better  -6/23 abx have finished. Dec hematuria; at some point will need to restart Plavix  -6/24 if urine stays clear , will restart Plavix tomorrow. Cont to hold Lovenox; cont cochran  -6/25 restart plavix and monitor. Will not resume Lovenox. Mobilizing well. Will need cysto as outpt per my d/w urology. Would not be able to self cath due to body habitus. Cont cardura 8mg bid  -6/26 no hematuria; cochran with yellow urine; monitor back on Plavix     Hyperlipidemia - on high-intensity statin.     Electrolyte abnormality - previous hypoK+, current BMP normal; monitor.  -6/26 recheck labs 6/29     Pain control - stable, mild-to-moderate joint symptoms intermittently, reasonably well controlled by PRN meds. Will require regular pain assessment and comprenhensive pain management.     Bowel program - at risk for constipation due to lack of mobility. MiraLAX daily for regularity, Meghan-Colace for stool softener prn. PRN MOM, bisacodyl suppository or tablets for constipation.  -6/15 normally has bm every other day. Will add daily senna.   -6/16 good results  -no complaints     Depression - no previous history; at risk of post-stroke depression. Monitor.     Pneumonia prophylaxis - incentive spirometer every hour while awake.     Newly diagnosed NELL; pt sets alarm off if not supine. If he rolls side to side , he desats. CXR shows atelectasis; enc IS  -6/16 not tolerating CPAP; O2 NC at noc at 1L, sats 91-98%  -6/17 weaned off O2 during day yesterday. Will get nocturnal pulse ox study closer to dc.  Reports no hx of NELL , wife reported no snoring but breaths \"heavy\"  -6/19 required O2 during PT; 6/22 reports no O2 with therapy but using at noc  -6/24 has weaned off of O2, monitoring sats. Denies any sob  -6/25 remains off O2, no sob. sats > 92%; not using our CPAP at this time     DVT risk / DVT prophylaxis - will require daily physician / PA exam to assess for signs and symptoms as patient is at increased risk for of thromboembolism. Mobilize as tolerated. Sequential pneumatic compression devices (SCDs) when in bed; thigh-high or knee-high thromboembolic deterrent hose when out of bed. On DAPT; Lovenox subcutaneously daily.   -6/17 hold Lovenox due to zainab hematuria; 6/22 cont to hold  -6/27 urine yellow.      Wound care - monitor general skin wound status daily per staff and physician / PA. At risk for failure. Will require 24/7 rehab nursing. No current wounds.     GERD - at times may need antacids, Maalox prn.               Time spent was 25 minutes with over 1/2 in direct patient care/examination, consultation and coordination of care.      Signed By: Salbador Dick MD     June 27, 2020

## 2020-06-27 NOTE — PROGRESS NOTES
Problem: Falls - Risk of  Goal: *Absence of Falls  Description: Document Leticia Dear Fall Risk and appropriate interventions in the flowsheet.   Outcome: Progressing Towards Goal  Note: Fall Risk Interventions:  Mobility Interventions: Utilize walker, cane, or other assistive device         Medication Interventions: Patient to call before getting OOB    Elimination Interventions: Call light in reach    History of Falls Interventions: Door open when patient unattended         Problem: Patient Education: Go to Patient Education Activity  Goal: Patient/Family Education  Outcome: Progressing Towards Goal

## 2020-06-27 NOTE — PROGRESS NOTES
PHYSICAL THERAPY DAILY NOTE  Time In: 2722  Time Out: 5607  Patient Seen For: AM;Gait training;Patient education; Therapeutic exercise    Subjective: Pt agreeable to therapy this AM. Says he's doing well and slept well last night. Objective: Other (comment)(fall risk)    COGNITION Daily Assessment    Pt is alert and oriented and following commands appropriately 100% of the time. Pt communicates verbally. Pt has a pleasant affect. Pt is willing to participate in therapy. Pt demonstrates decreased safety awareness with transfers and gait at times requiring intermittent cueing from PT.       BED/MAT MOBILITY Daily Assessment    Rolling Right : 0 (Not tested)  Rolling Left : 0 (Not tested)  Supine to Sit : 0 (Not tested)  Sit to Supine : 0 (Not tested)       TRANSFERS Daily Assessment   Increased time and effort to complete Transfer Type: SPT with walker  Transfer Assistance : 4 (Contact guard assistance)  Sit to Stand Assistance: Contact guard assistance  Car Transfers: Not tested       GAIT Daily Assessment   Ambulated 50 ft, 200 ft, and 250 ft with RW one time each    Pt reports \"I'm doing so much better. Don't you think? \"    Pt demonstrates improved gait mechanics with a more upright trunk and posture, improved stance time and weight shift to LLE, and fluidity of gait    Pt demonstrates a quick, shuffle-like gait pattern when making turns and needs cues to slow down Amount of Assistance: 4 (Minimal assistance)  Distance (ft): 250 Feet (ft)  Assistive Device: Gait belt;Walker, rolling       STEPS or STAIRS Daily Assessment    Steps/Stairs Ambulated (#): 0  Level of Assist : 0 (Not tested)       BALANCE Daily Assessment    Sitting - Static: Good (unsupported)  Sitting - Dynamic: Good (unsupported)  Standing - Static: Fair  Standing - Dynamic : Impaired       WHEELCHAIR MOBILITY Daily Assessment    Able to Propel (ft): 0 feet  Curbs/Ramps Assist Required (FIM Score): 0 (Not tested)  Wheelchair Setup Assist Required : 0 (Not tested)     Therapeutic Interventions: MotoMed level 2 x 10 min  Gait training RW    BP (!) 144/103 (BP 1 Location: Right arm, BP Patient Position: At rest)   Pulse 68   Temp 98.2 °F (36.8 °C)   Resp 18   Ht 5' 9\" (1.753 m)   Wt 123.3 kg (271 lb 12.8 oz)   SpO2 94%   BMI 40.14 kg/m²      Pain level: mild complaints of pain  Pain location: catheter site  Pain interventions: N/A    Patient education: Pt educated on how to normalize gait mechanics and increase safety during ambulatory activities. Interdisciplinary Communication: Collaborated with OT regarding pt's progress; handoff to OT     Pt left in rehab gym in [facility] for OT session. Pt in NAD. Assessment: Pt tolerated therapy well today. Pt demonstrates improved gait mechanics today (see above). Pt was very happy with his improvement during gait training this morning and encouraged by his progress. Pt continues to require cueing throughout session for safety with transfers and gait, but frequency of cueing has decreased compared to previous sessions. Pt would benefit from skilled physical therapy to maximize independence, increase activity tolerance, and improve functional mobility. Plan of Care: Continue with POC and progress as tolerated.       Bart Paul, PT  6/27/2020

## 2020-06-28 LAB
GLUCOSE BLD STRIP.AUTO-MCNC: 109 MG/DL (ref 65–100)
GLUCOSE BLD STRIP.AUTO-MCNC: 79 MG/DL (ref 65–100)

## 2020-06-28 PROCEDURE — 65310000000 HC RM PRIVATE REHAB

## 2020-06-28 PROCEDURE — 74011250637 HC RX REV CODE- 250/637: Performed by: PHYSICAL MEDICINE & REHABILITATION

## 2020-06-28 PROCEDURE — 77030027138 HC INCENT SPIROMETER -A

## 2020-06-28 PROCEDURE — 82962 GLUCOSE BLOOD TEST: CPT

## 2020-06-28 PROCEDURE — 74011250636 HC RX REV CODE- 250/636: Performed by: PHYSICAL MEDICINE & REHABILITATION

## 2020-06-28 PROCEDURE — 74011250637 HC RX REV CODE- 250/637: Performed by: PHYSICIAN ASSISTANT

## 2020-06-28 RX ADMIN — AMLODIPINE BESYLATE 10 MG: 10 TABLET ORAL at 08:14

## 2020-06-28 RX ADMIN — DOXAZOSIN MESYLATE 8 MG: 4 TABLET ORAL at 08:14

## 2020-06-28 RX ADMIN — MECLIZINE HYDROCHLORIDE 25 MG: 25 TABLET ORAL at 17:31

## 2020-06-28 RX ADMIN — LORATADINE 10 MG: 10 TABLET ORAL at 08:13

## 2020-06-28 RX ADMIN — ATORVASTATIN CALCIUM 80 MG: 80 TABLET, FILM COATED ORAL at 20:23

## 2020-06-28 RX ADMIN — GLIPIZIDE 5 MG: 5 TABLET ORAL at 08:14

## 2020-06-28 RX ADMIN — SENNOSIDES AND DOCUSATE SODIUM 1 TABLET: 8.6; 5 TABLET ORAL at 08:13

## 2020-06-28 RX ADMIN — MECLIZINE HYDROCHLORIDE 25 MG: 25 TABLET ORAL at 06:09

## 2020-06-28 RX ADMIN — CLOPIDOGREL BISULFATE 75 MG: 75 TABLET ORAL at 08:13

## 2020-06-28 RX ADMIN — ASPIRIN 81 MG 81 MG: 81 TABLET ORAL at 08:13

## 2020-06-28 RX ADMIN — DOXAZOSIN MESYLATE 8 MG: 4 TABLET ORAL at 20:23

## 2020-06-28 RX ADMIN — MECLIZINE HYDROCHLORIDE 25 MG: 25 TABLET ORAL at 12:14

## 2020-06-28 NOTE — PROGRESS NOTES
Problem: Falls - Risk of  Goal: *Absence of Falls  Description: Document Patrick Fleming Fall Risk and appropriate interventions in the flowsheet.   Outcome: Progressing Towards Goal  Note: Fall Risk Interventions:  Mobility Interventions: Utilize walker, cane, or other assistive device         Medication Interventions: Patient to call before getting OOB    Elimination Interventions: Bed/chair exit alarm, Call light in reach    History of Falls Interventions: Door open when patient unattended         Problem: Patient Education: Go to Patient Education Activity  Goal: Patient/Family Education  Outcome: Progressing Towards Goal

## 2020-06-29 LAB
ANION GAP SERPL CALC-SCNC: 7 MMOL/L (ref 7–16)
BUN SERPL-MCNC: 19 MG/DL (ref 8–23)
CALCIUM SERPL-MCNC: 8 MG/DL (ref 8.3–10.4)
CHLORIDE SERPL-SCNC: 111 MMOL/L (ref 98–107)
CO2 SERPL-SCNC: 27 MMOL/L (ref 21–32)
CREAT SERPL-MCNC: 0.92 MG/DL (ref 0.8–1.5)
ERYTHROCYTE [DISTWIDTH] IN BLOOD BY AUTOMATED COUNT: 14.1 % (ref 11.9–14.6)
GLUCOSE BLD STRIP.AUTO-MCNC: 136 MG/DL (ref 65–100)
GLUCOSE BLD STRIP.AUTO-MCNC: 147 MG/DL (ref 65–100)
GLUCOSE SERPL-MCNC: 100 MG/DL (ref 65–100)
HCT VFR BLD AUTO: 39.2 % (ref 41.1–50.3)
HGB BLD-MCNC: 12.7 G/DL (ref 13.6–17.2)
MCH RBC QN AUTO: 30 PG (ref 26.1–32.9)
MCHC RBC AUTO-ENTMCNC: 32.4 G/DL (ref 31.4–35)
MCV RBC AUTO: 92.7 FL (ref 79.6–97.8)
NRBC # BLD: 0 K/UL (ref 0–0.2)
PLATELET # BLD AUTO: 284 K/UL (ref 150–450)
PMV BLD AUTO: 10.7 FL (ref 9.4–12.3)
POTASSIUM SERPL-SCNC: 3.9 MMOL/L (ref 3.5–5.1)
RBC # BLD AUTO: 4.23 M/UL (ref 4.23–5.6)
SODIUM SERPL-SCNC: 145 MMOL/L (ref 136–145)
WBC # BLD AUTO: 6.2 K/UL (ref 4.3–11.1)

## 2020-06-29 PROCEDURE — 80048 BASIC METABOLIC PNL TOTAL CA: CPT

## 2020-06-29 PROCEDURE — 85027 COMPLETE CBC AUTOMATED: CPT

## 2020-06-29 PROCEDURE — 74011250636 HC RX REV CODE- 250/636: Performed by: PHYSICAL MEDICINE & REHABILITATION

## 2020-06-29 PROCEDURE — 92507 TX SP LANG VOICE COMM INDIV: CPT

## 2020-06-29 PROCEDURE — 97535 SELF CARE MNGMENT TRAINING: CPT

## 2020-06-29 PROCEDURE — 82962 GLUCOSE BLOOD TEST: CPT

## 2020-06-29 PROCEDURE — 74011250637 HC RX REV CODE- 250/637: Performed by: PHYSICIAN ASSISTANT

## 2020-06-29 PROCEDURE — 36415 COLL VENOUS BLD VENIPUNCTURE: CPT

## 2020-06-29 PROCEDURE — 97116 GAIT TRAINING THERAPY: CPT

## 2020-06-29 PROCEDURE — 97110 THERAPEUTIC EXERCISES: CPT

## 2020-06-29 PROCEDURE — 97530 THERAPEUTIC ACTIVITIES: CPT

## 2020-06-29 PROCEDURE — 99232 SBSQ HOSP IP/OBS MODERATE 35: CPT | Performed by: PHYSICAL MEDICINE & REHABILITATION

## 2020-06-29 PROCEDURE — 74011250637 HC RX REV CODE- 250/637: Performed by: PHYSICAL MEDICINE & REHABILITATION

## 2020-06-29 PROCEDURE — 65310000000 HC RM PRIVATE REHAB

## 2020-06-29 RX ORDER — LISINOPRIL 5 MG/1
5 TABLET ORAL DAILY
Status: DISCONTINUED | OUTPATIENT
Start: 2020-06-29 | End: 2020-07-01 | Stop reason: HOSPADM

## 2020-06-29 RX ADMIN — CLOPIDOGREL BISULFATE 75 MG: 75 TABLET ORAL at 08:03

## 2020-06-29 RX ADMIN — LISINOPRIL 5 MG: 5 TABLET ORAL at 09:32

## 2020-06-29 RX ADMIN — POLYETHYLENE GLYCOL 3350 17 G: 17 POWDER, FOR SOLUTION ORAL at 08:02

## 2020-06-29 RX ADMIN — DOXAZOSIN MESYLATE 8 MG: 4 TABLET ORAL at 08:03

## 2020-06-29 RX ADMIN — GLIPIZIDE 5 MG: 5 TABLET ORAL at 08:03

## 2020-06-29 RX ADMIN — AMLODIPINE BESYLATE 10 MG: 10 TABLET ORAL at 08:03

## 2020-06-29 RX ADMIN — MECLIZINE HYDROCHLORIDE 25 MG: 25 TABLET ORAL at 05:54

## 2020-06-29 RX ADMIN — LORATADINE 10 MG: 10 TABLET ORAL at 08:03

## 2020-06-29 RX ADMIN — MECLIZINE HYDROCHLORIDE 25 MG: 25 TABLET ORAL at 11:43

## 2020-06-29 RX ADMIN — DOXAZOSIN MESYLATE 8 MG: 4 TABLET ORAL at 21:57

## 2020-06-29 RX ADMIN — ATORVASTATIN CALCIUM 80 MG: 80 TABLET, FILM COATED ORAL at 21:57

## 2020-06-29 RX ADMIN — ASPIRIN 81 MG 81 MG: 81 TABLET ORAL at 08:03

## 2020-06-29 RX ADMIN — SENNOSIDES AND DOCUSATE SODIUM 1 TABLET: 8.6; 5 TABLET ORAL at 08:03

## 2020-06-29 RX ADMIN — MECLIZINE HYDROCHLORIDE 25 MG: 25 TABLET ORAL at 17:09

## 2020-06-29 NOTE — DIABETES MGMT
Patient admitted with urinary retention, cerebellar stroke. Admitting blood glucose 188. HbA1c 7.8 (eag 177). Blood glucose ranged  yesterday with patient receiving Glipizide 5mg. Lab blood glucose this morning was 100. Reviewed these numbers and their significance with patient. Patient in recliner, wife at bedside. Patient states he was told he was \"borderline diabetic about a year and a half ago. Patient states he was on Metformin for a few months but after seeing commercials about how bad of a medication it was he stopped taking it. Patient has not had formal diabetes education. Patient given educational material, \"Diabetes Self-Management: A Patient Teaching Guide\", which was reviewed with pt. Explained basic physiology of diabetes, as well as causes, s/s, and treatments for hypoglycemia and hyperglycemia. Described the effects of poor glycemic control on the development of long-term complications such as renal, eye, nerve, and cardiovascular disease. Described proper diabetic foot care and the importance of checking feet qd. Patient denies numbness and tingling in feet. Per patient they typically drink unsweetened beverages now after his doctor told . Reviewed the effects of sweetened beverages on glycemic control and alternative beverages to help improve glycemic control. Per patient they typically eat \"4 chocogels or a large vanilla milkshake from [employer]. Educated re: effects of carbohydrates on blood glucose, the \"plate method\" of healthy meal planning, basics of healthy meal plan, Consistent Carbohydrate Diet, discussed the basics of carb counting and how to read a nutrition label. Discussed the importance of portion control and the benefits of weight loss on glycemic control. Educated patient regarding the benefits of physical activity (as directed by provider) on glycemic control. Encouraged compliance with physical therapy.  Also explained the relationship between hyperglycemia and infection and delayed healing. Discussed target goals for blood glucose and A1C. Educated patient regarding diabetic medications including mechanism of action, timing, and possible side effects, discussed Metformin versus Glipizide. Patient would like to continue Glipizide at discharge. Patient verbalizes understanding of teaching. Explained the importance of blood glucose monitoring. Recommended frequency of twice weekely blood glucose checks alternating fasting and 2 hours post prandial and to record in log book to take to PCP appointment. Demonstrated how to use a blood glucose meter, care of strips, and sharps disposal. Educated patient that all glucometers are similar but differ in small ways. Educated patient that they have to get the glucometer covered by their insurance formulary to keep their costs down. Educated patient to seek out  affordable glucometer options that exist at some stores or drug stores if they are ever uninsured. Pt was able to return demonstrate correct use of meter. Pt will need prescription for glucometer and glucometer supplies at discharge so that the patient may obtain the meter covered by their insurance. Patient would likely benefit from continued diabetes outpatient education. Patient provided with contact information to [facility] program to pursue at their convenience after discharge with their PCP. Encouraged compliance with discharge regimen. Encouraged patient to continue to work on lifestyle modifications and to follow up with PCP for further titration of regimen. Patient and patient wife very appreciative of education and verbalized understanding of teaching. Patient and patient wife voice no further questions regarding diabetes management.

## 2020-06-29 NOTE — PROGRESS NOTES
OT Daily Note  Time In 1122   Time Out 1217     Subjective: \"We have a tub/shower and I used to mainly take baths. \"  Pain: not expressed, pt indicating dizziness. RN provided meds  Education: tub/shower training regarding setup  Interdisciplinary Communication: with PT and RN regarding dietician and diabetic RN education. Precautions: fall risk  Patient Vitals for the past 12 hrs:   Temp Pulse Resp BP SpO2   06/29/20 0743 98.4 °F (36.9 °C) 78 14 162/80 91 %         Mobility   Score Comments   Sit to Stand 4: Supervision or touching A    Transfer Assist 4: Supervision or touching A Transfer Type: SPT   Equipment: Rolling Walker   Comments: CGA     Self-Care Daily Assessment   Patient and patient's wife educated regarding recommendation for shower chair for increased safety and independence with bathing in shower at discharge according to home bathroom environment, and educated regarding shower chair setup, safe transfer technique, reimbursement issues, recommended features, and ideas for obtaining. Therapist demonstrated transfer to shower chair utilizing grab bars and RW. Pt unable to complete transfer at this time due to vertigo episode and decreased safety. Will complete transfer tomorrow AM with resolved symptoms. Patient provided printed information for acquiring shower chair for discharge. Pt and wife additionally educated and provided resources for obtaining grab bars for shower. Pt notes his son-in-law would be able to install. Assessment: Pt and his wife demonstrating appropriate safety awareness and understanding of bathroom setup needs. Plan: Continue OT POC with focus on ADL/IADL skills, functional transfers, functional mobility, coordination, strength, static and dynamic balance, and activity tolerance to maximize safety and independence with ADLs and functional transfers.      Antonio Mcdermott OTR/LUPE  6/29/2020

## 2020-06-29 NOTE — PROGRESS NOTES
PHYSICAL THERAPY DAILY NOTE  Time In: 1310  Time Out: 5994  Patient Seen For: PM;Balance activities;Gait training; Therapeutic exercise;Transfer training    Subjective: \"Did I do well? \"         Objective: Other (comment)(fall risk)    COGNITION Daily Assessment    Pt. Performed all transfers w/o requiring VCs for hand placement this session       BED/MAT MOBILITY Daily Assessment    Rolling Right : 0 (Not tested)  Rolling Left : 0 (Not tested)  Supine to Sit : 0 (Not tested)  Sit to Supine : 0 (Not tested)       TRANSFERS Daily Assessment    Transfer Type: SPT with walker  Transfer Assistance : 4 (Contact guard assistance)  Sit to Stand Assistance: Contact guard assistance  Car Transfers: Not tested  Car Type: rehab car       GAIT Daily Assessment   Performed gait activity involving carrying objects with RW transitions from forward to backward walking. Pt. Performed w/ CGA & 1 LOB requiring min A to correct (with backward walking) Amount of Assistance: 4 (Contact guard assistance)  Distance (ft): 25 Feet (ft)  Assistive Device: Gait belt;Walker, rolling       STEPS or STAIRS Daily Assessment    Steps/Stairs Ambulated (#): 0  Level of Assist : 0 (Not tested)  Rail Use: Left        BALANCE Daily Assessment    Sitting - Static: Good (unsupported)  Sitting - Dynamic: Good (unsupported)  Standing - Static: Fair  Standing - Dynamic : Impaired       WHEELCHAIR MOBILITY Daily Assessment    Able to Propel (ft): 0 feet  Curbs/Ramps Assist Required (FIM Score): 0 (Not tested)  Wheelchair Setup Assist Required : 0 (Not tested)       LOWER EXTREMITY EXERCISES Daily Assessment    Pt.  Performed NuStep @ resistance level 1 x10 minutes to increase strength & endurance B UEs & LEs     Vital Signs: /80   Pulse 78   Temp 98.4 °F (36.9 °C)   Resp 14   Ht 5' 9\" (1.753 m)   Wt 123.3 kg (271 lb 12.8 oz)   SpO2 91%   BMI 40.14 kg/m²     Pain level: no c/o pain    Patient education: pt's wife given information regarding bed linnette for home    Interdisciplinary Communication: collaborated w/ OT regarding pt's progress    Pt. Left up in recliner in NAD, call bell in reach. Assessment: Pt. Able to perform more challenging gait activity w/ only 1 LOB, executing backwards walking while maintaining midline. However, pt. Cont. To require UE support for stability & not yet ready to progress to gait training w/o A/D. Benefits from cont. PT. Plan of Care: Continue with POC and progress as tolerated.      Beatrice Corona, PT  6/29/2020

## 2020-06-29 NOTE — PROGRESS NOTES
06/29/20 1219   Time Spent With Patient   Time In 1045   Time Out 1120   Patient Seen For: AM   Mental Status   Neurologic State Alert   Orientation Level Oriented X4   Cognition Memory loss;Decreased attention/concentration   Safety/Judgement Home safety        06/29/20 1219   Verbal Organization Exercises   Functional Sequencing Accuracy (%) 85 %   Neuro-Linguistic Exercises   Verbal Reasoning Tasks Impaired   Verbal Organization Impaired   Memory Impaired     Patient participated with cognitive treatment. Patient's wife was present for the session and introduced to role of ST. Discussed cognitive changes related to attention and immediate memory with recommendations for assistance with higher level ADLs at discharge. Patient completed sequencing activity covering health related topics with 85% accuracy in a field of 4 and additional time needed. Reading comprehension to identify items and interpret and banking statement completed with Megha due to decreased attention to details. Patient has made progress with cognitive tasks since admission but pt/wife report cognitive function remains below baseline at this time with additional ST indicated.     Godwin Delvalle MS, CCC-SLP

## 2020-06-29 NOTE — PROGRESS NOTES
Becky Lott MD  Medical Director  [address] 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 Vss. No acute complaints. PT, OT well tolerated. Steady gains made. No new barrier to progress noted. No pain. Sat up in chair all day yesterday with no report of n/dizzines or vertigo.   Objective:     Current Facility-Administered Medications   Medication Dose Route Frequency    insulin lispro (HUMALOG) injection   SubCUTAneous ACB&D    amLODIPine (NORVASC) tablet 10 mg  10 mg Oral DAILY    meclizine (ANTIVERT) tablet 25 mg  25 mg Oral TID    cloNIDine HCL (CATAPRES) tablet 0.1 mg  0.1 mg Oral Q6H PRN    scopolamine (TRANSDERM-SCOP) 1 mg over 3 days 1 Patch  1 Patch TransDERmal Q72H    glipiZIDE (GLUCOTROL) tablet 5 mg  5 mg Oral ACB    hydrocortisone (CORTAID) 1 % cream   Topical BID PRN    lip protectant (BLISTEX) ointment 1 Each  1 Each Topical PRN    senna-docusate (PERICOLACE) 8.6-50 mg per tablet 1 Tab  1 Tab Oral DAILY    ondansetron (ZOFRAN ODT) tablet 4 mg  4 mg Oral Q6H PRN    meclizine (ANTIVERT) tablet 25 mg  25 mg Oral Q6H PRN    acetaminophen (TYLENOL) tablet 650 mg  650 mg Oral Q6H PRN    aspirin chewable tablet 81 mg  81 mg Oral DAILY    atorvastatin (LIPITOR) tablet 80 mg  80 mg Oral QHS    clopidogreL (PLAVIX) tablet 75 mg  75 mg Oral DAILY    doxazosin (CARDURA) tablet 8 mg  8 mg Oral BID    loratadine (CLARITIN) tablet 10 mg  10 mg Oral DAILY    sodium chloride (OCEAN) 0.65 % nasal squeeze bottle 2 Spray  2 Spray Both Nostrils Q2H PRN    pneumococcal 23-valent (PNEUMOVAX 23) injection 0.5 mL  0.5 mL IntraMUSCular PRIOR TO DISCHARGE    polyethylene glycol (MIRALAX) packet 17 g  17 g Oral DAILY    bisacodyL (DULCOLAX) tablet 5 mg  5 mg Oral DAILY PRN    Or    bisacodyL (DULCOLAX) suppository 10 mg  10 mg Rectal DAILY PRN    Or    magnesium hydroxide (MILK OF MAGNESIA) 400 mg/5 mL oral suspension 30 mL  30 mL Oral DAILY PRN       Review of Systems:   Denies chest pain, shortness of breath, cough, headache, visual problems, abdominal pain, dysuria, calf pain. Pertinent positives are as noted in the HPI, ROS unremarkable otherwise. Visit Vitals  /80   Pulse 78   Temp 98.4 °F (36.9 °C)   Resp 14   Ht 5' 9\" (1.753 m)   Wt 271 lb 12.8 oz (123.3 kg)   SpO2 91%   BMI 40.14 kg/m²        Physical Exam:   General: Alert and age appropriately oriented. No acute cardiorespiratory distress. HEENT: Normocephalic, no scleral icterus. Oral mucosa moist without cyanosis. Lungs: Clear to auscultation bilaterally. Respiration even and unlabored. Heart: Regular rate and rhythm, S1, S2. No murmurs, clicks, rub or gallops. Abdomen: Soft, non-tender, sltly distended. Bowel sounds normoactive. No organomegaly. obese   Genitourinary: Deferred. Neuromuscular:      No gross focal motor deficits noted. Answers questions approp. Dec safety awareness     Skin/extremity: No rashes, no erythema. No calf tenderness B LE.   No edema                                                                              Functional Assessment:  Gross Assessment  AROM: Within functional limits (06/20/20 1400)  PROM: Within functional limits (06/16/20 1100)  Strength: Generally decreased, functional (06/16/20 1100)  Coordination: Generally decreased, functional (06/16/20 1100)       Balance  Sitting - Static: Good (unsupported) (06/27/20 0900)  Sitting - Dynamic: Good (unsupported) (06/27/20 0900)  Standing - Static: Fair (06/27/20 0900)  Standing - Dynamic : Impaired (06/27/20 0900)                     Eunice Live Risk Assessment:  Doris Nayak Fall Risk  Mobility: Ambulates or transfers with assist devices or assistance (06/29/20 0754)  Mobility Interventions: Patient to call before getting OOB;Utilize walker, cane, or other assistive device (06/29/20 0754)  Mentation: Alert, oriented x 3 (06/28/20 2108)  Medication: Patient receiving anticonvulsants, sedatives(tranquilizers), psychotropics or hypnotics, hypoglycemics, narcotics, sleep aids, antihypertensives, laxatives, or diuretics (06/28/20 2108)  Medication Interventions: Patient to call before getting OOB (06/28/20 2108)  Elimination: Needs assistance with toileting (06/28/20 2108)  Elimination Interventions: Bed/chair exit alarm;Call light in reach (06/28/20 2108)  Prior Fall History: No (06/28/20 2108)  History of Falls Interventions: Door open when patient unattended (06/21/20 0911)  Total Score: 3 (06/28/20 2108)  High Fall Risk: Yes (06/28/20 2108)     Speech Assessment:         Ambulation:  Gait  Distance (ft): 250 Feet (ft) (06/27/20 0900)  Assistive Device: Gait belt;Walker, rolling (06/27/20 0900)  Rail Use: Both (06/25/20 1600)     Labs/Studies:  Recent Results (from the past 72 hour(s))   GLUCOSE, POC    Collection Time: 06/26/20 11:16 AM   Result Value Ref Range    Glucose (POC) 98 65 - 100 mg/dL   GLUCOSE, POC    Collection Time: 06/26/20  4:06 PM   Result Value Ref Range    Glucose (POC) 87 65 - 100 mg/dL   GLUCOSE, POC    Collection Time: 06/26/20  9:23 PM   Result Value Ref Range    Glucose (POC) 142 (H) 65 - 100 mg/dL   GLUCOSE, POC    Collection Time: 06/27/20  6:54 AM   Result Value Ref Range    Glucose (POC) 107 (H) 65 - 100 mg/dL   GLUCOSE, POC    Collection Time: 06/27/20 11:26 AM   Result Value Ref Range    Glucose (POC) 95 65 - 100 mg/dL   GLUCOSE, POC    Collection Time: 06/27/20  4:21 PM   Result Value Ref Range    Glucose (POC) 137 (H) 65 - 100 mg/dL   GLUCOSE, POC    Collection Time: 06/28/20  7:24 AM   Result Value Ref Range    Glucose (POC) 109 (H) 65 - 100 mg/dL   GLUCOSE, POC    Collection Time: 06/28/20  5:11 PM   Result Value Ref Range    Glucose (POC) 79 65 - 100 mg/dL   CBC W/O DIFF    Collection Time: 06/29/20  6:39 AM   Result Value Ref Range    WBC 6.2 4.3 - 11.1 K/uL    RBC 4.23 4.23 - 5.6 M/uL    HGB 12.7 (L) 13.6 - 17.2 g/dL    HCT 39.2 (L) 41.1 - 50.3 %    MCV 92.7 79.6 - 97.8 FL    MCH 30.0 26.1 - 32.9 PG    MCHC 32.4 31.4 - 35.0 g/dL    RDW 14.1 11.9 - 14.6 %    PLATELET 642 790 - 354 K/uL    MPV 10.7 9.4 - 12.3 FL    ABSOLUTE NRBC 0.00 0.0 - 0.2 K/uL   METABOLIC PANEL, BASIC    Collection Time: 06/29/20  6:39 AM   Result Value Ref Range    Sodium 145 136 - 145 mmol/L    Potassium 3.9 3.5 - 5.1 mmol/L    Chloride 111 (H) 98 - 107 mmol/L    CO2 27 21 - 32 mmol/L    Anion gap 7 7 - 16 mmol/L    Glucose 100 65 - 100 mg/dL    BUN 19 8 - 23 MG/DL    Creatinine 0.92 0.8 - 1.5 MG/DL    GFR est AA >60 >60 ml/min/1.73m2    GFR est non-AA >60 >60 ml/min/1.73m2    Calcium 8.0 (L) 8.3 - 10.4 MG/DL       Assessment:     Problem List as of 6/29/2020 Date Reviewed: 5/1/2020          Codes Class Noted - Resolved    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  ICD-9-CM: 434.91  6/14/2020 - Present        Cerebellar stroke (HCC) ICD-10-CM: I63.9  ICD-9-CM: 434.91  6/14/2020 - Present        Urinary retention ICD-10-CM: R33.9  ICD-9-CM: 788.20  6/8/2020 - Present        Stroke (cerebrum) (HCC) ICD-10-CM: I63.9  ICD-9-CM: 434.91  6/8/2020 - Present        Severe obesity (HCC) ICD-10-CM: E66.01  ICD-9-CM: 278.01  7/2/2019 - Present        Right thalamic infarction [facility]) ICD-10-CM: I63.9  ICD-9-CM: 434.91  7/2/2019 - Present        Numbness and tingling in left hand ICD-10-CM: R20.0, R20.2  ICD-9-CM: 782.0  7/2/2019 - Present        Controlled type 2 diabetes mellitus without complication, without long-term current use of insulin (HCC) ICD-10-CM: E11.9  ICD-9-CM: 250.00  2/22/2019 - Present        Cerebrovascular accident (CVA) due to occlusion ([facility] [address] ICD-10-CM: I63.9  ICD-9-CM: 434.91  2/12/2019 - Present        Essential hypertension, benign ICD-10-CM: I10  ICD-9-CM: 401.1  1/3/2014 - Present        Mixed hyperlipidemia ICD-10-CM: E78.2  ICD-9-CM: 272.2  1/3/2014 - Present                Dorsal Medullary syndrome/Cerebellar stroke  Plan / Recommendations / Medical Decision Making:      Continue daily physician / PA medical management:     New ischemic stroke, right cerebellar hemisphere, right dorsolateral medulla; secondary stroke prevention - DAPT, high-intensity statin (Lipitor), BP management.  -dizziness, nausea, intermittent diplopia; prn meclizine  -6/19 scopolamine patch applied  -6/22 added meclizine 6/19, remains dizzy when OOB. not orthostatic  -6/23 vertigo remains a significant issue despite meclizine and scopolomine; could try phenergan or valium but sedation main issue of concern. Will adjust meclizine dosing. Will check bmp to f/u on fluid status. Push po fluids (uop is good)  -6/24 did better with scheduled Meclizine but need to adjust pharmacies dose times so that he gets it before am. Therapies; 6/25 improved with scheduled meclizine and scopolomine patch; continue same. Cont vestibular therapies  -6/29 marked improvement in gait , postural support. No dizziness reported.      Hypertension / hypotension - patient on doxazosin 8mg twice daily from PCP, now additionally on amlodipine 5mg. /63, P 75 on admission here; patient gives pretty clear history of brief orthostatic hypotension symptoms when sitting from supine prior to meals.  Staff to perform OH maneuvers, may hold amlodipine.  -6/17 bp 142/69; no orthostatics; cont norvasc and cardura  -6/18 bp 152/89 (129-165), no reported orthostatics; cont current meds  -6/21 BP - 158/75, continues to fluctuate  -6/22 122//71 fluctuates making med mgt difficult  -6/23 147/69 ; 6/24 160/73, range 132-160/54-73; no orthostatics documented; consider increasing Norvasc to 7.5 mg; goal SBP<  140  -6/25 bp had inc Norvasc, SBP in the 160s, no orthostatics. Inc Norvasc to 10 mg, monitor for [facility]  -6/26 bp 135/71 this a.m. Improved with increased norvasc  -6/27 erroneous bp this a.m, 135/68 on recheck. Cont current meds  -6/29 162/80 this a.m. ranges 140-170; goal <140; add zestril 5mg daily. Cont norvasc 10 mg daily. Cont Cardura 8mg bid     Diabetes mellitus - uncontrolled / poor glycemic control, HgbA1c 7.8% (6/9/2020). Will require daily, close FSG monitoring and medication adjustment to optimize glycemic control in setting of acute illness and hospitalization. POC blood glucose around 130s-150s. Currently on Humalog SSI; will monitor and manage.  -6/15 fair control at this time. Consider low dose antihyperglycemic  -6/16 bs 147 this a.m., 167 at bedtime, 182 at supper, 152 at lunch; start glucotrol 2.5 mg  -6/17 124 this a.m; 188 at bedtime, 135 with supper, 133 with lunch; cont Glucotrol 2.5 mg, increase to 5mg if bs not consistently below 120-130  -6/18 bs 120 this a.m, 135 last pm, 200 at supper, 93 at lunch, still not consistent  -6/19 -174, increase glipizide to 5mg  - 6/21 BG-111, BG levels < 130   -6/24 bs remain acceptable/ stable on Glipizide 5mg  -6/27 controlled with Glipizide and diet     Urinary retention - cochran catheter in place since ED 6/8; Urologist suspects urinary retention part of stroke syndrome. Catheter remains until 6/16, at which time it will be removed for voiding trial.  -pt on Cardura 8mg. Check UA; lg blood, lg leuk; pt didn't have micro done. Asymptomatic  Cochran discontinued this a.m; await results  -6/17 cochran replaced due to hematuria and inability to void. Small amt of hematuria after one CIC; when cochran replaced; zainab blood, continues this a.m. lg amt of blood. Check CBC, irrigated without improvement, Urology Consulted. ? Continuous irrigation/3-way cochran. ?cysto; hold plavix and Lovenox.  No hx of retention or hematuria.; +>100K gram neg rods s/p one dose Rocephing 1g IV, on septra, day 2; await ID/sens.   -E coli UTI; sensitive to Septra, day 3/7. CIB per Urology, appreciate assistance. Hgb down one gram but stable 12.1; urine is getting pinker; no clots noted in bag. Continue  -6/19 Urologist D/Cing cochran irrigation; will ask NP to address Stat-lock / tethering issue. - 6/21 unsuccessful voiding trial yesterday/today with hematuria returning after catheterization, occhran reinserted, continue to irrigate, cochran functioning well  -6/22 cochran reinserted due to inability to void and inc hematuria. Pt is ok with this and understands reasoning. States wife upset with this, will d/w her. Likely to go with cochran; 6/23 less hematuria, few clots, urine looking better  -6/23 abx have finished. Dec hematuria; at some point will need to restart Plavix  -6/24 if urine stays clear , will restart Plavix tomorrow. Cont to hold Lovenox; cont cochran  -6/25 restart plavix and monitor. Will not resume Lovenox. Mobilizing well. Will need cysto as outpt per my d/w urology. Would not be able to self cath due to body habitus. Cont cardura 8mg bid  -6/26 no hematuria; cochran with yellow urine; monitor back on Plavix  -6/29 no further hematuria. Will go home with cochran and f/u with Dr Vincent Mittal. Neither pt nor wife feel comfortable learning to perform CIC. Pt, himself, would not be able to due to body habitus     Hyperlipidemia - on high-intensity statin.     Electrolyte abnormality - previous hypoK+, current BMP normal; monitor.  -6/26 recheck labs 6/29; K 3.9 good     Pain control - stable, mild-to-moderate joint symptoms intermittently, reasonably well controlled by PRN meds. Will require regular pain assessment and comprenhensive pain management.     Bowel program - at risk for constipation due to lack of mobility. MiraLAX daily for regularity, Meghan-Colace for stool softener prn. PRN MOM, bisacodyl suppository or tablets for constipation.  -6/15 normally has bm every other day.  Will add daily senna.   -6/16 good results  -no complaints     Depression - no previous history; at risk of post-stroke depression. Monitor.     Pneumonia prophylaxis - incentive spirometer every hour while awake.     Newly diagnosed NELL; pt sets alarm off if not supine. If he rolls side to side , he desats. CXR shows atelectasis; enc IS  -6/16 not tolerating CPAP; O2 NC at noc at 1L, sats 91-98%  -6/17 weaned off O2 during day yesterday. Will get nocturnal pulse ox study closer to dc. Reports no hx of NELL , wife reported no snoring but breaths \"heavy\"  -6/19 required O2 during PT; 6/22 reports no O2 with therapy but using at noc  -6/24 has weaned off of O2, monitoring sats. Denies any sob  -6/25 remains off O2, no sob. sats > 92%; not using our CPAP at this time  -6/29 no O2 needs. Will not need O2 at dc but will get nocturnal pulse ox tonight     DVT risk / DVT prophylaxis - will require daily physician / PA exam to assess for signs and symptoms as patient is at increased risk for of thromboembolism. Mobilize as tolerated. Sequential pneumatic compression devices (SCDs) when in bed; thigh-high or knee-high thromboembolic deterrent hose when out of bed. On DAPT; Lovenox subcutaneously daily.   -6/17 hold Lovenox due to zainab hematuria; 6/22 cont to hold  -6/27 urine yellow.      Wound care - monitor general skin wound status daily per staff and physician / PA. At risk for failure. Will require 24/7 rehab nursing. No current wounds.     GERD - at times may need antacids, Maalox prn.          Time spent was 25 minutes with over 1/2 in direct patient care/examination, consultation and coordination of care.      Signed By: Rebecca Cardoso MD     June 29, 2020

## 2020-06-29 NOTE — PROGRESS NOTES
OT WEEKLY PROGRESS NOTE    Time In: 7422  Time Out: 0930    Mobility   Score Comments   Rolling 6: Independent Side: Bilateral     Supine to Sit 4: Supervision or touching A Supervision   Sit to Supine 6: Independent    Sit to Stand 4: Supervision or touching A    Transfer Assist 4: Supervision or touching A Transfer Type: SPT   Equipment: Rolling Walker   Comments: CGA     Activities of Daily Living    Score Comments   Eating 6: Independent    Oral Hyigene 5: S/U or clean-up assist    Bathing 4: Supervision or touching A Type of Shower: Shower  Position: Supported sitting and Standing PRN   Adaptive  Equipment: Tub Transfer Bench, Grab Bars and Long Handled Sponge  Comments: Assist to wash bottom in stance   Upper Body  Dressing 5: S/U or clean-up assist Items Applied: Pullover  Position: Supported Sitting  Comments:    Lower Body Dressing 4: Supervision or touching A Items Applied: Underwear and Elastic pants  Position: Supported sitting and Standing PRN  Adaptive Equipment: Reacher and RW  Comments: Steady assist in stance   Donning/[place] Footwear 5: S/U or clean-up assist Items Applied: Socks  Adaptive Equipment: Sock Aide  Comments: Toilet Transfer 4: Supervision or touching A Transfer Type: SPT   Equipment: Helen Gifford   Comments: [address] 4: Supervision or touching A Output: BM  Comments: Min assist with finishing toileting hygiene   Education  Family training completed with patient's wife Earl Snowden present. Plan of Care: Please see Care Plan for updated LTGs. Family Training: completed 6/29/2020 with patient's wife Earl Snowden, discussed morning ADL routine, dressing and bathing AE, and functional transfers. Summary of Progress: Patient demonstrates good progress with AE use, coordination, command following, sequencing, and balance for performance of ADL and functional transfers, see above for details.   Patient continues to require skilled OT services to address deficits, provide education, and progress towards goals as stated in POC. Summary of Session:   S: \"You know I haven't taken a shower since Friday. \" Agreeable to therapy. Focus of session was on morning ADL routine and progress assessment. Patient was able to ambulate ~10 feet using a RW with CGA. Pain not expressed. Collaborated with PT and confirmed patient is on track to reach goals as documented in the care plan. Continue OT POC with focus on ADL/IADL skills, functional transfers, functional mobility, coordination, strength, static and dynamic balance, and activity tolerance to maximize safety and independence with ADLs and functional transfers   Patient ended session in wc with PT assuming care.      Antonio Elizalde, OTR/L  6/29/2020

## 2020-06-29 NOTE — PROGRESS NOTES
Problem: Falls - Risk of  Goal: *Absence of Falls  Description: Document Devere [place] Fall Risk and appropriate interventions in the flowsheet.   Outcome: Progressing Towards Goal  Note: Fall Risk Interventions:  Mobility Interventions: Utilize walker, cane, or other assistive device         Medication Interventions: Patient to call before getting OOB    Elimination Interventions: Bed/chair exit alarm, Call light in reach    History of Falls Interventions: Door open when patient unattended         Problem: Patient Education: Go to Patient Education Activity  Goal: Patient/Family Education  Outcome: Progressing Towards Goal

## 2020-06-29 NOTE — PROGRESS NOTES
PHYSICAL THERAPY WEEKLY PROGRESS NOTE  Time In: 0930  Time Out: 5205  Patient Seen For: AM;Family training;Gait training;Transfer training    Subjective: Pt. Reports that he is is doing much better today. Objective: Other (comment)(fall risk)  GROSS ASSESSMENT Daily Assessment    Pt's wife present for family training. COGNITION Daily Assessment    Pt's recall of transfer techniques improving       BED/MAT MOBILITY Daily Assessment    Rolling Right : 0 (Not tested)  Rolling Left : 0 (Not tested)  Supine to Sit : 0 (Not tested)  Sit to Supine : 0 (Not tested)       TRANSFERS Daily Assessment    Transfer Type: SPT with walker  Transfer Assistance : 4 (Contact guard assistance)  Sit to Stand Assistance: Contact guard assistance  Car Transfers: Minimum assistance  Car Type: rehab car       GAIT Daily Assessment   Pt's wife assisted in second gait trail, performing w/ supervision for VCs Amount of Assistance: 4 (Contact guard assistance)  Distance (ft): 150 Feet (ft)x1, 75'x1  Assistive Device: Gait belt;Walker, rolling       STEPS or STAIRS Daily Assessment   Step to gait pattern    Pt. Ascended/descended 20' ramp w/ CGA & 1 cue for R sided LOB. Steps/Stairs Ambulated (#): 4  Level of Assist : 4 (Contact guard assistance)  Rail Use: Left        BALANCE Daily Assessment    Sitting - Static: Good (unsupported)  Sitting - Dynamic: Good (unsupported)  Standing - Static: Fair  Standing - Dynamic : Impaired       WHEELCHAIR MOBILITY Daily Assessment    Able to Propel (ft): 0 feet  Curbs/Ramps Assist Required (FIM Score): 0 (Not tested)  Wheelchair Setup Assist Required : 0 (Not tested)       Vital Signs: /80   Pulse 78   Temp 98.4 °F (36.9 °C)   Resp 14   Ht 5' 9\" (1.753 m)   Wt 123.3 kg (271 lb 12.8 oz)   SpO2 91%   BMI 40.14 kg/m²     Pain level: no c/o pain    Patient education: Provided family training to pt's wife.   Information included:  Pt's CVA & symptoms, medications for vertigo, transfer technique, gait guarding technique, stair & ramp management, car transfer technique, equipment recommendations & therapy follow up recommendations. Answered pt's wife questions. Interdisciplinary Communication: handoff communication w/ OT    Pt. Left up in recliner in NAD, call bell in reach, wife @ bedside. Assessment: Pt. Demonstrating much more stable gait this AM w/ decreased R sided LOB. Pt's wife demonstrates understanding of gait guarding techniques. Pt. Also requiring less assistance on stairs & ramp this AM.  Pt. Has met 2/5 STGs;  Goals adjusted to reflect pt's progress. Pt. does cont. To exhibit decreased balance so benefits from ongoing PT services to address. Plan of Care: Continue with POC and progress as tolerated.      Fred White, PT  6/29/2020

## 2020-06-30 LAB
GLUCOSE BLD STRIP.AUTO-MCNC: 102 MG/DL (ref 65–100)
GLUCOSE BLD STRIP.AUTO-MCNC: 141 MG/DL (ref 65–100)

## 2020-06-30 PROCEDURE — 97535 SELF CARE MNGMENT TRAINING: CPT

## 2020-06-30 PROCEDURE — 99232 SBSQ HOSP IP/OBS MODERATE 35: CPT | Performed by: PHYSICAL MEDICINE & REHABILITATION

## 2020-06-30 PROCEDURE — 74011250637 HC RX REV CODE- 250/637: Performed by: PHYSICIAN ASSISTANT

## 2020-06-30 PROCEDURE — 82962 GLUCOSE BLOOD TEST: CPT

## 2020-06-30 PROCEDURE — 97530 THERAPEUTIC ACTIVITIES: CPT

## 2020-06-30 PROCEDURE — 97110 THERAPEUTIC EXERCISES: CPT

## 2020-06-30 PROCEDURE — 74011250636 HC RX REV CODE- 250/636: Performed by: PHYSICAL MEDICINE & REHABILITATION

## 2020-06-30 PROCEDURE — 97116 GAIT TRAINING THERAPY: CPT

## 2020-06-30 PROCEDURE — 74011250637 HC RX REV CODE- 250/637: Performed by: PHYSICAL MEDICINE & REHABILITATION

## 2020-06-30 PROCEDURE — 65310000000 HC RM PRIVATE REHAB

## 2020-06-30 PROCEDURE — 92507 TX SP LANG VOICE COMM INDIV: CPT

## 2020-06-30 RX ORDER — MECLIZINE HYDROCHLORIDE 25 MG/1
25 TABLET ORAL 3 TIMES DAILY
Qty: 90 TAB | Refills: 3 | Status: SHIPPED | OUTPATIENT
Start: 2020-06-30 | End: 2020-07-10

## 2020-06-30 RX ORDER — LORATADINE 10 MG/1
10 TABLET ORAL DAILY
Qty: 90 TAB | Refills: 4 | Status: SHIPPED | OUTPATIENT
Start: 2020-06-30 | End: 2021-02-26 | Stop reason: SDUPTHER

## 2020-06-30 RX ORDER — ONDANSETRON 4 MG/1
4 TABLET, ORALLY DISINTEGRATING ORAL
Qty: 60 TAB | Refills: 1 | Status: SHIPPED | OUTPATIENT
Start: 2020-06-30 | End: 2020-10-26

## 2020-06-30 RX ORDER — GLIPIZIDE 5 MG/1
5 TABLET ORAL
Qty: 30 TAB | Refills: 2 | Status: SHIPPED | OUTPATIENT
Start: 2020-07-01 | End: 2020-08-14 | Stop reason: SDUPTHER

## 2020-06-30 RX ORDER — GUAIFENESIN 100 MG/5ML
81 LIQUID (ML) ORAL DAILY
Qty: 90 TAB | Refills: 5 | Status: SHIPPED | OUTPATIENT
Start: 2020-06-30 | End: 2020-07-30

## 2020-06-30 RX ORDER — SCOLOPAMINE TRANSDERMAL SYSTEM 1 MG/1
1 PATCH, EXTENDED RELEASE TRANSDERMAL
Qty: 10 PATCH | Refills: 1 | Status: SHIPPED | OUTPATIENT
Start: 2020-07-01 | End: 2020-07-13

## 2020-06-30 RX ORDER — DOXAZOSIN 8 MG/1
8 TABLET ORAL 2 TIMES DAILY
Qty: 180 TAB | Refills: 1 | Status: SHIPPED | OUTPATIENT
Start: 2020-06-30 | End: 2020-08-14 | Stop reason: SDUPTHER

## 2020-06-30 RX ORDER — ATORVASTATIN CALCIUM 80 MG/1
80 TABLET, FILM COATED ORAL
Qty: 30 TAB | Refills: 3 | Status: SHIPPED | OUTPATIENT
Start: 2020-06-30 | End: 2020-10-26 | Stop reason: SDUPTHER

## 2020-06-30 RX ORDER — MECLIZINE HYDROCHLORIDE 25 MG/1
25 TABLET ORAL
Qty: 40 TAB | Refills: 2 | Status: SHIPPED | OUTPATIENT
Start: 2020-06-30 | End: 2020-07-30

## 2020-06-30 RX ORDER — LISINOPRIL 5 MG/1
5 TABLET ORAL DAILY
Qty: 30 TAB | Refills: 2 | Status: SHIPPED | OUTPATIENT
Start: 2020-07-01 | End: 2020-08-10 | Stop reason: SDUPTHER

## 2020-06-30 RX ORDER — CLOPIDOGREL BISULFATE 75 MG/1
75 TABLET ORAL DAILY
Qty: 30 TAB | Refills: 5 | Status: SHIPPED | OUTPATIENT
Start: 2020-06-30 | End: 2020-07-30

## 2020-06-30 RX ADMIN — GLIPIZIDE 5 MG: 5 TABLET ORAL at 08:10

## 2020-06-30 RX ADMIN — SENNOSIDES AND DOCUSATE SODIUM 1 TABLET: 8.6; 5 TABLET ORAL at 08:10

## 2020-06-30 RX ADMIN — DOXAZOSIN MESYLATE 8 MG: 4 TABLET ORAL at 08:10

## 2020-06-30 RX ADMIN — MECLIZINE HYDROCHLORIDE 25 MG: 25 TABLET ORAL at 11:49

## 2020-06-30 RX ADMIN — MECLIZINE HYDROCHLORIDE 25 MG: 25 TABLET ORAL at 05:56

## 2020-06-30 RX ADMIN — DOXAZOSIN MESYLATE 8 MG: 4 TABLET ORAL at 21:42

## 2020-06-30 RX ADMIN — MECLIZINE HYDROCHLORIDE 25 MG: 25 TABLET ORAL at 17:01

## 2020-06-30 RX ADMIN — LORATADINE 10 MG: 10 TABLET ORAL at 08:10

## 2020-06-30 RX ADMIN — AMLODIPINE BESYLATE 10 MG: 10 TABLET ORAL at 08:10

## 2020-06-30 RX ADMIN — CLOPIDOGREL BISULFATE 75 MG: 75 TABLET ORAL at 08:10

## 2020-06-30 RX ADMIN — ATORVASTATIN CALCIUM 80 MG: 80 TABLET, FILM COATED ORAL at 21:42

## 2020-06-30 RX ADMIN — LISINOPRIL 5 MG: 5 TABLET ORAL at 08:10

## 2020-06-30 RX ADMIN — ASPIRIN 81 MG 81 MG: 81 TABLET ORAL at 08:10

## 2020-06-30 RX ADMIN — MECLIZINE HYDROCHLORIDE 25 MG: 25 TABLET ORAL at 11:06

## 2020-06-30 RX ADMIN — POLYETHYLENE GLYCOL 3350 17 G: 17 POWDER, FOR SOLUTION ORAL at 08:10

## 2020-06-30 NOTE — PROGRESS NOTES
Problem: Self Care Deficits Care Plan (Adult)  Goal: *Therapy Goal (Edit Goal, Insert Text)  Description: LTG 1: Patient will complete dressing with independence using AE/DME PRN within 10 days. Modify to supervision/touching assist 6/22/2020, PROGRESSING  GOAL MET 6/29/2020, 6/30/2020      Outcome: Resolved/Met  Goal: *Therapy Goal (Edit Goal, Insert Text)  Description: LTG 2: Patient will complete bathing with independenfce using AE/DME PRN within 10 days. Modify to supervision/touching assist 6/22/2020, PROGRESSING    GOAL MET 6/29/2020, 6/30/2020        Outcome: Resolved/Met  Goal: *Therapy Goal (Edit Goal, Insert Text)  Description: LTG 3: Patient will complete toileting with independence using AE/DME PRN within 10 days. Modify to supervision/touching assist 6/22/2020, PROGRESSING   GOAL MET 6/29/2020, 6/30/2020        Outcome: Resolved/Met  Goal: *Therapy Goal (Edit Goal, Insert Text)  Description: LTG 4: Patient will complete toilet transfer with independence using AE/DME PRN within 10 days. Modify to supervision/touching assist 6/22/2020, GOAL MET 6/22/2020, 6/29/2020, 6/30/2020           Outcome: Resolved/Met  Goal: Interventions  Outcome: Resolved/Met     Problem: Patient Education: Go to Patient Education Activity  Goal: Patient/Family Education  Description: Pt/caregiver will demonstrate and verbalize good understanding of OT recommendations regarding ADL status, stroke education, functional transfer status, home safety, DME, AE, energy conservation techniques, follow-up therapy, for increasing safety with functional tasks upon discharge.    GOAL MET 6/30/2020     Outcome: Resolved/Met    PAT Serra  6/30/2020

## 2020-06-30 NOTE — PROGRESS NOTES
PHYSICAL THERAPY DISCHARGE SUMMARY  Time: 9444-7119    Precautions at discharge: Other (comment)(fall risk)    Problem List:    Decreased strength B LE  []     Decreased strength trunk/core  []     Decreased AROM   []     Decreased PROM  []     Decreased balance sitting  []     Decreased balance standing  [x]     Decreased endurance  [x]     Pain  [x]       Functional Limitations:   Decreased independence with bed mobility  []     Decreased independence with functional transfers  [x]     Decreased independence with ambulation  [x]     Decreased independence with stair negotiation  [x]            Outcome Measures:     /76   Pulse 67   Temp 98.3 °F (36.8 °C)   Resp 18   Ht 5' 9\" (1.753 m)   Wt 123.3 kg (271 lb 12.8 oz)   SpO2 93%   BMI 40.14 kg/m²     Pain level: no complaints of pain  Pain location: N/A  Pain interventions: N/A    Patient education: Pt educated on how to conserve energy and to increase caution when maneuvering uneven surfaces if looking downward provokes dizziness. Interdisciplinary Communication: Collaborated with RN regarding pt's reports of dizziness    Pt left in bedside chair with call bell and needs in reach. Pt in NAD. Cognition: Pt alert & oriented x 4. Pt follows commands appropriately. Pt has a pleasant affect and is engaged with therapy.     MMT Initial Asssessment   Right Lower Extremity Left Lower Extremity   Hip Flexion 4+ 5   Knee Extension 4+ 5   Knee Flexion 4 4+   Ankle Dorsiflexion 4+ 5     MMT Discharge Assessment   Right Lower Extremity Left Lower Extremity   Hip Flexion 5 5   Knee Extension 5 5   Knee Flexion 5 5   Ankle Dorsiflexion 4+ 5   0/5 No palpable muscle contraction  1/5 Palpable muscle contraction, no joint movement  2-/5 Less than full range of motion in gravity eliminated position  2/5 Able to complete full range of motion in gravity eliminated position  2+/5 Able to initiate movement against gravity  3-/5 More than half but not full range of motion against gravity  3/5 Able to complete full range of motion against gravity  3+/5 Completes full range of motion against gravity with minimal resistance  4-/5 Completes full range of motion against gravity with minimal-moderate resistance  4/5 Completes full range of motion against gravity with moderate resistance  4+/5 Completes full range of motion against gravity with moderate-maximum resistance  5/5 Completes full range of motion against gravity with maximum resistance    AROM: WNL    PRIMARY MODE OF LOCOMOTION: ambulation  Please see [facility] Interdisciplinary Eval: Coordination/Balance Section for details regarding FIM score description.     BED/CHAIR/WHEELCHAIR TRANSFERS Initial Assessment Discharge Assessment   Rolling Right 0 (Not tested) 6 (Modified independent)   Rolling Left 0 (Not tested) 6 (Modified independent)   Supine to Sit 3 (Moderate assistance) 5 (Supervision)   Sit to Stand Minimal assistance Contact guard assistance   Sit to Supine 4 (Minimal assistance) 5 (Supervision)   Transfer Type SPT without device SPT with walker   Comments Increased time and effort to maintain balance and pivot during SPT Increased time and effort to maintain balance and pivot during SPT   Car Transfer Not tested Not tested   Car Type rehab car         [facility] MOBILITY/MANAGEMENT Initial Assessment Discharge Assessment   Able to Propel 100 feet 100 feet   Functional Level       Curbs/ramps assistance required 0 (Not tested) 0 (Not tested)   Wheelchair set up assistance required 4 (Minimal assistance) 4 (Minimal assistance)   Wheelchair management Manages left brake, Manages right brake Manages left brake;Manages right brake;Manages left footrest;Manages right footrest       WALKING INDEPENDENCE Initial Assessment Discharge Assessment   Assistive device Gait belt, Walker, rolling Gait belt;Walker, rolling   Ambulation assistance - level surface 4 (Minimal assistance) 5 (Stand-by assistance)   Distance 5 Feet (ft) 150 Feet (ft)   Comments Pt c/o dizziness with ambulation and was unable to continue with ambulation. O2 sat dropped to 80% with ambulation. Decreased JAQUELIN, stride length; ataxia; requires min cueing for safety at times for turns and ambulating on uneven surfaces   Ambulation assistance - unlevel surface 0 (Not tested) 4 (Contact guard assistance)       STEPS/STAIRS Initial Assessment Discharge Assessment   Steps/Stairs ambulated 0 4(x3)   Rail Use Both Both   Functional Level       Comments Prefers to lead with LLE ascending and descending, but performs reciprocal gait intermittently Prefers to lead with LLE ascending and descending, but performs reciprocal gait intermittently   Curbs/Ramps 4 (Contact guard assistance) 4 (Contact guard assistance)       QUALITY INDICATOR ASSIST COMMENTS   Roll right (& return to back) 6: Independent    Roll left (& return to back) 6: Independent    Supine to sit 4: Supervision or touching A    Sit to stand 4: Supervision or touching A    Chair/bed-to-chair transfer 4: Supervision or touching A    Walk 10 feet 4: Supervision or touching A    Walk 50 feet with 2 turns 4: Supervision or touching A    Walk 150 feet 4: Supervision or touching A    Walk 10 feet on uneven  4: Supervision or touching A    1 step/curb 4: Supervision or touching A    4 steps 4: Supervision or touching A    12 steps 4: Supervision or touching A     object 4: Supervision or touching A    Wheel 48' with 2 turns Not Tested: Not applicable secondary to pt not completing activity previously    Wheel 150' Not Tested: Not applicable secondary to pt not completing activity previously    Car Transfer Not Tested: Not attempted due to environmental concerns: Equipment Pt's car is larger than rehab car and pt's large body frame may be an issue with small space in car            PHYSICAL THERAPY PLAN OF CARE    LTGs: Please see Care Plan for goals.     Pt would benefit from continued skilled physical therapy in order to improve independent functional mobility within the home with use of least restrictive device. Interventions may include range of motion (AROM, PROM B LE/trunk), motor function (B LE/trunk strengthening/coordination), activity tolerance (vitals, oxygen saturation levels), bed mobility training, balance activities, gait training (progressive ambulation program), and functional transfer training. HEP handout: To be reviewed with pt before pt leaves rehab floor. Continued therapy services with [facility] PT.    Pt to be discharged home with supervision as needed provided by family. Family training: Has taken place. Therapy Recommendations upon discharge: Candace Eason needs at discharge: Pt has access to RW. Please see IRC; Interdisciplinary Eval, Care Plan, and Patient Education for further information regarding physical therapy discharge summary and plan of care.      Lester Garcia, PT, DPT  6/30/2020

## 2020-06-30 NOTE — PROGRESS NOTES
Problem: Falls - Risk of  Goal: *Absence of Falls  Description: Document Yajaira Dove Fall Risk and appropriate interventions in the flowsheet.   Outcome: Progressing Towards Goal  Note: Fall Risk Interventions:  Mobility Interventions: Patient to call before getting OOB, Utilize walker, cane, or other assistive device         Medication Interventions: Patient to call before getting OOB    Elimination Interventions: Call light in reach, Patient to call for help with toileting needs, Toileting schedule/hourly rounds    History of Falls Interventions: Door open when patient unattended         Problem: Patient Education: Go to Patient Education Activity  Goal: Patient/Family Education  Outcome: Progressing Towards Goal

## 2020-06-30 NOTE — PROGRESS NOTES
PHYSICAL THERAPY DAILY NOTE  Time In: 0830  Time Out: 9455  Patient Seen For: AM;Gait training;Patient education; Therapeutic exercise    Subjective: Pt reports his dizziness is about 1/10 this AM. Agreeable to therapy this morning. Objective: Other (comment)(fall risk)    COGNITION Daily Assessment    Pt is alert and oriented and following commands appropriately >80% of the time. Pt communicates verbally. Pt has a pleasant affect. Pt is willing to participate in therapy. Pt demonstrates decreased safety awareness at times with transfers and gait. BED/MAT MOBILITY Daily Assessment    Rolling Right : 0 (Not tested)  Rolling Left : 0 (Not tested)  Supine to Sit : 0 (Not tested)  Sit to Supine : 0 (Not tested)       TRANSFERS Daily Assessment    Transfer Type: SPT with walker  Transfer Assistance : 4 (Contact guard assistance)  Sit to Stand Assistance: Contact guard assistance  Car Transfers: Not tested       GAIT Daily Assessment   Ambulates with a slower (safer) saravanan this morning, which resulted in more steady gait    Less cues required from PT for safety Amount of Assistance: 5 (Stand-by assistance)  Distance (ft): 125 Feet (ft)(x2)  Assistive Device: Gait belt;Walker, rolling       STEPS or STAIRS Daily Assessment    Steps/Stairs Ambulated (#): 0  Level of Assist : 0 (Not tested)       BALANCE Daily Assessment   Improved dynamic standing balance with turns during gait training and ambulating over 20 ft ramp    \"I feel like if I look straight ahead, I do better than if I'm looking down at my feet. \" Sitting - Static: Good (unsupported)  Sitting - Dynamic: Good (unsupported)  Standing - Static: Fair  Standing - Dynamic : Impaired       WHEELCHAIR MOBILITY Daily Assessment    Able to Propel (ft): 0 feet  Curbs/Ramps Assist Required (FIM Score): 0 (Not tested)  Wheelchair Setup Assist Required : 0 (Not tested)     Therapeutic Interventions:  NuStep level 2 x 10 min  Gait training MAU Banegas / diana management  Ramp management    /76   Pulse 67   Temp 98.3 °F (36.8 °C)   Resp 18   Ht 5' 9\" (1.753 m)   Wt 123.3 kg (271 lb 12.8 oz)   SpO2 93%   BMI 40.14 kg/m²      Pain level: no complaints of pain  Pain location: N/A  Pain interventions: N/A    Patient education: Pt educated on how to manage unlevel surfaces safely using the RW. Interdisciplinary Communication: Collaborated with PA regarding pt's concern of using the catheter following DC from [facility] [place]     Pt left in bedside chair with call bell and needs in reach. Pt in NAD. Assessment: Pt tolerated therapy well today. Pt demonstrates improved gait mechanics using the RW and reports increased confidence with his gait and mobility. Pt required min-mod cueing for maneuvering over a curb with the RW. Pt is concerned about going home with the Mena catheter, in terms of having to transfer it around everywhere during mobility. PT discussed using a \"leg bag\" with PA following DC from [facility]. Pt reported his dizziness seems to improve whenever he looks down toward the ground. Pt would benefit from skilled physical therapy to maximize independence, increase activity tolerance, and improve functional mobility following DC from inpatient stay. Plan of Care: Continue with POC and progress as tolerated.       Rehana Vasquez, PT  6/30/2020

## 2020-06-30 NOTE — DISCHARGE SUMMARY
REHABILITATION DISCHARGE SUMMARY     Date of admission to 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 s/p CVA    HPI: Mr. Clair Ma is a pleasant, previously functionally independent male with PMH of HTN, HL, DM 2, obesity, right thalamic stroke 7/2019, left carpal tunnel syndrome. He presented to the ED 6/8/2020 with urinary retention and 3d history of dizziness and uncoordination. ED placed Mena catheter, yielding 800 mL urine. Code S was activated, patient outside TPA treatment window. Head CT without acute changes. CTA with generalized atherosclerotic changes and intracranial stenoses involving right M2/M3 (high-grade), mid left M1 (moderate), proximal right PCA (moderate) and concern for occlusion/high-grade stenosis of right PICA. Patient was admitted for medical management of acute stroke, urinary retention and chronic medical issues; specialists including Neurology and Urology were consulted. Brain MRI confirmed inferior right cerebellar hemisphere and right dorsolateral medulla infarcts. DAPT was initiated with Neurology recommendation to switch to monotherapy at 90 days. 2D TTE with normal systolic function, EF 78-95%, no wall motion abnormalities, no PFO.  Urology felt urinary retention was related to stroke and recommended Mena remain until 6/16, when patient can undergo voiding trial. Physical and occupational therapies were initiated, and patient started to mobilize. He was found to have significant mobility and self-care impairments. Physical Medicine and Rehabilitation service was consulted, and patient was initially evaluated by Dr. Ag Foster on 6/11. She determined he would benefit from comprehensive acute inpatient rehabilitation, continued close medical supervision and management prior to returning home. Rehabilitation Course:         Dorsal Medullary syndrome/Cerebellar stroke  Plan / Recommendations / Medical Decision Making:      Continue daily physician / PA medical management:     New ischemic stroke, right cerebellar hemisphere, right dorsolateral medulla; secondary stroke prevention - DAPT, high-intensity statin (Lipitor), BP management.  -dizziness, nausea, intermittent diplopia; prn meclizine  -6/19 scopolamine patch applied  -6/22 added meclizine 6/19, remains dizzy when OOB. not orthostatic  -6/23 vertigo remains a significant issue despite meclizine and scopolomine; could try phenergan or valium but sedation main issue of concern. Will adjust meclizine dosing. Will check bmp to f/u on fluid status. Push po fluids (uop is good)  -6/24 did better with scheduled Meclizine but need to adjust pharmacies dose times so that he gets it before am. Therapies; 6/25 improved with scheduled meclizine and scopolomine patch; continue same. Cont vestibular therapies  -6/29 marked improvement in gait , postural support. No dizziness reported.   -7/1 intermittent dizziness and continued unsteadiness thus, needs HH therapies and supervision     Hypertension / hypotension - patient on doxazosin 8mg twice daily from PCP, now additionally on amlodipine 5mg. /63, P 75 on admission here; patient gives pretty clear history of brief orthostatic hypotension symptoms when sitting from supine prior to meals.  Staff to perform OH maneuvers, may hold amlodipine.  -6/17 bp 142/69; no orthostatics; cont norvasc and cardura  -6/18 bp 152/89 (129-165), no reported orthostatics; cont current meds  -6/21 BP - 158/75, continues to fluctuate  -6/22 122//71 fluctuates making med mgt difficult  -6/23 147/69 ; 6/24 160/73, range 132-160/54-73; no orthostatics documented; consider increasing Norvasc to 7.5 mg; goal SBP<  140  -6/25 bp had inc Norvasc, SBP in the 160s, no orthostatics. Inc Norvasc to 10 mg, monitor for [facility]  -6/26 bp 135/71 this a.m. Improved with increased norvasc  -6/27 erroneous bp this a.m, 135/68 on recheck. Cont current meds  -6/29 162/80 this a.m. ranges 140-170; goal <140; add zestril 5mg daily. Cont norvasc 10 mg daily. Cont Cardura 8mg bid  -7/1 141/72; f/u with PCP; cont current meds     Diabetes mellitus - uncontrolled / poor glycemic control, HgbA1c 7.8% (6/9/2020). Will require daily, close FSG monitoring and medication adjustment to optimize glycemic control in setting of acute illness and hospitalization. POC blood glucose around 130s-150s. Currently on Humalog SSI; will monitor and manage.  -6/15 fair control at this time. Consider low dose antihyperglycemic  -6/16 bs 147 this a.m., 167 at bedtime, 182 at supper, 152 at lunch; start glucotrol 2.5 mg  -6/17 124 this a.m; 188 at bedtime, 135 with supper, 133 with lunch; cont Glucotrol 2.5 mg, increase to 5mg if bs not consistently below 120-130  -6/18 bs 120 this a.m, 135 last pm, 200 at supper, 93 at lunch, still not consistent  -6/19 -174, increase glipizide to 5mg  - 6/21 BG-111, BG levels < 130   -6/24 bs remain acceptable/ stable on Glipizide 5mg  -6/30 continual excellent control with Glipizide and diet     Urinary retention - cochran catheter in place since ED 6/8; Urologist suspects urinary retention part of stroke syndrome. Catheter remains until 6/16, at which time it will be removed for voiding trial.  -pt on Cardura 8mg. Check UA; lg blood, lg leuk; pt didn't have micro done.  Asymptomatic  Cochran discontinued this a.m; await results  -6/17 cochran replaced due to hematuria and inability to void. Small amt of hematuria after one CIC; when cochran replaced; zainab blood, continues this a.m. lg amt of blood. Check CBC, irrigated without improvement, Urology Consulted. ? Continuous irrigation/3-way cochran. ?cysto; hold plavix and Lovenox. No hx of retention or hematuria.; +>100K gram neg rods s/p one dose Rocephing 1g IV, on septra, day 2; await ID/sens.   -E coli UTI; sensitive to Septra, day 3/7. CIB per Urology, appreciate assistance. Hgb down one gram but stable 12.1; urine is getting pinker; no clots noted in bag. Continue  -6/19 Urologist D/Cing cochran irrigation; will ask NP to address Stat-lock / tethering issue. - 6/21 unsuccessful voiding trial yesterday/today with hematuria returning after catheterization, cochran reinserted, continue to irrigate, cochran functioning well  -6/22 cochran reinserted due to inability to void and inc hematuria. Pt is ok with this and understands reasoning. States wife upset with this, will d/w her. Likely to go with cochran; 6/23 less hematuria, few clots, urine looking better  -6/23 abx have finished. Dec hematuria; at some point will need to restart Plavix  -6/24 if urine stays clear , will restart Plavix tomorrow. Cont to hold Lovenox; cont cochran  -6/25 restart plavix and monitor. Will not resume Lovenox. Mobilizing well. Will need cysto as outpt per my d/w urology. Would not be able to self cath due to body habitus. Cont cardura 8mg bid  -6/26 no hematuria; cochran with yellow urine; monitor back on Plavix  -6/29 no further hematuria. Will go home with cochran and f/u with Dr Sheyla Meier. Neither pt nor wife feel comfortable learning to perform CIC.  Pt, himself, would not be able to due to body habitus     Hyperlipidemia - on high-intensity statin.     Electrolyte abnormality - previous hypoK+, current BMP normal; monitor.  -6/26 recheck labs 6/29; K 3.9 good     Pain control - stable, mild-to-moderate joint symptoms intermittently, reasonably well controlled by PRN meds. Will require regular pain assessment and comprenhensive pain management.     Bowel program - at risk for constipation due to lack of mobility. MiraLAX daily for regularity, Meghan-Colace for stool softener prn. PRN MOM, bisacodyl suppository or tablets for constipation.  -6/15 normally has bm every other day. Will add daily senna.   -6/16 good results  -no complaints     Depression - no previous history; at risk of post-stroke depression. Monitor.     Pneumonia prophylaxis - incentive spirometer every hour while awake.     Newly diagnosed NELL; pt sets alarm off if not supine. If he rolls side to side , he desats. CXR shows atelectasis; enc IS  -6/16 not tolerating CPAP; O2 NC at [facility] at 1L, sats 91-98%  -6/17 weaned off O2 during day yesterday. Will get nocturnal pulse ox study closer to dc. Reports no hx of NELL , wife reported no snoring but breaths \"heavy\"  -6/19 required O2 during PT; 6/22 reports no O2 with therapy but using at noc  -6/24 has weaned off of O2, monitoring sats. Denies any sob  -6/25 remains off O2, no sob. sats > 92%; not using our CPAP at this time  -6/29 no O2 needs. Will not need O2 at dc but will get nocturnal pulse ox tonight  -results ; 6/30 mean SpO2 91.3%, median 92%; Hi of 98%, low of 72%; 34.8 avg desats per hr. Mean length of desat 21 sec. Pt will require an outpt sleep study. He remains off O2 during the day and does not desat with activity     DVT risk / DVT prophylaxis - will require daily physician / PA exam to assess for signs and symptoms as patient is at increased risk for of thromboembolism. Mobilize as tolerated.  Sequential pneumatic compression devices (SCDs) when in bed; thigh-high or knee-high thromboembolic deterrent hose when out of bed. On DAPT; Lovenox subcutaneously daily.   -6/17 hold Lovenox due to zainab hematuria; 6/22 cont to hold  -6/27 urine yellow.      Wound care - monitor general skin wound status daily per staff and physician / PA. At risk for failure. Will require 24/7 rehab nursing. No current wounds.     GERD - at times may need antacids, Maalox prn. Functional Level On Admission:  Patient with fair static and dynamic standing balance, requires moderate assistance with supine to sit and sit to stand. Patient requires moderate assistance with bathing and lower body dressing, maximum assistance with toileting, total assistance with bowel hygiene.       Functional Level At Discharge:    PHYSICAL THERAPY DISCHARGE SUMMARY  Time: 3163-3796     Precautions at discharge: Other (comment)(fall risk)     Problem List:    Decreased strength B LE  []? Decreased strength trunk/core  []? Decreased AROM   []? Decreased PROM  []? Decreased balance sitting  []? Decreased balance standing  [x]? Decreased endurance  [x]? Pain  [x]?        Functional Limitations:   Decreased independence with bed mobility  []? Decreased independence with functional transfers  [x]? Decreased independence with ambulation  [x]? Decreased independence with stair negotiation  [x]?           Outcome Measures:      /76   Pulse 67   Temp 98.3 °F (36.8 °C)   Resp 18   Ht 5' 9\" (1.753 m)   Wt 123.3 kg (271 lb 12.8 oz)   SpO2 93%   BMI 40.14 kg/m²      Pain level: no complaints of pain  Pain location: N/A  Pain interventions: N/A     Patient education: Pt educated on how to conserve energy and to increase caution when maneuvering uneven surfaces if looking downward provokes dizziness.     Interdisciplinary Communication: Collaborated with RN regarding pt's reports of dizziness     Pt left in bedside chair with call bell and needs in reach. Pt in NAD.     Cognition: Pt alert & oriented x 4. Pt follows commands appropriately.  Pt has a pleasant affect and is engaged with therapy.     MMT Initial Asssessment    Right Lower Extremity Left Lower Extremity   Hip Flexion 4+ 5   Knee Extension 4+ 5   Knee Flexion 4 4+ Ankle Dorsiflexion 4+ 5      MMT Discharge Assessment    Right Lower Extremity Left Lower Extremity   Hip Flexion 5 5   Knee Extension 5 5   Knee Flexion 5 5   Ankle Dorsiflexion 4+ 5   0/5       No palpable muscle contraction  1/5       Palpable muscle contraction, no joint movement  2-/5      Less than full range of motion in gravity eliminated position  2/5       Able to complete full range of motion in gravity eliminated position  2+/5     Able to initiate movement against gravity  3-/5      More than half but not full range of motion against gravity  3/5       Able to complete full range of motion against gravity  3+/5     Completes full range of motion against gravity with minimal resistance  4-/5      Completes full range of motion against gravity with minimal-moderate resistance  4/5       Completes full range of motion against gravity with moderate resistance  4+/5     Completes full range of motion against gravity with moderate-maximum resistance  5/5       Completes full range of motion against gravity with maximum resistance     AROM: WNL     PRIMARY MODE OF LOCOMOTION: ambulation  Please see IRC Interdisciplinary Eval: Coordination/Balance Section for details regarding FIM score description.     BED/CHAIR/WHEELCHAIR TRANSFERS Initial Assessment Discharge Assessment   Rolling Right 0 (Not tested) 6 (Modified independent)   Rolling Left 0 (Not tested) 6 (Modified independent)   Supine to Sit 3 (Moderate assistance) 5 (Supervision)   Sit to Stand Minimal assistance Contact guard assistance   Sit to Supine 4 (Minimal assistance) 5 (Supervision)   Transfer Type SPT without device SPT with walker   Comments Increased time and effort to maintain balance and pivot during SPT Increased time and effort to maintain balance and pivot during SPT   Car Transfer Not tested Not tested   Car Type rehab car          WHEELCHAIR MOBILITY/MANAGEMENT Initial Assessment Discharge Assessment   Able to Propel 100 feet 100 feet   Functional Level     Curbs/ramps assistance required 0 (Not tested) 0 (Not tested)   Wheelchair set up assistance required 4 (Minimal assistance) 4 (Minimal assistance)   Wheelchair management Manages left brake, Manages right brake Manages left brake;Manages right brake;Manages left footrest;Manages right footrest         WALKING INDEPENDENCE Initial Assessment Discharge Assessment   Assistive device Gait belt, Walker, rolling Gait belt;Walker, rolling   Ambulation assistance - level surface 4 (Minimal assistance) 5 (Stand-by assistance)   Distance 5 Feet (ft) 150 Feet (ft)   Comments Pt c/o dizziness with ambulation and was unable to continue with ambulation. O2 sat dropped to 80% with ambulation. Decreased JAQUELIN, stride length; ataxia; requires min cueing for safety at times for turns and ambulating on uneven surfaces   Ambulation assistance - unlevel surface 0 (Not tested) 4 (Contact guard assistance)         STEPS/STAIRS Initial Assessment Discharge Assessment   Steps/Stairs ambulated 0 4(x3)   Rail Use Both Both   Functional Level     Comments Prefers to lead with LLE ascending and descending, but performs reciprocal gait intermittently Prefers to lead with LLE ascending and descending, but performs reciprocal gait intermittently   Curbs/Ramps 4 (Contact guard assistance) 4 (Contact guard assistance)         QUALITY INDICATOR ASSIST COMMENTS   Roll right (& return to back) 6: Independent     Roll left (& return to back) 6:  Independent     Supine to sit 4: Supervision or touching A     Sit to stand 4: Supervision or touching A     Chair/bed-to-chair transfer 4: Supervision or touching A     Walk 10 feet 4: Supervision or touching A     Walk 50 feet with 2 turns 4: Supervision or touching A     Walk 150 feet 4: Supervision or touching A     Walk 10 feet on uneven  4: Supervision or touching A     1 step/curb 4: Supervision or touching A     4 steps 4: Supervision or touching A     12 steps 4: Supervision or touching A      object 4: Supervision or touching A     Wheel 48' with 2 turns Not Tested: Not applicable secondary to pt not completing activity previously     Wheel 150' Not Tested: Not applicable secondary to pt not completing activity previously     Car Transfer Not Tested: Not attempted due to environmental concerns: Equipment Pt's car is larger than rehab car and pt's large body frame may be an issue with small space in car              PHYSICAL THERAPY PLAN OF CARE     LTGs: Please see Care Plan for goals.     Pt would benefit from continued skilled physical therapy in order to improve independent functional mobility within the home with use of least restrictive device. Interventions may include range of motion (AROM, PROM B LE/trunk), motor function (B LE/trunk strengthening/coordination), activity tolerance (vitals, oxygen saturation levels), bed mobility training, balance activities, gait training (progressive ambulation program), and functional transfer training.      HEP handout: To be reviewed with pt before pt leaves rehab floor. Continued therapy services with [facility] PT.     Pt to be discharged home with supervision as needed provided by family. Family training: Has taken place. Therapy Recommendations upon discharge: Candace Eason needs at discharge: Pt has access to RW.     Please see IRC; Interdisciplinary Eval, Care Plan, and Patient Education for further information regarding physical therapy discharge summary and plan of care.      Prudence Kelton PT, DPT  6/30/2020           OT DISCHARGE REPORT     Time In: 0745  Time Out: 0829  Mobility    Usual Performance Score Comments   Rolling 6:  Independent Side: Bilateral      Supine to Sit 4: Supervision or touching A supervision   Sit to Supine 6: Independent     Sit to Stand 4: Supervision or touching A SBA   Transfer Assist 4: Supervision or touching A Transfer Type: SPT   Equipment: Rolling Walker   Comments: CGA      Activities of Daily Living     Usual Performance Score Comments   Eating 6: Independent     Oral Hyigene 5: S/U or clean-up assist     Bathing 5: S/U or clean-up assist Type of Shower: Shower  Position: Supported sitting and Standing PRN   Adaptive  Equipment: Tub Transfer Bench, Grab Bars and Long Handled Sponge  Comments: SBA in stance   Upper Body  Dressing 5: S/U or clean-up assist Items Applied: Pullover  Position: Supported Sitting  Comments:    Lower Body Dressing 4: Supervision or touching A Items Applied: Underwear and Elastic pants  Position: Supported sitting and Standing PRN  Adaptive Equipment: Reacher and RW  Comments: SBA in stance   Donning/Portal Footwear 5: S/U or clean-up assist Items Applied: Socks  Adaptive Equipment: Sock Aide  Comments: Toilet Transfer 4: Supervision or touching A Transfer Type: SPT   Equipment: Rolling Walker   Comments: CGA   Education   Regarding handouts of shower chair and grab bars      Plan of Care: Please see Care Plan for progress towards goals during stay  Precautions at Discharge: Falls  Discharge Location: home with spouse  Safety/Supervision Recommendations/Functional Level: SBA with basic ADLs, cues for threading cochran catheter (occasionally). Pt requires min assist to supervision for medication management and financial management. Pt requires mod assist with household tasks with supervision for safety due to vertigo symptoms affecting balance and mobility. Pt should not drive due to vertigo and visual impairments. Family Training: completed 6/29/20 with patient's wife present.  See daily note from 6/29/20 regarding shower chair and grab bar recommendations.      Recommended Continuing Therapy: Home Health OT  Residual Deficits: vertigo, decreased balance, occasional blurred vision, decreased activity tolerance, decreased functional mobility impacting safety  Progress over LOS: Patient demonstrates good progress with BUE strengthening, ADL AE training, [address], and static standing balance for performance of ADL and functional transfers, see above for details. Patient continues to require skilled [facility] services to address deficits stated above.     Summary of Session:   S: \"I think I've done really well this morning. \" Agreeable to therapy. Focus of session was on morning ADL routine and discharge assessment. Patient was able to ambulate ~10 feet using a RW with CGA. Completed ADLs with quality indicators noted above. Pain not expressed. Collaborated with PT and confirmed patient is ready for discharge. Patient ended session in wc with PT assuming care.          Antonio Bennett, DANNYR/LUPE   6/30/2020      Speech therapy    06/30/20 1109   Time Spent With Patient   Time In 1006   Time Out 1032   Patient Seen For: AM   Mental Status   Neurologic State Alert   Orientation Level Oriented X4   Cognition Memory loss;Decreased attention/concentration   Safety/Judgement Home safety           06/30/20 1110   Attention Exercises Performed   Accuracy (%) 80 %   Neuro-Linguistic Exercises   Verbal Problem Solving Impaired   Memory Impaired   Attention  Impaired         Patient participated with cognitive therapy. 80% accuracy with functional reading comprehension tasks. 80% accuracy with organizational activity related to using a calendar with decreased immediate attention/recall of details to include on the calendar and repetitions required. Once reviewed with ST patient recalled 3/4 items with a 10 minute delay. Discussed requesting repetition of information in real life scenarios and pt re-stating the information back to confirm he's correctly processed. Recommend assistance with higher level ADLs at discharge and continued ST to address cognitive function since patient was independent prior to CVA.     Dmitri Amaral MS, CCC-SLP         Home Architecture: At baseline, patient lives with spouse in one story home.  Pt is typically Mod I to independent for ADLs, with pt endorsing use of chair (not shower chair) to complete bathing in seated. Pt reports Mod I to independence for IADLs, including driving. Pt uses no DME for functional mobility but does endorse \"furniture walking. \" Pt does have prior history of CVA that occurred 7/2019 with residual R sided facial droop.      Past Medical History:   Diagnosis Date    Essential hypertension, benign 1/3/2014    Mixed hyperlipidemia 1/3/2014    Numbness and tingling in left hand 7/2/2019    Right thalamic infarction ([address] 7/2/2019      No past surgical history on file. No family history on file. Social History     Tobacco Use    Smoking status: Never Smoker    Smokeless tobacco: Never Used   Substance Use Topics    Alcohol use: Not on file     Prior to Admission medications    Medication Sig Start Date End Date Taking? Authorizing Provider   aspirin 81 mg chewable tablet Take 1 Tab by mouth daily for 30 days. 6/30/20 7/30/20 Yes Lilian Nunez MD   clopidogreL (PLAVIX) 75 mg tab Take 1 Tab by mouth daily for 30 days. 6/30/20 7/30/20 Yes Lilian Nunez MD   doxazosin (CARDURA) 8 mg tablet Take 1 Tab by mouth two (2) times a day. 6/30/20  Yes Lilian Nunez MD   loratadine (Claritin) 10 mg tablet Take 1 Tab by mouth daily. 6/30/20  Yes Lilian Nunez MD   atorvastatin (LIPITOR) 80 mg tablet Take 1 Tab by mouth nightly. 6/30/20  Yes Lilian Nunez MD   glipiZIDE (GLUCOTROL) 5 mg tablet Take 1 Tab by mouth Daily (before breakfast). 7/1/20  Yes Lilian Nunez MD   lisinopriL (PRINIVIL, ZESTRIL) 5 mg tablet Take 1 Tab by mouth daily. 7/1/20  Yes Lilian Nunez MD   meclizine (ANTIVERT) 25 mg tablet Take 1 Tab by mouth every six (6) hours as needed for Dizziness for up to 30 days. 6/30/20 7/30/20 Yes Lilian Nunez MD   meclizine (ANTIVERT) 25 mg tablet Take 1 Tab by mouth three (3) times daily for 10 days.  6/30/20 7/10/20 Yes Shaye Karimi, Lilian DANGELO MD   ondansetron (ZOFRAN ODT) 4 mg disintegrating tablet Take 1 Tab by mouth every six (6) hours as needed for Nausea or Vomiting. 6/30/20  Yes Lilian Johnson MD   scopolamine (TRANSDERM-SCOP) 1 mg over 3 days pt3d 1 Patch by TransDERmal route every seventy-two (72) hours. 7/1/20  Yes Lilian Johnson MD   atorvastatin (LIPITOR) 80 mg tablet Take 1 Tab by mouth nightly for 30 days. 6/12/20 7/12/20 Yes Arline Perez MD   amLODIPine (NORVASC) 5 mg tablet Take 1 Tab by mouth daily for 30 days. 6/13/20 7/13/20 Yes Arline Perez MD   insulin lispro (HUMALOG) 100 unit/mL injection INITIATE INSULIN CORRECTIVE PROTOCOL:  Normal Insulin Sensitivity   For Blood Sugar (mg/dL) of:     Less than 150 =   0 units           150 -199 =   2 units  200 -249 =   4 units  250 -299 =   6 units  300 -349 =   8 units  350 and above = 10 units and Call Physician     Initiate Hypoglycemia protocol if blood glucose is <70 mg/dL Fast Acting - Administer Immediately - or within 15 minutes of start of meal, if mealtime coverage. 6/12/20  Yes Arline Perez MD   mupirocin (BACTROBAN) 2 % ointment Apply  to affected area daily.  1/15/20   Cecelia Alexandre MD     No Known Allergies     Lab Review:   Recent Results (from the past 67 hour(s))   GLUCOSE, POC    Collection Time: 06/27/20  4:21 PM   Result Value Ref Range    Glucose (POC) 137 (H) 65 - 100 mg/dL   GLUCOSE, POC    Collection Time: 06/28/20  7:24 AM   Result Value Ref Range    Glucose (POC) 109 (H) 65 - 100 mg/dL   GLUCOSE, POC    Collection Time: 06/28/20  5:11 PM   Result Value Ref Range    Glucose (POC) 79 65 - 100 mg/dL   CBC W/O DIFF    Collection Time: 06/29/20  6:39 AM   Result Value Ref Range    WBC 6.2 4.3 - 11.1 K/uL    RBC 4.23 4.23 - 5.6 M/uL    HGB 12.7 (L) 13.6 - 17.2 g/dL    HCT 39.2 (L) 41.1 - 50.3 %    MCV 92.7 79.6 - 97.8 FL    MCH 30.0 26.1 - 32.9 PG    MCHC 32.4 31.4 - 35.0 g/dL    RDW 14.1 11.9 - 14.6 %    PLATELET 261 359 - 229 K/uL    MPV 10.7 9.4 - 12.3 FL    ABSOLUTE NRBC 0.00 0.0 - 0.2 K/uL   METABOLIC PANEL, BASIC    Collection Time: 06/29/20  6:39 AM   Result Value Ref Range    Sodium 145 136 - 145 mmol/L    Potassium 3.9 3.5 - 5.1 mmol/L    Chloride 111 (H) 98 - 107 mmol/L    CO2 27 21 - 32 mmol/L    Anion gap 7 7 - 16 mmol/L    Glucose 100 65 - 100 mg/dL    BUN 19 8 - 23 MG/DL    Creatinine 0.92 0.8 - 1.5 MG/DL    GFR est AA >60 >60 ml/min/1.73m2    GFR est non-AA >60 >60 ml/min/1.73m2    Calcium 8.0 (L) 8.3 - 10.4 MG/DL   GLUCOSE, POC    Collection Time: 06/29/20  4:02 PM   Result Value Ref Range    Glucose (POC) 147 (H) 65 - 100 mg/dL   GLUCOSE, POC    Collection Time: 06/29/20  9:56 PM   Result Value Ref Range    Glucose (POC) 136 (H) 65 - 100 mg/dL   GLUCOSE, POC    Collection Time: 06/30/20  6:42 AM   Result Value Ref Range    Glucose (POC) 102 (H) 65 - 100 mg/dL       Functional Assessment:  Gross Assessment  AROM: Within functional limits (06/20/20 1400)  PROM: Within functional limits (06/16/20 1100)  Strength: Generally decreased, functional (06/16/20 1100)  Coordination: Generally decreased, functional (06/16/20 1100)       Balance  Sitting - Static: Good (unsupported) (06/30/20 1200)  Sitting - Dynamic: Good (unsupported) (06/30/20 1200)  Standing - Static: Fair (06/30/20 1200)  Standing - Dynamic : Impaired (06/30/20 1000)                     Demetria Tse Fall Risk Assessment:  Demetria Tse Fall Risk  Mobility: Ambulates or transfers with assist devices or assistance (06/30/20 0716)  Mobility Interventions: Patient to call before getting OOB;Utilize walker, cane, or other assistive device (06/30/20 0716)  Mentation: Alert, oriented x 3 (06/30/20 0716)  Medication: Patient receiving anticonvulsants, sedatives(tranquilizers), psychotropics or hypnotics, hypoglycemics, narcotics, sleep aids, antihypertensives, laxatives, or diuretics (06/30/20 3792)  Medication Interventions: Patient to call before getting OOB (06/30/20 8234)  Elimination: Needs assistance with toileting (06/30/20 0716)  Elimination Interventions: Call light in reach; Patient to call for help with toileting needs; Toileting schedule/hourly rounds (06/30/20 0716)  Prior Fall History: No (06/30/20 0716)  History of Falls Interventions: Door open when patient unattended (06/21/20 0911)  Total Score: 3 (06/30/20 0716)  High Fall Risk: Yes (06/30/20 0716)     Speech Assessment:         Ambulation:  Gait  Distance (ft): 150 Feet (ft) (06/30/20 1200)  Assistive Device: Gait belt;Walker, rolling (06/30/20 1200)  Rail Use: Both (06/30/20 1200)     Visit Vitals  /76   Pulse 67   Temp 98.3 °F (36.8 °C)   Resp 18   Ht 5' 9\" (1.753 m)   Wt 271 lb 12.8 oz (123.3 kg)   SpO2 93%   BMI 40.14 kg/m²      Intake and Output:    06/28 1901 - 06/30 0700  In: 480 [P.O.:480]  Out: 1500 [Urine:1500]    Discharge Exam:  General: Alert and age appropriately oriented. No acute cardiorespiratory distress. HEENT: Normocephalic, no scleral icterus. Oral mucosa moist without cyanosis. Lungs: Clear to auscultation bilaterally. Respiration even and unlabored. Heart: Regular rate and rhythm, S1, S2. No murmurs, clicks, rub or gallops. Abdomen: Soft, non-tender, not distended. Bowel sounds normoactive. No organomegaly. Genitourinary: Deferred. Neuromuscular:        No gross focal motor deficits noted. Dec attn/concentration, but conversation appropriate  No nystagmus or field cut  Mild right ptosis, improved   Skin/extremity: No rashes, no erythema. No calf tenderness B LE.   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  ICD-9-CM: 434.91  6/14/2020 - Present        C[facility] ([facility] ICD-10-CM: I63.9  ICD-9-CM: 434.91  6/14/2020 - Present        Urinary retention ICD-10-CM: R33.9  ICD-9-CM: 788.20  6/8/2020 - Present        Stroke (cerebrum) (HCC) ICD-10-CM: I63.9  ICD-9-CM: 434.91 6/8/2020 - Present        Severe obesity ([facility] ICD-10-CM: E66.01  ICD-9-CM: 278.01  7/2/2019 - Present        Right thalamic infarction [facility]) ICD-10-CM: I63.9  ICD-9-CM: 434.91  7/2/2019 - Present        Numbness and tingling in left hand ICD-10-CM: R20.0, R20.2  ICD-9-CM: 782.0  7/2/2019 - Present        Controlled type 2 diabetes mellitus without complication, without long-term current use of insulin (HCC) ICD-10-CM: E11.9  ICD-9-CM: 250.00  2/22/2019 - Present        Cerebrovascular accident (CVA) due to occlusion [facility]) ICD-10-CM: I63.9  ICD-9-CM: 434.91  2/12/2019 - Present        Essential hypertension, benign ICD-10-CM: I10  ICD-9-CM: 401.1  1/3/2014 - Present        Mixed hyperlipidemia ICD-10-CM: E78.2  ICD-9-CM: 272.2  1/3/2014 - Present              Discharge Instructions:   1. Diet: Diabetic Diet  2. Activity: PT/OT per Home Health  3. Wound Care: None needed  Current Discharge Medication List      START taking these medications    Details   glipiZIDE (GLUCOTROL) 5 mg tablet Take 1 Tab by mouth Daily (before breakfast). Qty: 30 Tab, Refills: 2      lisinopriL (PRINIVIL, ZESTRIL) 5 mg tablet Take 1 Tab by mouth daily. Qty: 30 Tab, Refills: 2      !! meclizine (ANTIVERT) 25 mg tablet Take 1 Tab by mouth every six (6) hours as needed for Dizziness for up to 30 days. Qty: 40 Tab, Refills: 2      !! meclizine (ANTIVERT) 25 mg tablet Take 1 Tab by mouth three (3) times daily for 10 days. Qty: 90 Tab, Refills: 3      ondansetron (ZOFRAN ODT) 4 mg disintegrating tablet Take 1 Tab by mouth every six (6) hours as needed for Nausea or Vomiting. Qty: 60 Tab, Refills: 1      scopolamine (TRANSDERM-SCOP) 1 mg over 3 days pt3d 1 Patch by TransDERmal route every seventy-two (72) hours. Qty: 10 Patch, Refills: 1       !! - Potential duplicate medications found. Please discuss with provider.       CONTINUE these medications which have CHANGED    Details   aspirin 81 mg chewable tablet Take 1 Tab by mouth daily for 30 days.  Qty: 90 Tab, Refills: 5      clopidogreL (PLAVIX) 75 mg tab Take 1 Tab by mouth daily for 30 days. Qty: 30 Tab, Refills: 5      doxazosin (CARDURA) 8 mg tablet Take 1 Tab by mouth two (2) times a day. Qty: 180 Tab, Refills: 1    Associated Diagnoses: Essential hypertension, benign      loratadine (Claritin) 10 mg tablet Take 1 Tab by mouth daily. Qty: 90 Tab, Refills: 4      atorvastatin (LIPITOR) 80 mg tablet Take 1 Tab by mouth nightly. Qty: 30 Tab, Refills: 3         CONTINUE these medications which have NOT CHANGED    Details   insulin lispro (HUMALOG) 100 unit/mL injection INITIATE INSULIN CORRECTIVE PROTOCOL:  Normal Insulin Sensitivity   For Blood Sugar (mg/dL) of:     Less than 150 =   0 units           150 -199 =   2 units  200 -249 =   4 units  250 -299 =   6 units  300 -349 =   8 units  350 and above = 10 units and Call Physician     Initiate Hypoglycemia protocol if blood glucose is <70 mg/dL Fast Acting - Administer Immediately - or within 15 minutes of start of meal, if mealtime coverage. Qty: 1 Vial, Refills: 0         STOP taking these medications       amLODIPine (NORVASC) 5 mg tablet Comments:   Reason for Stopping:         mupirocin (BACTROBAN) 2 % ointment Comments:   Reason for Stopping:               Rehabilitation Management:   1. Devices: Walkers, Type: Rolling Walker  2. Consult:PT/OT/ST/Urology    Disposition: home with [facility] PT/OT and 24hr supervision    Follow-up ; Dr Derian Matt at [facility] at 2:10pm 7/7/2020  -f/u with Dr Tala Machuca of Urology, [facility], Judah Ambriz PA-C for Dr Seema Abbott in 4 weeks.      >30min  Lilian Mack MD

## 2020-06-30 NOTE — PROGRESS NOTES
OT DISCHARGE REPORT    Time In: 0745  Time Out: 0829  Mobility   Usual Performance Score Comments   Rolling 6: Independent Side: Bilateral     Supine to Sit 4: Supervision or touching A supervision   Sit to Supine 6: Independent    Sit to Stand 4: Supervision or touching A SBA   Transfer Assist 4: Supervision or touching A Transfer Type: SPT   Equipment: Rolling Walker   Comments: CGA     Activities of Daily Living    Usual Performance Score Comments   Eating 6: Independent    Oral Hyigene 5: S/U or clean-up assist    Bathing 5: S/U or clean-up assist Type of Shower: Shower  Position: Supported sitting and Standing PRN   Adaptive  Equipment: Tub Transfer Bench, Grab Bars and Long Handled Sponge  Comments: SBA in stance   Upper Body  Dressing 5: S/U or clean-up assist Items Applied: Pullover  Position: Supported Sitting  Comments:    Lower Body Dressing 4: Supervision or touching A Items Applied: Underwear and Elastic pants  Position: Supported sitting and Standing PRN  Adaptive Equipment: Reacher and RW  Comments: SBA in stance   Donning/[place] Footwear 5: S/U or clean-up assist Items Applied: Socks  Adaptive Equipment: Sock Aide  Comments: Toilet Transfer 4: Supervision or touching A Transfer Type: SPT   Equipment: Rolling Walker   Comments: CGA   Education  Regarding handouts of shower chair and grab bars     Plan of Care: Please see Care Plan for progress towards goals during stay  Precautions at Discharge: Falls  Discharge Location: home with spouse  Safety/Supervision Recommendations/Functional Level: SBA with basic ADLs, cues for threading cochran catheter (occasionally). Pt requires min assist to supervision for medication management and financial management. Pt requires mod assist with household tasks with supervision for safety due to vertigo symptoms affecting balance and mobility. Pt should not drive due to vertigo and visual impairments. Family Training: completed 6/29/20 with patient's wife present. See daily note from 6/29/20 regarding shower chair and grab bar recommendations. Recommended Continuing Therapy: Home Health OT  Residual Deficits: vertigo, decreased balance, occasional blurred vision, decreased activity tolerance, decreased functional mobility impacting safety  Progress over LOS: Patient demonstrates good progress with BUE strengthening, ADL AE training, [address], and static standing balance for performance of ADL and functional transfers, see above for details. Patient continues to require skilled [address] [place] [address] services to address deficits stated above. Summary of Session:   S: \"I think I've done really well this morning. \" Agreeable to therapy. Focus of session was on morning ADL routine and discharge assessment. Patient was able to ambulate ~10 feet using a RW with CGA. Completed ADLs with quality indicators noted above. Pain not expressed. Collaborated with PT and confirmed patient is ready for discharge. Patient ended session in wc with PT assuming care.         Antonio Major, OTR/L   6/30/2020

## 2020-06-30 NOTE — PROGRESS NOTES
Michael Hanson MD  Medical Director  [address] 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 Vss. No acute complaints. PT, OT well tolerated. Steady gains made. No new barrier to progress noted. Excited about dc home tomorrow.  Feels more confident  -  Objective:     Current Facility-Administered Medications   Medication Dose Route Frequency    lisinopriL (PRINIVIL, ZESTRIL) tablet 5 mg  5 mg Oral DAILY    insulin lispro (HUMALOG) injection   SubCUTAneous ACB&D    amLODIPine (NORVASC) tablet 10 mg  10 mg Oral DAILY    meclizine (ANTIVERT) tablet 25 mg  25 mg Oral TID    cloNIDine HCL (CATAPRES) tablet 0.1 mg  0.1 mg Oral Q6H PRN    scopolamine (TRANSDERM-SCOP) 1 mg over 3 days 1 Patch  1 Patch TransDERmal Q72H    glipiZIDE (GLUCOTROL) tablet 5 mg  5 mg Oral ACB    hydrocortisone (CORTAID) 1 % cream   Topical BID PRN    lip protectant (BLISTEX) ointment 1 Each  1 Each Topical PRN    senna-docusate (PERICOLACE) 8.6-50 mg per tablet 1 Tab  1 Tab Oral DAILY    ondansetron (ZOFRAN ODT) tablet 4 mg  4 mg Oral Q6H PRN    meclizine (ANTIVERT) tablet 25 mg  25 mg Oral Q6H PRN    acetaminophen (TYLENOL) tablet 650 mg  650 mg Oral Q6H PRN    aspirin chewable tablet 81 mg  81 mg Oral DAILY    atorvastatin (LIPITOR) tablet 80 mg  80 mg Oral QHS    clopidogreL (PLAVIX) tablet 75 mg  75 mg Oral DAILY    doxazosin (CARDURA) tablet 8 mg  8 mg Oral BID    loratadine (CLARITIN) tablet 10 mg  10 mg Oral DAILY    sodium chloride (OCEAN) 0.65 % nasal squeeze bottle 2 Spray  2 Spray Both Nostrils Q2H PRN    pneumococcal 23-valent (PNEUMOVAX 23) injection 0.5 mL  0.5 mL IntraMUSCular PRIOR TO DISCHARGE    polyethylene glycol (MIRALAX) packet 17 g  17 g Oral DAILY    bisacodyL (DULCOLAX) tablet 5 mg  5 mg Oral DAILY PRN    Or    bisacodyL (DULCOLAX) suppository 10 mg  10 mg Rectal DAILY PRN    Or    magnesium hydroxide (MILK OF MAGNESIA) 400 mg/5 mL oral suspension 30 mL  30 mL Oral DAILY PRN       Review of Systems:   Denies chest pain, shortness of breath, cough, headache, visual problems, abdominal pain, dysuria, calf pain. Pertinent positives are as noted in the HPI, ROS unremarkable otherwise.   = only a bit of dizziness during therapies yesterday, no vertigo or nausea  Visit Vitals  /76   Pulse 67   Temp 98.3 °F (36.8 °C)   Resp 18   Ht 5' 9\" (1.753 m)   Wt 271 lb 12.8 oz (123.3 kg)   SpO2 93%   BMI 40.14 kg/m²        Physical Exam:   General: Alert and age appropriately oriented. No acute cardiorespiratory distress. HEENT: Normocephalic, no scleral icterus. Oral mucosa moist without cyanosis. Lungs: Clear to auscultation bilaterally. Respiration even and unlabored. Heart: Regular rate and rhythm, S1, S2. No murmurs, clicks, rub or gallops. Abdomen: Soft, non-tender, not distended. Bowel sounds normoactive. No organomegaly. Genitourinary: Deferred. Neuromuscular:      No gross focal motor deficits noted. Dec attn/concentration, but conversation appropriate  No nystagmus or field cus   Skin/extremity: No rashes, no erythema. No calf tenderness B LE.   No edema                                                                              Functional Assessment:  Gross Assessment  AROM: Within functional limits (06/20/20 1400)  PROM: Within functional limits (06/16/20 1100)  Strength: Generally decreased, functional (06/16/20 1100)  Coordination: Generally decreased, functional (06/16/20 1100)       Balance  Sitting - Static: Good (unsupported) (06/30/20 1000)  Sitting - Dynamic: Good (unsupported) (06/30/20 1000)  Standing - Static: Fair (06/30/20 1000)  Standing - Dynamic : Impaired (06/30/20 1000)                     Delores Nichols Fall Risk Assessment:  Delores Nichols Fall Risk  Mobility: Ambulates or transfers with assist devices or assistance (06/30/20 0716)  Mobility Interventions: Patient to call before getting OOB;Utilize walker, cane, or other assistive device (06/30/20 0716)  Mentation: Alert, oriented x 3 (06/30/20 0716)  Medication: Patient receiving anticonvulsants, sedatives(tranquilizers), psychotropics or hypnotics, hypoglycemics, narcotics, sleep aids, antihypertensives, laxatives, or diuretics (06/30/20 0716)  Medication Interventions: Patient to call before getting OOB (06/30/20 5362)  Elimination: Needs assistance with toileting (06/30/20 0716)  Elimination Interventions: Call light in reach; Patient to call for help with toileting needs; Toileting schedule/hourly rounds (06/30/20 0716)  Prior Fall History: No (06/30/20 0716)  History of Falls Interventions: Door open when patient unattended (06/21/20 0911)  Total Score: 3 (06/30/20 0716)  High Fall Risk: Yes (06/30/20 0716)     Speech Assessment:         Ambulation:  Gait  Distance (ft): 125 Feet (ft)(x2) (06/30/20 1000)  Assistive Device: Gait belt;Walker, rolling (06/30/20 1000)  Rail Use: Left  (06/29/20 1200)     Labs/Studies:  Recent Results (from the past 72 hour(s))   GLUCOSE, POC    Collection Time: 06/27/20 11:26 AM   Result Value Ref Range    Glucose (POC) 95 65 - 100 mg/dL   GLUCOSE, POC    Collection Time: 06/27/20  4:21 PM   Result Value Ref Range    Glucose (POC) 137 (H) 65 - 100 mg/dL   GLUCOSE, POC    Collection Time: 06/28/20  7:24 AM   Result Value Ref Range    Glucose (POC) 109 (H) 65 - 100 mg/dL   GLUCOSE, POC    Collection Time: 06/28/20  5:11 PM   Result Value Ref Range    Glucose (POC) 79 65 - 100 mg/dL   CBC W/O DIFF    Collection Time: 06/29/20  6:39 AM   Result Value Ref Range    WBC 6.2 4.3 - 11.1 K/uL    RBC 4.23 4.23 - 5.6 M/uL HGB 12.7 (L) 13.6 - 17.2 g/dL    HCT 39.2 (L) 41.1 - 50.3 %    MCV 92.7 79.6 - 97.8 FL    MCH 30.0 26.1 - 32.9 PG    MCHC 32.4 31.4 - 35.0 g/dL    RDW 14.1 11.9 - 14.6 %    PLATELET 218 275 - 831 K/uL    MPV 10.7 9.4 - 12.3 FL    ABSOLUTE NRBC 0.00 0.0 - 0.2 K/uL   METABOLIC PANEL, BASIC    Collection Time: 06/29/20  6:39 AM   Result Value Ref Range    Sodium 145 136 - 145 mmol/L    Potassium 3.9 3.5 - 5.1 mmol/L    Chloride 111 (H) 98 - 107 mmol/L    CO2 27 21 - 32 mmol/L    Anion gap 7 7 - 16 mmol/L    Glucose 100 65 - 100 mg/dL    BUN 19 8 - 23 MG/DL    Creatinine 0.92 0.8 - 1.5 MG/DL    GFR est AA >60 >60 ml/min/1.73m2    GFR est non-AA >60 >60 ml/min/1.73m2    Calcium 8.0 (L) 8.3 - 10.4 MG/DL   GLUCOSE, POC    Collection Time: 06/29/20  4:02 PM   Result Value Ref Range    Glucose (POC) 147 (H) 65 - 100 mg/dL   GLUCOSE, POC    Collection Time: 06/29/20  9:56 PM   Result Value Ref Range    Glucose (POC) 136 (H) 65 - 100 mg/dL   GLUCOSE, POC    Collection Time: 06/30/20  6:42 AM   Result Value Ref Range    Glucose (POC) 102 (H) 65 - 100 mg/dL       Assessment:     Problem List as of 6/30/2020 Date Reviewed: 5/1/2020          Codes Class Noted - Resolved    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  ICD-9-CM: 434.91  6/14/2020 - Present        Cerebellar stroke (HCC) ICD-10-CM: I63.9  ICD-9-CM: 434.91  6/14/2020 - Present        Urinary retention ICD-10-CM: R33.9  ICD-9-CM: 788.20  6/8/2020 - Present        Stroke (cerebrum) (HCC) ICD-10-CM: I63.9  ICD-9-CM: 434.91  6/8/2020 - Present        Severe obesity (HCC) ICD-10-CM: E66.01  ICD-9-CM: 278.01  7/2/2019 - Present        Right thalamic infarction [facility]) ICD-10-CM: I63.9  ICD-9-CM: 434.91  7/2/2019 - Present        Numbness and tingling in left hand ICD-10-CM: R20.0, R20.2  ICD-9-CM: 782.0  7/2/2019 - Present        Controlled type 2 diabetes mellitus without complication, without long-term current use of insulin (HCC) ICD-10-CM: E11.9  ICD-9-CM: 250.00 2/22/2019 - Present        Cerebrovascular accident (CVA) due to occlusion [facility]) ICD-10-CM: I63.9  ICD-9-CM: 434.91  2/12/2019 - Present        Essential hypertension, benign ICD-10-CM: I10  ICD-9-CM: 401.1  1/3/2014 - Present        Mixed hyperlipidemia ICD-10-CM: E78.2  ICD-9-CM: 272.2  1/3/2014 - Present                Dorsal Medullary syndrome/Cerebellar stroke  Plan / Recommendations / Medical Decision Making:      Continue daily physician / PA medical management:     New ischemic stroke, right cerebellar hemisphere, right dorsolateral medulla; secondary stroke prevention - DAPT, high-intensity statin (Lipitor), BP management.  -dizziness, nausea, intermittent diplopia; prn meclizine  -6/19 scopolamine patch applied  -6/22 added meclizine 6/19, remains dizzy when OOB. not orthostatic  -6/23 vertigo remains a significant issue despite meclizine and scopolomine; could try phenergan or valium but sedation main issue of concern. Will adjust meclizine dosing. Will check bmp to f/u on fluid status. Push po fluids (uop is good)  -6/24 did better with scheduled Meclizine but need to adjust pharmacies dose times so that he gets it before am. Therapies; 6/25 improved with scheduled meclizine and scopolomine patch; continue same. Cont vestibular therapies  -6/29 marked improvement in gait , postural support. No dizziness reported.      Hypertension / hypotension - patient on doxazosin 8mg twice daily from PCP, now additionally on amlodipine 5mg. /63, P 75 on admission here; patient gives pretty clear history of brief orthostatic hypotension symptoms when sitting from supine prior to meals.  Staff to perform OH maneuvers, may hold amlodipine.  -6/17 bp 142/69; no orthostatics; cont norvasc and cardura  -6/18 bp 152/89 (129-165), no reported orthostatics; cont current meds  -6/21 BP - 158/75, continues to fluctuate  -6/22 122//71 fluctuates making med mgt difficult  -6/23 147/69 ; 6/24 160/73, range 132-160/54-73; no orthostatics documented; consider increasing Norvasc to 7.5 mg; goal SBP<  140  -6/25 bp had inc Norvasc, SBP in the 160s, no orthostatics. Inc Norvasc to 10 mg, monitor for [facility]  -6/26 bp 135/71 this a.m. Improved with increased norvasc  -6/27 erroneous bp this a.m, 135/68 on recheck. Cont current meds  -6/29 162/80 this a.m. ranges 140-170; goal <140; add zestril 5mg daily. Cont norvasc 10 mg daily. Cont Cardura 8mg bid     Diabetes mellitus - uncontrolled / poor glycemic control, HgbA1c 7.8% (6/9/2020). Will require daily, close FSG monitoring and medication adjustment to optimize glycemic control in setting of acute illness and hospitalization. POC blood glucose around 130s-150s. Currently on Humalog SSI; will monitor and manage.  -6/15 fair control at this time. Consider low dose antihyperglycemic  -6/16 bs 147 this a.m., 167 at bedtime, 182 at supper, 152 at lunch; start glucotrol 2.5 mg  -6/17 124 this a.m; 188 at bedtime, 135 with supper, 133 with lunch; cont Glucotrol 2.5 mg, increase to 5mg if bs not consistently below 120-130  -6/18 bs 120 this a.m, 135 last pm, 200 at supper, 93 at lunch, still not consistent  -6/19 -174, increase glipizide to 5mg  - 6/21 BG-111, BG levels < 130   -6/24 bs remain acceptable/ stable on Glipizide 5mg  -6/30 continual excellent control with Glipizide and diet     Urinary retention - cochran catheter in place since ED 6/8; Urologist suspects urinary retention part of stroke syndrome. Catheter remains until 6/16, at which time it will be removed for voiding trial.  -pt on Cardura 8mg. Check UA; lg blood, lg leuk; pt didn't have micro done. Asymptomatic  Cochran discontinued this a.m; await results  -6/17 cochran replaced due to hematuria and inability to void. Small amt of hematuria after one CIC; when cochran replaced; zainab blood, continues this a.m. lg amt of blood. Check CBC, irrigated without improvement, Urology Consulted. ? Continuous irrigation/3-way cochran.  ?cysto; hold plavix and Lovenox. No hx of retention or hematuria.; +>100K gram neg rods s/p one dose Rocephing 1g IV, on septra, day 2; await ID/sens.   -E coli UTI; sensitive to Septra, day 3/7. CIB per Urology, appreciate assistance. Hgb down one gram but stable 12.1; urine is getting pinker; no clots noted in bag. Continue  -6/19 Urologist D/Cing cochran irrigation; will ask NP to address Stat-lock / tethering issue. - 6/21 unsuccessful voiding trial yesterday/today with hematuria returning after catheterization, cochran reinserted, continue to irrigate, cochran functioning well  -6/22 cochran reinserted due to inability to void and inc hematuria. Pt is ok with this and understands reasoning. States wife upset with this, will d/w her. Likely to go with cochran; 6/23 less hematuria, few clots, urine looking better  -6/23 abx have finished. Dec hematuria; at some point will need to restart Plavix  -6/24 if urine stays clear , will restart Plavix tomorrow. Cont to hold Lovenox; cont cochran  -6/25 restart plavix and monitor. Will not resume Lovenox. Mobilizing well. Will need cysto as outpt per my d/w urology. Would not be able to self cath due to body habitus. Cont cardura 8mg bid  -6/26 no hematuria; cochran with yellow urine; monitor back on Plavix  -6/29 no further hematuria. Will go home with cochran and f/u with Dr Debo Garcia. Neither pt nor wife feel comfortable learning to perform CIC. Pt, himself, would not be able to due to body habitus     Hyperlipidemia - on high-intensity statin.     Electrolyte abnormality - previous hypoK+, current BMP normal; monitor.  -6/26 recheck labs 6/29; K 3.9 good     Pain control - stable, mild-to-moderate joint symptoms intermittently, reasonably well controlled by PRN meds. Will require regular pain assessment and comprenhensive pain management.     Bowel program - at risk for constipation due to lack of mobility. MiraLAX daily for regularity, Meghan-Colace for stool softener prn.  PRN MOM, bisacodyl suppository or tablets for constipation.  -6/15 normally has bm every other day. Will add daily senna.   -6/16 good results  -no complaints     Depression - no previous history; at risk of post-stroke depression. Monitor.     Pneumonia prophylaxis - incentive spirometer every hour while awake.     Newly diagnosed NELL; pt sets alarm off if not supine. If he rolls side to side , he desats. CXR shows atelectasis; enc IS  -6/16 not tolerating CPAP; O2 NC at [facility] at 1L, sats 91-98%  -6/17 weaned off O2 during day yesterday. Will get nocturnal pulse ox study closer to NE. Reports no hx of NELL , wife reported no snoring but breaths \"heavy\"  -6/19 required O2 during PT; 6/22 reports no O2 with therapy but using at noc  -6/24 has weaned off of O2, monitoring sats. Denies any sob  -6/25 remains off O2, no sob. sats > 92%; not using our CPAP at this time  -6/29 no O2 needs. Will not need O2 at dc but will get nocturnal pulse ox tonight  -results pending     DVT risk / DVT prophylaxis - will require daily physician / PA exam to assess for signs and symptoms as patient is at increased risk for of thromboembolism. Mobilize as tolerated. Sequential pneumatic compression devices (SCDs) when in bed; thigh-high or knee-high thromboembolic deterrent hose when out of bed. On DAPT; Lovenox subcutaneously daily.   -6/17 hold Lovenox due to zainab hematuria; 6/22 cont to hold  -6/27 urine yellow.      Wound care - monitor general skin wound status daily per staff and physician / PA. At risk for failure. Will require 24/7 rehab nursing. No current wounds.     GERD - at times may need antacids, Maalox prn.       Time spent was 25 minutes with over 1/2 in direct patient care/examination, consultation and coordination of care.      Signed By: Rosibel Padron MD     June 30, 2020

## 2020-06-30 NOTE — PROGRESS NOTES
OT Daily Note  Time In 1031   Time Out 1115     Subjective: \"I felt safe doing that and you know what, stepping over the tub was easier than I thought it would be. \"  Pain: not expressed  Education: tub/shower combo transfer training  Interdisciplinary Communication: with PT during handoff and regarding tub/shower transfer  Precautions: fall risk  Patient Vitals for the past 12 hrs:   Temp Pulse Resp BP SpO2   06/30/20 0714 98.3 °F (36.8 °C) 67 18 158/76 93 %       Mobility   Score Comments   Sit to Stand 4: Supervision or touching A SBA   Transfer Assist 4: Supervision or touching A Transfer Type: SPT   Equipment: Rolling Walker   Comments: CGA     Self-Care Daily Assessment   Pt completed tub/shower combo transfer in training bathroom while wearing gait belt and using RW with CGA. Pt completed 2 transfers with CGA with no LOB and appropriate sequencing/following directions. Pt utilized grab bars and verbalized that daughter and son-in-law will be installing grab bars upon return home. Pt with increased nausea and dizziness upon second transfer. Provided emesis bag but vertigo episode subsided without incident. Reaching  Daily Assessment   Patient performed ring ladder reaching activity using different vertical heights and small rings with BUEs, working on shoulder stabilization for overhead reaching and  strengthening. Completed task in seated  position. Patient used green medium resistance clothespin in B hands to move x 10 plastic rings up and down vertical ladder. Pt completed 5 vertical level heights to promote shoulder flexion and abduction and 5 diagonal patterns to promote crossing midline. No rest breaks throughout activity. Visual-Perceptual Daily Assessment   Pt completed top row of 30 pc puzzle working on visual perceptual skills. Pt with 2 errors noted. Pt completed top row until time ended for session and transitioned to next discipline.        Assessment: Pt with vertigo episode following tub/shower transfer, however, resolved with seated rest break. RN provided meds to resolve dizziness. Plan: Continue OT POC with focus on ADL/IADL skills, functional transfers, functional mobility, coordination, strength, static and dynamic balance, and activity tolerance to maximize safety and independence with ADLs and functional transfers.      Antonio Mcdermott, OTR/L  6/30/2020

## 2020-06-30 NOTE — PROGRESS NOTES
06/30/20 1109   Time Spent With Patient   Time In 1006   Time Out 1032   Patient Seen For: AM   Mental Status   Neurologic State Alert   Orientation Level Oriented X4   Cognition Memory loss;Decreased attention/concentration   Safety/Judgement Home safety        06/30/20 1110   Attention Exercises Performed   Accuracy (%) 80 %   Neuro-Linguistic Exercises   Verbal Problem Solving Impaired   Memory Impaired   Attention  Impaired       Patient participated with cognitive therapy. 80% accuracy with functional reading comprehension tasks. 80% accuracy with organizational activity related to using a calendar with decreased immediate attention/recall of details to include on the calendar and repetitions required. Once reviewed with ST patient recalled 3/4 items with a 10 minute delay. Discussed requesting repetition of information in real life scenarios and pt re-stating the information back to confirm he's correctly processed. Recommend assistance with higher level ADLs at discharge and continued ST to address cognitive function since patient was independent prior to CVA.     Milly Black MS, CCC-SLP

## 2020-07-01 VITALS
WEIGHT: 271.8 LBS | RESPIRATION RATE: 16 BRPM | OXYGEN SATURATION: 94 % | DIASTOLIC BLOOD PRESSURE: 72 MMHG | HEIGHT: 69 IN | SYSTOLIC BLOOD PRESSURE: 141 MMHG | BODY MASS INDEX: 40.26 KG/M2 | TEMPERATURE: 98.1 F | HEART RATE: 70 BPM

## 2020-07-01 LAB
GLUCOSE BLD STRIP.AUTO-MCNC: 101 MG/DL (ref 65–100)
GLUCOSE BLD STRIP.AUTO-MCNC: 113 MG/DL (ref 65–100)

## 2020-07-01 PROCEDURE — 97530 THERAPEUTIC ACTIVITIES: CPT

## 2020-07-01 PROCEDURE — 74011250636 HC RX REV CODE- 250/636: Performed by: PHYSICAL MEDICINE & REHABILITATION

## 2020-07-01 PROCEDURE — 74011250637 HC RX REV CODE- 250/637: Performed by: PHYSICIAN ASSISTANT

## 2020-07-01 PROCEDURE — 74011250637 HC RX REV CODE- 250/637: Performed by: PHYSICAL MEDICINE & REHABILITATION

## 2020-07-01 PROCEDURE — 97535 SELF CARE MNGMENT TRAINING: CPT

## 2020-07-01 PROCEDURE — 99239 HOSP IP/OBS DSCHRG MGMT >30: CPT | Performed by: PHYSICAL MEDICINE & REHABILITATION

## 2020-07-01 PROCEDURE — 97110 THERAPEUTIC EXERCISES: CPT

## 2020-07-01 PROCEDURE — 97116 GAIT TRAINING THERAPY: CPT

## 2020-07-01 RX ADMIN — DOXAZOSIN MESYLATE 8 MG: 4 TABLET ORAL at 08:20

## 2020-07-01 RX ADMIN — SENNOSIDES AND DOCUSATE SODIUM 1 TABLET: 8.6; 5 TABLET ORAL at 08:20

## 2020-07-01 RX ADMIN — MECLIZINE HYDROCHLORIDE 25 MG: 25 TABLET ORAL at 06:36

## 2020-07-01 RX ADMIN — CLOPIDOGREL BISULFATE 75 MG: 75 TABLET ORAL at 08:19

## 2020-07-01 RX ADMIN — AMLODIPINE BESYLATE 10 MG: 10 TABLET ORAL at 08:19

## 2020-07-01 RX ADMIN — LISINOPRIL 5 MG: 5 TABLET ORAL at 08:20

## 2020-07-01 RX ADMIN — GLIPIZIDE 5 MG: 5 TABLET ORAL at 08:20

## 2020-07-01 RX ADMIN — MECLIZINE HYDROCHLORIDE 25 MG: 25 TABLET ORAL at 11:15

## 2020-07-01 RX ADMIN — ASPIRIN 81 MG 81 MG: 81 TABLET ORAL at 08:20

## 2020-07-01 RX ADMIN — LORATADINE 10 MG: 10 TABLET ORAL at 08:19

## 2020-07-01 NOTE — PROGRESS NOTES
All discharge paperwork including follow up appointments, medications, and cochran catheter care reviewed with pt and pt's spouse. Pt verbalized understanding of all discharge instructions. Pt to be discharged with cochran catheter. Pt and pt's spouse understand to call urology appointment. Pt discharged to private vehicle with spouse.

## 2020-07-01 NOTE — PROGRESS NOTES
PHYSICAL THERAPY DAILY NOTE  Time In: 0830  Time Out: 9650  Patient Seen For: AM;Gait training;Patient education; Therapeutic exercise;Transfer training    Subjective: Pt reports he's doing well this morning. Says he's still been getting a little dizzy. Objective: Other (comment)(fall risk)    COGNITION Daily Assessment    Pt is alert and oriented and following commands appropriately 80% of the time. Pt communicates verbally. Pt has a pleasant affect. Pt is willing to participate in therapy. Pt demonstrates decreased safety awareness with transfers and gait and requires min cueing for safety. BED/MAT MOBILITY Daily Assessment    Rolling Right : 6 (Modified independent)  Rolling Left : 6 (Modified independent)  Supine to Sit : 5 (Supervision)  Sit to Supine : 5 (Supervision)       TRANSFERS Daily Assessment   Increased time and effort to complete SPT    Attempts to sit down after ambulating without assurance of something behind him to sit in Transfer Type: SPT with walker  Transfer Assistance : 4 (Contact guard assistance)  Sit to Stand Assistance: Contact guard assistance  Car Transfers: Not tested       GAIT Daily Assessment   Ambulates quickly around corners and tight spaces 250 ft x 1; 100 ft x 1    \"I get really dizzy. I think I do better in the afternoon. \"   Amount of Assistance: 5 (Stand-by assistance)  Distance (ft): 200 Feet (ft)  Assistive Device: Gait belt;Walker, rolling       STEPS or STAIRS Daily Assessment    Steps/Stairs Ambulated (#): 0  Level of Assist : 0 (Not tested)       BALANCE Daily Assessment    Sitting - Static: Good (unsupported)  Sitting - Dynamic: Good (unsupported)  Standing - Static: Fair  Standing - Dynamic : Impaired       WHEELCHAIR MOBILITY Daily Assessment    Able to Propel (ft): 0 feet  Curbs/Ramps Assist Required (FIM Score): 0 (Not tested)  Wheelchair Setup Assist Required : 0 (Not tested)  Wheelchair Management: Manages left brake;Manages right brake Therapeutic Interventions:  NuStep level 2 x 12 min  Heel raises 2 x 10 B  Toe raises 2 x 15 B  Standing marches 2 x 20 B  Hip ABD 2 x 10 B  HS curls 2 x 15 B  Squats 2 x 10   Gait training  MotoMed level 2 x 10 min    /72   Pulse 70   Temp 98.1 °F (36.7 °C)   Resp 16   Ht 5' 9\" (1.753 m)   Wt 123.3 kg (271 lb 12.8 oz)   SpO2 94%   BMI 40.14 kg/m²      Pain level: no complaints  Pain location: N/A  Pain interventions: N/A    Patient education: Pt educated on how to increase safety awareness during transfers and gait. Educated on how to perform exercises of HEP handout. Interdisciplinary Communication: Collaborated with RN and PA regarding pt using a \"leg bag\" at home for catheter usage     Pt left in bedside chair with call bell and needs in reach. Pt in NAD. Assessment: Pt tolerated therapy well today. Pt verbalized and demonstrated understanding of his home exercise program handout. Pt intermittently reported feeling \"queasy\" and getting really dizzy during several of the exercises and gait training. Pt continues to demonstrate poor safety awareness with transfers and gait and PT recommends supervision assistance for functional mobility. Pt would benefit from skilled physical therapy to maximize independence, increase activity tolerance, and improve functional mobility following DC from [facility]. Plan of Care: Pt will be discharged from [facility] at this time.     Venice Bill PT, DPT  7/1/2020

## 2020-07-01 NOTE — PROGRESS NOTES
Problem: Falls - Risk of  Goal: *Absence of Falls  Description: Document Adonay Morrow Fall Risk and appropriate interventions in the flowsheet.   Outcome: Progressing Towards Goal  Note: Fall Risk Interventions:  Mobility Interventions: Utilize walker, cane, or other assistive device         Medication Interventions: Patient to call before getting OOB    Elimination Interventions: Call light in reach    History of Falls Interventions: Door open when patient unattended         Problem: Patient Education: Go to Patient Education Activity  Goal: Patient/Family Education  Outcome: Progressing Towards Goal

## 2020-07-01 NOTE — PROGRESS NOTES
Problem: Mobility Impaired (Adult and Pediatric)  Goal: *Therapy Goal (Edit Goal, Insert Text)  Description: LTG 1. Patient transfer supine<>sit with Mod I in 10 days. (6/22/2020 - Goal not met secondary to increased vertigo episodes, goal remains appropriate & will cont. W/ present goal). 6/29/2020 D/C Goal  6/29/2020- New goal:  Pt. To transfer supine <>sit w/ supervision in 1 week MET 07/01/2020   Outcome: Resolved/Met  Goal: *Therapy Goal (Edit Goal, Insert Text)  Description: LTG 2. Patient transfer sit<>stand and perform stand pivot transfer with roller walker and SBA in 10 days. (6/22/2020, 6/29/2020 - Goal not met secondary to increased vertigo episodes, goal remains appropriate & will cont. W/ present goal). MET 07/01/2020   Outcome: Resolved/Met  Goal: *Therapy Goal (Edit Goal, Insert Text)  Description: LTG 3. Patient ambulate 150 feet with roller walker and SBA assist in 10 days. (6/22/2020 - Goal not met secondary to increased vertigo episodes, goal remains appropriate & will cont. W/ present goal). 6/29/2020 D/C Goal  6/29/2020 - New goal:  Pt. To ambulate x150' w/ RW CGA in 1 week. MET 07/01/2020       Outcome: Resolved/Met  Goal: *Therapy Goal (Edit Goal, Insert Text)  Description: LTG 4. Patient ambulate up/down 3 steps using handrail with CGA assist in 10 days. (6/22/2020 - Goal not met secondary to increased vertigo episodes, goal remains appropriate & will cont. W/ present goal). 6/29/2020 MET      Outcome: Resolved/Met  Goal: *Therapy Goal (Edit Goal, Insert Text)  Description: LTG 5. Patient will be able to tolerate static/dynamic standing x10 min with fair+ balance in 10 days. (6/22/2020 - Goal not met secondary to increased vertigo episodes, goal remains appropriate & will cont. W/ present goal).  6/29/2020 MET      Outcome: Resolved/Met  Goal: Interventions  Outcome: Resolved/Met

## 2020-07-01 NOTE — DISCHARGE INSTRUCTIONS
DISCHARGE SUMMARY from Nurse    PATIENT INSTRUCTIONS:    After general anesthesia or intravenous sedation, for 24 hours or while taking prescription Narcotics:  · Limit your activities  · Do not drive and operate hazardous machinery  · Do not make important personal or business decisions  · Do  not drink alcoholic beverages  · If you have not urinated within 8 hours after discharge, please contact your surgeon on call. Report the following to your surgeon:  · Excessive pain, swelling, redness or odor of or around the surgical area  · Temperature over 100.5  · Nausea and vomiting lasting longer than 4 hours or if unable to take medications  · Any signs of decreased circulation or nerve impairment to extremity: change in color, persistent  numbness, tingling, coldness or increase pain  · Any questions    What to do at Home:    *  Please give a list of your current medications to your Primary Care Provider. *  Please update this list whenever your medications are discontinued, doses are      changed, or new medications (including over-the-counter products) are added. *  Please carry medication information at all times in case of emergency situations. These are general instructions for a healthy lifestyle:    No smoking/ No tobacco products/ Avoid exposure to second hand smoke  Surgeon General's Warning:  Quitting smoking now greatly reduces serious risk to your health. Obesity, smoking, and sedentary lifestyle greatly increases your risk for illness    A healthy diet, regular physical exercise & weight monitoring are important for maintaining a healthy lifestyle    You may be retaining fluid if you have a history of heart failure or if you experience any of the following symptoms:  Weight gain of 3 pounds or more overnight or 5 pounds in a week, increased swelling in our hands or feet or shortness of breath while lying flat in bed.   Please call your doctor as soon as you notice any of these symptoms; do not wait until your next office visit. The discharge information has been reviewed with the patient and spouse. The patient and spouse verbalized understanding. Discharge medications reviewed with the patient and spouse and appropriate educational materials and side effects teaching were provided. ___________________________________________________________________________________________________________________________________     Learning About How to Care for a Person's Indwelling Urinary Catheter  Introduction     A urinary catheter is a flexible plastic tube used to drain urine from the bladder when a person cannot urinate. A doctor will place the catheter into the bladder by inserting it through the urethra. The urethra is the opening that carries urine from the bladder to the outside of the body. When the catheter is in the bladder, a small balloon is used to keep the catheter in place. The catheter lets urine drain from the bladder into a collection bag. Urinary catheters can be used in both men and women. A catheter that stays in place for a longer period of time is called an indwelling catheter. A catheter may be needed because of certain medical conditions. These include an enlarged prostate or problems controlling urine. It may be used after surgery on the pelvis or urinary tract. Urinary catheters are also used when the lower part of the body is paralyzed. When helping a loved one with a catheter, try to be relaxed. Caring for a catheter can be embarrassing for both of you. If you are calm and don't seem embarrassed, the person may feel more comfortable. How do you take care of the catheter? Wear disposable gloves when handling someone's catheter. Make sure to follow all of the instructions the doctor has given. And always wash your hands before and after you're done.   Here are some other things to remember when caring for someone's catheter:  · Make sure that urine is running out of the catheter into the urine collection bag. And make sure that the catheter tubing does not get twisted or bent. · Keep the urine collection bag below the level of the bladder. At night it may be helpful to hang the bag on the side of the bed. · Make sure that the urine collection bag does not drag and pull on the catheter. · It is okay to shower with a catheter and urine collection bag in place, unless the doctor says not to. · Check for swelling or signs of infection in the area around the catheter. Signs of infection include pus or irritated, swollen, red, or tender skin. · Clean the area around the catheter twice a day with soap and water. Dry with a clean towel afterward. · Do not apply powder or lotion to the skin around the catheter. · Do not tug or pull on the catheter. · Sexual intercourse may still be possible for individuals who wear a catheter. It is best to talk with a doctor about options. How do you empty the bag? The urine collection bag needs to be emptied regularly. It is best to empty the bag when it's about half full or at bedtime. If the doctor has asked you to measure the amount of urine, do that before you empty the urine into the toilet. When you are ready to empty the bag, follow these steps:  1. Put on disposable gloves. 2. Remove the drain spout from its sleeve at the bottom of the collection bag. Open the valve on the spout. 3. Let the urine flow out of the bag and into the toilet or a container. Do not let the tubing or drain spout touch anything. 4. After you empty the bag, close the valve and put the drain spout back into its sleeve. 5. Remove your gloves and throw them away. 6. Wash your hands with soap and water. How do you care for someone after the catheter is removed? After the catheter is taken out, the person may have trouble urinating. If this happens, try helping them sit in a few inches of warm water (sitz bath).  If the urge to urinate comes during the sitz bath, it may be easier for them to urinate while still in the bath. Some burning may happen the first few times the person urinates. If the burning lasts longer, it may be a sign of an infection. If the catheter causes irritation or a rash, wearing loose, cotton underwear may help. Watch closely for changes in the person's health, and be sure to contact their doctor if you notice any problems. Where can you learn more? Go to http://jase-princess.info/  Enter X535 in the search box to learn more about \"Learning About How to Care for a Person's Indwelling Urinary Catheter. \"  Current as of: December 9, 2019               Content Version: 12.5  © 1702-7883 [employer]. Care instructions adapted under license by [employer] (which disclaims liability or warranty for this information). If you have questions about a medical condition or this instruction, always ask your healthcare professional. [place] [address] any warranty or liability for your use of this information.           -see MD if you develop bleeding noted again in your urine bag or if there is a problem with the cochran tube draining from the bladder  -seek medical attention if you develop worsening dizziness or vertigo, light headedness, balance difficulties or any new weakness, speech difficulties or swallowing difficulties. -monitor blood sugars at home before meals and at bedtime.  Keep a log for Dr Shyla Ambrose  -no driving

## 2020-07-01 NOTE — PROGRESS NOTES
Time In 0740   Time Out 0820     Mobility   Score Comments   Sit to Stand 4: Supervision or touching A SBA   Transfer Assist 4: Supervision or touching A Transfer Type: SPT   Equipment: Rolling Walker   Comments: SBA     Activities of Daily Living    Score Comments   Eating 6: Independent    Oral Hyigene 5: S/U or clean-up assist    Bathing 5: S/U or clean-up assist Type of Shower: Shower  Position: Standing PRN and Unsupported Sitting   Adaptive  Equipment: Tub Transfer Bench, Grab Bars and Long Handled Sponge  Comments:    Upper Body  Dressing 5: S/U or clean-up assist Items Applied: Pullover  Position: Supported Sitting  Comments:    Lower Body Dressing 4: Supervision or touching A Items Applied: Underwear and Elastic pants  Position: Supported sitting and Standing PRN  Adaptive Equipment: Reacher and RW  Comments: SBA in stance   Donning/K[place] Footwear 5: S/U or clean-up assist Items Applied: Slip-on shoes  Adaptive Equipment: N/A  Comments: Issued [address] for more difficult shoes to don   Education  [address] use       S: \"I feel really good this morning. \" Agreeable to therapy. Patient was able to ambulate ~10 feet using a RW with SBA. Pain not expressed. Patient Vitals for the past 12 hrs:   Temp Pulse Resp BP SpO2   07/01/20 0753 98.1 °F (36.7 °C) 70 16 141/72 94 %       Collaborated with PT regarding patient's discharge. Patient tolerated session well, but activity tolerance, balance, functional mobility, strength, coordination, cognition are still below baseline and require skilled facilitation to successfully and safely complete ADLs and transfers. Patient ended session in  with call remote and phone within reach.        Antonio Mcdermott OTR/LUPE  7/1/2020

## 2020-07-01 NOTE — DIABETES MGMT
Patient admitted with urinary retention, cerebellar stroke. Blood glucose ranged 102-141 yesterday with patient receiving Glipizide 5mg. Blood glucose this morning was 113. Noted patient discharge summary has Glipizide and Humalog SSI. Spoke with provider for clarification as patient A1c was 7.8 and has not need SSI in days, per provider patient is not to take insulin at home. Primary RN updated.

## 2020-07-01 NOTE — PROGRESS NOTES
CASE MANAGEMENT DISCHARGE NOTE      Discharge Destination:  Home with family assistance    Discharge Date:   7/1/2020    DME: patient already has a RW at home    [address]: [facility] PT/OT and RN    Out Patient Facility:  none    STR: none    Care Management Interventions  PCP Verified by CM: Yes(Dr. Alexa Salas, Virtual Visit in May)  Physical Therapy Consult: Yes  Occupational Therapy Consult: Yes  Current Support Network: Own Home, Lives Alone(patient and spouse live separately)  The Plan for Transition of Care is Related to the Following Treatment Goals : DC home with [facility] PT/OT and RN to regain strength and to return to baseline functioning.    The Patient and/or Patient Representative was Provided with a Choice of Provider and Agrees with the Discharge Plan?: Yes  Freedom of Choice List was Provided with Basic Dialogue that Supports the Patient's Individualized Plan of Care/Goals, Treatment Preferences and Shares the Quality Data Associated with the Providers?: Yes  [employer] Information Provided?: No(Medicare AARP)  Discharge Location  Discharge Placement: Home with home health

## 2020-07-02 ENCOUNTER — HOME CARE VISIT (OUTPATIENT)
Dept: SCHEDULING | Facility: HOME HEALTH | Age: 75
End: 2020-07-02
Payer: MEDICARE

## 2020-07-02 ENCOUNTER — PATIENT OUTREACH (OUTPATIENT)
Dept: CASE MANAGEMENT | Age: 75
End: 2020-07-02

## 2020-07-02 VITALS
TEMPERATURE: 97.7 F | HEART RATE: 64 BPM | OXYGEN SATURATION: 94 % | RESPIRATION RATE: 18 BRPM | SYSTOLIC BLOOD PRESSURE: 138 MMHG | DIASTOLIC BLOOD PRESSURE: 76 MMHG

## 2020-07-02 PROCEDURE — 3331090002 HH PPS REVENUE DEBIT

## 2020-07-02 PROCEDURE — 3331090001 HH PPS REVENUE CREDIT

## 2020-07-02 PROCEDURE — G0153 HHCP-SVS OF S/L PATH,EA 15MN: HCPCS

## 2020-07-02 PROCEDURE — G0299 HHS/HOSPICE OF RN EA 15 MIN: HCPCS

## 2020-07-02 PROCEDURE — 400013 HH SOC

## 2020-07-02 NOTE — PROGRESS NOTES
Transition of Care SNF Discharge Follow-Up      Date/Time:  2020 12:06 PM    Patient was admitted to [facility] on 20 and discharged on 20 for CVA. Contacted after discharge from IRF on 20. LPN Care Coordinator contacted the patient by telephone to perform post hospital discharge assessment. Verified name and  with patient as identifiers. Provided introduction to self, and explanation of the Care Coordinator role. Patient received discharge instructions. LPN reviewed discharge instructions and red flags with patient who verbalized understanding. Patient given an opportunity to ask questions and does not have any further questions or concerns at this time. The patient agrees to contact the PCP office for questions related to their healthcare. LPN provided contact information for future reference. Patients top risk factors for readmission:  comorbidities    Home Health orders at discharge: PT, OT, [facility]: [facility], [place]  Date of initial or scheduled visit: with patient now  (Assist with coordination of services if necessary.)    Durable Medical Equipment ordered at discharge: none  [address] received: n/a  (Assist patient in obtaining DME orders &/or equipment if necessary.)    Medication(s):   Medications at Discharge: medications in home, paitent states will  glucometer    Medication review was performed with patient, who verbalizes understanding of administration of home medications. There were no barriers to obtaining medications identified at this time. Current Outpatient Medications   Medication Sig    aspirin 81 mg chewable tablet Take 1 Tab by mouth daily for 30 days.  clopidogreL (PLAVIX) 75 mg tab Take 1 Tab by mouth daily for 30 days.  doxazosin (CARDURA) 8 mg tablet Take 1 Tab by mouth two (2) times a day.  loratadine (Claritin) 10 mg tablet Take 1 Tab by mouth daily.     atorvastatin (LIPITOR) 80 mg tablet Take 1 Tab by mouth nightly.  glipiZIDE (GLUCOTROL) 5 mg tablet Take 1 Tab by mouth Daily (before breakfast).  lisinopriL (PRINIVIL, ZESTRIL) 5 mg tablet Take 1 Tab by mouth daily.  meclizine (ANTIVERT) 25 mg tablet Take 1 Tab by mouth every six (6) hours as needed for Dizziness for up to 30 days.  meclizine (ANTIVERT) 25 mg tablet Take 1 Tab by mouth three (3) times daily for 10 days.  ondansetron (ZOFRAN ODT) 4 mg disintegrating tablet Take 1 Tab by mouth every six (6) hours as needed for Nausea or Vomiting.  scopolamine (TRANSDERM-SCOP) 1 mg over 3 days pt3d 1 Patch by TransDERmal route every seventy-two (72) hours. No current facility-administered medications for this visit. There are no discontinued medications. ADL assessment:   (If patient is unable to perform ADLs  what is the limiting factor(s)? Do they have a support system that can assist? If no support system is present, discuss possible assistance that they may be able to obtain. Escalate for SW if ongoing issues are verbalized by pt or anticipated)    [facility] follow up appointment(s):   Future Appointments   Date Time Provider Mari Maldonado   7/2/2020  1:00 PM Araceli Jackson, MICHAEL [address] [place] [address]   7/7/2020  2:10 PM Renu De La Garza MD SSA DFM DFM   7/22/2020  8:00 AM Sheryl Segovia MD [id number] PGU   7/23/2020  1:00 PM Phil Jarrell,  BSND BSND      Non-[facility] follow up appointment(s): n/a  7 Day follow up with PCP or Specialist: yes  Transportation arranged: no needs at this time    Any other questions or concerns expressed by patient?  Not at this time    Schedule next appointment with LYNNETTE PEDROZA or referral to CTN/ ACM: CCC will follow per workflow guidelines  Next follow up call: within 21 days

## 2020-07-03 VITALS
HEART RATE: 66 BPM | OXYGEN SATURATION: 95 % | DIASTOLIC BLOOD PRESSURE: 70 MMHG | TEMPERATURE: 98.2 F | SYSTOLIC BLOOD PRESSURE: 140 MMHG | RESPIRATION RATE: 18 BRPM

## 2020-07-03 PROCEDURE — 3331090001 HH PPS REVENUE CREDIT

## 2020-07-03 PROCEDURE — 3331090002 HH PPS REVENUE DEBIT

## 2020-07-04 PROCEDURE — 3331090002 HH PPS REVENUE DEBIT

## 2020-07-04 PROCEDURE — 3331090001 HH PPS REVENUE CREDIT

## 2020-07-05 PROCEDURE — 3331090001 HH PPS REVENUE CREDIT

## 2020-07-05 PROCEDURE — 3331090002 HH PPS REVENUE DEBIT

## 2020-07-06 ENCOUNTER — HOME CARE VISIT (OUTPATIENT)
Dept: SCHEDULING | Facility: HOME HEALTH | Age: 75
End: 2020-07-06
Payer: MEDICARE

## 2020-07-06 VITALS
HEART RATE: 74 BPM | SYSTOLIC BLOOD PRESSURE: 156 MMHG | RESPIRATION RATE: 18 BRPM | DIASTOLIC BLOOD PRESSURE: 70 MMHG | OXYGEN SATURATION: 98 % | TEMPERATURE: 98.6 F

## 2020-07-06 VITALS
RESPIRATION RATE: 18 BRPM | DIASTOLIC BLOOD PRESSURE: 82 MMHG | SYSTOLIC BLOOD PRESSURE: 158 MMHG | TEMPERATURE: 98.2 F | HEART RATE: 68 BPM

## 2020-07-06 PROCEDURE — A4357 BEDSIDE DRAINAGE BAG: HCPCS

## 2020-07-06 PROCEDURE — 3331090001 HH PPS REVENUE CREDIT

## 2020-07-06 PROCEDURE — A4338 INDWELLING CATHETER LATEX: HCPCS

## 2020-07-06 PROCEDURE — 3331090002 HH PPS REVENUE DEBIT

## 2020-07-06 PROCEDURE — A5114 FOAM/FABRIC LEG STRAP: HCPCS

## 2020-07-06 PROCEDURE — A4358 URINARY LEG OR ABDOMEN BAG: HCPCS

## 2020-07-06 PROCEDURE — G0299 HHS/HOSPICE OF RN EA 15 MIN: HCPCS

## 2020-07-06 PROCEDURE — G0151 HHCP-SERV OF PT,EA 15 MIN: HCPCS

## 2020-07-06 PROCEDURE — A4333 URINARY CATH ANCHOR DEVICE: HCPCS

## 2020-07-06 PROCEDURE — A4314 CATH W/DRAINAGE 2-WAY LATEX: HCPCS

## 2020-07-07 ENCOUNTER — HOME CARE VISIT (OUTPATIENT)
Dept: SCHEDULING | Facility: HOME HEALTH | Age: 75
End: 2020-07-07
Payer: MEDICARE

## 2020-07-07 VITALS
HEART RATE: 72 BPM | DIASTOLIC BLOOD PRESSURE: 76 MMHG | RESPIRATION RATE: 18 BRPM | OXYGEN SATURATION: 97 % | SYSTOLIC BLOOD PRESSURE: 154 MMHG

## 2020-07-07 PROCEDURE — G0152 HHCP-SERV OF OT,EA 15 MIN: HCPCS

## 2020-07-07 PROCEDURE — 3331090001 HH PPS REVENUE CREDIT

## 2020-07-07 PROCEDURE — 3331090002 HH PPS REVENUE DEBIT

## 2020-07-08 ENCOUNTER — HOME CARE VISIT (OUTPATIENT)
Dept: SCHEDULING | Facility: HOME HEALTH | Age: 75
End: 2020-07-08
Payer: MEDICARE

## 2020-07-08 VITALS
HEART RATE: 67 BPM | OXYGEN SATURATION: 96 % | SYSTOLIC BLOOD PRESSURE: 142 MMHG | DIASTOLIC BLOOD PRESSURE: 84 MMHG | TEMPERATURE: 98.2 F | RESPIRATION RATE: 16 BRPM

## 2020-07-08 PROCEDURE — 3331090001 HH PPS REVENUE CREDIT

## 2020-07-08 PROCEDURE — G0299 HHS/HOSPICE OF RN EA 15 MIN: HCPCS

## 2020-07-08 PROCEDURE — 3331090002 HH PPS REVENUE DEBIT

## 2020-07-09 ENCOUNTER — HOME CARE VISIT (OUTPATIENT)
Dept: SCHEDULING | Facility: HOME HEALTH | Age: 75
End: 2020-07-09
Payer: MEDICARE

## 2020-07-09 PROCEDURE — 3331090001 HH PPS REVENUE CREDIT

## 2020-07-09 PROCEDURE — G0157 HHC PT ASSISTANT EA 15: HCPCS

## 2020-07-09 PROCEDURE — 3331090002 HH PPS REVENUE DEBIT

## 2020-07-10 ENCOUNTER — HOME CARE VISIT (OUTPATIENT)
Dept: SCHEDULING | Facility: HOME HEALTH | Age: 75
End: 2020-07-10
Payer: MEDICARE

## 2020-07-10 ENCOUNTER — HOME CARE VISIT (OUTPATIENT)
Dept: HOME HEALTH SERVICES | Facility: HOME HEALTH | Age: 75
End: 2020-07-10
Payer: MEDICARE

## 2020-07-10 PROCEDURE — 3331090001 HH PPS REVENUE CREDIT

## 2020-07-10 PROCEDURE — G0299 HHS/HOSPICE OF RN EA 15 MIN: HCPCS

## 2020-07-10 PROCEDURE — 3331090002 HH PPS REVENUE DEBIT

## 2020-07-11 PROCEDURE — 3331090001 HH PPS REVENUE CREDIT

## 2020-07-11 PROCEDURE — 3331090002 HH PPS REVENUE DEBIT

## 2020-07-12 VITALS
TEMPERATURE: 98.3 F | OXYGEN SATURATION: 96 % | SYSTOLIC BLOOD PRESSURE: 146 MMHG | DIASTOLIC BLOOD PRESSURE: 84 MMHG | RESPIRATION RATE: 16 BRPM | HEART RATE: 68 BPM

## 2020-07-12 VITALS
DIASTOLIC BLOOD PRESSURE: 84 MMHG | SYSTOLIC BLOOD PRESSURE: 167 MMHG | TEMPERATURE: 98.2 F | HEART RATE: 69 BPM | RESPIRATION RATE: 18 BRPM

## 2020-07-12 PROCEDURE — 3331090001 HH PPS REVENUE CREDIT

## 2020-07-12 PROCEDURE — 3331090002 HH PPS REVENUE DEBIT

## 2020-07-13 ENCOUNTER — HOME CARE VISIT (OUTPATIENT)
Dept: SCHEDULING | Facility: HOME HEALTH | Age: 75
End: 2020-07-13
Payer: MEDICARE

## 2020-07-13 VITALS
HEART RATE: 72 BPM | TEMPERATURE: 98.6 F | RESPIRATION RATE: 16 BRPM | DIASTOLIC BLOOD PRESSURE: 78 MMHG | SYSTOLIC BLOOD PRESSURE: 160 MMHG

## 2020-07-13 PROCEDURE — G0151 HHCP-SERV OF PT,EA 15 MIN: HCPCS

## 2020-07-13 PROCEDURE — 3331090001 HH PPS REVENUE CREDIT

## 2020-07-13 PROCEDURE — 3331090002 HH PPS REVENUE DEBIT

## 2020-07-13 PROCEDURE — G0299 HHS/HOSPICE OF RN EA 15 MIN: HCPCS

## 2020-07-14 PROCEDURE — 3331090002 HH PPS REVENUE DEBIT

## 2020-07-14 PROCEDURE — 3331090001 HH PPS REVENUE CREDIT

## 2020-07-15 ENCOUNTER — HOME CARE VISIT (OUTPATIENT)
Dept: SCHEDULING | Facility: HOME HEALTH | Age: 75
End: 2020-07-15
Payer: MEDICARE

## 2020-07-15 VITALS
OXYGEN SATURATION: 96 % | DIASTOLIC BLOOD PRESSURE: 76 MMHG | TEMPERATURE: 97.6 F | HEART RATE: 66 BPM | SYSTOLIC BLOOD PRESSURE: 150 MMHG

## 2020-07-15 VITALS
DIASTOLIC BLOOD PRESSURE: 68 MMHG | HEART RATE: 75 BPM | TEMPERATURE: 98.5 F | RESPIRATION RATE: 16 BRPM | SYSTOLIC BLOOD PRESSURE: 118 MMHG

## 2020-07-15 PROCEDURE — G0299 HHS/HOSPICE OF RN EA 15 MIN: HCPCS

## 2020-07-15 PROCEDURE — 3331090002 HH PPS REVENUE DEBIT

## 2020-07-15 PROCEDURE — 3331090001 HH PPS REVENUE CREDIT

## 2020-07-15 PROCEDURE — G0151 HHCP-SERV OF PT,EA 15 MIN: HCPCS

## 2020-07-16 PROCEDURE — 3331090001 HH PPS REVENUE CREDIT

## 2020-07-16 PROCEDURE — 3331090002 HH PPS REVENUE DEBIT

## 2020-07-17 PROCEDURE — 3331090002 HH PPS REVENUE DEBIT

## 2020-07-17 PROCEDURE — 3331090001 HH PPS REVENUE CREDIT

## 2020-07-18 ENCOUNTER — HOME CARE VISIT (OUTPATIENT)
Dept: SCHEDULING | Facility: HOME HEALTH | Age: 75
End: 2020-07-18
Payer: MEDICARE

## 2020-07-18 VITALS
RESPIRATION RATE: 16 BRPM | SYSTOLIC BLOOD PRESSURE: 132 MMHG | HEART RATE: 78 BPM | DIASTOLIC BLOOD PRESSURE: 84 MMHG | TEMPERATURE: 98.1 F | OXYGEN SATURATION: 98 %

## 2020-07-18 PROCEDURE — G0299 HHS/HOSPICE OF RN EA 15 MIN: HCPCS

## 2020-07-18 PROCEDURE — 3331090001 HH PPS REVENUE CREDIT

## 2020-07-18 PROCEDURE — 3331090002 HH PPS REVENUE DEBIT

## 2020-07-19 VITALS
OXYGEN SATURATION: 94 % | HEART RATE: 73 BPM | SYSTOLIC BLOOD PRESSURE: 140 MMHG | TEMPERATURE: 98.1 F | RESPIRATION RATE: 18 BRPM | DIASTOLIC BLOOD PRESSURE: 70 MMHG

## 2020-07-19 PROCEDURE — 3331090001 HH PPS REVENUE CREDIT

## 2020-07-19 PROCEDURE — 3331090002 HH PPS REVENUE DEBIT

## 2020-07-20 ENCOUNTER — HOME CARE VISIT (OUTPATIENT)
Dept: SCHEDULING | Facility: HOME HEALTH | Age: 75
End: 2020-07-20
Payer: MEDICARE

## 2020-07-20 VITALS
SYSTOLIC BLOOD PRESSURE: 128 MMHG | HEART RATE: 62 BPM | TEMPERATURE: 97.7 F | OXYGEN SATURATION: 95 % | DIASTOLIC BLOOD PRESSURE: 80 MMHG | RESPIRATION RATE: 17 BRPM

## 2020-07-20 PROCEDURE — G0157 HHC PT ASSISTANT EA 15: HCPCS

## 2020-07-20 PROCEDURE — 3331090001 HH PPS REVENUE CREDIT

## 2020-07-20 PROCEDURE — 3331090002 HH PPS REVENUE DEBIT

## 2020-07-21 ENCOUNTER — HOME CARE VISIT (OUTPATIENT)
Dept: SCHEDULING | Facility: HOME HEALTH | Age: 75
End: 2020-07-21
Payer: MEDICARE

## 2020-07-21 VITALS
TEMPERATURE: 99.1 F | DIASTOLIC BLOOD PRESSURE: 96 MMHG | HEART RATE: 66 BPM | SYSTOLIC BLOOD PRESSURE: 180 MMHG | OXYGEN SATURATION: 95 % | RESPIRATION RATE: 18 BRPM

## 2020-07-21 PROCEDURE — 3331090001 HH PPS REVENUE CREDIT

## 2020-07-21 PROCEDURE — 3331090002 HH PPS REVENUE DEBIT

## 2020-07-21 PROCEDURE — G0299 HHS/HOSPICE OF RN EA 15 MIN: HCPCS

## 2020-07-22 PROCEDURE — 3331090001 HH PPS REVENUE CREDIT

## 2020-07-22 PROCEDURE — 3331090002 HH PPS REVENUE DEBIT

## 2020-07-22 NOTE — PROGRESS NOTES
Assessment completed - VSS. Patient reports feeling okay today and denies any new concerns or issues at this time. Patient reports that his pain is being controlled with current treatment plan. No significant changes noted since last visit. Patient reports that since [place] had to come out to troubleshoot his catheter that was not draining that they found out was \"just Kinked\" his catheter has drained well with no concerns or issues. Patient has MD appointment tomorrow at Dr. Savannah Bañuelos office to have his catheter changed he states. SN I/patient that orders for SN to change catheter today - patient/wife both refusing stating that he has MD appt tomorrow for possible voiding trial that he will let Dr. Raisa Batista office know if he is unable to void that he has [facility] that can replace his catheter if needed if MD agreeable and does decide to remove catheter for voiding t rial. SN has instructed patient/wife both that if MD office does not change catheter that [facility] can and will change catheter - patient has one catheter kit available in his home. SN noted patients BP to be 180/96 manual left arm and 190/90 manual right arm today when SN checked patients BP. Patients reading was 161/84 on patient's BP cuff today during SN visit. Patient reports that he feels well today that the only thing that is bothe ring him is his eye that has bothered him since he had his stroke. Patient denies any s/sx hypertension at this time and patient educated on s/sx HTN to call [facility] for if noticed with the patient and his wife both with good recall and teachback. Tete at Dr. Dillon Tafoya office notified today during visit at 4:43 pm of patient's BP readings above who states that she is going to let Dr. Catherine Moy know BP readings and will have him paged (MD on c all for Dr. Shyla Ambrose) as everyone else has gone home for the day. SN waiting to hear back from Dr. William Berumen office.

## 2020-07-23 ENCOUNTER — HOME CARE VISIT (OUTPATIENT)
Dept: HOME HEALTH SERVICES | Facility: HOME HEALTH | Age: 75
End: 2020-07-23
Payer: MEDICARE

## 2020-07-23 ENCOUNTER — HOME CARE VISIT (OUTPATIENT)
Dept: SCHEDULING | Facility: HOME HEALTH | Age: 75
End: 2020-07-23
Payer: MEDICARE

## 2020-07-23 ENCOUNTER — HOSPITAL ENCOUNTER (OUTPATIENT)
Dept: LAB | Age: 75
Discharge: HOME | End: 2020-07-23
Payer: MEDICARE

## 2020-07-23 ENCOUNTER — PATIENT OUTREACH (OUTPATIENT)
Dept: CASE MANAGEMENT | Age: 75
End: 2020-07-23

## 2020-07-23 DIAGNOSIS — Z51.81 THERAPEUTIC DRUG MONITORING: ICD-10-CM

## 2020-07-23 LAB — P2Y12 PLT RESPONSE,PPPR: 86 PRU

## 2020-07-23 PROCEDURE — 85576 BLOOD PLATELET AGGREGATION: CPT

## 2020-07-23 PROCEDURE — 3331090001 HH PPS REVENUE CREDIT

## 2020-07-23 PROCEDURE — 36415 COLL VENOUS BLD VENIPUNCTURE: CPT

## 2020-07-23 PROCEDURE — 3331090002 HH PPS REVENUE DEBIT

## 2020-07-23 NOTE — PROGRESS NOTES
Attempted outreach follow up without success, left message. Per [facility] patient is following given plan of care. Patient has attended follow ups as scheduled. Next visits: Dr. Forte Better today, 7/23/20; Dr. Cass Chaudhry 8/5/20. [facility] remains active. No new needs noted on chart review. Patient will be graduated from [employer] program.  Will reopen if call is returned.

## 2020-07-24 ENCOUNTER — HOME CARE VISIT (OUTPATIENT)
Dept: SCHEDULING | Facility: HOME HEALTH | Age: 75
End: 2020-07-24
Payer: MEDICARE

## 2020-07-24 VITALS
SYSTOLIC BLOOD PRESSURE: 140 MMHG | DIASTOLIC BLOOD PRESSURE: 80 MMHG | OXYGEN SATURATION: 97 % | TEMPERATURE: 98 F | HEART RATE: 68 BPM

## 2020-07-24 PROCEDURE — G0157 HHC PT ASSISTANT EA 15: HCPCS

## 2020-07-24 PROCEDURE — 3331090001 HH PPS REVENUE CREDIT

## 2020-07-24 PROCEDURE — 3331090002 HH PPS REVENUE DEBIT

## 2020-07-24 NOTE — PROGRESS NOTES
Arely,     Please see below note in Epic regarding patients elevated BP results during his last visit. I seen you made him a missed visit today. He had his catheter removed at Urology and may need a visit tomorrow to recheck BP and f/u on cath removal to make sure he is urinating well. He had residual urine the last few times he had voiding trials. I will call the patient first thing in the morning and let him know to increase his lis inopril:     Radha Gonzalez MD 7:34 AM   Note   He is on 5 mg of lisinopril have him take 2 at a time to increase it to 10 mg will watch his pressure for a week if it continues to be high we will increase it a little bit more.      Thanks,   [employer], RN

## 2020-07-25 PROCEDURE — 3331090002 HH PPS REVENUE DEBIT

## 2020-07-25 PROCEDURE — 3331090001 HH PPS REVENUE CREDIT

## 2020-07-26 PROCEDURE — 3331090002 HH PPS REVENUE DEBIT

## 2020-07-26 PROCEDURE — 3331090001 HH PPS REVENUE CREDIT

## 2020-07-27 ENCOUNTER — HOME CARE VISIT (OUTPATIENT)
Dept: SCHEDULING | Facility: HOME HEALTH | Age: 75
End: 2020-07-27
Payer: MEDICARE

## 2020-07-27 VITALS
DIASTOLIC BLOOD PRESSURE: 80 MMHG | TEMPERATURE: 98 F | RESPIRATION RATE: 16 BRPM | SYSTOLIC BLOOD PRESSURE: 150 MMHG | HEART RATE: 80 BPM

## 2020-07-27 PROCEDURE — 3331090002 HH PPS REVENUE DEBIT

## 2020-07-27 PROCEDURE — 3331090001 HH PPS REVENUE CREDIT

## 2020-07-27 PROCEDURE — G0157 HHC PT ASSISTANT EA 15: HCPCS

## 2020-07-28 ENCOUNTER — HOME CARE VISIT (OUTPATIENT)
Dept: SCHEDULING | Facility: HOME HEALTH | Age: 75
End: 2020-07-28
Payer: MEDICARE

## 2020-07-28 VITALS
RESPIRATION RATE: 18 BRPM | TEMPERATURE: 98.7 F | HEART RATE: 66 BPM | OXYGEN SATURATION: 95 % | SYSTOLIC BLOOD PRESSURE: 142 MMHG | DIASTOLIC BLOOD PRESSURE: 72 MMHG

## 2020-07-28 PROCEDURE — 3331090001 HH PPS REVENUE CREDIT

## 2020-07-28 PROCEDURE — 3331090002 HH PPS REVENUE DEBIT

## 2020-07-28 PROCEDURE — G0299 HHS/HOSPICE OF RN EA 15 MIN: HCPCS

## 2020-07-29 PROCEDURE — 3331090002 HH PPS REVENUE DEBIT

## 2020-07-29 PROCEDURE — 3331090001 HH PPS REVENUE CREDIT

## 2020-07-30 ENCOUNTER — HOME CARE VISIT (OUTPATIENT)
Dept: SCHEDULING | Facility: HOME HEALTH | Age: 75
End: 2020-07-30
Payer: MEDICARE

## 2020-07-30 VITALS
RESPIRATION RATE: 16 BRPM | TEMPERATURE: 98.2 F | DIASTOLIC BLOOD PRESSURE: 82 MMHG | HEART RATE: 72 BPM | SYSTOLIC BLOOD PRESSURE: 140 MMHG

## 2020-07-30 PROCEDURE — G0151 HHCP-SERV OF PT,EA 15 MIN: HCPCS

## 2020-07-30 PROCEDURE — 3331090002 HH PPS REVENUE DEBIT

## 2020-07-30 PROCEDURE — 3331090001 HH PPS REVENUE CREDIT

## 2020-07-31 ENCOUNTER — HOME CARE VISIT (OUTPATIENT)
Dept: SCHEDULING | Facility: HOME HEALTH | Age: 75
End: 2020-07-31
Payer: MEDICARE

## 2020-07-31 PROCEDURE — G0299 HHS/HOSPICE OF RN EA 15 MIN: HCPCS

## 2020-07-31 PROCEDURE — 3331090002 HH PPS REVENUE DEBIT

## 2020-07-31 PROCEDURE — 3331090001 HH PPS REVENUE CREDIT

## 2020-08-01 PROCEDURE — 3331090001 HH PPS REVENUE CREDIT

## 2020-08-01 PROCEDURE — 3331090002 HH PPS REVENUE DEBIT

## 2020-08-02 VITALS
TEMPERATURE: 98.3 F | OXYGEN SATURATION: 96 % | DIASTOLIC BLOOD PRESSURE: 84 MMHG | HEART RATE: 67 BPM | SYSTOLIC BLOOD PRESSURE: 146 MMHG | RESPIRATION RATE: 16 BRPM

## 2020-08-02 PROCEDURE — 3331090002 HH PPS REVENUE DEBIT

## 2020-08-02 PROCEDURE — 3331090001 HH PPS REVENUE CREDIT

## 2020-08-03 PROCEDURE — 3331090002 HH PPS REVENUE DEBIT

## 2020-08-03 PROCEDURE — 3331090001 HH PPS REVENUE CREDIT

## 2020-08-04 ENCOUNTER — HOME CARE VISIT (OUTPATIENT)
Dept: SCHEDULING | Facility: HOME HEALTH | Age: 75
End: 2020-08-04
Payer: MEDICARE

## 2020-08-04 PROCEDURE — 3331090002 HH PPS REVENUE DEBIT

## 2020-08-04 PROCEDURE — 3331090001 HH PPS REVENUE CREDIT

## 2020-08-04 PROCEDURE — 400013 HH SOC

## 2020-08-04 PROCEDURE — G0299 HHS/HOSPICE OF RN EA 15 MIN: HCPCS

## 2020-08-05 ENCOUNTER — HOME CARE VISIT (OUTPATIENT)
Dept: SCHEDULING | Facility: HOME HEALTH | Age: 75
End: 2020-08-05
Payer: MEDICARE

## 2020-08-05 VITALS
OXYGEN SATURATION: 96 % | RESPIRATION RATE: 16 BRPM | TEMPERATURE: 101.6 F | DIASTOLIC BLOOD PRESSURE: 78 MMHG | SYSTOLIC BLOOD PRESSURE: 134 MMHG | HEART RATE: 87 BPM

## 2020-08-05 PROCEDURE — G0299 HHS/HOSPICE OF RN EA 15 MIN: HCPCS

## 2020-08-05 PROCEDURE — 3331090002 HH PPS REVENUE DEBIT

## 2020-08-05 PROCEDURE — 3331090001 HH PPS REVENUE CREDIT

## 2020-08-06 ENCOUNTER — HOSPITAL ENCOUNTER (OUTPATIENT)
Dept: PHYSICAL THERAPY | Age: 75
Discharge: HOME | End: 2020-08-06
Attending: PHYSICAL MEDICINE & REHABILITATION
Payer: MEDICARE

## 2020-08-06 PROCEDURE — 3331090001 HH PPS REVENUE CREDIT

## 2020-08-06 PROCEDURE — 3331090002 HH PPS REVENUE DEBIT

## 2020-08-06 NOTE — PROGRESS NOTES
Enrico Renee  : 1945  [place]  Secondary: Jayesh Almonte [address] at [place] [id number] [facility], [address] [place] [address],[address], [facility] [address]. 91.  [phone]   [phone]          OUTPATIENT DAILY NOTE    NAME/AGE/GENDER: Enrico Renee is a 69 y.o. male. DATE: 2020    Patient cancelled (less than 24 hours ago) his initial evaluation appointment today due to illness. Will plan to follow up on next scheduled visit.     Zuleima Echavarria, PT    Future Appointments   Date Time Provider Mari Maldonado   2020  1:00 PM Marilu Castillo PCORAVV SFE   2020 To Be Determined Wilbert Gutierres, ANA [facility] [address]   2020  3:00 PM Hoa Pryor MD [id number] PGU

## 2020-08-07 VITALS
SYSTOLIC BLOOD PRESSURE: 138 MMHG | HEART RATE: 78 BPM | RESPIRATION RATE: 16 BRPM | TEMPERATURE: 98.3 F | OXYGEN SATURATION: 96 % | DIASTOLIC BLOOD PRESSURE: 74 MMHG

## 2020-08-07 PROCEDURE — 3331090002 HH PPS REVENUE DEBIT

## 2020-08-07 PROCEDURE — 3331090001 HH PPS REVENUE CREDIT

## 2020-08-08 PROCEDURE — 3331090001 HH PPS REVENUE CREDIT

## 2020-08-08 PROCEDURE — 3331090002 HH PPS REVENUE DEBIT

## 2020-08-09 PROCEDURE — 3331090001 HH PPS REVENUE CREDIT

## 2020-08-09 PROCEDURE — 3331090002 HH PPS REVENUE DEBIT

## 2020-08-10 PROCEDURE — 3331090002 HH PPS REVENUE DEBIT

## 2020-08-10 PROCEDURE — 3331090001 HH PPS REVENUE CREDIT

## 2020-08-11 ENCOUNTER — HOME CARE VISIT (OUTPATIENT)
Dept: SCHEDULING | Facility: HOME HEALTH | Age: 75
End: 2020-08-11
Payer: MEDICARE

## 2020-08-11 PROCEDURE — G0299 HHS/HOSPICE OF RN EA 15 MIN: HCPCS

## 2020-08-11 PROCEDURE — 3331090001 HH PPS REVENUE CREDIT

## 2020-08-11 PROCEDURE — 3331090002 HH PPS REVENUE DEBIT

## 2020-08-12 PROCEDURE — 3331090001 HH PPS REVENUE CREDIT

## 2020-08-12 PROCEDURE — 3331090002 HH PPS REVENUE DEBIT

## 2020-08-13 VITALS
SYSTOLIC BLOOD PRESSURE: 142 MMHG | RESPIRATION RATE: 16 BRPM | OXYGEN SATURATION: 98 % | HEART RATE: 67 BPM | DIASTOLIC BLOOD PRESSURE: 80 MMHG | TEMPERATURE: 98.4 F

## 2020-08-13 PROCEDURE — 3331090002 HH PPS REVENUE DEBIT

## 2020-08-13 PROCEDURE — 3331090001 HH PPS REVENUE CREDIT

## 2020-08-14 PROCEDURE — 3331090002 HH PPS REVENUE DEBIT

## 2020-08-14 PROCEDURE — 3331090001 HH PPS REVENUE CREDIT

## 2020-08-15 PROCEDURE — 3331090001 HH PPS REVENUE CREDIT

## 2020-08-15 PROCEDURE — 3331090002 HH PPS REVENUE DEBIT

## 2020-08-16 PROCEDURE — 3331090001 HH PPS REVENUE CREDIT

## 2020-08-16 PROCEDURE — 3331090002 HH PPS REVENUE DEBIT

## 2020-08-17 PROCEDURE — 3331090001 HH PPS REVENUE CREDIT

## 2020-08-17 PROCEDURE — 3331090002 HH PPS REVENUE DEBIT

## 2020-08-18 ENCOUNTER — HOME CARE VISIT (OUTPATIENT)
Dept: SCHEDULING | Facility: HOME HEALTH | Age: 75
End: 2020-08-18
Payer: MEDICARE

## 2020-08-18 PROCEDURE — 3331090001 HH PPS REVENUE CREDIT

## 2020-08-18 PROCEDURE — G0299 HHS/HOSPICE OF RN EA 15 MIN: HCPCS

## 2020-08-18 PROCEDURE — 3331090002 HH PPS REVENUE DEBIT

## 2020-08-19 PROCEDURE — 3331090002 HH PPS REVENUE DEBIT

## 2020-08-19 PROCEDURE — 3331090001 HH PPS REVENUE CREDIT

## 2020-08-20 PROCEDURE — 3331090001 HH PPS REVENUE CREDIT

## 2020-08-20 PROCEDURE — 3331090002 HH PPS REVENUE DEBIT

## 2020-08-20 RX ORDER — CEFAZOLIN SODIUM/WATER 2 G/20 ML
2 SYRINGE (ML) INTRAVENOUS
Status: CANCELLED | OUTPATIENT
Start: 2020-08-20 | End: 2020-08-20

## 2020-08-21 PROCEDURE — 3331090001 HH PPS REVENUE CREDIT

## 2020-08-21 PROCEDURE — 3331090002 HH PPS REVENUE DEBIT

## 2020-08-22 PROCEDURE — 3331090002 HH PPS REVENUE DEBIT

## 2020-08-22 PROCEDURE — 3331090001 HH PPS REVENUE CREDIT

## 2020-08-23 PROCEDURE — 3331090002 HH PPS REVENUE DEBIT

## 2020-08-23 PROCEDURE — 3331090001 HH PPS REVENUE CREDIT

## 2020-08-24 ENCOUNTER — HOME CARE VISIT (OUTPATIENT)
Dept: SCHEDULING | Facility: HOME HEALTH | Age: 75
End: 2020-08-24
Payer: MEDICARE

## 2020-08-24 PROCEDURE — 3331090002 HH PPS REVENUE DEBIT

## 2020-08-24 PROCEDURE — 3331090001 HH PPS REVENUE CREDIT

## 2020-08-24 PROCEDURE — G0299 HHS/HOSPICE OF RN EA 15 MIN: HCPCS

## 2020-08-25 ENCOUNTER — HOSPITAL ENCOUNTER (OUTPATIENT)
Dept: PHYSICAL THERAPY | Age: 75
Discharge: HOME | End: 2020-08-25
Attending: PHYSICAL MEDICINE & REHABILITATION
Payer: MEDICARE

## 2020-08-25 VITALS
TEMPERATURE: 97.5 F | SYSTOLIC BLOOD PRESSURE: 154 MMHG | HEART RATE: 75 BPM | OXYGEN SATURATION: 97 % | DIASTOLIC BLOOD PRESSURE: 80 MMHG | RESPIRATION RATE: 15 BRPM

## 2020-08-25 PROCEDURE — 97162 PT EVAL MOD COMPLEX 30 MIN: CPT

## 2020-08-25 PROCEDURE — 97112 NEUROMUSCULAR REEDUCATION: CPT

## 2020-08-25 PROCEDURE — 3331090002 HH PPS REVENUE DEBIT

## 2020-08-25 PROCEDURE — 3331090001 HH PPS REVENUE CREDIT

## 2020-08-25 NOTE — PROGRESS NOTES
Yolis Garcia  : 1945  [place]  Secondary: Jayesh Velez at [place] [id number] [facility], [address] [place] [address],[address], [facility] [address]. 91.  [phone]   [phone]     OUTPATIENT PHYSICAL THERAPY: Daily Treatment Note 2020  Visit Count:  1    ICD-10: Treatment Diagnosis: Difficulty in walking, not elsewhere classified (R26.2)  Precautions/Allergies:   Patient has no known allergies. TREATMENT PLAN:  Effective Dates: 2020 TO 2020 (90 days). Frequency/Duration: 2 times a week for 90 Day(s)    Pre-treatment Symptoms/Complaints:  Imbalance, dizziness, and difficulty walking  Pain: Initial: Pain Intensity 1: 0  Post Session:  0/10   Medications Last Reviewed:  2020  Updated Objective Findings:  See evaluation note from today  TREATMENT:     NEUROMUSCULAR RE-EDUCATION: (10 minutes):  Exercise/activities per grid below to improve balance and coordination. Required minimal verbal cues to promote dynamic balance in standing. Date:  2020 Date:   Date:     Activity/Exercise Parameters Parameters Parameters   Standing feet together Feet closed     Marching in place 10 reps bilateral      Single step sideways 10 reps bilateral      Single step forward 10 reps bilateral                            MedBridge Portal  Treatment/Session Summary:    · Response to Treatment:  tolerated without complaints. · Communication/Consultation:  None today  · Equipment provided today:  Home exercise program  · Recommendations/Intent for next treatment session: Next visit will focus on balance exercise and gait.     Total Treatment Billable Duration:  10 minutes +evaluation  PT Patient Time In/Time Out  Time In: 1430  Time Out: [address], PT    Future Appointments   Date Time Provider Mari Maldonado   2020  9:25 AM [address] THRU [facility]SA IMD IMD   2020 11:15 AM SFD ASSESSMENT RM 03 SFDORPA SFD OR PRE A 9/10/2020 10:00 AM GARCÍA Choudhury SSA PMR PMR   10/26/2020  3:15 PM Mikayla Wayne MD SSA [facility] WANDER

## 2020-08-25 NOTE — THERAPY EVALUATION
Ernesot Guillen  : 1945  [place]  Secondary: Jayesh Velez at [place] [address] [facility], [address] [place] [address],[address], [facility] [address].  [phone]   [phone]        OUTPATIENT PHYSICAL THERAPY:Initial Assessment 2020   ICD-10: Treatment Diagnosis: Difficulty in walking, not elsewhere classified (R26.2)  Precautions/Allergies:   Patient has no known allergies. TREATMENT PLAN:  Effective Dates: 2020 TO 2020 (90 days). Frequency/Duration: 2 times a week for 90 Day(s) MEDICAL/REFERRING DIAGNOSIS:  Cerebral infarction, unspecified [I63.9]   DATE OF ONSET:    REFERRING PHYSICIAN: Daphne Whiteside MD Orders: Evaluate and treat   Return MD Appointment: unknown      INITIAL ASSESSMENT:  Mr. Enio Valdez presents with imbalance and difficulty walking due to CVA. Patient is at higher fall risk based on score received on Watt Balance Scale and Dynamic Gait Index. Patient would benefit from skilled physical therapy to address problems and goals. Thank you for this referral.     PROBLEM LIST (Impacting functional limitations):  1. Decreased Ambulation Ability/Technique  2. Decreased Balance  3. Decreased [place] with Home Exercise Program INTERVENTIONS PLANNED: (Treatment may consist of any combination of the following)  1. Balance Exercise  2. Gait Training  3. Home Exercise Program (HEP)     GOALS: (Goals have been discussed and agreed upon with patient.)  Short-Term Functional Goals: Time Frame: 30 days   1. Patient will be independent with home exercise program to improve balance. Discharge Goals: Time Frame: 90 days   1. Patient will score greater than or equal to 48/56 on Watt Balance Score indicating improved balance and decreased fall risk with daily activities.     2. Patient will score greater than or equal to 20/24 on Dynamic Gait Index indicating improved balance and decreased fall risk with daily activities. 3. Patient will score less than or equal to 10 seconds on the Timed Up and Go Test indicating improved gait speed. 4. Patient will ambulate without assistive device across level and unlevel surfaces without loss of balance or fear of falling. OUTCOME MEASURE:   Tool Used: Watt Balance Scale  Score:  Initial: 37/56 Most Recent: X/56 (Date: -- )   Interpretation of Score: Each section is scored on a 0-4 scale, 0 representing the patients inability to perform the task and 4 representing independence. The scores of each section are added together for a total score of 56. The higher the patients score, the more independent the patient is. Any score below 45 indicates increased risk for falls. Tool Used: Dynamic Gait Index  Score:  Initial: 10/24 Most Recent: X/24 (Date: -- )   Interpretation of Score: Each section is scored on a 0-3 scale, 0 representing the patients inability to perform the task and 3 representing independence. The scores of each section are added together for a total score of 24. Any score below 19 indicates increased risk for falls. Tool Used: Timed Up and Go (TUG)  Score:  Initial: 12.53 seconds Most Recent: X seconds (Date: -- )   Interpretation of Score: The test measures, in seconds, the time taken by an individual to stand up from a standard arm chair (seat height 46 cm [18 in], arm height 65 cm [25.6 in]), walk a distance of 3 meters (118 in, approx 10 ft), turn, walk back to the chair and sit down. If the individual takes longer than 14 seconds to complete TUG, this indicates risk for falls. MEDICAL NECESSITY:   · Patient is expected to demonstrate progress in balance and gait to improve safety during activities of daily living. REASON FOR SERVICES/OTHER COMMENTS:  · Patient continues to require skilled intervention due to recent CVA.   Total Duration:  PT Patient Time In/Time Out  Time In: 1430  Time Out: 1515    Rehabilitation Potential For Stated Goals: Excellent  Regarding Jessie Golden therapy, I certify that the treatment plan above will be carried out by a therapist or under their direction. Thank you for this referral,  Samantha Polanco PT     Referring Physician Signature: Lilian Encarnacion* _______________________________ Date _____________     PAIN/SUBJECTIVE:   Initial: Pain Intensity 1: 0  Post Session:  0/10   HISTORY:   History of Injury/Illness (Reason for Referral):  Patient reports having CVA on June 8, 2020. Patient complains of imbalance, dizziness, and difficulty walking. Patient has had no falls. Patient currently using rolling walker, but used no assistive device prior to CVA. Patient rates current dizziness as 0/10 and current as pain level 0/10. Patient's goal is to walk without his walker. Past Medical History/Comorbidities:   Mr. Severino Bellamy  has a past medical history of Diabetes [facility]), Essential hypertension, benign (1/3/2014), Mixed hyperlipidemia (1/3/2014), Numbness and tingling in left hand (7/2/2019), and Right thalamic infarction ([facility] [address] (7/2/2019). Mr. Severino Bellamy  has no past surgical history on file. Social History/Living Environment:     Lives with spouse in a one story home. Prior Level of Function/Work/Activity:  Independent. Retired. Dominant Side:         RIGHT  Personal Factors:          Sex:  male        Age:  70 y.o. Ambulatory/Rehab Services H2 Model Falls Risk Assessment   Risk Factors:       (1)  Dizziness/Vertigo       (1)  Gender [Male] Ability to Rise from Chair:       (0)  Ability to rise in a single movement   Falls Prevention Plan:       No modifications necessary   Total: (5 or greater = High Risk): 2   ©2010 [facility] of [employer]. All Rights Reserved. [facility] States Patent #[id number].  Federal Law prohibits the replication, distribution or use without written permission from [facility] [address]   Current Medications:       Current Outpatient Medications:    DISABLED PLACARD (DISABLED PLACARD) DMV, Use as directed, Disp: 1 Each, Rfl: 0    scopolamine (TRANSDERM-SCOP) 1 mg over 3 days pt3d, 1 Patch by TransDERmal route every seventy-two (72) hours. , Disp: 15 Patch, Rfl: 1    meclizine (ANTIVERT) 25 mg tablet, Take 1 Tab by mouth three (3) times daily as needed for Dizziness. , Disp: 270 Tab, Rfl: 1    doxazosin (CARDURA) 8 mg tablet, Take 1 Tab by mouth two (2) times a day., Disp: 180 Tab, Rfl: 1    glipiZIDE (GLUCOTROL) 5 mg tablet, Take 1 Tab by mouth Daily (before breakfast). , Disp: 90 Tab, Rfl: 1    lisinopriL (PRINIVIL, ZESTRIL) 5 mg tablet, Take 1 Tab by mouth two (2) times a day., Disp: 180 Tab, Rfl: 1    clopidogreL (PLAVIX) 75 mg tab, Take 1 Tab by mouth daily. , Disp: 90 Tab, Rfl: 1    finasteride (PROSCAR) 5 mg tablet, TAKE 1 TABLET BY MOUTH DAILY, Disp: 90 Tab, Rfl: 3    neomycin-polymyxin-dexamethasone (MAXITROL) ophthalmic suspension, Administer 1 Drop to right eye two (2) times a day., Disp: 5 mL, Rfl: 0    loratadine (Claritin) 10 mg tablet, Take 1 Tab by mouth daily. , Disp: 90 Tab, Rfl: 4    atorvastatin (LIPITOR) 80 mg tablet, Take 1 Tab by mouth nightly., Disp: 30 Tab, Rfl: 3    ondansetron (ZOFRAN ODT) 4 mg disintegrating tablet, Take 1 Tab by mouth every six (6) hours as needed for Nausea or Vomiting., Disp: 60 Tab, Rfl: 1   Date Last Reviewed:  8/25/2020   Number of Personal Factors/Comorbidities that affect the Plan of Care: 1-2: MODERATE COMPLEXITY   EXAMINATION:   Functional Mobility:         Gait/Ambulation:  Patient ambulates with shuffling gait pattern with decreased stride length and decreased heel strike. Strength:          Bilateral lower extremity 5/5. Sensation:         Within normal limits. Postural Control & Balance:  · Watt Balance Scale:  37/56.   (A score less than 45/56 indicates high risk of falls)     · Dynamic Gait Index:  10/24.    (A score less than or equal to19/24 is abnormal and predictive of falls) · Smart [employer] Sensory Organization Test:  Not tested. Body Structures Involved:  1. Nerves  2. Eyes and Ears  3. Muscles Body Functions Affected:  1. Neuromusculoskeletal Activities and Participation Affected:  1. General Tasks and Demands  2. Mobility  3.  Community, Social and [address]   Number of elements (examined above) that affect the Plan of Care: 4+: HIGH COMPLEXITY   CLINICAL PRESENTATION:   Presentation: Evolving clinical presentation with changing clinical characteristics: MODERATE COMPLEXITY   CLINICAL DECISION MAKING:   Use of outcome tool(s) and clinical judgement create a POC that gives a: Questionable prediction of patient's progress: MODERATE COMPLEXITY

## 2020-08-26 PROCEDURE — 3331090002 HH PPS REVENUE DEBIT

## 2020-08-26 PROCEDURE — 3331090001 HH PPS REVENUE CREDIT

## 2020-08-27 PROCEDURE — 3331090002 HH PPS REVENUE DEBIT

## 2020-08-27 PROCEDURE — 3331090001 HH PPS REVENUE CREDIT

## 2020-08-28 PROCEDURE — 3331090001 HH PPS REVENUE CREDIT

## 2020-08-28 PROCEDURE — 3331090002 HH PPS REVENUE DEBIT

## 2020-08-29 PROCEDURE — 3331090001 HH PPS REVENUE CREDIT

## 2020-08-29 PROCEDURE — 3331090002 HH PPS REVENUE DEBIT

## 2020-08-30 PROCEDURE — 3331090002 HH PPS REVENUE DEBIT

## 2020-08-30 PROCEDURE — 3331090001 HH PPS REVENUE CREDIT

## 2020-09-01 ENCOUNTER — HOSPITAL ENCOUNTER (OUTPATIENT)
Dept: SURGERY | Age: 75
Discharge: HOME | End: 2020-09-01
Payer: MEDICARE

## 2020-09-01 VITALS
WEIGHT: 271.3 LBS | SYSTOLIC BLOOD PRESSURE: 188 MMHG | DIASTOLIC BLOOD PRESSURE: 73 MMHG | RESPIRATION RATE: 16 BRPM | TEMPERATURE: 97.8 F | OXYGEN SATURATION: 94 % | BODY MASS INDEX: 40.18 KG/M2 | HEIGHT: 69 IN | HEART RATE: 85 BPM

## 2020-09-01 LAB
APPEARANCE UR: ABNORMAL
BACTERIA URNS QL MICRO: ABNORMAL /HPF
BILIRUB UR QL: NEGATIVE
CASTS URNS QL MICRO: 0 /LPF
COLOR UR: YELLOW
CRYSTALS URNS QL MICRO: 0 /LPF
EPI CELLS #/AREA URNS HPF: ABNORMAL /HPF
GLUCOSE BLD STRIP.AUTO-MCNC: 101 MG/DL (ref 65–100)
GLUCOSE UR STRIP.AUTO-MCNC: NEGATIVE MG/DL
HGB UR QL STRIP: ABNORMAL
KETONES UR QL STRIP.AUTO: NEGATIVE MG/DL
LEUKOCYTE ESTERASE UR QL STRIP.AUTO: ABNORMAL
MUCOUS THREADS URNS QL MICRO: 0 /LPF
NITRITE UR QL STRIP.AUTO: POSITIVE
OTHER OBSERVATIONS,UCOM: ABNORMAL
PH UR STRIP: 7 [PH] (ref 5–9)
PROT UR STRIP-MCNC: 30 MG/DL
RBC #/AREA URNS HPF: ABNORMAL /HPF
SP GR UR REFRACTOMETRY: 1.01 (ref 1–1.02)
UROBILINOGEN UR QL STRIP.AUTO: 1 EU/DL (ref 0.2–1)
WBC URNS QL MICRO: ABNORMAL /HPF

## 2020-09-01 PROCEDURE — 81015 MICROSCOPIC EXAM OF URINE: CPT

## 2020-09-01 PROCEDURE — 82962 GLUCOSE BLOOD TEST: CPT

## 2020-09-01 PROCEDURE — 81003 URINALYSIS AUTO W/O SCOPE: CPT

## 2020-09-01 RX ORDER — CARBOXYMETHYLCELLULOSE SODIUM 10 MG/ML
1 GEL OPHTHALMIC DAILY
COMMUNITY
End: 2020-11-24

## 2020-09-01 NOTE — PERIOP NOTES
Patient verified name and     Order for consent was found in EHR and matches case posting; patient verified. Type 1B surgery, walk-in assessment complete. Labs per surgeon: UA  Labs per anesthesia protocol: poc glucose  EKG: last done 2020 and echo 2020    Per Marlena Armstrong at Dr. Ina Ratliff office she is still waiting on Plavix hold approval from Dr. Taty Broussard and surgical clearance from neurologist due to patient's recent stroke. Informed patient to continue plavix until he received hold information from Marlena Armstrong. Patient aware that a negative Covid swab result is required to proceed with surgery;  appointments are made by the surgeon office and test should be collected 7 days prior to surgery. The testing center is located at the . [address], [place]. Hospital approved surgical skin cleanser and instructions given per hospital policy. Patient provided with and instructed on educational handouts including Guide to Surgery, Pain Management, Hand Hygiene, Blood Transfusion Education, and [place] [facility] Brochure. Patient answered medical/surgical history questions at their best of ability. All prior to admission medications documented in [facility]. Original medication prescription bottle not visualized during patient appointment. Patient instructed to hold all vitamins 7 days prior to surgery and NSAIDS 5 days prior to surgery, patient verbalized understanding. Patient teach back successful and patient demonstrates knowledge of instructions.

## 2020-09-01 NOTE — PROGRESS NOTES
Please call patient to let him  Know Levaquin sent to pharmacy for presumed. UTI. He needs to start taking today in preparation for surgery next week to clear urine.

## 2020-09-01 NOTE — PERIOP NOTES
PLEASE CONTINUE TAKING ALL PRESCRIPTION MEDICATIONS UP TO THE DAY OF SURGERY UNLESS OTHERWISE DIRECTED BELOW. DISCONTINUE all vitamins and supplements 7 days prior to surgery. DISCONTINUE Non-Steriodal Anti-Inflammatory (NSAIDS) such as Advil and Aleve 5 days prior to surgery. Home Medications to take  the day of surgery    doxasosin  finasteride   loratidine        Home Medications   to Hold   AM OF SURGERY HOLD LISINOPRIL, AND GLIPIZIDE   Request has been made by Dr. Valeria Ward to hold Plavix for 3 days prior to surgery. Spoke with Edwin Camilo at his office and they are waiting for hold clearance. She will call you with instructions regarding holding your plavix. Comments          *Visitor policy of 1 visitor per patient discussed. Please do not bring home medications with you on the day of surgery unless otherwise directed by your nurse. If you are instructed to bring home medications, please give them to your nurse as they will be administered by the nursing staff. If you have any questions, please call [facility] [phone] or [facility] [phone]. A copy of this note was provided to the patient for reference.

## 2020-09-01 NOTE — PERIOP NOTES
Recent Results (from the past 12 hour(s))   GLUCOSE, POC    Collection Time: 09/01/20 12:06 PM   Result Value Ref Range    Glucose (POC) 101 (H) 65 - 100 mg/dL   URINALYSIS W/ RFLX MICROSCOPIC    Collection Time: 09/01/20  1:13 PM   Result Value Ref Range    Color YELLOW      Appearance CLOUDY      Specific gravity 1.010 1.001 - 1.023      pH (UA) 7.0 5.0 - 9.0      Protein 30 (A) NEG mg/dL    Glucose Negative NEG mg/dL    Ketone Negative NEG mg/dL    Bilirubin Negative NEG      Blood MODERATE (A) NEG      Urobilinogen 1.0 0.2 - 1.0 EU/dL    Nitrites Positive (A) NEG      Leukocyte Esterase LARGE (A) NEG     URINE MICROSCOPIC    Collection Time: 09/01/20  1:13 PM   Result Value Ref Range    WBC 3-5 0 /hpf    RBC 0-3 0 /hpf    Epithelial cells 0-3 0 /hpf    Bacteria 4+ (H) 0 /hpf    Casts 0 0 /lpf    Crystals, urine 0 0 /LPF    Mucus 0 0 /lpf    Other observations RESULTS VERIFIED MANUALLY       Specimen was obtained by clamping catheter and obtaining directly from cochran not catheter bag. Results have been reviewed by Dr. Arden Najjar and Alexandrea called into pharmacy. I called and left message for patient that he should get medication from pharmacy.

## 2020-09-02 ENCOUNTER — HOSPITAL ENCOUNTER (OUTPATIENT)
Dept: PHYSICAL THERAPY | Age: 75
Discharge: HOME | End: 2020-09-02
Attending: PHYSICAL MEDICINE & REHABILITATION
Payer: MEDICARE

## 2020-09-02 PROCEDURE — 97112 NEUROMUSCULAR REEDUCATION: CPT

## 2020-09-02 NOTE — PROGRESS NOTES
Austin Cano  : 1945  [place]  Secondary: Jayesh Velez at [place] [id number] [facility], [address] [place] [address],[address], [address]  [phone]   [phone]     OUTPATIENT PHYSICAL THERAPY: Daily Treatment Note 2020  Visit Count:  2    ICD-10: Treatment Diagnosis: Difficulty in walking, not elsewhere classified (R26.2)  Precautions/Allergies:   Patient has no known allergies. TREATMENT PLAN:  Effective Dates: 2020 TO 2020 (90 days). Frequency/Duration: 2 times a week for 90 Day(s)    Pre-treatment Symptoms/Complaints:  2020: Patient reports he is doing well today. Pain: Initial: Pain Intensity 1: 0  Post Session:  0/10   Medications Last Reviewed:  2020  Updated Objective Findings:  None Today  TREATMENT:     NEUROMUSCULAR RE-EDUCATION: (45 minutes):  Exercise/activities per grid below to improve balance and coordination. Required minimal verbal cues to promote dynamic balance in standing. Date:  2020   Activity/Exercise Parameters   Marching in hallway 4 laps   Side-stepping in hallway 4 laps   Walking backward in hallway 4 laps   Weaving around cones 4 laps   Step taps 6 inch step  Alternating LEs  20 reps   Step ups 6 inch step  10 reps  B LE   Step overs: forward 1/2 foam roll  10 reps  Alternating LEs   Step overs: lateral 1/2 foam roll  10 reps  B LE   Standing with feet apart Eyes closed  10 seconds  3 reps   Semi-tandem stance Eyes closed  10 second hold  3 reps each     Treatment/Session Summary:    · Response to Treatment:  Patient tolerated treatment with several sitting rest breaks secondary to fatigue. · Communication/Consultation:  None today  · Equipment provided today:  None today  · Recommendations/Intent for next treatment session: Next visit will focus on balance exercise and gait.     Total Treatment Billable Duration:  45 minutes  PT Patient Time In/Time Out  Time In: 1300  Time Out: [address] , PT    Future Appointments   Date Time Provider Mari Cejai   9/2/2020  1:00 PM Iris Main, [facility] SFE   9/4/2020  1:45 PM [facility]   9/10/2020 10:00 AM GARCÍA Negron SSA PMR PMR   9/14/2020  1:00 PM [facility]   9/17/2020  1:00 PM Iris Main[facility]   10/26/2020  3:15 PM Jackson Friedman MD [facility] WANDER

## 2020-09-04 ENCOUNTER — HOSPITAL ENCOUNTER (OUTPATIENT)
Dept: PHYSICAL THERAPY | Age: 75
Discharge: HOME | End: 2020-09-04
Attending: PHYSICAL MEDICINE & REHABILITATION
Payer: MEDICARE

## 2020-09-04 PROCEDURE — 97112 NEUROMUSCULAR REEDUCATION: CPT

## 2020-09-04 NOTE — PROGRESS NOTES
Lore Ruggiero  : 1945  [place]  Secondary: Jayesh Velez at [place] [id number] [facility], [address] [place] [address],[address], [address] [facility]  [phone]   [phone]     OUTPATIENT PHYSICAL THERAPY: Daily Treatment Note 2020  Visit Count:  3    ICD-10: Treatment Diagnosis: Difficulty in walking, not elsewhere classified (R26.2)  Precautions/Allergies:   Patient has no known allergies. TREATMENT PLAN:  Effective Dates: 2020 TO 2020 (90 days). Frequency/Duration: 2 times a week for 90 Day(s)    Pre-treatment Symptoms/Complaints:  2020: Patient reports he is doing pretty good. Pain: Initial: Pain Intensity 1: 0  Post Session:  0/10   Medications Last Reviewed:  2020  Updated Objective Findings:  None Today  TREATMENT:     NEUROMUSCULAR RE-EDUCATION: (40 minutes):  Exercise/activities per grid below to improve balance and coordination. Required minimal verbal cues to promote dynamic balance in standing. Date:  2020   Activity/Exercise Parameters   Marching in hallway 4 laps   Side-stepping in hallway 4 laps   Walking backward in hallway 4 laps   Weaving around cones 4 laps   Step taps 6 inch step  Alternating LEs  20 reps   Step ups 6 inch step  10 reps  B LE   Step overs: forward 1/2 foam roll  10 reps  Alternating LEs   Step overs: lateral 1/2 foam roll  10 reps  B LE   Standing with feet closed Eyes closed  30 seconds  3 reps   Walking without rolling walker 200 feet + up/down ramp   Semi-tandem stance Eyes closed  10 second hold  3 reps each     Treatment/Session Summary:    · Response to Treatment:  Patient needed several rest breaks due to increased fatigue. Patient tolerated additional ambulation without complaints.    · Communication/Consultation:  None today  · Equipment provided today:  None today  · Recommendations/Intent for next treatment session: Next visit will focus on balance exercise and gait.    Total Treatment Billable Duration:  40 minutes  PT Patient Time In/Time Out  Time In: 1340  Time Out: [address], PT    Future Appointments   Date Time Provider Mari Erica   9/14/2020  1:00 PM [facility]   9/17/2020 11:00 AM GARCÍA Ford SSA PMR PMR   9/17/2020  1:00 PM Jenniffer Shin, PT [facility] SFE   10/26/2020  3:15 PM Elda Hughes MD [facility] WANDER

## 2020-09-07 ENCOUNTER — ANESTHESIA EVENT (OUTPATIENT)
Dept: SURGERY | Age: 75
End: 2020-09-07
Payer: MEDICARE

## 2020-09-08 ENCOUNTER — ANESTHESIA (OUTPATIENT)
Dept: SURGERY | Age: 75
End: 2020-09-08
Payer: MEDICARE

## 2020-09-08 ENCOUNTER — HOSPITAL ENCOUNTER (OUTPATIENT)
Age: 75
Setting detail: OUTPATIENT SURGERY
Discharge: HOME | End: 2020-09-08
Attending: UROLOGY | Admitting: UROLOGY
Payer: MEDICARE

## 2020-09-08 VITALS
OXYGEN SATURATION: 92 % | WEIGHT: 273.8 LBS | BODY MASS INDEX: 40.55 KG/M2 | TEMPERATURE: 98.2 F | HEART RATE: 73 BPM | SYSTOLIC BLOOD PRESSURE: 190 MMHG | DIASTOLIC BLOOD PRESSURE: 83 MMHG | HEIGHT: 69 IN | RESPIRATION RATE: 15 BRPM

## 2020-09-08 DIAGNOSIS — R33.8 BENIGN PROSTATIC HYPERPLASIA WITH URINARY RETENTION: Primary | ICD-10-CM

## 2020-09-08 DIAGNOSIS — N40.1 BENIGN PROSTATIC HYPERPLASIA WITH URINARY RETENTION: Primary | ICD-10-CM

## 2020-09-08 LAB — GLUCOSE BLD STRIP.AUTO-MCNC: 130 MG/DL (ref 65–100)

## 2020-09-08 PROCEDURE — 77030019927 HC TBNG IRR CYSTO BAXT -A: Performed by: UROLOGY

## 2020-09-08 PROCEDURE — 76060000032 HC ANESTHESIA 0.5 TO 1 HR: Performed by: UROLOGY

## 2020-09-08 PROCEDURE — 76210000021 HC REC RM PH II 0.5 TO 1 HR: Performed by: UROLOGY

## 2020-09-08 PROCEDURE — 82962 GLUCOSE BLOOD TEST: CPT

## 2020-09-08 PROCEDURE — 76210000063 HC OR PH I REC FIRST 0.5 HR: Performed by: UROLOGY

## 2020-09-08 PROCEDURE — 74011250636 HC RX REV CODE- 250/636: Performed by: NURSE ANESTHETIST, CERTIFIED REGISTERED

## 2020-09-08 PROCEDURE — 74011000250 HC RX REV CODE- 250: Performed by: NURSE ANESTHETIST, CERTIFIED REGISTERED

## 2020-09-08 PROCEDURE — L8699 PROSTHETIC IMPLANT NOS: HCPCS | Performed by: UROLOGY

## 2020-09-08 PROCEDURE — 77030010509 HC AIRWY LMA MSK TELE -A: Performed by: ANESTHESIOLOGY

## 2020-09-08 PROCEDURE — 76010000138 HC OR TIME 0.5 TO 1 HR: Performed by: UROLOGY

## 2020-09-08 PROCEDURE — 77030040922 HC BLNKT HYPOTHRM STRY -A: Performed by: ANESTHESIOLOGY

## 2020-09-08 PROCEDURE — 74011250636 HC RX REV CODE- 250/636: Performed by: UROLOGY

## 2020-09-08 PROCEDURE — 74011250636 HC RX REV CODE- 250/636: Performed by: ANESTHESIOLOGY

## 2020-09-08 DEVICE — SYSTEM URO W/ IMPL DEL DEV FOR TREAT OF URIN OUTFLO: Type: IMPLANTABLE DEVICE | Site: PROSTATE | Status: FUNCTIONAL

## 2020-09-08 RX ORDER — SODIUM CHLORIDE 0.9 % (FLUSH) 0.9 %
5-40 SYRINGE (ML) INJECTION EVERY 8 HOURS
Status: DISCONTINUED | OUTPATIENT
Start: 2020-09-08 | End: 2020-09-08 | Stop reason: HOSPADM

## 2020-09-08 RX ORDER — PROPOFOL 10 MG/ML
INJECTION, EMULSION INTRAVENOUS AS NEEDED
Status: DISCONTINUED | OUTPATIENT
Start: 2020-09-08 | End: 2020-09-08 | Stop reason: HOSPADM

## 2020-09-08 RX ORDER — SODIUM CHLORIDE 0.9 % (FLUSH) 0.9 %
5-40 SYRINGE (ML) INJECTION AS NEEDED
Status: DISCONTINUED | OUTPATIENT
Start: 2020-09-08 | End: 2020-09-08 | Stop reason: HOSPADM

## 2020-09-08 RX ORDER — NALOXONE HYDROCHLORIDE 0.4 MG/ML
0.2 INJECTION, SOLUTION INTRAMUSCULAR; INTRAVENOUS; SUBCUTANEOUS AS NEEDED
Status: DISCONTINUED | OUTPATIENT
Start: 2020-09-08 | End: 2020-09-08 | Stop reason: HOSPADM

## 2020-09-08 RX ORDER — LIDOCAINE HYDROCHLORIDE 10 MG/ML
0.1 INJECTION INFILTRATION; PERINEURAL AS NEEDED
Status: DISCONTINUED | OUTPATIENT
Start: 2020-09-08 | End: 2020-09-08 | Stop reason: HOSPADM

## 2020-09-08 RX ORDER — MIDAZOLAM HYDROCHLORIDE 1 MG/ML
2 INJECTION, SOLUTION INTRAMUSCULAR; INTRAVENOUS
Status: DISCONTINUED | OUTPATIENT
Start: 2020-09-08 | End: 2020-09-08 | Stop reason: HOSPADM

## 2020-09-08 RX ORDER — HYDROMORPHONE HYDROCHLORIDE 2 MG/ML
0.5 INJECTION, SOLUTION INTRAMUSCULAR; INTRAVENOUS; SUBCUTANEOUS
Status: DISCONTINUED | OUTPATIENT
Start: 2020-09-08 | End: 2020-09-08 | Stop reason: HOSPADM

## 2020-09-08 RX ORDER — SODIUM CHLORIDE, SODIUM LACTATE, POTASSIUM CHLORIDE, CALCIUM CHLORIDE 600; 310; 30; 20 MG/100ML; MG/100ML; MG/100ML; MG/100ML
75 INJECTION, SOLUTION INTRAVENOUS CONTINUOUS
Status: DISCONTINUED | OUTPATIENT
Start: 2020-09-08 | End: 2020-09-08 | Stop reason: HOSPADM

## 2020-09-08 RX ORDER — DEXAMETHASONE SODIUM PHOSPHATE 4 MG/ML
INJECTION, SOLUTION INTRA-ARTICULAR; INTRALESIONAL; INTRAMUSCULAR; INTRAVENOUS; SOFT TISSUE AS NEEDED
Status: DISCONTINUED | OUTPATIENT
Start: 2020-09-08 | End: 2020-09-08 | Stop reason: HOSPADM

## 2020-09-08 RX ORDER — OXYCODONE AND ACETAMINOPHEN 5; 325 MG/1; MG/1
1 TABLET ORAL AS NEEDED
Status: DISCONTINUED | OUTPATIENT
Start: 2020-09-08 | End: 2020-09-08 | Stop reason: HOSPADM

## 2020-09-08 RX ORDER — ONDANSETRON 2 MG/ML
INJECTION INTRAMUSCULAR; INTRAVENOUS AS NEEDED
Status: DISCONTINUED | OUTPATIENT
Start: 2020-09-08 | End: 2020-09-08 | Stop reason: HOSPADM

## 2020-09-08 RX ORDER — LIDOCAINE HYDROCHLORIDE 20 MG/ML
INJECTION, SOLUTION EPIDURAL; INFILTRATION; INTRACAUDAL; PERINEURAL AS NEEDED
Status: DISCONTINUED | OUTPATIENT
Start: 2020-09-08 | End: 2020-09-08 | Stop reason: HOSPADM

## 2020-09-08 RX ORDER — EPHEDRINE SULFATE/0.9% NACL/PF 50 MG/5 ML
SYRINGE (ML) INTRAVENOUS AS NEEDED
Status: DISCONTINUED | OUTPATIENT
Start: 2020-09-08 | End: 2020-09-08 | Stop reason: HOSPADM

## 2020-09-08 RX ORDER — HYDROCODONE BITARTRATE AND ACETAMINOPHEN 5; 325 MG/1; MG/1
1 TABLET ORAL
Qty: 15 TAB | Refills: 0 | Status: SHIPPED | OUTPATIENT
Start: 2020-09-08 | End: 2020-09-15

## 2020-09-08 RX ADMIN — Medication 5 MG: at 14:25

## 2020-09-08 RX ADMIN — PROPOFOL 200 MG: 10 INJECTION, EMULSION INTRAVENOUS at 13:59

## 2020-09-08 RX ADMIN — ONDANSETRON 4 MG: 2 INJECTION INTRAMUSCULAR; INTRAVENOUS at 14:10

## 2020-09-08 RX ADMIN — LIDOCAINE HYDROCHLORIDE 100 MG: 20 INJECTION, SOLUTION EPIDURAL; INFILTRATION; INTRACAUDAL; PERINEURAL at 13:59

## 2020-09-08 RX ADMIN — DEXAMETHASONE SODIUM PHOSPHATE 4 MG: 4 INJECTION, SOLUTION INTRAMUSCULAR; INTRAVENOUS at 14:10

## 2020-09-08 RX ADMIN — CEFAZOLIN 3 G: 1 INJECTION, POWDER, FOR SOLUTION INTRAVENOUS at 14:02

## 2020-09-08 RX ADMIN — SODIUM CHLORIDE, SODIUM LACTATE, POTASSIUM CHLORIDE, AND CALCIUM CHLORIDE 75 ML/HR: 600; 310; 30; 20 INJECTION, SOLUTION INTRAVENOUS at 11:34

## 2020-09-08 NOTE — DISCHARGE INSTRUCTIONS
Please hold your Plavix until your urine returns to normal color and then resume.  If you have not resumed it by Friday, please let us know at your office appointment. You may have a small tube called a catheter in your urethra to help stop bleeding and to prevent blockage of the urethra. You may feel the need to urinate frequently for a while after the surgery, but this should improve with time. It may burn when you urinate. Drink lots of fluids to help with the burning. Your urine also may look pink for up to 2 to 3 weeks after surgery. This is because there may be blood in it. You may have to avoid strenuous activity and heavy lifting for about 3 weeks. This care sheet gives you a general idea about how long it will take for you to recover. But each person recovers at a different pace. Follow the steps below to feel better as quickly as possible. How can you care for yourself at home? Activity  · Rest when you feel tired. Getting enough sleep will help you recover. · Try to walk each day. Start by walking a little more than you did the day before. Bit by bit, increase the amount you walk. Walking boosts blood flow and helps prevent pneumonia and constipation. · Please limit your activities for 3 days. · Avoid strenuous activities, such as bicycle riding, jogging, weight lifting, or aerobic exercise, for about 3 weeks, or until your doctor says it is okay. · For about 3 weeks, avoid lifting anything that would make you strain. This may include heavy grocery bags and milk containers, a heavy briefcase or backpack, cat litter or dog food bags, a vacuum , or a child. · Ask your doctor when you can drive again. · You may shower and take baths when your doctor says it is okay. · Ask your doctor when it is okay for you to have sex. Diet  · You can eat your normal diet. If your stomach is upset, try bland, low-fat foods like plain rice, broiled chicken, toast, and yogurt.   · Drink plenty of fluids. Medicines  · Your doctor will tell you if and when you can restart your medicines. He or she will also give you instructions about taking any new medicines. · If you take blood thinners, such as warfarin (Coumadin), clopidogrel (Plavix), or aspirin, be sure to talk to your doctor. He or she will tell you if and when to start taking those medicines again. Make sure that you understand exactly what your doctor wants you to do. · Take pain medicines exactly as directed. ¨ If the doctor gave you a prescription medicine for pain, take it as prescribed. ¨ If you are not taking a prescription pain medicine, ask your doctor if you can take an over-the-counter medicine. · If you think your pain medicine is making you sick to your stomach:  ¨ Take your medicine after meals (unless your doctor has told you not to). ¨ Ask your doctor for a different pain medicine. · If your doctor prescribed antibiotics, take them as directed. Do not stop taking them just because you feel better. You need to take the full course of antibiotics. Follow-up care is a key part of your treatment and safety. Be sure to make and go to all appointments, and call your doctor if you are having problems. It's also a good idea to know your test results and keep a list of the medicines you take. When should you call for help? Call 911 anytime you think you may need emergency care. For example, call if:  · You passed out (lost consciousness). · You have severe trouble breathing. · You have sudden chest pain and shortness of breath, or you cough up blood. · You have severe pain in your belly. Call your doctor now or seek immediate medical care if:  · You are sick to your stomach or cannot keep fluids down. · You have trouble passing urine or stool, especially if you have pain or swelling in your lower belly. · You have signs of a blood clot, such as:  ¨ Pain in your calf, back of the knee, thigh, or groin.   ¨ Redness and swelling in your leg or groin. · You have fever or severe chills. · You have pain in your back just below your rib cage. This is called flank pain. Watch closely for any changes in your health, and be sure to contact your doctor if:  · You have pain or burning when you urinate. A burning feeling is normal for a day or two after the surgery, but call if it does not get better. · The blood has not cleared out of your urine after several weeks. You have a frequent urge to urinate but can pass only small amounts of urine. After general anesthesia or intravenous sedation, for 24 hours or while taking prescription Narcotics:  · Limit your activities  · Do not drive and operate hazardous machinery  · Do not make important personal or business decisions  · Do not drink alcoholic beverages  · If you have not urinated within 8 hours after discharge, please contact your surgeon on call. *  Please give a list of your current medications to your Primary Care Provider. *  Please update this list whenever your medications are discontinued, doses are      changed, or new medications (including over-the-counter products) are added. *  Please carry medication information at all times in case of emergency situations. These are general instructions for a healthy lifestyle:  No smoking/ No tobacco products/ Avoid exposure to second hand smoke  Surgeon General's Warning:  Quitting smoking now greatly reduces serious risk to your health. Obesity, smoking, and sedentary lifestyle greatly increases your risk for illness  A healthy diet, regular physical exercise & weight monitoring are important for maintaining a healthy lifestyle    You may be retaining fluid if you have a history of heart failure or if you experience any of the following symptoms:  Weight gain of 3 pounds or more overnight or 5 pounds in a week, increased swelling in our hands or feet or shortness of breath while lying flat in bed.   Please call your doctor as soon as you notice any of these symptoms; do not wait until your next office visit.

## 2020-09-08 NOTE — ANESTHESIA PREPROCEDURE EVALUATION
Relevant Problems   No relevant active problems       Anesthetic History               Review of Systems / Medical History      Pulmonary                   Neuro/Psych       CVA      Comments: R;  Stopped Plavix three days agoight thalamic stroke in 2019, second stroke in 6/2020 -- mild gait disturbance;  stopped Plavix three days ago Cardiovascular    Hypertension: well controlled          Hyperlipidemia    Exercise tolerance: >4 METS     GI/Hepatic/Renal               Comments: BPH Endo/Other    Diabetes: well controlled, type 2, using insulin         Other Findings              Physical Exam    Airway  Mallampati: II  TM Distance: 4 - 6 cm  Neck ROM: normal range of motion   Mouth opening: Normal     Cardiovascular    Rhythm: regular           Dental  No notable dental hx       Pulmonary  Breath sounds clear to auscultation               Abdominal  GI exam deferred       Other Findings            Anesthetic Plan    ASA: 3  Anesthesia type: general            Anesthetic plan and risks discussed with: Patient and Spouse

## 2020-09-08 NOTE — PROGRESS NOTES
Spiritual Care visit. Initial Visit, Pre Surgery Consult. Visit and prayer before patient goes to surgery.     Visit by Arjun Thao M.Ed., Th.B. ,Staff

## 2020-09-08 NOTE — PROGRESS NOTES
's pre-procedure visit and prayer with patient as requested.     Yaneth Palomares MDiv, BS  Board Certified

## 2020-09-08 NOTE — OP NOTES
[facility] OPERATIVE REPORT     Name:  Diana Gunn  MR#:  [id number]  :   1945    DATE OF SERVICE:  2020     PREOPERATIVE DIAGNOSIS:  Benign prostatic hyperplasia/lower urinary tract symptoms. POSTOPERATIVE DIAGNOSIS:  Benign prostatic hyperplasia/lower urinary tract symptoms. PROCEDURE PERFORMED:  UroLift. SURGEON:  María Rangel MD     ASSISTANT: None     ANESTHESIA:  General.     COMPLICATIONS:  None. SPECIMENS REMOVED:  None. FINDINGS:  Lateral lobe hyperplasia bilaterally, No significant median lobe, Deployment of 6 UroLift implants with an open anterior channel at the end of the case. IMPLANTS:  UroLift     ESTIMATED BLOOD LOSS:  1 mL. INDICATIONS FOR OPERATIVE PROCEDURE:  The patient is a 70year old gentleman with symptomatic BPH/LUTS refractory to medications who opted for UroLift procedure today. Preoperative cystoscopy revealed no significant median lobe, but did show lateral lobe hyperplasia. He was counseled extensively on management options and risks/benefits and opted to proceed today with Urolift. DESCRIPTION OF OPERATIVE PROCEDURE:  After informed consent was obtained, the correct patient was identified in preoperative holding area, he was taken back to operating suite and placed on the table in the supine position. Time-out was performed confirming the correct patient and planned procedure. He received 2 g of IV Ancef prior to smooth induction of general endotracheal anesthesia. He was then moved to the dorsal lithotomy position, prepped and draped in usual sterile fashion. I began the case by inserting a 20-[place] rigid urolift cystoscope with the visual obturator into the urethra and advanced it into the patient's bladder. .  Pancystoscopy was performed which was unremarkable. There was no significant median lobe and but significant lateral lobe bilateral hypertrophy.   I then removed the cystoscope back to the verumontanum and examined the prostate very carefully. At this point, I then placed the cystoscope back into the patient's bladder. I removed the obturator and loaded the first UroLift implant onto the camera. I then turned to the right anterolateral position, about 10 o'clock on the clock face, backed the implant system into the patient's prostatic urethra about 2 cm distal to the bladder neck. I then compressed the prostate tissue laterally and then fired the implant into the prostate and released it. The implant appeared in good position. I then came back to the midline and reinserted my scope into the patient's bladder. I removed the implant device and loaded a second implant. I then turned my attention to the 2 o'clock position on the clock face on the left side of the patient and performed gentle compression of the prostate again about 2 cm distal to the bladder neck, just across from my first implant. I injected the second implant in this location which helped to create an open anterior channel. I then again moved back into the bladder before removing the implant device. I then proceeded to deploy two more implants at the verumontanum distally to create an open anterior channel in the patient's prostate gland. These implants were delivered in the exact same fashion as the initial two. I then switched back to the visual obturator after the delivery of four implants and investigated the prostatic urethra.  2 additional implants were placed beyond the typical four to allow for an open channel. After placement of all of the implants, final cystoscopy using the visual obturator showed a nice wide open anterior channel at the end of the case. I then drained the bladder completely. I inspected the prostatic urethra. There was no evidence of any significant bleeding, no evidence of any injury to the bladder neck or bladder and all of the implants appear to be in a satisfactory location.   At this point, I then removed the rigid cystoscope and did leave a Cochran catheter due to his history of urinary retention. The patient was then awoken from anesthesia and transferred to the PACU in stable condition. He tolerated the procedure well. There were no complications. All counts were correct at the end of the procedure.   He will follow up on Friday for cochran catheter removal.

## 2020-09-09 NOTE — ANESTHESIA POSTPROCEDURE EVALUATION
Procedure(s):  UROLIFT.     general    Anesthesia Post Evaluation      Multimodal analgesia: multimodal analgesia used between 6 hours prior to anesthesia start to PACU discharge  Patient location during evaluation: PACU  Patient participation: complete - patient participated  Level of consciousness: awake and alert  Pain management: adequate  Airway patency: patent  Anesthetic complications: no  Cardiovascular status: acceptable  Respiratory status: acceptable  Hydration status: acceptable  Post anesthesia nausea and vomiting:  controlled  Final Post Anesthesia Temperature Assessment:  Normothermia (36.0-37.5 degrees C)      INITIAL Post-op Vital signs:   Vitals Value Taken Time   /83 9/8/2020  3:10 PM   Temp 36.8 °C (98.2 °F) 9/8/2020  3:13 PM   Pulse 73 9/8/2020  3:13 PM   Resp 15 9/8/2020  3:13 PM   SpO2 92 % 9/8/2020  3:13 PM

## 2020-09-14 ENCOUNTER — HOSPITAL ENCOUNTER (OUTPATIENT)
Dept: PHYSICAL THERAPY | Age: 75
Discharge: HOME | End: 2020-09-14
Attending: PHYSICAL MEDICINE & REHABILITATION
Payer: MEDICARE

## 2020-09-14 PROCEDURE — 97112 NEUROMUSCULAR REEDUCATION: CPT

## 2020-09-14 NOTE — PROGRESS NOTES
Prasanth Vasquez  : 1945  [place]  Secondary: Jayesh Velez at [address] [place] [address], [address] [facility], [place] [address].  [phone]   [phone]     OUTPATIENT PHYSICAL THERAPY: Daily Treatment Note 2020  Visit Count:  Visit count could not be calculated. Make sure you are using a visit which is associated with an episode. ICD-10: Treatment Diagnosis: Difficulty in walking, not elsewhere classified (R26.2)  Precautions/Allergies:   Patient has no known allergies. TREATMENT PLAN:  Effective Dates: 2020 TO 2020 (90 days). Frequency/Duration: 2 times a week for 90 Day(s)    Pre-treatment Symptoms/Complaints:  2020: Patient reports he had a procedure to remove his catheter. \"I feel like I am weaker after the procedure, but I am feeling pretty good today\". Pain: Initial: Pain Intensity 1: 0  Post Session:  0/10   Medications Last Reviewed:  2020  Updated Objective Findings:  None Today  TREATMENT:     NEUROMUSCULAR RE-EDUCATION: (45 minutes):  Exercise/activities per grid below to improve balance and coordination. Required minimal verbal cues to promote dynamic balance in standing. Date:  2020   Activity/Exercise Parameters   Marching in hallway 4 laps   Side-stepping in hallway 4 laps   Walking backward in hallway 4 laps   Weaving around cones 4 laps   Step taps 6 inch step  Alternating LEs  10 reps  Crosstaps 10 reps   Step ups 6 inch step  10 reps  B LE   Step overs: forward 1/2 foam roll  10 reps  Alternating LEs   Step overs: lateral 1/2 foam roll  10 reps  B LE   Standing with feet closed Blue foam eyes open and eyes closed   Walking with rolling walker 200 feet + up/down ramp   Semi-tandem stance Blue foam Eyes open and eyes closed       Treatment/Session Summary:    · Response to Treatment:  Patient needed several rest breaks, but tolerated treatment well. · Communication/Consultation:  None today  · Equipment provided today:  None today  · Recommendations/Intent for next treatment session: Next visit will focus on balance exercise and gait.     Total Treatment Billable Duration:  45 minutes  PT Patient Time In/Time Out  Time In: 1300  Time Out: [address], PT    Future Appointments   Date Time Provider Mari Maldonado   9/17/2020 11:00 AM Agustina Lopez, [address] SSA PMR PMR   9/17/2020  1:00 PM Ankush Hill, PT SFEORPT SFE   9/21/2020  2:30 PM Walker Curry, PT SFEORPT SFE   9/25/2020  1:00 PM Elvin Smith, PT SFEORPT SFE   9/29/2020  1:45 PM Ankushpedro Hill, PT SFEORPT SFE   10/1/2020  1:00 PM Walker Curry, PT SFEORPT SFE   10/2/2020 10:30 AM Vero Chowdhury MD [id number] PGU   10/6/2020  1:00 PM Walker Curry, PT SFEORPT SFE   10/9/2020  1:00 PM Walker Curry, PT SFEORPT SFE   10/13/2020  1:00 PM Walker Curry, PT SFEORPT SFE   10/16/2020  1:00 PM Walker Curry, PT SFEORPT SFE   10/26/2020  3:15 PM Charly Santiago MD [facility] WANDER

## 2020-09-17 ENCOUNTER — HOSPITAL ENCOUNTER (OUTPATIENT)
Dept: PHYSICAL THERAPY | Age: 75
Discharge: HOME | End: 2020-09-17
Attending: PHYSICAL MEDICINE & REHABILITATION
Payer: MEDICARE

## 2020-09-17 PROBLEM — R33.9 URINARY RETENTION: Status: RESOLVED | Noted: 2020-06-08 | Resolved: 2020-09-17

## 2020-09-17 PROCEDURE — 97112 NEUROMUSCULAR REEDUCATION: CPT

## 2020-09-17 NOTE — PROGRESS NOTES
Silvia Roth  : 1945  [place]  Secondary: Jayesh eVlez at [address] [place] [address], [address], [address] [facility]  [phone]   [phone]     OUTPATIENT PHYSICAL THERAPY: Daily Treatment Note 2020  Visit Count:  5    ICD-10: Treatment Diagnosis: Difficulty in walking, not elsewhere classified (R26.2)  Precautions/Allergies:   Patient has no known allergies. TREATMENT PLAN:  Effective Dates: 2020 TO 2020 (90 days). Frequency/Duration: 2 times a week for 90 Day(s)    Pre-treatment Symptoms/Complaints:  2020: Patient reports he is feeling good today. Pain: Initial: Pain Intensity 1: 0  Post Session:  0/10   Medications Last Reviewed:  2020  Updated Objective Findings:  None Today  TREATMENT:     NEUROMUSCULAR RE-EDUCATION: (45 minutes):  Exercise/activities per grid below to improve balance and coordination. Required minimal verbal cues to promote dynamic balance in standing. Date:  2020   Activity/Exercise Parameters   Marching in hallway 4 laps   Side-stepping in hallway 4 laps   Walking backward in hallway 4 laps   Weaving around cones 4 laps   Step taps 6 inch step  Alternating LEs  10 reps  Crosstaps 10 reps   Step ups 6 inch step  10 reps  B LE   Step overs: forward 1/2 foam roll  10 reps  Alternating LEs   Step overs: lateral 1/2 foam roll  10 reps  B LE   Standing with feet apart Blue foam eyes open and eyes closed   Walking with rolling walker 200 feet + up/down ramp   Semi-tandem stance Blue foam Eyes open and eyes closed       Treatment/Session Summary:    · Response to Treatment:  Patient completed all activities without rolling walker. Verbal cues for slow and controlled steps with heel/toe walking.   · Communication/Consultation:  None today  · Equipment provided today:  None today  · Recommendations/Intent for next treatment session: Next visit will focus on balance exercise and gait.     Total Treatment Billable Duration:  45 minutes  PT Patient Time In/Time Out  Time In: 1300  Time Out: [address] Farzana Almonte, PT    Future Appointments   Date Time Provider Mari Maldonado   9/17/2020 11:00 AM Livier Wright SSA PMR PMR   9/17/2020  1:00 PM Obdulio Castanon, PT SFEORPT SFE   9/21/2020  2:30 PM Chaya Currylvenu Kruse, PT SFEORPT SFE   9/25/2020  1:00 PM Leila Gleason, PT SFEORPT SFE   9/29/2020  1:45 PM Obdulio Castanon, PT SFEORPT SFE   10/1/2020  1:00 PM Patricio Teri Helling, PT SFEORPT SFE   10/2/2020 10:30 AM Makenzie Emmanuel MD [id number] PGU   10/6/2020  1:00 PM Viskristopher Teri Helling, PT SFEORPT SFE   10/9/2020  1:00 PM Vissasheila Teri Helling, PT SFEORPT SFE   10/13/2020  1:00 PM Vissasheila Teri Helling, PT SFEORPT SFE   10/16/2020  1:00 PM Vissasheila Teri Helling, PT SFEORPT SFE   10/26/2020  3:15 PM Stephanie Wilson MD [facility] WANDER

## 2020-09-21 ENCOUNTER — HOME CARE VISIT (OUTPATIENT)
Dept: HOME HEALTH SERVICES | Facility: HOME HEALTH | Age: 75
End: 2020-09-21
Payer: MEDICARE

## 2020-09-21 ENCOUNTER — HOSPITAL ENCOUNTER (OUTPATIENT)
Dept: PHYSICAL THERAPY | Age: 75
Discharge: HOME | End: 2020-09-21
Attending: PHYSICAL MEDICINE & REHABILITATION
Payer: MEDICARE

## 2020-09-21 PROCEDURE — 97112 NEUROMUSCULAR REEDUCATION: CPT

## 2020-09-21 NOTE — PROGRESS NOTES
Yolis Garcia  : 1945  [place]  Secondary: Jayesh Almonte [address] at [address], [address] [place] [address],[address], [place] [address].  [phone]   [phone]     OUTPATIENT PHYSICAL THERAPY: Daily Treatment Note 2020  Visit Count:  5    ICD-10: Treatment Diagnosis: Difficulty in walking, not elsewhere classified (R26.2)  Precautions/Allergies:   Patient has no known allergies. TREATMENT PLAN:  Effective Dates: 2020 TO 2020 (90 days). Frequency/Duration: 2 times a week for 90 Day(s)    Pre-treatment Symptoms/Complaints:  2020: Patient reports he went up and down the stairs at his house for the first time this past weekend. Pain: Initial: Pain Intensity 1: 0  Post Session:  0/10   Medications Last Reviewed:  2020  Updated Objective Findings:  None Today  TREATMENT:     NEUROMUSCULAR RE-EDUCATION: (45 minutes):  Exercise/activities per grid below to improve balance and coordination. Required minimal verbal cues to promote dynamic balance in standing. Date:  2020   Activity/Exercise Parameters   Marching in hallway 4 laps   Walking with horizontal head turns 4 laps   Side-stepping in hallway 4 laps   Walking backward in hallway 4 laps   Weaving around cones 4 laps   Step taps 6 inch step  Alternating LEs  10 reps  Crosstaps 10 reps   Step ups 6 inch step  10 reps  B LE   Step overs: forward 1/2 foam roll  10 reps  Alternating LEs   Step overs: lateral 1/2 foam roll  10 reps  B LE   Standing with feet apart Blue foam eyes open and eyes closed   Walking with rolling walker Without assistive device 200 feet + up/down ramp   Semi-tandem stance Blue foam Eyes open and eyes closed       Treatment/Session Summary:    · Response to Treatment:  Patient demonstrating improved balance with exercises. Patient needs verbal cues to correct flexed posture.     · Communication/Consultation:  None today  · Equipment provided today:  None today  · Recommendations/Intent for next treatment session: Next visit will focus on balance exercise and gait.     Total Treatment Billable Duration:  45 minutes  PT Patient Time In/Time Out  Time In: 5890  Time Out: [address], PT    Future Appointments   Date Time Provider Mari Maldonado   9/25/2020  1:00 PM Janny Mccann, PT [facility] SFE   9/29/2020  1:45 PM Elier Camara, PT SFEORPT SFE   10/1/2020  1:00 PM Margy Curry, PT SFEORPT SFE   10/2/2020 10:30 AM Louie Morales MD [id number] PGU   10/6/2020  1:00 PM Margy Curry, PT SFEORPT SFE   10/9/2020  1:00 PM Margy Curry, PT SFEORPT SFE   10/13/2020  1:00 PM VisMargy summers, PT SFEORPT SFE   10/16/2020  1:00 PM Margy Curry, PT SFEORPT SFE   10/26/2020  3:15 PM Marco Antonio Molina MD SSA WANDER WANDER   3/2/2021 11:00 AM GARCÍA Mcarthur SSA PMR PMR

## 2020-09-25 ENCOUNTER — HOSPITAL ENCOUNTER (OUTPATIENT)
Dept: PHYSICAL THERAPY | Age: 75
Discharge: HOME | End: 2020-09-25
Attending: PHYSICAL MEDICINE & REHABILITATION
Payer: MEDICARE

## 2020-09-25 PROCEDURE — 97112 NEUROMUSCULAR REEDUCATION: CPT

## 2020-09-25 PROCEDURE — 97110 THERAPEUTIC EXERCISES: CPT

## 2020-09-25 NOTE — PROGRESS NOTES
Yolis Garcia  : 1945  [place]  [facility]: [facility] [address], [address] [facility]  [phone]   [phone]     OUTPATIENT PHYSICAL THERAPY: Daily Treatment Note 2020  Visit Count:  5    ICD-10: Treatment Diagnosis: Difficulty in walking, not elsewhere classified (R26.2)  Precautions/Allergies:   Patient has no known allergies. TREATMENT PLAN:  Effective Dates: 2020 TO 2020 (90 days). Frequency/Duration: 2 times a week for 90 Day(s)    Pre-treatment Symptoms/Complaints:  2020: No complaints  Pain: Initial: Pain Intensity 1: 0  Post Session:  0/10   Medications Last Reviewed:  2020  Updated Objective Findings:  None Today  TREATMENT:     NEUROMUSCULAR RE-EDUCATION: (35 minutes):  Exercise/activities per grid below to improve balance and coordination. Required minimal verbal cues to promote dynamic balance in standing. Date:  2020   Activity/Exercise Parameters   Marching in hallway 4 laps   Walking with horizontal head turns 4 laps   Side-stepping in hallway 4 laps   Walking backward in hallway 4 laps   Weaving around cones 4 laps   Step taps 6 inch step  Alternating LEs  10 reps  Crosstaps 10 reps   Step ups 6 inch step  10 reps  B LE   Marching on yellow foam X 20 reps with CGA   Tandem walking In parallel bars 4 laps   Step overs: forward 1/2 foam roll  10 reps  Alternating LEs   Step overs: lateral 1/2 foam roll  10 reps  B LE   Standing with feet apart Blue foam eyes open and eyes closed   Walking with rolling walker Without assistive device 200 feet + up/down ramp       Semi-tandem stance Blue foam Eyes open and eyes closed       Therapeutic Exercise: ( 10 minutes):  Exercises per grid below to improve mobility, strength and balance.   Required minimal verbal cues to promote proper body alignment, promote proper body posture and promote proper body mechanics. Progressed resistance, repetitions and complexity of movement as indicated. Date:  9/25/20 Date:   Date:     Activity/Exercise Parameters Parameters Parameters   Sit to stand From mat table 2 x 10 reps with no UE assist     LAQs  X 20 reps B                                         Treatment/Session Summary:    · Response to Treatment:  Patient demonstrating improved balance with exercises. Patient was able to add more exercises today and did well. He had no complaints of dizziness. Continue to progress as tolerated. · Communication/Consultation:  None today  · Equipment provided today:  None today  · Recommendations/Intent for next treatment session: Next visit will focus on balance exercise and gait.     Total Treatment Billable Duration:  45 minutes  PT Patient Time In/Time Out  Time In: 1300  Time Out: [address] [facility], PT    Future Appointments   Date Time Provider Mari Maldonado   9/29/2020  1:45 PM Filiberto Carrasco PT [facility] SFE   10/1/2020  1:00 PM Nighat Brito, PT SFEORPT SFE   10/2/2020 10:30 AM Sierra Lawson MD [id number] PGU   10/6/2020  1:00 PM Vissage, Dierdre Servant, PT SFEORPT SFE   10/9/2020  1:00 PM Vissage, Dierdre Servant, PT SFEORPT SFE   10/13/2020  1:00 PM Vissage, Dierdre Servant, PT SFEORPT SFE   10/16/2020  1:00 PM Vissage, Dierdre Servant, PT SFEORPT SFE   10/26/2020  3:15 PM Paty Mai MD SSA WANDER [facility]   3/2/2021 11:00 AM GARCÍA Swanson SSA PMR PMR

## 2020-09-29 ENCOUNTER — HOSPITAL ENCOUNTER (OUTPATIENT)
Dept: PHYSICAL THERAPY | Age: 75
Discharge: HOME | End: 2020-09-29
Attending: PHYSICAL MEDICINE & REHABILITATION
Payer: MEDICARE

## 2020-09-29 PROCEDURE — 97112 NEUROMUSCULAR REEDUCATION: CPT

## 2020-09-29 NOTE — PROGRESS NOTES
Rosio Best  : 1945  [place]  Secondary: Jayesh Velez at [place] [address] [facility], [address] [place] [address],[address], [place] [address].  [phone]   [phone]        OUTPATIENT PHYSICAL THERAPY:Progress Report 2020   ICD-10: Treatment Diagnosis: Difficulty in walking, not elsewhere classified (R26.2)  Precautions/Allergies:   Patient has no known allergies. TREATMENT PLAN:  Effective Dates: 2020 TO 2020 (90 days). Frequency/Duration: 2 times a week for 90 Day(s) MEDICAL/REFERRING DIAGNOSIS:  Cerebral infarction, unspecified [I63.9]   DATE OF ONSET:    REFERRING PHYSICIAN: Daphne Crespo MD Orders: Evaluate and treat   Return MD Appointment: unknown      ASSESSMENT:  Mr. Ena Peña presents with improving balance and safety with walking following CVA. Patient has attended a total of 6 scheduled physical therapy visits including initial evaluation on 2020. Treatment has consisted of balance and strengthening activiies to improve overall safety and performance with activities of daily living. PROBLEM LIST (Impacting functional limitations):  1. Decreased Ambulation Ability/Technique  2. Decreased Balance  3. Decreased [place] with Home Exercise Program INTERVENTIONS PLANNED: (Treatment may consist of any combination of the following)  1. Balance Exercise  2. Gait Training  3. Home Exercise Program (HEP)     GOALS: (Goals have been discussed and agreed upon with patient.)  Short-Term Functional Goals: Time Frame: 30 days   1. Patient will be independent with home exercise program to improve balance--goal met. Discharge Goals: Time Frame: 90 days   1. Patient will score greater than or equal to 48/56 on Watt Balance Score indicating improved balance and decreased fall risk with daily activities--goal ongoing.     2. Patient will score greater than or equal to 20/24 on Dynamic Gait Index indicating improved balance and decreased fall risk with daily activities--goal ongoing. 3. Patient will score less than or equal to 10 seconds on the Timed Up and Go Test indicating improved gait speed--goal ongoing. 4. Patient will ambulate without assistive device across level and unlevel surfaces without loss of balance or fear of falling--goal ongoing. OUTCOME MEASURE:   Tool Used: Watt Balance Scale  Score:  Initial: 37/56 Most Recent: 40/56 (Date: 9/29/2020 )   Interpretation of Score: Each section is scored on a 0-4 scale, 0 representing the patients inability to perform the task and 4 representing independence. The scores of each section are added together for a total score of 56. The higher the patients score, the more independent the patient is. Any score below 45 indicates increased risk for falls. Tool Used: Dynamic Gait Index  Score:  Initial: 10/24 Most Recent: 14/24 (Date: 9/29/2020 )   Interpretation of Score: Each section is scored on a 0-3 scale, 0 representing the patients inability to perform the task and 3 representing independence. The scores of each section are added together for a total score of 24. Any score below 19 indicates increased risk for falls. Tool Used: Timed Up and Go (TUG)  Score:  Initial: 12.53 seconds Most Recent: 11.32 seconds (Date: 9/29/202 )   Interpretation of Score: The test measures, in seconds, the time taken by an individual to stand up from a standard arm chair (seat height 46 cm [18 in], arm height 65 cm [25.6 in]), walk a distance of 3 meters (118 in, approx 10 ft), turn, walk back to the chair and sit down. If the individual takes longer than 14 seconds to complete TUG, this indicates risk for falls. MEDICAL NECESSITY:   · Patient is expected to demonstrate progress in balance and gait to improve safety during activities of daily living.   REASON FOR SERVICES/OTHER COMMENTS:  · Patient continues to require skilled intervention due to recent CVA. Total Duration:  PT Patient Time In/Time Out  Time In: 1345  Time Out: 1430    Rehabilitation Potential For Stated Goals: Excellent     PAIN/SUBJECTIVE:   Initial: Pain Intensity 1: 0  Post Session:  0/10   HISTORY:   History of Injury/Illness (Reason for Referral):  Patient reports having CVA on June 8, 2020. Patient complains of imbalance, dizziness, and difficulty walking. Patient has had no falls. Patient currently using rolling walker, but used no assistive device prior to CVA. Patient rates current dizziness as 0/10 and current as pain level 0/10. Patient's goal is to walk without his walker. 9/29/2020 (Progress Note): Patient reports he feels his balance is getting better. He also reports less dizziness as well. He reports no falls. Past Medical History/Comorbidities:   Mr. Siva Armstrong  has a past medical history of Diabetes [facility]), Essential hypertension, benign (1/3/2014), Mixed hyperlipidemia (1/3/2014), Numbness and tingling in left hand (7/2/2019), Right thalamic infarction ([facility] (7/2/2019), and Stroke ([facility] (06/08/2020). Mr. Siva Armstrong  has a past surgical history that includes hx heent; hx other surgical; and hx urological.  Social History/Living Environment:     Lives with spouse in a one story home. Prior Level of Function/Work/Activity:  Independent. Retired. Dominant Side:         RIGHT  Personal Factors:          Sex:  male        Age:  70 y.o. Current Medications:       Current Outpatient Medications:     carboxymethylcellulose sodium (REFRESH LIQUIGEL) 1 % dlgl ophthalmic solution, Administer 1 Drop to right eye daily. , Disp: , Rfl:     DISABLED PLACARD (DISABLED PLACARD) DMV, Use as directed, Disp: 1 Each, Rfl: 0    scopolamine (TRANSDERM-SCOP) 1 mg over 3 days pt3d, 1 Patch by TransDERmal route every seventy-two (72) hours. , Disp: 15 Patch, Rfl: 1    meclizine (ANTIVERT) 25 mg tablet, Take 1 Tab by mouth three (3) times daily as needed for Dizziness. , Disp: 270 Tab, Rfl: 1    doxazosin (CARDURA) 8 mg tablet, Take 1 Tab by mouth two (2) times a day., Disp: 180 Tab, Rfl: 1    glipiZIDE (GLUCOTROL) 5 mg tablet, Take 1 Tab by mouth Daily (before breakfast). , Disp: 90 Tab, Rfl: 1    lisinopriL (PRINIVIL, ZESTRIL) 5 mg tablet, Take 1 Tab by mouth two (2) times a day., Disp: 180 Tab, Rfl: 1    finasteride (PROSCAR) 5 mg tablet, TAKE 1 TABLET BY MOUTH DAILY, Disp: 90 Tab, Rfl: 3    loratadine (Claritin) 10 mg tablet, Take 1 Tab by mouth daily. , Disp: 90 Tab, Rfl: 4    atorvastatin (LIPITOR) 80 mg tablet, Take 1 Tab by mouth nightly., Disp: 30 Tab, Rfl: 3    ondansetron (ZOFRAN ODT) 4 mg disintegrating tablet, Take 1 Tab by mouth every six (6) hours as needed for Nausea or Vomiting., Disp: 60 Tab, Rfl: 1   Date Last Reviewed:  8/25/2020   EXAMINATION:   Functional Mobility:         Gait/Ambulation:  Patient ambulates with shuffling gait pattern with decreased stride length and decreased heel strike. Strength:          Bilateral lower extremity 5/5. Sensation:         Within normal limits.

## 2020-09-29 NOTE — PROGRESS NOTES
Roseanna Stapleton  : 1945  [place]  Secondary: Jayesh Almonte [address] at [address], [address] [place] [address],[address], [place] [address].  [phone]   [phone]     OUTPATIENT PHYSICAL THERAPY: Daily Treatment Note 2020  Visit Count:  5    ICD-10: Treatment Diagnosis: Difficulty in walking, not elsewhere classified (R26.2)  Precautions/Allergies:   Patient has no known allergies. TREATMENT PLAN:  Effective Dates: 2020 TO 2020 (90 days). Frequency/Duration: 2 times a week for 90 Day(s)    Pre-treatment Symptoms/Complaints:  2020: Patient reports he is doing well overall. Pain: Initial: Pain Intensity 1: 0  Post Session:  0/10   Medications Last Reviewed:  2020  Updated Objective Findings:  None Today  TREATMENT:     NEUROMUSCULAR RE-EDUCATION: (40 minutes):  Exercise/activities per grid below to improve balance and coordination. Required minimal verbal cues to promote dynamic balance in standing. Date:  2020   Activity/Exercise Parameters   Marching in hallway 4 laps   Walking with horizontal head turns 4 laps   Side-stepping in hallway 4 laps   Walking backward in hallway 4 laps   Weaving around cones 4 laps   Step taps 6 inch step  Alternating LEs  10 reps  Crosstaps 10 reps   Step ups 6 inch step  10 reps  B LE   Marching on yellow foam X 20 reps with CGA   Tandem walking In parallel bars 4 laps   Step overs: forward 1/2 foam roll  10 reps  Alternating LEs   Step overs: lateral 1/2 foam roll  10 reps  B LE   Standing with feet apart Blue foam eyes open and eyes closed   Walking with rolling walker Without assistive device 200 feet + up/down ramp   Semi-tandem stance Blue foam Eyes open and eyes closed       Therapeutic Exercise: ( 5 minutes):  Exercises per grid below to improve mobility, strength and balance.   Required minimal verbal cues to promote proper body alignment, promote proper body posture and promote proper body mechanics. Progressed resistance, repetitions and complexity of movement as indicated. Date:  9/25/20   Activity/Exercise Parameters   Sit to stand From mat table 2 x 10 reps with no UE assist   LAQs  20 reps B     Treatment/Session Summary:    · Response to Treatment:  Patient tolerated treatment well with improving overall balance and mobility. · Communication/Consultation:  None today  · Equipment provided today:  None today  · Recommendations/Intent for next treatment session: Next visit will focus on balance exercise and gait.     Total Treatment Billable Duration:  45 minutes  PT Patient Time In/Time Out  Time In: 9764  Time Out: VERA Bello    Future Appointments   Date Time Provider Mari Maldonado   9/29/2020  1:45 PM Leonidas Wilburn PT [facility] SFE   10/1/2020  1:00 PM Briana Zaman, PT SFE[facility]PT SFE   10/2/2020 10:30 AM Willa No MD [id number] PGU   10/6/2020  1:00 PM Marilin Curry, PT [facility]PT SFE   10/9/2020  1:00 PM Marilin Curry, PT S[facility]PT SFE   10/13/2020  1:00 PM Marilin Curry, PT SFEORPT SFE   10/16/2020  1:00 PM Marilin Curry, PT SFEORPT SFE   10/26/2020  3:15 PM Jenae Allen MD SSA WANDER WANDER   3/2/2021 11:00 AM GARCÍA Mejia SSA PMR PMR

## 2020-10-01 ENCOUNTER — HOSPITAL ENCOUNTER (OUTPATIENT)
Dept: PHYSICAL THERAPY | Age: 75
Discharge: HOME | End: 2020-10-01
Attending: PHYSICAL MEDICINE & REHABILITATION
Payer: MEDICARE

## 2020-10-01 PROCEDURE — 97112 NEUROMUSCULAR REEDUCATION: CPT

## 2020-10-01 NOTE — PROGRESS NOTES
Marc Bedoya  : 1945  [place]  Secondary: Jayesh Velez at [address], [address], [address] [place] [address]  [phone]   [phone]     OUTPATIENT PHYSICAL THERAPY: Daily Treatment Note 10/1/2020  Visit Count:  5    ICD-10: Treatment Diagnosis: Difficulty in walking, not elsewhere classified (R26.2)  Precautions/Allergies:   Patient has no known allergies. TREATMENT PLAN:  Effective Dates: 2020 TO 2020 (90 days). Frequency/Duration: 2 times a week for 90 Day(s)    Pre-treatment Symptoms/Complaints:  10/1/2020: Patient feels like he is getting stronger. Pain: Initial: Pain Intensity 1: 0  Post Session:  0/10   Medications Last Reviewed:  10/1/2020  Updated Objective Findings:  None Today  TREATMENT:     NEUROMUSCULAR RE-EDUCATION: (40 minutes):  Exercise/activities per grid below to improve balance and coordination. Required minimal verbal cues to promote dynamic balance in standing. Date:  10/1/2020   Activity/Exercise Parameters   Marching in hallway 4 laps   Walking with horizontal head turns 4 laps   Side-stepping in hallway 4 laps   Walking backward in hallway 4 laps   Weaving around cones 4 laps   Step taps 6 inch step  Alternating LEs  10 reps  Crosstaps 10 reps   Step ups 6 inch step  10 reps  B LE   Marching on yellow foam X 20 reps with CGA   Tandem walking In parallel bars 4 laps   Step overs: forward 1/2 foam roll  10 reps  Alternating LEs   Step overs: lateral 1/2 foam roll  10 reps  B LE   Standing with feet apart Blue foam eyes open and eyes closed   Walking with rolling walker Without assistive device 200 feet + up/down ramp   Semi-tandem stance Blue foam Eyes open and eyes closed       Therapeutic Exercise: ( 5 minutes):  Exercises per grid below to improve mobility, strength and balance.   Required minimal verbal cues to promote proper body alignment, promote proper body posture and promote proper body mechanics. Progressed resistance, repetitions and complexity of movement as indicated. Date:  10/1/20   Activity/Exercise Parameters   Sit to stand From mat table 2 x 10 reps with no UE assist   LAQs  20 reps B     Treatment/Session Summary:    · Response to Treatment:  Patient progressing well with therapy. · Communication/Consultation:  None today  · Equipment provided today:  None today  · Recommendations/Intent for next treatment session: Next visit will focus on balance exercise and gait.     Total Treatment Billable Duration:  45 minutes  PT Patient Time In/Time Out  Time In: 6067  Time Out: 826 [facility],     Future Appointments   Date Time Provider Mari Erica   10/2/2020 10:30 AM Arianna Ling MD [id number] PGU   10/6/2020  1:00 PM Jose Curry, PT [facility]FE[facility]PT SFE   10/9/2020  1:00 PM Jose Curry, PT SFEORPT SFE   10/13/2020  1:00 PM Jose Curry, PT SFEORPT SFE   10/16/2020  1:00 PM Michelle Marroquin, PT SFEORPT SFE   10/26/2020  3:15 PM Marjan Sherman MD SSA WANDER WANDER   3/2/2021 11:00 AM GARCÍA Bojorquez SSA PMR PMR

## 2020-10-06 ENCOUNTER — HOSPITAL ENCOUNTER (OUTPATIENT)
Dept: PHYSICAL THERAPY | Age: 75
Discharge: HOME | End: 2020-10-06
Attending: PHYSICAL MEDICINE & REHABILITATION
Payer: MEDICARE

## 2020-10-06 PROCEDURE — 97112 NEUROMUSCULAR REEDUCATION: CPT

## 2020-10-06 NOTE — PROGRESS NOTES
Yuki Matias  : 1945  [place]  Secondary: Jayesh Velez at [place] [id number] [facility], [address], [place] [address].  [phone]   [phone]     OUTPATIENT PHYSICAL THERAPY: Daily Treatment Note 10/6/2020  Visit Count:  5    ICD-10: Treatment Diagnosis: Difficulty in walking, not elsewhere classified (R26.2)  Precautions/Allergies:   Patient has no known allergies. TREATMENT PLAN:  Effective Dates: 2020 TO 2020 (90 days). Frequency/Duration: 2 times a week for 90 Day(s)    Pre-treatment Symptoms/Complaints:  10/6/2020: Patient reports he went and put some screws in his tractor to fix it, and he has been walking up and down his driveway getting more exercise. Pain: Initial: Pain Intensity 1: 0  Post Session:  0/10   Medications Last Reviewed:  10/6/2020  Updated Objective Findings:  None Today  TREATMENT:     NEUROMUSCULAR RE-EDUCATION: (45 minutes):  Exercise/activities per grid below to improve balance and coordination. Required minimal verbal cues to promote dynamic balance in standing. Date:  10/6/2020   Activity/Exercise Parameters   Marching in hallway 4 laps   Walking with horizontal head turns 4 laps   Side-stepping in hallway 4 laps   Walking backward in hallway 4 laps   Weaving around cones 4 laps   Step taps 6 inch step  Alternating LEs  10 reps  Crosstaps 10 reps   Step ups 6 inch step  10 reps  B LE   Marching on yellow foam X 20 reps with CGA   Tandem walking 4 laps   Step overs: forward 1/2 foam roll  10 reps  Alternating LEs   Step overs: lateral 1/2 foam roll  10 reps  B LE   Standing with feet apart Blue foam eyes open and eyes closed   Walking with rolling walker    Semi-tandem stance        Therapeutic Exercise: ( 0 minutes):  Exercises per grid below to improve mobility, strength and balance.   Required minimal verbal cues to promote proper body alignment, promote proper body posture and promote proper body mechanics. Progressed resistance, repetitions and complexity of movement as indicated. Date:  10/1/20   Activity/Exercise Parameters   Sit to stand From mat table 2 x 10 reps with no UE assist   LAQs  20 reps B     Treatment/Session Summary:    · Response to Treatment:  Patient tolerated treatment without complaints. · Communication/Consultation:  None today  · Equipment provided today:  None today  · Recommendations/Intent for next treatment session: Next visit will focus on balance exercise and gait.     Total Treatment Billable Duration:  45 minutes  PT Patient Time In/Time Out  Time In: 1300  Time Out: 4606 [facility],     Future Appointments   Date Time Provider Mari Maldonado   10/9/2020  1:00 PM Bruce Kay, PT [facility] SFE   10/13/2020  1:00 PM Bruce Kay, PT SFEORPT E   10/16/2020  1:00 PM Bruce Kay, PT [facility] SFE   10/26/2020  3:15 PM Rodrigo Lyon MD SSA WANDER WANDER   3/2/2021 11:00 AM GARCÍA Gaming SSA PMR PMR   4/8/2021  2:15 PM David Bojorquez MD [id number] PGU

## 2020-10-09 ENCOUNTER — HOSPITAL ENCOUNTER (OUTPATIENT)
Dept: PHYSICAL THERAPY | Age: 75
Discharge: HOME | End: 2020-10-09
Attending: PHYSICAL MEDICINE & REHABILITATION
Payer: MEDICARE

## 2020-10-09 PROCEDURE — 97110 THERAPEUTIC EXERCISES: CPT

## 2020-10-09 PROCEDURE — 97112 NEUROMUSCULAR REEDUCATION: CPT

## 2020-10-09 NOTE — PROGRESS NOTES
Rachid Sheldon  : 1945  [place]  Secondary: Jayesh Velez at [address] [place] [address], [address], [address] [facility]  [phone]   [phone]     OUTPATIENT PHYSICAL THERAPY: Daily Treatment Note 10/9/2020  Visit Count:  5    ICD-10: Treatment Diagnosis: Difficulty in walking, not elsewhere classified (R26.2)  Precautions/Allergies:   Patient has no known allergies. TREATMENT PLAN:  Effective Dates: 2020 TO 2020 (90 days). Frequency/Duration: 2 times a week for 90 Day(s)    Pre-treatment Symptoms/Complaints:  10/9/2020: Patient reports doing his exercises and being more active [place]. \"My daughter has noticed me getting stronger\". Pain: Initial: Pain Intensity 1: 0  Post Session:  0/10   Medications Last Reviewed:  10/9/2020  Updated Objective Findings:  None Today  TREATMENT:     NEUROMUSCULAR RE-EDUCATION: (35 minutes):  Exercise/activities per grid below to improve balance and coordination. Required minimal verbal cues to promote dynamic balance in standing. Date:  10/9/2020   Activity/Exercise Parameters   Marching in hallway 4 laps   Walking with horizontal head turns 4 laps   Side-stepping in hallway 4 laps   Walking backward in hallway 4 laps   Weaving around cones 4 laps   Step taps 8 inch step  Alternating LEs  10 reps  Crosstaps 10 reps   Step ups 8 inch step  10 reps  B LE   Marching on yellow foam X 20 reps with CGA   Tandem walking 4 laps   Step overs: forward 1/2 foam roll  10 reps  Alternating LEs   Step overs: lateral 1/2 foam roll  10 reps  B LE   Standing with feet apart [place] eyes open and eyes closed   Walking with rolling walker    Semi-tandem stance Eyes open and eyes closed       Therapeutic Exercise: ( 10 minutes):  Exercises per grid below to improve mobility, strength and balance.   Required minimal verbal cues to promote proper body alignment, promote proper body posture and promote proper body mechanics. Progressed resistance, repetitions and complexity of movement as indicated. Date:  10/9/20   Activity/Exercise Parameters   Sit to stand From chair 2 x 10 reps with no UE assist   LAQs  20 reps B 3#     Treatment/Session Summary:    · Response to Treatment:  Patient tolerated step ups and step taps with 8 inch step. Patient progressing well with therapy. · Communication/Consultation:  None today  · Equipment provided today:  None today  · Recommendations/Intent for next treatment session: Next visit will focus on balance exercise and gait.     Total Treatment Billable Duration:  45 minutes  PT Patient Time In/Time Out  Time In: 5822  Time Out: 826 [facility]    Future Appointments   Date Time Provider Mari Maldonado   10/13/2020  1:00 PM Bruce Kay, PT [facility] SFE   10/16/2020  1:00 PM Bruce Kay, PT [facility] SFE   10/26/2020  3:15 PM Rodrigo Lyon MD SSA WANDER WANDER   3/2/2021 11:00 AM GARCÍA Gaming SSA PMR PMR   4/8/2021  2:15 PM David Bojorquez MD [id number] PGU

## 2020-10-13 ENCOUNTER — HOSPITAL ENCOUNTER (OUTPATIENT)
Dept: PHYSICAL THERAPY | Age: 75
Discharge: HOME | End: 2020-10-13
Attending: PHYSICAL MEDICINE & REHABILITATION
Payer: MEDICARE

## 2020-10-13 PROCEDURE — 97112 NEUROMUSCULAR REEDUCATION: CPT

## 2020-10-13 PROCEDURE — 97110 THERAPEUTIC EXERCISES: CPT

## 2020-10-13 NOTE — PROGRESS NOTES
Linh Steven  : 1945  [place]  Secondary: Jayesh Velez at [address] [place] [address], [address], [place] [address].  [phone]   [phone]     OUTPATIENT PHYSICAL THERAPY: Daily Treatment Note 10/13/2020  Visit Count:  5    ICD-10: Treatment Diagnosis: Difficulty in walking, not elsewhere classified (R26.2)  Precautions/Allergies:   Patient has no known allergies. TREATMENT PLAN:  Effective Dates: 2020 TO 2020 (90 days). Frequency/Duration: 2 times a week for 90 Day(s)    Pre-treatment Symptoms/Complaints:  10/13/2020: Patient reports he is doing well. No falls. Pain: Initial: Pain Intensity 1: 0  Post Session:  0/10   Medications Last Reviewed:  10/13/2020  Updated Objective Findings:  None Today  TREATMENT:     NEUROMUSCULAR RE-EDUCATION: (30 minutes):  Exercise/activities per grid below to improve balance and coordination. Required minimal verbal cues to promote dynamic balance in standing. Date:  10/13/2020   Activity/Exercise Parameters   Marching in hallway 4 laps   Walking with horizontal head turns 4 laps   Side-stepping in hallway 4 laps   Walking backward in hallway 4 laps   Weaving around cones 4 laps   Step taps 8 inch step  Alternating LEs  10 reps  Crosstaps 10 reps   Step ups 8 inch step  10 reps  B LE   Marching on sanddune X 20 reps with CGA   Tandem walking 4 laps   Step overs: forward 1/2 blue roll  10 reps  Alternating LEs   Step overs: lateral 1/2 blue roll  10 reps  B LE   Standing with feet apart [place] eyes open with head turnsand eyes closed   Walking with rolling walker    Semi-tandem stance        Therapeutic Exercise: ( 15 minutes):  Exercises per grid below to improve mobility, strength and balance. Required minimal verbal cues to promote proper body alignment, promote proper body posture and promote proper body mechanics.   Progressed resistance, repetitions and complexity of movement as indicated. Date:  10/13/20   Activity/Exercise Parameters   Sit to stand From chair 2 x 10 reps with no UE assist   Nustep Level 4 x 8 minutes     Treatment/Session Summary:    · Response to Treatment:  Patient demonstrates improved strength and improved balance during treatment. · Communication/Consultation:  None today  · Equipment provided today:  None today  · Recommendations/Intent for next treatment session: Next visit will focus on balance exercise and gait.     Total Treatment Billable Duration:  45 minutes  PT Patient Time In/Time Out  Time In: 1300  Time Out: 4606 [facility], PT    Future Appointments   Date Time Provider Mari Maldonado   10/16/2020  1:00 PM Briana Zaman PT [facility] SFE   10/26/2020  3:15 PM Jenae Allen MD SSA WANDER WANDER   3/2/2021 11:00 AM GARCÍA Mejia SSA PMR PMR   4/8/2021  2:15 PM Willa No MD [id number] PGU

## 2020-10-16 ENCOUNTER — HOSPITAL ENCOUNTER (OUTPATIENT)
Dept: PHYSICAL THERAPY | Age: 75
Discharge: HOME | End: 2020-10-16
Attending: PHYSICAL MEDICINE & REHABILITATION
Payer: MEDICARE

## 2020-10-16 PROCEDURE — 97112 NEUROMUSCULAR REEDUCATION: CPT

## 2020-10-16 PROCEDURE — 97110 THERAPEUTIC EXERCISES: CPT

## 2020-10-16 NOTE — PROGRESS NOTES
Marisol Hui  : 1945  [place]  Secondary: Jayesh Velez at [address] [id number] [facility], [address] [place] [address],[address], [address]  [phone]   [phone]     OUTPATIENT PHYSICAL THERAPY: Daily Treatment Note 10/16/2020  Visit Count:  5    ICD-10: Treatment Diagnosis: Difficulty in walking, not elsewhere classified (R26.2)  Precautions/Allergies:   Patient has no known allergies. TREATMENT PLAN:  Effective Dates: 2020 TO 2020 (90 days). Frequency/Duration: 2 times a week for 90 Day(s)    Pre-treatment Symptoms/Complaints:  10/16/2020: Patient reports he walked down to his barn and rode on his tractor for an hour. Pain: Initial: Pain Intensity 1: 0  Post Session:  0/10   Medications Last Reviewed:  10/16/2020  Updated Objective Findings:  None Today  TREATMENT:     NEUROMUSCULAR RE-EDUCATION: (35 minutes):  Exercise/activities per grid below to improve balance and coordination. Required minimal verbal cues to promote dynamic balance in standing. Date:  10/16/2020   Activity/Exercise Parameters   Marching in hallway 4 laps   Walking with horizontal head turns 4 laps   Side-stepping in hallway 4 laps   Walking backward in hallway 4 laps   Weaving around cones 4 laps   Step taps 8 inch step  Alternating LEs  10 reps  Crosstaps 10 reps   Step ups 8 inch step  10 reps  B LE   Marching on sanddune X    Tandem walking 4 laps   Step overs: forward 1/2 blue roll  10 reps  Alternating LEs   Step overs: lateral 1/2 blue roll  10 reps  B LE   Standing with feet apart Sanddune eyes open with head turnsand eyes closed   Walking with rolling walker    Semi-tandem stance        Therapeutic Exercise: ( 10 minutes):  Exercises per grid below to improve mobility, strength and balance. Required minimal verbal cues to promote proper body alignment, promote proper body posture and promote proper body mechanics.   Progressed resistance, repetitions and complexity of movement as indicated. Date:  10/16/20   Activity/Exercise Parameters   Sit to stand X   Nustep Level 4 x 8 minutes     Treatment/Session Summary:    · Response to Treatment:  Patient tolerated treatment without complaints. · Communication/Consultation:  None today  · Equipment provided today:  None today  · Recommendations/Intent for next treatment session: Next visit will focus on balance exercise and gait.     Total Treatment Billable Duration:  45 minutes  PT Patient Time In/Time Out  Time In: 1300  Time Out: 4606 [facility],     Future Appointments   Date Time Provider Mari Maldonado   10/26/2020  3:15 PM Robert Holland MD SSA WANDER WANDER   11/2/2020  1:00 PM Vissage, Otila Night, PT SFEORPT SFE   11/5/2020  9:30 AM Vissage, Otila Night, PT SFEORPT SFE   11/9/2020  1:00 PM Vissage, Otila Night, PT SFEORPT SFE   11/13/2020  1:45 PM Vissage, Otila Night, PT SFEORPT SFE   11/16/2020  1:00 PM Vissage, Otila Night, PT SFEORPT SFE   11/20/2020  1:45 PM Vissage, Otila Night, PT SFEORPT SFE   11/23/2020  1:00 PM Vissage, Otila Night, PT SFEORPT SFE   11/30/2020  1:00 PM Vissage, Otila Night, PT SFEORPT SFE   3/2/2021 11:00 AM GARCÍA Campbell SSA PMR PMR   4/8/2021  2:15 PM Jazz Maya MD [id number] PGU

## 2020-11-02 ENCOUNTER — HOSPITAL ENCOUNTER (OUTPATIENT)
Dept: PHYSICAL THERAPY | Age: 75
Discharge: HOME | End: 2020-11-02
Attending: PHYSICAL MEDICINE & REHABILITATION
Payer: MEDICARE

## 2020-11-02 PROCEDURE — 97112 NEUROMUSCULAR REEDUCATION: CPT

## 2020-11-02 PROCEDURE — 97110 THERAPEUTIC EXERCISES: CPT

## 2020-11-02 NOTE — PROGRESS NOTES
Adriana Padilla  : 1945  [place]  Secondary: Jayesh Velez at [address] [id number] [facility], [address] [place] [address],[address], [facility].  [phone]   [phone]     OUTPATIENT PHYSICAL THERAPY: Daily Treatment Note 2020  Visit Count:  5    ICD-10: Treatment Diagnosis: Difficulty in walking, not elsewhere classified (R26.2)  Precautions/Allergies:   Patient has no known allergies. TREATMENT PLAN:  Effective Dates: 2020 TO 2020 (90 days). Frequency/Duration: 2 times a week for 90 Day(s)    Pre-treatment Symptoms/Complaints:  2020: Patient reports he has been having issues with his blood pressure. \"My blood pressure has been high. I have just got back from having my eyes dilated at the eye doctor\". Pain: Initial: Pain Intensity 1: 0  Post Session:  0/10   Medications Last Reviewed:  2020  Updated Objective Findings:  None Today  TREATMENT:     NEUROMUSCULAR RE-EDUCATION: (35 minutes):  Exercise/activities per grid below to improve balance and coordination. Required minimal verbal cues to promote dynamic balance in standing. Date:  2020   Activity/Exercise Parameters   Marching in hallway 4 laps   Walking with horizontal head turns/vertical head turns 4 laps   Side-stepping in hallway 4 laps   Walking backward in hallway 4 laps   Weaving around cones 4 laps   Step taps 8 inch step  Alternating LEs  10 reps  Crosstaps 10 reps   Step ups 8 inch step  10 reps  B LE   Marching on sanddune X    Tandem walking X   Step overs: forward 1/2 blue roll  10 reps  Alternating LEs   Step overs: lateral 1/2 blue roll  10 reps  B LE   Standing with feet apart Sanddune eyes open with head turns and eyes closed   Walking with rolling walker    Semi-tandem stance        Therapeutic Exercise: ( 10 minutes):  Exercises per grid below to improve mobility, strength and balance.   Required minimal verbal cues to promote proper body alignment, promote proper body posture and promote proper body mechanics. Progressed resistance, repetitions and complexity of movement as indicated. Date:  11/2/20   Activity/Exercise Parameters   Sit to stand X   Nustep Level 4 x 8 minutes     Treatment/Session Summary:    · Response to Treatment:  Patient needed several rest breaks due to increased shortness of breath. Patient encouraged to perform balance exercises at home. · Communication/Consultation:  None today  · Equipment provided today:  None today  · Recommendations/Intent for next treatment session: Next visit will focus on balance exercise and gait.     Total Treatment Billable Duration:  45 minutes  PT Patient Time In/Time Out  Time In: 1300  Time Out: 4606 [facility]    Future Appointments   Date Time Provider Mari Erica   11/5/2020  9:30 AM Michele Golden PT [facility] SFE   11/9/2020  1:00 PM Florencio Curry, PT SFEORPT SFE   11/13/2020  1:45 PM Florencio Curry, PT SFEORPT SFE   11/16/2020  1:00 PM Florencio Curry, PT SFEORPT SFE   11/20/2020  1:45 PM Florencio Curry, PT SFEORPT SFE   11/23/2020  1:00 PM Florencio Curry, PT SFEORPT SFE   11/24/2020  1:00 PM Julia Martinez MD [facility]   11/30/2020  1:00 PM Michele Golden PT [facility] SFE   3/2/2021 11:00 AM GARCÍA Chapman SSA PMR PMR   4/8/2021  2:15 PM Momo Rinaldi MD [id number] PGU

## 2020-11-05 ENCOUNTER — HOSPITAL ENCOUNTER (OUTPATIENT)
Dept: PHYSICAL THERAPY | Age: 75
Discharge: HOME | End: 2020-11-05
Attending: PHYSICAL MEDICINE & REHABILITATION
Payer: MEDICARE

## 2020-11-05 PROCEDURE — 97110 THERAPEUTIC EXERCISES: CPT

## 2020-11-05 PROCEDURE — 97112 NEUROMUSCULAR REEDUCATION: CPT

## 2020-11-05 NOTE — PROGRESS NOTES
Josh Anderson  : 1945  [place]  Secondary: Jayesh Velez at [address] [id number] [facility], [address], [place] [address].  [phone]   [phone]     OUTPATIENT PHYSICAL THERAPY: Daily Treatment Note 2020  Visit Count:  5    ICD-10: Treatment Diagnosis: Difficulty in walking, not elsewhere classified (R26.2)  Precautions/Allergies:   Patient has no known allergies. TREATMENT PLAN:  Effective Dates: 2020 TO 2020 (90 days). Frequency/Duration: 2 times a week for 90 Day(s)    Pre-treatment Symptoms/Complaints:  2020: Patient reports he got really nauseated after his last treatment. \"I felt bad the whole day. I am feeling good today\". Pain: Initial: Pain Intensity 1: 0  Post Session:  0/10   Medications Last Reviewed:  2020  Updated Objective Findings:  None Today  TREATMENT:     NEUROMUSCULAR RE-EDUCATION: (35 minutes):  Exercise/activities per grid below to improve balance and coordination. Required minimal verbal cues to promote dynamic balance in standing. Date:  2020   Activity/Exercise Parameters   Marching in hallway 4 laps   Walking with horizontal head turns/vertical head turns 4 laps   Side-stepping in hallway 4 laps   Walking backward in hallway 4 laps   Weaving around cones 4 laps   Step taps 8 inch step  Alternating LEs  10 reps  Crosstaps 10 reps   Step ups 8 inch step  10 reps  B LE   Marching on sanddune X    Tandem walking 4 laps   Step overs: forward X   Step overs: lateral X   Standing with feet apart Sanddune eyes open with head turns and eyes closed   Walking with rolling walker    Semi-tandem stance        Therapeutic Exercise: ( 10 minutes):  Exercises per grid below to improve mobility, strength and balance. Required minimal verbal cues to promote proper body alignment, promote proper body posture and promote proper body mechanics.   Progressed resistance, repetitions and complexity of movement as indicated. Date:  11/5/20   Activity/Exercise Parameters   Sit to stand X   Nustep Level 4 x 8 minutes     Treatment/Session Summary:    · Response to Treatment:  Patient tolerated exercises without complaints of dizziness or nausea during treatment. · Communication/Consultation:  None today  · Equipment provided today:  None today  · Recommendations/Intent for next treatment session: Next visit will focus on balance exercise and gait.     Total Treatment Billable Duration:  45 minutes  PT Patient Time In/Time Out  Time In: 0930  Time Out: Gt Roman, PT    Future Appointments   Date Time Provider Mari Maldonado   11/9/2020  1:00 PM [facility]   11/13/2020  1:45 PM Zay Curry, PT SFEORPT SFE   11/16/2020  1:00 PM Zay Curry, PT SFEORPT SFE   11/20/2020  1:45 PM Zay Curry PT SFEORPT SFE   11/23/2020  1:00 PM Zay Curry PT SFEORPT SFE   11/24/2020  1:00 PM Nikhil Curran MD S[facility] WANDER WANDER   11/30/2020  1:00 PM Jada Mckinney PT [facility] SFE   3/2/2021 11:00 AM Weston Curling, PA SSA PMR PMR   4/8/2021  2:15 PM Simone Rubinstein, MD [id number] PGU

## 2020-11-09 ENCOUNTER — HOSPITAL ENCOUNTER (OUTPATIENT)
Dept: PHYSICAL THERAPY | Age: 75
Discharge: HOME | End: 2020-11-09
Attending: PHYSICAL MEDICINE & REHABILITATION
Payer: MEDICARE

## 2020-11-09 PROCEDURE — 97112 NEUROMUSCULAR REEDUCATION: CPT

## 2020-11-09 PROCEDURE — 97110 THERAPEUTIC EXERCISES: CPT

## 2020-11-09 NOTE — PROGRESS NOTES
Patience Toledo  : 1945  [place]  Secondary: Jayesh Velez at [address] [id number] [facility], [address] [place] [address],[address], [facility] [address].  [phone]   [phone]     OUTPATIENT PHYSICAL THERAPY: Daily Treatment Note 2020  Visit Count:  5    ICD-10: Treatment Diagnosis: Difficulty in walking, not elsewhere classified (R26.2)  Precautions/Allergies:   Patient has no known allergies. TREATMENT PLAN:  Effective Dates: 2020 TO 2020 (90 days). Frequency/Duration: 2 times a week for 90 Day(s)    Pre-treatment Symptoms/Complaints:  2020: Patient had no nausea after his last treatment. \"I am feeling good today\". Pain: Initial: Pain Intensity 1: 0  Post Session:  0/10   Medications Last Reviewed:  2020  Updated Objective Findings:  None Today  TREATMENT:     NEUROMUSCULAR RE-EDUCATION: (35 minutes):  Exercise/activities per grid below to improve balance and coordination. Required minimal verbal cues to promote dynamic balance in standing. Date:  2020   Activity/Exercise Parameters   Marching in hallway 4 laps   Walking with horizontal head turns/vertical head turns 4 laps   Side-stepping in hallway 4 laps   Walking backward in hallway 4 laps   Weaving around cones 4 laps   Step taps 8 inch step  Alternating LEs  10 reps  Crosstaps 10 reps   Step ups 8 inch step  10 reps  B LE   Marching on sanddune X    Tandem walking 4 laps   Step overs: forward X   Step overs: lateral X   Standing with feet apart Sanddune eyes open with head turns and eyes closed   Walking with rolling walker    Semi-tandem stance        Therapeutic Exercise: ( 10 minutes):  Exercises per grid below to improve mobility, strength and balance. Required minimal verbal cues to promote proper body alignment, promote proper body posture and promote proper body mechanics.   Progressed resistance, repetitions and complexity of movement as indicated. Date:  11/9/20   Activity/Exercise Parameters   Sit to stand X   Nustep Level 5 x 8 minutes     Treatment/Session Summary:    · Response to Treatment:  Patient needed less rest breaks today demonstrating improvement. · Communication/Consultation:  None today  · Equipment provided today:  None today  · Recommendations/Intent for next treatment session: Next visit will focus on balance exercise and gait. Will reassess next appointment.      Total Treatment Billable Duration:  45 minutes  PT Patient Time In/Time Out  Time In: 1300  Time Out: 4606 [facility],     Future Appointments   Date Time Provider Mari Maldonado   11/13/2020  1:45 PM Lori Brandon, PT [facility] SFE   11/16/2020  1:00 PM Douglas Curry, PT SFEORPT E   11/20/2020  1:45 PM Douglas Curry, PT SFEORPT SFE   11/23/2020  1:00 PM Lori Brandon, PT SFEORPT SFE   11/24/2020  1:00 PM Blaire [place] MD SSA WANDER WANDER   11/30/2020  1:00 PM Lori Brandon, PT [facility] SFE   3/2/2021 11:00 AM GARCÍA Atkins SSA PMR PMR   4/8/2021  2:15 PM David Ba MD [id number] PGU

## 2020-11-13 ENCOUNTER — HOSPITAL ENCOUNTER (OUTPATIENT)
Dept: PHYSICAL THERAPY | Age: 75
Discharge: HOME | End: 2020-11-13
Attending: PHYSICAL MEDICINE & REHABILITATION
Payer: MEDICARE

## 2020-11-13 PROCEDURE — 97110 THERAPEUTIC EXERCISES: CPT

## 2020-11-13 PROCEDURE — 97112 NEUROMUSCULAR REEDUCATION: CPT

## 2020-11-13 NOTE — THERAPY RECERTIFICATION
Adriana Padilla  : 1945  [place]  Secondary: Jayesh Velez at [address] [address] [facility], [address] [place] [address],[address], [facility] [address].  [phone]   [phone]        OUTPATIENT PHYSICAL [address]    ICD-10: Treatment Diagnosis: Difficulty in walking, not elsewhere classified (R26.2)  Precautions/Allergies:   Patient has no known allergies. TREATMENT PLAN:  Effective Dates: 2020 TO 2021 (60 days). Frequency/Duration: 2 times a week for 60 Days   MEDICAL/REFERRING DIAGNOSIS:  Cerebral infarction, unspecified [I63.9]   DATE OF ONSET:    REFERRING PHYSICIAN: Daphne Oliveros MD Orders: Evaluate and treat   Return MD Appointment: unknown      ASSESSMENT:  Mr. Ana Schaffer presents with improving balance and safety with walking following CVA. Patient has attended a total of 15 scheduled physical therapy visits including initial evaluation on 2020. Treatment has consisted of balance and strengthening activiies to improve overall safety and performance with activities of daily living. Recertification:  Patient has attended seventeen scheduled physical therapy appointments from 2020 to 2020. Patient demonstrates improved balance and improved gait speed since initial evaluation. Patient would benefit from continuing skilled physical therapy to address problems and goals. Thank you for this referral.      PROBLEM LIST (Impacting functional limitations):  1. Decreased Ambulation Ability/Technique  2. Decreased Balance  3. Decreased [place] with Home Exercise Program INTERVENTIONS PLANNED: (Treatment may consist of any combination of the following)  1. Balance Exercise  2. Gait Training  3. Home Exercise Program (HEP)     GOALS: (Goals have been discussed and agreed upon with patient.)  Short-Term Functional Goals: Time Frame: 30 days   1.  Patient will be independent with home exercise program to improve balance--goal met. Discharge Goals: Time Frame: 90 days   1. Patient will score greater than or equal to 48/56 on Watt Balance Score indicating improved balance and decreased fall risk with daily activities--goal met. 2. Patient will score greater than or equal to 20/24 on Dynamic Gait Index indicating improved balance and decreased fall risk with daily activities--goal met. 3. Patient will score less than or equal to 10 seconds on the Timed Up and Go Test indicating improved gait speed--goal ongoing. 4. Patient will ambulate without assistive device across level and unlevel surfaces without loss of balance or fear of falling--goal ongoing. OUTCOME MEASURE:   Tool Used: Watt Balance Scale  Score:  Initial: 37/56 Most Recent: 49/56 (Date: 11/13/2020 )   Interpretation of Score: Each section is scored on a 0-4 scale, 0 representing the patients inability to perform the task and 4 representing independence. The scores of each section are added together for a total score of 56. The higher the patients score, the more independent the patient is. Any score below 45 indicates increased risk for falls. Tool Used: Dynamic Gait Index  Score:  Initial: 10/24 Most Recent: 20/24 (Date: 11/13/2020 )   Interpretation of Score: Each section is scored on a 0-3 scale, 0 representing the patients inability to perform the task and 3 representing independence. The scores of each section are added together for a total score of 24. Any score below 19 indicates increased risk for falls. Tool Used: Timed Up and Go (TUG)  Score:  Initial: 12.53 seconds Most Recent: 11.06 seconds (Date: 11/13/2020 )   Interpretation of Score:  The test measures, in seconds, the time taken by an individual to stand up from a standard arm chair (seat height 46 cm [18 in], arm height 65 cm [25.6 in]), walk a distance of 3 meters (118 in, approx 10 ft), turn, walk back to the chair and sit down. If the individual takes longer than 14 seconds to complete TUG, this indicates risk for falls. MEDICAL NECESSITY:   · Patient is expected to demonstrate progress in balance and gait to improve safety during activities of daily living. REASON FOR SERVICES/OTHER COMMENTS:  · Patient continues to require skilled intervention due to recent CVA. Total Duration:  PT Patient Time In/Time Out  Time In: 1345  Time Out: 1430    Rehabilitation Potential For Stated Goals: Excellent   Regarding Facundo Bachelor therapy, I certify that the treatment plan above will be carried out by a therapist or under their direction. Thank you for this referral,  Noah Logan, PT     Referring Physician Signature: Lilian Torres* _______________________________ Date _____________       PAIN/SUBJECTIVE:   Initial: Pain Intensity 1: 0  Post Session:  0/10   HISTORY:   History of Injury/Illness (Reason for Referral):  Patient reports having CVA on June 8, 2020. Patient complains of imbalance, dizziness, and difficulty walking. Patient has had no falls. Patient currently using rolling walker, but used no assistive device prior to CVA. Patient rates current dizziness as 0/10 and current as pain level 0/10. Patient's goal is to walk without his walker. 9/29/2020 (Progress Note): Patient reports he feels his balance is getting better. He also reports less dizziness as well. He reports no falls. Past Medical History/Comorbidities:   Mr. Tiana Horton  has a past medical history of Diabetes [facility]), Essential hypertension, benign (1/3/2014), Mixed hyperlipidemia (1/3/2014), Numbness and tingling in left hand (7/2/2019), Right thalamic infarction ([facility] [address] (7/2/2019), and Stroke ([facility] [address] (06/08/2020). Mr. Tiana Horton  has a past surgical history that includes hx heent; hx other surgical; and hx urological.  Social History/Living Environment:     Lives with spouse in a one story home.    Prior Level of Function/Work/Activity:  Independent. Retired. Dominant Side:         RIGHT  Personal Factors:          Sex:  male        Age:  70 y.o. Current Medications:       Current Outpatient Medications:     tobramycin-dexamethasone (Tobradex) ophthalmic suspension, 1 Drop every four (4) hours (while awake). , Disp: , Rfl:     erythromycin (ILOTYCIN) ophthalmic ointment, Administer  to both eyes nightly., Disp: , Rfl:     doxycycline (VIBRAMYCIN) 50 mg capsule, Take 50 mg by mouth daily. , Disp: , Rfl:     OneTouch Ultra2 Meter misc, by Does Not Apply route., Disp: , Rfl:     OneTouch Ultra Blue Test Strip strip, by Does Not Apply route See Admin Instructions. , Disp: , Rfl:     OneTouch Delica Lancets 30 gauge misc, by Does Not Apply route., Disp: , Rfl:     lisinopriL (PRINIVIL, ZESTRIL) 5 mg tablet, 2 tab am and 1 pm, Disp: 270 Tab, Rfl: 4    clopidogreL (Plavix) 75 mg tab, Take 1 Tab by mouth daily. , Disp: 90 Tab, Rfl: 4    meclizine (ANTIVERT) 25 mg tablet, Take 1 Tab by mouth three (3) times daily as needed for Dizziness. , Disp: 270 Tab, Rfl: 1    doxazosin (CARDURA) 8 mg tablet, Take 1 Tab by mouth nightly., Disp: 90 Tab, Rfl: 4    glipiZIDE (GLUCOTROL) 5 mg tablet, Take 1 Tab by mouth Daily (before breakfast). , Disp: 90 Tab, Rfl: 4    atorvastatin (LIPITOR) 80 mg tablet, Take 1 Tab by mouth nightly., Disp: 30 Tab, Rfl: 3    carboxymethylcellulose sodium (REFRESH LIQUIGEL) 1 % dlgl ophthalmic solution, Administer 1 Drop to right eye daily. , Disp: , Rfl:     DISABLED PLACARD (DISABLED PLACARD) DMV, Use as directed, Disp: 1 Each, Rfl: 0    scopolamine (TRANSDERM-SCOP) 1 mg over 3 days pt3d, 1 Patch by TransDERmal route every seventy-two (72) hours. , Disp: 15 Patch, Rfl: 1    lisinopriL (PRINIVIL, ZESTRIL) 5 mg tablet, Take 1 Tab by mouth two (2) times a day. (Patient taking differently: Take 10 mg by mouth daily.  2 tabs in am), Disp: 180 Tab, Rfl: 1    loratadine (Claritin) 10 mg tablet, Take 1 Tab by mouth daily. , Disp: 90 Tab, Rfl: 4   Date Last Reviewed:  11/13/2020   EXAMINATION:   Functional Mobility:         Gait/Ambulation:  Patient ambulates with shuffling gait pattern with decreased stride length and decreased heel strike. Strength:          Bilateral lower extremity 5/5. Sensation:         Within normal limits.

## 2020-11-13 NOTE — PROGRESS NOTES
Rubina Kapadia  : 1945  [place]  Secondary: Jayesh Velez at [place] [address] [facility], [address], [place] [address].  [phone]   [phone]     OUTPATIENT PHYSICAL THERAPY: Daily Treatment Note 2020  Visit Count:  5    ICD-10: Treatment Diagnosis: Difficulty in walking, not elsewhere classified (R26.2)  Precautions/Allergies:   Patient has no known allergies. TREATMENT PLAN:  Effective Dates: 2020 TO 2021 (60 days). Frequency/Duration: 2 times a week for 60 Days    Pre-treatment Symptoms/Complaints:  2020: Patient reports he is feeling good today. Pain: Initial: Pain Intensity 1: 0  Post Session:  0/10   Medications Last Reviewed:  2020  Updated Objective Findings:  See recertification note  TREATMENT:     NEUROMUSCULAR RE-EDUCATION: (35 minutes):  Exercise/activities per grid below to improve balance and coordination. Required minimal verbal cues to promote dynamic balance in standing. Date:  2020   Activity/Exercise Parameters   Marching in hallway 4 laps   Walking with horizontal head turns/vertical head turns 4 laps   Side-stepping in hallway 4 laps   Walking backward in hallway 4 laps   Weaving around cones 4 laps   Step taps 8 inch step  Alternating LEs  10 reps  Crosstaps 10 reps   Step ups 8 inch step  10 reps  B LE   Marching on sanddune X    Tandem walking 4 laps   Step overs: forward X   Step overs: lateral X   Standing with feet apart Sanddune eyes open with head turns and eyes closed   Walking with rolling walker    Semi-tandem stance        Therapeutic Exercise: ( 10 minutes):  Exercises per grid below to improve mobility, strength and balance. Required minimal verbal cues to promote proper body alignment, promote proper body posture and promote proper body mechanics. Progressed resistance, repetitions and complexity of movement as indicated. Date:  11/13/20   Activity/Exercise Parameters   Sit to stand X   Nustep Level 5 x 8 minutes     Treatment/Session Summary:    · Response to Treatment:  Patient demonstrates improved gait speed and improved balance since initial evaluation. · Communication/Consultation:  None today  · Equipment provided today:  None today  · Recommendations/Intent for next treatment session: Next visit will focus on balance exercise and gait.       Total Treatment Billable Duration:  45 minutes  PT Patient Time In/Time Out  Time In: 1345  Time Out: John Rodriges, PT    Future Appointments   Date Time Provider Mari Maldonado   11/16/2020  1:00 PM Jorge L Carrera, PT [facility] SFE   11/20/2020  1:45 PM Nik Curry, PT SFEORPT SFE   11/23/2020  1:00 PM Nik Curry, PT SFEORPT SFE   11/24/2020  1:00 PM Mardella Kemp, Beryle Hess, MD [facility]   11/30/2020  1:00 PM Nik Curry, PT SFEORPT SFE   12/3/2020  9:30 AM Nik Curry, PT SFEORPT SFE   12/4/2020  9:30 AM Nik Curry, PT SFEORPT SFE   12/8/2020  9:30 AM Nik Curry, PT SFEORPT SFE   12/10/2020  9:30 AM Jorge L Carrera, PT SFEORPT SFE   3/2/2021 11:00 AM GARCÍA Ernst SSA PMR PMR   4/8/2021  2:15 PM Ramona Gao MD [id number] PGU

## 2020-11-16 ENCOUNTER — HOSPITAL ENCOUNTER (OUTPATIENT)
Dept: PHYSICAL THERAPY | Age: 75
Discharge: HOME | End: 2020-11-16
Attending: PHYSICAL MEDICINE & REHABILITATION
Payer: MEDICARE

## 2020-11-16 PROCEDURE — 97112 NEUROMUSCULAR REEDUCATION: CPT

## 2020-11-16 PROCEDURE — 97110 THERAPEUTIC EXERCISES: CPT

## 2020-11-16 NOTE — PROGRESS NOTES
Thomas Ornelas  : 1945  [place]  Secondary: Jayesh Velez at [place] [id number] [facility], [address], [place] [address].  [phone]   [phone]     OUTPATIENT PHYSICAL THERAPY: Daily Treatment Note 2020  Visit Count:  5    ICD-10: Treatment Diagnosis: Difficulty in walking, not elsewhere classified (R26.2)  Precautions/Allergies:   Patient has no known allergies. TREATMENT PLAN:  Effective Dates: 2020 TO 2021 (60 days). Frequency/Duration: 2 times a week for 60 Days    Pre-treatment Symptoms/Complaints:  2020: No falls. \"Doing okay\". Pain: Initial: Pain Intensity 1: 0  Post Session:  0/10   Medications Last Reviewed:  2020  Updated Objective Findings:  None Today  TREATMENT:     NEUROMUSCULAR RE-EDUCATION: (35 minutes):  Exercise/activities per grid below to improve balance and coordination. Required minimal verbal cues to promote dynamic balance in standing. Date:  2020   Activity/Exercise Parameters   Marching in hallway 4 laps   Walking with horizontal head turns/vertical head turns 4 laps   Side-stepping in hallway 4 laps   Walking backward in hallway 4 laps   Weaving around cones 4 laps   Step taps 8 inch step  Alternating LEs  10 reps  Crosstaps 10 reps   Step ups 8 inch step  10 reps  B LE   Marching on sanddune X    Tandem walking 4 laps   Step overs: forward 2x10 bilateral   Step overs: lateral 2x10 bilateral    Standing with feet apart Sanddune eyes open with head turns and eyes closed   Walking with arm swing 2 laps in clinic   Semi-tandem stance        Therapeutic Exercise: ( 10 minutes):  Exercises per grid below to improve mobility, strength and balance. Required minimal verbal cues to promote proper body alignment, promote proper body posture and promote proper body mechanics. Progressed resistance, repetitions and complexity of movement as indicated. Date:  11/16/20   Activity/Exercise Parameters   Sit to stand X   Nustep Level 5 x 8 minutes     Treatment/Session Summary:    · Response to Treatment:  Patient tolerated treatment without complaints. · Communication/Consultation:  None today  · Equipment provided today:  None today  · Recommendations/Intent for next treatment session: Next visit will focus on balance exercise and gait.       Total Treatment Billable Duration:  45 minutes  PT Patient Time In/Time Out  Time In: 9691  Time Out: 826 [facility], PT    Future Appointments   Date Time Provider Mari Cejai   11/20/2020  1:45 PM Gonsalo Perez, PT [facility] SFE   11/23/2020  1:00 PM Gonsalo Perez, PT [facility] SFE   11/24/2020  1:00 PM Jena Pedroza MD SSA [facility] WANDER   11/30/2020  1:00 PM Gonsalo Perez, PT [facility] SFE   12/3/2020  9:30 AM Jannet Curry, PT [facility] SFE   12/4/2020  7:00 PM Jannet Curry, PT [facility] SFE   12/8/2020  9:30 AM Jannet Curry, PT [facility] SFE   12/10/2020  9:30 AM Gonsalo Perez, PT [facility] SFE   3/2/2021 11:00 AM GARCÍA Manriquez SSA PMR PMR   4/8/2021  2:15 PM Avelino Turner MD [id number] PGU

## 2020-11-20 ENCOUNTER — HOSPITAL ENCOUNTER (OUTPATIENT)
Dept: PHYSICAL THERAPY | Age: 75
Discharge: HOME | End: 2020-11-20
Attending: PHYSICAL MEDICINE & REHABILITATION
Payer: MEDICARE

## 2020-11-20 PROCEDURE — 97112 NEUROMUSCULAR REEDUCATION: CPT

## 2020-11-20 PROCEDURE — 97110 THERAPEUTIC EXERCISES: CPT

## 2020-11-20 NOTE — PROGRESS NOTES
Patience Toledo  : 1945  [place]  Secondary: Jayesh Almonte 77 at [address] [place] [address], [address], [place] [address].  [phone]   [phone]     OUTPATIENT PHYSICAL THERAPY: Daily Treatment Note 2020  Visit Count:  5    ICD-10: Treatment Diagnosis: Difficulty in walking, not elsewhere classified (R26.2)  Precautions/Allergies:   Patient has no known allergies. TREATMENT PLAN:  Effective Dates: 2020 TO 2021 (60 days). Frequency/Duration: 2 times a week for 60 Days    Pre-treatment Symptoms/Complaints:  2020: \"I feel like I am getting stronger\". Pain: Initial: Pain Intensity 1: 0  Post Session:  0/10   Medications Last Reviewed:  2020  Updated Objective Findings:  None Today  TREATMENT:     NEUROMUSCULAR RE-EDUCATION: (35 minutes):  Exercise/activities per grid below to improve balance and coordination. Required minimal verbal cues to promote dynamic balance in standing. Date:  2020   Activity/Exercise Parameters   Marching in hallway 4 laps   Walking with horizontal head turns/vertical head turns 4 laps   Side-stepping in hallway 4 laps   Walking backward in hallway 4 laps   Weaving around cones 4 laps   Step taps 8 inch step  Alternating LEs  10 reps  Crosstaps 10 reps   Step ups 8 inch step  10 reps  B LE   Marching on sanddune X    Tandem walking 4 laps   Step overs: forward 2x10 bilateral   Step overs: lateral 2x10 bilateral    Standing with feet apart Sanddune eyes open with head turns and eyes closed   Walking with arm swing 2 laps in clinic   Semi-tandem stance        Therapeutic Exercise: ( 10 minutes):  Exercises per grid below to improve mobility, strength and balance. Required minimal verbal cues to promote proper body alignment, promote proper body posture and promote proper body mechanics.   Progressed resistance, repetitions and complexity of movement as indicated. Date:  11/20/20   Activity/Exercise Parameters   Sit to stand X   Nustep Level 5 x 8 minutes     Treatment/Session Summary:    · Response to Treatment:  Patient walking with more upright posture and demonstrating better steps/turns with walking. · Communication/Consultation:  None today  · Equipment provided today:  None today  · Recommendations/Intent for next treatment session: Next visit will focus on balance exercise and gait.       Total Treatment Billable Duration:  45 minutes  PT Patient Time In/Time Out  Time In: 1345  Time Out: John Rodriges, PT    Future Appointments   Date Time Provider Mari Maldonado   11/23/2020  1:00 PM Michele Golden, PT [facility] SFE   11/24/2020  1:00 PM Megan Ramachandran MD [facility]   11/30/2020  1:00 PM Michele Golden, PT [facility] SFE   12/3/2020  9:30 AM Florencio Curry, PT [facility] SFE   12/4/2020  7:00 PM Florencio Curry, PT [facility] SFE   12/8/2020  9:30 AM Florencio Curry, PT [facility] SFE   12/10/2020  9:30 AM Michele Golden, PT [facility] SFE   3/2/2021 11:00 AM GARCÍA Chapman [facility] PMR PMR   4/8/2021  2:15 PM Momo Rinaldi MD [id number] PGU

## 2020-11-23 ENCOUNTER — HOSPITAL ENCOUNTER (OUTPATIENT)
Dept: PHYSICAL THERAPY | Age: 75
Discharge: HOME | End: 2020-11-23
Attending: PHYSICAL MEDICINE & REHABILITATION
Payer: MEDICARE

## 2020-11-23 PROCEDURE — 97112 NEUROMUSCULAR REEDUCATION: CPT

## 2020-11-23 PROCEDURE — 97110 THERAPEUTIC EXERCISES: CPT

## 2020-11-23 NOTE — PROGRESS NOTES
Aster Pappas  : 1945  [place]  Secondary: Jayesh Velez at [address] [place] [address], [address], [place] [address].  [phone]   [phone]     OUTPATIENT PHYSICAL THERAPY: Daily Treatment Note 2020  Visit Count:  5    ICD-10: Treatment Diagnosis: Difficulty in walking, not elsewhere classified (R26.2)  Precautions/Allergies:   Patient has no known allergies. TREATMENT PLAN:  Effective Dates: 2020 TO 2021 (60 days). Frequency/Duration: 2 times a week for 60 Days    Pre-treatment Symptoms/Complaints:  2020: Patient has no complaints. Pain: Initial: Pain Intensity 1: 0  Post Session:  0/10   Medications Last Reviewed:  2020  Updated Objective Findings:  None Today  TREATMENT:     NEUROMUSCULAR RE-EDUCATION: (35 minutes):  Exercise/activities per grid below to improve balance and coordination. Required minimal verbal cues to promote dynamic balance in standing. Date:  2020   Activity/Exercise Parameters   Marching in hallway 4 laps   Walking with horizontal head turns/vertical head turns 4 laps   Side-stepping in hallway 4 laps   Walking backward in hallway 4 laps   Weaving around cones 4 laps   Step taps 8 inch step  Alternating LEs  10 reps  Crosstaps 10 reps   Step ups 8 inch step  10 reps  B LE   Marching on sanddune X    Tandem walking 4 laps   Step overs: forward 2x10 bilateral   Step overs: lateral 2x10 bilateral    Standing with feet apart Sanddune eyes open with head turns and eyes closed   Walking with arm swing    Semi-tandem stance        Therapeutic Exercise: ( 10 minutes):  Exercises per grid below to improve mobility, strength and balance. Required minimal verbal cues to promote proper body alignment, promote proper body posture and promote proper body mechanics. Progressed resistance, repetitions and complexity of movement as indicated.    Date:  20 Activity/Exercise Parameters   Sit to stand X   Nustep Level 5 x 8 minutes     Treatment/Session Summary:    · Response to Treatment:  Patient tolerated treatment without complaints. · Communication/Consultation:  None today  · Equipment provided today:  None today  · Recommendations/Intent for next treatment session: Next visit will focus on balance exercise and gait.       Total Treatment Billable Duration:  45 minutes  PT Patient Time In/Time Out  Time In: 1300  Time Out: 4606 [facility], PT    Future Appointments   Date Time Provider Mari Cejai   11/24/2020  1:00 PM Blaire Washington MD S[facility]   11/30/2020  1:00 PM Lori Roma, PT [facility] SFE   12/3/2020  7:00 PM VisDouglas summers Jointer, PT [facility] SFE   12/4/2020  7:00 PM Douglas Curry Jointer, PT [facility] SFE   12/8/2020  9:30 AM Douglas Curry Jointer, PT [facility] SFE   12/10/2020  9:30 AM Lori Roma, PT [facility] SFE   3/2/2021 11:00 AM GARCÍA Atkins SSA PMR PMR   4/8/2021  2:15 PM David Ba MD [id number] PGU

## 2020-11-30 ENCOUNTER — HOSPITAL ENCOUNTER (OUTPATIENT)
Dept: PHYSICAL THERAPY | Age: 75
Discharge: HOME | End: 2020-11-30
Attending: PHYSICAL MEDICINE & REHABILITATION
Payer: MEDICARE

## 2020-11-30 PROCEDURE — 97112 NEUROMUSCULAR REEDUCATION: CPT

## 2020-11-30 PROCEDURE — 97110 THERAPEUTIC EXERCISES: CPT

## 2020-11-30 NOTE — PROGRESS NOTES
Martha Livingston  : 1945  [place]  Secondary: Jayesh Almonte 77 at [address], [address] [place] [address],[address] [facility]  [phone]   [phone]     OUTPATIENT PHYSICAL THERAPY: Daily Treatment Note 2020  Visit Count:  5    ICD-10: Treatment Diagnosis: Difficulty in walking, not elsewhere classified (R26.2)  Precautions/Allergies:   Patient has no known allergies. TREATMENT PLAN:  Effective Dates: 2020 TO 2021 (60 days). Frequency/Duration: 2 times a week for 60 Days    Pre-treatment Symptoms/Complaints:  2020: Patient reports he did not feel great yesterday. \"I didn't have a lot of energy yesterday for some reason\". Pain: Initial: Pain Intensity 1: 0  Post Session:  0/10   Medications Last Reviewed:  2020  Updated Objective Findings:  None Today  TREATMENT:     NEUROMUSCULAR RE-EDUCATION: (35 minutes):  Exercise/activities per grid below to improve balance and coordination. Required minimal verbal cues to promote dynamic balance in standing. Date:  2020   Activity/Exercise Parameters   Marching in hallway 4 laps   Walking with horizontal head turns/vertical head turns 4 laps   Side-stepping in hallway 4 laps   Walking backward in hallway 4 laps   Weaving around cones 4 laps   Step taps 8 inch step  Alternating LEs  10 reps  Crosstaps 10 reps   Step ups 8 inch step  10 reps  B LE   Marching on sanddune X    Tandem walking 4 laps   Step overs: forward 2x10 bilateral   Step overs: lateral 2x10 bilateral    Standing with feet apart Sanddune eyes open with head turns and eyes closed   Walking with arm swing    Semi-tandem stance        Therapeutic Exercise: ( 10 minutes):  Exercises per grid below to improve mobility, strength and balance. Required minimal verbal cues to promote proper body alignment, promote proper body posture and promote proper body mechanics.   Progressed resistance, repetitions and complexity of movement as indicated. Date:  11/30/20   Activity/Exercise Parameters   Sit to stand X   Nustep Level 5 x 8 minutes     Treatment/Session Summary:    · Response to Treatment:  Patient needed several rest breaks due to fatigue. Patient progressing well with therapy. · Communication/Consultation:  None today  · Equipment provided today:  None today  · Recommendations/Intent for next treatment session: Next visit will focus on balance exercise and gait.       Total Treatment Billable Duration:  45 minutes  PT Patient Time In/Time Out  Time In: 5086  Time Out: Bernard [address], PT    Future Appointments   Date Time Provider Mari Maldonado   12/3/2020  7:00 PM Michele Golden, PT [facility] SFE   12/4/2020  7:00 PM Michele Golden, PT [facility] SFE   12/8/2020  9:30 AM Florencio Curry, PT [facility] SFE   12/10/2020  9:30 AM Michele Golden, PT [facility] SFE   12/29/2020 10:30 AM Julia Martinez MD SSA [facility] WANDER   3/2/2021 11:00 AM GARCÍA Chapman SSA PMR PMR   4/8/2021  2:15 PM Momo Rinaldi MD [id number] PGU

## 2020-12-03 ENCOUNTER — APPOINTMENT (OUTPATIENT)
Dept: PHYSICAL THERAPY | Age: 75
End: 2020-12-03
Attending: PHYSICAL MEDICINE & REHABILITATION
Payer: MEDICARE

## 2020-12-04 ENCOUNTER — APPOINTMENT (OUTPATIENT)
Dept: PHYSICAL THERAPY | Age: 75
End: 2020-12-04
Attending: PHYSICAL MEDICINE & REHABILITATION
Payer: MEDICARE

## 2020-12-08 ENCOUNTER — HOSPITAL ENCOUNTER (OUTPATIENT)
Dept: PHYSICAL THERAPY | Age: 75
Discharge: HOME | End: 2020-12-08
Attending: PHYSICAL MEDICINE & REHABILITATION
Payer: MEDICARE

## 2020-12-08 PROCEDURE — 97110 THERAPEUTIC EXERCISES: CPT

## 2020-12-08 PROCEDURE — 97112 NEUROMUSCULAR REEDUCATION: CPT

## 2020-12-08 NOTE — PROGRESS NOTES
Goran Ramirez  : 1945  [place]  Secondary: Jayesh Velez at [address] [place] [address] [address], [address], [facility]. .  [phone]   [phone]     OUTPATIENT PHYSICAL THERAPY: Daily Treatment Note 2020  Visit Count:  5    ICD-10: Treatment Diagnosis: Difficulty in walking, not elsewhere classified (R26.2)  Precautions/Allergies:   Patient has no known allergies. TREATMENT PLAN:  Effective Dates: 2020 TO 2021 (60 days). Frequency/Duration: 2 times a week for 60 Days    Pre-treatment Symptoms/Complaints:  2020:   \"I was able to get down and plug in the [place] tree and then stand up from the floor\". Pain: Initial: Pain Intensity 1: 0  Post Session:  0/10   Medications Last Reviewed:  2020  Updated Objective Findings:  None Today  TREATMENT:     NEUROMUSCULAR RE-EDUCATION: (35 minutes):  Exercise/activities per grid below to improve balance and coordination. Required minimal verbal cues to promote dynamic balance in standing. Date:  2020   Activity/Exercise Parameters   Marching in hallway 4 laps   Walking with horizontal head turns/vertical head turns 4 laps   Side-stepping in hallway 4 laps   Walking backward in hallway 4 laps   Weaving around cones 4 laps   Step taps 8 inch step  Alternating LEs  10 reps  Crosstaps 10 reps   Step ups 8 inch step  10 reps  B LE   Marching on sanddune X    Tandem walking 4 laps   Step overs: forward 2x10 bilateral   Step overs: lateral 2x10 bilateral    Standing with feet apart Sanddune eyes open with head turns and eyes closed   Walking with arm swing    Semi-tandem stance        Therapeutic Exercise: ( 10 minutes):  Exercises per grid below to improve mobility, strength and balance. Required minimal verbal cues to promote proper body alignment, promote proper body posture and promote proper body mechanics.   Progressed resistance, repetitions and complexity of movement as indicated. Date:  12/8/20   Activity/Exercise Parameters   Sit to stand X   Nustep Level 5 x 8 minutes     Treatment/Session Summary:    · Response to Treatment:  Patient tolerated treatment without complaints. · Communication/Consultation:  None today  · Equipment provided today:  None today  · Recommendations/Intent for next treatment session: Next visit will focus on balance exercise and gait.       Total Treatment Billable Duration:  45 minutes  PT Patient Time In/Time Out  Time In: 8506  Time Out: [address] Vicente Wade, PT    Future Appointments   Date Time Provider Mari Maldonado   12/10/2020  9:30 AM Karen Saab PT [facility] SFE   12/29/2020 10:30 AM Mauricio Ruano MD SSA [facility] WANDER   3/2/2021 11:00 AM GARCÍA Stone SSA PMR PMR   4/8/2021  2:15 PM Jennifer Ba MD [id number] PGU

## 2020-12-10 ENCOUNTER — HOSPITAL ENCOUNTER (OUTPATIENT)
Dept: PHYSICAL THERAPY | Age: 75
Discharge: HOME | End: 2020-12-10
Attending: PHYSICAL MEDICINE & REHABILITATION
Payer: MEDICARE

## 2020-12-10 PROCEDURE — 97112 NEUROMUSCULAR REEDUCATION: CPT

## 2020-12-10 PROCEDURE — 97110 THERAPEUTIC EXERCISES: CPT

## 2020-12-10 NOTE — PROGRESS NOTES
Antony Pearson  : 1945  [place]  Secondary: Jayesh Velez at [place] [address] [facility], [address], [place] [address].  [phone]   [phone]     OUTPATIENT PHYSICAL THERAPY: Daily Treatment Note 12/10/2020  Visit Count:  5    ICD-10: Treatment Diagnosis: Difficulty in walking, not elsewhere classified (R26.2)  Precautions/Allergies:   Patient has no known allergies. TREATMENT PLAN:  Effective Dates: 2020 TO 2021 (60 days). Frequency/Duration: 2 times a week for 60 Days. Patient discharged due to meeting all of his goals. Pre-treatment Symptoms/Complaints:  12/10/2020:   Patient has no complaints. \"I am doing well\". Pain: Initial: Pain Intensity 1: 0  Post Session:  0/10   Medications Last Reviewed:  12/10/2020  Updated Objective Findings:  Timed Up and Go Test:  9.11 seconds  TREATMENT:     NEUROMUSCULAR RE-EDUCATION: (35 minutes):  Exercise/activities per grid below to improve balance and coordination. Required minimal verbal cues to promote dynamic balance in standing. Date:  12/10/2020   Activity/Exercise Parameters   Marching in hallway 4 laps   Walking with horizontal head turns/vertical head turns 4 laps   Side-stepping in hallway 4 laps   Walking backward in hallway 4 laps   Weaving around cones 4 laps   Step taps 8 inch step  Alternating LEs  10 reps  Crosstaps 10 reps   Step ups 8 inch step  10 reps  B LE   Marching on sanddune X    Tandem walking 4 laps   Step overs: forward 2x10 bilateral   Step overs: lateral 2x10 bilateral    Standing with feet apart    Walking with arm swing    Semi-tandem stance        Therapeutic Exercise: ( 10 minutes):  Exercises per grid below to improve mobility, strength and balance. Required minimal verbal cues to promote proper body alignment, promote proper body posture and promote proper body mechanics.   Progressed resistance, repetitions and complexity of movement as indicated. Date:  12/10/20   Activity/Exercise Parameters   Sit to stand X   Nustep Level 5 x 8 minutes     Treatment/Session Summary:    · Response to Treatment:  Patient demonstrates improved balance and improved gait speed since initial evaluation. · Communication/Consultation:  None today  · Equipment provided today:  None today  · Recommendations/Intent for next treatment session: Patient discharged due to meeting all of his goals.       Total Treatment Billable Duration:  45 minutes  PT Patient Time In/Time Out  Time In: 0930  Time Out: Gt Roman, PT    Future Appointments   Date Time Provider Mari Maldonado   12/29/2020 10:30 AM Mauricio Ruano MD [facility]SA [facility] WANDER   3/2/2021 11:00 AM GARCÍA Stone SSA PMR PMR   4/8/2021  2:15 PM Jennifer Ba MD [id number] PGU

## 2020-12-10 NOTE — THERAPY DISCHARGE
Marisol Hui : 1945 [place] Secondary: Jayesh Velez at [place] [id number] [facility], [address], [place] [address]. [phone]   [phone] OUTPATIENT PHYSICAL THERAPY:Discharge Summary 12/10/2020 ICD-10: Treatment Diagnosis: Difficulty in walking, not elsewhere classified (R26.2) Precautions/Allergies:  
Patient has no known allergies. TREATMENT PLAN: 
Effective Dates: 2020 TO 2021 (60 days). Frequency/Duration: 2 times a week for 60 Days. Patient discharged due to meeting all of his goals. MEDICAL/REFERRING DIAGNOSIS: 
Cerebral infarction, unspecified [I63.9] DATE OF ONSET:  REFERRING PHYSICIAN: Daphne Stewart MD Orders: Evaluate and treat Return MD Appointment: unknown ASSESSMENT:  Mr. Jennifer Rivera presents with improving balance and safety with walking following CVA. Patient has attended a total of 21 scheduled physical therapy visits including initial evaluation on 2020. Treatment has consisted of balance and strengthening activies to improve overall safety and performance with activities of daily living. Discharge note:  Patient attended twenty-three scheduled physical therapy appointments from 2020 to 12/10/2020. Patient demonstrates improved balance and improved gait speed since initial evaluation. Patient discharged due to meeting all of his goals. Thank you for this referral.   
  
PROBLEM LIST (Impacting functional limitations): 1. Decreased Ambulation Ability/Technique 2. Decreased Balance 3. Decreased [place] with Home Exercise Program INTERVENTIONS PLANNED: (Treatment may consist of any combination of the following) 1. Balance Exercise 2. Gait Training 3. Home Exercise Program (HEP) GOALS: (Goals have been discussed and agreed upon with patient.) Short-Term Functional Goals: Time Frame: 30 days 1. Patient will be independent with home exercise program to improve balance--goal met. Discharge Goals: Time Frame: 90 days 1. Patient will score greater than or equal to 48/56 on Watt Balance Score indicating improved balance and decreased fall risk with daily activities--goal met. 2. Patient will score greater than or equal to 20/24 on Dynamic Gait Index indicating improved balance and decreased fall risk with daily activities--goal met. 3. Patient will score less than or equal to 10 seconds on the Timed Up and Go Test indicating improved gait speed--goal met. 4. Patient will ambulate without assistive device across level and unlevel surfaces without loss of balance or fear of falling--goal met. OUTCOME MEASURE:  
Tool Used: Watt Balance Scale Score:  Initial: 37/56 Most Recent: 49/56 (Date: 11/13/2020 ) Interpretation of Score: Each section is scored on a 0-4 scale, 0 representing the patients inability to perform the task and 4 representing independence. The scores of each section are added together for a total score of 56. The higher the patients score, the more independent the patient is. Any score below 45 indicates increased risk for falls. Tool Used: Dynamic Gait Index Score:  Initial: 10/24 Most Recent: 20/24 (Date: 11/13/2020 ) Interpretation of Score: Each section is scored on a 0-3 scale, 0 representing the patients inability to perform the task and 3 representing independence. The scores of each section are added together for a total score of 24. Any score below 19 indicates increased risk for falls. Tool Used: Timed Up and Go (TUG) Score:  Initial: 12.53 seconds Most Recent: 9.11 seconds (Date: 12/10/2020 ) Interpretation of Score:  The test measures, in seconds, the time taken by an individual to stand up from a standard arm chair (seat height 46 cm [18 in], arm height 65 cm [25.6 in]), walk a distance of 3 meters (118 in, approx 10 ft), turn, walk back to the chair and sit down. If the individual takes longer than 14 seconds to complete TUG, this indicates risk for falls. PAIN/SUBJECTIVE:  
Initial: Pain Intensity 1: 0  Post Session:  0/10 HISTORY:  
History of Injury/Illness (Reason for Referral): 
Patient reports having CVA on June 8, 2020. Patient complains of imbalance, dizziness, and difficulty walking. Patient has had no falls. Patient currently using rolling walker, but used no assistive device prior to CVA. Patient rates current dizziness as 0/10 and current as pain level 0/10. Patient's goal is to walk without his walker. 9/29/2020 (Progress Note): Patient reports he feels his balance is getting better. He also reports less dizziness as well. He reports no falls. Past Medical History/Comorbidities:  
Mr. Kings Watson  has a past medical history of Diabetes [facility]), Essential hypertension, benign (1/3/2014), Mixed hyperlipidemia (1/3/2014), Numbness and tingling in left hand (7/2/2019), Right thalamic infarction ([facility] [address] (7/2/2019), and Stroke ([facility] [address] (06/08/2020). Mr. Kings Watson  has a past surgical history that includes hx heent; hx other surgical; and hx urological. 
Social History/Living Environment:  
  Lives with spouse in a one story home. Prior Level of Function/Work/Activity: 
Independent. Retired. Dominant Side:  
      RIGHT Personal Factors:   
      Sex:  male Age:  70 y.o. Current Medications:   
  
Current Outpatient Medications:  
  lisinopriL (PRINIVIL, ZESTRIL) 10 mg tablet, Take 1 Tab by mouth two (2) times a day., Disp: 180 Tab, Rfl: 4 
  hydroCHLOROthiazide (HYDRODIURIL) 12.5 mg tablet, Take 1 Tab by mouth daily. , Disp: 90 Tab, Rfl: 4 
  tobramycin-dexamethasone (Tobradex) ophthalmic suspension, 1 Drop every four (4) hours (while awake). , Disp: , Rfl:  
  erythromycin (ILOTYCIN) ophthalmic ointment, Administer  to both eyes nightly., Disp: , Rfl:  
   doxycycline (VIBRAMYCIN) 50 mg capsule, Take 50 mg by mouth daily. , Disp: , Rfl:  
  OneTouch Ultra2 Meter misc, by Does Not Apply route., Disp: , Rfl:  
  OneTouch Ultra Blue Test Strip strip, by Does Not Apply route See Admin Instructions. , Disp: , Rfl:  
  OneTouch Delica Lancets 30 gauge misc, by Does Not Apply route., Disp: , Rfl:  
  clopidogreL (Plavix) 75 mg tab, Take 1 Tab by mouth daily. , Disp: 90 Tab, Rfl: 4 
  meclizine (ANTIVERT) 25 mg tablet, Take 1 Tab by mouth three (3) times daily as needed for Dizziness. , Disp: 270 Tab, Rfl: 1 
  doxazosin (CARDURA) 8 mg tablet, Take 1 Tab by mouth nightly., Disp: 90 Tab, Rfl: 4 
  glipiZIDE (GLUCOTROL) 5 mg tablet, Take 1 Tab by mouth Daily (before breakfast). , Disp: 90 Tab, Rfl: 4 
  atorvastatin (LIPITOR) 80 mg tablet, Take 1 Tab by mouth nightly., Disp: 30 Tab, Rfl: 3 
  DISABLED PLACARD (DISABLED PLACARD) DMV, Use as directed, Disp: 1 Each, Rfl: 0 
  scopolamine (TRANSDERM-SCOP) 1 mg over 3 days pt3d, 1 Patch by TransDERmal route every seventy-two (72) hours. , Disp: 15 Patch, Rfl: 1 
  loratadine (Claritin) 10 mg tablet, Take 1 Tab by mouth daily. , Disp: 90 Tab, Rfl: 4 Date Last Reviewed:  12/10/2020 EXAMINATION:  
Functional Mobility:  
      Gait/Ambulation:  Patient ambulates with shuffling gait pattern with decreased stride length and decreased heel strike. Strength:   
      Bilateral lower extremity 5/5. Sensation:  
      Within normal limits.

## 2021-02-26 PROBLEM — E11.51 TYPE 2 DIABETES MELLITUS WITH PERIPHERAL VASCULAR DISEASE (HCC): Status: ACTIVE | Noted: 2021-02-26

## 2021-02-26 PROBLEM — I63.9 STROKE (CEREBRUM) (HCC): Status: RESOLVED | Noted: 2020-06-08 | Resolved: 2021-02-26

## 2021-08-05 PROBLEM — E04.1 THYROID NODULE: Status: ACTIVE | Noted: 2021-08-05

## 2021-08-05 PROBLEM — E11.65 TYPE 2 DIABETES MELLITUS WITH HYPERGLYCEMIA, WITHOUT LONG-TERM CURRENT USE OF INSULIN (HCC): Status: ACTIVE | Noted: 2021-02-26

## 2021-10-21 ENCOUNTER — APPOINTMENT (OUTPATIENT)
Dept: GENERAL RADIOLOGY | Age: 76
DRG: 727 | End: 2021-10-21
Attending: STUDENT IN AN ORGANIZED HEALTH CARE EDUCATION/TRAINING PROGRAM
Payer: MEDICARE

## 2021-10-21 ENCOUNTER — HOSPITAL ENCOUNTER (INPATIENT)
Age: 76
LOS: 5 days | Discharge: NURSING, REHAB OR LONG-TERM CARE | DRG: 727 | End: 2021-10-26
Attending: EMERGENCY MEDICINE | Admitting: EMERGENCY MEDICINE
Payer: MEDICARE

## 2021-10-21 DIAGNOSIS — N41.0 ACUTE PROSTATITIS: ICD-10-CM

## 2021-10-21 DIAGNOSIS — E87.70 HYPERVOLEMIA, UNSPECIFIED HYPERVOLEMIA TYPE: ICD-10-CM

## 2021-10-21 DIAGNOSIS — N39.0 URINARY TRACT INFECTION WITHOUT HEMATURIA, SITE UNSPECIFIED: Primary | ICD-10-CM

## 2021-10-21 DIAGNOSIS — R09.02 HYPOXEMIA: ICD-10-CM

## 2021-10-21 PROBLEM — J18.9 PNEUMONIA OF RIGHT LOWER LOBE DUE TO INFECTIOUS ORGANISM: Status: ACTIVE | Noted: 2021-10-21

## 2021-10-21 PROBLEM — J18.9 CAP (COMMUNITY ACQUIRED PNEUMONIA): Status: ACTIVE | Noted: 2021-10-21

## 2021-10-21 LAB
ALBUMIN SERPL-MCNC: 2.9 G/DL (ref 3.2–4.6)
ALBUMIN/GLOB SERPL: 0.7 {RATIO} (ref 1.2–3.5)
ALP SERPL-CCNC: 79 U/L (ref 50–136)
ALT SERPL-CCNC: 22 U/L (ref 12–65)
ANION GAP SERPL CALC-SCNC: 7 MMOL/L (ref 7–16)
AST SERPL-CCNC: 60 U/L (ref 15–37)
BACTERIA URNS QL MICRO: ABNORMAL /HPF
BASOPHILS # BLD: 0 K/UL (ref 0–0.2)
BASOPHILS NFR BLD: 0 % (ref 0–2)
BILIRUB SERPL-MCNC: 1 MG/DL (ref 0.2–1.1)
BNP SERPL-MCNC: 1519 PG/ML (ref 5–125)
BUN SERPL-MCNC: 18 MG/DL (ref 8–23)
CALCIUM SERPL-MCNC: 8.2 MG/DL (ref 8.3–10.4)
CASTS URNS QL MICRO: ABNORMAL /LPF
CHLORIDE SERPL-SCNC: 104 MMOL/L (ref 98–107)
CO2 SERPL-SCNC: 28 MMOL/L (ref 21–32)
COVID-19 RAPID TEST, COVR: NOT DETECTED
CREAT SERPL-MCNC: 1.5 MG/DL (ref 0.8–1.5)
DIFFERENTIAL METHOD BLD: ABNORMAL
EOSINOPHIL # BLD: 0.1 K/UL (ref 0–0.8)
EOSINOPHIL NFR BLD: 1 % (ref 0.5–7.8)
EPI CELLS #/AREA URNS HPF: ABNORMAL /HPF
ERYTHROCYTE [DISTWIDTH] IN BLOOD BY AUTOMATED COUNT: 15.1 % (ref 11.9–14.6)
GLOBULIN SER CALC-MCNC: 3.9 G/DL (ref 2.3–3.5)
GLUCOSE SERPL-MCNC: 213 MG/DL (ref 65–100)
HCT VFR BLD AUTO: 40.9 % (ref 41.1–50.3)
HGB BLD-MCNC: 13.2 G/DL (ref 13.6–17.2)
IMM GRANULOCYTES # BLD AUTO: 0.1 K/UL (ref 0–0.5)
IMM GRANULOCYTES NFR BLD AUTO: 1 % (ref 0–5)
LACTATE SERPL-SCNC: 1.7 MMOL/L (ref 0.4–2)
LYMPHOCYTES # BLD: 0.8 K/UL (ref 0.5–4.6)
LYMPHOCYTES NFR BLD: 6 % (ref 13–44)
MCH RBC QN AUTO: 29.9 PG (ref 26.1–32.9)
MCHC RBC AUTO-ENTMCNC: 32.3 G/DL (ref 31.4–35)
MCV RBC AUTO: 92.7 FL (ref 79.6–97.8)
MONOCYTES # BLD: 1.3 K/UL (ref 0.1–1.3)
MONOCYTES NFR BLD: 10 % (ref 4–12)
NEUTS SEG # BLD: 10.2 K/UL (ref 1.7–8.2)
NEUTS SEG NFR BLD: 82 % (ref 43–78)
NRBC # BLD: 0 K/UL (ref 0–0.2)
PLATELET # BLD AUTO: 198 K/UL (ref 150–450)
PLATELET COMMENTS,PCOM: ADEQUATE
PMV BLD AUTO: 11.5 FL (ref 9.4–12.3)
POTASSIUM SERPL-SCNC: 3.2 MMOL/L (ref 3.5–5.1)
PROCALCITONIN SERPL-MCNC: 3.94 NG/ML
PROT SERPL-MCNC: 6.8 G/DL (ref 6.3–8.2)
RBC # BLD AUTO: 4.41 M/UL (ref 4.23–5.6)
RBC #/AREA URNS HPF: ABNORMAL /HPF
RBC MORPH BLD: ABNORMAL
SARS-COV-2, COV2: NORMAL
SODIUM SERPL-SCNC: 139 MMOL/L (ref 138–145)
SOURCE, COVRS: NORMAL
WBC # BLD AUTO: 12.5 K/UL (ref 4.3–11.1)
WBC MORPH BLD: ABNORMAL
WBC URNS QL MICRO: >100 /HPF

## 2021-10-21 PROCEDURE — 87086 URINE CULTURE/COLONY COUNT: CPT

## 2021-10-21 PROCEDURE — 65270000029 HC RM PRIVATE

## 2021-10-21 PROCEDURE — 71045 X-RAY EXAM CHEST 1 VIEW: CPT

## 2021-10-21 PROCEDURE — 74011250637 HC RX REV CODE- 250/637: Performed by: EMERGENCY MEDICINE

## 2021-10-21 PROCEDURE — 80053 COMPREHEN METABOLIC PANEL: CPT

## 2021-10-21 PROCEDURE — U0005 INFEC AGEN DETEC AMPLI PROBE: HCPCS

## 2021-10-21 PROCEDURE — 85025 COMPLETE CBC W/AUTO DIFF WBC: CPT

## 2021-10-21 PROCEDURE — 93005 ELECTROCARDIOGRAM TRACING: CPT | Performed by: STUDENT IN AN ORGANIZED HEALTH CARE EDUCATION/TRAINING PROGRAM

## 2021-10-21 PROCEDURE — 74011250636 HC RX REV CODE- 250/636: Performed by: EMERGENCY MEDICINE

## 2021-10-21 PROCEDURE — 96365 THER/PROPH/DIAG IV INF INIT: CPT

## 2021-10-21 PROCEDURE — 84145 PROCALCITONIN (PCT): CPT

## 2021-10-21 PROCEDURE — 87040 BLOOD CULTURE FOR BACTERIA: CPT

## 2021-10-21 PROCEDURE — 99285 EMERGENCY DEPT VISIT HI MDM: CPT

## 2021-10-21 PROCEDURE — 87635 SARS-COV-2 COVID-19 AMP PRB: CPT

## 2021-10-21 PROCEDURE — 83880 ASSAY OF NATRIURETIC PEPTIDE: CPT

## 2021-10-21 PROCEDURE — 81015 MICROSCOPIC EXAM OF URINE: CPT

## 2021-10-21 PROCEDURE — 87186 SC STD MICRODIL/AGAR DIL: CPT

## 2021-10-21 PROCEDURE — 87088 URINE BACTERIA CULTURE: CPT

## 2021-10-21 PROCEDURE — 83605 ASSAY OF LACTIC ACID: CPT

## 2021-10-21 RX ORDER — ACETAMINOPHEN 500 MG
1000 TABLET ORAL
Status: COMPLETED | OUTPATIENT
Start: 2021-10-21 | End: 2021-10-21

## 2021-10-21 RX ORDER — CLOPIDOGREL BISULFATE 75 MG/1
75 TABLET ORAL DAILY
Status: DISCONTINUED | OUTPATIENT
Start: 2021-10-22 | End: 2021-10-26 | Stop reason: HOSPADM

## 2021-10-21 RX ORDER — GLIPIZIDE 5 MG/1
10 TABLET ORAL
Status: DISCONTINUED | OUTPATIENT
Start: 2021-10-22 | End: 2021-10-25

## 2021-10-21 RX ORDER — SODIUM CHLORIDE 0.9 % (FLUSH) 0.9 %
5-10 SYRINGE (ML) INJECTION EVERY 8 HOURS
Status: DISCONTINUED | OUTPATIENT
Start: 2021-10-21 | End: 2021-10-26 | Stop reason: HOSPADM

## 2021-10-21 RX ORDER — HYDRALAZINE HYDROCHLORIDE 20 MG/ML
10 INJECTION INTRAMUSCULAR; INTRAVENOUS
Status: DISCONTINUED | OUTPATIENT
Start: 2021-10-21 | End: 2021-10-26 | Stop reason: HOSPADM

## 2021-10-21 RX ORDER — DOXAZOSIN 4 MG/1
8 TABLET ORAL
Status: DISCONTINUED | OUTPATIENT
Start: 2021-10-21 | End: 2021-10-26 | Stop reason: HOSPADM

## 2021-10-21 RX ORDER — SODIUM CHLORIDE 0.9 % (FLUSH) 0.9 %
5-40 SYRINGE (ML) INJECTION AS NEEDED
Status: DISCONTINUED | OUTPATIENT
Start: 2021-10-21 | End: 2021-10-26 | Stop reason: HOSPADM

## 2021-10-21 RX ORDER — SODIUM CHLORIDE 0.9 % (FLUSH) 0.9 %
5-40 SYRINGE (ML) INJECTION EVERY 8 HOURS
Status: DISCONTINUED | OUTPATIENT
Start: 2021-10-21 | End: 2021-10-26 | Stop reason: SDUPTHER

## 2021-10-21 RX ORDER — FUROSEMIDE 10 MG/ML
40 INJECTION INTRAMUSCULAR; INTRAVENOUS DAILY
Status: DISCONTINUED | OUTPATIENT
Start: 2021-10-22 | End: 2021-10-25

## 2021-10-21 RX ORDER — HYDROCHLOROTHIAZIDE 25 MG/1
12.5 TABLET ORAL DAILY
Status: DISCONTINUED | OUTPATIENT
Start: 2021-10-22 | End: 2021-10-26 | Stop reason: HOSPADM

## 2021-10-21 RX ORDER — INSULIN LISPRO 100 [IU]/ML
INJECTION, SOLUTION INTRAVENOUS; SUBCUTANEOUS
Status: DISCONTINUED | OUTPATIENT
Start: 2021-10-22 | End: 2021-10-26 | Stop reason: HOSPADM

## 2021-10-21 RX ORDER — SERTRALINE HYDROCHLORIDE 50 MG/1
50 TABLET, FILM COATED ORAL DAILY
Status: DISCONTINUED | OUTPATIENT
Start: 2021-10-22 | End: 2021-10-26 | Stop reason: HOSPADM

## 2021-10-21 RX ORDER — FINASTERIDE 5 MG/1
5 TABLET, FILM COATED ORAL DAILY
Status: DISCONTINUED | OUTPATIENT
Start: 2021-10-22 | End: 2021-10-26 | Stop reason: HOSPADM

## 2021-10-21 RX ORDER — CARBOXYMETHYLCELLULOSE SODIUM 10 MG/ML
1 GEL OPHTHALMIC DAILY
COMMUNITY
End: 2021-11-17 | Stop reason: ALTCHOICE

## 2021-10-21 RX ORDER — SODIUM CHLORIDE 0.9 % (FLUSH) 0.9 %
5-10 SYRINGE (ML) INJECTION AS NEEDED
Status: DISCONTINUED | OUTPATIENT
Start: 2021-10-21 | End: 2021-10-26 | Stop reason: HOSPADM

## 2021-10-21 RX ORDER — ATORVASTATIN CALCIUM 80 MG/1
80 TABLET, FILM COATED ORAL DAILY
Status: DISCONTINUED | OUTPATIENT
Start: 2021-10-22 | End: 2021-10-26 | Stop reason: HOSPADM

## 2021-10-21 RX ORDER — ENOXAPARIN SODIUM 100 MG/ML
40 INJECTION SUBCUTANEOUS EVERY 24 HOURS
Status: DISCONTINUED | OUTPATIENT
Start: 2021-10-22 | End: 2021-10-26 | Stop reason: HOSPADM

## 2021-10-21 RX ORDER — ONDANSETRON 4 MG/1
4 TABLET, ORALLY DISINTEGRATING ORAL
COMMUNITY
End: 2021-11-17 | Stop reason: ALTCHOICE

## 2021-10-21 RX ORDER — LISINOPRIL 5 MG/1
10 TABLET ORAL 2 TIMES DAILY
Status: DISCONTINUED | OUTPATIENT
Start: 2021-10-22 | End: 2021-10-26 | Stop reason: HOSPADM

## 2021-10-21 RX ORDER — METFORMIN HYDROCHLORIDE 500 MG/1
1000 TABLET, EXTENDED RELEASE ORAL
Status: DISCONTINUED | OUTPATIENT
Start: 2021-10-22 | End: 2021-10-26 | Stop reason: HOSPADM

## 2021-10-21 RX ADMIN — Medication 10 ML: at 23:45

## 2021-10-21 RX ADMIN — AZITHROMYCIN MONOHYDRATE 500 MG: 500 INJECTION, POWDER, LYOPHILIZED, FOR SOLUTION INTRAVENOUS at 19:00

## 2021-10-21 RX ADMIN — DOXAZOSIN 8 MG: 4 TABLET ORAL at 23:44

## 2021-10-21 RX ADMIN — ACETAMINOPHEN 1000 MG: 500 TABLET ORAL at 18:08

## 2021-10-21 NOTE — ED PROVIDER NOTES
68-year-old gentleman presents to the ER from home by way of EMS for weakness, fever and concerns of sepsis. Patient had noticed some urinary hesitancy and dark coloring to his urine for 2 days. Last night around 1:30 AM he awoke, ousmane from bed to go to the bathroom. He got to the bathroom but suddenly became weak. He did not pass out, or fall, he lowered himself to the floor in a controlled fashion where he laid till about 530 when a neighbor came to help him. He has had some fevers and chills, a little nausea but no vomiting, no diarrhea. Prior history of BPH and what sounds like UroLift procedure in the past.     He has a mild cough which is no different from the previous months productive of a little clear phlegm in the mornings but not productive throughout the day. He denies chest pain but does report mild dyspnea. He has no history of COPD, asthma, CHF, DVT or PE. Patient really has no pain anywhere.            Past Medical History:   Diagnosis Date    Essential hypertension     Mixed hyperlipidemia     Right thalamic infarction ([facility] [address] 7/2/2019    Stroke ([facility] [address] 06/08/2020    Type 2 diabetes mellitus [facility])        Past Surgical History:   Procedure Laterality Date    HX CATARACT REMOVAL  January and February 2021    HX HEENT      sinus surgery    HX MALIGNANT SKIN LESION EXCISION      HX UROLOGICAL  09/2020    prostate surgery         Family History:   Problem Relation Age of Onset    No Known Problems Other     Heart Disease Mother     Thyroid Disease Mother         goiter    Heart Disease Father     Stomach Cancer Brother     Thyroid Cancer Neg Hx        Social History     Socioeconomic History    Marital status:      Spouse name: Not on file    Number of children: Not on file    Years of education: Not on file    Highest education level: Not on file   Occupational History    Not on file   Tobacco Use    Smoking status: Never Smoker    Smokeless tobacco: Never Used Substance and Sexual Activity    Alcohol use: Never    Drug use: Never    Sexual activity: Not on file   Other Topics Concern    Not on file   Social History Narrative    Not on file     Social Determinants of Health     Financial Resource Strain:     Difficulty of Paying Living Expenses:    Food Insecurity:     Worried About Running Out of Food in the Last Year:     [address] in the Last Year:    Transportation Needs:     Lack of Transportation (Medical):  Lack of Transportation (Non-Medical):    Physical Activity:     Days of Exercise per Week:     Minutes of Exercise per Session:    Stress:     Feeling of Stress :    Social Connections:     Frequency of Communication with Friends and Family:     Frequency of Social Gatherings with Friends and Family:     Attends [employer] Services:     Active Member of Clubs or Organizations:     Attends Club or Organization Meetings:     Marital Status:    Intimate Partner Violence:     Fear of Current or Ex-Partner:     Emotionally Abused:     Physically Abused:     Sexually Abused: ALLERGIES: Patient has no known allergies. Review of Systems   Constitutional: Positive for activity change, chills, fatigue and fever. Negative for diaphoresis. HENT: Negative for rhinorrhea and sore throat. Eyes: Negative for discharge and redness. Respiratory: Positive for cough and shortness of breath. Negative for wheezing. Cardiovascular: Negative for chest pain and palpitations. Gastrointestinal: Positive for nausea. Negative for abdominal pain, diarrhea and vomiting. Genitourinary: Positive for decreased urine volume, difficulty urinating and urgency. Negative for dysuria. Musculoskeletal: Negative for arthralgias and back pain. Skin: Negative for rash. Neurological: Negative for dizziness, light-headedness and headaches. All other systems reviewed and are negative.       Vitals:    10/21/21 1657 10/21/21 1658 10/21/21 1718 BP: (!) 142/63 (!) 142/63    Pulse: 98 97    Resp: 20     Temp: 98.5 °F (36.9 °C)  (!) 102.5 °F (39.2 °C)   SpO2: 90% 90%    Weight: 122.5 kg (270 lb)     Height: 5' 9\" (1.753 m)              Physical Exam  Vitals and nursing note reviewed. Constitutional:       General: He is not in acute distress. Appearance: Normal appearance. He is well-developed. He is obese. He is not ill-appearing, toxic-appearing or diaphoretic. Comments: Tachycardic and febrile   HENT:      Head: Normocephalic and atraumatic. Right Ear: External ear normal.      Left Ear: External ear normal.      Mouth/Throat:      Mouth: Mucous membranes are moist.      Pharynx: Oropharynx is clear. No oropharyngeal exudate or posterior oropharyngeal erythema. Eyes:      General: No scleral icterus. Right eye: No discharge. Left eye: No discharge. Extraocular Movements: Extraocular movements intact. Conjunctiva/sclera: Conjunctivae normal.      Pupils: Pupils are equal, round, and reactive to light. Neck:      Thyroid: No thyromegaly. Trachea: Trachea normal.   Cardiovascular:      Rate and Rhythm: Normal rate and regular rhythm. Heart sounds: Normal heart sounds. No murmur heard. No gallop. Pulmonary:      Effort: Pulmonary effort is normal. No respiratory distress. Breath sounds: Normal breath sounds. No wheezing or rales. Abdominal:      Palpations: Abdomen is soft. There is no hepatomegaly, splenomegaly or pulsatile mass. Tenderness: There is no abdominal tenderness. There is no guarding. Musculoskeletal:         General: Normal range of motion. Cervical back: Normal range of motion and neck supple. Normal range of motion. Lymphadenopathy:      Cervical: No cervical adenopathy. Skin:     General: Skin is warm and dry. Neurological:      General: No focal deficit present. Mental Status: He is alert and oriented to person, place, and time.  Mental status is at baseline. Motor: No abnormal muscle tone. Comments: cni 2-12 grossly   Psychiatric:         Mood and Affect: Mood normal.         Behavior: Behavior normal.          MDM  Number of Diagnoses or Management Options  Acute prostatitis  Hypervolemia, unspecified hypervolemia type  Hypoxemia  Urinary tract infection without hematuria, site unspecified  Diagnosis management comments: Medical decision making note:  Fever and weakness, hypoxia to 90% on room air noted on arrival, corrects easily with 2 L nasal cannula. Chest x-ray shows basilar infiltrate the patient has minimal cough, will check Covid to rule that out. Symptoms most concerning for acute prostatitis  Urine and blood work are pending. Patient received Zosyn in route, will add IV Zithromax to round out pneumonia coverage  This concludes the \"medical decision making note\" part of this emergency department visit note.          Amount and/or Complexity of Data Reviewed  Clinical lab tests: reviewed and ordered (Results Include:    Recent Results (from the past 24 hour(s))  -LACTIC ACID  Collection Time: 10/21/21  5:24 PM       Result                      Value             Ref Range           Lactic acid                 1.7               0.4 - 2.0 MM*  -CBC WITH AUTOMATED DIFF  Collection Time: 10/21/21  5:24 PM       Result                      Value             Ref Range           WBC                         12.5 (H)          4.3 - 11.1 K*       RBC                         4.41              4.23 - 5.6 M*       HGB                         13.2 (L)          13.6 - 17.2 *       HCT                         40.9 (L)          41.1 - 50.3 %       MCV                         92.7              79.6 - 97.8 *       MCH                         29.9              26.1 - 32.9 *       MCHC                        32.3              31.4 - 35.0 *       RDW                         15.1 (H)          11.9 - 14.6 %       PLATELET                    198               150 - 450 K/*       MPV                         11.5              9.4 - 12.3 FL       ABSOLUTE NRBC               0.00              0.0 - 0.2 K/*       DF                          PENDING                          -METABOLIC PANEL, COMPREHENSIVE  Collection Time: 10/21/21  5:24 PM       Result                      Value             Ref Range           Sodium                      139               138 - 145 mm*       Potassium                   3.2 (L)           3.5 - 5.1 mm*       Chloride                    104               98 - 107 mmo*       CO2                         28                21 - 32 mmol*       Anion gap                   7                 7 - 16 mmol/L       Glucose                     213 (H)           65 - 100 mg/*       BUN                         18                8 - 23 MG/DL        Creatinine                  1.50              0.8 - 1.5 MG*       GFR est AA                  59 (L)            >60 ml/min/1*       GFR est non-AA              49 (L)            >60 ml/min/1*       Calcium                     8.2 (L)           8.3 - 10.4 M*       Bilirubin, total            1.0               0.2 - 1.1 MG*       ALT (SGPT)                  22                12 - 65 U/L         AST (SGOT)                  60 (H)            15 - 37 U/L         Alk.  phosphatase            79                50 - 136 U/L        Protein, total              6.8               6.3 - 8.2 g/*       Albumin                     2.9 (L)           3.2 - 4.6 g/*       Globulin                    3.9 (H)           2.3 - 3.5 g/*       A-G Ratio                   0.7 (L)           1.2 - 3.5      -PROCALCITONIN  Collection Time: 10/21/21  5:24 PM       Result                      Value             Ref Range           Procalcitonin               3.94              ng/mL          -NT-PRO BNP  Collection Time: 10/21/21  5:24 PM       Result                      Value             Ref Range           NT pro-BNP                  1,519 (H)         5 - 125 PG/ML  -URINE MICROSCOPIC  Collection Time: 10/21/21  5:52 PM       Result                      Value             Ref Range           WBC                         >100 (H)          0 /hpf              RBC                                     0 /hpf              Epithelial cells            0-3               0 /hpf              Bacteria                    4+ (H)            0 /hpf              Casts                       0-3               0 /lpf         -SARS-COV-2  Collection Time: 10/21/21  5:57 PM       Result                      Value             Ref Range           SARS-CoV-2                                                    Please find results under separate order  -COVID-19 RAPID TEST  Collection Time: 10/21/21  5:57 PM       Result                      Value             Ref Range           Specimen source             NASAL                                 COVID-19 rapid test         Not detected      NOTD           )  Tests in the radiology section of CPT®: reviewed and ordered (XR CHEST PORT   Final Result    Airspace opacities in the right lung base increased compared to prior reflecting atelectasis or infiltrates.)  Decide to obtain previous medical records or to obtain history from someone other than the patient: yes  Obtain history from someone other than the patient: yes (Family at bedside)  Discuss the patient with other providers: yes (Hospitalist)    Risk of Complications, Morbidity, and/or Mortality  Presenting problems: moderate  Diagnostic procedures: low  Management options: low    Patient Progress  Patient progress: improved         Procedures

## 2021-10-21 NOTE — ED NOTES
EMS also states 1 L NS given en route and 4mg zofran. Per family at bedside, pt speech is \"off\" Pt has hx of stroke in the cerebellum region. Family states similar symptoms with previous stroke.

## 2021-10-21 NOTE — ED TRIAGE NOTES
Pt to ED via [facility] from home for possible UTI. Pt got up to the bathroom this AM and got \"weak\" Pt did not fall but helped himself to the ground. Pt has been back in bed since noon with increased weakness and dizziness. Pt states chills and cold sweats. Pt states painful urination and inability to empty his bladder. Pt complains of urine being \"dark phylicia\" and malodorous. Pt was treated as a sepsis alert en route with EMS after zosyn admin pt got red in the face and on chest wall negative for hives but ems treated with 25 of benadryl for possible allergic reaction. Pt masked with EMS.  20 G L hand placed with EMS

## 2021-10-22 PROBLEM — N17.9 AKI (ACUTE KIDNEY INJURY) (HCC): Status: ACTIVE | Noted: 2021-10-22

## 2021-10-22 PROBLEM — N18.30 CKD (CHRONIC KIDNEY DISEASE) STAGE 3, GFR 30-59 ML/MIN (HCC): Status: ACTIVE | Noted: 2021-10-22

## 2021-10-22 PROBLEM — A41.9 SEPSIS (HCC): Status: ACTIVE | Noted: 2021-10-22

## 2021-10-22 LAB
ALBUMIN SERPL-MCNC: 2.6 G/DL (ref 3.2–4.6)
ALBUMIN/GLOB SERPL: 0.7 {RATIO} (ref 1.2–3.5)
ALP SERPL-CCNC: 67 U/L (ref 50–136)
ALT SERPL-CCNC: 20 U/L (ref 12–65)
ANION GAP SERPL CALC-SCNC: 7 MMOL/L (ref 7–16)
AST SERPL-CCNC: 58 U/L (ref 15–37)
ATRIAL RATE: 93 BPM
BASOPHILS # BLD: 0 K/UL (ref 0–0.2)
BASOPHILS NFR BLD: 0 % (ref 0–2)
BILIRUB SERPL-MCNC: 0.8 MG/DL (ref 0.2–1.1)
BUN SERPL-MCNC: 27 MG/DL (ref 8–23)
CALCIUM SERPL-MCNC: 8 MG/DL (ref 8.3–10.4)
CALCULATED P AXIS, ECG09: 18 DEGREES
CALCULATED R AXIS, ECG10: 3 DEGREES
CALCULATED T AXIS, ECG11: 45 DEGREES
CHLORIDE SERPL-SCNC: 105 MMOL/L (ref 98–107)
CO2 SERPL-SCNC: 29 MMOL/L (ref 21–32)
CREAT SERPL-MCNC: 2.4 MG/DL (ref 0.8–1.5)
DIAGNOSIS, 93000: NORMAL
DIFFERENTIAL METHOD BLD: ABNORMAL
EOSINOPHIL # BLD: 0 K/UL (ref 0–0.8)
EOSINOPHIL NFR BLD: 0 % (ref 0.5–7.8)
ERYTHROCYTE [DISTWIDTH] IN BLOOD BY AUTOMATED COUNT: 15.4 % (ref 11.9–14.6)
GLOBULIN SER CALC-MCNC: 3.6 G/DL (ref 2.3–3.5)
GLUCOSE BLD STRIP.AUTO-MCNC: 137 MG/DL (ref 65–100)
GLUCOSE BLD STRIP.AUTO-MCNC: 158 MG/DL (ref 65–100)
GLUCOSE BLD STRIP.AUTO-MCNC: 179 MG/DL (ref 65–100)
GLUCOSE BLD STRIP.AUTO-MCNC: 184 MG/DL (ref 65–100)
GLUCOSE SERPL-MCNC: 187 MG/DL (ref 65–100)
HCT VFR BLD AUTO: 37 % (ref 41.1–50.3)
HGB BLD-MCNC: 11.8 G/DL (ref 13.6–17.2)
IMM GRANULOCYTES # BLD AUTO: 0.1 K/UL (ref 0–0.5)
IMM GRANULOCYTES NFR BLD AUTO: 1 % (ref 0–5)
LYMPHOCYTES # BLD: 1.2 K/UL (ref 0.5–4.6)
LYMPHOCYTES NFR BLD: 10 % (ref 13–44)
MCH RBC QN AUTO: 29.4 PG (ref 26.1–32.9)
MCHC RBC AUTO-ENTMCNC: 31.9 G/DL (ref 31.4–35)
MCV RBC AUTO: 92 FL (ref 79.6–97.8)
MONOCYTES # BLD: 1.1 K/UL (ref 0.1–1.3)
MONOCYTES NFR BLD: 9 % (ref 4–12)
NEUTS SEG # BLD: 9.6 K/UL (ref 1.7–8.2)
NEUTS SEG NFR BLD: 80 % (ref 43–78)
NRBC # BLD: 0 K/UL (ref 0–0.2)
P-R INTERVAL, ECG05: 182 MS
PLATELET # BLD AUTO: 175 K/UL (ref 150–450)
PLATELET COMMENTS,PCOM: ADEQUATE
PMV BLD AUTO: 12.1 FL (ref 9.4–12.3)
POTASSIUM SERPL-SCNC: 3.3 MMOL/L (ref 3.5–5.1)
PROT SERPL-MCNC: 6.2 G/DL (ref 6.3–8.2)
Q-T INTERVAL, ECG07: 328 MS
QRS DURATION, ECG06: 82 MS
QTC CALCULATION (BEZET), ECG08: 407 MS
RBC # BLD AUTO: 4.02 M/UL (ref 4.23–5.6)
RBC MORPH BLD: ABNORMAL
SARS-COV-2, COV2: NOT DETECTED
SERVICE CMNT-IMP: ABNORMAL
SODIUM SERPL-SCNC: 141 MMOL/L (ref 136–145)
SPECIMEN SOURCE, FCOV2M: NORMAL
VENTRICULAR RATE, ECG03: 93 BPM
WBC # BLD AUTO: 12 K/UL (ref 4.3–11.1)
WBC MORPH BLD: ABNORMAL

## 2021-10-22 PROCEDURE — 74011250637 HC RX REV CODE- 250/637: Performed by: EMERGENCY MEDICINE

## 2021-10-22 PROCEDURE — 74011636637 HC RX REV CODE- 636/637: Performed by: EMERGENCY MEDICINE

## 2021-10-22 PROCEDURE — 97165 OT EVAL LOW COMPLEX 30 MIN: CPT

## 2021-10-22 PROCEDURE — 85025 COMPLETE CBC W/AUTO DIFF WBC: CPT

## 2021-10-22 PROCEDURE — 82962 GLUCOSE BLOOD TEST: CPT

## 2021-10-22 PROCEDURE — 36415 COLL VENOUS BLD VENIPUNCTURE: CPT

## 2021-10-22 PROCEDURE — 74011250636 HC RX REV CODE- 250/636: Performed by: EMERGENCY MEDICINE

## 2021-10-22 PROCEDURE — 80053 COMPREHEN METABOLIC PANEL: CPT

## 2021-10-22 PROCEDURE — 97530 THERAPEUTIC ACTIVITIES: CPT

## 2021-10-22 PROCEDURE — 65270000029 HC RM PRIVATE

## 2021-10-22 PROCEDURE — 97161 PT EVAL LOW COMPLEX 20 MIN: CPT

## 2021-10-22 PROCEDURE — 97535 SELF CARE MNGMENT TRAINING: CPT

## 2021-10-22 PROCEDURE — 74011000258 HC RX REV CODE- 258: Performed by: EMERGENCY MEDICINE

## 2021-10-22 RX ORDER — CHOLECALCIFEROL (VITAMIN D3) 125 MCG
5 CAPSULE ORAL
Status: DISCONTINUED | OUTPATIENT
Start: 2021-10-22 | End: 2021-10-25

## 2021-10-22 RX ORDER — METFORMIN HYDROCHLORIDE 500 MG/1
1000 TABLET, EXTENDED RELEASE ORAL
Status: CANCELLED | OUTPATIENT
Start: 2021-10-22

## 2021-10-22 RX ADMIN — INSULIN LISPRO 2 UNITS: 100 INJECTION, SOLUTION INTRAVENOUS; SUBCUTANEOUS at 08:25

## 2021-10-22 RX ADMIN — DOXAZOSIN 8 MG: 4 TABLET ORAL at 21:46

## 2021-10-22 RX ADMIN — INSULIN LISPRO 2 UNITS: 100 INJECTION, SOLUTION INTRAVENOUS; SUBCUTANEOUS at 11:48

## 2021-10-22 RX ADMIN — ATORVASTATIN CALCIUM 80 MG: 80 TABLET, FILM COATED ORAL at 08:25

## 2021-10-22 RX ADMIN — Medication 5 ML: at 21:46

## 2021-10-22 RX ADMIN — Medication 5 MG: at 21:46

## 2021-10-22 RX ADMIN — LISINOPRIL 10 MG: 5 TABLET ORAL at 08:25

## 2021-10-22 RX ADMIN — CEFTRIAXONE 2 G: 2 INJECTION, POWDER, FOR SOLUTION INTRAMUSCULAR; INTRAVENOUS at 08:24

## 2021-10-22 RX ADMIN — Medication 10 ML: at 14:00

## 2021-10-22 RX ADMIN — CLOPIDOGREL BISULFATE 75 MG: 75 TABLET ORAL at 08:25

## 2021-10-22 RX ADMIN — FINASTERIDE 5 MG: 5 TABLET, FILM COATED ORAL at 08:25

## 2021-10-22 RX ADMIN — SERTRALINE 50 MG: 50 TABLET, FILM COATED ORAL at 08:25

## 2021-10-22 RX ADMIN — AZITHROMYCIN MONOHYDRATE 500 MG: 500 INJECTION, POWDER, LYOPHILIZED, FOR SOLUTION INTRAVENOUS at 17:24

## 2021-10-22 RX ADMIN — GLIPIZIDE 10 MG: 5 TABLET ORAL at 06:19

## 2021-10-22 RX ADMIN — Medication 5 ML: at 21:47

## 2021-10-22 RX ADMIN — HYDROCHLOROTHIAZIDE 12.5 MG: 25 TABLET ORAL at 08:25

## 2021-10-22 RX ADMIN — FUROSEMIDE 40 MG: 10 INJECTION, SOLUTION INTRAMUSCULAR; INTRAVENOUS at 08:25

## 2021-10-22 RX ADMIN — INSULIN LISPRO 2 UNITS: 100 INJECTION, SOLUTION INTRAVENOUS; SUBCUTANEOUS at 16:22

## 2021-10-22 RX ADMIN — ENOXAPARIN SODIUM 40 MG: 40 INJECTION SUBCUTANEOUS at 08:25

## 2021-10-22 NOTE — H&P
History and Physical          Hospitalist History and Physical   Admit Date:  10/21/2021  4:50 PM   Name:  Mahogany Mendoza   Age:  78 y.o. Sex:  male  :  1945   MRN:  [id number]   Room:  [place] [address]    Presenting Complaint: Bladder Infection    Reason(s) for Admission: Acute bacterial prostatitis. History of Present Illness:   Mahogany Mendoza is a 78 y.o. male with medical history of hypertension, hyperlipidemia, diabetes type 2 not requiring long-term use of insulin, cerebellar stroke 16 months ago from which she has recovered well with regular attendance to therapy and gym exercise allowing him to no longer use the walker and actually within the last week to be able to drive himself and get out and about for the first time independently, who presented with relatively rapid onset of generalized weakness in the last 24 hours with some associated urinary retention, fever, chills. He denies significant cough, noted shortness of breath, chest pain, diaphoresis, nausea, vomiting, abdominal pain, suprapubic or flank pain, other urinary symptoms, focal weakness or numbness. Review of Systems:  10 systems reviewed and negative except as noted in HPI. Assessment & Plan:     Principal Problem:    Acute prostatitis without hematuria  Requires admission due to combination of age, fever, level of illness, elevated procalcitonin, hypoxia. IV Rocephin. Tylenol for fever control. Continue home dose of finasteride. Will monitor closely and adjust treatment as necessary. Cerebellar stroke ([facility] [address]  History of cerebellar stroke. Head worked hard with rehab and exercise at the gym 3 times a week so that he was no longer using the walker and actually was able to drive himself and get out and about by himself for the first time in 16 months this past week.   Would benefit from PT and OT while here due to rapid recurrence of weakness with this urinary tract infection and pneumonia so as to limit backtracking of his physical ability as much as we possibly can. I appreciate the involvement of PT and OT personnel in the ongoing care of Mr. Ana Laura Cornelius. Will monitor and adjust treatment as necessary. Controlled type 2 diabetes mellitus without complication, without long-term current use of insulin (HCC)  Continue glipizide and Metformin. Sliding scale insulin and monitor glucose 4 times daily as needed. Will monitor closely and adjust treatment as necessary. Essential hypertension, benign  Continue home dosing of doxazosin, hydrochlorothiazide, lisinopril. Hydralazine IV as needed. Will monitor and adjust treatment as necessary. Mixed hyperlipidemia  Continue atorvastatin 80 mg. Last lipids were checked 4 months ago. As such, they are current. Pneumonia of right lower lobe due to infectious organism  Pneumonia versus fluid overload. IV azithromycin added to IV Rocephin to treat for community-acquired pneumonia. Lasix IV to treat overload. Will monitor closely and adjust treatment as necessary    Severe obesity ([address]  We will continue to encourage exercise as able as well as diet. PT and OT ordered to help prevent further debility. Fluid overload  Appears relatively mild in combination with what is likely a community-acquired pneumonia in the right lower lobe. Lasix 40 mg IV daily. Will monitor and adjust treatment as necessary    Hypoxia  Dropped to 88% on room air at rest.  Requires oxygen support. We will continue oxygen support and wean as able. Likely this is secondary to overall state of illness in addition to what appears to be a right lower lobe community-acquired pneumonia with some associated fluid overload. Covid is negative.           Dispo/Discharge Plannin-3 midnights    Diet: Heart healthy diabetic diet  VTE ppx: Lovenox  Code status: [address] Problems as of 10/21/2021 Date Reviewed: 10/21/2021        Codes Class Noted - Resolved POA    * (Principal) Acute prostatitis without hematuria ICD-10-CM: N41.0  ICD-9-CM: 601.0  10/21/2021 - Present Yes        Pneumonia of right lower lobe due to infectious organism ICD-10-CM: J18.9  ICD-9-CM: 486  10/21/2021 - Present Yes        Fluid overload ICD-10-CM: E87.70  ICD-9-CM: 276.69  10/21/2021 - Present Yes        Cerebellar stroke ([facility] ICD-10-CM: I63.9  ICD-9-CM: 434.91  6/14/2020 - Present Yes        Controlled type 2 diabetes mellitus without complication, without long-term current use of insulin (HCC) ICD-10-CM: E11.9  ICD-9-CM: 250.00  2/22/2019 - Present Yes        Severe obesity ([facility] ICD-10-CM: E66.01  ICD-9-CM: 278.01  7/2/2019 - Present Yes        Essential hypertension, benign ICD-10-CM: I10  ICD-9-CM: 401.1  1/3/2014 - Present Yes        Mixed hyperlipidemia ICD-10-CM: E78.2  ICD-9-CM: 272.2  1/3/2014 - Present Yes              Past Medical History:   Diagnosis Date    Essential hypertension     Mixed hyperlipidemia     Right thalamic infarction ([facility] 7/2/2019    Stroke ([facility] 06/08/2020    Type 2 diabetes mellitus ([facility]      Past Surgical History:   Procedure Laterality Date    HX CATARACT REMOVAL  January and February 2021    HX HEENT      sinus surgery    HX MALIGNANT SKIN LESION EXCISION      HX UROLOGICAL  09/2020    prostate surgery      No Known Allergies   Social History     Tobacco Use    Smoking status: Never Smoker    Smokeless tobacco: Never Used   Substance Use Topics    Alcohol use: Never      Family History   Problem Relation Age of Onset    No Known Problems Other     Heart Disease Mother     Thyroid Disease Mother         goiter    Heart Disease Father     Stomach Cancer Brother     Thyroid Cancer Neg Hx       Family history reviewed and negative except as noted above.     Immunization History   Administered Date(s) Administered    Covid-19, MODERNA, Mrna, Lnp-s, Pf, 100mcg/0.5mL 03/18/2021, 04/12/2021    Influenza High Dose Vaccine PF 11/12/2016, 10/17/2018    Influenza Vaccine (Tri) Adjuvanted (>65 Yrs FLUAD TRI 12427) 10/10/2019    Influenza, Quadrivalent, Adjuvanted (>65 Yrs FLUAD QUAD 24219) 10/26/2020    TB Skin Test (PPD) Intradermal 06/11/2020    Zoster Vaccine, Live 12/13/2016     PTA Medications:  Current Outpatient Medications   Medication Instructions    atorvastatin (LIPITOR) 80 mg, Oral, DAILY    cefpodoxime (VANTIN) 200 mg, Oral, 2 TIMES DAILY    clopidogreL (PLAVIX) 75 mg, Oral, DAILY    DISABLED PLACARD (DISABLED PLACARD) DMV As directed    doxazosin (CARDURA) 8 mg, Oral, EVERY BEDTIME    finasteride (PROSCAR) 5 mg tablet No dose, route, or frequency recorded.  glipiZIDE (GLUCOTROL) 10 mg, Oral, DAILY BEFORE BREAKFAST    hydroCHLOROthiazide (HYDRODIURIL) 12.5 mg, Oral, DAILY    lisinopriL (PRINIVIL, ZESTRIL) 10 mg, Oral, 2 TIMES DAILY    loratadine (CLARITIN) 10 mg, Oral, DAILY    metFORMIN ER (GLUCOPHAGE XR) 1,000 mg, Oral, DAILY WITH DINNER    OneTouch Delica Lancets 30 gauge misc Does Not Apply    OneTouch Ultra Blue Test Strip strip Does Not Apply, SEE ADMIN INSTRUCTIONS    OneTouch Ultra2 Meter misc Does Not Apply    sertraline (ZOLOFT) 50 mg, Oral, DAILY       Objective:     Patient Vitals for the past 24 hrs:   Temp Pulse Resp BP SpO2   10/21/21 1718 (!) 102.5 °F (39.2 °C)       10/21/21 1658  97  (!) 142/63 90 %   10/21/21 1657 98.5 °F (36.9 °C) 98 20 (!) 142/63 90 %     Oxygen Therapy  O2 Sat (%): 90 % (10/21/21 1658)  Pulse via Oximetry: 97 beats per minute (10/21/21 1658)  O2 Device: None (Room air) (10/21/21 1657)    Estimated body mass index is 39.87 kg/m² as calculated from the following:    Height as of this encounter: 5' 9\" (1.753 m). Weight as of this encounter: 122.5 kg (270 lb).     Intake/Output Summary (Last 24 hours) at 10/21/2021 2042  Last data filed at 10/21/2021 2025  Gross per 24 hour   Intake 250 ml   Output    Net 250 ml         Physical Exam:    Blood pressure (!) 142/63, pulse 97, temperature (!) 102.5 °F (39.2 °C), resp. rate 20, height 5' 9\" (1.753 m), weight 122.5 kg (270 lb), SpO2 90 %. General:    Well nourished. No overt distress. Appears morbidly obese. Lying semireclined on stretcher no acute distress. Appears comfortable. Nontoxic-appearing. Mildly ill-appearing. Requiring 2 L of oxygen via nasal cannula to keep O2 saturations greater than 90%. Head:  Normocephalic, atraumatic  Eyes:  Sclerae appear normal.  Pupils equally round. ENT:  Nares appear normal, no drainage. Moist oral mucosa  Neck:  No restricted ROM. Trachea midline   CV:   RRR. No m/r/g. No jugular venous distension. Lungs:   Right base rales and rhonchi noted. Left basilar rhonchi. Good air movement throughout. Crystal Danyell Respirations even, unlabored without use of accessory muscles of respiration. Abdomen: Bowel sounds present. Soft, nontender, nondistended. Extremities: No cyanosis or clubbing. No edema  Skin:     No rashes and normal coloration. Warm and dry. Neuro:  CN II-XII grossly intact. Sensation intact. A&Ox3  Psych:  Normal mood and affect. I have reviewed ordered lab tests and independently visualized imaging below:    Last 24hr Labs:  Recent Results (from the past 24 hour(s))   LACTIC ACID    Collection Time: 10/21/21  5:24 PM   Result Value Ref Range    Lactic acid 1.7 0.4 - 2.0 MMOL/L   CBC WITH AUTOMATED DIFF    Collection Time: 10/21/21  5:24 PM   Result Value Ref Range    WBC 12.5 (H) 4.3 - 11.1 K/uL    RBC 4.41 4.23 - 5.6 M/uL    HGB 13.2 (L) 13.6 - 17.2 g/dL    HCT 40.9 (L) 41.1 - 50.3 %    MCV 92.7 79.6 - 97.8 FL    MCH 29.9 26.1 - 32.9 PG    MCHC 32.3 31.4 - 35.0 g/dL    RDW 15.1 (H) 11.9 - 14.6 %    PLATELET 220 887 - 174 K/uL    MPV 11.5 9.4 - 12.3 FL    ABSOLUTE NRBC 0.00 0.0 - 0.2 K/uL    NEUTROPHILS 82 (H) 43 - 78 %    LYMPHOCYTES 6 (L) 13 - 44 %    MONOCYTES 10 4.0 - 12.0 %    EOSINOPHILS 1 0.5 - 7.8 %    BASOPHILS 0 0.0 - 2.0 %    IMMATURE GRANULOCYTES 1 0.0 - 5.0 %    ABS.  NEUTROPHILS 10.2 (H) 1.7 - 8.2 K/UL    ABS. LYMPHOCYTES 0.8 0.5 - 4.6 K/UL    ABS. MONOCYTES 1.3 0.1 - 1.3 K/UL    ABS. EOSINOPHILS 0.1 0.0 - 0.8 K/UL    ABS. BASOPHILS 0.0 0.0 - 0.2 K/UL    ABS. IMM. GRANS. 0.1 0.0 - 0.5 K/UL    RBC COMMENTS OCCASIONAL  ANISOCYTOSIS        WBC COMMENTS Result Confirmed By Smear      PLATELET COMMENTS ADEQUATE      DF AUTOMATED     METABOLIC PANEL, COMPREHENSIVE    Collection Time: 10/21/21  5:24 PM   Result Value Ref Range    Sodium 139 138 - 145 mmol/L    Potassium 3.2 (L) 3.5 - 5.1 mmol/L    Chloride 104 98 - 107 mmol/L    CO2 28 21 - 32 mmol/L    Anion gap 7 7 - 16 mmol/L    Glucose 213 (H) 65 - 100 mg/dL    BUN 18 8 - 23 MG/DL    Creatinine 1.50 0.8 - 1.5 MG/DL    GFR est AA 59 (L) >60 ml/min/1.73m2    GFR est non-AA 49 (L) >60 ml/min/1.73m2    Calcium 8.2 (L) 8.3 - 10.4 MG/DL    Bilirubin, total 1.0 0.2 - 1.1 MG/DL    ALT (SGPT) 22 12 - 65 U/L    AST (SGOT) 60 (H) 15 - 37 U/L    Alk.  phosphatase 79 50 - 136 U/L    Protein, total 6.8 6.3 - 8.2 g/dL    Albumin 2.9 (L) 3.2 - 4.6 g/dL    Globulin 3.9 (H) 2.3 - 3.5 g/dL    A-G Ratio 0.7 (L) 1.2 - 3.5     PROCALCITONIN    Collection Time: 10/21/21  5:24 PM   Result Value Ref Range    Procalcitonin 3.94 ng/mL   NT-PRO BNP    Collection Time: 10/21/21  5:24 PM   Result Value Ref Range    NT pro-BNP 1,519 (H) 5 - 125 PG/ML   URINE MICROSCOPIC    Collection Time: 10/21/21  5:52 PM   Result Value Ref Range    WBC >100 (H) 0 /hpf    RBC  0 /hpf    Epithelial cells 0-3 0 /hpf    Bacteria 4+ (H) 0 /hpf    Casts 0-3 0 /lpf   SARS-COV-2    Collection Time: 10/21/21  5:57 PM   Result Value Ref Range    SARS-CoV-2 Please find results under separate order     COVID-19 RAPID TEST    Collection Time: 10/21/21  5:57 PM   Result Value Ref Range    Specimen source NASAL      COVID-19 rapid test Not detected NOTD         All Micro Results     Procedure Component Value Units Date/Time    CULTURE, URINE [[id number]] Collected: 10/21/21 8798    Order Status: Completed Specimen: Cath Urine Updated: 10/21/21 1854    SARS-COV-2, PCR [[id number]] Collected: 10/21/21 1757    Order Status: Completed Updated: 10/21/21 [address]    COVID-19 RAPID TEST [[id number]] Collected: 10/21/21 1757    Order Status: Completed Specimen: Nasopharyngeal Updated: 10/21/21 1835     Specimen source NASAL        COVID-19 rapid test Not detected        Comment:      The specimen is NEGATIVE for SARS-CoV-2, the novel coronavirus associated with COVID-19. A negative result does not rule out COVID-19. This test has been authorized by the FDA under an Emergency Use Authorization (EUA) for use by authorized laboratories. Fact sheet for Healthcare Providers: "[employer]date.co.nz  Fact sheet for Patients: "[employer]date.co[employer]nz       Methodology: Isothermal Nucleic Acid Amplification         COVID-19 RAPID TEST [[id number]] Collected: 10/21/21 1757    Order Status: Canceled     BLOOD CULTURE [[id number]] Collected: 10/21/21 1728    Order Status: Completed Specimen: Blood Updated: 10/21/21 1753    BLOOD CULTURE [[id number]] Collected: 10/21/21 1648    Order Status: Completed Specimen: Blood Updated: 10/21/21 1753          Other Studies:  XR CHEST PORT    Result Date: 10/21/2021  EXAM: CHEST X-RAY, 1 VIEW INDICATION: meets SIRS criteria. COMPARISON: Chest x-ray 6/15/2020 TECHNIQUE: Single AP view of the chest was obtained. FINDINGS: Airspace opacities in the right lung base increased compared to prior. No pneumothorax or pleural effusion. The cardiomediastinal silhouette is within normal limits. The osseous structures are unremarkable. Airspace opacities in the right lung base increased compared to prior reflecting atelectasis or infiltrates.          Medications Administered     acetaminophen (TYLENOL) tablet 1,000 mg     Admin Date  10/21/2021 Action  Given Dose  1,000 mg Route  Oral Administered By  TagbrandJannette          azithromycin (ZITHROMAX) 500 mg in 0.9% sodium chloride 250 mL (VIAL-MATE)     Admin Date  10/21/2021 Action  New Bag Dose  500 mg Rate  250 mL/hr Route  IntraVENous Administered By  Zuleika Dial                  Signed:  Arpit Waters MD    Part of this note may have been written by using a voice dictation software.   The note has been proof read but may still contain some grammatical/other typographical errors.'

## 2021-10-22 NOTE — PROGRESS NOTES
[facility] Hospitalist Progress Note     Name: Eduardo Rodriguez Age: 78 y.o. Sex: male  : 1945    MRN:     [id number]    Admit Date:  10/21/2021    Presenting Complaint:   Bladder Infection      Reason(s) for Admission:   Acute prostatitis [N41.0]  Hypoxia [R09.02]  CAP (community acquired pneumonia) [J18.9]       Hospital Course and Interval History:     Please refer to the admission H&P for details of presentation. In summary, Eduardo Rodriguez is a 78 y.o. male with medical history significant for hypertension, hyperlipidemia, diabetes type 2 not requiring long-term use of insulin, cerebellar stroke without residual deficits, who presented to ED with rapid onset generalized weakness with urinary retention, fever and chills. Work-up shows a with 4+ bacteria, more than 100 WBCs. Chest x-ray shows possible infiltrate. Patient was started IV antibiotics for likely acute prostatitis. Subjective/24 hr Events (10/22/21) : Patient is seen and examined at bedside. No acute events reported overnight by nursing staff. Reports having cold sweats overnight. Patient was noted to have temperature 102.5 °F yesterday evening. Currently on 2 L nasal cannula with saturation of 94%. Reports no shortness of breath but has intermittent cough with yellow-white sputum production. Patient denies chest pains, shortness of breath, n/v, abdominal pain. Review of Systems: 10 point review of systems is otherwise negative with the exception of the elements mentioned above. Assessment & Plan     Sepsis, present on admission  Meets criteria for sepsis with leukocytosis and fever. Likely source of urinary as well as pulmonary. Lactic acid of 1.7, procalcitonin of 3.94  -Antibiotics as below. Acute prostatitis without hematuria  Urinalysis with 4+ bacteria, more than 100 WBCs.   Patient febrile episode of 102.5 °F yesterday evening.  -Empiric antibiotics with IV ceftriaxone  -Follow-up urine cultures    Pneumonia of right lower lobe due to infectious organism   Chest x-ray with all right lung base airspace opacity. Elevated procalcitonin. Covid swab is negative. 10/22/21: Requiring 2 L of O2 nasal cannula to maintain saturation. Intermittent cough with yellowwhitish sputum production. - azithromycin and ceftriaxone  - Mucinex, Tessalon pearls    GRACE on CKD Stage 3  Cr of 2.40 today with baseline creatinine of 1.16-1.22.  - Will hold Lasix given elevated creatinine today. - Monitor renal function closely. Further work-up and management pending clinical course. HTN // HLD  - hold lisinopril, HCTZ In setting of GRACE    Controlled type 2 diabetes mellitus without complication, without long-term current use of insulin  - sliding scale    Severe obesity: Body mass index is 40.99 kg/m². Prior Cerebellar stroke without any residual deficits  - on lipitor and plavix   - PT/OT      Diet:  ADULT DIET Regular; 3 carb choices (45 gm/meal); Low Fat/Low Chol/High Fiber/2 gm Na  DVT PPx: lovenox    Code: DNR    I have reviewed and ordered clinical lab tests and independently visualized images, tracing.    I have reviewed, updated, and verified this note's content and spent 36 minutes of my 42minutes visit performing counseling and coordination of care regarding medical management    Problem List:  Hospital Problems as of 10/22/2021 Date Reviewed: 10/21/2021        Codes Class Noted - Resolved POA    * (Principal) Sepsis ([facility] ICD-10-CM: A41.9  ICD-9-CM: 038.9, 995.91  10/22/2021 - Present Yes        GRACE (acute kidney injury) ([facility] ICD-10-CM: N17.9  ICD-9-CM: 584.9  10/22/2021 - Present No        CKD (chronic kidney disease) stage 3, GFR 30-59 ml/min ([facility]) ICD-10-CM: N18.30  ICD-9-CM: 585.3  10/22/2021 - Present Yes        Acute prostatitis without hematuria ICD-10-CM: N41.0  ICD-9-CM: 601.0  10/21/2021 - Present Yes        Pneumonia of right lower lobe due to infectious organism ICD-10-CM: J18.9  ICD-9-CM: 486  10/21/2021 - Present Yes        Fluid overload ICD-10-CM: E87.70  ICD-9-CM: 276.69  10/21/2021 - Present Yes        Hypoxia ICD-10-CM: R09.02  ICD-9-CM: 799.02  10/21/2021 - Present Yes        CAP (community acquired pneumonia) ICD-10-CM: J18.9  ICD-9-CM: 702  10/21/2021 - Present Yes        Cerebellar stroke ([facility] ICD-10-CM: I63.9  ICD-9-CM: 434.91  6/14/2020 - Present Yes        Severe obesity ([facility] ICD-10-CM: E66.01  ICD-9-CM: 278.01  7/2/2019 - Present Yes        Controlled type 2 diabetes mellitus without complication, without long-term current use of insulin ([facility] ICD-10-CM: E11.9  ICD-9-CM: 250.00  2/22/2019 - Present Yes        Essential hypertension, benign ICD-10-CM: I10  ICD-9-CM: 401.1  1/3/2014 - Present Yes        Mixed hyperlipidemia ICD-10-CM: E78.2  ICD-9-CM: 272.2  1/3/2014 - Present Yes                 Objective:     Patient Vitals for the past 24 hrs:   Temp Pulse Resp BP SpO2   10/22/21 1128 98.5 °F (36.9 °C) 81 19 108/63 92 %   10/22/21 0732 98.7 °F (37.1 °C) 79 19 93/62 92 %   10/22/21 0340 98.7 °F (37.1 °C) 82 20 132/71 97 %   10/21/21 2307 99.1 °F (37.3 °C) 95 22 139/75 96 %   10/21/21 1718 (!) 102.5 °F (39.2 °C)       10/21/21 1658  97  (!) 142/63 90 %   10/21/21 1657 98.5 °F (36.9 °C) 98 20 (!) 142/63 90 %     Oxygen Therapy  O2 Sat (%): 92 % (10/22/21 1128)  Pulse via Oximetry: 96 beats per minute (10/22/21 0730)  O2 Device: Nasal cannula (10/22/21 0730)  Skin Assessment: Clean, dry, & intact (10/22/21 0730)  Skin Protection for O2 Device: No (10/22/21 0730)  O2 Flow Rate (L/min): 2 l/min (10/22/21 0730)    Estimated body mass index is 40.99 kg/m² as calculated from the following:    Height as of this encounter: 5' 9\" (1.753 m). Weight as of this encounter: 125.9 kg (277 lb 9.6 oz).     Intake/Output Summary (Last 24 hours) at 10/22/2021 1318  Last data filed at 10/22/2021 0346  Gross per 24 hour   Intake 250 ml   Output 125 ml   Net 125 ml       Physical Exam:   General: alert, awake , obese, pleasant   Head:   Normocephalic, atraumatic  Eyes:   anicteric sclerae, normal conjunctiva,  EOMI.  ENT:   Nares appear normal, no drainage. Moist oral mucosa  Neck:    supple, non-tender. Trachea midline. Lungs: No wheezing, scattered crackles. Cardiac:   RRR, Normal S1 and S2. Abdomen:   Soft, non distended, nontender, +BS, no guarding/rebound  Extremities:   No edema , pedal pulses present, no cyanosis  Skin:   Warm, dry, normal coloration and texture  Neuro:  AAOx3.  No gross focal neurological deficit  Psychiatric:  No anxiety, calm, cooperative    Data Review:  I have reviewed ordered lab tests and independently visualized imaging below:    Last 24hr Labs:  Recent Results (from the past 24 hour(s))   CULTURE, BLOOD    Collection Time: 10/21/21  4:48 PM    Specimen: Blood   Result Value Ref Range    Special Requests: LEFT  HAND        Culture result: NO GROWTH AFTER 12 HOURS     EKG, 12 LEAD, INITIAL    Collection Time: 10/21/21  5:02 PM   Result Value Ref Range    Ventricular Rate 93 BPM    Atrial Rate 93 BPM    P-R Interval 182 ms    QRS Duration 82 ms    Q-T Interval 328 ms    QTC Calculation (Bezet) 407 ms    Calculated P Axis 18 degrees    Calculated R Axis 3 degrees    Calculated T Axis 45 degrees    Diagnosis       Normal sinus rhythm  Possible Inferior infarct (cited on or before 08-FEB-2019)  Abnormal ECG  When compared with ECG of 08-JUN-2020 07:36,  Previous ECG has undetermined rhythm, needs review  Nonspecific T wave abnormality, worse in Anterolateral leads  Confirmed by Ted Corrales ([id number]) on 10/22/2021 7:04:20 AM     LACTIC ACID    Collection Time: 10/21/21  5:24 PM   Result Value Ref Range    Lactic acid 1.7 0.4 - 2.0 MMOL/L   CBC WITH AUTOMATED DIFF    Collection Time: 10/21/21  5:24 PM   Result Value Ref Range    WBC 12.5 (H) 4.3 - 11.1 K/uL    RBC 4.41 4.23 - 5.6 M/uL    HGB 13.2 (L) 13.6 - 17.2 g/dL    HCT 40.9 (L) 41.1 - 50.3 %    MCV 92.7 79.6 - 97.8 FL MCH 29.9 26.1 - 32.9 PG    MCHC 32.3 31.4 - 35.0 g/dL    RDW 15.1 (H) 11.9 - 14.6 %    PLATELET 011 543 - 339 K/uL    MPV 11.5 9.4 - 12.3 FL    ABSOLUTE NRBC 0.00 0.0 - 0.2 K/uL    NEUTROPHILS 82 (H) 43 - 78 %    LYMPHOCYTES 6 (L) 13 - 44 %    MONOCYTES 10 4.0 - 12.0 %    EOSINOPHILS 1 0.5 - 7.8 %    BASOPHILS 0 0.0 - 2.0 %    IMMATURE GRANULOCYTES 1 0.0 - 5.0 %    ABS. NEUTROPHILS 10.2 (H) 1.7 - 8.2 K/UL    ABS. LYMPHOCYTES 0.8 0.5 - 4.6 K/UL    ABS. MONOCYTES 1.3 0.1 - 1.3 K/UL    ABS. EOSINOPHILS 0.1 0.0 - 0.8 K/UL    ABS. BASOPHILS 0.0 0.0 - 0.2 K/UL    ABS. IMM. GRANS. 0.1 0.0 - 0.5 K/UL    RBC COMMENTS OCCASIONAL  ANISOCYTOSIS        WBC COMMENTS Result Confirmed By Smear      PLATELET COMMENTS ADEQUATE      DF AUTOMATED     METABOLIC PANEL, COMPREHENSIVE    Collection Time: 10/21/21  5:24 PM   Result Value Ref Range    Sodium 139 138 - 145 mmol/L    Potassium 3.2 (L) 3.5 - 5.1 mmol/L    Chloride 104 98 - 107 mmol/L    CO2 28 21 - 32 mmol/L    Anion gap 7 7 - 16 mmol/L    Glucose 213 (H) 65 - 100 mg/dL    BUN 18 8 - 23 MG/DL    Creatinine 1.50 0.8 - 1.5 MG/DL    GFR est AA 59 (L) >60 ml/min/1.73m2    GFR est non-AA 49 (L) >60 ml/min/1.73m2    Calcium 8.2 (L) 8.3 - 10.4 MG/DL    Bilirubin, total 1.0 0.2 - 1.1 MG/DL    ALT (SGPT) 22 12 - 65 U/L    AST (SGOT) 60 (H) 15 - 37 U/L    Alk.  phosphatase 79 50 - 136 U/L    Protein, total 6.8 6.3 - 8.2 g/dL    Albumin 2.9 (L) 3.2 - 4.6 g/dL    Globulin 3.9 (H) 2.3 - 3.5 g/dL    A-G Ratio 0.7 (L) 1.2 - 3.5     PROCALCITONIN    Collection Time: 10/21/21  5:24 PM   Result Value Ref Range    Procalcitonin 3.94 ng/mL   NT-PRO BNP    Collection Time: 10/21/21  5:24 PM   Result Value Ref Range    NT pro-BNP 1,519 (H) 5 - 125 PG/ML   CULTURE, BLOOD    Collection Time: 10/21/21  5:28 PM    Specimen: Blood   Result Value Ref Range    Special Requests: RIGHT  FOREARM        Culture result: NO GROWTH AFTER 12 HOURS     URINE MICROSCOPIC    Collection Time: 10/21/21  5:52 PM   Result Value Ref Range    WBC >100 (H) 0 /hpf    RBC  0 /hpf    Epithelial cells 0-3 0 /hpf    Bacteria 4+ (H) 0 /hpf    Casts 0-3 0 /lpf   CULTURE, URINE    Collection Time: 10/21/21  5:52 PM    Specimen: Cath Urine    URINE   Result Value Ref Range    Special Requests: NO SPECIAL REQUESTS      Culture result:        NO GROWTH AFTER SHORT PERIOD OF INCUBATION. FURTHER RESULTS TO FOLLOW AFTER OVERNIGHT INCUBATION. SARS-COV-2    Collection Time: 10/21/21  5:57 PM   Result Value Ref Range    SARS-CoV-2 Please find results under separate order     COVID-19 RAPID TEST    Collection Time: 10/21/21  5:57 PM   Result Value Ref Range    Specimen source NASAL      COVID-19 rapid test Not detected NOTD     SARS-COV-2, PCR    Collection Time: 10/21/21  5:57 PM    Specimen: Nasopharyngeal   Result Value Ref Range    Specimen source Nasopharyngeal      SARS-CoV-2 Not detected NOTD     METABOLIC PANEL, COMPREHENSIVE    Collection Time: 10/22/21  7:13 AM   Result Value Ref Range    Sodium 141 136 - 145 mmol/L    Potassium 3.3 (L) 3.5 - 5.1 mmol/L    Chloride 105 98 - 107 mmol/L    CO2 29 21 - 32 mmol/L    Anion gap 7 7 - 16 mmol/L    Glucose 187 (H) 65 - 100 mg/dL    BUN 27 (H) 8 - 23 MG/DL    Creatinine 2.40 (H) 0.8 - 1.5 MG/DL    GFR est AA 34 (L) >60 ml/min/1.73m2    GFR est non-AA 28 (L) >60 ml/min/1.73m2    Calcium 8.0 (L) 8.3 - 10.4 MG/DL    Bilirubin, total 0.8 0.2 - 1.1 MG/DL    ALT (SGPT) 20 12 - 65 U/L    AST (SGOT) 58 (H) 15 - 37 U/L    Alk.  phosphatase 67 50 - 136 U/L    Protein, total 6.2 (L) 6.3 - 8.2 g/dL    Albumin 2.6 (L) 3.2 - 4.6 g/dL    Globulin 3.6 (H) 2.3 - 3.5 g/dL    A-G Ratio 0.7 (L) 1.2 - 3.5     CBC WITH AUTOMATED DIFF    Collection Time: 10/22/21  7:13 AM   Result Value Ref Range    WBC 12.0 (H) 4.3 - 11.1 K/uL    RBC 4.02 (L) 4.23 - 5.6 M/uL    HGB 11.8 (L) 13.6 - 17.2 g/dL    HCT 37.0 (L) 41.1 - 50.3 %    MCV 92.0 79.6 - 97.8 FL    MCH 29.4 26.1 - 32.9 PG    MCHC 31.9 31.4 - 35.0 g/dL    RDW 15.4 (H) 11.9 - 14.6 %    PLATELET 769 478 - 715 K/uL    MPV 12.1 9.4 - 12.3 FL    ABSOLUTE NRBC 0.00 0.0 - 0.2 K/uL    NEUTROPHILS 80 (H) 43 - 78 %    LYMPHOCYTES 10 (L) 13 - 44 %    MONOCYTES 9 4.0 - 12.0 %    EOSINOPHILS 0 (L) 0.5 - 7.8 %    BASOPHILS 0 0.0 - 2.0 %    IMMATURE GRANULOCYTES 1 0.0 - 5.0 %    ABS. NEUTROPHILS 9.6 (H) 1.7 - 8.2 K/UL    ABS. LYMPHOCYTES 1.2 0.5 - 4.6 K/UL    ABS. MONOCYTES 1.1 0.1 - 1.3 K/UL    ABS. EOSINOPHILS 0.0 0.0 - 0.8 K/UL    ABS. BASOPHILS 0.0 0.0 - 0.2 K/UL    ABS. IMM. GRANS. 0.1 0.0 - 0.5 K/UL    RBC COMMENTS NORMOCYTIC/NORMOCHROMIC      WBC COMMENTS Result Confirmed By Smear      PLATELET COMMENTS ADEQUATE      DF AUTOMATED     GLUCOSE, POC    Collection Time: 10/22/21  7:34 AM   Result Value Ref Range    Glucose (POC) 179 (H) 65 - 100 mg/dL    Performed by [address], POC    Collection Time: 10/22/21 11:27 AM   Result Value Ref Range    Glucose (POC) 184 (H) 65 - 100 mg/dL    Performed by Ana        All Micro Results     Procedure Component Value Units Date/Time    SARS-COV-2, PCR [[id number]] Collected: 10/21/21 1757    Order Status: Completed Specimen: Nasopharyngeal Updated: 10/22/21 0816     Specimen source Nasopharyngeal        SARS-CoV-2 Not detected        Comment:      The specimen is NEGATIVE for SARS-CoV-2, the novel coronavirus associated with COVID-19. This test has been authorized by the FDA under an Emergency Use Authorization (EUA) for use by authorized laboratories.         Fact sheet for Healthcare Providers: ConventionUpdate.co.nz       Fact sheet for Patients: ConventionUpdate.co.nz       Methodology: RT-PCR         BLOOD CULTURE [[id number]] Collected: 10/21/21 1728    Order Status: Completed Specimen: Blood Updated: 10/22/21 0649     Special Requests: --        RIGHT  FOREARM       Culture result: NO GROWTH AFTER 12 HOURS       BLOOD CULTURE [[id number]] Collected: 10/21/21 1648    Order Status: Completed Specimen: Blood Updated: 10/22/21 0649     Special Requests: --        LEFT  HAND       Culture result: NO GROWTH AFTER 12 HOURS       CULTURE, URINE [[id number]] Collected: 10/21/21 1752    Order Status: Completed Specimen: Cath Urine Updated: 10/22/21 0514     Special Requests: NO SPECIAL REQUESTS        Culture result:       NO GROWTH AFTER SHORT PERIOD OF INCUBATION. FURTHER RESULTS TO FOLLOW AFTER OVERNIGHT INCUBATION. COVID-19 RAPID TEST [[id number]] Collected: 10/21/21 1757    Order Status: Completed Specimen: Nasopharyngeal Updated: 10/21/21 1835     Specimen source NASAL        COVID-19 rapid test Not detected        Comment:      The specimen is NEGATIVE for SARS-CoV-2, the novel coronavirus associated with COVID-19. A negative result does not rule out COVID-19. This test has been authorized by the FDA under an Emergency Use Authorization (EUA) for use by authorized laboratories.         Fact sheet for Healthcare Providers: ConventionSemantradate.co.nz  Fact sheet for Patients: Solmentumdate.co.nz       Methodology: Isothermal Nucleic Acid Amplification         COVID-19 RAPID TEST [[id number]] Collected: 10/21/21 1757    Order Status: Canceled           EKG Results     Procedure 720 Value Units Date/Time    EKG, 12 LEAD, INITIAL [[id number]] Collected: 10/21/21 1702    Order Status: Completed Updated: 10/22/21 0704     Ventricular Rate 93 BPM      Atrial Rate 93 BPM      P-R Interval 182 ms      QRS Duration 82 ms      Q-T Interval 328 ms      QTC Calculation (Bezet) 407 ms      Calculated P Axis 18 degrees      Calculated R Axis 3 degrees      Calculated T Axis 45 degrees      Diagnosis --     Normal sinus rhythm  Possible Inferior infarct (cited on or before 08-FEB-2019)  Abnormal ECG  When compared with ECG of 08-JUN-2020 07:36,  Previous ECG has undetermined rhythm, needs review  Nonspecific T wave abnormality, worse in Anterolateral leads  Confirmed by Suellen Cherry ([id number]) on 10/22/2021 7:04:20 AM            Other Studies:  XR CHEST PORT    Result Date: 10/21/2021  EXAM: CHEST X-RAY, 1 VIEW INDICATION: meets SIRS criteria. COMPARISON: Chest x-ray 6/15/2020 TECHNIQUE: Single AP view of the chest was obtained. FINDINGS: Airspace opacities in the right lung base increased compared to prior. No pneumothorax or pleural effusion. The cardiomediastinal silhouette is within normal limits. The osseous structures are unremarkable. Airspace opacities in the right lung base increased compared to prior reflecting atelectasis or infiltrates.           Current Meds:   Current Facility-Administered Medications   Medication Dose Route Frequency    sodium chloride (NS) flush 5-10 mL  5-10 mL IntraVENous Q8H    sodium chloride (NS) flush 5-10 mL  5-10 mL IntraVENous PRN    atorvastatin (LIPITOR) tablet 80 mg  80 mg Oral DAILY    clopidogreL (PLAVIX) tablet 75 mg  75 mg Oral DAILY    doxazosin (CARDURA) tablet 8 mg  8 mg Oral QHS    finasteride (PROSCAR) tablet 5 mg  5 mg Oral DAILY    [Held by provider] glipiZIDE (GLUCOTROL) tablet 10 mg  10 mg Oral ACB    hydroCHLOROthiazide (HYDRODIURIL) tablet 12.5 mg  12.5 mg Oral DAILY    lisinopriL (PRINIVIL, ZESTRIL) tablet 10 mg  10 mg Oral BID    [Held by provider] metFORMIN ER (GLUCOPHAGE XR) tablet 1,000 mg  1,000 mg Oral DAILY WITH DINNER    sertraline (ZOLOFT) tablet 50 mg  50 mg Oral DAILY    sodium chloride (NS) flush 5-40 mL  5-40 mL IntraVENous Q8H    sodium chloride (NS) flush 5-40 mL  5-40 mL IntraVENous PRN    hydrALAZINE (APRESOLINE) 20 mg/mL injection 10 mg  10 mg IntraVENous Q6H PRN    insulin lispro (HUMALOG) injection   SubCUTAneous AC&HS    [Held by provider] furosemide (LASIX) injection 40 mg  40 mg IntraVENous DAILY    azithromycin (ZITHROMAX) 500 mg in 0.9% sodium chloride 250 mL (VIAL-MATE)  500 mg IntraVENous Q24H    cefTRIAXone (ROCEPHIN) 2 g in 0.9% sodium chloride (MBP/ADV) 50 mL MBP  2 g IntraVENous Q24H    enoxaparin (LOVENOX) injection 40 mg  40 mg SubCUTAneous Q24H       Part of this note was written by using a voice dictation software and the note has been proof read but may still contain some grammatical/other typographical errors.     Signed By: Carl Hunt MD   [facility] Hospitalist Service    October 22, 2021  12:46 PM

## 2021-10-22 NOTE — PROGRESS NOTES
MSN, CM:  Spoke with patient this AM about discharge planning. Patient lives with his wife Cristian Munson" in own house with a ramp for entrance. Patient has a daughter Vamshi Weldon" that lives locally. Patient is independent with all ADL's and requires no equipment for ambulation. DME in the home includes cane, walker and shower chair. Patient denies any home oxygen or rehab in the past.  Patient has received HH with [facility] [place] and inpatient rehab at [facility]. Patient confirms PCP is Dr. Jacki Dominguez he drives himself to all appointments. PT consulted for evaluation and recommendations. Case Management will continue to follow for any discharge needs. Care Management Interventions  PCP Verified by CM: Yes (Dr. Rich Potter)  Mode of Transport at Discharge:  Other (see comment) (family to transport)  Health Maintenance Reviewed: Yes  Physical Therapy Consult: Yes  Support Systems: Child(rosanne), Spouse/Significant Other  Confirm Follow Up Transport: Self  Freedom of Choice List was Provided with Basic Dialogue that Supports the Patient's Individualized Plan of Care/Goals, Treatment Preferences and Shares the Quality Data Associated with the Providers?: Yes  Discharge Location  Discharge Placement: Home

## 2021-10-22 NOTE — ASSESSMENT & PLAN NOTE
Continue home dosing of doxazosin, hydrochlorothiazide, lisinopril. Hydralazine IV as needed. Will monitor and adjust treatment as necessary.

## 2021-10-22 NOTE — PROGRESS NOTES
ACUTE PHYSICAL THERAPY GOALS:  (Developed with and agreed upon by patient and/or caregiver. )    LTG:  (1.)Mr. Jose Daniel Liang will move from supine to sit and sit to supine , scoot up and down and roll side to side in bed with STAND BY ASSIST within 7 treatment day(s). (2.)Mr. Jose Daniel Liang will transfer from bed to chair and chair to bed with STAND BY ASSIST using the least restrictive device within 7 treatment day(s). (3.)Mr. Jose Daniel Liang will ambulate with CONTACT GUARD ASSIST for 200 feet with the least restrictive device within 7 treatment day(s). (4.)Mr. Jose Daniel Liang will participate in therapeutic activity/exercises x 24 minutes for increased strength within 7 treatment days. ________________________________________________________________________________________________      PHYSICAL THERAPY ASSESSMENT: Initial Assessment and AM PT Treatment Day # 1      Chani Hussein is a 78 y.o. male   PRIMARY DIAGNOSIS: Sepsis ([facility] [address]  Acute prostatitis [N41.0]  Hypoxia [R09.02]  CAP (community acquired pneumonia) [J18.9]       Reason for Referral:    ICD-10: Treatment Diagnosis: Generalized Muscle Weakness (M62.81)  Difficulty in walking, Not elsewhere classified (R26.2)  [place] / Product Type: Medicare /     ASSESSMENT:     REHAB RECOMMENDATIONS:   Recommendation to date pending progress:  Setting:   Short-term Rehab  Equipment:    None     PRIOR LEVEL OF FUNCTION:  (Prior to Hospitalization) INITIAL/CURRENT LEVEL OF FUNCTION:  (Most Recently Demonstrated)   Bed Mobility:   Independent  Sit to Stand:   Modified Independent  Transfers:   Modified Independent  Gait/Mobility:   Modified Independent Bed Mobility:   Minimal Assistance x 2  Sit to Stand:   Minimal Assistance x 2  Transfers:   Minimal Assistance x 2  Gait/Mobility:   Minimal Assistance x 2     ASSESSMENT:  Mr. Jose Dainel Liang presents to PT with [facility] AROM and decreased strength in B LEs.   Pt has prior history of CVA that he reported left him with residual L sided weakness. Pt performed bed mobility today with min-modA x 2 and additional time. He transferred using RW and Wilfrid x 2 demonstrating fair standing balance. Pt ambulated in room with Wilfrid x 2/RW and hunched posture requiring cuing for RW placement. Mr. Monisha Weathers could benefit from skilled PT as he is currently functioning below his baseline. SUBJECTIVE:   Mr. Monisha Weathers states, \"My wife does. \"    SOCIAL HISTORY/LIVING ENVIRONMENT: Lives with spouse and PTA used RW for ambulation  Home Environment: Private residence  # Steps to Enter: 0 (Has a ramp)  One/Two Story Residence: One story  Living Alone: No  Support Systems: Child(rosanne), Spouse/Significant Other  OBJECTIVE:     PAIN: VITAL SIGNS: LINES/DRAINS:   Pre Treatment: Pain Screen  Pain Scale 1: Numeric (0 - 10)  Pain Intensity 1: 0  Post Treatment: 0   none  O2 Device: Nasal cannula     GROSS EVALUATION:  B LE Within Functional Limits Abnormal/ Functional Abnormal/ Non-Functional (see comments) Not Tested Comments:   AROM [x] [] [] []    PROM [] [] [] []    Strength [] [x] [] [] Generalized weakness   Balance [] [x] [] [] Fair - standing   Posture [] [] [x] [] Forward flexed   Sensation [] [] [] []    Coordination [] [x] [] [] decreased   Tone [] [] [] []    Edema [] [] [] []    Activity Tolerance [] [x] [] [] Fatigued quickly    [] [] [] []      COGNITION/  PERCEPTION: Intact Impaired   (see comments) Comments:   Orientation [x] []    Vision [] []    Hearing [] []    Command Following [x] []    Safety Awareness [x] []     [] []      MOBILITY: I Mod I S SBA CGA Min Mod Max Total  NT x2 Comments:   Bed Mobility    Rolling [] [] [] [] [] [x] [] [] [] [] [x]    Supine to Sit [] [] [] [] [] [x] [] [] [] [] [x]    Scooting [] [] [] [] [] [x] [] [] [] [] [x]    Sit to Supine [] [] [] [] [] [] [] [] [] [] []    Transfers    Sit to Stand [] [] [] [] [] [x] [] [] [] [] [x]    Bed to Chair [] [] [] [] [] [x] [] [] [] [] [x] Stand to Sit [] [] [] [] [] [x] [] [] [] [] [x]    I=Independent, Mod I=Modified Independent, S=Supervision, SBA=Standby Assistance, CGA=Contact Guard Assistance,   Min=Minimal Assistance, Mod=Moderate Assistance, Max=Maximal Assistance, Total=Total Assistance, NT=Not Tested  GAIT: I Mod I S SBA CGA Min Mod Max Total  NT x2 Comments:   Level of Assistance [] [] [] [] [] [x] [] [] [] [] [x]    Distance 4 ft    DME Rolling Walker    Gait Quality Decreased B LE step length/clearance    Weightbearing Status N/A     I=Independent, Mod I=Modified Independent, S=Supervision, SBA=Standby Assistance, CGA=Contact Guard Assistance,   Min=Minimal Assistance, Mod=Moderate Assistance, Max=Maximal Assistance, Total=Total Assistance, NT=Not Tested    [address] [place] [address] AM-[facility] Form       How much difficulty does the patient currently have. .. Unable A Lot A Little None   1. Turning over in bed (including adjusting bedclothes, sheets and blankets)? [] 1   [] 2   [x] 3   [] 4   2. Sitting down on and standing up from a chair with arms ( e.g., wheelchair, bedside commode, etc.)   [] 1   [] 2   [x] 3   [] 4   3. Moving from lying on back to sitting on the side of the bed? [] 1   [] 2   [x] 3   [] 4   How much help from another person does the patient currently need. .. Total A Lot A Little None   4. Moving to and from a bed to a chair (including a wheelchair)? [] 1   [] 2   [x] 3   [] 4   5. Need to walk in hospital room? [] 1   [x] 2   [] 3   [] 4   6. Climbing 3-5 steps with a railing? [x] 1   [] 2   [] 3   [] 4   © 2007, Trustees of [address] [place] [id number], under license to [employer]. All rights reserved     Score:  Initial: 15 Most Recent: X (Date: -- )    Interpretation of Tool:  Represents activities that are increasingly more difficult (i.e. Bed mobility, Transfers, Gait).     PLAN:   FREQUENCY/DURATION: PT Plan of Care: 3 times/week for duration of hospital stay or until stated goals are met, whichever comes first.    PROBLEM LIST:   (Skilled intervention is medically necessary to address:)  1. Decreased ADL/Functional Activities  2. Decreased Activity Tolerance  3. Decreased Balance  4. Decreased Coordination  5. Decreased Gait Ability  6. Decreased Strength  7. Decreased Transfer Abilities   INTERVENTIONS PLANNED:   (Benefits and precautions of physical therapy have been discussed with the patient.)  1. Therapeutic Activity  2. Therapeutic Exercise/HEP  3. Neuromuscular Re-education  4. Gait Training  5. Manual Therapy  6. Education     TREATMENT:     EVALUATION: Low Complexity : (Untimed Charge)    TREATMENT:   ($$ Therapeutic Activity: 23-37 mins    )  Co-Treatment PT/OT necessary due to patient's decreased overall endurance/tolerance levels, as well as need for high level skilled assistance to complete functional transfers/mobility and functional tasks  Therapeutic Activity (23 Minutes): Therapeutic activity included Rolling, Supine to Sit, Scooting, Transfer Training, Ambulation on level ground, Sitting balance  and Standing balance to improve functional Mobility, Strength and Activity tolerance.     TREATMENT GRID:  N/A    AFTER TREATMENT POSITION/PRECAUTIONS:  Chair, Needs within reach and RN notified    INTERDISCIPLINARY COLLABORATION:  RN/PCT, PT/PTA and OT/STARK    TOTAL TREATMENT DURATION:  PT Patient Time In/Time Out  Time In: 1345  Time Out: [address] Caryl Klein, DPT

## 2021-10-22 NOTE — PROGRESS NOTES
ACUTE OT GOALS:  (Developed with and agreed upon by patient and/or caregiver.)  1. Patient will bathe and dress total body with stand by assistance and adaptive device as needed. 2. Patient will toilet with minimal assistance and adaptive device as needed. 3. Patient will tolerate 30 minutes of OT treatment with up to 2 rest breaks to increase activity tolerance for ADLs. 4. Patient will complete functional transfers with supervision. 5. Patient will complete functional mobility for ADLs with stand by asssistance. 6. Patient will demonstrate modified independence with therapeutic exercise HEP to increase strength in BUEs for increased safety and independence with functional tasks. Timeframe: 7 visits     OCCUPATIONAL THERAPY ASSESSMENT: Initial Assessment and Daily Note OT Treatment Day # 1    Milagro Hernandez is a 78 y.o. male   PRIMARY DIAGNOSIS: Sepsis ([facility] [address]  Acute prostatitis [N41.0]  Hypoxia [R09.02]  CAP (community acquired pneumonia) [J18.9]       Reason for Referral:    ICD-10: Treatment Diagnosis: Generalized Muscle Weakness (M62.81)  Difficulty in walking, Not elsewhere classified (R26.2)  [place] / Product Type: Medicare /   ASSESSMENT:     REHAB RECOMMENDATIONS:   Recommendation to date pending progress:  Setting:   Short-term Rehab  Equipment:    To Be Determined     PRIOR LEVEL OF FUNCTION:  (Prior to Hospitalization)  INITIAL/CURRENT LEVEL OF FUNCTION:  (Based on today's evaluation)   Bathing:   Modified Independent  Dressing:   Independent  Feeding/Grooming:   Independent  Toileting:   Minimal Assistance  Functional Mobility:   Modified Independent Bathing:   Moderate Assistance  Dressing:   Moderate Assistance  Feeding/Grooming:   Set Up  Toileting:   Moderate Assistance  Functional Mobility:   Minimal Assistance x 2     ASSESSMENT:  Mr. Harry Galarza presents with sepsis, prostatitis. Hx CVA.  At baseline pt lives with wife, completes ADLs, ambulates short distances, and drives with [employer] using walker. Pt with deficits in respiratory status, strength, balance, and endurance impacting mobility and ADLs. Pt practiced bed mobility, functional transfers, and ambulation with MinAx2, cueing for safety, technique, balance, upright posture. Pt required [address] [place] for toileting then became dizzy. /68. Pt required 2L O2 with activity, SpO2 88% on room air. Pt is functioning below baseline and would benefit from continued OT to increase safety and independence. SUBJECTIVE:   Mr. Roma Tena states, \"I think I need rehab. \"    SOCIAL HISTORY/LIVING ENVIRONMENT: Hx CVA. At baseline pt lives with wife, completes ADLs, ambulates short distances, and drives with [employer] using walker.    Home Environment: Private residence  # Steps to Enter: 0 (Has a ramp)  One/Two Story Residence: One story  Living Alone: No  Support Systems: Child(rosanne), Spouse/Significant Other    OBJECTIVE:     PAIN: VITAL SIGNS: LINES/DRAINS:   Pre Treatment: Pain Screen  Pain Scale 1: Numeric (0 - 10)  Pain Intensity 1: 0  Post Treatment: same Vital Signs  BP: 106/68  MAP (Calculated): 81  BP 1 Location: Left upper arm  BP 1 Method: Automatic  BP Patient Position: Sitting  O2 Sat (%): 92 %  O2 Device: Nasal cannula  O2 Flow Rate (L/min): 2 l/min NA  O2 Device: Nasal cannula     GROSS EVALUATION:  BUEs Within Functional Limits Abnormal/ Functional Abnormal/ Non-Functional (see comments) Not Tested Comments:   AROM [] [x] [] [] Generally decreased proximally   PROM [] [] [] []    Strength [] [x] [] []    Balance [] [x] [] []    Posture [] [x] [] [] Forward flexed at hips, head forward   Sensation [] [] [] []    Coordination [] [x] [] []    Tone [] [] [] []    Edema [] [] [] []    Activity Tolerance [] [x] [] []     [] [] [] []      COGNITION/  PERCEPTION: Intact Impaired   (see comments) Comments:   Orientation [x] []    Vision [] []    Hearing [x] []    Judgment/ Insight [x] [] Attention [x] []    Memory [] []    Command Following [x] []    Emotional Regulation [x] []     [] []      ACTIVITIES OF DAILY LIVING: I Mod I S SBA CGA Min Mod Max Total NT Comments   BASIC ADLs:              Bathing/ Showering [] [] [] [] [] [] [] [] [] [x]    Toileting [] [] [] [] [] [] [x] [] [] [] CGA ama hygiene/Total assist bowel hygiene - standing   Dressing [] [] [] [] [] [] [] [x] [] [] Total assist don socks, MaxA change brief    Feeding [] [] [] [] [] [] [] [] [] [x]    Grooming [] [] [x] [] [] [] [] [] [] [] Wash hands - seated   Personal Device Care [] [] [] [] [] [] [] [] [] [x]    Functional Mobility [] [] [] [] [] [x] [] [] [] [] x2   I=Independent, Mod I=Modified Independent, S=Supervision, SBA=Standby Assistance, CGA=Contact Guard Assistance,   Min=Minimal Assistance, Mod=Moderate Assistance, Max=Maximal Assistance, Total=Total Assistance, NT=Not Tested    MOBILITY: I Mod I S SBA CGA Min Mod Max Total  NT x2 Comments:   Supine to sit [] [] [] [] [] [x] [] [] [] [] [x]    Sit to supine [] [] [] [] [] [] [] [] [] [x] []    Sit to stand [] [] [] [] [] [x] [] [] [] [] [x]    Bed to chair [] [] [] [] [] [x] [] [] [] [] [x] RW   I=Independent, Mod I=Modified Independent, S=Supervision, SBA=Standby Assistance, CGA=Contact Guard Assistance,   Min=Minimal Assistance, Mod=Moderate Assistance, Max=Maximal Assistance, Total=Total Assistance, NT=Not Tested    [employer] 6 Clicks   Daily Activity Inpatient Short Form        How much help from another person does the patient currently need. .. Total A Lot A Little None   1. Putting on and taking off regular lower body clothing? [] 1   [x] 2   [] 3   [] 4   2. Bathing (including washing, rinsing, drying)? [] 1   [x] 2   [] 3   [] 4   3. Toileting, which includes using toilet, bedpan or urinal?   [] 1   [x] 2   [] 3   [] 4   4. Putting on and taking off regular upper body clothing? [] 1   [] 2   [x] 3   [] 4   5.   Taking care of personal grooming such as brushing teeth? [] 1   [] 2   [x] 3   [] 4   6. Eating meals? [] 1   [] 2   [] 3   [x] 4   © 2007, Trustees of [address] [place] [address] [address] [address], under license to [employer]. All rights reserved     Score:  Initial: 16 10/22/21 Most Recent: X (Date: -- )   Interpretation of Tool:  Represents activities that are increasingly more difficult (i.e. Bed mobility, Transfers, Gait). PLAN:   FREQUENCY/DURATION: OT Plan of Care: 3 times/week for duration of hospital stay or until stated goals are met, whichever comes first.    PROBLEM LIST:   (Skilled intervention is medically necessary to address:)  1. Decreased ADL/Functional Activities  2. Decreased Activity Tolerance  3. Decreased AROM/PROM  4. Decreased Balance  5. Decreased Gait Ability  6. Decreased Strength  7. Decreased Transfer Abilities   INTERVENTIONS PLANNED:   (Benefits and precautions of occupational therapy have been discussed with the patient.)  1. Self Care Training  2. Therapeutic Activity  3. Therapeutic Exercise/HEP  4. Neuromuscular Re-education  5. Education     TREATMENT:     EVALUATION: Low Complexity : (Untimed Charge)    TREATMENT:   ($$ Self Care/Home Management: 8-22 mins    )  Co-Treatment PT/OT necessary due to patient's decreased overall endurance/tolerance levels, as well as need for high level skilled assistance to complete functional transfers/mobility and functional tasks  Self Care (15 Minutes): Self care including Toileting, Lower Body Dressing, Grooming, ADL Adaptive Equipment Training and Energy Conservation Training to increase independence and decrease level of assistance required.     TREATMENT GRID:  N/A    AFTER TREATMENT POSITION/PRECAUTIONS:  Chair, Needs within reach and RN notified    INTERDISCIPLINARY COLLABORATION:  RN/PCT, PT/PTA and OT/STARK    TOTAL TREATMENT DURATION:  OT Patient Time In/Time Out  Time In: 0722  Time Out: [address], OTR/L

## 2021-10-22 NOTE — ED NOTES
TRANSFER - OUT REPORT:    Verbal report given to alexx on 8th floor  being transferred to 8th floor for admittion  Report consisted of patients Situation, Background, Assessment and   Recommendations(SBAR). Information from the following report(s) ED Summary was reviewed with the receiving nurse. Lines:   Peripheral IV 10/21/21 Anterior; Left Hand (Active)       Peripheral IV 10/21/21 Right Antecubital (Active)        Opportunity for questions and clarification was provided.

## 2021-10-22 NOTE — ASSESSMENT & PLAN NOTE
Continue glipizide and Metformin. Sliding scale insulin and monitor glucose 4 times daily as needed. Will monitor closely and adjust treatment as necessary.

## 2021-10-22 NOTE — ASSESSMENT & PLAN NOTE
History of cerebellar stroke. Head worked hard with rehab and exercise at the gym 3 times a week so that he was no longer using the walker and actually was able to drive himself and get out and about by himself for the first time in 16 months this past week. Would benefit from PT and OT while here due to rapid recurrence of weakness with this urinary tract infection and pneumonia so as to limit backtracking of his physical ability as much as we possibly can. I appreciate the involvement of PT and OT personnel in the ongoing care of Mr. Leandra [place]. Will monitor and adjust treatment as necessary.

## 2021-10-22 NOTE — PROGRESS NOTES
TRANSFER - IN REPORT:    Verbal report received from Presley Thomas RN  on Mindy Schulz  being received from ED for routine progression of care      Report consisted of patients Situation, Background, Assessment and   Recommendations(SBAR). Information from the following report(s) ED Summary was reviewed with the receiving nurse. Opportunity for questions and clarification was provided. Assessment completed upon patients arrival to unit and care assumed.

## 2021-10-22 NOTE — PROGRESS NOTES
Discussed admission with Dr Sarita Hodgkin. PCR previously ordered by ER md which places patient as a rule out. Rapid swab negative. Admitting MD has no covid concerns.   Patient may be admitted to clean medical floor if needed

## 2021-10-22 NOTE — ASSESSMENT & PLAN NOTE
We will continue to encourage exercise as able as well as diet. PT and OT ordered to help prevent further debility.

## 2021-10-22 NOTE — ASSESSMENT & PLAN NOTE
Pneumonia versus fluid overload. IV azithromycin added to IV Rocephin to treat for community-acquired pneumonia. Lasix IV to treat overload.   Will monitor closely and adjust treatment as necessary

## 2021-10-22 NOTE — ASSESSMENT & PLAN NOTE
Appears relatively mild in combination with what is likely a community-acquired pneumonia in the right lower lobe. Lasix 40 mg IV daily.   Will monitor and adjust treatment as necessary

## 2021-10-22 NOTE — ASSESSMENT & PLAN NOTE
Requires admission due to combination of age, fever, level of illness, elevated procalcitonin, hypoxia. IV Rocephin. Tylenol for fever control. Continue home dose of finasteride. Will monitor closely and adjust treatment as necessary.

## 2021-10-22 NOTE — ASSESSMENT & PLAN NOTE
Dropped to 88% on room air at rest.  Requires oxygen support. We will continue oxygen support and wean as able. Likely this is secondary to overall state of illness in addition to what appears to be a right lower lobe community-acquired pneumonia with some associated fluid overload. Covid is negative.

## 2021-10-22 NOTE — ACP (ADVANCE CARE PLANNING)
Advance Care Planning     General Advance Care Planning (ACP) Conversation      Date of Conversation: 10/21/2021  Conducted with: Patient with Decision Making Capacity    Healthcare Decision Maker:   No healthcare decision makers have been documented. Click here to complete [address] including selection of the Healthcare Decision Maker Relationship (ie \"Primary\")    Today we documented Decision Maker(s) consistent with Legal Next of Kin hierarchy.     Content/Action Overview:   Has NO ACP documents/care preferences - requested patient complete ACP documents  Reviewed DNR/DNI and patient elects DNR order - referred to ACP Clinical Specialist & placed order  Topics discussed: ventilation preferences and resuscitation preferences       Length of Voluntary ACP Conversation in minutes:  <16 minutes (Non-Billable)    Rosalino Reese RN

## 2021-10-23 PROBLEM — E87.6 HYPOKALEMIA: Status: ACTIVE | Noted: 2021-10-23

## 2021-10-23 PROBLEM — J96.01 ACUTE RESPIRATORY FAILURE WITH HYPOXIA (HCC): Status: ACTIVE | Noted: 2021-10-23

## 2021-10-23 LAB
ANION GAP SERPL CALC-SCNC: 9 MMOL/L (ref 7–16)
BUN SERPL-MCNC: 40 MG/DL (ref 8–23)
CALCIUM SERPL-MCNC: 7.8 MG/DL (ref 8.3–10.4)
CHLORIDE SERPL-SCNC: 105 MMOL/L (ref 98–107)
CO2 SERPL-SCNC: 29 MMOL/L (ref 21–32)
CREAT SERPL-MCNC: 2.54 MG/DL (ref 0.8–1.5)
ERYTHROCYTE [DISTWIDTH] IN BLOOD BY AUTOMATED COUNT: 15.4 % (ref 11.9–14.6)
GLUCOSE BLD STRIP.AUTO-MCNC: 114 MG/DL (ref 65–100)
GLUCOSE BLD STRIP.AUTO-MCNC: 118 MG/DL (ref 65–100)
GLUCOSE BLD STRIP.AUTO-MCNC: 78 MG/DL (ref 65–100)
GLUCOSE BLD STRIP.AUTO-MCNC: 81 MG/DL (ref 65–100)
GLUCOSE SERPL-MCNC: 79 MG/DL (ref 65–100)
HCT VFR BLD AUTO: 36.7 % (ref 41.1–50.3)
HGB BLD-MCNC: 11.6 G/DL (ref 13.6–17.2)
MCH RBC QN AUTO: 29.3 PG (ref 26.1–32.9)
MCHC RBC AUTO-ENTMCNC: 31.6 G/DL (ref 31.4–35)
MCV RBC AUTO: 92.7 FL (ref 79.6–97.8)
NRBC # BLD: 0 K/UL (ref 0–0.2)
PLATELET # BLD AUTO: 155 K/UL (ref 150–450)
PMV BLD AUTO: 12.5 FL (ref 9.4–12.3)
POTASSIUM SERPL-SCNC: 3.1 MMOL/L (ref 3.5–5.1)
RBC # BLD AUTO: 3.96 M/UL (ref 4.23–5.6)
SERVICE CMNT-IMP: ABNORMAL
SERVICE CMNT-IMP: ABNORMAL
SERVICE CMNT-IMP: NORMAL
SERVICE CMNT-IMP: NORMAL
SODIUM SERPL-SCNC: 143 MMOL/L (ref 136–145)
WBC # BLD AUTO: 8.6 K/UL (ref 4.3–11.1)

## 2021-10-23 PROCEDURE — 36415 COLL VENOUS BLD VENIPUNCTURE: CPT

## 2021-10-23 PROCEDURE — 74011250637 HC RX REV CODE- 250/637: Performed by: EMERGENCY MEDICINE

## 2021-10-23 PROCEDURE — 85027 COMPLETE CBC AUTOMATED: CPT

## 2021-10-23 PROCEDURE — 74011250636 HC RX REV CODE- 250/636: Performed by: EMERGENCY MEDICINE

## 2021-10-23 PROCEDURE — 80048 BASIC METABOLIC PNL TOTAL CA: CPT

## 2021-10-23 PROCEDURE — 82962 GLUCOSE BLOOD TEST: CPT

## 2021-10-23 PROCEDURE — 74011250637 HC RX REV CODE- 250/637: Performed by: INTERNAL MEDICINE

## 2021-10-23 PROCEDURE — 65270000029 HC RM PRIVATE

## 2021-10-23 PROCEDURE — 74011000258 HC RX REV CODE- 258: Performed by: EMERGENCY MEDICINE

## 2021-10-23 RX ORDER — POTASSIUM CHLORIDE 20 MEQ/1
40 TABLET, EXTENDED RELEASE ORAL
Status: COMPLETED | OUTPATIENT
Start: 2021-10-23 | End: 2021-10-23

## 2021-10-23 RX ADMIN — ATORVASTATIN CALCIUM 80 MG: 80 TABLET, FILM COATED ORAL at 08:10

## 2021-10-23 RX ADMIN — Medication 5 ML: at 05:06

## 2021-10-23 RX ADMIN — Medication 10 ML: at 21:42

## 2021-10-23 RX ADMIN — Medication 10 ML: at 14:24

## 2021-10-23 RX ADMIN — ENOXAPARIN SODIUM 40 MG: 40 INJECTION SUBCUTANEOUS at 08:10

## 2021-10-23 RX ADMIN — DOXAZOSIN 8 MG: 4 TABLET ORAL at 21:41

## 2021-10-23 RX ADMIN — CEFTRIAXONE 2 G: 2 INJECTION, POWDER, FOR SOLUTION INTRAMUSCULAR; INTRAVENOUS at 08:10

## 2021-10-23 RX ADMIN — SERTRALINE 50 MG: 50 TABLET, FILM COATED ORAL at 08:10

## 2021-10-23 RX ADMIN — AZITHROMYCIN MONOHYDRATE 500 MG: 500 INJECTION, POWDER, LYOPHILIZED, FOR SOLUTION INTRAVENOUS at 17:38

## 2021-10-23 RX ADMIN — POTASSIUM CHLORIDE 40 MEQ: 20 TABLET, EXTENDED RELEASE ORAL at 14:24

## 2021-10-23 RX ADMIN — Medication 5 MG: at 21:41

## 2021-10-23 RX ADMIN — Medication 10 ML: at 21:41

## 2021-10-23 RX ADMIN — CLOPIDOGREL BISULFATE 75 MG: 75 TABLET ORAL at 08:10

## 2021-10-23 RX ADMIN — FINASTERIDE 5 MG: 5 TABLET, FILM COATED ORAL at 08:10

## 2021-10-23 NOTE — PROGRESS NOTES
Pt in bed sleeping but wakes up when called. Pt on 2L NC with no SOB noted. No needs expressed at this time. Pt slept through the night without any concerns, except for his increasing BP which was monitored - no need for hydralazine PRN as of yet. Safety measures in place. Call light in reach. Preparing to report off to oncoming RN.

## 2021-10-23 NOTE — PROGRESS NOTES
Pt reports feeling much improved since admission. IV abx infusing without difficulty. 1 loose stool today. Abdomen distended but soft and nontender. Requiring intermittent use of 2L NC to maintain SaO2 90%. Family updated on status and plan of care.

## 2021-10-23 NOTE — PROGRESS NOTES
Assumed pt care. Pt in bed resting, denies SOB on RA. No apparent distress noted. Call light in reach and safety measures in place. No needs or pain expressed.

## 2021-10-23 NOTE — PROGRESS NOTES
[facility]ist Progress Note     Name: Kirk Rodriguez Age: 78 y.o. Sex: male  : 1945    MRN:     [id number]    Admit Date:  10/21/2021    Presenting Complaint:   Bladder Infection      Reason(s) for Admission:   Acute prostatitis [N41.0]  Hypoxia [R09.02]  CAP (community acquired pneumonia) [J18.9]       Hospital Course and Interval History:     Please refer to the admission H&P for details of presentation. In summary, Kirk Rodriguez is a 78 y.o. male with medical history significant for hypertension, hyperlipidemia, diabetes type 2 not requiring long-term use of insulin, cerebellar stroke without residual deficits, who presented to ED with rapid onset generalized weakness with urinary retention, fever and chills. Work-up shows a with 4+ bacteria, more than 100 WBCs. Chest x-ray shows possible infiltrate. Patient was started IV antibiotics for likely acute prostatitis. Subjective/24 hr Events (10/23/21) : Patient is seen and examined at bedside. No acute events reported overnight by nursing staff. Patient is noted to be laying comfortably in bed. Saturating at 85% on room air. Put on 2 L nasal cannula with saturation of 94%. Reports feeling better today. Denies any shortness of breath although his saturation was 85%. Denies any need for for use of CPAP or BiPAP at nighttime. Patient denies chest pains, shortness of breath, n/v, abdominal pain. Review of Systems: 10 point review of systems is otherwise negative with the exception of the elements mentioned above. Assessment & Plan     Sepsis, present on admission  Meets criteria for sepsis with leukocytosis and fever. Likely source of urinary as well as pulmonary. Lactic acid of 1.7, procalcitonin of 3.94  -Antibiotics as below. Acute prostatitis without hematuria  Urinalysis with 4+ bacteria, more than 100 WBCs. Patient febrile episode of 102.5 °F 10/22 evening.   UCx with GNRs  -on IV ceftriaxone (EOT 10/25) -Follow-up urine cultures    Acute respiratory failure with hypoxia  Pneumonia of right lower lobe due to infectious organism   Chest x-ray with all right lung base airspace opacity. Elevated procalcitonin. Covid PCR is negative. 10/23/21:Noted to desat down to 85% on room air.  - azithromycin and ceftriaxone (EOT 10/25)  - Mucinex, Tessalon pearls    GRACE on CKD Stage 3  Cr of 2.54 today with baseline creatinine of 1.16-1.22.  - Will hold Lasix given elevated creatinine today. - Monitor renal function closely. Further work-up and management pending clinical course. Hypokalemia  Potassium 3.1 today. - Will replete with 40 mEq of KCl    HTN // HLD  - hold lisinopril, HCTZ In setting of GRACE    Controlled type 2 diabetes mellitus without complication, without long-term current use of insulin  - sliding scale    Severe obesity: Body mass index is 40.64 kg/m². Prior Cerebellar stroke without any residual deficits  - on lipitor and plavix   - PT/OT      Diet:  ADULT DIET Regular; 3 carb choices (45 gm/meal); Low Fat/Low Chol/High Fiber/2 gm Na  DVT PPx: lovenox  Code: DNR  Dispo: PT recommending SNF    I have reviewed and ordered clinical lab tests and independently visualized images, tracing.      Problem List:  Hospital Problems as of 10/23/2021 Date Reviewed: 10/21/2021        Codes Class Noted - Resolved POA    Acute respiratory failure with hypoxia [facility]) ICD-10-CM: J96.01  ICD-9-CM: 518.81  10/23/2021 - Present Yes        * (Principal) Sepsis ([facility] ICD-10-CM: A41.9  ICD-9-CM: 038.9, 995.91  10/22/2021 - Present Yes        GRACE (acute kidney injury) ([facility] ICD-10-CM: N17.9  ICD-9-CM: 584.9  10/22/2021 - Present No        CKD (chronic kidney disease) stage 3, GFR 30-59 ml/min ([facility]) ICD-10-CM: N18.30  ICD-9-CM: 585.3  10/22/2021 - Present Yes        Acute prostatitis without hematuria ICD-10-CM: N41.0  ICD-9-CM: 601.0  10/21/2021 - Present Yes        Pneumonia of right lower lobe due to infectious organism ICD-10-CM: J18.9  ICD-9-CM: 001  10/21/2021 - Present Yes        Fluid overload ICD-10-CM: E87.70  ICD-9-CM: 276.69  10/21/2021 - Present Yes        Hypoxia ICD-10-CM: R09.02  ICD-9-CM: 799.02  10/21/2021 - Present Yes        CAP (community acquired pneumonia) ICD-10-CM: J18.9  ICD-9-CM: 558  10/21/2021 - Present Yes        Cerebellar stroke ([facility] ICD-10-CM: I63.9  ICD-9-CM: 434.91  6/14/2020 - Present Yes        Severe obesity ([facility] ICD-10-CM: E66.01  ICD-9-CM: 278.01  7/2/2019 - Present Yes        Controlled type 2 diabetes mellitus without complication, without long-term current use of insulin ([facility] ICD-10-CM: E11.9  ICD-9-CM: 250.00  2/22/2019 - Present Yes        Essential hypertension, benign ICD-10-CM: I10  ICD-9-CM: 401.1  1/3/2014 - Present Yes        Mixed hyperlipidemia ICD-10-CM: E78.2  ICD-9-CM: 272.2  1/3/2014 - Present Yes                 Objective:     Patient Vitals for the past 24 hrs:   Temp Pulse Resp BP SpO2   10/23/21 1053 98.7 °F (37.1 °C) 79 18 129/70 92 %   10/23/21 0825     92 %   10/23/21 0810 98.6 °F (37 °C) 80 18 121/71 94 %   10/23/21 0309 98.1 °F (36.7 °C) 82 18 (!) 150/64 98 %   10/23/21 0032    (!) 140/61    10/22/21 2340    110/60    10/22/21 2250 98.3 °F (36.8 °C) 73 18 (!) 98/47 98 %   10/22/21 1927 98.4 °F (36.9 °C) 84 20 132/84 93 %   10/22/21 1454 98.9 °F (37.2 °C) 88 20 (!) 135/91 92 %     Oxygen Therapy  O2 Sat (%): 92 % (10/23/21 1053)  Pulse via Oximetry: 96 beats per minute (10/22/21 0730)  O2 Device: None (Room air) (10/23/21 1053)  Skin Assessment: Clean, dry, & intact (10/22/21 1927)  Skin Protection for O2 Device: No (10/22/21 0730)  O2 Flow Rate (L/min): 2 l/min (10/23/21 0810)    Estimated body mass index is 40.64 kg/m² as calculated from the following:    Height as of this encounter: 5' 9\" (1.753 m). Weight as of this encounter: 124.8 kg (275 lb 3.2 oz).     Intake/Output Summary (Last 24 hours) at 10/23/2021 1252  Last data filed at 10/23/2021 6148  Gross per 24 hour   Intake 240 ml   Output 450 ml   Net -210 ml       Physical Exam:   General:     alert, awake , obese, pleasant   Head:   Normocephalic, atraumatic  Eyes:   anicteric sclerae, normal conjunctiva,  EOMI.  ENT:   Nares appear normal, no drainage. Moist oral mucosa  Neck:    supple, non-tender. Trachea midline. Lungs: No wheezing, scattered crackles. Cardiac:   RRR, Normal S1 and S2. Abdomen:   Soft, non distended, nontender, +BS, no guarding/rebound  Extremities:   No edema , pedal pulses present, no cyanosis  Skin:   Warm, dry, normal coloration and texture  Neuro:  AAOx3.  No gross focal neurological deficit  Psychiatric:  No anxiety, calm, cooperative    Data Review:  I have reviewed ordered lab tests and independently visualized imaging below:    Last 24hr Labs:  Recent Results (from the past 24 hour(s))   GLUCOSE, POC    Collection Time: 10/22/21  4:19 PM   Result Value Ref Range    Glucose (POC) 158 (H) 65 - 100 mg/dL    Performed by [address] [place] [address], POC    Collection Time: 10/22/21  8:31 PM   Result Value Ref Range    Glucose (POC) 137 (H) 65 - 100 mg/dL    Performed by [facility]    CBC W/O DIFF    Collection Time: 10/23/21  6:53 AM   Result Value Ref Range    WBC 8.6 4.3 - 11.1 K/uL    RBC 3.96 (L) 4.23 - 5.6 M/uL    HGB 11.6 (L) 13.6 - 17.2 g/dL    HCT 36.7 (L) 41.1 - 50.3 %    MCV 92.7 79.6 - 97.8 FL    MCH 29.3 26.1 - 32.9 PG    MCHC 31.6 31.4 - 35.0 g/dL    RDW 15.4 (H) 11.9 - 14.6 %    PLATELET 824 585 - 726 K/uL    MPV 12.5 (H) 9.4 - 12.3 FL    ABSOLUTE NRBC 0.00 0.0 - 0.2 K/uL   METABOLIC PANEL, BASIC    Collection Time: 10/23/21  6:53 AM   Result Value Ref Range    Sodium 143 136 - 145 mmol/L    Potassium 3.1 (L) 3.5 - 5.1 mmol/L    Chloride 105 98 - 107 mmol/L    CO2 29 21 - 32 mmol/L    Anion gap 9 7 - 16 mmol/L    Glucose 79 65 - 100 mg/dL    BUN 40 (H) 8 - 23 MG/DL    Creatinine 2.54 (H) 0.8 - 1.5 MG/DL    GFR est AA 32 (L) >60 ml/min/1.73m2    GFR est non-AA 26 (L) >60 ml/min/1.73m2    Calcium 7.8 (L) 8.3 - 10.4 MG/DL   GLUCOSE, POC    Collection Time: 10/23/21  7:17 AM   Result Value Ref Range    Glucose (POC) 78 65 - 100 mg/dL    Performed by [facility]    GLUCOSE, POC    Collection Time: 10/23/21 11:47 AM   Result Value Ref Range    Glucose (POC) 81 65 - 100 mg/dL    Performed by [facility]        All Micro Results     Procedure Component Value Units Date/Time    BLOOD CULTURE [[id number]] Collected: 10/21/21 1728    Order Status: Completed Specimen: Blood Updated: 10/23/21 0825     Special Requests: --        RIGHT  FOREARM       Culture result: NO GROWTH 2 DAYS       BLOOD CULTURE [[id number]] Collected: 10/21/21 1648    Order Status: Completed Specimen: Blood Updated: 10/23/21 0825     Special Requests: --        LEFT  HAND       Culture result: NO GROWTH 2 DAYS       CULTURE, URINE [[id number]]  (Abnormal) Collected: 10/21/21 1752    Order Status: Completed Specimen: Cath Urine Updated: 10/23/21 0806     Special Requests: NO SPECIAL REQUESTS        Culture result:       >100,000 COLONIES/mL GRAM NEGATIVE RODS IDENTIFICATION AND SUSCEPTIBILITY TO FOLLOW          SARS-COV-2, PCR [[id number]] Collected: 10/21/21 1757    Order Status: Completed Specimen: Nasopharyngeal Updated: 10/22/21 0816     Specimen source Nasopharyngeal        SARS-CoV-2 Not detected        Comment:      The specimen is NEGATIVE for SARS-CoV-2, the novel coronavirus associated with COVID-19. This test has been authorized by the FDA under an Emergency Use Authorization (EUA) for use by authorized laboratories.         Fact sheet for Healthcare Providers: ConventionUpdate.co.nz       Fact sheet for Patients: ConventionUpdate.co.nz       Methodology: RT-PCR         COVID-19 RAPID TEST [[id number]] Collected: 10/21/21 1757    Order Status: Completed Specimen: Nasopharyngeal Updated: 10/21/21 1835     Specimen source NASAL        COVID-19 rapid test Not detected        Comment:      The specimen is NEGATIVE for SARS-CoV-2, the novel coronavirus associated with COVID-19. A negative result does not rule out COVID-19. This test has been authorized by the FDA under an Emergency Use Authorization (EUA) for use by authorized laboratories. Fact sheet for Healthcare Providers: [employer].nz  Fact sheet for Patients: AQUA PURE.nz       Methodology: Isothermal Nucleic Acid Amplification         COVID-19 RAPID TEST [[id number]] Collected: 10/21/21 1757    Order Status: Canceled           EKG Results     Procedure 720 Value Units Date/Time    EKG, 12 LEAD, INITIAL [[id number]] Collected: 10/21/21 1702    Order Status: Completed Updated: 10/22/21 0704     Ventricular Rate 93 BPM      Atrial Rate 93 BPM      P-R Interval 182 ms      QRS Duration 82 ms      Q-T Interval 328 ms      QTC Calculation (Bezet) 407 ms      Calculated P Axis 18 degrees      Calculated R Axis 3 degrees      Calculated T Axis 45 degrees      Diagnosis --     Normal sinus rhythm  Possible Inferior infarct (cited on or before 08-FEB-2019)  Abnormal ECG  When compared with ECG of 08-JUN-2020 07:36,  Previous ECG has undetermined rhythm, needs review  Nonspecific T wave abnormality, worse in Anterolateral leads  Confirmed by April Hein ([id number]) on 10/22/2021 7:04:20 AM            Other Studies:  XR CHEST PORT    Result Date: 10/21/2021  EXAM: CHEST X-RAY, 1 VIEW INDICATION: meets SIRS criteria. COMPARISON: Chest x-ray 6/15/2020 TECHNIQUE: Single AP view of the chest was obtained. FINDINGS: Airspace opacities in the right lung base increased compared to prior. No pneumothorax or pleural effusion. The cardiomediastinal silhouette is within normal limits. The osseous structures are unremarkable. Airspace opacities in the right lung base increased compared to prior reflecting atelectasis or infiltrates.           Current Meds: Current Facility-Administered Medications   Medication Dose Route Frequency    melatonin tablet 5 mg  5 mg Oral QHS    sodium chloride (NS) flush 5-10 mL  5-10 mL IntraVENous Q8H    sodium chloride (NS) flush 5-10 mL  5-10 mL IntraVENous PRN    atorvastatin (LIPITOR) tablet 80 mg  80 mg Oral DAILY    clopidogreL (PLAVIX) tablet 75 mg  75 mg Oral DAILY    doxazosin (CARDURA) tablet 8 mg  8 mg Oral QHS    finasteride (PROSCAR) tablet 5 mg  5 mg Oral DAILY    [Held by provider] glipiZIDE (GLUCOTROL) tablet 10 mg  10 mg Oral ACB    [Held by provider] hydroCHLOROthiazide (HYDRODIURIL) tablet 12.5 mg  12.5 mg Oral DAILY    [Held by provider] lisinopriL (PRINIVIL, ZESTRIL) tablet 10 mg  10 mg Oral BID    [Held by provider] metFORMIN ER (GLUCOPHAGE XR) tablet 1,000 mg  1,000 mg Oral DAILY WITH DINNER    sertraline (ZOLOFT) tablet 50 mg  50 mg Oral DAILY    sodium chloride (NS) flush 5-40 mL  5-40 mL IntraVENous Q8H    sodium chloride (NS) flush 5-40 mL  5-40 mL IntraVENous PRN    hydrALAZINE (APRESOLINE) 20 mg/mL injection 10 mg  10 mg IntraVENous Q6H PRN    insulin lispro (HUMALOG) injection   SubCUTAneous AC&HS    [Held by provider] furosemide (LASIX) injection 40 mg  40 mg IntraVENous DAILY    azithromycin (ZITHROMAX) 500 mg in 0.9% sodium chloride 250 mL (VIAL-MATE)  500 mg IntraVENous Q24H    cefTRIAXone (ROCEPHIN) 2 g in 0.9% sodium chloride (MBP/ADV) 50 mL MBP  2 g IntraVENous Q24H    enoxaparin (LOVENOX) injection 40 mg  40 mg SubCUTAneous Q24H       Part of this note was written by using a voice dictation software and the note has been proof read but may still contain some grammatical/other typographical errors.     Signed By: Kina Jimenez MD   [facility] Hospitalist Service    October 23, 2021  12:46 PM

## 2021-10-23 NOTE — PROGRESS NOTES
Reviewed notes for concerns      Per notes:    Brianna Carrsaquillo is a 78 y.o. male with medical history significant for hypertension, hyperlipidemia, diabetes type 2 not requiring long-term use of insulin, cerebellar stroke without residual deficits, who presented to ED with rapid onset generalized weakness with urinary retention, fever and chills. Work-up shows a with 4+ bacteria, more than 100 WBCs. Chest x-ray shows possible infiltrate.   Patient was started IV antibiotics for likely acute prostatitis.              Will continue to assess how to best serve this family      PREFERRED NAME:  [id number] [facility]    DNR    NO DIRECTIVES ON FILE    EXP D/C  4  DAYS    OBESITY

## 2021-10-24 PROBLEM — B96.20 E-COLI UTI: Status: ACTIVE | Noted: 2021-10-24

## 2021-10-24 PROBLEM — N39.0 E-COLI UTI: Status: ACTIVE | Noted: 2021-10-24

## 2021-10-24 LAB
ANION GAP SERPL CALC-SCNC: 5 MMOL/L (ref 7–16)
BACTERIA SPEC CULT: ABNORMAL
BUN SERPL-MCNC: 35 MG/DL (ref 8–23)
CALCIUM SERPL-MCNC: 8.2 MG/DL (ref 8.3–10.4)
CHLORIDE SERPL-SCNC: 109 MMOL/L (ref 98–107)
CO2 SERPL-SCNC: 27 MMOL/L (ref 21–32)
CREAT SERPL-MCNC: 1.51 MG/DL (ref 0.8–1.5)
ERYTHROCYTE [DISTWIDTH] IN BLOOD BY AUTOMATED COUNT: 15.4 % (ref 11.9–14.6)
GLUCOSE BLD STRIP.AUTO-MCNC: 126 MG/DL (ref 65–100)
GLUCOSE BLD STRIP.AUTO-MCNC: 142 MG/DL (ref 65–100)
GLUCOSE BLD STRIP.AUTO-MCNC: 158 MG/DL (ref 65–100)
GLUCOSE BLD STRIP.AUTO-MCNC: 169 MG/DL (ref 65–100)
GLUCOSE SERPL-MCNC: 125 MG/DL (ref 65–100)
HCT VFR BLD AUTO: 39.9 % (ref 41.1–50.3)
HGB BLD-MCNC: 11.9 G/DL (ref 13.6–17.2)
MCH RBC QN AUTO: 28.5 PG (ref 26.1–32.9)
MCHC RBC AUTO-ENTMCNC: 29.8 G/DL (ref 31.4–35)
MCV RBC AUTO: 95.5 FL (ref 79.6–97.8)
NRBC # BLD: 0 K/UL (ref 0–0.2)
PLATELET # BLD AUTO: 157 K/UL (ref 150–450)
PMV BLD AUTO: 12 FL (ref 9.4–12.3)
POTASSIUM SERPL-SCNC: 3.7 MMOL/L (ref 3.5–5.1)
PROCALCITONIN SERPL-MCNC: 4.24 NG/ML
RBC # BLD AUTO: 4.18 M/UL (ref 4.23–5.6)
SERVICE CMNT-IMP: ABNORMAL
SODIUM SERPL-SCNC: 141 MMOL/L (ref 136–145)
WBC # BLD AUTO: 6.4 K/UL (ref 4.3–11.1)

## 2021-10-24 PROCEDURE — 80048 BASIC METABOLIC PNL TOTAL CA: CPT

## 2021-10-24 PROCEDURE — 74011250637 HC RX REV CODE- 250/637: Performed by: EMERGENCY MEDICINE

## 2021-10-24 PROCEDURE — 85027 COMPLETE CBC AUTOMATED: CPT

## 2021-10-24 PROCEDURE — 94760 N-INVAS EAR/PLS OXIMETRY 1: CPT

## 2021-10-24 PROCEDURE — 74011250636 HC RX REV CODE- 250/636: Performed by: INTERNAL MEDICINE

## 2021-10-24 PROCEDURE — 65270000029 HC RM PRIVATE

## 2021-10-24 PROCEDURE — 74011250636 HC RX REV CODE- 250/636: Performed by: EMERGENCY MEDICINE

## 2021-10-24 PROCEDURE — 74011000258 HC RX REV CODE- 258: Performed by: EMERGENCY MEDICINE

## 2021-10-24 PROCEDURE — 84145 PROCALCITONIN (PCT): CPT

## 2021-10-24 PROCEDURE — 36415 COLL VENOUS BLD VENIPUNCTURE: CPT

## 2021-10-24 PROCEDURE — 82962 GLUCOSE BLOOD TEST: CPT

## 2021-10-24 PROCEDURE — 74011636637 HC RX REV CODE- 636/637: Performed by: EMERGENCY MEDICINE

## 2021-10-24 RX ORDER — FUROSEMIDE 10 MG/ML
40 INJECTION INTRAMUSCULAR; INTRAVENOUS ONCE
Status: COMPLETED | OUTPATIENT
Start: 2021-10-24 | End: 2021-10-24

## 2021-10-24 RX ADMIN — Medication 5 MG: at 21:25

## 2021-10-24 RX ADMIN — Medication 10 ML: at 05:45

## 2021-10-24 RX ADMIN — Medication 10 ML: at 13:42

## 2021-10-24 RX ADMIN — FUROSEMIDE 40 MG: 10 INJECTION, SOLUTION INTRAMUSCULAR; INTRAVENOUS at 08:18

## 2021-10-24 RX ADMIN — SERTRALINE 50 MG: 50 TABLET, FILM COATED ORAL at 08:18

## 2021-10-24 RX ADMIN — CLOPIDOGREL BISULFATE 75 MG: 75 TABLET ORAL at 08:18

## 2021-10-24 RX ADMIN — FINASTERIDE 5 MG: 5 TABLET, FILM COATED ORAL at 08:18

## 2021-10-24 RX ADMIN — DOXAZOSIN 8 MG: 4 TABLET ORAL at 21:25

## 2021-10-24 RX ADMIN — INSULIN LISPRO 2 UNITS: 100 INJECTION, SOLUTION INTRAVENOUS; SUBCUTANEOUS at 11:16

## 2021-10-24 RX ADMIN — ENOXAPARIN SODIUM 40 MG: 40 INJECTION SUBCUTANEOUS at 08:18

## 2021-10-24 RX ADMIN — CEFTRIAXONE 2 G: 2 INJECTION, POWDER, FOR SOLUTION INTRAMUSCULAR; INTRAVENOUS at 08:18

## 2021-10-24 RX ADMIN — Medication 10 ML: at 21:25

## 2021-10-24 RX ADMIN — INSULIN LISPRO 2 UNITS: 100 INJECTION, SOLUTION INTRAVENOUS; SUBCUTANEOUS at 21:25

## 2021-10-24 RX ADMIN — ATORVASTATIN CALCIUM 80 MG: 80 TABLET, FILM COATED ORAL at 08:18

## 2021-10-24 RX ADMIN — AZITHROMYCIN MONOHYDRATE 500 MG: 500 INJECTION, POWDER, LYOPHILIZED, FOR SOLUTION INTRAVENOUS at 17:02

## 2021-10-24 NOTE — PROGRESS NOTES
Pt in bed alert and oriented denies pain or need at this time. Pt on 3L NC at 94%. No apparent distress noted. Preparing to report off to oncoming RN.

## 2021-10-24 NOTE — PROGRESS NOTES
[facility]ist Progress Note     Name: France Carballo Age: 78 y.o. Sex: male  : 1945    MRN:     [id number]    Admit Date:  10/21/2021    Presenting Complaint:   Bladder Infection      Reason(s) for Admission:   Acute prostatitis [N41.0]  Hypoxia [R09.02]  CAP (community acquired pneumonia) [J18.9]       Hospital Course and Interval History:     Please refer to the admission H&P for details of presentation. In summary, France Carballo is a 78 y.o. male with medical history significant for hypertension, hyperlipidemia, diabetes type 2 not requiring long-term use of insulin, cerebellar stroke without residual deficits, who presented to ED with rapid onset generalized weakness with urinary retention, fever and chills. Work-up shows a with 4+ bacteria, more than 100 WBCs. Chest x-ray shows right lung base airspace opacity with elevated procalcitonin. Patient was started IV antibiotics for likely acute prostatitis as well as pneumonia right lower lobe. Patient was noted to be saturating down to 85% on room air on 10/23 and has required O2 nasal cannula. Subjective/24 hr Events (10/24/21) :  Patient is seen and examined at bedside. No acute events reported overnight by nursing staff. Patient laying comfortably in bed. Saturating at 95% on 3 L O2 nasal cannula. Reports feeling better any shortness of breath. Patient denies chest pains, fever, chills, n/v, abdominal pain. Review of Systems: 10 point review of systems is otherwise negative with the exception of the elements mentioned above. Assessment & Plan     Sepsis, present on admission  Meets criteria for sepsis with leukocytosis and fever. Likely source of urinary as well as pulmonary. Lactic acid of 1.7, procalcitonin of 3.94  -Antibiotics as below. Acute prostatitis without hematuria  Urinalysis with 4+ bacteria, more than 100 WBCs. Patient febrile episode of 102.5 °F 10/22 evening.   UCx with pansensitive E. coli  -on IV ceftriaxone (EOT 10/25)   -Follow-up urine cultures    Acute respiratory failure with hypoxia  Pneumonia of right lower lobe due to infectious organism   Chest x-ray with all right lung base airspace opacity. Elevated procalcitonin. Covid PCR is negative. Noted to desat down to 85% on room air on 10/23.  10/24/21: on 3L O2 NC at sat of 95%  - azithromycin and ceftriaxone (EOT 10/25)  - Mucinex, Tessalon pearls  - will give 20mg IV of laxis  - O2 supplement and wean as tolerated    GRACE on CKD Stage 3  Cr of 2.54 today with baseline creatinine of 1.16-1.22.  - Will hold Lasix given elevated creatinine today. - Monitor renal function closely. Further work-up and management pending clinical course. HTN // HLD  - hold lisinopril, HCTZ In setting of GRACE    Controlled type 2 diabetes mellitus without complication, without long-term current use of insulin  - sliding scale    Severe obesity: Body mass index is 40.39 kg/m². Prior Cerebellar stroke without any residual deficits  - on lipitor and plavix   - PT/OT      Diet:  ADULT DIET Regular; 3 carb choices (45 gm/meal); Low Fat/Low Chol/High Fiber/2 gm Na  DVT PPx: lovenox  Code: DNR  Dispo: PT recommending SNF    I have reviewed and ordered clinical lab tests and independently visualized images, tracing.      Problem List:  Hospital Problems as of 10/24/2021 Date Reviewed: 10/21/2021        Codes Class Noted - Resolved POA    E-coli UTI ICD-10-CM: N39.0, B96.20  ICD-9-CM: 599.0, 041.49  10/24/2021 - Present Unknown        Acute respiratory failure with hypoxia (HCC) ICD-10-CM: J96.01  ICD-9-CM: 518.81  10/23/2021 - Present Yes        Hypokalemia ICD-10-CM: E87.6  ICD-9-CM: 276.8  10/23/2021 - Present Unknown        * (Principal) Sepsis ([facility] ICD-10-CM: A41.9  ICD-9-CM: 038.9, 995.91  10/22/2021 - Present Yes        GRACE (acute kidney injury) ([facility] ICD-10-CM: N17.9  ICD-9-CM: 584.9  10/22/2021 - Present No        CKD (chronic kidney disease) stage 3, GFR 30-59 ml/min [facility]) ICD-10-CM: N18.30  ICD-9-CM: 585.3  10/22/2021 - Present Yes        Acute prostatitis without hematuria ICD-10-CM: N41.0  ICD-9-CM: 601.0  10/21/2021 - Present Yes        Pneumonia of right lower lobe due to infectious organism ICD-10-CM: J18.9  ICD-9-CM: 304  10/21/2021 - Present Yes        Fluid overload ICD-10-CM: E87.70  ICD-9-CM: 276.69  10/21/2021 - Present Yes        Hypoxia ICD-10-CM: R09.02  ICD-9-CM: 799.02  10/21/2021 - Present Yes        CAP (community acquired pneumonia) ICD-10-CM: J18.9  ICD-9-CM: 113  10/21/2021 - Present Yes        Cerebellar stroke ([facility] ICD-10-CM: I63.9  ICD-9-CM: 434.91  6/14/2020 - Present Yes        Severe obesity ([facility] ICD-10-CM: E66.01  ICD-9-CM: 278.01  7/2/2019 - Present Yes        Controlled type 2 diabetes mellitus without complication, without long-term current use of insulin ([facility] ICD-10-CM: E11.9  ICD-9-CM: 250.00  2/22/2019 - Present Yes        Essential hypertension, benign ICD-10-CM: I10  ICD-9-CM: 401.1  1/3/2014 - Present Yes        Mixed hyperlipidemia ICD-10-CM: E78.2  ICD-9-CM: 272.2  1/3/2014 - Present Yes                 Objective:     Patient Vitals for the past 24 hrs:   Temp Pulse Resp BP SpO2   10/24/21 0803 98.4 °F (36.9 °C) 84 18 (!) 164/75 93 %   10/24/21 0259    (!) 149/67    10/24/21 0236 98.1 °F (36.7 °C) 82 18 (!) 160/68 94 %   10/23/21 2254     93 %   10/23/21 2215     94 %   10/23/21 2207 98.4 °F (36.9 °C) 80 18 137/61 (!) 82 %   10/23/21 1908 98.8 °F (37.1 °C) 82 18 132/64 93 %   10/23/21 1524 98.4 °F (36.9 °C) 74 18 (!) 116/59 93 %     Oxygen Therapy  O2 Sat (%): 93 % (10/24/21 0803)  Pulse via Oximetry: 96 beats per minute (10/22/21 0730)  O2 Device: Nasal cannula (10/23/21 2254)  Skin Assessment: Clean, dry, & intact (10/22/21 1927)  Skin Protection for O2 Device: No (10/22/21 0730)  O2 Flow Rate (L/min): 3 l/min (10/23/21 2254)    Estimated body mass index is 40.39 kg/m² as calculated from the following:    Height as of this encounter: 5' 9\" (1.753 m). Weight as of this encounter: 124.1 kg (273 lb 8 oz). Intake/Output Summary (Last 24 hours) at 10/24/2021 1102  Last data filed at 10/24/2021 0240  Gross per 24 hour   Intake 240 ml   Output 550 ml   Net -310 ml       Physical Exam:   General:     alert, awake , obese, pleasant   Head:   Normocephalic, atraumatic  Eyes:   anicteric sclerae, normal conjunctiva,  EOMI.  ENT:   Nares appear normal, no drainage. Moist oral mucosa  Neck:    supple, non-tender. Trachea midline. Lungs: No wheezing, scattered crackles. Cardiac:   RRR, Normal S1 and S2. Abdomen:   Soft, non distended, nontender, +BS, no guarding/rebound  Extremities:   No edema , pedal pulses present, no cyanosis  Skin:   Warm, dry, normal coloration and texture  Neuro:  AAOx3.  No gross focal neurological deficit  Psychiatric:  No anxiety, calm, cooperative    Data Review:  I have reviewed ordered lab tests and independently visualized imaging below:    Last 24hr Labs:  Recent Results (from the past 24 hour(s))   GLUCOSE, POC    Collection Time: 10/23/21 11:47 AM   Result Value Ref Range    Glucose (POC) 81 65 - 100 mg/dL    Performed by [facility]    GLUCOSE, POC    Collection Time: 10/23/21  4:19 PM   Result Value Ref Range    Glucose (POC) 114 (H) 65 - 100 mg/dL    Performed by [facility]    GLUCOSE, POC    Collection Time: 10/23/21  8:41 PM   Result Value Ref Range    Glucose (POC) 118 (H) 65 - 100 mg/dL    Performed by [facility]    CBC W/O DIFF    Collection Time: 10/24/21  6:04 AM   Result Value Ref Range    WBC 6.4 4.3 - 11.1 K/uL    RBC 4.18 (L) 4.23 - 5.6 M/uL    HGB 11.9 (L) 13.6 - 17.2 g/dL    HCT 39.9 (L) 41.1 - 50.3 %    MCV 95.5 79.6 - 97.8 FL    MCH 28.5 26.1 - 32.9 PG    MCHC 29.8 (L) 31.4 - 35.0 g/dL    RDW 15.4 (H) 11.9 - 14.6 %    PLATELET 335 891 - 789 K/uL    MPV 12.0 9.4 - 12.3 FL    ABSOLUTE NRBC 0.00 0.0 - 0.2 K/uL   METABOLIC PANEL, BASIC    Collection Time: 10/24/21 6:04 AM   Result Value Ref Range    Sodium 141 136 - 145 mmol/L    Potassium 3.7 3.5 - 5.1 mmol/L    Chloride 109 (H) 98 - 107 mmol/L    CO2 27 21 - 32 mmol/L    Anion gap 5 (L) 7 - 16 mmol/L    Glucose 125 (H) 65 - 100 mg/dL    BUN 35 (H) 8 - 23 MG/DL    Creatinine 1.51 (H) 0.8 - 1.5 MG/DL    GFR est AA 58 (L) >60 ml/min/1.73m2    GFR est non-AA 48 (L) >60 ml/min/1.73m2    Calcium 8.2 (L) 8.3 - 10.4 MG/DL   PROCALCITONIN    Collection Time: 10/24/21  6:04 AM   Result Value Ref Range    Procalcitonin 4.24 ng/mL   GLUCOSE, POC    Collection Time: 10/24/21  7:25 AM   Result Value Ref Range    Glucose (POC) 126 (H) 65 - 100 mg/dL    Performed by [facility]    GLUCOSE, POC    Collection Time: 10/24/21 10:44 AM   Result Value Ref Range    Glucose (POC) 169 (H) 65 - 100 mg/dL    Performed by [facility]        All Micro Results     Procedure Component Value Units Date/Time    CULTURE, URINE [[id number]]  (Abnormal)  (Susceptibility) Collected: 10/21/21 1752    Order Status: Completed Specimen: Cath Urine Updated: 10/24/21 0754     Special Requests: NO SPECIAL REQUESTS        Culture result:       >100,000 COLONIES/mL ESCHERICHIA COLI          BLOOD CULTURE [[id number]] Collected: 10/21/21 1728    Order Status: Completed Specimen: Blood Updated: 10/24/21 0707     Special Requests: --        RIGHT  FOREARM       Culture result: NO GROWTH 3 DAYS       BLOOD CULTURE [[id number]] Collected: 10/21/21 1648    Order Status: Completed Specimen: Blood Updated: 10/24/21 0707     Special Requests: --        LEFT  HAND       Culture result: NO GROWTH 3 DAYS       SARS-COV-2, PCR [[id number]] Collected: 10/21/21 1757    Order Status: Completed Specimen: Nasopharyngeal Updated: 10/22/21 0816     Specimen source Nasopharyngeal        SARS-CoV-2 Not detected        Comment:      The specimen is NEGATIVE for SARS-CoV-2, the novel coronavirus associated with COVID-19.        This test has been authorized by the FDA under an Emergency Use Authorization (EUA) for use by authorized laboratories. Fact sheet for Healthcare Providers: Appercode.co.nz       Fact sheet for Patients: Simplificare.nz       Methodology: RT-PCR         COVID-19 RAPID TEST [[id number]] Collected: 10/21/21 1757    Order Status: Completed Specimen: Nasopharyngeal Updated: 10/21/21 1835     Specimen source NASAL        COVID-19 rapid test Not detected        Comment:      The specimen is NEGATIVE for SARS-CoV-2, the novel coronavirus associated with COVID-19. A negative result does not rule out COVID-19. This test has been authorized by the FDA under an Emergency Use Authorization (EUA) for use by authorized laboratories. Fact sheet for Healthcare Providers: Simplificare.nz  Fact sheet for Patients: Simplificare.nz       Methodology: Isothermal Nucleic Acid Amplification         COVID-19 RAPID TEST [[id number]] Collected: 10/21/21 1757    Order Status: Canceled           EKG Results     Procedure 720 Value Units Date/Time    EKG, 12 LEAD, INITIAL [[id number]] Collected: 10/21/21 1702    Order Status: Completed Updated: 10/22/21 0704     Ventricular Rate 93 BPM      Atrial Rate 93 BPM      P-R Interval 182 ms      QRS Duration 82 ms      Q-T Interval 328 ms      QTC Calculation (Bezet) 407 ms      Calculated P Axis 18 degrees      Calculated R Axis 3 degrees      Calculated T Axis 45 degrees      Diagnosis --     Normal sinus rhythm  Possible Inferior infarct (cited on or before 08-FEB-2019)  Abnormal ECG  When compared with ECG of 08-JUN-2020 07:36,  Previous ECG has undetermined rhythm, needs review  Nonspecific T wave abnormality, worse in Anterolateral leads  Confirmed by Sarah Prescott ([id number]) on 10/22/2021 7:04:20 AM            Other Studies:  XR CHEST PORT    Result Date: 10/21/2021  EXAM: CHEST X-RAY, 1 VIEW INDICATION: meets SIRS criteria.  COMPARISON: Chest x-ray 6/15/2020 TECHNIQUE: Single AP view of the chest was obtained. FINDINGS: Airspace opacities in the right lung base increased compared to prior. No pneumothorax or pleural effusion. The cardiomediastinal silhouette is within normal limits. The osseous structures are unremarkable. Airspace opacities in the right lung base increased compared to prior reflecting atelectasis or infiltrates.           Current Meds:   Current Facility-Administered Medications   Medication Dose Route Frequency    melatonin tablet 5 mg  5 mg Oral QHS    sodium chloride (NS) flush 5-10 mL  5-10 mL IntraVENous Q8H    sodium chloride (NS) flush 5-10 mL  5-10 mL IntraVENous PRN    atorvastatin (LIPITOR) tablet 80 mg  80 mg Oral DAILY    clopidogreL (PLAVIX) tablet 75 mg  75 mg Oral DAILY    doxazosin (CARDURA) tablet 8 mg  8 mg Oral QHS    finasteride (PROSCAR) tablet 5 mg  5 mg Oral DAILY    [Held by provider] glipiZIDE (GLUCOTROL) tablet 10 mg  10 mg Oral ACB    [Held by provider] hydroCHLOROthiazide (HYDRODIURIL) tablet 12.5 mg  12.5 mg Oral DAILY    [Held by provider] lisinopriL (PRINIVIL, ZESTRIL) tablet 10 mg  10 mg Oral BID    [Held by provider] metFORMIN ER (GLUCOPHAGE XR) tablet 1,000 mg  1,000 mg Oral DAILY WITH DINNER    sertraline (ZOLOFT) tablet 50 mg  50 mg Oral DAILY    sodium chloride (NS) flush 5-40 mL  5-40 mL IntraVENous Q8H    sodium chloride (NS) flush 5-40 mL  5-40 mL IntraVENous PRN    hydrALAZINE (APRESOLINE) 20 mg/mL injection 10 mg  10 mg IntraVENous Q6H PRN    insulin lispro (HUMALOG) injection   SubCUTAneous AC&HS    [Held by provider] furosemide (LASIX) injection 40 mg  40 mg IntraVENous DAILY    azithromycin (ZITHROMAX) 500 mg in 0.9% sodium chloride 250 mL (VIAL-MATE)  500 mg IntraVENous Q24H    cefTRIAXone (ROCEPHIN) 2 g in 0.9% sodium chloride (MBP/ADV) 50 mL MBP  2 g IntraVENous Q24H    enoxaparin (LOVENOX) injection 40 mg  40 mg SubCUTAneous Q24H       Part of this note was written by using a voice dictation software and the note has been proof read but may still contain some grammatical/other typographical errors.     Signed By: Carmen Srena MD   [facility]    October 24, 2021  12:46 PM

## 2021-10-24 NOTE — PROGRESS NOTES
Received report from Berkley Rodrigez, [facility]. Patient awake and in bed. A&O x4. Respirations present. On room air. No signs of distress. No needs expressed. Bed low and locked. Call light within reach. Will continue to monitor.

## 2021-10-24 NOTE — PROGRESS NOTES
Hourly rounding completed. Patient resting in bed with no complaints. Wife came to visit patient in good spirits.

## 2021-10-25 ENCOUNTER — APPOINTMENT (OUTPATIENT)
Dept: NON INVASIVE DIAGNOSTICS | Age: 76
DRG: 727 | End: 2021-10-25
Attending: INTERNAL MEDICINE
Payer: MEDICARE

## 2021-10-25 PROBLEM — N41.0 ACUTE PROSTATITIS WITHOUT HEMATURIA: Status: RESOLVED | Noted: 2021-10-21 | Resolved: 2021-10-25

## 2021-10-25 PROBLEM — A41.9 SEPSIS (HCC): Status: RESOLVED | Noted: 2021-10-22 | Resolved: 2021-10-25

## 2021-10-25 PROBLEM — E87.6 HYPOKALEMIA: Status: RESOLVED | Noted: 2021-10-23 | Resolved: 2021-10-25

## 2021-10-25 LAB
ANION GAP SERPL CALC-SCNC: 5 MMOL/L (ref 7–16)
BUN SERPL-MCNC: 30 MG/DL (ref 8–23)
CALCIUM SERPL-MCNC: 8.7 MG/DL (ref 8.3–10.4)
CHLORIDE SERPL-SCNC: 108 MMOL/L (ref 98–107)
CO2 SERPL-SCNC: 32 MMOL/L (ref 21–32)
CREAT SERPL-MCNC: 1.16 MG/DL (ref 0.8–1.5)
ECHO AO ROOT DIAM: 3.56 CM
ECHO AV AREA PEAK VELOCITY: 2.4 CM2
ECHO AV AREA VTI: 2.59 CM2
ECHO AV AREA/BSA PEAK VELOCITY: 1 CM2/M2
ECHO AV AREA/BSA VTI: 1.1 CM2/M2
ECHO AV MEAN GRADIENT: 8 MMHG
ECHO AV PEAK GRADIENT: 17 MMHG
ECHO AV PEAK VELOCITY: 207 CM/S
ECHO AV VTI: 38 CM
ECHO LA AREA 2C: 21 CM2
ECHO LA AREA 4C: 23.9 CM2
ECHO LA MAJOR AXIS: 5.54 CM
ECHO LA MINOR AXIS: 2.35 CM
ECHO LV E' LATERAL VELOCITY: 5.61 CM/S
ECHO LV E' SEPTAL VELOCITY: 6.38 CM/S
ECHO LV EDV A2C: 97 CM3
ECHO LV EDV A4C: 148 CM3
ECHO LV ESV A2C: 19.5 CM3
ECHO LV ESV A4C: 44.3 CM3
ECHO LV INTERNAL DIMENSION DIASTOLIC: 4.01 CM (ref 4.2–5.9)
ECHO LV INTERNAL DIMENSION SYSTOLIC: 2.95 CM
ECHO LV IVSD: 1.05 CM (ref 0.6–1)
ECHO LV MASS 2D: 140.5 G (ref 88–224)
ECHO LV MASS INDEX 2D: 59.5 G/M2 (ref 49–115)
ECHO LV POSTERIOR WALL DIASTOLIC: 1.09 CM (ref 0.6–1)
ECHO LVOT DIAM: 1.94 CM
ECHO LVOT PEAK GRADIENT: 11 MMHG
ECHO LVOT VTI: 33.2 CM
ECHO MV A VELOCITY: 103 CM/S
ECHO MV E DECELERATION TIME (DT): 333 MS
ECHO MV E VELOCITY: 69.8 CM/S
ECHO MV E/A RATIO: 0.68
ECHO MV E/E' LATERAL: 12.44
ECHO MV E/E' RATIO (AVERAGED): 11.69
ECHO MV E/E' SEPTAL: 10.94
ECHO MV MAX VELOCITY: 112 CM/S
ECHO MV MEAN GRADIENT: 2 MMHG
ECHO MV PEAK GRADIENT: 5 MMHG
ECHO MV VTI: 31.3 CM
ECHO RV TAPSE: 2.31 CM (ref 1.5–2)
ERYTHROCYTE [DISTWIDTH] IN BLOOD BY AUTOMATED COUNT: 15 % (ref 11.9–14.6)
GLUCOSE BLD STRIP.AUTO-MCNC: 143 MG/DL (ref 65–100)
GLUCOSE BLD STRIP.AUTO-MCNC: 148 MG/DL (ref 65–100)
GLUCOSE BLD STRIP.AUTO-MCNC: 175 MG/DL (ref 65–100)
GLUCOSE BLD STRIP.AUTO-MCNC: 180 MG/DL (ref 65–100)
GLUCOSE SERPL-MCNC: 142 MG/DL (ref 65–100)
HCT VFR BLD AUTO: 38.9 % (ref 41.1–50.3)
HGB BLD-MCNC: 11.9 G/DL (ref 13.6–17.2)
MCH RBC QN AUTO: 28.3 PG (ref 26.1–32.9)
MCHC RBC AUTO-ENTMCNC: 30.6 G/DL (ref 31.4–35)
MCV RBC AUTO: 92.6 FL (ref 79.6–97.8)
NRBC # BLD: 0 K/UL (ref 0–0.2)
PLATELET # BLD AUTO: 177 K/UL (ref 150–450)
PMV BLD AUTO: 11.8 FL (ref 9.4–12.3)
POTASSIUM SERPL-SCNC: 3.3 MMOL/L (ref 3.5–5.1)
RBC # BLD AUTO: 4.2 M/UL (ref 4.23–5.6)
SERVICE CMNT-IMP: ABNORMAL
SODIUM SERPL-SCNC: 145 MMOL/L (ref 136–145)
WBC # BLD AUTO: 5.7 K/UL (ref 4.3–11.1)

## 2021-10-25 PROCEDURE — 36415 COLL VENOUS BLD VENIPUNCTURE: CPT

## 2021-10-25 PROCEDURE — 74011250637 HC RX REV CODE- 250/637: Performed by: INTERNAL MEDICINE

## 2021-10-25 PROCEDURE — 97110 THERAPEUTIC EXERCISES: CPT

## 2021-10-25 PROCEDURE — 82962 GLUCOSE BLOOD TEST: CPT

## 2021-10-25 PROCEDURE — 80048 BASIC METABOLIC PNL TOTAL CA: CPT

## 2021-10-25 PROCEDURE — C8929 TTE W OR WO FOL WCON,DOPPLER: HCPCS

## 2021-10-25 PROCEDURE — 74011636637 HC RX REV CODE- 636/637: Performed by: EMERGENCY MEDICINE

## 2021-10-25 PROCEDURE — 74011000250 HC RX REV CODE- 250: Performed by: INTERNAL MEDICINE

## 2021-10-25 PROCEDURE — 74011250636 HC RX REV CODE- 250/636: Performed by: INTERNAL MEDICINE

## 2021-10-25 PROCEDURE — 97535 SELF CARE MNGMENT TRAINING: CPT

## 2021-10-25 PROCEDURE — 65270000029 HC RM PRIVATE

## 2021-10-25 PROCEDURE — 74011250636 HC RX REV CODE- 250/636: Performed by: EMERGENCY MEDICINE

## 2021-10-25 PROCEDURE — 74011000258 HC RX REV CODE- 258: Performed by: EMERGENCY MEDICINE

## 2021-10-25 PROCEDURE — 74011250637 HC RX REV CODE- 250/637: Performed by: EMERGENCY MEDICINE

## 2021-10-25 PROCEDURE — 85027 COMPLETE CBC AUTOMATED: CPT

## 2021-10-25 RX ORDER — FUROSEMIDE 10 MG/ML
40 INJECTION INTRAMUSCULAR; INTRAVENOUS ONCE
Status: COMPLETED | OUTPATIENT
Start: 2021-10-25 | End: 2021-10-25

## 2021-10-25 RX ORDER — AZITHROMYCIN 250 MG/1
500 TABLET, FILM COATED ORAL DAILY
Status: COMPLETED | OUTPATIENT
Start: 2021-10-25 | End: 2021-10-25

## 2021-10-25 RX ADMIN — ENOXAPARIN SODIUM 40 MG: 40 INJECTION SUBCUTANEOUS at 08:21

## 2021-10-25 RX ADMIN — Medication 10 ML: at 06:13

## 2021-10-25 RX ADMIN — SERTRALINE 50 MG: 50 TABLET, FILM COATED ORAL at 08:22

## 2021-10-25 RX ADMIN — Medication 10 ML: at 16:31

## 2021-10-25 RX ADMIN — CEFTRIAXONE 2 G: 2 INJECTION, POWDER, FOR SOLUTION INTRAMUSCULAR; INTRAVENOUS at 08:21

## 2021-10-25 RX ADMIN — INSULIN LISPRO 2 UNITS: 100 INJECTION, SOLUTION INTRAVENOUS; SUBCUTANEOUS at 11:30

## 2021-10-25 RX ADMIN — DOXAZOSIN 8 MG: 4 TABLET ORAL at 21:42

## 2021-10-25 RX ADMIN — LISINOPRIL 10 MG: 5 TABLET ORAL at 17:00

## 2021-10-25 RX ADMIN — Medication 10 ML: at 21:42

## 2021-10-25 RX ADMIN — PERFLUTREN 2 ML: 6.52 INJECTION, SUSPENSION INTRAVENOUS at 15:58

## 2021-10-25 RX ADMIN — INSULIN LISPRO 2 UNITS: 100 INJECTION, SOLUTION INTRAVENOUS; SUBCUTANEOUS at 17:00

## 2021-10-25 RX ADMIN — FUROSEMIDE 40 MG: 10 INJECTION, SOLUTION INTRAMUSCULAR; INTRAVENOUS at 13:24

## 2021-10-25 RX ADMIN — Medication 10 ML: at 21:44

## 2021-10-25 RX ADMIN — FINASTERIDE 5 MG: 5 TABLET, FILM COATED ORAL at 08:22

## 2021-10-25 RX ADMIN — CLOPIDOGREL BISULFATE 75 MG: 75 TABLET ORAL at 08:22

## 2021-10-25 RX ADMIN — AZITHROMYCIN MONOHYDRATE 500 MG: 250 TABLET ORAL at 17:00

## 2021-10-25 RX ADMIN — ATORVASTATIN CALCIUM 80 MG: 80 TABLET, FILM COATED ORAL at 08:22

## 2021-10-25 NOTE — PROGRESS NOTES
MSN, CM:  PT and OT recommends rehab. Patient requested [facility]. Referral sent. Will continue to follow.

## 2021-10-25 NOTE — PROGRESS NOTES
Beside shift report received from [place] RN  Patient lying in bed  Respirations even and unlabored on room air  O2 sat 94%  No signs of distress  No needs expressed at this time  Safety measures in place

## 2021-10-25 NOTE — PROGRESS NOTES
Received report from Lucio Busby, [facility]. Patient awake and in recliner. A&O x4. Respirations present. On room air. No signs of distress. No needs expressed. Bed low and locked. Call light within reach. Will continue to monitor.

## 2021-10-25 NOTE — PROGRESS NOTES
[facility]ist Progress Note     Name: Faviola Starks Age: 78 y.o. Sex: male  : 1945    MRN:     [id number]    Admit Date:  10/21/2021    Presenting Complaint:   Bladder Infection      Reason(s) for Admission:   Acute prostatitis [N41.0]  Hypoxia [R09.02]  CAP (community acquired pneumonia) [J18.9]       Hospital Course and Interval History:     Please refer to the admission H&P for details of presentation. In summary, Faviola Starks is a 78 y.o. male with medical history significant for hypertension, hyperlipidemia, diabetes type 2 not requiring long-term use of insulin, cerebellar stroke without residual deficits, who presented to ED with rapid onset generalized weakness with urinary retention, fever and chills. Work-up shows a with 4+ bacteria, more than 100 WBCs. Chest x-ray shows right lung base airspace opacity with elevated procalcitonin. Patient was started IV antibiotics for likely acute prostatitis as well as pneumonia right lower lobe. Patient was noted to be saturating down to 85% on room air on 10/23 and has required O2 nasal cannula. Urine cultures growing pansensitive E. Coli. Patient was noted to have acute kidney injury likely due to urinary tract infection. His creatinine has trended back down to his baseline. Subjective/24 hr Events (10/25/21) :  Patient is seen and examined at bedside. No acute events reported overnight by nursing staff. Patient is comfortable lying in bed. Not in any acute distress. Reports that he is feeling better and breathing has improved while on room air. Complaints of dry nasal passages while on O2 nasal cannula. Patient denies chest pains, fever, chills, n/v, abdominal pain. Review of Systems: 10 point review of systems is otherwise negative with the exception of the elements mentioned above. Assessment & Plan     Sepsis, present on admission - resolved  Meets criteria for sepsis with leukocytosis and fever.   Likely source of urinary as well as pulmonary. Lactic acid of 1.7, procalcitonin of 3.94    Acute prostatitis without hematuria  Urinalysis with 4+ bacteria, more than 100 WBCs. Patient febrile episode of 102.5 °F 10/22 evening. UCx with pansensitive E. coli  - complete IV ceftriaxone today. Acute respiratory failure with hypoxia  Pneumonia of right lower lobe due to infectious organism   Chest x-ray with all right lung base airspace opacity. Elevated procalcitonin. Covid PCR is negative. Noted to desat down to 85% on room air on 10/23.  10/25/21: weaned to room air at rest  - completing azithromycin and ceftriaxone today (EOT 10/25)  - Mucinex, Tessalon pearls  - O2 walk test    GRACE on CKD Stage 3 - resolved  Cr of now back to baseline ( baseline creatinine of 1.16-1.22 )  - Will hold Lasix given elevated creatinine today. - Monitor renal function closely. HTN // HLD  Fluid Overload  - will restart lisinopril, HCTZ as GRACE has resolved  - echo ordered given elevated pBNP with SOB. - will give lasix 40mg IV today      Controlled type 2 diabetes mellitus without complication, without long-term current use of insulin  - sliding scale    Severe obesity: Body mass index is 40.26 kg/m². Prior Cerebellar stroke without any residual deficits  - on lipitor and plavix   - PT/OT      Diet:  ADULT DIET Regular; 3 carb choices (45 gm/meal); Low Fat/Low Chol/High Fiber/2 gm Na  DVT PPx: lovenox  Code: DNR  Dispo: PT recommending SNF. Patient is not IRC. [facility] consult placed by case management. I have reviewed and ordered clinical lab tests and independently visualized images, tracing.      Problem List:  Hospital Problems as of 10/25/2021 Date Reviewed: 10/21/2021        Codes Class Noted - Resolved POA    E-coli UTI ICD-10-CM: N39.0, B96.20  ICD-9-CM: 599.0, 041.49  10/24/2021 - Present Yes        Acute respiratory failure with hypoxia [facility]) ICD-10-CM: J96.01  ICD-9-CM: 518.81  10/23/2021 - Present Yes        GRACE (acute kidney injury) [facility]) ICD-10-CM: N17.9  ICD-9-CM: 584.9  10/22/2021 - Present No        CKD (chronic kidney disease) stage 3, GFR 30-59 ml/min ([facility]) ICD-10-CM: N18.30  ICD-9-CM: 585.3  10/22/2021 - Present Yes        Pneumonia of right lower lobe due to infectious organism ICD-10-CM: J18.9  ICD-9-CM: 486  10/21/2021 - Present Yes        Fluid overload ICD-10-CM: E87.70  ICD-9-CM: 276.69  10/21/2021 - Present Yes        Hypoxia ICD-10-CM: R09.02  ICD-9-CM: 799.02  10/21/2021 - Present Yes        CAP (community acquired pneumonia) ICD-10-CM: J18.9  ICD-9-CM: 208  10/21/2021 - Present Yes        Cerebellar stroke ([facility] ICD-10-CM: I63.9  ICD-9-CM: 434.91  6/14/2020 - Present Yes        Severe obesity ([facility] ICD-10-CM: E66.01  ICD-9-CM: 278.01  7/2/2019 - Present Yes        Controlled type 2 diabetes mellitus without complication, without long-term current use of insulin ([facility] ICD-10-CM: E11.9  ICD-9-CM: 250.00  2/22/2019 - Present Yes        Essential hypertension, benign ICD-10-CM: I10  ICD-9-CM: 401.1  1/3/2014 - Present Yes        Mixed hyperlipidemia ICD-10-CM: E78.2  ICD-9-CM: 272.2  1/3/2014 - Present Yes        RESOLVED: Hypokalemia ICD-10-CM: E87.6  ICD-9-CM: 276.8  10/23/2021 - 10/25/2021 Yes        * (Principal) RESOLVED: Sepsis ([facility] ICD-10-CM: A41.9  ICD-9-CM: 038.9, 995.91  10/22/2021 - 10/25/2021 Yes        RESOLVED: Acute prostatitis without hematuria ICD-10-CM: N41.0  ICD-9-CM: 601.0  10/21/2021 - 10/25/2021 Yes                 Objective:     Patient Vitals for the past 24 hrs:   Temp Pulse Resp BP SpO2   10/25/21 0742 97.7 °F (36.5 °C) 68 20 (!) 152/80 96 %   10/25/21 0408  72  (!) 153/71    10/25/21 0250 97.8 °F (36.6 °C) 76 18 (!) 162/68 93 %   10/24/21 2208 98.1 °F (36.7 °C) 70 18 (!) 151/67 93 %   10/24/21 2004     92 %   10/24/21 1858 98.2 °F (36.8 °C) 77 20 (!) 152/71 91 %   10/24/21 1505 98.4 °F (36.9 °C) 74 18 138/73 93 %     Oxygen Therapy  O2 Sat (%): 96 % (10/25/21 0742)  Pulse via Oximetry: 96 beats per minute (10/22/21 0730)  O2 Device: None (Room air) (10/25/21 0238)  Skin Assessment: Clean, dry, & intact (10/22/21 1927)  Skin Protection for O2 Device: No (10/22/21 0730)  O2 Flow Rate (L/min): 3 l/min (10/23/21 3423)    Estimated body mass index is 40.26 kg/m² as calculated from the following:    Height as of this encounter: 5' 9\" (1.753 m). Weight as of this encounter: 123.7 kg (272 lb 9.6 oz). Intake/Output Summary (Last 24 hours) at 10/25/2021 1258  Last data filed at 10/25/2021 6051  Gross per 24 hour   Intake 600 ml   Output 900 ml   Net -300 ml       Physical Exam:   General:     alert, awake , obese, pleasant   Head:   Normocephalic, atraumatic  Eyes:   anicteric sclerae, normal conjunctiva,  EOMI.  ENT:   Nares appear normal, no drainage. Moist oral mucosa  Neck:    supple, non-tender. Trachea midline. Lungs: No wheezing, scattered crackles. Cardiac:   RRR, Normal S1 and S2. Abdomen:   Soft, non distended, nontender, +BS, no guarding/rebound  Extremities:   No edema , pedal pulses present, no cyanosis  Skin:   Warm, dry, normal coloration and texture  Neuro:  AAOx3.  No gross focal neurological deficit  Psychiatric:  No anxiety, calm, cooperative    Data Review:  I have reviewed ordered lab tests and independently visualized imaging below:    Last 24hr Labs:  Recent Results (from the past 24 hour(s))   GLUCOSE, POC    Collection Time: 10/24/21  4:15 PM   Result Value Ref Range    Glucose (POC) 142 (H) 65 - 100 mg/dL    Performed by Miky    GLUCOSE, POC    Collection Time: 10/24/21  8:33 PM   Result Value Ref Range    Glucose (POC) 158 (H) 65 - 100 mg/dL    Performed by Jose D    CBC W/O DIFF    Collection Time: 10/25/21  6:25 AM   Result Value Ref Range    WBC 5.7 4.3 - 11.1 K/uL    RBC 4.20 (L) 4.23 - 5.6 M/uL    HGB 11.9 (L) 13.6 - 17.2 g/dL    HCT 38.9 (L) 41.1 - 50.3 %    MCV 92.6 79.6 - 97.8 FL    MCH 28.3 26.1 - 32.9 PG    MCHC 30.6 (L) 31.4 - 35.0 g/dL    RDW 15.0 (H) 11.9 - 14.6 %    PLATELET 350 595 - 523 K/uL    MPV 11.8 9.4 - 12.3 FL    ABSOLUTE NRBC 0.00 0.0 - 0.2 K/uL   METABOLIC PANEL, BASIC    Collection Time: 10/25/21  6:25 AM   Result Value Ref Range    Sodium 145 136 - 145 mmol/L    Potassium 3.3 (L) 3.5 - 5.1 mmol/L    Chloride 108 (H) 98 - 107 mmol/L    CO2 32 21 - 32 mmol/L    Anion gap 5 (L) 7 - 16 mmol/L    Glucose 142 (H) 65 - 100 mg/dL    BUN 30 (H) 8 - 23 MG/DL    Creatinine 1.16 0.8 - 1.5 MG/DL    GFR est AA >60 >60 ml/min/1.73m2    GFR est non-AA >60 >60 ml/min/1.73m2    Calcium 8.7 8.3 - 10.4 MG/DL   GLUCOSE, POC    Collection Time: 10/25/21  7:32 AM   Result Value Ref Range    Glucose (POC) 143 (H) 65 - 100 mg/dL    Performed by [address] [place] [address], POC    Collection Time: 10/25/21 11:27 AM   Result Value Ref Range    Glucose (POC) 180 (H) 65 - 100 mg/dL    Performed by Marquise Navarro        All Micro Results     Procedure Component Value Units Date/Time    CULTURE, URINE [[id number]]  (Abnormal)  (Susceptibility) Collected: 10/21/21 1752    Order Status: Completed Specimen: Cath Urine Updated: 10/24/21 0754     Special Requests: NO SPECIAL REQUESTS        Culture result:       >100,000 COLONIES/mL ESCHERICHIA COLI          BLOOD CULTURE [[id number]] Collected: 10/21/21 1728    Order Status: Completed Specimen: Blood Updated: 10/24/21 0707     Special Requests: --        RIGHT  FOREARM       Culture result: NO GROWTH 3 DAYS       BLOOD CULTURE [[id number]] Collected: 10/21/21 1648    Order Status: Completed Specimen: Blood Updated: 10/24/21 0707     Special Requests: --        LEFT  HAND       Culture result: NO GROWTH 3 DAYS       SARS-COV-2, PCR [[id number]] Collected: 10/21/21 1757    Order Status: Completed Specimen: Nasopharyngeal Updated: 10/22/21 0816     Specimen source Nasopharyngeal        SARS-CoV-2 Not detected        Comment:      The specimen is NEGATIVE for SARS-CoV-2, the novel coronavirus associated with COVID-19. This test has been authorized by the FDA under an Emergency Use Authorization (EUA) for use by authorized laboratories. Fact sheet for Healthcare Providers: [employer]       Fact sheet for Patients: Web[employer]afety[employer]       Methodology: RT-PCR         COVID-19 RAPID TEST [[id number]] Collected: 10/21/21 1757    Order Status: Completed Specimen: Nasopharyngeal Updated: 10/21/21 1835     Specimen source NASAL        COVID-19 rapid test Not detected        Comment:      The specimen is NEGATIVE for SARS-CoV-2, the novel coronavirus associated with COVID-19. A negative result does not rule out COVID-19. This test has been authorized by the FDA under an Emergency Use Authorization (EUA) for use by authorized laboratories.         Fact sheet for Healthcare Providers: [employer]afety[employer]  Fact sheet for Patients: [employer]afety[employer]       Methodology: Isothermal Nucleic Acid Amplification         COVID-19 RAPID TEST [[id number]] Collected: 10/21/21 1757    Order Status: Canceled           EKG Results     Procedure 720 Value Units Date/Time    EKG, 12 LEAD, INITIAL [[id number]] Collected: 10/21/21 1702    Order Status: Completed Updated: 10/22/21 0704     Ventricular Rate 93 BPM      Atrial Rate 93 BPM      P-R Interval 182 ms      QRS Duration 82 ms      Q-T Interval 328 ms      QTC Calculation (Bezet) 407 ms      Calculated P Axis 18 degrees      Calculated R Axis 3 degrees      Calculated T Axis 45 degrees      Diagnosis --     Normal sinus rhythm  Possible Inferior infarct (cited on or before 08-FEB-2019)  Abnormal ECG  When compared with ECG of 08-JUN-2020 07:36,  Previous ECG has undetermined rhythm, needs review  Nonspecific T wave abnormality, worse in Anterolateral leads  Confirmed by April Hein ([id number]) on 10/22/2021 7:04:20 AM            Other Studies:  XR CHEST PORT    Result Date: 10/21/2021  EXAM: CHEST X-RAY, 1 VIEW INDICATION: meets SIRS criteria. COMPARISON: Chest x-ray 6/15/2020 TECHNIQUE: Single AP view of the chest was obtained. FINDINGS: Airspace opacities in the right lung base increased compared to prior. No pneumothorax or pleural effusion. The cardiomediastinal silhouette is within normal limits. The osseous structures are unremarkable. Airspace opacities in the right lung base increased compared to prior reflecting atelectasis or infiltrates.           Current Meds:   Current Facility-Administered Medications   Medication Dose Route Frequency    azithromycin (ZITHROMAX) tablet 500 mg  500 mg Oral DAILY    melatonin tablet 5 mg  5 mg Oral QHS    sodium chloride (NS) flush 5-10 mL  5-10 mL IntraVENous Q8H    sodium chloride (NS) flush 5-10 mL  5-10 mL IntraVENous PRN    atorvastatin (LIPITOR) tablet 80 mg  80 mg Oral DAILY    clopidogreL (PLAVIX) tablet 75 mg  75 mg Oral DAILY    doxazosin (CARDURA) tablet 8 mg  8 mg Oral QHS    finasteride (PROSCAR) tablet 5 mg  5 mg Oral DAILY    [Held by provider] glipiZIDE (GLUCOTROL) tablet 10 mg  10 mg Oral ACB    [Held by provider] hydroCHLOROthiazide (HYDRODIURIL) tablet 12.5 mg  12.5 mg Oral DAILY    [Held by provider] lisinopriL (PRINIVIL, ZESTRIL) tablet 10 mg  10 mg Oral BID    [Held by provider] metFORMIN ER (GLUCOPHAGE XR) tablet 1,000 mg  1,000 mg Oral DAILY WITH DINNER    sertraline (ZOLOFT) tablet 50 mg  50 mg Oral DAILY    sodium chloride (NS) flush 5-40 mL  5-40 mL IntraVENous Q8H    sodium chloride (NS) flush 5-40 mL  5-40 mL IntraVENous PRN    hydrALAZINE (APRESOLINE) 20 mg/mL injection 10 mg  10 mg IntraVENous Q6H PRN    insulin lispro (HUMALOG) injection   SubCUTAneous AC&HS    [Held by provider] furosemide (LASIX) injection 40 mg  40 mg IntraVENous DAILY    enoxaparin (LOVENOX) injection 40 mg  40 mg SubCUTAneous Q24H       Part of this note was written by using a voice dictation software and the note has been proof read but may still contain some grammatical/other typographical errors.     Signed By: Celestina Spencer MD   [facility] Hospitalist Service    October 25, 2021  12:46 PM

## 2021-10-26 ENCOUNTER — HOSPITAL ENCOUNTER (INPATIENT)
Age: 76
LOS: 17 days | Discharge: HOME HEALTH | DRG: 948 | End: 2021-11-12
Attending: PHYSICAL MEDICINE & REHABILITATION | Admitting: PHYSICAL MEDICINE & REHABILITATION
Payer: MEDICARE

## 2021-10-26 VITALS
HEART RATE: 76 BPM | WEIGHT: 272.6 LBS | DIASTOLIC BLOOD PRESSURE: 62 MMHG | RESPIRATION RATE: 20 BRPM | SYSTOLIC BLOOD PRESSURE: 187 MMHG | OXYGEN SATURATION: 91 % | TEMPERATURE: 97.8 F | BODY MASS INDEX: 40.38 KG/M2 | HEIGHT: 69 IN

## 2021-10-26 DIAGNOSIS — E78.2 MIXED HYPERLIPIDEMIA: ICD-10-CM

## 2021-10-26 DIAGNOSIS — E11.65 TYPE 2 DIABETES MELLITUS WITH HYPERGLYCEMIA, WITHOUT LONG-TERM CURRENT USE OF INSULIN (HCC): ICD-10-CM

## 2021-10-26 DIAGNOSIS — R53.81 PHYSICAL DEBILITY: ICD-10-CM

## 2021-10-26 DIAGNOSIS — I63.9 CEREBROVASCULAR ACCIDENT (CVA) DUE TO OCCLUSION (HCC): ICD-10-CM

## 2021-10-26 DIAGNOSIS — N17.9 AKI (ACUTE KIDNEY INJURY) (HCC): ICD-10-CM

## 2021-10-26 DIAGNOSIS — N39.0 E-COLI UTI: ICD-10-CM

## 2021-10-26 DIAGNOSIS — I10 ESSENTIAL HYPERTENSION, BENIGN: ICD-10-CM

## 2021-10-26 DIAGNOSIS — B96.20 E-COLI UTI: ICD-10-CM

## 2021-10-26 DIAGNOSIS — E11.9 CONTROLLED TYPE 2 DIABETES MELLITUS WITHOUT COMPLICATION, WITHOUT LONG-TERM CURRENT USE OF INSULIN (HCC): ICD-10-CM

## 2021-10-26 DIAGNOSIS — J18.9 PNEUMONIA OF RIGHT LOWER LOBE DUE TO INFECTIOUS ORGANISM: ICD-10-CM

## 2021-10-26 PROBLEM — I63.81 RIGHT THALAMIC INFARCTION (HCC): Status: RESOLVED | Noted: 2019-07-02 | Resolved: 2021-10-26

## 2021-10-26 LAB
ANION GAP SERPL CALC-SCNC: 3 MMOL/L (ref 7–16)
BACTERIA SPEC CULT: NORMAL
BACTERIA SPEC CULT: NORMAL
BUN SERPL-MCNC: 27 MG/DL (ref 8–23)
CALCIUM SERPL-MCNC: 8.5 MG/DL (ref 8.3–10.4)
CHLORIDE SERPL-SCNC: 105 MMOL/L (ref 98–107)
CO2 SERPL-SCNC: 35 MMOL/L (ref 21–32)
CREAT SERPL-MCNC: 1.04 MG/DL (ref 0.8–1.5)
ERYTHROCYTE [DISTWIDTH] IN BLOOD BY AUTOMATED COUNT: 14.9 % (ref 11.9–14.6)
GLUCOSE BLD STRIP.AUTO-MCNC: 142 MG/DL (ref 65–100)
GLUCOSE BLD STRIP.AUTO-MCNC: 155 MG/DL (ref 65–100)
GLUCOSE SERPL-MCNC: 150 MG/DL (ref 65–100)
HCT VFR BLD AUTO: 39.7 % (ref 41.1–50.3)
HGB BLD-MCNC: 12.6 G/DL (ref 13.6–17.2)
MCH RBC QN AUTO: 29.4 PG (ref 26.1–32.9)
MCHC RBC AUTO-ENTMCNC: 31.7 G/DL (ref 31.4–35)
MCV RBC AUTO: 92.8 FL (ref 79.6–97.8)
NRBC # BLD: 0 K/UL (ref 0–0.2)
PLATELET # BLD AUTO: 229 K/UL (ref 150–450)
PMV BLD AUTO: 11.8 FL (ref 9.4–12.3)
POTASSIUM SERPL-SCNC: 3 MMOL/L (ref 3.5–5.1)
RBC # BLD AUTO: 4.28 M/UL (ref 4.23–5.6)
SERVICE CMNT-IMP: ABNORMAL
SERVICE CMNT-IMP: ABNORMAL
SERVICE CMNT-IMP: NORMAL
SERVICE CMNT-IMP: NORMAL
SODIUM SERPL-SCNC: 143 MMOL/L (ref 138–145)
WBC # BLD AUTO: 6 K/UL (ref 4.3–11.1)

## 2021-10-26 PROCEDURE — 74011250637 HC RX REV CODE- 250/637: Performed by: PHYSICIAN ASSISTANT

## 2021-10-26 PROCEDURE — 97530 THERAPEUTIC ACTIVITIES: CPT

## 2021-10-26 PROCEDURE — 74011250636 HC RX REV CODE- 250/636: Performed by: EMERGENCY MEDICINE

## 2021-10-26 PROCEDURE — 74011250637 HC RX REV CODE- 250/637: Performed by: EMERGENCY MEDICINE

## 2021-10-26 PROCEDURE — 97535 SELF CARE MNGMENT TRAINING: CPT

## 2021-10-26 PROCEDURE — 97161 PT EVAL LOW COMPLEX 20 MIN: CPT

## 2021-10-26 PROCEDURE — 97116 GAIT TRAINING THERAPY: CPT

## 2021-10-26 PROCEDURE — 85027 COMPLETE CBC AUTOMATED: CPT

## 2021-10-26 PROCEDURE — 36415 COLL VENOUS BLD VENIPUNCTURE: CPT

## 2021-10-26 PROCEDURE — 65310000000 HC RM PRIVATE REHAB

## 2021-10-26 PROCEDURE — 99223 1ST HOSP IP/OBS HIGH 75: CPT | Performed by: PHYSICAL MEDICINE & REHABILITATION

## 2021-10-26 PROCEDURE — 74011636637 HC RX REV CODE- 636/637: Performed by: PHYSICIAN ASSISTANT

## 2021-10-26 PROCEDURE — 82962 GLUCOSE BLOOD TEST: CPT

## 2021-10-26 PROCEDURE — 97165 OT EVAL LOW COMPLEX 30 MIN: CPT

## 2021-10-26 PROCEDURE — 80048 BASIC METABOLIC PNL TOTAL CA: CPT

## 2021-10-26 PROCEDURE — 2709999900 HC NON-CHARGEABLE SUPPLY

## 2021-10-26 RX ORDER — METFORMIN HYDROCHLORIDE 500 MG/1
1000 TABLET, EXTENDED RELEASE ORAL
Status: DISCONTINUED | OUTPATIENT
Start: 2021-10-26 | End: 2021-11-12 | Stop reason: HOSPADM

## 2021-10-26 RX ORDER — TRAZODONE HYDROCHLORIDE 50 MG/1
50 TABLET ORAL
Status: DISCONTINUED | OUTPATIENT
Start: 2021-10-26 | End: 2021-11-12 | Stop reason: HOSPADM

## 2021-10-26 RX ORDER — ACETAMINOPHEN 325 MG/1
650 TABLET ORAL
Status: DISCONTINUED | OUTPATIENT
Start: 2021-10-26 | End: 2021-11-12 | Stop reason: HOSPADM

## 2021-10-26 RX ORDER — ATORVASTATIN CALCIUM 80 MG/1
80 TABLET, FILM COATED ORAL DAILY
Status: CANCELLED | OUTPATIENT
Start: 2021-10-27

## 2021-10-26 RX ORDER — HYDROCHLOROTHIAZIDE 25 MG/1
12.5 TABLET ORAL DAILY
Status: CANCELLED | OUTPATIENT
Start: 2021-10-27

## 2021-10-26 RX ORDER — FACIAL-BODY WIPES
10 EACH TOPICAL DAILY PRN
Status: DISCONTINUED | OUTPATIENT
Start: 2021-10-26 | End: 2021-11-12 | Stop reason: HOSPADM

## 2021-10-26 RX ORDER — CLOPIDOGREL BISULFATE 75 MG/1
75 TABLET ORAL DAILY
Status: DISCONTINUED | OUTPATIENT
Start: 2021-10-27 | End: 2021-11-12 | Stop reason: HOSPADM

## 2021-10-26 RX ORDER — BISACODYL 5 MG
5 TABLET, DELAYED RELEASE (ENTERIC COATED) ORAL DAILY PRN
Status: DISCONTINUED | OUTPATIENT
Start: 2021-10-26 | End: 2021-11-12 | Stop reason: HOSPADM

## 2021-10-26 RX ORDER — SERTRALINE HYDROCHLORIDE 50 MG/1
50 TABLET, FILM COATED ORAL DAILY
Status: DISCONTINUED | OUTPATIENT
Start: 2021-10-27 | End: 2021-11-12 | Stop reason: HOSPADM

## 2021-10-26 RX ORDER — INSULIN LISPRO 100 [IU]/ML
INJECTION, SOLUTION INTRAVENOUS; SUBCUTANEOUS
Status: CANCELLED | OUTPATIENT
Start: 2021-10-26

## 2021-10-26 RX ORDER — SERTRALINE HYDROCHLORIDE 50 MG/1
50 TABLET, FILM COATED ORAL DAILY
Status: CANCELLED | OUTPATIENT
Start: 2021-10-27

## 2021-10-26 RX ORDER — HYDROCHLOROTHIAZIDE 12.5 MG/1
12.5 CAPSULE ORAL DAILY
Status: DISCONTINUED | OUTPATIENT
Start: 2021-10-27 | End: 2021-11-12 | Stop reason: HOSPADM

## 2021-10-26 RX ORDER — DOXAZOSIN 4 MG/1
8 TABLET ORAL
Status: DISCONTINUED | OUTPATIENT
Start: 2021-10-26 | End: 2021-11-01

## 2021-10-26 RX ORDER — LISINOPRIL 5 MG/1
10 TABLET ORAL 2 TIMES DAILY
Status: DISCONTINUED | OUTPATIENT
Start: 2021-10-26 | End: 2021-10-28

## 2021-10-26 RX ORDER — LISINOPRIL 5 MG/1
10 TABLET ORAL 2 TIMES DAILY
Status: CANCELLED | OUTPATIENT
Start: 2021-10-26

## 2021-10-26 RX ORDER — ENOXAPARIN SODIUM 100 MG/ML
40 INJECTION SUBCUTANEOUS EVERY 24 HOURS
Status: DISCONTINUED | OUTPATIENT
Start: 2021-10-27 | End: 2021-11-12 | Stop reason: HOSPADM

## 2021-10-26 RX ORDER — HYDRALAZINE HYDROCHLORIDE 25 MG/1
25 TABLET, FILM COATED ORAL
Status: CANCELLED | OUTPATIENT
Start: 2021-10-26

## 2021-10-26 RX ORDER — CLOPIDOGREL BISULFATE 75 MG/1
75 TABLET ORAL DAILY
Status: CANCELLED | OUTPATIENT
Start: 2021-10-27

## 2021-10-26 RX ORDER — ATORVASTATIN CALCIUM 80 MG/1
80 TABLET, FILM COATED ORAL DAILY
Status: DISCONTINUED | OUTPATIENT
Start: 2021-10-27 | End: 2021-11-12 | Stop reason: HOSPADM

## 2021-10-26 RX ORDER — FINASTERIDE 5 MG/1
5 TABLET, FILM COATED ORAL DAILY
Status: CANCELLED | OUTPATIENT
Start: 2021-10-27

## 2021-10-26 RX ORDER — INSULIN LISPRO 100 [IU]/ML
INJECTION, SOLUTION INTRAVENOUS; SUBCUTANEOUS
Status: DISCONTINUED | OUTPATIENT
Start: 2021-10-26 | End: 2021-11-08

## 2021-10-26 RX ORDER — LOPERAMIDE HYDROCHLORIDE 2 MG/1
4 CAPSULE ORAL ONCE
Status: COMPLETED | OUTPATIENT
Start: 2021-10-26 | End: 2021-10-26

## 2021-10-26 RX ORDER — METFORMIN HYDROCHLORIDE 500 MG/1
1000 TABLET, EXTENDED RELEASE ORAL
Status: CANCELLED | OUTPATIENT
Start: 2021-10-26

## 2021-10-26 RX ORDER — DOXAZOSIN 4 MG/1
8 TABLET ORAL
Status: CANCELLED | OUTPATIENT
Start: 2021-10-26

## 2021-10-26 RX ORDER — ENOXAPARIN SODIUM 100 MG/ML
40 INJECTION SUBCUTANEOUS EVERY 24 HOURS
Status: CANCELLED | OUTPATIENT
Start: 2021-10-27

## 2021-10-26 RX ORDER — FINASTERIDE 5 MG/1
5 TABLET, FILM COATED ORAL DAILY
Qty: 30 TABLET | Refills: 0 | Status: SHIPPED | OUTPATIENT
Start: 2021-10-27 | End: 2021-11-12

## 2021-10-26 RX ORDER — HYDRALAZINE HYDROCHLORIDE 25 MG/1
25 TABLET, FILM COATED ORAL
Status: DISCONTINUED | OUTPATIENT
Start: 2021-10-26 | End: 2021-11-12 | Stop reason: HOSPADM

## 2021-10-26 RX ORDER — POTASSIUM CHLORIDE 20 MEQ/1
20 TABLET, EXTENDED RELEASE ORAL 2 TIMES DAILY
Status: COMPLETED | OUTPATIENT
Start: 2021-10-26 | End: 2021-10-28

## 2021-10-26 RX ORDER — FINASTERIDE 5 MG/1
5 TABLET, FILM COATED ORAL DAILY
Status: DISCONTINUED | OUTPATIENT
Start: 2021-10-27 | End: 2021-11-03

## 2021-10-26 RX ORDER — ADHESIVE BANDAGE
30 BANDAGE TOPICAL DAILY PRN
Status: DISCONTINUED | OUTPATIENT
Start: 2021-10-26 | End: 2021-11-12 | Stop reason: HOSPADM

## 2021-10-26 RX ADMIN — INSULIN LISPRO 2 UNITS: 100 INJECTION, SOLUTION INTRAVENOUS; SUBCUTANEOUS at 21:44

## 2021-10-26 RX ADMIN — LISINOPRIL 10 MG: 5 TABLET ORAL at 08:16

## 2021-10-26 RX ADMIN — LISINOPRIL 10 MG: 5 TABLET ORAL at 21:43

## 2021-10-26 RX ADMIN — Medication 10 ML: at 06:03

## 2021-10-26 RX ADMIN — POTASSIUM CHLORIDE 20 MEQ: 20 TABLET, EXTENDED RELEASE ORAL at 17:20

## 2021-10-26 RX ADMIN — DOXAZOSIN 8 MG: 4 TABLET ORAL at 21:45

## 2021-10-26 RX ADMIN — LOPERAMIDE HYDROCHLORIDE 4 MG: 2 CAPSULE ORAL at 18:19

## 2021-10-26 RX ADMIN — CLOPIDOGREL BISULFATE 75 MG: 75 TABLET ORAL at 08:16

## 2021-10-26 RX ADMIN — ATORVASTATIN CALCIUM 80 MG: 80 TABLET, FILM COATED ORAL at 08:15

## 2021-10-26 RX ADMIN — INSULIN LISPRO 2 UNITS: 100 INJECTION, SOLUTION INTRAVENOUS; SUBCUTANEOUS at 17:21

## 2021-10-26 RX ADMIN — SERTRALINE 50 MG: 50 TABLET, FILM COATED ORAL at 08:16

## 2021-10-26 RX ADMIN — METFORMIN HYDROCHLORIDE 1000 MG: 500 TABLET, EXTENDED RELEASE ORAL at 17:19

## 2021-10-26 RX ADMIN — ENOXAPARIN SODIUM 40 MG: 40 INJECTION SUBCUTANEOUS at 08:16

## 2021-10-26 RX ADMIN — TRAZODONE HYDROCHLORIDE 50 MG: 50 TABLET ORAL at 22:58

## 2021-10-26 RX ADMIN — HYDROCHLOROTHIAZIDE 12.5 MG: 25 TABLET ORAL at 08:14

## 2021-10-26 RX ADMIN — FINASTERIDE 5 MG: 5 TABLET, FILM COATED ORAL at 08:16

## 2021-10-26 NOTE — ROUTINE PROCESS
Pt admitted to room 126 from the 8th floor. Dual skin assessment completed with Arturo Richardson RN. No breakdown noted. Pt c/o small blister on left scrotum. Call light given to pt and instructed to call before getting out of bed. Pt verbalized understanding. Vital signs stable.

## 2021-10-26 NOTE — ROUTINE PROCESS
TRANSFER - IN REPORT:    Verbal report received from Tiffani Higgins on Eduardo Rodriguez  being received from 8th floor for routine progression of care      Report consisted of patients Situation, Background, Assessment and   Recommendations(SBAR). Information from the following report(s) SBAR, Kardex, [facility] and Recent Results was reviewed with the receiving nurse. Opportunity for questions and clarification was provided. Assessment completed upon patients arrival to unit and care assumed.

## 2021-10-26 NOTE — DISCHARGE SUMMARY
Hospitalist Discharge Summary     Admit Date:  10/21/2021  4:50 PM   DC note date: 10/26/2021  Name:  Kirk Rodriguez   Age:  78 y.o.  :  1945   MRN:  [id number]   PCP:  Renee Mitchell MD  Treatment Team: Utilization Review: Napoleon Manning RN; Care Manager: Lauri James RN; Consulting Provider: Jordan Crockett MD; Physical Therapy Assistant: Miriam Reyna PTA; Occupational Therapy Assistant: Dimas Menendez    Problem List for this Hospitalization:  Hospital Problems as of 10/26/2021 Date Reviewed: 10/21/2021        Codes Class Noted - Resolved POA    E-coli UTI ICD-10-CM: N39.0, B96.20  ICD-9-CM: 599.0, 041.49  10/24/2021 - Present Yes        Acute respiratory failure with hypoxia [facility]) ICD-10-CM: J96.01  ICD-9-CM: 518.81  10/23/2021 - Present Yes        GRACE (acute kidney injury) ([facility] ICD-10-CM: N17.9  ICD-9-CM: 584.9  10/22/2021 - Present No        CKD (chronic kidney disease) stage 3, GFR 30-59 ml/min ([facility] ICD-10-CM: N18.30  ICD-9-CM: 585.3  10/22/2021 - Present Yes        Pneumonia of right lower lobe due to infectious organism ICD-10-CM: J18.9  ICD-9-CM: 486  10/21/2021 - Present Yes        Fluid overload ICD-10-CM: E87.70  ICD-9-CM: 276.69  10/21/2021 - Present Yes        Hypoxia ICD-10-CM: R09.02  ICD-9-CM: 799.02  10/21/2021 - Present Yes        CAP (community acquired pneumonia) ICD-10-CM: J18.9  ICD-9-CM: 486  10/21/2021 - Present Yes        Cerebellar stroke ([facility] ICD-10-CM: I63.9  ICD-9-CM: 434.91  2020 - Present Yes        Severe obesity ([address] ICD-10-CM: E66.01  ICD-9-CM: 278.01  2019 - Present Yes        Controlled type 2 diabetes mellitus without complication, without long-term current use of insulin ([facility] ICD-10-CM: E11.9  ICD-9-CM: 250.00  2019 - Present Yes        Essential hypertension, benign ICD-10-CM: I10  ICD-9-CM: 401.1  1/3/2014 - Present Yes        Mixed hyperlipidemia ICD-10-CM: E78.2  ICD-9-CM: 272.2  1/3/2014 - Present Yes RESOLVED: Hypokalemia ICD-10-CM: E87.6  ICD-9-CM: 276.8  10/23/2021 - 10/25/2021 Yes        * (Principal) RESOLVED: Sepsis ([facility] ICD-10-CM: A41.9  ICD-9-CM: 038.9, 995.91  10/22/2021 - 10/25/2021 Yes        RESOLVED: Acute prostatitis without hematuria ICD-10-CM: N41.0  ICD-9-CM: 601.0  10/21/2021 - 10/25/2021 Yes            Admission HPI from 10/21/2021:    \" Prince Fair is a 78 y.o. male with medical history of hypertension, hyperlipidemia, diabetes type 2 not requiring long-term use of insulin, cerebellar stroke 16 months ago from which she has recovered well with regular attendance to therapy and gym exercise allowing him to no longer use the walker and actually within the last week to be able to drive himself and get out and about for the first time independently, who presented with relatively rapid onset of generalized weakness in the last 24 hours with some associated urinary retention, fever, chills. He denies significant cough, noted shortness of breath, chest pain, diaphoresis, nausea, vomiting, abdominal pain, suprapubic or flank pain, other urinary symptoms, focal weakness or numbness. \"    Hospital Course:  Prince Fair is a 78 y.o. male with medical history significant for hypertension, hyperlipidemia, diabetes type 2 not requiring long-term use of insulin, cerebellar stroke without residual deficits, who presented to ED with rapid onset generalized weakness with urinary retention, fever and chills. Work-up showed UA with 4+ bacteria, more than 100 WBCs. Chest x-ray showed right lung base airspace opacity with elevated procalcitonin. Patient was started IV antibiotics for likely acute prostatitis as well as pneumonia right lower lobe. Patient was noted to be saturating down to 85% on room air on 10/23 and has required O2 nasal cannula. Urine cultures growing pansensitive E. Coli. Patient has completed antibiotics ceftriaxone/azithromycin for prostatitis/pneumonia.   Oxygen requirement improved and patient weaned to room air. Patient was also noted to have acute kidney injury likely due to urinary tract infection. His creatinine has trended back down to his baseline. Electrolytes repleted as needed. All other chronic comorbidities stable and we continued essential home meds. PT/OT recommend rehab. [facility] consulted and accepted patient. Patient is stable to discharge to [facility] today with close follow-up with PCP. Disposition: Inpatient Rehab with Planned Acute Readmission  Activity: Activity as tolerated  Diet: ADULT DIET Regular; 3 carb choices (45 gm/meal); Low Fat/Low Chol/High Fiber/2 gm Na  Code Status: Prior    Follow Up Orders: Follow-up Appointments   Procedures    FOLLOW UP VISIT Appointment in: 3 - 5 Days PCP     PCP     Standing Status:   Standing     Number of Occurrences:   1     Order Specific Question:   Appointment in     Answer:   3 - 5 Days       Follow-up Information     Follow up With Specialties Details Why Contact Med Yancey MD Internal Medicine On 11/2/2021 3:00pm  [address] [facility]  [phone]            Discharge meds at bottom of this note. Plan was discussed with patient. All questions answered. Patient was stable at time of discharge. Given instructions to call a physician or return if any concerns. Discharge summary and encounter summary was sent to PCP electronically via \"Comm Mgt\" link in [facility], if possible. Diagnostic Imaging/Tests:   XR CHEST PORT    Result Date: 10/21/2021  EXAM: CHEST X-RAY, 1 VIEW INDICATION: meets SIRS criteria. COMPARISON: Chest x-ray 6/15/2020 TECHNIQUE: Single AP view of the chest was obtained. FINDINGS: Airspace opacities in the right lung base increased compared to prior. No pneumothorax or pleural effusion. The cardiomediastinal silhouette is within normal limits. The osseous structures are unremarkable.      Airspace opacities in the right lung base increased compared to prior reflecting atelectasis or infiltrates. ECHO ADULT COMPLETE    Result Date: 10/25/2021  · Contrast used: DEFINITY. · AV: Aortic valve mean gradient is 8 mmHg. · LV: Estimated LVEF is 65 - 70%. Normal cavity size, wall thickness, systolic function (ejection fraction normal) and diastolic function. · TV: Pulmonary hypertension not suggested by Doppler findings. Echocardiogram results:  No results found for this visit on 10/21/21. Procedures done this admission:  * No surgery found *    All Micro Results     Procedure Component Value Units Date/Time    BLOOD CULTURE [[id number]] Collected: 10/21/21 1648    Order Status: Completed Specimen: Blood Updated: 10/26/21 0818     Special Requests: --        LEFT  HAND       Culture result: NO GROWTH 5 DAYS       BLOOD CULTURE [[id number]] Collected: 10/21/21 1728    Order Status: Completed Specimen: Blood Updated: 10/26/21 0818     Special Requests: --        RIGHT  FOREARM       Culture result: NO GROWTH 5 DAYS       CULTURE, URINE [[id number]]  (Abnormal)  (Susceptibility) Collected: 10/21/21 1752    Order Status: Completed Specimen: Cath Urine Updated: 10/24/21 0754     Special Requests: NO SPECIAL REQUESTS        Culture result:       >100,000 COLONIES/mL ESCHERICHIA COLI          SARS-COV-2, PCR [[id number]] Collected: 10/21/21 1757    Order Status: Completed Specimen: Nasopharyngeal Updated: 10/22/21 0816     Specimen source Nasopharyngeal        SARS-CoV-2 Not detected        Comment:      The specimen is NEGATIVE for SARS-CoV-2, the novel coronavirus associated with COVID-19. This test has been authorized by the FDA under an Emergency Use Authorization (EUA) for use by authorized laboratories.         Fact sheet for Healthcare Providers: ConventionUpdate.co.nz       Fact sheet for Patients: ConventionUpdate.co.nz       Methodology: RT-PCR         COVID-19 RAPID TEST [[id number]] Collected: 10/21/21 1757    Order Status: Completed Specimen: Nasopharyngeal Updated: 10/21/21 1835     Specimen source NASAL        COVID-19 rapid test Not detected        Comment:      The specimen is NEGATIVE for SARS-CoV-2, the novel coronavirus associated with COVID-19. A negative result does not rule out COVID-19. This test has been authorized by the FDA under an Emergency Use Authorization (EUA) for use by authorized laboratories. Fact sheet for Healthcare Providers: Clickstco.nz  Fact sheet for Patients: Clickstco.nz       Methodology: Isothermal Nucleic Acid Amplification         COVID-19 RAPID TEST [[id number]] Collected: 10/21/21 3527    Order Status: Canceled           [unfilled]    Labs: Results:       BMP, Mg, Phos Recent Labs     10/26/21  0702 10/25/21  0625 10/24/21  0604    145 141   K 3.0* 3.3* 3.7    108* 109*   CO2 35* 32 27   AGAP 3* 5* 5*   BUN 27* 30* 35*   CREA 1.04 1.16 1.51*   CA 8.5 8.7 8.2*   * 142* 125*      CBC Recent Labs     10/26/21  0702 10/25/21  0625 10/24/21  0604   WBC 6.0 5.7 6.4   RBC 4.28 4.20* 4.18*   HGB 12.6* 11.9* 11.9*   HCT 39.7* 38.9* 39.9*    177 157      LFT No results for input(s): ALT, TBIL, AP, TP, ALB, GLOB, AGRAT in the last 72 hours.     No lab exists for component: SGOT, GPT   Cardiac Testing No results found for: BNPP, BNP, CPK, RCK1, RCK2, RCK3, RCK4, CKMB, CKNDX, CKND1, TROPT, TROIQ   Coagulation Tests No results found for: PTP, INR, APTT, INREXT   A1c Lab Results   Component Value Date/Time    Hemoglobin A1c 8.1 (H) 06/30/2021 11:46 AM    Hemoglobin A1c 7.9 (H) 02/26/2021 02:05 PM    Hemoglobin A1c 6.4 (H) 10/26/2020 04:55 PM      Lipid Panel Lab Results   Component Value Date/Time    Cholesterol, total 196 06/30/2021 11:46 AM    HDL Cholesterol 31 (L) 06/30/2021 11:46 AM    LDL, calculated 141 (H) 06/30/2021 11:46 AM    LDL, calculated 108.6 (H) 06/09/2020 06:05 AM    VLDL, calculated 24 06/30/2021 11:46 AM    VLDL, calculated 21.4 06/09/2020 06:05 AM    Triglyceride 132 06/30/2021 11:46 AM    CHOL/HDL Ratio 5.2 06/09/2020 06:05 AM      Thyroid Panel Lab Results   Component Value Date/Time    TSH 2.190 08/05/2021 10:51 AM    TSH 2.750 12/07/2016 09:49 AM    TSH 2.450 11/19/2015 10:24 AM        Most Recent UA Lab Results   Component Value Date/Time    Color YELLOW 09/01/2020 01:13 PM    Appearance CLOUDY 09/01/2020 01:13 PM    Specific gravity 1.022 06/16/2020 05:57 AM    pH (UA) 7.0 09/01/2020 01:13 PM    Protein 30 (A) 09/01/2020 01:13 PM    Glucose Negative 09/01/2020 01:13 PM    Ketone Negative 09/01/2020 01:13 PM    Bilirubin Negative 09/01/2020 01:13 PM    Blood MODERATE (A) 09/01/2020 01:13 PM    Urobilinogen 1.0 09/01/2020 01:13 PM    Nitrites Positive (A) 09/01/2020 01:13 PM    Leukocyte Esterase LARGE (A) 09/01/2020 01:13 PM    WBC >100 (H) 10/21/2021 05:52 PM    RBC  10/21/2021 05:52 PM    Epithelial cells 0-3 10/21/2021 05:52 PM    Bacteria 4+ (H) 10/21/2021 05:52 PM    Casts 0-3 10/21/2021 05:52 PM    Crystals, urine 0 09/01/2020 01:13 PM    Mucus 0 09/01/2020 01:13 PM    Other observations RESULTS VERIFIED MANUALLY 09/01/2020 01:13 PM        No Known Allergies  Immunization History   Administered Date(s) Administered    Covid-19, MODERNA, Mrna, Lnp-s, Pf, 100mcg/0.5mL 03/18/2021, 04/12/2021    Influenza High Dose Vaccine PF 11/12/2016, 10/17/2018    Influenza Vaccine (Tri) Adjuvanted (>65 Yrs FLUAD TRI 91061) 10/10/2019    Influenza, Quadrivalent, Adjuvanted (>65 Yrs FLUAD QUAD 90123) 10/26/2020    TB Skin Test (PPD) Intradermal 06/11/2020    Zoster Vaccine, Live 12/13/2016       All Labs from Last 24 Hrs:  Recent Results (from the past 24 hour(s))   ECHO ADULT COMPLETE    Collection Time: 10/25/21  2:48 PM   Result Value Ref Range    LV ED Vol A2C 97.00 cm3    LV ED Vol A4C 148.00 cm3    LV ES Vol A2C 19.50 cm3    LV ES Vol A4C 44.30 cm3    IVSd 1.05 (A) 0.60 - 1.00 cm LVIDd 4.01 (A) 4.20 - 5.90 cm    LVIDs 2.95 cm    LVOT d 1.94 cm    LVOT VTI 33.20 cm    LVOT Peak Gradient 11.00 mmHg    LVPWd 1.09 (A) 0.60 - 1.00 cm    LV E' Lateral Velocity 5.61 cm/s    LV E' Septal Velocity 6.38 cm/s    Left Atrium Minor Axis 2.35 cm    Left Atrium Major Axis 5.54 cm    LA Area 2C 21.00 cm2    LA Area 4C 23.90 cm2    Aortic Valve Systolic Mean Gradient 4.56 mmHg    AoV VTI 38.00 cm    Aortic Valve Systolic Peak Velocity 302.27 cm/s    AoV PG 17.00 mmHg    Aortic Valve Area by Continuity of VTI 2.59 cm2    Aortic Valve Area by Continuity of Peak Velocity 2.40 cm2    Ao Root D 3.56 cm    Mitral Valve E Wave Deceleration Time 333.00 ms    MV A Jorge 103.00 cm/s    MV E Jorge 69.80 cm/s    MV Mean Gradient 2.00 mmHg    Mitral Valve Annulus Velocity Time Integral 31.30 cm    Mitral Valve Max Velocity 112.00 cm/s    MV Peak Gradient 5.00 mmHg    Tapse 2.31 (A) 1.50 - 2.00 cm    MV E/A 0.68     LV Mass .5 88.0 - 224.0 g    LV Mass AL Index 59.5 49.0 - 115.0 g/m2    E/E' lateral 12.44     E/E' septal 10.94     E/E' ratio (averaged) 11.69     LILLIE/BSA Pk Jorge 1.0 cm2/m2    LILLIE/BSA VTI 1.1 cm2/m2   GLUCOSE, POC    Collection Time: 10/25/21  4:13 PM   Result Value Ref Range    Glucose (POC) 175 (H) 65 - 100 mg/dL    Performed by [address] [place] [address], POC    Collection Time: 10/25/21  8:46 PM   Result Value Ref Range    Glucose (POC) 148 (H) 65 - 100 mg/dL    Performed by Gia    CBC W/O DIFF    Collection Time: 10/26/21  7:02 AM   Result Value Ref Range    WBC 6.0 4.3 - 11.1 K/uL    RBC 4.28 4.23 - 5.6 M/uL    HGB 12.6 (L) 13.6 - 17.2 g/dL    HCT 39.7 (L) 41.1 - 50.3 %    MCV 92.8 79.6 - 97.8 FL    MCH 29.4 26.1 - 32.9 PG    MCHC 31.7 31.4 - 35.0 g/dL    RDW 14.9 (H) 11.9 - 14.6 %    PLATELET 093 938 - 211 K/uL    MPV 11.8 9.4 - 12.3 FL    ABSOLUTE NRBC 0.00 0.0 - 0.2 K/uL   METABOLIC PANEL, BASIC    Collection Time: 10/26/21  7:02 AM   Result Value Ref Range    Sodium 143 138 - 145 mmol/L Potassium 3.0 (L) 3.5 - 5.1 mmol/L    Chloride 105 98 - 107 mmol/L    CO2 35 (H) 21 - 32 mmol/L    Anion gap 3 (L) 7 - 16 mmol/L    Glucose 150 (H) 65 - 100 mg/dL    BUN 27 (H) 8 - 23 MG/DL    Creatinine 1.04 0.8 - 1.5 MG/DL    GFR est AA >60 >60 ml/min/1.73m2    GFR est non-AA >60 >60 ml/min/1.73m2    Calcium 8.5 8.3 - 10.4 MG/DL   GLUCOSE, POC    Collection Time: 10/26/21  7:43 AM   Result Value Ref Range    Glucose (POC) 142 (H) 65 - 100 mg/dL    Performed by Akira        Discharge Exam:  Patient Vitals for the past 24 hrs:   Temp Pulse Resp BP SpO2   10/26/21 0751 97.8 °F (36.6 °C) 76 20 (!) 187/62 91 %   10/26/21 0323 98 °F (36.7 °C) 71 20 (!) 158/76 92 %   10/25/21 2303 98.1 °F (36.7 °C) 73 20 (!) 146/65 91 %   10/25/21 1957 98.4 °F (36.9 °C) 62 18 (!) 142/62 94 %   10/25/21 1316 98.3 °F (36.8 °C) 72 20 (!) 149/73 91 %     Oxygen Therapy  O2 Sat (%): 91 % (10/26/21 0751)  Pulse via Oximetry: 96 beats per minute (10/22/21 0730)  O2 Device: None (Room air) (10/26/21 0230)  Skin Assessment: Clean, dry, & intact (10/22/21 1927)  Skin Protection for O2 Device: No (10/22/21 0730)  O2 Flow Rate (L/min): 3 l/min (10/23/21 2254)    Estimated body mass index is 40.26 kg/m² as calculated from the following:    Height as of this encounter: 5' 9\" (1.753 m). Weight as of this encounter: 123.7 kg (272 lb 9.6 oz). Intake/Output Summary (Last 24 hours) at 10/26/2021 1151  Last data filed at 10/26/2021 0910  Gross per 24 hour   Intake 480 ml   Output 2275 ml   Net -1795 ml       *Note that automatically entered I/Os may not be accurate; dependent on patient compliance with collection and accurate  by assistants. General:    Well nourished. Alert. Eyes:   Normal sclerae. Extraocular movements intact. ENT:  Normocephalic, atraumatic. Moist mucous membranes  CV:   Regular rate and rhythm. No murmur, rub, or gallop. Lungs:  Clear to auscultation bilaterally.   No wheezing, rhonchi, or rales.  Abdomen: Soft, nontender, nondistended. Extremities: Warm and dry. No cyanosis or edema. Neurologic: CN II-XII grossly intact. No gross focal deficits   Skin:     No rashes or jaundice. Psych:  Normal mood and affect. Current Med List in Hospital:   No current facility-administered medications for this encounter. No current outpatient medications on file. Discharge Info:   Discharge Medication List as of 10/26/2021 11:32 AM      CONTINUE these medications which have CHANGED    Details   finasteride (PROSCAR) 5 mg tablet Take 1 Tablet by mouth daily for 30 days. , Normal, Disp-30 Tablet, R-0         CONTINUE these medications which have NOT CHANGED    Details   ondansetron (Zofran ODT) 4 mg disintegrating tablet Take 4 mg by mouth every eight (8) hours as needed for Nausea or Vomiting., Historical Med      carboxymethylcellulose sodium (REFRESH LIQUIGEL) 1 % dlgl ophthalmic solution Administer 1 Drop to right eye daily. , Historical Med      glipiZIDE (GLUCOTROL) 10 mg tablet Take 1 Tablet by mouth Daily (before breakfast). , Normal, Disp-90 Tablet, R-4      loratadine (Claritin) 10 mg tablet Take 1 Tablet by mouth daily. , Normal, Disp-90 Tablet, R-4      sertraline (ZOLOFT) 50 mg tablet Take 1 Tablet by mouth daily. , Normal, Disp-90 Tablet, R-4      atorvastatin (LIPITOR) 80 mg tablet Take 1 Tablet by mouth daily. , Normal, Disp-90 Tablet, R-4Requesting 1 year supply      lisinopriL (PRINIVIL, ZESTRIL) 10 mg tablet Take 1 Tablet by mouth two (2) times a day., Normal, Disp-180 Tablet, R-4      doxazosin (CARDURA) 8 mg tablet Take 1 Tablet by mouth nightly., Normal, Disp-90 Tablet, R-4      DISABLED PLACARD (DISABLED PLACARD) DMV As directed, Print, Disp-1 Each, R-0      OneTouch Ultra2 Meter misc by Does Not Apply route., Historical Med, VERONICA      OneTouch Ultra Blue Test Strip strip by Does Not Apply route See Neeta Anglin. , Historical Med, VERONICA      OneTouch Delica Lancets 30 gauge misc by Does Not Apply route., Historical Med, VERONICA      metFORMIN ER (GLUCOPHAGE XR) 500 mg tablet Take 2 Tablets by mouth daily (with dinner). , Normal, Disp-60 Tablet, R-5      hydroCHLOROthiazide (HYDRODIURIL) 12.5 mg tablet Take 1 Tablet by mouth daily. , Normal, Disp-90 Tablet, R-4      clopidogreL (Plavix) 75 mg tab Take 1 Tablet by mouth daily. , Normal, Disp-90 Tablet, R-4         STOP taking these medications       cefpodoxime (VANTIN) 200 mg tablet Comments:   Reason for Stopping:                 Time spent in patient discharge planning and coordination 37 minutes.     Signed:  Rosalva Palomino MD

## 2021-10-26 NOTE — PROGRESS NOTES
TRANSFER - OUT REPORT:    Verbal report given to Rivka PEREZ(name) on Keshav Sanchez  being transferred to [facility](unit) for routine progression of care       Report consisted of patients Situation, Background, Assessment and   Recommendations(SBAR). Information from the following report(s) SBAR was reviewed with the receiving nurse. Lines:   Peripheral IV 10/21/21 Right Antecubital (Active)   Site Assessment Clean, dry, & intact 10/26/21 0731   Phlebitis Assessment 0 10/26/21 0731   Infiltration Assessment 0 10/26/21 0731   Dressing Status Clean, dry, & intact 10/26/21 0731   Dressing Type Tape;Transparent 10/26/21 0731   Hub Color/Line Status Patent 10/26/21 0230        Opportunity for questions and clarification was provided.       Patient transported with:   [facility]

## 2021-10-26 NOTE — PROGRESS NOTES
[facility] OCCUPATIONAL THERAPY INITIAL EVALUATION    Time In: 7665  Time Out: 1335    Precautions: Falls    Pain: Patient had no complaint of pain. History of Presenting Illness (per previous reports): Conner Gil is a 78 y. o. white male patient at [address] [place] [address] who was admitted to [facility] on 10/26/2021 by Haydee Calixto MD of Physical Medicine and Rehabilitation service with below-mentioned medical history.     HPI: This patient is a right-hand dominant 68yo WM with PMH including HTN, HL, DM2, right thalamic stroke 7/2019, right cerebellar stroke 6/2020 and obesity; he is known to our practice from [facility] treatment 6/14 to 7/1/2020 and successful completion of outpatient follow-up through 9/2021. He presented to the ED 10/21/2021 with relatively rapid onset of generalized weakness, fever / chills, urinary retention and dyspnea. U/A +infection, CXR with right lung base opacity. He was admitted for sepsis and started on IV ABX for likely acute prostatitis and RLL PNA. He initially required O2 via NC. UCx grew pansensitive E. coli. He had GRACE with creatinine up to 2.54 (10/23), down to 1.04 on HD #6. He completed ceftriaxone / azithromycin. O2 needs diminished, and he was weaned to room air. Physical and occupational therapies were initiated, and patient was found to have significant mobility and self-care deficits. Physical Medicine and Rehab service was consulted, and patient's EMR was initially evaluated by Dr. Neeru Raines on 10/25. Past Medical History/ Co-morbidities:   Past Medical History:   Diagnosis Date    Essential hypertension     Mixed hyperlipidemia     Right thalamic infarction ([address] 7/2/2019    Stroke ([facility] 06/08/2020    Type 2 diabetes mellitus ([address]        Patient's Goal: Patient reporting he wants to go back to walking without a device and being able to drive again.  Wants to return to going to gym     Previous Level of Function: Patient reporting he was ambulating independently without a device inside and outside home including up/down ramp. reports independent with ADLs except required assistance with perineal hygiene. Reports driving.     [address] Private residence   Lives Alone No   Support Systems Spouse/Significant Other, Child(roasnne) (daughter available to help)   Shower Situation Tub/Shower combination (3 garb bars, TTB, no curtain nor door)   Current DME Cane, straight, Grab bars, Tub transfer bench, Walker, rolling   Lift Chair     Stairs to [employer]        W/C Ramp Yes (at back door)   Interior Steps       Upper Extremity Function   CHANCE LOMBARDI   [address] Decreased, but pre-morbid Decreased, but functional   [facility] Intact Intact   Light Touch       Sharp/Dull Discrimination       Hot/Cold       Proprioception       Stereognosis       9 [address] Peg Test  50 seconds (imapired)  34 seconds (mild)      CHANCE QURESHIE   General Evalutaion       Shoulder Flexion 4+ 5   Shoulder Extension  4+ 4+   Shoulder Abduction 4+ 4+   Shoulder Adduction 4+ 4+   Elbow Flexion 5 5   Elbow Extension  4+ 4+    4+ (60 lbs) 5 (70 lbs)   0/5 No palpable muscle contraction  1/5 Palpable muscle contraction, no joint movement  2-/5 Less than full range of motion in gravity eliminated position  2/5 Able to complete full range of motion in gravity eliminated position  2+/5 Able to initiate movement against gravity  3-/5 More than half but not full range of motion against gravity  3/5 Able to complete full range of motion against gravity  3+/5 Completes full range of motion against gravity with minimal resistance  4-/5 Completes full range of motion against gravity with minimal-moderate resistance  4/5 Completes full range of motion against gravity with moderate resistance  4+/5 Completes full range of motion against gravity with moderate-maximum resistance  5/5 Completes full range of motion against gravity with maximum resistance      Functional Mobility   Score Comments   Rolling 4: Supervision or touching A Side: Bilateral  Supervision   Supine to Sit 3: Partial/Moderate A Min assist   Sit to Supine 3: Partial/Moderate A    Sit to Stand 3: Partial/Moderate A Min assist    Transfer Assist 3: Partial/Moderate A Transfer Type: SPT   Equipment: N/A   Comments:      Activities of Daily Living    Score Comments   Eating 6: Independent    Donning/[place] Footwear 4: Supervision or touching A Items Applied: Slip-on shoes  Adaptive Equipment: N/A  Comments:    Remainder of ADLs to be completed during next morning session. Cognition: Pt completed the IRF-ANIYAH with a score of 15 out of 15. Pt is alert and oriented x4. Pt reports occasional \"mental fogginess. \" Will continue to assess for higher level cognitive skills impacting safety. Vision/Perception: Formal testing needs to be completed. Session: Pt was friendly and agreeable to OT evaluation. Pt completed ROM, MMT, functional mobility, IRF-PIAI, and informal OT interview. Pt is strong in BUEs but would benefit from activity tolerance building, coordination, and ADL training for return to baseline. Interdisciplinary Communication: Collaborated with PT and nursing for current level of function, plan of care, and measures to assure safety during their stay. Updated board with current level of function to increase carryover between disciplines. Patient/Family Education: Patient was/were educated On the role of OT, On POC and On IRC expectations. Problem List: [address], Activity Tolerance, Visual Perceptual , Safety Awareness, Strength, Standing Balance and Cognition    Functional Limitations: ADL, IADL, Functional Transfers and Functional Mobility    Goals: Please see Care Plan    OT order received and chart reviewed. OT orders have been acknowledged.  Patient will benefit from skilled OT services to address ADL, functional transfers, UE strength, UE coordination, balance, cognition and activity tolerance to maximize functional performance with daily self-care tasks and functional mobility. Treatment is likely to include ADL, Balance, Strength, Activity tolerance, Visual perceptual, Cognitive, DME, AE, Family  and Safety awareness training to increase independence with self-care. Patient will be seen for 1.5-2 hours of skilled OT services 5-6 days a week as appropriate. Initate POC.      Antonio Crouch [facility], OTR/L  10/26/2021

## 2021-10-26 NOTE — PROGRESS NOTES
Beside shift report received from [address] [place] [address] RN  Patient lying in bed  Respirations even and unlabored on room air  No signs of distress  No needs expressed at this time  Safety measures in place

## 2021-10-26 NOTE — PROGRESS NOTES
MSN CM:  Patient given a copy of the Important Letter from Medicare and a signed copy was placed in patient's chart.

## 2021-10-26 NOTE — PROGRESS NOTES
ACUTE OT GOALS:  (Developed with and agreed upon by patient and/or caregiver.)  1. Patient will bathe and dress total body with stand by assistance and adaptive device as needed. 2. Patient will toilet with minimal assistance and adaptive device as needed. 3. Patient will tolerate 30 minutes of OT treatment with up to 2 rest breaks to increase activity tolerance for ADLs. 4. Patient will complete functional transfers with supervision. 5. Patient will complete functional mobility for ADLs with stand by asssistance. 6. Patient will demonstrate modified independence with therapeutic exercise HEP to increase strength in BUEs for increased safety and independence with functional tasks. Timeframe: 7 visits     OCCUPATIONAL THERAPY: Daily Note OT Treatment Day # 3    Taylor Cunha is a 78 y.o. male   PRIMARY DIAGNOSIS: Sepsis ([facility]  Acute prostatitis [N41.0]  Hypoxia [R09.02]  CAP (community acquired pneumonia) [J18.9]       [place] / [place] / Product Type: Medicare /   ASSESSMENT:     REHAB RECOMMENDATIONS: CURRENT LEVEL OF FUNCTION:  (Most Recently Demonstrated)   Recommendation to date pending progress:  [address] [place] [address] Therapy  Equipment:    None Bathing:   Not tested  Dressing:   Supervision using reacher and sock aid  Feeding/Grooming:  [employer] Assistance  Toileting:   Not tested  Functional Mobility:   Contact Guard Assistance     ASSESSMENT:  Mr. Isis Myers presents up in the chair upon arrival. Pt agreeable to therapy and completed functional mobility in the room with CGA. Pt stood at the sink and completed in grooming and hygiene activity. Pt is most likely being d/c'ed today to [facility]. Will follow.      SUBJECTIVE:   Mr. Isis Myers states, \"I think I just need to work on my balance\"    SOCIAL HISTORY/LIVING ENVIRONMENT:   Home Environment: Private residence  # Steps to Enter: 0 (Has a ramp)  One/Two Story Residence: One story  Living Alone: No  Support Systems: Child(rosanne), Spouse/Significant Other    OBJECTIVE:     PAIN: VITAL SIGNS: LINES/DRAINS:   Pre Treatment: Pain Screen  Pain Scale 1: Numeric (0 - 10)  Pain Intensity 1: 0  Post Treatment: 0   IV  O2 Device: None (Room air)     ACTIVITIES OF DAILY LIVING: I Mod I S SBA CGA Min Mod Max Total NT Comments   BASIC ADLs:              Bathing/ Showering [] [] [] [] [] [] [] [] [] [x]    Toileting [] [] [] [] [] [] [] [] [] [x]    Dressing [] [] [x] [] [] [] [] [] [] []    Feeding [] [] [] [] [] [] [] [] [] [x]    Grooming [] [] [] [] [] [] [] [] [] [x]    Personal Device Care [] [] [] [] [] [] [] [] [] [x]    Functional Mobility [] [] [] [] [] [] [] [] [] [x]    I=Independent, Mod I=Modified Independent, S=Supervision, SBA=Standby Assistance, CGA=Contact Guard Assistance,   Min=Minimal Assistance, Mod=Moderate Assistance, Max=Maximal Assistance, Total=Total Assistance, NT=Not Tested    MOBILITY: I Mod I S SBA CGA Min Mod Max Total  NT x2 Comments:   Supine to sit [] [] [] [] [] [] [] [] [] [x] []    Sit to supine [] [] [] [] [] [] [] [] [] [x] []    Sit to stand [] [] [] [x] [] [] [] [] [] [] []    Bed to chair [] [] [] [] [] [] [] [] [] [x] []    I=Independent, Mod I=Modified Independent, S=Supervision, SBA=Standby Assistance, CGA=Contact Guard Assistance,   Min=Minimal Assistance, Mod=Moderate Assistance, Max=Maximal Assistance, Total=Total Assistance, NT=Not Tested    BALANCE: Good Fair+ Fair Fair- Poor NT Comments   Sitting Static [] [x] [] [] [] []    Sitting Dynamic [] [x] [] [] [] []              Standing Static [] [] [] [] [] [x]    Standing Dynamic [] [] [] [] [] [x]      PLAN:   FREQUENCY/DURATION: OT Plan of Care: 3 times/week for duration of hospital stay or until stated goals are met, whichever comes first.    TREATMENT:   TREATMENT:   ($$ Self Care/Home Management: 8-22 mins$$ Therapeutic Activity: 8-22 mins   )  Therapeutic Activity (16 Minutes):  Therapeutic activity included Ambulation on level ground and Standing balance to improve functional Mobility, Strength and Activity tolerance. Self Care (10 Minutes): Self care including Grooming to increase independence and decrease level of assistance required.     TREATMENT GRID:   Date:  10/25/21 Date:   Date:     Activity/Exercise Parameters Parameters Parameters   Shoulder Abd/Adduction 10 reps     Shoulder Flexion 10 reps     Elbow Flexion 10 reps     Punches 10 reps                         AFTER TREATMENT POSITION/PRECAUTIONS:  Chair, Needs within reach and RN notified    INTERDISCIPLINARY COLLABORATION:  RN/PCT and OT/STARK    TOTAL TREATMENT DURATION:  OT Patient Time In/Time Out  Time In: 0940  Time Out: [facility]

## 2021-10-26 NOTE — PROGRESS NOTES
PHYSICAL THERAPY EXAMINATION  TIME IN [address] [place] [address]  Patient Name: Leyla Donis  Patient Age: 78 y.o. Past Medical History:   Past Medical History:   Diagnosis Date    Cerebellar stroke ([facility] 6/14/2020    Essential hypertension     Mixed hyperlipidemia     [facility] 7/2/2019    Right thalamic infarction ([facility] 7/2/2019    Stroke ([facility] 06/08/2020    Type 2 diabetes mellitus ([facility])        Medical Diagnosis:  Physical debility [R53.81] Physical debility    Precautions at Admission: Other (comment) (fall precautionws)    Therapy Diagnosis:   Difficulty with bed mobility  [x]     Difficulty with functional transfers  [x]     Difficulty with ambulation  [x]     Difficulty with stair negotiations  [x]       Problem List:    Decreased strength B LE  [x]     Decreased strength trunk/core  [x]     Decreased AROM   [x]     Decreased PROM  []    Decreased endurance  [x]     Decreased balance sitting  [x]     Decreased balance standing  [x]     Pain   [x]     Slow ambulation velocity  [x]    Decreased coordination  [x]    Decreased safety awareness  []      Functional Limitations:   Decreased independence with bed mobility  [x]     Decreased independence with functional transfers  [x]     Decreased independence with ambulation  [x]     Decreased independence with stair negotiation  [x]       Previous Functional Level: Patient reporting he was ambulating independently without a device inside and outside home including up/down ramp. reports independent with ADLs except required assistance with perineal hygiene. Reports driving.     Home Environment: Home Environment: Private residence  # Steps to Enter: 0  Rails to Enter: No  Wheelchair Ramp: Yes (at back door)  One/Two Story Residence: One story  Living Alone: No  Support Systems: Spouse/Significant Other, Child(rosanne) (daughter available to help)  Patient Expects to be Discharged to[de-identified] House  Current DME Used/Available at Home: Cane, straight, Grab bars, Tub transfer bench, Walker, rolling  Tub or Shower Type: Tub/Shower combination         Outcome Measures: Vital Signs:  Patient Vitals for the past 12 hrs:   Temp Pulse Resp BP SpO2   10/26/21 1536 97.8 °F (36.6 °C) 66 18 132/65 95 %   10/26/21 1222 97.8 °F (36.6 °C) 74 16 (!) 155/72 95 %     Pain level:did not rate on pain scale, mild c/o pain with resisted right hip flexion and at end range of hip flexion  Pain location:right lower abdominal quadrant  Pain interventions: rest, positioning,relaxation,       Patient education:PT POC and goals, Bed mobility training,transfer training, gait training, balance training,fall precautions, body mechanics,activity pacing,Patient verbalizing understanding and demonstrating understanding of patient education. Recommend follow up education. Interdisciplinary Communication:coassessment with OT.  Spoke with RN regarding functional status    Cognition: Orientation:A+O x 3  Communication:able to express needs verbally, able to follow single step commands  Social Interaction:cooperating, appropriate with PT, participating, motivated to improve  Problem Solving:difficulty with managing body mechanics during functional mobility  Memory:able to recall name, , PLOF, PMH         MMT Initial Asssessment   Right Lower Extremity Left Lower Extremity   Hip Flexion 5 4+   Knee Extension 5 5   Knee Flexion 5 5   Ankle Dorsiflexion 5 5   0/5 No palpable muscle contraction  1/5 Palpable muscle contraction, no joint movement  2-/5 Less than full range of motion in gravity eliminated position  2/5 Able to complete full range of motion in gravity eliminated position  2+/5 Able to initiate movement against gravity  3-/5 More than half but not full range of motion against gravity  3/5 Able to complete full range of motion against gravity  3+/5 Completes full range of motion against gravity with minimal resistance  4-/5 Completes full range of motion against gravity with minimal-moderate resistance  4/5 Completes full range of motion against gravity with moderate resistance  4+/5 Completes full range of motion against gravity with moderate-maximum resistance  5/5 Completes full range of motion against gravity with maximum resistance     AROM: Bilateral LE generally decreased, functional    PRIMARY MODE OF LOCOMOTION: ambulation      BED/CHAIR/WHEELCHAIR TRANSFERS Initial Assessment   Rolling Right 5 (Supervision)   Rolling Left 5 (Supervision)   Supine to Sit 4 (Minimal assistance)   Sit to Stand Minimal assistance   Sit to Supine 5 (Supervision)   Transfer Type SPT without device   Comments Increased time and effort to complete bed mobility and transfers. Using bed rail for rolling and supine to sit. Car Transfer Not tested   Car Type         WHEELCHAIR MOBILITY/MANAGEMENT Initial Assessment   Able to Propel     W/C Assistance     Curbs/ramps assistance required 0 (Not tested)   Wheelchair set up assistance required 0 (Not tested)   Wheelchair management         WALKING INDEPENDENCE Initial Assessment   Assistive device Gait belt   Ambulation assistance - level surface 4 (Minimal assistance)   Distance 165 Feet (ft)   Comments Cont step through gait with mild trunk flexion with increased width of base of support with decrease in step length and ankel DF at initial contact. Occasional mild Ataxia. Loss of balance x 1 with min assist to correct   Ambulation assistance - unlevel surface         STEPS/STAIRS Initial Assessment   Steps/Stairs ambulated 4   Stairs Assistance     Rail Use Both   Comments slow reciprocating pattern going up steps and single step at a time going down steps.  Increased time and effort to complete   Curbs/Ramps 0 (Not tested)       QUALITY INDICATOR ASSIST COMMENTS   Roll right (&return to back) 4: Supervision or touching A    Roll left (& return to back) 4: Supervision or touching A    Supine to sit 3: Partial/Moderate A    Sit to stand 3: Partial/Moderate A    Chair/bed-to-chair transfer 3: Partial/Moderate A    Walk 10 feet 3: Partial/Moderate A    Walk 50 feet with 2 turns 3: Partial/Moderate A    Walk 150 feet 3: Partial/Moderate A    Walk 10 feet on uneven  2: Substantial/Maximal A    1 step/curb 3: Partial/Moderate A    4 steps 3: Partial/Moderate A    12 steps Not Tested: Not attempted due to decreased endurance     object 3: Partial/Moderate A    Wheel 48' w/2 turns Not Tested: Not applicable secondary to pt not completing activity previously    Wheel 150' Not Tested: Not applicable secondary to pt not completing activity previously    Car Transfer Not Tested: Not attempted due to environmental concerns: Equipment No car available            PHYSICAL THERAPY PLAN OF CARE    Therapy Diagnosis:   Please see table above    Order received from MD for physical therapy services and chart reviewed. Pt to be seen at least 5 times per week for at least 1.5 hours of physical therapy per day for 10 days. Thank you for the referral.    LTGs:  Kaushal Listen. Patient roll left and right and transfer supine<>sit with modified independence without bed rail in 10 days  LTG 2. Patient transfer sit<>stand and perform SPT with LRAD and modified independence in 10 days  LTG 3. Patient ambulate 200 ft with LRAD and modified independence in level surfaces in 10 days  LTG 4. Patient ambulate up/down 4 steps with hand rails and modified independence in 10 days  LTG 5. Patient ambulate up/down 10 ft ramp with LRAD and SBA in 10 days    Pt would benefit from skilled physical therapy in order to improve independent functional mobility within the home. Interventions may include range of motion (AROM, PROM B LE/trunk), motor function (B LE/trunk strengthening/coordination), activity tolerance (vitals, oxygen saturation levels), bed mobility training, balance activities, gait training (progressive ambulation program), and functional transfer training. Please see IRC;  Interdisciplinary Eval, Care Plan, and Patient Education for further information regarding physical therapy examination and plan of care. Patient returned to room at end of treatment and remained up in recliner with LEs elevated and with needs in reach.     Tylor Sears, PT  10/26/2021

## 2021-10-26 NOTE — PROGRESS NOTES
MSN, CM:  Patient to be discharged to [facility] today. Patient and family agrees with this discharge plan. Patient has met all milestones for this admission. Contacted patient's wife Mark Trujillo to inform her of transfer plans. [facility] to transport patient to facility. Care Management Interventions  PCP Verified by CM:  Yes (Dr. Renetta Cruz)  Mode of Transport at Discharge: BLS ([address] [facility])  Transition of Care Consult (CM Consult): Acute Rehab  Health Maintenance Reviewed: Yes  Physical Therapy Consult: Yes  Support Systems: Child(rosanne), Spouse/Significant Other  Confirm Follow Up Transport: Self  Freedom of Choice List was Provided with Basic Dialogue that Supports the Patient's Individualized Plan of Care/Goals, Treatment Preferences and Shares the Quality Data Associated with the Providers?: Yes  Discharge Location  Discharge Placement: Rehab hospital/unit acute

## 2021-10-26 NOTE — H&P
Dany Hurtado MD  Medical Director  [address] [facility], [address] [facility]  Tel: [phone]       Admission History and Physical Exam  Luis Barahona Date: 10/26/2021  Primary Care Provider: Samuel Torres MD  Specialty Group / Referring Service: Hospitalist service    Chief Complaint : \"I don't know how I got so sick so fast.\"  Admitting Diagnosis:   Principal Problem:    Physical debility (10/26/2021)  Active Problems:    Pneumonia of right lower lobe due to infectious organism (10/21/2021)    GRACE (acute kidney injury) ([address] (10/22/2021)    E. coli UTI (10/24/2021)    Essential hypertension, benign (1/3/2014)    Controlled type 2 diabetes mellitus without complication, without long-term current use of insulin ([address] (2/22/2019)    Mixed hyperlipidemia (1/3/2014)    Cerebrovascular accident (CVA) due to occlusion ([address] (2/12/2019)    Severe obesity ([address] (7/2/2019)    Acute Rehab Dx:  Gait impairment / gait dysfunction  Debility  Deconditioning  Mobility and ambulation deficits  Self care / ADL deficits    Medical Dx:  Past Medical History:   Diagnosis Date    Essential hypertension     Mixed hyperlipidemia     Right thalamic infarction ([address] 7/2/2019    Stroke ([address] 06/08/2020    Type 2 diabetes mellitus ([address]        Date of Evaluation: October 26, 2021    Sheree Herrera is a 78 y. o. white male patient at [facility] who was admitted to [facility] on 10/26/2021 by Dany Hurtado MD of Physical Medicine and Rehabilitation service with below-mentioned medical history. HPI: This patient is a right-hand dominant 68yo WM with PMH including HTN, HL, DM2, right thalamic stroke 7/2019, right cerebellar stroke 6/2020 and obesity; he is known to our practice from [facility] treatment 6/14 to 7/1/2020 and successful completion of outpatient follow-up through 9/2021.  He presented to the ED 10/21/2021 with relatively rapid onset of generalized weakness, fever / chills, urinary retention and dyspnea. U/A +infection, CXR with right lung base opacity. He was admitted for sepsis and started on IV ABX for likely acute prostatitis and RLL PNA. He initially required O2 via NC. UCx grew pansensitive E. coli. He had GRACE with creatinine up to 2.54 (10/23), down to 1.04 on HD #6. He completed ceftriaxone / azithromycin. O2 needs diminished, and he was weaned to room air. Physical and occupational therapies were initiated, and patient was found to have significant mobility and self-care deficits. Physical Medicine and Rehab service was consulted, and patient's EMR was initially evaluated by Dr. Brigitte Raza on 10/25. During reevaluation earlier today, patient shows improvement from initial consult but still shows significant functional deficits. We recommend inpatient hospital rehabilitation as reasonable and necessary due to ongoing acute medical issues which have not changed since initial pre-admission evaluation. We reviewed the preadmission screening and have approved the patient for admission to the [facility]. Attending service cleared patient for transfer to rehab today. Patient continues to have ongoing medical issues outlined above requiring physician / PA medical supervision and has functional deficits which would benefit from continued intensive therapies. Current Level of Function: (evaluated by acute therapy staff, with bed mobility, transfers, balance personally observed post-admission in the [facility] setting minutes prior to submission of document)   Per PT: performed bed mobility with min-mod A x 2 and additional time. He transferred using RW and min A x 2, demonstrating fair standing balance. Pt ambulated in room with min A x 2 / RW and hunched posture requiring cueing for RW placement. Per OT: practiced using reacher and sock aid to increase independence with LB dressing. Pt did very well with using AE.  Pt instructed in UE exercises to increase strength and activity tolerance to perform mobility and ADLs. Prior Home Situation:   Home Environment: Private residence  # Steps to Enter: 0  One/Two Story Residence: One story  Living Alone: No  Support Systems: Spouse/Significant Other/Partner  Patient Expects to be Discharged to[de-identified] Private residence  Current DME Used/Available at Home: None  Tub or Shower Type: Shower     Previous Level of Function / Work / Activity:   Despite recent strokes, patient had successfully completed all skilled therapies and had return to independence for ADLs, IADLs and mobility. He had started exercising regularly at a local fitness facility 3x/wk with goal of modest weight loss. Past Medical History:   Diagnosis Date    Essential hypertension     Mixed hyperlipidemia     Right thalamic infarction ([facility] 7/2/2019    Stroke ([facility] 06/08/2020    Type 2 diabetes mellitus [facility])       Past Surgical History:   Procedure Laterality Date    HX CATARACT REMOVAL  January and February 2021    HX HEENT      sinus surgery    HX MALIGNANT SKIN LESION EXCISION      HX UROLOGICAL  09/2020    prostate surgery     No Known Allergies   Family History   Problem Relation Age of Onset    No Known Problems Other     Heart Disease Mother     Thyroid Disease Mother         goiter    Heart Disease Father     Stomach Cancer Brother     Thyroid Cancer Neg Hx       Social History     Tobacco Use    Smoking status: Never Smoker    Smokeless tobacco: Never Used   Substance Use Topics    Alcohol use: Never      Prior to Admission Medications   Prescriptions Last Dose Informant Patient Reported? Taking? DISABLED PLACARD (DISABLED PLACARD) DMV Unknown at Unknown time  No No   Sig: As directed   OneTouch Delica Lancets 30 gauge misc Unknown at Unknown time  Yes No   Sig: by Does Not Apply route. OneTouch Ultra Blue Test Strip Unknown at Unknown time  Yes No   Sig: by Does Not Apply route See Admin Instructions. OneTouch Ultra2 Meter misc Unknown at Unknown time  Yes No   Sig: by Does Not Apply route. atorvastatin (LIPITOR) 80 mg tablet Unknown at Unknown time  No No   Sig: Take 1 Tablet by mouth daily. carboxymethylcellulose sodium (REFRESH LIQUIGEL) 1 % dlgl ophthalmic solution Unknown at Unknown time  Yes No   Sig: Administer 1 Drop to right eye daily. clopidogreL (Plavix) 75 mg tab Unknown at Unknown time  No No   Sig: Take 1 Tablet by mouth daily. doxazosin (CARDURA) 8 mg tablet Unknown at Unknown time  No No   Sig: Take 1 Tablet by mouth nightly. finasteride (PROSCAR) 5 mg tablet Unknown at Unknown time  No No   Sig: Take 1 Tablet by mouth daily for 30 days. glipiZIDE (GLUCOTROL) 10 mg tablet Unknown at Unknown time  No No   Sig: Take 1 Tablet by mouth Daily (before breakfast). hydroCHLOROthiazide (HYDRODIURIL) 12.5 mg tablet Unknown at Unknown time  No No   Sig: Take 1 Tablet by mouth daily. Patient not taking: Reported on 10/21/2021   lisinopriL (PRINIVIL, ZESTRIL) 10 mg tablet Unknown at Unknown time  No No   Sig: Take 1 Tablet by mouth two (2) times a day. Patient taking differently: Take 5 mg by mouth two (2) times a day. loratadine (Claritin) 10 mg tablet Unknown at Unknown time  No No   Sig: Take 1 Tablet by mouth daily. metFORMIN ER (GLUCOPHAGE XR) 500 mg tablet Unknown at Unknown time  No No   Sig: Take 2 Tablets by mouth daily (with dinner). Patient not taking: Reported on 10/21/2021   ondansetron (Zofran ODT) 4 mg disintegrating tablet Unknown at Unknown time  Yes No   Sig: Take 4 mg by mouth every eight (8) hours as needed for Nausea or Vomiting.   sertraline (ZOLOFT) 50 mg tablet Unknown at Unknown time  No No   Sig: Take 1 Tablet by mouth daily.       Facility-Administered Medications: None     Current Facility-Administered Medications   Medication Dose Route Frequency    [START ON 10/27/2021] atorvastatin (LIPITOR) tablet 80 mg  80 mg Oral DAILY    [START ON 10/27/2021] clopidogreL (PLAVIX) tablet 75 mg  75 mg Oral DAILY    doxazosin (CARDURA) tablet 8 mg  8 mg Oral QHS    [START ON 10/27/2021] finasteride (PROSCAR) tablet 5 mg  5 mg Oral DAILY    hydrALAZINE (APRESOLINE) tablet 25 mg  25 mg Oral Q6H PRN    [START ON 10/27/2021] hydroCHLOROthiazide (MICROZIDE) capsule 12.5 mg  12.5 mg Oral DAILY    insulin lispro (HUMALOG) injection   SubCUTAneous AC&HS    lisinopriL (PRINIVIL, ZESTRIL) tablet 10 mg  10 mg Oral BID    metFORMIN ER (GLUCOPHAGE XR) tablet 1,000 mg  1,000 mg Oral DAILY WITH DINNER    [START ON 10/27/2021] sertraline (ZOLOFT) tablet 50 mg  50 mg Oral DAILY    [START ON 10/27/2021] enoxaparin (LOVENOX) injection 40 mg  40 mg SubCUTAneous Q24H    influenza vaccine 2021-22 (6 mos+)(PF) (FLUARIX/FLULAVAL/FLUZONE QUAD) injection 0.5 mL  1 Each IntraMUSCular PRIOR TO DISCHARGE       Review of Systems:  General: Admits: fatigue, modest intentional weight loss,  Denies: fevers, chills, sweats, malaise, anorexia  Eyes:  Denies: blurry vision, loss of vision, eye pain, photophobia   HENT:  Denies: hearing loss, tinnitus, earache, epistaxis, sore throat  Lungs: Admits: mild non-productive cough, Denies: wheeze, hemoptysis, dyspnea  CV:  Denies: chest pain, palpitations, syncope, paroxysmal nocturnal dyspnea, claudication   GI:  Admits: nausea (mostly resolved), Denies: dysphagia, odynophagia, vomiting, diarrhea, constipation, abdominal pain, melena   :  Admits: normalized urine color and flow, Denies: frequency, dysuria, nocturia, urinary incontinence, hematuria   Endocrine: Denies: polydipsia/polyuria, skin changes, temperature intolerance, unexpected weight gain or loss  MSK:  Admits: new right groin pain, long-standing left shoulder / neck pain, muscle weakness, Denies: back pain, joint swelling, muscle pain   Heme:  Denies: bleeding problems, bruising, pallor, swollen lymph nodes   Neuro:  Admits: gait problems, muscle weakness, Denies: headache, dysarthria, blurred vision, diplopia, seizure, memory problems, speech problems, coordination problems      Objective:     Visit Vitals  BP (!) 155/72   Pulse 74   Temp 97.8 °F (36.6 °C)   Resp 16   SpO2 95%      Intake and Output:  No intake/output data recorded.     Physical Exam:   General: Alert, obese, cooperative WM in no acute distress   Skin:  Warm, moist with texture appropriate for age; sun-related skin changes on exposed surfaces  Eyes:  PERRL, sclera are clear, slight right lid lag, extraocular muscles intact without nystagmus  HENT:  Normocephalic, minimal right lower facial weakness; nares patent, oral mucosa moist, neck supple; trachea midline  Respiratory: Diminished right base breath sounds but otherwise lungs clear to auscultation, breathing is non-labored   CV:  Regular rate and rhythm, no appreciable murmur  Abdomen: Soft, nontender, obese and protuberant but not distended; bowel sounds normoactive   Extremities: UE strength at biceps, triceps, finger flexion 5/5 (B) and symmetric; lifts B LE off bed against gravity, strength at hip flexion 4-/5, knee flexion 4/5, ankle DF/PF 4/5 bilaterally; no edema, cyanosis, clubbing  Neurological: Oriented to person, year, city, president; good attention and focus, no noted word finding deficits or dysarthria; smile asymmetry from previous left lower lip skin cancer excision, tongue midline; EOM intact without nystagmus, visual fields are full to finger confrontation; symmetric shoulder shrug, no pronator drift or dysmetria; able to read, perform simple math, identify common object and state its use      Recent Results (from the past 72 hour(s))   GLUCOSE, POC    Collection Time: 10/23/21  4:19 PM   Result Value Ref Range    Glucose (POC) 114 (H) 65 - 100 mg/dL    Performed by Miky    GLUCOSE, POC    Collection Time: 10/23/21  8:41 PM   Result Value Ref Range    Glucose (POC) 118 (H) 65 - 100 mg/dL    Performed by Jose D    CBC W/O DIFF    Collection Time: 10/24/21  6:04 AM   Result Value Ref Range    WBC 6.4 4.3 - 11.1 K/uL    RBC 4.18 (L) 4.23 - 5.6 M/uL    HGB 11.9 (L) 13.6 - 17.2 g/dL    HCT 39.9 (L) 41.1 - 50.3 %    MCV 95.5 79.6 - 97.8 FL    MCH 28.5 26.1 - 32.9 PG    MCHC 29.8 (L) 31.4 - 35.0 g/dL    RDW 15.4 (H) 11.9 - 14.6 %    PLATELET 135 103 - 523 K/uL    MPV 12.0 9.4 - 12.3 FL    ABSOLUTE NRBC 0.00 0.0 - 0.2 K/uL   METABOLIC PANEL, BASIC    Collection Time: 10/24/21  6:04 AM   Result Value Ref Range    Sodium 141 136 - 145 mmol/L    Potassium 3.7 3.5 - 5.1 mmol/L    Chloride 109 (H) 98 - 107 mmol/L    CO2 27 21 - 32 mmol/L    Anion gap 5 (L) 7 - 16 mmol/L    Glucose 125 (H) 65 - 100 mg/dL    BUN 35 (H) 8 - 23 MG/DL    Creatinine 1.51 (H) 0.8 - 1.5 MG/DL    GFR est AA 58 (L) >60 ml/min/1.73m2    GFR est non-AA 48 (L) >60 ml/min/1.73m2    Calcium 8.2 (L) 8.3 - 10.4 MG/DL   PROCALCITONIN    Collection Time: 10/24/21  6:04 AM   Result Value Ref Range    Procalcitonin 4.24 ng/mL   GLUCOSE, POC    Collection Time: 10/24/21  7:25 AM   Result Value Ref Range    Glucose (POC) 126 (H) 65 - 100 mg/dL    Performed by [employer] Ink[employer]    GLUCOSE, POC    Collection Time: 10/24/21 10:44 AM   Result Value Ref Range    Glucose (POC) 169 (H) 65 - 100 mg/dL    Performed by [email or web address]    GLUCOSE, POC    Collection Time: 10/24/21  4:15 PM   Result Value Ref Range    Glucose (POC) 142 (H) 65 - 100 mg/dL    Performed by E InkA    GLUCOSE, POC    Collection Time: 10/24/21  8:33 PM   Result Value Ref Range    Glucose (POC) 158 (H) 65 - 100 mg/dL    Performed by Jose D    CBC W/O DIFF    Collection Time: 10/25/21  6:25 AM   Result Value Ref Range    WBC 5.7 4.3 - 11.1 K/uL    RBC 4.20 (L) 4.23 - 5.6 M/uL    HGB 11.9 (L) 13.6 - 17.2 g/dL    HCT 38.9 (L) 41.1 - 50.3 %    MCV 92.6 79.6 - 97.8 FL    MCH 28.3 26.1 - 32.9 PG    MCHC 30.6 (L) 31.4 - 35.0 g/dL    RDW 15.0 (H) 11.9 - 14.6 %    PLATELET 149 660 - 802 K/uL    MPV 11.8 9.4 - 12.3 FL    ABSOLUTE NRBC 0. 00 0.0 - 0.2 K/uL   METABOLIC PANEL, BASIC    Collection Time: 10/25/21  6:25 AM   Result Value Ref Range    Sodium 145 136 - 145 mmol/L    Potassium 3.3 (L) 3.5 - 5.1 mmol/L    Chloride 108 (H) 98 - 107 mmol/L    CO2 32 21 - 32 mmol/L    Anion gap 5 (L) 7 - 16 mmol/L    Glucose 142 (H) 65 - 100 mg/dL    BUN 30 (H) 8 - 23 MG/DL    Creatinine 1.16 0.8 - 1.5 MG/DL    GFR est AA >60 >60 ml/min/1.73m2    GFR est non-AA >60 >60 ml/min/1.73m2    Calcium 8.7 8.3 - 10.4 MG/DL   GLUCOSE, POC    Collection Time: 10/25/21  7:32 AM   Result Value Ref Range    Glucose (POC) 143 (H) 65 - 100 mg/dL    Performed by [email or web address], POC    Collection Time: 10/25/21 11:27 AM   Result Value Ref Range    Glucose (POC) 180 (H) 65 - 100 mg/dL    Performed by [facility]    ECHO ADULT COMPLETE    Collection Time: 10/25/21  2:48 PM   Result Value Ref Range    LV ED Vol A2C 97.00 cm3    LV ED Vol A4C 148.00 cm3    LV ES Vol A2C 19.50 cm3    LV ES Vol A4C 44.30 cm3    IVSd 1.05 (A) 0.60 - 1.00 cm    LVIDd 4.01 (A) 4.20 - 5.90 cm    LVIDs 2.95 cm    LVOT d 1.94 cm    LVOT VTI 33.20 cm    LVOT Peak Gradient 11.00 mmHg    LVPWd 1.09 (A) 0.60 - 1.00 cm    LV E' Lateral Velocity 5.61 cm/s    LV E' Septal Velocity 6.38 cm/s    Left Atrium Minor Axis 2.35 cm    Left Atrium Major Axis 5.54 cm    LA Area 2C 21.00 cm2    LA Area 4C 23.90 cm2    Aortic Valve Systolic Mean Gradient 6.86 mmHg    AoV VTI 38.00 cm    Aortic Valve Systolic Peak Velocity 464.51 cm/s    AoV PG 17.00 mmHg    Aortic Valve Area by Continuity of VTI 2.59 cm2    Aortic Valve Area by Continuity of Peak Velocity 2.40 cm2    Ao Root D 3.56 cm    Mitral Valve E Wave Deceleration Time 333.00 ms    MV A Jorge 103.00 cm/s    MV E Jorge 69.80 cm/s    MV Mean Gradient 2.00 mmHg    Mitral Valve Annulus Velocity Time Integral 31.30 cm    Mitral Valve Max Velocity 112.00 cm/s    MV Peak Gradient 5.00 mmHg    Tapse 2.31 (A) 1.50 - 2.00 cm    MV E/A 0.68     LV Mass .5 88.0 - 224.0 g    LV Mass AL Index 59.5 49.0 - 115.0 g/m2    E/E' lateral 12.44     E/E' septal 10.94     E/E' ratio (averaged) 11.69     LILLIE/BSA Pk Jorge 1.0 cm2/m2    LILLIE/BSA VTI 1.1 cm2/m2   GLUCOSE, POC    Collection Time: 10/25/21  4:13 PM   Result Value Ref Range    Glucose (POC) 175 (H) 65 - 100 mg/dL    Performed by [address] [place] [address], POC    Collection Time: 10/25/21  8:46 PM   Result Value Ref Range    Glucose (POC) 148 (H) 65 - 100 mg/dL    Performed by [facility]    CBC W/O DIFF    Collection Time: 10/26/21  7:02 AM   Result Value Ref Range    WBC 6.0 4.3 - 11.1 K/uL    RBC 4.28 4.23 - 5.6 M/uL    HGB 12.6 (L) 13.6 - 17.2 g/dL    HCT 39.7 (L) 41.1 - 50.3 %    MCV 92.8 79.6 - 97.8 FL    MCH 29.4 26.1 - 32.9 PG    MCHC 31.7 31.4 - 35.0 g/dL    RDW 14.9 (H) 11.9 - 14.6 %    PLATELET 905 861 - 154 K/uL    MPV 11.8 9.4 - 12.3 FL    ABSOLUTE NRBC 0.00 0.0 - 0.2 K/uL   METABOLIC PANEL, BASIC    Collection Time: 10/26/21  7:02 AM   Result Value Ref Range    Sodium 143 138 - 145 mmol/L    Potassium 3.0 (L) 3.5 - 5.1 mmol/L    Chloride 105 98 - 107 mmol/L    CO2 35 (H) 21 - 32 mmol/L    Anion gap 3 (L) 7 - 16 mmol/L    Glucose 150 (H) 65 - 100 mg/dL    BUN 27 (H) 8 - 23 MG/DL    Creatinine 1.04 0.8 - 1.5 MG/DL    GFR est AA >60 >60 ml/min/1.73m2    GFR est non-AA >60 >60 ml/min/1.73m2    Calcium 8.5 8.3 - 10.4 MG/DL   GLUCOSE, POC    Collection Time: 10/26/21  7:43 AM   Result Value Ref Range    Glucose (POC) 142 (H) 65 - 100 mg/dL    Performed by Guille          Condition on Admission: Good        Assessment:     The Post Assessment Physician Evaluation (ROE) found the current functional status to be comparable with the pre-admission screening. The patient is a good candidate for acute inpatient rehabilitation. Nothing since the pre-admission screen has changed that determination.      Rehabilitation Plan  The patient has shown the ability to tolerate and benefit from 3 hours of therapy daily and is being admitted to a comprehensive acute inpatient rehabilitation program consisting of at least 3 hours of combined physical and occupational therapies. - Begin intensive Physical Therapy for a minimum of 1.5 hours a day, at least 5 out of 7 days per week to address bed mobility, transfers, ambulation, strengthening, balance, and endurance. - Begin intensive Occupational Therapy for a minimum of 1.5 hours a day, at least 5 out of 7 days per week to address ADLs (bathing, LE dressing, toileting) and adaptive equipment as needed. Each of these therapies will be continued as above for the duration of the inpatient rehab stay. The patient will also require 24-hour skilled rehabilitation nursing for bowel and bladder management, skin care for decubitus ulcer prevention, pain management and ongoing medication administration. Plan / Recommendations / Medical Decision Making:     Daily physician / PA medical management:    Physical debility - admission for sepsis, acute prostatitis, E. coli UTI and RLL pneumonia; finished ABX but with residual weakness. Aggressive PT, OT. Recent sepsis - completed IV ABX, WBC normalized, remains afebrile. Monitor. GRACE, acute kidney injury - creatinine up to 2.54 (10/23), down to 1.04 by [facility] admission 10/26. Monitor and hold metformin ER if Cr trends upward. Pneumonia, recent RLL CAP - completed ceftriaxone / azithromycin. O2 needs diminished; he has been weaned to room air. Incentive spirometer every hour while awake. Prostatitis, E coli UTI - completed ceftriaxone / azithromycin. S/p UroLift procedure 9/2020. Continue on doxazosin, finasteride; monitor for urinary retention. Check post-void residual as needed; in-and-out catheter if post-void residual is more than 400ml. Hypertension - BP fluctuating, managed medically on lisinopril, HCTZ with help from doxazosin. sBP excursions into 160-180s, dBP all <90.  PRN hydralazine but may need to add another agent.    Diabetes mellitus - most recent HgbA1c 8.1% (6/30/2021), poor glycemic control. Will require ACHS fasting glucose monitoring and medication adjustment to optimize glycemic control in view of recent acute illness and hospitalization. DM diet, Humalog SSI; metformin ER had been held in acute setting during GRACE but will resume since renal function has normalized. Monitor. Hyperlipidemia - continue Lipitor 80mg daily. 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 Continue Plavix 75mg daily. Pain control - stable, mild-to-moderate joint symptoms intermittently, reasonably well controlled by PRN meds. Will require regular pain assessment and comprehensive pain management. Has long-standing left shoulder / neck pain treated by chiropractor. Electrolyte abnormality - hypoK+ to 3.0 (10/26); replace and monitor. Will give KCl 20mEq x 4 doses. DVT risk / DVT prophylaxis - daily physician / PA exam to assess as patient is at increased risk for of thromboembolism. Mobilize as tolerated. Sequential pneumatic compression devices (SCDs) when in bed; thigh-high or knee-high thromboembolic deterrent hose when out of bed. Lovenox subcutaneously daily. GI prophylaxis - consider PPI. At times may need additional antacids, Maalox prn. Depression - continue on Zoloft; has good support system. Monitor. General skin care / wound prevention - monitor general skin wound status daily per staff and physician / PA. At risk for failure. Will require 24/7 rehab nursing. Bowel program - at risk for constipation as a side effect of opioids, other medications, impaired mobility, etc. MiraLAX daily for regularity, Meghan-Colace for stool softener. PRN MOM, bisacodyl suppository or tablets for constipation.           Disposition: The patient's prognosis for significant practical improvement within a reasonable period of time appears good and the estimated length of stay is 7 days; patient is expected to return home with spouse / family supervision and continued rehabilitation with home health therapy. Given the patient's complex neurologic / medical condition, risks of further medical complications include but are not limited to: thromboembolism / pulmonary embolism, skin breakdown, pneumonia due to decreased mobility, CVA, MI, cardiac arrhythmias due to HTN, postural hypotension. For these ongoing medical issues, rehabilitation services could not be safely provided at a lower level of care such as a skilled nursing facility or nursing home. Signed By: Seth Wynne PA-C    October 26, 2021      Physician Assistant with [facility]  Lilian Pompa MD, Medical Director

## 2021-10-27 LAB
ANION GAP SERPL CALC-SCNC: 6 MMOL/L (ref 7–16)
BUN SERPL-MCNC: 21 MG/DL (ref 8–23)
CALCIUM SERPL-MCNC: 8.6 MG/DL (ref 8.3–10.4)
CHLORIDE SERPL-SCNC: 105 MMOL/L (ref 98–107)
CO2 SERPL-SCNC: 33 MMOL/L (ref 21–32)
CREAT SERPL-MCNC: 0.99 MG/DL (ref 0.8–1.5)
ERYTHROCYTE [DISTWIDTH] IN BLOOD BY AUTOMATED COUNT: 14.9 % (ref 11.9–14.6)
GLUCOSE BLD STRIP.AUTO-MCNC: 128 MG/DL (ref 65–100)
GLUCOSE BLD STRIP.AUTO-MCNC: 141 MG/DL (ref 65–100)
GLUCOSE BLD STRIP.AUTO-MCNC: 152 MG/DL (ref 65–100)
GLUCOSE BLD STRIP.AUTO-MCNC: 163 MG/DL (ref 65–100)
GLUCOSE BLD STRIP.AUTO-MCNC: 242 MG/DL (ref 65–100)
GLUCOSE SERPL-MCNC: 146 MG/DL (ref 65–100)
HCT VFR BLD AUTO: 41.2 % (ref 41.1–50.3)
HGB BLD-MCNC: 13.2 G/DL (ref 13.6–17.2)
MCH RBC QN AUTO: 29.4 PG (ref 26.1–32.9)
MCHC RBC AUTO-ENTMCNC: 32 G/DL (ref 31.4–35)
MCV RBC AUTO: 91.8 FL (ref 79.6–97.8)
NRBC # BLD: 0 K/UL (ref 0–0.2)
PLATELET # BLD AUTO: 239 K/UL (ref 150–450)
PMV BLD AUTO: 11.5 FL (ref 9.4–12.3)
POTASSIUM SERPL-SCNC: 3.3 MMOL/L (ref 3.5–5.1)
RBC # BLD AUTO: 4.49 M/UL (ref 4.23–5.6)
SERVICE CMNT-IMP: ABNORMAL
SODIUM SERPL-SCNC: 144 MMOL/L (ref 136–145)
WBC # BLD AUTO: 6.8 K/UL (ref 4.3–11.1)

## 2021-10-27 PROCEDURE — 97530 THERAPEUTIC ACTIVITIES: CPT

## 2021-10-27 PROCEDURE — 74011250637 HC RX REV CODE- 250/637: Performed by: PHYSICIAN ASSISTANT

## 2021-10-27 PROCEDURE — 97116 GAIT TRAINING THERAPY: CPT

## 2021-10-27 PROCEDURE — 74011250636 HC RX REV CODE- 250/636: Performed by: PHYSICIAN ASSISTANT

## 2021-10-27 PROCEDURE — 97535 SELF CARE MNGMENT TRAINING: CPT

## 2021-10-27 PROCEDURE — 80048 BASIC METABOLIC PNL TOTAL CA: CPT

## 2021-10-27 PROCEDURE — 65310000000 HC RM PRIVATE REHAB

## 2021-10-27 PROCEDURE — 99232 SBSQ HOSP IP/OBS MODERATE 35: CPT | Performed by: PHYSICAL MEDICINE & REHABILITATION

## 2021-10-27 PROCEDURE — 85027 COMPLETE CBC AUTOMATED: CPT

## 2021-10-27 PROCEDURE — 97110 THERAPEUTIC EXERCISES: CPT

## 2021-10-27 PROCEDURE — 74011636637 HC RX REV CODE- 636/637: Performed by: PHYSICIAN ASSISTANT

## 2021-10-27 RX ORDER — AMLODIPINE BESYLATE 5 MG/1
5 TABLET ORAL DAILY
Status: DISCONTINUED | OUTPATIENT
Start: 2021-10-28 | End: 2021-10-28

## 2021-10-27 RX ADMIN — INSULIN LISPRO 2 UNITS: 100 INJECTION, SOLUTION INTRAVENOUS; SUBCUTANEOUS at 21:33

## 2021-10-27 RX ADMIN — ENOXAPARIN SODIUM 40 MG: 40 INJECTION SUBCUTANEOUS at 08:46

## 2021-10-27 RX ADMIN — INSULIN LISPRO 4 UNITS: 100 INJECTION, SOLUTION INTRAVENOUS; SUBCUTANEOUS at 12:04

## 2021-10-27 RX ADMIN — SERTRALINE 50 MG: 50 TABLET, FILM COATED ORAL at 08:46

## 2021-10-27 RX ADMIN — ATORVASTATIN CALCIUM 80 MG: 80 TABLET, FILM COATED ORAL at 08:46

## 2021-10-27 RX ADMIN — LISINOPRIL 10 MG: 5 TABLET ORAL at 21:26

## 2021-10-27 RX ADMIN — DOXAZOSIN 8 MG: 4 TABLET ORAL at 21:26

## 2021-10-27 RX ADMIN — CLOPIDOGREL BISULFATE 75 MG: 75 TABLET ORAL at 08:46

## 2021-10-27 RX ADMIN — LISINOPRIL 10 MG: 5 TABLET ORAL at 08:43

## 2021-10-27 RX ADMIN — FINASTERIDE 5 MG: 5 TABLET, FILM COATED ORAL at 08:46

## 2021-10-27 RX ADMIN — HYDROCHLOROTHIAZIDE 12.5 MG: 12.5 CAPSULE ORAL at 08:46

## 2021-10-27 RX ADMIN — POTASSIUM CHLORIDE 20 MEQ: 20 TABLET, EXTENDED RELEASE ORAL at 08:42

## 2021-10-27 RX ADMIN — POTASSIUM CHLORIDE 20 MEQ: 20 TABLET, EXTENDED RELEASE ORAL at 17:49

## 2021-10-27 RX ADMIN — METFORMIN HYDROCHLORIDE 1000 MG: 500 TABLET, EXTENDED RELEASE ORAL at 16:52

## 2021-10-27 NOTE — PROGRESS NOTES
OT Daily Note  Time In 1300   Time Out 1345     Subjective: \"Reaching in the back to do my washing and toileting is the hardest thing for me. \"  Pain: Pt reported no pain. Education: BUE ROM exercises  Interdisciplinary Communication: communicated with PTA regarding pt's ambulation   Precautions: Falls  Patient Vitals for the past 12 hrs:   Temp Pulse Resp BP SpO2   10/27/21 2124 98.1 °F (36.7 °C) 69 16 (!) 142/64 98 %         Mobility   Score Comments   Sit to Stand 4: Supervision or touching A S   Transfer Assist 4: Supervision or touching A Transfer Type: SPT   Equipment: N/A   Comments: CGA     Activity Tolerance Daily Assessment   Patient completed x 10 minutes UBE on heavy resistance using Bues seated in wheelchair. Patient completed in forward rotation working on overall endurance and UB strength. Activity Tolerance and Shoulder Stabilization and Reaching Daily Assessment   Patient performed ring ladder reaching activity using different vertical heights and small rings with BUEs, working on shoulder stabilization for overhead reaching and  strengthening. Completed task in seated  position. Patient used medium heavy resistance clothespin in bilateral hands to move rings up and down vertical ladder. Pt completed 4 vertical level heights to promote shoulder flexion and abduction and 8 diagonal patterns to promote crossing midline. Patient required short 1 to 2 minute rest breaks throughout activity. Shoulder Extension Daily Assessment   Pt verbalizing and demonstrated decreased shoulder extension during ADLs, completed simulated ADL activity working on posterior reaching/shoulder extension for improved independence and shoulder glides. Pt reached backwards and dropped 10 bean bags x2 sets into container placed posteriorly. Right shoulder demonstrates decreased extension compared to L but both are below functional limits.          Assessment: Pt demonstrates great UB strength but decreased shoulder extension (R< L) impacting self care skills. Pt demonstrated good participation and engagement in OT session. Pt continues to benefit from skilled OT services to address remaining deficits and return back to baseline with improved independence and safety during ADLs and IADLs. Patient ended session seated in wc with PTA assuming care. Plan: Continue OT POC with focus on ADL/IADL skills, functional transfers, functional mobility, coordination, strength, static and dynamic balance, and activity tolerance to maximize safety and independence with ADLs and functional transfers.      Antonio Mcdermott, OTR/L  10/28/2021

## 2021-10-27 NOTE — PROGRESS NOTES
Pt transferred to Acute Rehab yesterday from [facility] after a hospitalizations for treatment of sepsis, CAP, respiratory failure and GRACE/CKD. CM visited with pt today and completed initial assessment. Pt resides with his spouse and also has a daughter April who lives locally and who are supportive. Pt has had a previous stay in the [facility] Acute Rehab Unit and also received [facility] in the past.  CM will follow pt plan of care and assist with supportive care referrals as needed for pt transition home after rehab. Care Management Interventions  PCP Verified by CM:  Yes (Seb Navarro MD)  Mode of Transport at Discharge:  (family)  Transition of Care Consult (CM Consult): Discharge Planning (Pt is insured by Medicare with AARP supplement and pharmacy benefits.  )  Discharge Durable Medical Equipment:  (DME in the home includes cane, walker and shower chair)  Physical Therapy Consult: Yes  Occupational Therapy Consult: Yes  Speech Therapy Consult: No  Support Systems: Spouse/Significant Other (Pt lives with spouse in a one story home with a ramp.  )  Confirm Follow Up Transport: Family  Name of the Patient Representative Who was Provided with a Choice of Provider and Agrees with the Discharge Plan: pt/spouse  [employer] Information Provided?: No  Discharge Location  Discharge Placement: Unable to determine at this time (Await recommendations from therapy for supportive care necessary for pt to transition home.)

## 2021-10-27 NOTE — PROGRESS NOTES
PHYSICAL THERAPY DAILY NOTE  Time In: (P) 1004  Time Out: (P) 1118  Patient Seen For: (P) AM;Transfer training; Therapeutic exercise;Gait training; Other (see progress notes)    Subjective: Patient had no complaints. Objective: No pain noted. Other (comment) (fall precautions)  GROSS ASSESSMENT Daily Assessment            COGNITION Daily Assessment           BED/MAT MOBILITY Daily Assessment    Supine to Sit : (P) 0 (Not tested)  Sit to Supine : (P) 0 (Not tested)       TRANSFERS Daily Assessment    Other: (P) SPT with straight cane  Transfer Assistance : (P) 4 (Minimal assistance)  Sit to Stand Assistance: (P) Contact guard assistance  Car Transfers: (P) Not tested       GAIT Daily Assessment   Patient had a couple losses of balance during gait but could recover . Using straight cane seemed to help and improved with step length and pattern. Amount of Assistance: (P) 4 (Minimal assistance)  Distance (ft): (P) 100 Feet (ft)  Assistive Device: (P) Gait belt;Cane, straight       STEPS or STAIRS Daily Assessment    Level of Assist : (P) 0 (Not tested)       BALANCE Daily Assessment            WHEELCHAIR MOBILITY Daily Assessment            LOWER EXTREMITY EXERCISES Daily Assessment    Extremity: (P) Both  Exercise Type #1: (P) Other (comment) (standing exs moving UEs and LEs)  Sets Performed: (P) 1  Reps Performed: (P) 10  Level of Assist: (P) Minimal assistance          Assessment: Patient making good progress. Plan of Care: Continue with plan of care.     Dee Dee Ortega, PTA  10/27/2021

## 2021-10-27 NOTE — PROGRESS NOTES
Hourly rounds completed this shift, will give report to oncoming nurse. Pt sitting in recliner, call light within reach.

## 2021-10-27 NOTE — PROGRESS NOTES
Time In 0920   Time Out 1007     Mobility   Score Comments   Sit to Stand 4: Supervision or touching A CGA   Transfer Assist 4: Supervision or touching A Transfer Type: SPT   Equipment: Rolling Walker   Comments: CGA     Activities of Daily Living    Score Comments   Eating 6: Independent    Oral Hyigene 6: Independent    Bathing 4: Supervision or touching A Type of Shower: Shower  Position: Standing PRN and Unsupported Sitting   Adaptive  Equipment: Tub Transfer Bench, Grab Bars and Long Handled Sponge  Comments: vc's for techniques to wash bottom, pt uses shiny hiney cleansing tools at home   Upper Body  Dressing 4: Supervision or touching A Items Applied: Pullover  Position: Unsupported Sitting  Comments: assist to pull down    Lower Body Dressing 3: Partial/Moderate A Items Applied: Underwear and Elastic pants  Position: Standing PRN and Unsupported Sitting  Adaptive Equipment: RW  Comments: assist with RLE due to foot getting caught   [place] Footwear 4: Supervision or touching A Items Applied: Slip-on shoes  Adaptive Equipment: Foot Funnel  Comments: cues for use of foot funnel   Education  AE/DME training and functional transfers       S: \"I already used the restroom this morning. \" Agreeable to therapy. Patient was able to ambulate ~10 feet using a RW with CGA. Pt completed morning ADLs with quality indicators reflected in chart above. Pain No pain expressed. .  Patient Vitals for the past 12 hrs:   Temp Pulse Resp BP SpO2   10/27/21 0755 97.6 °F (36.4 °C) 69 14 (!) 151/74 92 %       Collaborated with PTA regarding patient performance and POC. Patient tolerated session well, but activity tolerance, balance, functional mobility, strength, coordination, cognition are still below baseline and require skilled facilitation to successfully and safely complete ADLs and transfers. Patient ended session seated in wc with PTA assuming care.       Antonio Mcdermott, DANNYR/L  10/27/2021

## 2021-10-27 NOTE — PROGRESS NOTES
PHYSICAL THERAPY DAILY NOTE  Time In: (P) 1346  Time Out: (P) 1446  Patient Seen For: (P) PM;Therapeutic exercise;Transfer training;Gait training; Other (see progress notes)    Subjective: Patient had no complaints. Objective: No pain noted. Other (comment) (fall precautions)  GROSS ASSESSMENT Daily Assessment            COGNITION Daily Assessment           BED/MAT MOBILITY Daily Assessment    Supine to Sit : (P) 0 (Not tested)  Sit to Supine : (P) 0 (Not tested)       TRANSFERS Daily Assessment    Other: (P) SPT with straight cane  Transfer Assistance : (P) 4 (Minimal assistance)  Sit to Stand Assistance: (P) Contact guard assistance  Car Transfers: (P) Not tested       GAIT Daily Assessment   Patient fatigued this afternoon. Patient ambulating with and without straight cane . Patient agrees that he is much more stable with cane. He looses balance several times but did recover using cane. Amount of Assistance: (P) 4 (Minimal assistance)  Distance (ft): (P) 100 Feet (ft)  Assistive Device: (P) Cane, straight;Gait belt       STEPS or STAIRS Daily Assessment    Level of Assist : (P) 0 (Not tested)       BALANCE Daily Assessment            WHEELCHAIR MOBILITY Daily Assessment            LOWER EXTREMITY EXERCISES Daily Assessment    Extremity: (P) Both  Exercise Type #1: (P) Standing lower extremity strengthening  Sets Performed: (P) 1  Reps Performed: (P) 10  Level of Assist: (P) Minimal assistance          Assessment: Patient doesn't realize his deficits with his balance with gait. Plan of Care: Continue with plan of care.     Mejia Hawk, PTA  10/27/2021

## 2021-10-27 NOTE — PROGRESS NOTES
Skyla Ricardo MD  Medical Director  [address] [facility], [address] [facility]  Tel: [phone]       [facility] PROGRESS NOTE    [address] Date: 10/26/2021  Admit Diagnosis:   Physical debility [R53.81]    Subjective     Patient seen and examined. Feeling well. Slept well. No pain. Feels he'll do well with therapies and not be here long    Objective:     Current Facility-Administered Medications   Medication Dose Route Frequency    atorvastatin (LIPITOR) tablet 80 mg  80 mg Oral DAILY    clopidogreL (PLAVIX) tablet 75 mg  75 mg Oral DAILY    doxazosin (CARDURA) tablet 8 mg  8 mg Oral QHS    finasteride (PROSCAR) tablet 5 mg  5 mg Oral DAILY    hydrALAZINE (APRESOLINE) tablet 25 mg  25 mg Oral Q6H PRN    hydroCHLOROthiazide (MICROZIDE) capsule 12.5 mg  12.5 mg Oral DAILY    insulin lispro (HUMALOG) injection   SubCUTAneous AC&HS    lisinopriL (PRINIVIL, ZESTRIL) tablet 10 mg  10 mg Oral BID    metFORMIN ER (GLUCOPHAGE XR) tablet 1,000 mg  1,000 mg Oral DAILY WITH DINNER    sertraline (ZOLOFT) tablet 50 mg  50 mg Oral DAILY    enoxaparin (LOVENOX) injection 40 mg  40 mg SubCUTAneous Q24H    influenza vaccine 2021-22 (6 mos+)(PF) (FLUARIX/FLULAVAL/FLUZONE QUAD) injection 0.5 mL  1 Each IntraMUSCular PRIOR TO DISCHARGE    acetaminophen (TYLENOL) tablet 650 mg  650 mg Oral Q6H PRN    bisacodyL (DULCOLAX) tablet 5 mg  5 mg Oral DAILY PRN    Or    bisacodyL (DULCOLAX) suppository 10 mg  10 mg Rectal DAILY PRN    Or    magnesium hydroxide (MILK OF MAGNESIA) 400 mg/5 mL oral suspension 30 mL  30 mL Oral DAILY PRN    potassium chloride (K-DUR, KLOR-CON) SR tablet 20 mEq  20 mEq Oral BID    traZODone (DESYREL) tablet 50 mg  50 mg Oral QHS PRN       Review of Systems:   Denies chest pain, shortness of breath, cough, headache, visual problems, abdominal pain, dysuria, calf pain.  Pertinent positives are as noted in the HPI, ROS unremarkable otherwise. Visit Vitals  BP (!) 151/74   Pulse 69   Temp 97.6 °F (36.4 °C)   Resp 14   SpO2 92%        Physical Exam:   General: Alert and age appropriately oriented. No acute cardiorespiratory distress. HEENT: Normocephalic, no scleral icterus. Oral mucosa moist without cyanosis. Lungs: Clear to auscultation bilaterally. Respiration even and unlabored. Heart: Regular rate and rhythm, S1, S2. No murmurs, clicks, rub or gallops. Abdomen: Soft, non-tender, not distended. Bowel sounds normoactive. No organomegaly. protuberant    Genitourinary: Deferred. Neuromuscular:      No gross focal motor deficits noted. No new neuro deficits ( chronic slight asym of smile due to excision of skin CA)  Speech is clear and language and conversation approp   Skin/extremity: No rashes, no erythema. No calf tenderness B LE. No edema, pulses 2+                                                                              Functional Assessment:          Balance  Sitting - Static: Good (unsupported) (10/26/21 1500)  Sitting - Dynamic: Fair (occasional) (10/26/21 1500)  Standing - Static: Fair (10/26/21 1500)                     [facility] Fall Risk Assessment:  [facility] Fall Risk  Mobility: Ambulates or transfers with assist devices or assistance (10/27/21 0747)  Mobility Interventions: Communicate number of staff needed for ambulation/transfer;OT consult for ADLs; Patient to call before getting OOB;PT Consult for mobility concerns;PT Consult for assist device competence;Strengthening exercises (ROM-active/passive); Utilize walker, cane, or other assistive device;Utilize gait belt for transfers/ambulation (10/27/21 0747)  Mentation: Alert, oriented x 3 (10/27/21 0747)  Medication: No anticonvulsants, sedatives(tranquilizers), psychotropics or hypnotics, hypoglycemics, narcotics, sleep aids, antihypertensives, laxatives, or diuretics (10/27/21 0747)  Medication Interventions: Evaluate medications/consider consulting pharmacy; Patient to call before getting OOB; Teach patient to arise slowly (10/27/21 0747)  Elimination: Needs assistance with toileting (10/27/21 0747)  Elimination Interventions: Call light in reach; Patient to call for help with toileting needs; Toileting schedule/hourly rounds;Urinal in reach (10/27/21 0747)  Prior Fall History: Before admission in past 12 months _home or previous inpatient care) (10/27/21 0747)  History of Falls Interventions: Consult care management for discharge planning;Evaluate medications/consider consulting pharmacy (10/27/21 0747)  Total Score: 3 (10/27/21 0747)  High Fall Risk: Yes (10/27/21 0747)     Speech Assessment:         Ambulation:  Gait  Distance (ft): 100 Feet (ft) (10/27/21 1246)  Assistive Device: Gait belt;Cane, straight (10/27/21 1246)  Rail Use: Both (10/26/21 1500)     Labs/Studies:  Recent Results (from the past 72 hour(s))   GLUCOSE, POC    Collection Time: 10/24/21  4:15 PM   Result Value Ref Range    Glucose (POC) 142 (H) 65 - 100 mg/dL    Performed by Miky    GLUCOSE, POC    Collection Time: 10/24/21  8:33 PM   Result Value Ref Range    Glucose (POC) 158 (H) 65 - 100 mg/dL    Performed by Jose D    CBC W/O DIFF    Collection Time: 10/25/21  6:25 AM   Result Value Ref Range    WBC 5.7 4.3 - 11.1 K/uL    RBC 4.20 (L) 4.23 - 5.6 M/uL    HGB 11.9 (L) 13.6 - 17.2 g/dL    HCT 38.9 (L) 41.1 - 50.3 %    MCV 92.6 79.6 - 97.8 FL    MCH 28.3 26.1 - 32.9 PG    MCHC 30.6 (L) 31.4 - 35.0 g/dL    RDW 15.0 (H) 11.9 - 14.6 %    PLATELET 244 944 - 718 K/uL    MPV 11.8 9.4 - 12.3 FL    ABSOLUTE NRBC 0.00 0.0 - 0.2 K/uL   METABOLIC PANEL, BASIC    Collection Time: 10/25/21  6:25 AM   Result Value Ref Range    Sodium 145 136 - 145 mmol/L    Potassium 3.3 (L) 3.5 - 5.1 mmol/L    Chloride 108 (H) 98 - 107 mmol/L    CO2 32 21 - 32 mmol/L    Anion gap 5 (L) 7 - 16 mmol/L    Glucose 142 (H) 65 - 100 mg/dL    BUN 30 (H) 8 - 23 MG/DL    Creatinine 1.16 0.8 - 1.5 MG/DL    GFR est AA >60 >60 ml/min/1.73m2    GFR est non-AA >60 >60 ml/min/1.73m2    Calcium 8.7 8.3 - 10.4 MG/DL   GLUCOSE, POC    Collection Time: 10/25/21  7:32 AM   Result Value Ref Range    Glucose (POC) 143 (H) 65 - 100 mg/dL    Performed by [employer] POC    Collection Time: 10/25/21 11:27 AM   Result Value Ref Range    Glucose (POC) 180 (H) 65 - 100 mg/dL    Performed by [facility]    [place] [facility]    Collection Time: 10/25/21  2:48 PM   Result Value Ref Range    LV ED Vol A2C 97.00 cm3    LV ED Vol A4C 148.00 cm3    LV ES Vol A2C 19.50 cm3    LV ES Vol A4C 44.30 cm3    IVSd 1.05 (A) 0.60 - 1.00 cm    LVIDd 4.01 (A) 4.20 - 5.90 cm    LVIDs 2.95 cm    LVOT d 1.94 cm    LVOT VTI 33.20 cm    LVOT Peak Gradient 11.00 mmHg    LVPWd 1.09 (A) 0.60 - 1.00 cm    LV E' Lateral Velocity 5.61 cm/s    LV E' Septal Velocity 6.38 cm/s    Left Atrium Minor Axis 2.35 cm    Left Atrium Major Axis 5.54 cm    LA Area 2C 21.00 cm2    LA Area 4C 23.90 cm2    Aortic Valve Systolic Mean Gradient 3.23 mmHg    AoV VTI 38.00 cm    Aortic Valve Systolic Peak Velocity 924.17 cm/s    AoV PG 17.00 mmHg    Aortic Valve Area by Continuity of VTI 2.59 cm2    Aortic Valve Area by Continuity of Peak Velocity 2.40 cm2    Ao Root D 3.56 cm    Mitral Valve E Wave Deceleration Time 333.00 ms    MV A Jorge 103.00 cm/s    MV E Jorge 69.80 cm/s    MV Mean Gradient 2.00 mmHg    Mitral Valve Annulus Velocity Time Integral 31.30 cm    Mitral Valve Max Velocity 112.00 cm/s    MV Peak Gradient 5.00 mmHg    Tapse 2.31 (A) 1.50 - 2.00 cm    MV E/A 0.68     LV Mass .5 88.0 - 224.0 g    LV Mass AL Index 59.5 49.0 - 115.0 g/m2    E/E' lateral 12.44     E/E' septal 10.94     E/E' ratio (averaged) 11.69     LILLIE/BSA Pk Jorge 1.0 cm2/m2    LILLIE/BSA VTI 1.1 cm2/m2   GLUCOSE, POC    Collection Time: 10/25/21  4:13 PM   Result Value Ref Range    Glucose (POC) 175 (H) 65 - 100 mg/dL    Performed by [address] [place] [address], POC    Collection Time: 10/25/21  8:46 PM   Result Value Ref Range    Glucose (POC) 148 (H) 65 - 100 mg/dL    Performed by Gia    CBC W/O DIFF    Collection Time: 10/26/21  7:02 AM   Result Value Ref Range    WBC 6.0 4.3 - 11.1 K/uL    RBC 4.28 4.23 - 5.6 M/uL    HGB 12.6 (L) 13.6 - 17.2 g/dL    HCT 39.7 (L) 41.1 - 50.3 %    MCV 92.8 79.6 - 97.8 FL    MCH 29.4 26.1 - 32.9 PG    MCHC 31.7 31.4 - 35.0 g/dL    RDW 14.9 (H) 11.9 - 14.6 %    PLATELET 378 562 - 073 K/uL    MPV 11.8 9.4 - 12.3 FL    ABSOLUTE NRBC 0.00 0.0 - 0.2 K/uL   METABOLIC PANEL, BASIC    Collection Time: 10/26/21  7:02 AM   Result Value Ref Range    Sodium 143 138 - 145 mmol/L    Potassium 3.0 (L) 3.5 - 5.1 mmol/L    Chloride 105 98 - 107 mmol/L    CO2 35 (H) 21 - 32 mmol/L    Anion gap 3 (L) 7 - 16 mmol/L    Glucose 150 (H) 65 - 100 mg/dL    BUN 27 (H) 8 - 23 MG/DL    Creatinine 1.04 0.8 - 1.5 MG/DL    GFR est AA >60 >60 ml/min/1.73m2    GFR est non-AA >60 >60 ml/min/1.73m2    Calcium 8.5 8.3 - 10.4 MG/DL   GLUCOSE, POC    Collection Time: 10/26/21  7:43 AM   Result Value Ref Range    Glucose (POC) 142 (H) 65 - 100 mg/dL    Performed by [address] [place] [address], POC    Collection Time: 10/26/21  4:43 PM   Result Value Ref Range    Glucose (POC) 163 (H) 65 - 100 mg/dL    Performed by Merle    GLUCOSE, POC    Collection Time: 10/26/21  9:33 PM   Result Value Ref Range    Glucose (POC) 155 (H) 65 - 100 mg/dL    Performed by Hayde    CBC W/O DIFF    Collection Time: 10/27/21  5:40 AM   Result Value Ref Range    WBC 6.8 4.3 - 11.1 K/uL    RBC 4.49 4.23 - 5.6 M/uL    HGB 13.2 (L) 13.6 - 17.2 g/dL    HCT 41.2 41.1 - 50.3 %    MCV 91.8 79.6 - 97.8 FL    MCH 29.4 26.1 - 32.9 PG    MCHC 32.0 31.4 - 35.0 g/dL    RDW 14.9 (H) 11.9 - 14.6 %    PLATELET 673 539 - 269 K/uL    MPV 11.5 9.4 - 12.3 FL    ABSOLUTE NRBC 0.00 0.0 - 0.2 K/uL   METABOLIC PANEL, BASIC    Collection Time: 10/27/21  5:40 AM   Result Value Ref Range    Sodium 144 136 - 145 mmol/L    Potassium 3.3 (L) 3.5 - 5.1 mmol/L    Chloride 105 98 - 107 mmol/L    CO2 33 (H) 21 - 32 mmol/L    Anion gap 6 (L) 7 - 16 mmol/L    Glucose 146 (H) 65 - 100 mg/dL    BUN 21 8 - 23 MG/DL    Creatinine 0.99 0.8 - 1.5 MG/DL    GFR est AA >60 >60 ml/min/1.73m2    GFR est non-AA >60 >60 ml/min/1.73m2    Calcium 8.6 8.3 - 10.4 MG/DL   GLUCOSE, POC    Collection Time: 10/27/21  7:08 AM   Result Value Ref Range    Glucose (POC) 141 (H) 65 - 100 mg/dL    Performed by CourBtyson(Deneen)CNA    GLUCOSE, POC    Collection Time: 10/27/21 11:35 AM   Result Value Ref Range    Glucose (POC) 242 (H) 65 - 100 mg/dL    Performed by ValeryrBtyson(Deneen)CNA        Assessment:     Problem List as of 10/27/2021 Date Reviewed: 10/26/2021        Codes Class Noted - Resolved    * (Principal) Physical debility ICD-10-CM: R53.81  ICD-9-CM: 799.3  10/26/2021 - Present        E-coli UTI ICD-10-CM: N39.0, B96.20  ICD-9-CM: 599.0, 041.49  10/24/2021 - Present        Acute respiratory failure with hypoxia (HCC) ICD-10-CM: J96.01  ICD-9-CM: 518.81  10/23/2021 - Present        [facility] ICD-10-CM: N17.9  ICD-9-CM: 584.9  10/22/2021 - Present        CKD (chronic kidney disease) stage 3, GFR 30-59 ml/min (HCC) ICD-10-CM: N18.30  ICD-9-CM: 585.3  10/22/2021 - Present        Pneumonia of right lower lobe due to infectious organism ICD-10-CM: J18.9  ICD-9-CM: 486  10/21/2021 - Present        Fluid overload ICD-10-CM: E87.70  ICD-9-CM: 276.69  10/21/2021 - Present        Hypoxia ICD-10-CM: R09.02  ICD-9-CM: 799.02  10/21/2021 - Present        CAP (community acquired pneumonia) ICD-10-CM: J18.9  ICD-9-CM: 274  10/21/2021 - Present        Thyroid nodule ICD-10-CM: E04.1  ICD-9-CM: 241.0  8/5/2021 - Present        Type 2 diabetes mellitus with hyperglycemia, without long-term current use of insulin (HCC) ICD-10-CM: E11.65  ICD-9-CM: 250.00, 790.29  2/26/2021 - Present        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) ICD-10-CM: I63.9  ICD-9-CM: 434.91  2/12/2019 - Present        Essential hypertension, benign ICD-10-CM: I10  ICD-9-CM: 401.1  1/3/2014 - Present        Mixed hyperlipidemia ICD-10-CM: E78.2  ICD-9-CM: 272.2  1/3/2014 - Present        RESOLVED: Hypokalemia ICD-10-CM: E87.6  ICD-9-CM: 276.8  10/23/2021 - 10/25/2021        RESOLVED: Sepsis ([facility] ICD-10-CM: A41.9  ICD-9-CM: 038.9, 995.91  10/22/2021 - 10/25/2021        RESOLVED: Acute prostatitis without hematuria ICD-10-CM: N41.0  ICD-9-CM: 601.0  10/21/2021 - 10/25/2021        RESOLVED: Cerebellar stroke ([facility] ICD-10-CM: I63.9  ICD-9-CM: 434.91  6/14/2020 - 10/26/2021        RESOLVED: Urinary retention ICD-10-CM: R33.9  ICD-9-CM: 788.20  6/8/2020 - 9/17/2020        RESOLVED: Stroke (cerebrum) ([facility] ICD-10-CM: I63.9  ICD-9-CM: 434.91  6/8/2020 - 2/26/2021        RESOLVED: Right thalamic infarction [facility]) ICD-10-CM: I63.9  ICD-9-CM: 434.91  7/2/2019 - 10/26/2021              Plan / Recommendations / Medical Decision Making:      Daily physician / PA medical management:     Physical debility - admission for sepsis, acute prostatitis, E. coli UTI and RLL pneumonia; finished ABX but with residual weakness. Aggressive PT, OT.      Recent sepsis - completed IV ABX, WBC normalized, remains afebrile. Monitor.     GRACE, acute kidney injury - creatinine up to 2.54 (10/23), down to 1.04 by [facility] admission 10/26. Monitor and hold metformin ER if Cr trends upward.     Pneumonia, recent RLL CAP - completed ceftriaxone / azithromycin.  O2 needs diminished; he has been weaned to room air. Incentive spirometer every hour while awake.     Prostatitis, E coli UTI - completed ceftriaxone / azithromycin. S/p UroLift procedure 9/2020. Continue on doxazosin, finasteride; monitor for urinary retention. Check post-void residual as needed; in-and-out catheter if post-void residual is more than 400ml.  -voiding well     Hypertension - BP fluctuating, managed medically on lisinopril, HCTZ with help from doxazosin. sBP excursions into 160-180s, dBP all <90. PRN hydralazine but may need to add another agent.  -10/27 132-187/62-75; on 10 bid of Lisinopril/12.5 of HCTZ, Cardura 8mg , add Norvasc 5mg     Diabetes mellitus - most recent HgbA1c 8.1% (6/30/2021), poor glycemic control. Will require ACHS fasting glucose monitoring and medication adjustment to optimize glycemic control in view of recent acute illness and hospitalization. DM diet, Humalog SSI; metformin ER had been held in acute setting during GRACE but will resume since renal function has normalized. Monitor.   -10/27 -242; poorly controlled over time. Just restarted Metformin yesterday, cont SSI; adjust as needed     Hyperlipidemia - continue Lipitor 80mg daily.     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 Continue Plavix 75mg daily.     Pain control - stable, mild-to-moderate joint symptoms intermittently, reasonably well controlled by PRN meds. Will require regular pain assessment and comprehensive pain management. Has long-standing left shoulder / neck pain treated by chiropractor.     Electrolyte abnormality - hypoK+ to 3.0 (10/26); replace and monitor. Will give KCl 20mEq x 4 doses. -K + 3.3 today, cont to monitor     DVT risk / DVT prophylaxis - daily physician / PA exam to assess as patient is at increased risk for of thromboembolism. Mobilize as tolerated.  Sequential pneumatic compression devices (SCDs) when in bed; thigh-high or knee-high thromboembolic deterrent hose when out of bed. Lovenox subcutaneously daily.      GI prophylaxis - consider PPI. At times may need additional antacids, Maalox prn.     Depression - continue on Zoloft; has good support system. Monitor.     General skin care / wound prevention - monitor general skin wound status daily per staff and physician / PA. At risk for failure. Will require 24/7 rehab nursing.     Bowel program - at risk for constipation as a side effect of opioids, other medications, impaired mobility, etc. MiraLAX daily for regularity, Meghan-Colace for stool softener. PRN MOM, bisacodyl suppository or tablets for constipation.       Time spent was 25 minutes with over 1/2 in direct patient care/examination, consultation and coordination of care.      Signed By: Anisha Saab MD     October 27, 2021

## 2021-10-28 LAB
GLUCOSE BLD STRIP.AUTO-MCNC: 106 MG/DL (ref 65–100)
GLUCOSE BLD STRIP.AUTO-MCNC: 144 MG/DL (ref 65–100)
GLUCOSE BLD STRIP.AUTO-MCNC: 155 MG/DL (ref 65–100)
GLUCOSE BLD STRIP.AUTO-MCNC: 195 MG/DL (ref 65–100)
SERVICE CMNT-IMP: ABNORMAL

## 2021-10-28 PROCEDURE — 74011250637 HC RX REV CODE- 250/637: Performed by: PHYSICIAN ASSISTANT

## 2021-10-28 PROCEDURE — 97116 GAIT TRAINING THERAPY: CPT

## 2021-10-28 PROCEDURE — 97530 THERAPEUTIC ACTIVITIES: CPT

## 2021-10-28 PROCEDURE — 97110 THERAPEUTIC EXERCISES: CPT

## 2021-10-28 PROCEDURE — 97535 SELF CARE MNGMENT TRAINING: CPT

## 2021-10-28 PROCEDURE — 97112 NEUROMUSCULAR REEDUCATION: CPT

## 2021-10-28 PROCEDURE — 74011636637 HC RX REV CODE- 636/637: Performed by: PHYSICIAN ASSISTANT

## 2021-10-28 PROCEDURE — 99232 SBSQ HOSP IP/OBS MODERATE 35: CPT | Performed by: PHYSICAL MEDICINE & REHABILITATION

## 2021-10-28 PROCEDURE — 74011250637 HC RX REV CODE- 250/637: Performed by: PHYSICAL MEDICINE & REHABILITATION

## 2021-10-28 PROCEDURE — 82962 GLUCOSE BLOOD TEST: CPT

## 2021-10-28 PROCEDURE — 65310000000 HC RM PRIVATE REHAB

## 2021-10-28 PROCEDURE — 74011250636 HC RX REV CODE- 250/636: Performed by: PHYSICIAN ASSISTANT

## 2021-10-28 RX ORDER — LISINOPRIL 5 MG/1
15 TABLET ORAL DAILY
Status: DISCONTINUED | OUTPATIENT
Start: 2021-10-29 | End: 2021-10-30

## 2021-10-28 RX ORDER — LOPERAMIDE HYDROCHLORIDE 2 MG/1
4 CAPSULE ORAL ONCE
Status: COMPLETED | OUTPATIENT
Start: 2021-10-28 | End: 2021-10-28

## 2021-10-28 RX ORDER — LOPERAMIDE HYDROCHLORIDE 2 MG/1
2 CAPSULE ORAL AS NEEDED
Status: DISCONTINUED | OUTPATIENT
Start: 2021-10-28 | End: 2021-11-12 | Stop reason: HOSPADM

## 2021-10-28 RX ORDER — LISINOPRIL 5 MG/1
10 TABLET ORAL EVERY EVENING
Status: DISCONTINUED | OUTPATIENT
Start: 2021-10-29 | End: 2021-10-30

## 2021-10-28 RX ADMIN — LISINOPRIL 10 MG: 5 TABLET ORAL at 08:22

## 2021-10-28 RX ADMIN — DOXAZOSIN 8 MG: 4 TABLET ORAL at 21:20

## 2021-10-28 RX ADMIN — METFORMIN HYDROCHLORIDE 1000 MG: 500 TABLET, EXTENDED RELEASE ORAL at 17:44

## 2021-10-28 RX ADMIN — FINASTERIDE 5 MG: 5 TABLET, FILM COATED ORAL at 08:22

## 2021-10-28 RX ADMIN — CLOPIDOGREL BISULFATE 75 MG: 75 TABLET ORAL at 08:22

## 2021-10-28 RX ADMIN — ENOXAPARIN SODIUM 40 MG: 40 INJECTION SUBCUTANEOUS at 08:22

## 2021-10-28 RX ADMIN — SERTRALINE 50 MG: 50 TABLET, FILM COATED ORAL at 08:22

## 2021-10-28 RX ADMIN — AMLODIPINE BESYLATE 5 MG: 5 TABLET ORAL at 08:38

## 2021-10-28 RX ADMIN — POTASSIUM CHLORIDE 20 MEQ: 20 TABLET, EXTENDED RELEASE ORAL at 08:22

## 2021-10-28 RX ADMIN — INSULIN LISPRO 2 UNITS: 100 INJECTION, SOLUTION INTRAVENOUS; SUBCUTANEOUS at 08:18

## 2021-10-28 RX ADMIN — HYDROCHLOROTHIAZIDE 12.5 MG: 12.5 CAPSULE ORAL at 08:22

## 2021-10-28 RX ADMIN — ATORVASTATIN CALCIUM 80 MG: 80 TABLET, FILM COATED ORAL at 08:22

## 2021-10-28 RX ADMIN — LOPERAMIDE HYDROCHLORIDE 4 MG: 2 CAPSULE ORAL at 16:05

## 2021-10-28 RX ADMIN — INSULIN LISPRO 2 UNITS: 100 INJECTION, SOLUTION INTRAVENOUS; SUBCUTANEOUS at 17:44

## 2021-10-28 NOTE — PROGRESS NOTES
Time In 0835   Time Out 1030     Mobility   Score Comments   Sit to Stand 4: Supervision or touching A S   Transfer Assist 4: Supervision or touching A Transfer Type: SPT   Equipment: Cane   Comments: Close SBA     Activities of Daily Living    Score Comments   Eating 6: Independent    Oral Hyigene 6: Independent    Bathing 4: Supervision or touching A Type of Shower: Shower  Position: Standing PRN and Unsupported Sitting   Adaptive  Equipment: Tub Transfer Bench, Grab Bars and Long Handled Sponge  Comments: cues to use LHS to wash bottom, S for safety in stance   Upper Body  Dressing 5: S/U or clean-up assist Items Applied: Pullover  Position: Unsupported Sitting  Comments:    Lower Body Dressing 4: Supervision or touching A Items Applied: Underwear and Elastic pants  Position: Standing PRN and Unsupported Sitting  Adaptive Equipment: cane  Comments: SBA   Donning/[place] Footwear 4: Supervision or touching A Items Applied: Shoes with fasteners   Adaptive Equipment: Foot Funnel and Reacher  Comments: cues for using foot funnel and reacher to don shoes   Education  AE/DME training and functional transfers       S: \"I can tell my arms are sore from the stretches. \" Agreeable to therapy. Patient was able to ambulate ~10 feet x2 using a SPC with SBA. Pt completed morning ADLs with quality indicators reflected in chart above. Pt transitioned to gym and completed bilateral shoulder extension exercises reaching posteriorly to drop basketballs into basket. Pt requires assistance to reach end ranges of shoulder extension with L shoulder demonstrating greater ROM than R shoulder. Pt also presents with a midline shift from previous (17 mos ago) right cerebellar stroke. Midline shift is impacting shoulder alignment as well as shoulder extension tightness. Pt completed a 60 piece jigsaw puzzle to increase UE strength, visual perceptual skills, coordination, and problem solving.  Pt completed puzzle with moderate assistance while seated on table top with bilateral 2 lbs wrist weights donned. Patient completed x 10 minutes UBE on medium resistance using BUEs seated in wheelchair. Patient completed in backward rotation working on shoulder rotation. Pain Pt reported no pain. .  Patient Vitals for the past 12 hrs:   Temp Pulse Resp BP SpO2   10/28/21 0710 97.5 °F (36.4 °C) 92 16 (!) 141/68 95 %       Collaborated with PT regarding patient performance, POC, midline shift, and shoulder extension ROM deficits. Patient tolerated session well, but activity tolerance, balance, functional mobility, strength, coordination, cognition are still below baseline and require skilled facilitation to successfully and safely complete ADLs and transfers. Patient ended session seated in wc and with PT assuming care.       JEEVAN Olivia/LUPE  10/28/2021

## 2021-10-28 NOTE — PROGRESS NOTES
Amarilys Pérez MD  Medical Director  [address] [facility], [address] [facility]  Tel: [phone]       [facility] PROGRESS NOTE    Taylor Cunha  Admit Date: 10/26/2021  Admit Diagnosis:   Physical debility [R53.81]    Subjective     Patient seen and examined after lunch. Had symptomatic hypotension late AM after first dose amlodipine 5mg; reports he is accustomed to sBP in 150s. Otherwise, he denies dyspnea, palpitations, CP, HA. Reports mild anorexia (\"But I ate my salad and dessert. \"), voiding without issue. Reports intermittent loose stools, last responded to Imodium; suspect this is from restarting metformin at [facility] admission.     Objective:     Current Facility-Administered Medications   Medication Dose Route Frequency    amLODIPine (NORVASC) tablet 5 mg  5 mg Oral DAILY    atorvastatin (LIPITOR) tablet 80 mg  80 mg Oral DAILY    clopidogreL (PLAVIX) tablet 75 mg  75 mg Oral DAILY    doxazosin (CARDURA) tablet 8 mg  8 mg Oral QHS    finasteride (PROSCAR) tablet 5 mg  5 mg Oral DAILY    hydrALAZINE (APRESOLINE) tablet 25 mg  25 mg Oral Q6H PRN    hydroCHLOROthiazide (MICROZIDE) capsule 12.5 mg  12.5 mg Oral DAILY    insulin lispro (HUMALOG) injection   SubCUTAneous AC&HS    lisinopriL (PRINIVIL, ZESTRIL) tablet 10 mg  10 mg Oral BID    metFORMIN ER (GLUCOPHAGE XR) tablet 1,000 mg  1,000 mg Oral DAILY WITH DINNER    sertraline (ZOLOFT) tablet 50 mg  50 mg Oral DAILY    enoxaparin (LOVENOX) injection 40 mg  40 mg SubCUTAneous Q24H    influenza vaccine 2021-22 (6 mos+)(PF) (FLUARIX/FLULAVAL/FLUZONE QUAD) injection 0.5 mL  1 Each IntraMUSCular PRIOR TO DISCHARGE    acetaminophen (TYLENOL) tablet 650 mg  650 mg Oral Q6H PRN    bisacodyL (DULCOLAX) tablet 5 mg  5 mg Oral DAILY PRN    Or    bisacodyL (DULCOLAX) suppository 10 mg  10 mg Rectal DAILY PRN    Or    magnesium hydroxide (MILK OF MAGNESIA) 400 mg/5 mL oral suspension 30 mL 30 mL Oral DAILY PRN    traZODone (DESYREL) tablet 50 mg  50 mg Oral QHS PRN       Review of Systems:   Denies cough, visual problems, abdominal pain, dysuria, calf pain. Pertinent positives are as noted in the HPI, ROS unremarkable otherwise. Visit Vitals  BP (!) 99/50   Pulse 78   Temp 97.5 °F (36.4 °C)   Resp 16   SpO2 95%        Physical Exam:   General: Alert, appropriately oriented. OOB in recliner with feet elevated. HEENT: Normocephalic, EOM intact. Oral mucosa moist.   Lungs: Clear to auscultation bilaterally. Respirations even and unlabored. Heart: Regular rate and rhythm. .  No appreciable murmur. Abdomen: Soft, non-tender, obese and protuberant but not distended. Bowel sounds normoactive, no organomegaly but proper exam precluded by obesity. Genitourinary: Deferred. Neuromuscular:      No gross focal neuro deficits. UE strength at biceps, triceps, finger flexion 5/5 (B) and symmetric. B LE strength at hip flexion 4-/5, knee flexion 4/5, ankle DF/PF 4/5. Speech clear, thoughts cogent. Skin/extremity: No rashes, no erythema. Calves soft, non-tender B LE. No edema, pulses 2+. Functional Assessment:  Balance  Sitting - Static: Good (unsupported) (10/28/21 1300)  Sitting - Dynamic: Fair (occasional) (10/28/21 1300)  Standing - Static: Fair (10/28/21 1300)  Standing - Dynamic : Impaired (10/28/21 1300)         Fall Risk Assessment:    Fall Risk  Mobility: Ambulates or transfers with assist devices or assistance (10/28/21 0754)  Mobility Interventions: Patient to call before getting OOB;Utilize walker, cane, or other assistive device (10/28/21 0754)  Mentation: Alert, oriented x 3 (10/28/21 0754)  Medication: Patient receiving anticonvulsants, sedatives(tranquilizers), psychotropics or hypnotics, hypoglycemics, narcotics, sleep aids, antihypertensives, laxatives, or diuretics (10/28/21 0754)  Medication Interventions: Patient to call before getting OOB; Teach patient to arise slowly (10/28/21 0754)  Elimination: Needs assistance with toileting (10/28/21 0754)  Elimination Interventions: Call light in reach; Patient to call for help with toileting needs (10/28/21 0754)  Prior Fall History: Before admission in past 12 months _home or previous inpatient care) (10/28/21 0754)  History of Falls Interventions: Consult care management for discharge planning;Door open when patient unattended (10/28/21 0754)  Total Score: 4 (10/28/21 0754)  High Fall Risk: Yes (10/28/21 0754)     Ambulation:  Gait  Distance (ft): 100 Feet (ft) (x2) (10/28/21 1300)  Assistive Device: Cane, straight (10/28/21 1300)  Rail Use: Both (10/26/21 1500)     Labs/Studies:  Recent Results (from the past 72 hour(s))   ECHO ADULT COMPLETE    Collection Time: 10/25/21  2:48 PM   Result Value Ref Range    LV ED Vol A2C 97.00 cm3    LV ED Vol A4C 148.00 cm3    LV ES Vol A2C 19.50 cm3    LV ES Vol A4C 44.30 cm3    IVSd 1.05 (A) 0.60 - 1.00 cm    LVIDd 4.01 (A) 4.20 - 5.90 cm    LVIDs 2.95 cm    LVOT d 1.94 cm    LVOT VTI 33.20 cm    LVOT Peak Gradient 11.00 mmHg    LVPWd 1.09 (A) 0.60 - 1.00 cm    LV E' Lateral Velocity 5.61 cm/s    LV E' Septal Velocity 6.38 cm/s    Left Atrium Minor Axis 2.35 cm    Left Atrium Major Axis 5.54 cm    LA Area 2C 21.00 cm2    LA Area 4C 23.90 cm2    Aortic Valve Systolic Mean Gradient 5.53 mmHg    AoV VTI 38.00 cm    Aortic Valve Systolic Peak Velocity 647.87 cm/s    AoV PG 17.00 mmHg    Aortic Valve Area by Continuity of VTI 2.59 cm2    Aortic Valve Area by Continuity of Peak Velocity 2.40 cm2    Ao Root D 3.56 cm    Mitral Valve E Wave Deceleration Time 333.00 ms    MV A Jorge 103.00 cm/s    MV E Jorge 69.80 cm/s    MV Mean Gradient 2.00 mmHg    Mitral Valve Annulus Velocity Time Integral 31.30 cm    Mitral Valve Max Velocity 112.00 cm/s    MV Peak Gradient 5.00 mmHg    Tapse 2.31 (A) 1.50 - 2.00 cm    MV E/A 0.68     LV Mass .5 88.0 - 224.0 g    LV Mass AL Index 59.5 49.0 - 115.0 g/m2 E/E' lateral 12.44     E/E' septal 10.94     E/E' ratio (averaged) 11.69     LILLIE/BSA Pk Jorge 1.0 cm2/m2    LILLIE/BSA VTI 1.1 cm2/m2   GLUCOSE, POC    Collection Time: 10/25/21  4:13 PM   Result Value Ref Range    Glucose (POC) 175 (H) 65 - 100 mg/dL    Performed by [address] [place] [address], POC    Collection Time: 10/25/21  8:46 PM   Result Value Ref Range    Glucose (POC) 148 (H) 65 - 100 mg/dL    Performed by Gia    CBC W/O DIFF    Collection Time: 10/26/21  7:02 AM   Result Value Ref Range    WBC 6.0 4.3 - 11.1 K/uL    RBC 4.28 4.23 - 5.6 M/uL    HGB 12.6 (L) 13.6 - 17.2 g/dL    HCT 39.7 (L) 41.1 - 50.3 %    MCV 92.8 79.6 - 97.8 FL    MCH 29.4 26.1 - 32.9 PG    MCHC 31.7 31.4 - 35.0 g/dL    RDW 14.9 (H) 11.9 - 14.6 %    PLATELET 926 766 - 508 K/uL    MPV 11.8 9.4 - 12.3 FL    ABSOLUTE NRBC 0.00 0.0 - 0.2 K/uL   METABOLIC PANEL, BASIC    Collection Time: 10/26/21  7:02 AM   Result Value Ref Range    Sodium 143 138 - 145 mmol/L    Potassium 3.0 (L) 3.5 - 5.1 mmol/L    Chloride 105 98 - 107 mmol/L    CO2 35 (H) 21 - 32 mmol/L    Anion gap 3 (L) 7 - 16 mmol/L    Glucose 150 (H) 65 - 100 mg/dL    BUN 27 (H) 8 - 23 MG/DL    Creatinine 1.04 0.8 - 1.5 MG/DL    GFR est AA >60 >60 ml/min/1.73m2    GFR est non-AA >60 >60 ml/min/1.73m2    Calcium 8.5 8.3 - 10.4 MG/DL   GLUCOSE, POC    Collection Time: 10/26/21  7:43 AM   Result Value Ref Range    Glucose (POC) 142 (H) 65 - 100 mg/dL    Performed by [address] [place] [address], POC    Collection Time: 10/26/21  4:43 PM   Result Value Ref Range    Glucose (POC) 163 (H) 65 - 100 mg/dL    Performed by Merle    GLUCOSE, POC    Collection Time: 10/26/21  9:33 PM   Result Value Ref Range    Glucose (POC) 155 (H) 65 - 100 mg/dL    Performed by Hayde    CBC W/O DIFF    Collection Time: 10/27/21  5:40 AM   Result Value Ref Range    WBC 6.8 4.3 - 11.1 K/uL    RBC 4.49 4.23 - 5.6 M/uL    HGB 13.2 (L) 13.6 - 17.2 g/dL    HCT 41.2 41.1 - 50.3 %    MCV 91.8 79.6 - 97.8 FL    MCH 29.4 26.1 - 32.9 PG    MCHC 32.0 31.4 - 35.0 g/dL    RDW 14.9 (H) 11.9 - 14.6 %    PLATELET 541 913 - 008 K/uL    MPV 11.5 9.4 - 12.3 FL    ABSOLUTE NRBC 0.00 0.0 - 0.2 K/uL   METABOLIC PANEL, BASIC    Collection Time: 10/27/21  5:40 AM   Result Value Ref Range    Sodium 144 136 - 145 mmol/L    Potassium 3.3 (L) 3.5 - 5.1 mmol/L    Chloride 105 98 - 107 mmol/L    CO2 33 (H) 21 - 32 mmol/L    Anion gap 6 (L) 7 - 16 mmol/L    Glucose 146 (H) 65 - 100 mg/dL    BUN 21 8 - 23 MG/DL    Creatinine 0.99 0.8 - 1.5 MG/DL    GFR est AA >60 >60 ml/min/1.73m2    GFR est non-AA >60 >60 ml/min/1.73m2    Calcium 8.6 8.3 - 10.4 MG/DL   GLUCOSE, POC    Collection Time: 10/27/21  7:08 AM   Result Value Ref Range    Glucose (POC) 141 (H) 65 - 100 mg/dL    Performed by Sweetie)CNA    GLUCOSE, POC    Collection Time: 10/27/21 11:35 AM   Result Value Ref Range    Glucose (POC) 242 (H) 65 - 100 mg/dL    Performed by Sweetie)CNA    GLUCOSE, POC    Collection Time: 10/27/21  4:19 PM   Result Value Ref Range    Glucose (POC) 128 (H) 65 - 100 mg/dL    Performed by Sweetie)CNA    GLUCOSE, POC    Collection Time: 10/27/21  9:31 PM   Result Value Ref Range    Glucose (POC) 152 (H) 65 - 100 mg/dL    Performed by Katrina    GLUCOSE, POC    Collection Time: 10/28/21  8:00 AM   Result Value Ref Range    Glucose (POC) 155 (H) 65 - 100 mg/dL    Performed by Kiara Zavala, POC    Collection Time: 10/28/21 11:58 AM   Result Value Ref Range    Glucose (POC) 144 (H) 65 - 100 mg/dL    Performed by AshleySylviaRN        Assessment:     Problem List as of 10/28/2021 Date Reviewed: 10/26/2021        Codes Class Noted - Resolved    * (Principal) Physical debility ICD-10-CM: R53.81  ICD-9-CM: 799.3  10/26/2021 - Present        E-coli UTI ICD-10-CM: N39.0, B96.20  ICD-9-CM: 599.0, 041.49  10/24/2021 - Present        GR[facility] (acute [facility]) ([facility] ICD-10-CM: N17.9  ICD-9-CM: 584.9 10/22/2021 - Present        Pneumonia of right lower lobe due to infectious organism ICD-10-CM: J18.9  ICD-9-CM: 486  10/21/2021 - Present        Controlled type 2 diabetes mellitus without complication, without long-term current use of insulin (HCC) ICD-10-CM: E11.9  ICD-9-CM: 250.00  2/22/2019 - Present        Essential hypertension, benign ICD-10-CM: I10  ICD-9-CM: 401.1  1/3/2014 - Present        Cerebrovascular accident (CVA) due to occlusion [facility]) ICD-10-CM: I63.9  ICD-9-CM: 434.91  2/12/2019 - Present        Mixed hyperlipidemia ICD-10-CM: E78.2  ICD-9-CM: 272.2  1/3/2014 - Present        Severe obesity ([facility] ICD-10-CM: E66.01  ICD-9-CM: 278.01  7/2/2019 - Present        Acute respiratory failure with hypoxia ([facility] ICD-10-CM: J96.01  ICD-9-CM: 518.81  10/23/2021 - Present        CKD (chronic kidney disease) stage 3, GFR 30-59 ml/min ([facility]) ICD-10-CM: N18.30  ICD-9-CM: 585.3  10/22/2021 - Present        Fluid overload ICD-10-CM: E87.70  ICD-9-CM: 276.69  10/21/2021 - Present        Hypoxia ICD-10-CM: R09.02  ICD-9-CM: 799.02  10/21/2021 - Present        CAP (community acquired pneumonia) ICD-10-CM: J18.9  ICD-9-CM: 852  10/21/2021 - Present        Thyroid nodule ICD-10-CM: E04.1  ICD-9-CM: 241.0  8/5/2021 - Present        Type 2 diabetes mellitus with hyperglycemia, without long-term current use of insulin ([facility] ICD-10-CM: E11.65  ICD-9-CM: 250.00, 790.29  2/26/2021 - Present        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  ICD-9-CM: 434.91  6/14/2020 - Present        Numbness and tingling in left hand ICD-10-CM: R20.0, R20.2  ICD-9-CM: 782.0  7/2/2019 - Present        RESOLVED: Hypokalemia ICD-10-CM: E87.6  ICD-9-CM: 276.8  10/23/2021 - 10/25/2021        RESOLVED: Sepsis ([facility] ICD-10-CM: A41.9  ICD-9-CM: 038.9, 995.91  10/22/2021 - 10/25/2021        RESOLVED: Acute prostatitis without hematuria ICD-10-CM: N41.0  ICD-9-CM: 601.0  10/21/2021 - 10/25/2021        RESOLVED: Cerebellar stroke ([facility] ICD-10-CM: I63.9  ICD-9-CM: 434.91  6/14/2020 - 10/26/2021        RESOLVED: Urinary retention ICD-10-CM: R33.9  ICD-9-CM: 788.20  6/8/2020 - 9/17/2020        RESOLVED: Stroke (cerebrum) ([facility] ICD-10-CM: I63.9  ICD-9-CM: 434.91  6/8/2020 - 2/26/2021        RESOLVED: Right thalamic infarction [facility]) ICD-10-CM: I63.9  ICD-9-CM: 434.91  7/2/2019 - 10/26/2021            Plan / Recommendations / Medical Decision Making:      Continue daily physician / PA medical management:     Physical debility - admission for sepsis, acute prostatitis, E. coli UTI and RLL pneumonia; finished ABX but with residual weakness. Aggressive PT, OT.      Recent sepsis - completed IV ABX, WBC normalized, remains afebrile. Monitor.     GRACE, acute kidney injury - creatinine up to 2.54 (10/23), down to 1.04 by [facility] admission 10/26. Monitor and hold metformin ER if Cr trends upward.  -10/28 renal function normalized, Cr 0.99, BUN 21 on BMP yesterday     Pneumonia, recent RLL CAP - completed ceftriaxone / azithromycin. O2 needs diminished; he has been weaned to room air. Incentive spirometer every hour while awake.     Prostatitis, E coli UTI - completed ceftriaxone / azithromycin. S/p UroLift procedure 9/2020. Continue on doxazosin, finasteride; monitor for urinary retention. Check post-void residual as needed; in-and-out catheter if post-void residual is more than 400ml.  -voiding well     Hypertension - BP fluctuating, managed medically on lisinopril, HCTZ with help from doxazosin. sBP excursions into 160-180s, dBP all <90.  PRN hydralazine but may need to add another agent.  -10/27 132-187/62-75; on 10mg BID of lisinopril, 12.5mg of HCTZ, Cardura 8mg; add Norvasc 5mg  -10/28 /68 this AM before meds, dropped to 99/50 and very symptomatic later in AM; stop amlodipine, increase AM lisinopril to 15mg, leave PM dose at 10mg, other meds same; will go more slowly, as patient accustomed to sBP >150     Diabetes mellitus - most recent HgbA1c 8.1% (6/30/2021), poor glycemic control. Will require ACHS fasting glucose monitoring and medication adjustment to optimize glycemic control in view of recent acute illness and hospitalization. DM diet, Humalog SSI; metformin ER had been held in acute setting during GRACE but will resume since renal function has normalized. Monitor.   -10/27 -242; poorly controlled over time; just restarted metformin yesterday, cont SSI; adjust as needed  -10/28 BS <160, continue current regimen     Hyperlipidemia - continue Lipitor 80mg daily.     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 Continue Plavix 75mg daily.     Pain control - stable, mild-to-moderate joint symptoms intermittently, reasonably well controlled by PRN meds. Will require regular pain assessment and comprehensive pain management. Has long-standing left shoulder / neck pain treated by chiropractor.     Electrolyte abnormality - hypoK+ to 3.0 (10/26); replace and monitor. Will give KCl 20mEq x 4 doses. -10/27 K+ 3.3 today, continue to monitor     DVT risk / DVT prophylaxis - daily physician / PA exam to assess as patient is at increased risk for of thromboembolism. Mobilize as tolerated. Sequential pneumatic compression devices (SCDs) when in bed; thigh-high or knee-high thromboembolic deterrent hose when out of bed. Lovenox subcutaneously daily.      GI prophylaxis - consider PPI. At times may need additional antacids, Maalox prn.     Depression - continue on Zoloft; has good support system. Monitor.     General skin care / wound prevention - monitor general skin wound status daily per staff and physician / PA. At risk for failure. Will require 24/7 rehab nursing.     Bowel program - at risk for constipation as a side effect of opioids, other medications, impaired mobility, etc. MiraLAX daily for regularity, Meghan-Colace for stool softener.  PRN MOM, bisacodyl suppository or tablets for constipation.  -10/28 occasional loose stools, suspect this is from restarting metformin ER; PRN loperamide         Time spent was 25 minutes with over 1/2 in direct patient care/examination, consultation and coordination of care. Signed By: Diane Blanco PA-C    October 28, 2021      Physician Assistant with [facility]  Lilian Rodriguez MD, Medical Director

## 2021-10-28 NOTE — PROGRESS NOTES
PHYSICAL THERAPY DAILY NOTE  Time In: 1030  Time Out: 0133  Patient Seen For: AM;Balance activities;Gait training; Therapeutic exercise;Transfer training    Subjective: \"I don't think my walking is as good as it was before I got sick. \"         Objective: Other (comment) (fall precautions)  GROSS ASSESSMENT Daily Assessment    Pt. Postures in sitting w/ L sided shift w/ shoulders deviated mor than pelvis. Placed small towel wedge under L cushion to promote more midline positioning       COGNITION Daily Assessment    Pt. W/ decreased carry over of therapeutic corrective techniques as well as decreased insight into deficits & need to pace activities       BED/MAT MOBILITY Daily Assessment    Rolling Right : 0 (Not tested)  Rolling Left : 0 (Not tested)  Supine to Sit : 0 (Not tested)  Sit to Supine : 0 (Not tested)       TRANSFERS Daily Assessment   close supervision for balance Transfer Type: Other (SPT w/ SPC)  Transfer Assistance : 5 (Supervision/setup)  Sit to Stand Assistance: Supervision  Car Transfers: Not tested       GAIT Daily Assessment   Third gait attempt pt. Amb. x40' & experienced a LOB requiring min A to correct. Assisted to seated position secondary to c/o dizziness. Amount of Assistance: 4 (Contact guard assistance)  Distance (ft): 100 Feet (ft) (x2)  Assistive Device: Cane, straight   First gait trial performed w/ SPC in L hand, second gait trial performed w/ SPC in R hand       STEPS or STAIRS Daily Assessment    Steps/Stairs Ambulated (#): 0  Level of Assist : 0 (Not tested)       BALANCE Daily Assessment   Pt. Performed dynamic standing balance/pre-gait training activities to improve R sided weight shift during L LE swing phase of gait as well as to improve midline control during R LE stepping.   Pt. Performed w/ frequent rest breaks & S-CGA:  Lateral weight shifting  Single LE stepping  Single LE stepping on to 6\" step  Alternate LE stepping Sitting - Static: Good (unsupported)  Sitting - Dynamic: Fair (occasional)  Standing - Static: Fair  Standing - Dynamic : Impaired       WHEELCHAIR MOBILITY Daily Assessment    Able to Propel (ft): 0 feet  Curbs/Ramps Assist Required (FIM Score): 0 (Not tested)  Wheelchair Setup Assist Required : 0 (Not tested)       LOWER EXTREMITY EXERCISES Daily Assessment    Pt. Performed NuStep @ resistance level 2 x10 minutes to increase strength & endurance B UEs & LEs     Vital Signs: At end of tx session after c/o dizziness pt. Assisted into recliner w/ BP 99/50 HR 78 (LEs elevated)    Pain level: no c/o pain    Patient education: discussed effects of sepsis on mobility, especially w/ PMH CVA; Discussed recommendation for [facility] use    Interdisciplinary Communication: discussed pt's postural deviation & balance impairments w/ OT;  RN made aware of BP reading & dizzy episode    Pt. Left up in recliner in NAD, call bell in reach         Assessment: Trial of gait w/ SPC in R versus L hand revealed improved gait pattern using SPC in R LE w/ increased step length & improved R sided weight shift during L LE swing phase of gait. Pt. Also w/ improved weight shift during balance training, but cont. W/ difficulty carrying over to functional gait. At end of session, pt. Experienced a sudden dizzy episode ambulating back to room for lunch, requiring assistance to chair, likely secondary to orthostatic hypotension. RN aware. Pt. Cont. To mobilize below his baseline & requires cont. PT to address. Plan of Care: Continue with POC and progress as tolerated.      Donal Hernandez, PT  10/28/2021

## 2021-10-29 LAB
GLUCOSE BLD STRIP.AUTO-MCNC: 130 MG/DL (ref 65–100)
GLUCOSE BLD STRIP.AUTO-MCNC: 151 MG/DL (ref 65–100)
GLUCOSE BLD STRIP.AUTO-MCNC: 176 MG/DL (ref 65–100)
GLUCOSE BLD STRIP.AUTO-MCNC: 184 MG/DL (ref 65–100)
SERVICE CMNT-IMP: ABNORMAL

## 2021-10-29 PROCEDURE — 74011250637 HC RX REV CODE- 250/637: Performed by: PHYSICIAN ASSISTANT

## 2021-10-29 PROCEDURE — 65310000000 HC RM PRIVATE REHAB

## 2021-10-29 PROCEDURE — 97535 SELF CARE MNGMENT TRAINING: CPT

## 2021-10-29 PROCEDURE — 97112 NEUROMUSCULAR REEDUCATION: CPT

## 2021-10-29 PROCEDURE — 97530 THERAPEUTIC ACTIVITIES: CPT

## 2021-10-29 PROCEDURE — 97116 GAIT TRAINING THERAPY: CPT

## 2021-10-29 PROCEDURE — 74011250636 HC RX REV CODE- 250/636: Performed by: PHYSICIAN ASSISTANT

## 2021-10-29 PROCEDURE — 74011636637 HC RX REV CODE- 636/637: Performed by: PHYSICIAN ASSISTANT

## 2021-10-29 PROCEDURE — 99232 SBSQ HOSP IP/OBS MODERATE 35: CPT | Performed by: PHYSICAL MEDICINE & REHABILITATION

## 2021-10-29 PROCEDURE — 82962 GLUCOSE BLOOD TEST: CPT

## 2021-10-29 PROCEDURE — 97150 GROUP THERAPEUTIC PROCEDURES: CPT

## 2021-10-29 RX ADMIN — ENOXAPARIN SODIUM 40 MG: 40 INJECTION SUBCUTANEOUS at 08:30

## 2021-10-29 RX ADMIN — ATORVASTATIN CALCIUM 80 MG: 80 TABLET, FILM COATED ORAL at 08:31

## 2021-10-29 RX ADMIN — DOXAZOSIN 8 MG: 4 TABLET ORAL at 21:01

## 2021-10-29 RX ADMIN — INSULIN LISPRO 2 UNITS: 100 INJECTION, SOLUTION INTRAVENOUS; SUBCUTANEOUS at 08:10

## 2021-10-29 RX ADMIN — FINASTERIDE 5 MG: 5 TABLET, FILM COATED ORAL at 08:31

## 2021-10-29 RX ADMIN — METFORMIN HYDROCHLORIDE 1000 MG: 500 TABLET, EXTENDED RELEASE ORAL at 17:49

## 2021-10-29 RX ADMIN — LISINOPRIL 15 MG: 5 TABLET ORAL at 08:31

## 2021-10-29 RX ADMIN — CLOPIDOGREL BISULFATE 75 MG: 75 TABLET ORAL at 08:31

## 2021-10-29 RX ADMIN — HYDROCHLOROTHIAZIDE 12.5 MG: 12.5 CAPSULE ORAL at 08:31

## 2021-10-29 RX ADMIN — INSULIN LISPRO 2 UNITS: 100 INJECTION, SOLUTION INTRAVENOUS; SUBCUTANEOUS at 11:55

## 2021-10-29 RX ADMIN — INSULIN LISPRO 2 UNITS: 100 INJECTION, SOLUTION INTRAVENOUS; SUBCUTANEOUS at 21:02

## 2021-10-29 RX ADMIN — LISINOPRIL 10 MG: 5 TABLET ORAL at 17:49

## 2021-10-29 RX ADMIN — SERTRALINE 50 MG: 50 TABLET, FILM COATED ORAL at 08:30

## 2021-10-29 NOTE — PROGRESS NOTES
PHYSICAL THERAPY DAILY NOTE  Time In: 6045  Time Out: 1859  Patient Seen For: AM;Balance activities;Gait training;Transfer training    Subjective: Pt. Reports he feels much better this AM.  No reports of dizziness. Still doesn't feel like he has much of an appetite. Objective: Other (comment) (fall precautions)    COGNITION Daily Assessment    Pt. W/ decreased insight into deficits. BED/MAT MOBILITY Daily Assessment    Rolling Right : 0 (Not tested)  Rolling Left : 0 (Not tested)  Supine to Sit : 0 (Not tested)  Sit to Supine : 0 (Not tested)       TRANSFERS Daily Assessment    Transfer Type: Other (SPT w/ SPC)  Transfer Assistance : 5 (Supervision/setup)  Sit to Stand Assistance: Supervision       GAIT Daily Assessment   Focusing on decreasing saravanan & maintaining core control. Amount of Assistance: 5 (Supervision/setup)  Distance (ft): 150 Feet (ft) (x1, 140'x1)  Assistive Device: Cane, straight       STEPS or STAIRS Daily Assessment    Steps/Stairs Ambulated (#): 0  Level of Assist : 0 (Not tested)       BALANCE Daily Assessment   Pt. Performed static standing balance x 5 minutes while performing B UE task, performed w/ supervision, increased sway but no LOB. Pt. Performed dynamic pre-gait balance training to improve weight shift & single leg stance stability during gait. Pt. Performed w/ CGA-min A to correct occasional LOB.   Alternating single LE stepping  Alternating single LE stepping up on to 6\" step Sitting - Static: Good (unsupported)  Sitting - Dynamic: Fair (occasional)  Standing - Static: Fair  Standing - Dynamic : Impaired       WHEELCHAIR MOBILITY Daily Assessment    Able to Propel (ft): 0 feet  Curbs/Ramps Assist Required (FIM Score): 0 (Not tested)  Wheelchair Setup Assist Required : 0 (Not tested)     Vital Signs: Extended / Orthostatic Vitals:  Patient Position 2: Sitting  BP 2: 133/53  Pulse 2: 68  Patient Position 3: Standing  BP 3: 123/44  Pulse 3: 66  Patient Position 4: Standing, Other (Comment) (x 5 minutes)  BP 4: 124/59  Pulse 4: 84    Pain level: no c/o pain    Patient education: pt. Educated on balance strategies to improve dynamic standing balance    Interdisciplinary Communication: collaborated w/ OT regarding pt's progress    Pt. Left up in recliner in NAD, call bell in reach. Assessment: Pt. Able to improve lateral weight shift during alternate stepping task. Needs cues to slow pace, especially w/ LOB. Gait quality improved w/ gait training, although pt. Fatigues with distances >100'. Benefits from cont. PT to improve endurance & balance w/ household ambulation. Plan of Care: Continue with POC and progress as tolerated.      Mariano Martins, PT  10/29/2021

## 2021-10-29 NOTE — PROGRESS NOTES
Purposeful hourly rounds completed this shift, needs met. No complaints of pain voiced. States \"I had a really good day today. \"  Continent voids on toilet. Calls appropriately for assistance. Very pleasant.

## 2021-10-29 NOTE — PROGRESS NOTES
OT Daily Note  Time In 1302   Time Out 1355     Subjective: \"I liked that game. \" Pt was agreeable to tx. Pain: No pain expressed. Patient Vitals for the past 8 hrs:   Temp Pulse Resp BP SpO2   10/29/21 0706 98 °F (36.7 °C) 71 16 (!) 149/74 96 %           Functional Mobility      Score Comments   Transfer Assist 4: Supervision or touching A Transfer Type: SPT   Equipment: N/A   Comments: CGA         Activity Tolerance, Coordination and Visual-Perceptual   While in seated position, pt engaged in sequence board game working on visual perceptual skills, visual scanning, functional reaching skills, attention to detail, and problem solving skills. Pt was able to visually scan board appropriately with MOD verbal cues, additionally able to follow rules of game MOD prompting. Pt required MOD assistance for problem solving skills while dual tasking throughout activity. Completed game within appropriate time parameters. Self-Care    Score Comments   Toilet Transfer 4: Supervision or touching A Transfer Type: SPT   Equipment: Grab Bars   Comments: [place] [address] 4: Supervision or touching A Output: Urine  Comments: S        Education   benefits of OT, energy conservation, safety awareness and functional transfers     Interdisciplinary Communication: Communicated with PT on handoff to ensure progress towards POC and goals. Summary of session: Pt demonstrated good participation in session. Pt benefited from increased cuing for sequence game play. Pt was left seated on NuStep with PT assuming care. Plan: Continue with POC.      Julito Hughes MS OTR/L  10/29/2021

## 2021-10-29 NOTE — PROGRESS NOTES
OT Daily Note    Time In 0814   Time Out 0912     Subjective: \"I can get there I just can't wipe. \" Pt was agreeable to tx. Pain:  No pain expressed. Patient Vitals for the past 8 hrs:   Temp Pulse Resp BP SpO2   10/29/21 0706 98 °F (36.7 °C) 71 16 (!) 149/74 96 %           Mobility   Score Comments   Sit to Stand 4: Supervision or touching A SBA   Transfer Assist 4: Supervision or touching A Transfer Type: SPT   Equipment: Cane   Comments: CGA     Activities of Daily Living    Score Comments   Eating 6: Independent    Bathing 4: Supervision or touching A Type of Shower: Shower  Position: Supported sitting and Standing PRN   Adaptive  Equipment: Tub Transfer Bench and Grab Bars  Comments: S   Upper Body  Dressing 5: S/U or clean-up assist Items Applied: Pullover  Position: Unsupported Sitting   Lower Body Dressing 4: Supervision or touching A Items Applied: Underwear and Elastic pants  Position: Standing PRN and Unsupported Sitting  Adaptive Equipment: N/A  Comments: S   Donning/[place] Footwear 5: S/U or clean-up assist Items Applied: Slip-on shoes  Adaptive Equipment: N/A   Toilet Transfer 4: Supervision or touching A Transfer Type: SPT   Equipment: Grab Bars   Comments: CGA   Toileting Hygiene 3: Partial/Moderate A Output: Urine + BM  Comments: A to wipe secondary to reach   Education  benefits of OT, energy conservation, safety awareness and functional transfers     Activity Tolerance, Coordination and Strengthening   Pt completed visual perceptual, reaching, and bilateral hand coordination task utilizing pipe tree while following visual design. Pt with good coordination skills noted while connecting pipe tree pieces onto design at midline. Pt with good reaching skills as well. Pt required no verbal and visual cues throughout for accuracy in matching visual design to constructed design. Pt completed activity in seated position at table top.      Pt engaged in blink card game working on attention, visual perceptual skills, problem solving, and STM skills. Pt was to match cards in hand to two center cards based on similarities in either color, shape, or number. Pt required demonstration for initially starting game. Pt only required min vc throughout game. Interdisciplinary Communication: Communicated with PT to ensure progress towards POC and goals. Summary of Session: Pt demonstrated good participation in OT tasks during this session. Pt's performance with ADL is reflected in above chart. Pt was left seated in recliner with call bell within reach and all needs met. Plan: Continue per OT POC.        Dennys Woodall MS OTR/L  10/29/2021

## 2021-10-29 NOTE — PROGRESS NOTES
Nicolasa Randall MD  Medical Director  [address] [facility], [address] [facility]  Tel: [phone]       [facility] PROGRESS NOTE    [address] Date: 10/26/2021  Admit Diagnosis:   Physical debility [R53.81]    Subjective     Patient seen and examined. No further loose stools after bm yesterday a.m , took one dose of imodium. Feeling well. No cp, sob, n/v. Slept well. Hypotension yesterday with norvasc, symptomatic; felt washed out per pt.      Objective:     Current Facility-Administered Medications   Medication Dose Route Frequency    lisinopriL (PRINIVIL, ZESTRIL) tablet 10 mg  10 mg Oral QPM    lisinopriL (PRINIVIL, ZESTRIL) tablet 15 mg  15 mg Oral DAILY    loperamide (IMODIUM) capsule 2 mg  2 mg Oral PRN    atorvastatin (LIPITOR) tablet 80 mg  80 mg Oral DAILY    clopidogreL (PLAVIX) tablet 75 mg  75 mg Oral DAILY    doxazosin (CARDURA) tablet 8 mg  8 mg Oral QHS    finasteride (PROSCAR) tablet 5 mg  5 mg Oral DAILY    hydrALAZINE (APRESOLINE) tablet 25 mg  25 mg Oral Q6H PRN    hydroCHLOROthiazide (MICROZIDE) capsule 12.5 mg  12.5 mg Oral DAILY    insulin lispro (HUMALOG) injection   SubCUTAneous AC&HS    metFORMIN ER (GLUCOPHAGE XR) tablet 1,000 mg  1,000 mg Oral DAILY WITH DINNER    sertraline (ZOLOFT) tablet 50 mg  50 mg Oral DAILY    enoxaparin (LOVENOX) injection 40 mg  40 mg SubCUTAneous Q24H    influenza vaccine 2021-22 (6 mos+)(PF) (FLUARIX/FLULAVAL/FLUZONE QUAD) injection 0.5 mL  1 Each IntraMUSCular PRIOR TO DISCHARGE    acetaminophen (TYLENOL) tablet 650 mg  650 mg Oral Q6H PRN    bisacodyL (DULCOLAX) tablet 5 mg  5 mg Oral DAILY PRN    Or    bisacodyL (DULCOLAX) suppository 10 mg  10 mg Rectal DAILY PRN    Or    magnesium hydroxide (MILK OF MAGNESIA) 400 mg/5 mL oral suspension 30 mL  30 mL Oral DAILY PRN    traZODone (DESYREL) tablet 50 mg  50 mg Oral QHS PRN       Review of Systems:   Denies chest pain, shortness of breath, cough, headache, visual problems, abdominal pain, dysuria, calf pain. Pertinent positives are as noted in the HPI, ROS unremarkable otherwise. Visit Vitals  BP (!) 147/61   Pulse 69   Temp 98.3 °F (36.8 °C)   Resp 16   SpO2 95%        Physical Exam:   General: Alert and age appropriately oriented. No acute cardiorespiratory distress. HEENT: Normocephalic, no scleral icterus. Oral mucosa moist without cyanosis. Lungs: Clear to auscultation bilaterally. Respiration even and unlabored. Heart: Regular rate and rhythm, S1, S2. No murmurs, clicks, rub or gallops. Abdomen: Soft, non-tender, not distended. Bowel sounds normoactive. No organomegaly. Genitourinary: Deferred. Neuromuscular:      No gross focal motor deficits noted. LEs 4-/5 hip flexion, KE 4, distally 5  Sensation intact   Skin/extremity: No rashes, no erythema. No calf tenderness B LE.   Trace edema R>L , neg Homans                                                                              Functional Assessment:          Balance  Sitting - Static: Good (unsupported) (10/28/21 1300)  Sitting - Dynamic: Fair (occasional) (10/28/21 1300)  Standing - Static: Fair (10/28/21 1300)  Standing - Dynamic : Impaired (10/28/21 1300)                     Mar Blood Fall Risk Assessment:  Mar Blood Fall Risk  Mobility: Ambulates or transfers with assist devices or assistance (10/28/21 2147)  Mobility Interventions: Patient to call before getting OOB (10/28/21 2147)  Mentation: Alert, oriented x 3 (10/28/21 2147)  Medication: Patient receiving anticonvulsants, sedatives(tranquilizers), psychotropics or hypnotics, hypoglycemics, narcotics, sleep aids, antihypertensives, laxatives, or diuretics (10/28/21 2147)  Medication Interventions: Patient to call before getting OOB (10/28/21 2147)  Elimination: Needs assistance with toileting (10/28/21 2147)  Elimination Interventions: Call light in reach (10/28/21 2147)  Prior Fall History: Before admission in past 12 months _home or previous inpatient care) (10/28/21 2147)  History of Falls Interventions: Consult care management for discharge planning (10/28/21 2147)  Total Score: 4 (10/28/21 2147)  High Fall Risk: Yes (10/28/21 2147)     Speech Assessment:         Ambulation:  Gait  Distance (ft): 100 Feet (ft) (x2) (10/28/21 1300)  Assistive Device: Cane, straight (10/28/21 1300)  Rail Use: Both (10/26/21 1500)     Labs/Studies:  Recent Results (from the past 72 hour(s))   CBC W/O DIFF    Collection Time: 10/26/21  7:02 AM   Result Value Ref Range    WBC 6.0 4.3 - 11.1 K/uL    RBC 4.28 4.23 - 5.6 M/uL    HGB 12.6 (L) 13.6 - 17.2 g/dL    HCT 39.7 (L) 41.1 - 50.3 %    MCV 92.8 79.6 - 97.8 FL    MCH 29.4 26.1 - 32.9 PG    MCHC 31.7 31.4 - 35.0 g/dL    RDW 14.9 (H) 11.9 - 14.6 %    PLATELET 774 721 - 172 K/uL    MPV 11.8 9.4 - 12.3 FL    ABSOLUTE NRBC 0.00 0.0 - 0.2 K/uL   METABOLIC PANEL, BASIC    Collection Time: 10/26/21  7:02 AM   Result Value Ref Range    Sodium 143 138 - 145 mmol/L    Potassium 3.0 (L) 3.5 - 5.1 mmol/L    Chloride 105 98 - 107 mmol/L    CO2 35 (H) 21 - 32 mmol/L    Anion gap 3 (L) 7 - 16 mmol/L    Glucose 150 (H) 65 - 100 mg/dL    BUN 27 (H) 8 - 23 MG/DL    Creatinine 1.04 0.8 - 1.5 MG/DL    GFR est AA >60 >60 ml/min/1.73m2    GFR est non-AA >60 >60 ml/min/1.73m2    Calcium 8.5 8.3 - 10.4 MG/DL   GLUCOSE, POC    Collection Time: 10/26/21  7:43 AM   Result Value Ref Range    Glucose (POC) 142 (H) 65 - 100 mg/dL    Performed by [address] [place] [address], POC    Collection Time: 10/26/21  4:43 PM   Result Value Ref Range    Glucose (POC) 163 (H) 65 - 100 mg/dL    Performed by Merle    GLUCOSE, POC    Collection Time: 10/26/21  9:33 PM   Result Value Ref Range    Glucose (POC) 155 (H) 65 - 100 mg/dL    Performed by Hayde    CBC W/O DIFF    Collection Time: 10/27/21  5:40 AM   Result Value Ref Range    WBC 6.8 4.3 - 11.1 K/uL    RBC 4.49 4.23 - 5.6 M/uL    HGB 13.2 (L) 13.6 - 17.2 g/dL    HCT 41.2 41.1 - 50.3 %    MCV 91.8 79.6 - 97.8 FL    MCH 29.4 26.1 - 32.9 PG    MCHC 32.0 31.4 - 35.0 g/dL    RDW 14.9 (H) 11.9 - 14.6 %    PLATELET 147 328 - 931 K/uL    MPV 11.5 9.4 - 12.3 FL    ABSOLUTE NRBC 0.00 0.0 - 0.2 K/uL   METABOLIC PANEL, BASIC    Collection Time: 10/27/21  5:40 AM   Result Value Ref Range    Sodium 144 136 - 145 mmol/L    Potassium 3.3 (L) 3.5 - 5.1 mmol/L    Chloride 105 98 - 107 mmol/L    CO2 33 (H) 21 - 32 mmol/L    Anion gap 6 (L) 7 - 16 mmol/L    Glucose 146 (H) 65 - 100 mg/dL    BUN 21 8 - 23 MG/DL    Creatinine 0.99 0.8 - 1.5 MG/DL    GFR est AA >60 >60 ml/min/1.73m2    GFR est non-AA >60 >60 ml/min/1.73m2    Calcium 8.6 8.3 - 10.4 MG/DL   GLUCOSE, POC    Collection Time: 10/27/21  7:08 AM   Result Value Ref Range    Glucose (POC) 141 (H) 65 - 100 mg/dL    Performed by Sweetie)CNA    GLUCOSE, POC    Collection Time: 10/27/21 11:35 AM   Result Value Ref Range    Glucose (POC) 242 (H) 65 - 100 mg/dL    Performed by Sweetie)CNA    GLUCOSE, POC    Collection Time: 10/27/21  4:19 PM   Result Value Ref Range    Glucose (POC) 128 (H) 65 - 100 mg/dL    Performed by Sweetie)CNA    GLUCOSE, POC    Collection Time: 10/27/21  9:31 PM   Result Value Ref Range    Glucose (POC) 152 (H) 65 - 100 mg/dL    Performed by [address], POC    Collection Time: 10/28/21  8:00 AM   Result Value Ref Range    Glucose (POC) 155 (H) 65 - 100 mg/dL    Performed by Urv[id number], POC    Collection Time: 10/28/21 11:58 AM   Result Value Ref Range    Glucose (POC) 144 (H) 65 - 100 mg/dL    Performed by Urv[id number], POC    Collection Time: 10/28/21  4:49 PM   Result Value Ref Range    Glucose (POC) 195 (H) 65 - 100 mg/dL    Performed by Carmina    GLUCOSE, POC    Collection Time: 10/28/21  9:20 PM   Result Value Ref Range    Glucose (POC) 106 (H) 65 - 100 mg/dL    Performed by [place] Assessment:     Problem List as of 10/29/2021 Date Reviewed: 10/26/2021        Codes Class Noted - Resolved    * (Principal) Physical debility ICD-10-CM: R53.81  ICD-9-CM: 799.3  10/26/2021 - Present        E-coli UTI ICD-10-CM: N39.0, B96.20  ICD-9-CM: 599.0, 041.49  10/24/2021 - Present        Acute respiratory failure with hypoxia (HCC) ICD-10-CM: J96.01  ICD-9-CM: 518.81  10/23/2021 - Present        GRACE (acute kidney injury) ([facility] ICD-10-CM: N17.9  ICD-9-CM: 584.9  10/22/2021 - Present        CKD (chronic kidney disease) stage 3, GFR 30-59 ml/min (HCC) ICD-10-CM: N18.30  ICD-9-CM: 585.3  10/22/2021 - Present        Pneumonia of right lower lobe due to infectious organism ICD-10-CM: J18.9  ICD-9-CM: 486  10/21/2021 - Present        Fluid overload ICD-10-CM: E87.70  ICD-9-CM: 276.69  10/21/2021 - Present        Hypoxia ICD-10-CM: R09.02  ICD-9-CM: 799.02  10/21/2021 - Present        CAP (community acquired pneumonia) ICD-10-CM: J18.9  ICD-9-CM: 986  10/21/2021 - Present        Thyroid nodule ICD-10-CM: E04.1  ICD-9-CM: 241.0  8/5/2021 - Present        Type 2 diabetes mellitus with hyperglycemia, without long-term current use of insulin (HCC) ICD-10-CM: E11.65  ICD-9-CM: 250.00, 790.29  2/26/2021 - Present        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  ICD-9-CM: 434.91  6/14/2020 - Present        Severe obesity ([facility] ICD-10-CM: E66.01  ICD-9-CM: 278.01  7/2/2019 - Present        Numbness and tingling in left hand ICD-10-CM: R20.0, R20.2  ICD-9-CM: 782.0  7/2/2019 - Present        Controlled type 2 diabetes mellitus without complication, without long-term current use of insulin ([facility] ICD-10-CM: E11.9  ICD-9-CM: 250.00  2/22/2019 - Present        Cerebrovascular accident (CVA) due to occlusion [facility]) ICD-10-CM: I63.9  ICD-9-CM: 434.91  2/12/2019 - Present        Essential hypertension, benign ICD-10-CM: I10  ICD-9-CM: 401.1  1/3/2014 - Present        Mixed hyperlipidemia ICD-10-CM: E78.2  ICD-9-CM: 272.2  1/3/2014 - Present        RESOLVED: Hypokalemia ICD-10-CM: E87.6  ICD-9-CM: 276.8  10/23/2021 - 10/25/2021        RESOLVED: Sepsis ([facility] ICD-10-CM: A41.9  ICD-9-CM: 038.9, 995.91  10/22/2021 - 10/25/2021        RESOLVED: Acute prostatitis without hematuria ICD-10-CM: N41.0  ICD-9-CM: 601.0  10/21/2021 - 10/25/2021        RESOLVED: Cerebellar stroke ([facility] ICD-10-CM: I63.9  ICD-9-CM: 434.91  6/14/2020 - 10/26/2021        RESOLVED: Urinary retention ICD-10-CM: R33.9  ICD-9-CM: 788.20  6/8/2020 - 9/17/2020        RESOLVED: Stroke (cerebrum) ([facility] ICD-10-CM: I63.9  ICD-9-CM: 434.91  6/8/2020 - 2/26/2021        RESOLVED: Right thalamic infarction [facility]) ICD-10-CM: I63.9  ICD-9-CM: 434.91  7/2/2019 - 10/26/2021            Plan / Recommendations / Medical Decision Making:      Continue daily physician / PA medical management:     Physical debility - admission for sepsis, acute prostatitis, E. coli UTI and RLL pneumonia; finished ABX but with residual weakness. Aggressive PT, OT.   -progressing well     Recent sepsis - completed IV ABX, WBC normalized, remains afebrile. Monitor.     GRACE, acute kidney injury - creatinine up to 2.54 (10/23), down to 1.04 by [facility] admission 10/26. Monitor and hold metformin ER if Cr trends upward.  -10/28 renal function normalized, Cr 0.99, BUN 21 on BMP yesterday     Pneumonia, recent RLL CAP - completed ceftriaxone / azithromycin. O2 needs diminished; he has been weaned to room air. Incentive spirometer every hour while awake.     Prostatitis, E coli UTI - completed ceftriaxone / azithromycin. S/p UroLift procedure 9/2020. Continue on doxazosin, finasteride; monitor for urinary retention. Check post-void residual as needed; in-and-out catheter if post-void residual is more than 400ml.  -voiding well     Hypertension - BP fluctuating, managed medically on lisinopril, HCTZ with help from doxazosin. sBP excursions into 160-180s, dBP all <90.  PRN hydralazine but may need to add another agent.  -10/27 132-187/62-75; on 10mg BID of lisinopril, 12.5mg of HCTZ, Cardura 8mg; add Norvasc 5mg  -10/28 /68 this AM before meds, dropped to 99/50 and very symptomatic later in AM; stop amlodipine, increase AM lisinopril to 15mg, leave PM dose at 10mg, other meds same; will go more slowly, as patient accustomed to sBP >150  -10/29 147/61, norvasc stopped due to hypotension; continue current meds     Diabetes mellitus - most recent HgbA1c 8.1% (6/30/2021), poor glycemic control. [facility] and medication adjustment to optimize glycemic control in view of recent acute illness and hospitalization. DM diet, Humalog SSI; metformin ER had been held in acute setting during GRACE but will resume since renal function has normalized. Monitor.   -10/27 -242; poorly controlled over time; just restarted metformin yesterday, cont SSI; adjust as needed  -10/28 BS <160, continue current regimen  -fair control. 10/29 bs 106 at bedtime, otherwise; 144-195; takes glipizide as well as the glucophage at home. Consider restarting it at 2.5mg daily. Typically his bs run in the 150-160 range at home. Not ideal     Hyperlipidemia - continue Lipitor 80mg daily.     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 Continue Plavix 75mg daily.     Pain control - stable, mild-to-moderate joint symptoms intermittently, reasonably well controlled by PRN meds. Will require regular pain assessment and comprehensive pain management. Has long-standing left shoulder / neck pain treated by chiropractor.     Electrolyte abnormality - hypoK+ to 3.0 (10/26); replace and monitor. Will give KCl 20mEq x 4 doses. -10/27 K+ 3.3 today, continue to monitor     DVT risk / DVT prophylaxis - daily physician / PA exam to assess as patient is at increased risk for of thromboembolism. Mobilize as tolerated.  Sequential pneumatic compression devices (SCDs) when in bed; thigh-high or knee-high thromboembolic deterrent hose when out of bed. Lovenox subcutaneously daily.      GI prophylaxis - consider PPI. At times may need additional antacids, Maalox prn.     Depression - continue on Zoloft; has good support system. Monitor.     General skin care / wound prevention - monitor general skin wound status daily per staff and physician / PA. At risk for failure. Will require 24/7 rehab nursing.     Bowel program - at risk for constipation as a side effect of opioids, other medications, impaired mobility, etc. MiraLAX daily for regularity, Meghan-Colace for stool softener. PRN MOM, bisacodyl suppository or tablets for constipation.  -10/28 occasional loose stools, suspect this is from restarting metformin ER; PRN loperamide  -10/29 improved. One bm yesterday         Time spent was 25 minutes with over 1/2 in direct patient care/examination, consultation and coordination of care.      Signed By: Freedom Carlin MD     October 29, 2021

## 2021-10-29 NOTE — PROGRESS NOTES
PHYSICAL THERAPY DAILY NOTE  Time In: 6091  Time Out: 0004  Patient Seen For: PM;Gait training; Therapeutic exercise;Transfer training    Subjective: Pt. Reports he enjoys going to the gym. Typically goes 3x/week & enjoys riding the recumbent bike or using the treadmill         Objective: Other (comment) (fall precautions)    COGNITION Daily Assessment    Pt. Requires cues to navigate back to room w/ decreased short term recall observed       BED/MAT MOBILITY Daily Assessment    Rolling Right : 0 (Not tested)  Rolling Left : 0 (Not tested)  Supine to Sit : 0 (Not tested)  Sit to Supine : 0 (Not tested)       TRANSFERS Daily Assessment    Transfer Type: SPT without device  Transfer Assistance : 5 (Supervision/setup)  Sit to Stand Assistance: Supervision       GAIT Daily Assessment   Cues to slow pace as pt/s tends to turn over feet too quickly resulting in chasing of COG & increased sway Amount of Assistance: 4 (Contact guard assistance)  Distance (ft): 140 Feet (ft) (x2)  Assistive Device: Cane, straight       STEPS or STAIRS Daily Assessment   First trial performed w/ B rails; Second trial perform w/ L sided rail & SPC in R UE;  Step to gait pattern utilized Steps/Stairs Ambulated (#): 8 (x2)  Level of Assist : 4 (Contact guard assistance)  Rail Use: Both       BALANCE Daily Assessment    Sitting - Static: Good (unsupported)  Sitting - Dynamic: Fair (occasional)  Standing - Static: Fair  Standing - Dynamic : Impaired       WHEELCHAIR MOBILITY Daily Assessment    Able to Propel (ft): 0 feet  Curbs/Ramps Assist Required (FIM Score): 0 (Not tested)  Wheelchair Setup Assist Required : 0 (Not tested)       LOWER EXTREMITY EXERCISES Daily Assessment    Pt.  Performed NuStep @ resistance level 1 x10 minutes to increase strength & endurance B UEs & LEs     Vital Signs: BP (!) 149/74   Pulse 71   Temp 98 °F (36.7 °C)   Resp 16   SpO2 96%     Pain level: no c/o pain    Patient education: reviewed stair management technique both with & without SPC    Interdisciplinary Communication: collaborated w/ OT regarding pt's progress    Pt. Left up in recliner in NAD, call bell in reach. Assessment: Pt. Able to progress to stair training this PM.  Did require increased assistance w/ gait due to increased fatigue & decreased carry over of balance strategies this PM.  Pt. Remains @ increased risk for falls & benefits from cont. PT to address. Plan of Care: Continue with POC and progress as tolerated.      Katerina Moon, PT  10/29/2021

## 2021-10-30 LAB
GLUCOSE BLD STRIP.AUTO-MCNC: 148 MG/DL (ref 65–100)
GLUCOSE BLD STRIP.AUTO-MCNC: 150 MG/DL (ref 65–100)
GLUCOSE BLD STRIP.AUTO-MCNC: 180 MG/DL (ref 65–100)
GLUCOSE BLD STRIP.AUTO-MCNC: 98 MG/DL (ref 65–100)
SERVICE CMNT-IMP: ABNORMAL
SERVICE CMNT-IMP: NORMAL

## 2021-10-30 PROCEDURE — 74011250636 HC RX REV CODE- 250/636: Performed by: PHYSICIAN ASSISTANT

## 2021-10-30 PROCEDURE — 74011250637 HC RX REV CODE- 250/637: Performed by: PHYSICAL MEDICINE & REHABILITATION

## 2021-10-30 PROCEDURE — 99232 SBSQ HOSP IP/OBS MODERATE 35: CPT | Performed by: PHYSICAL MEDICINE & REHABILITATION

## 2021-10-30 PROCEDURE — 97116 GAIT TRAINING THERAPY: CPT

## 2021-10-30 PROCEDURE — 65310000000 HC RM PRIVATE REHAB

## 2021-10-30 PROCEDURE — 97535 SELF CARE MNGMENT TRAINING: CPT

## 2021-10-30 PROCEDURE — 97530 THERAPEUTIC ACTIVITIES: CPT

## 2021-10-30 PROCEDURE — 74011636637 HC RX REV CODE- 636/637: Performed by: PHYSICIAN ASSISTANT

## 2021-10-30 PROCEDURE — 74011250637 HC RX REV CODE- 250/637: Performed by: PHYSICIAN ASSISTANT

## 2021-10-30 PROCEDURE — 82962 GLUCOSE BLOOD TEST: CPT

## 2021-10-30 RX ORDER — GLIPIZIDE 5 MG/1
2.5 TABLET ORAL
Status: DISCONTINUED | OUTPATIENT
Start: 2021-10-30 | End: 2021-11-12 | Stop reason: HOSPADM

## 2021-10-30 RX ORDER — LISINOPRIL 20 MG/1
20 TABLET ORAL DAILY
Status: DISCONTINUED | OUTPATIENT
Start: 2021-10-30 | End: 2021-10-30

## 2021-10-30 RX ORDER — LISINOPRIL 5 MG/1
15 TABLET ORAL 2 TIMES DAILY
Status: DISCONTINUED | OUTPATIENT
Start: 2021-10-30 | End: 2021-10-30

## 2021-10-30 RX ORDER — LISINOPRIL 5 MG/1
10 TABLET ORAL 2 TIMES DAILY
Status: DISCONTINUED | OUTPATIENT
Start: 2021-10-30 | End: 2021-11-03

## 2021-10-30 RX ADMIN — CLOPIDOGREL BISULFATE 75 MG: 75 TABLET ORAL at 09:00

## 2021-10-30 RX ADMIN — HYDROCHLOROTHIAZIDE 12.5 MG: 12.5 CAPSULE ORAL at 09:00

## 2021-10-30 RX ADMIN — FINASTERIDE 5 MG: 5 TABLET, FILM COATED ORAL at 09:00

## 2021-10-30 RX ADMIN — ENOXAPARIN SODIUM 40 MG: 40 INJECTION SUBCUTANEOUS at 09:00

## 2021-10-30 RX ADMIN — DOXAZOSIN 8 MG: 4 TABLET ORAL at 19:41

## 2021-10-30 RX ADMIN — ATORVASTATIN CALCIUM 80 MG: 80 TABLET, FILM COATED ORAL at 09:00

## 2021-10-30 RX ADMIN — SERTRALINE 50 MG: 50 TABLET, FILM COATED ORAL at 09:00

## 2021-10-30 RX ADMIN — LISINOPRIL 15 MG: 5 TABLET ORAL at 09:00

## 2021-10-30 RX ADMIN — INSULIN LISPRO 2 UNITS: 100 INJECTION, SOLUTION INTRAVENOUS; SUBCUTANEOUS at 19:40

## 2021-10-30 RX ADMIN — GLIPIZIDE 2.5 MG: 5 TABLET ORAL at 09:00

## 2021-10-30 RX ADMIN — INSULIN LISPRO 2 UNITS: 100 INJECTION, SOLUTION INTRAVENOUS; SUBCUTANEOUS at 11:32

## 2021-10-30 RX ADMIN — METFORMIN HYDROCHLORIDE 1000 MG: 500 TABLET, EXTENDED RELEASE ORAL at 17:22

## 2021-10-30 RX ADMIN — LISINOPRIL 10 MG: 5 TABLET ORAL at 17:22

## 2021-10-30 NOTE — PROGRESS NOTES
Time In 0735   Time Out 0804     Mobility   Score Comments   Transfer Assist 4: Supervision or touching A Transfer Type: SPT   Equipment: N/A   Comments: SBA     Activities of Daily Living    Score Comments   Eating 6: Independent    Bathing 4: Supervision or touching A Type of Shower: Shower  Position: Supported sitting and Standing PRN   Adaptive  Equipment: Tub Transfer Bench and Grab Bars  Comments: set up assistance, intermittent supervision   Upper Body  Dressing 5: S/U or clean-up assist Items Applied: Pullover  Position: Unsupported Sitting  Comments: set up assistane   Lower Body Dressing 5: S/U or clean-up assist Items Applied: Underwear and Elastic pants  Adaptive Equipment: N/A  Comments: set up assistance   Donning/[place] Footwear 5: S/U or clean-up assist Items Applied: Slip-on shoes  Adaptive Equipment: N/A  Comments: set up assistance   Toilet Transfer 4: Supervision or touching A Transfer Type: N/A  Equipment: Grab Bars   Comments: supervision/SBA   Toileting Hygiene 3: Partial/Moderate A Output: bowel, bladder  Comments: moderate assistance for hygiene, attempted x2 with therapist following behind   Education  Benefits of therapy     Patient stated, \"I'm feeling pretty good. I was up a lot going pee last night. \"    Pt was seated in recliner upon arrival upon arrival to room and agreeable to OT. Completed ADL; see above for details. Patient demonstrated improved tolerance/endurance with ADLs this AM. He displayed good cooperation and participation. Patient tolerated session well with no complaint of pain and was left  with call light/all needs in reach. Continue with OT POC. Interdisciplinary communication: Collaborated with PT and confirmed patient is on track to meet goals.      GRANT Jose, OTR/L  10/30/2021        Note may contain the following abbreviations:   Abbreviation Explanation   AROM Active range of motion   PROM Passive range of motion   SPT Stand pivot transfer   LPT Lateral pivot transfer   WC wheelchair   RW Rolling walker    BUE Bilateral upper extremities   BLE Bilateral lower extremities    WBAT Weight bearing as tolerated    TTWB Toe-touch weight bearing    AD Adaptive device   AE Adaptive equipment    NMES Neuro muscular electrical stimulation   UBE Upper body ergometer

## 2021-10-30 NOTE — PROGRESS NOTES
Dominga Alfaro MD  Medical Director  [address] [facility], [address] [facility]  Tel: [phone]       [facility] PROGRESS NOTE    [address] Date: 10/26/2021  Admit Diagnosis:   Physical debility [R53.81]    Subjective     Patient seen and examined. Had a great day in therapies yesterday. Really enjoys it. No cp, sob, n. No further loose stools.  Appetite good, sleeping well    Objective:     Current Facility-Administered Medications   Medication Dose Route Frequency    artificial saliva (MOUTH KOTE) 1 Spray  1 Spray Oral PRN    lisinopriL (PRINIVIL, ZESTRIL) tablet 10 mg  10 mg Oral QPM    lisinopriL (PRINIVIL, ZESTRIL) tablet 15 mg  15 mg Oral DAILY    loperamide (IMODIUM) capsule 2 mg  2 mg Oral PRN    atorvastatin (LIPITOR) tablet 80 mg  80 mg Oral DAILY    clopidogreL (PLAVIX) tablet 75 mg  75 mg Oral DAILY    doxazosin (CARDURA) tablet 8 mg  8 mg Oral QHS    finasteride (PROSCAR) tablet 5 mg  5 mg Oral DAILY    hydrALAZINE (APRESOLINE) tablet 25 mg  25 mg Oral Q6H PRN    hydroCHLOROthiazide (MICROZIDE) capsule 12.5 mg  12.5 mg Oral DAILY    insulin lispro (HUMALOG) injection   SubCUTAneous AC&HS    metFORMIN ER (GLUCOPHAGE XR) tablet 1,000 mg  1,000 mg Oral DAILY WITH DINNER    sertraline (ZOLOFT) tablet 50 mg  50 mg Oral DAILY    enoxaparin (LOVENOX) injection 40 mg  40 mg SubCUTAneous Q24H    influenza vaccine 2021-22 (6 mos+)(PF) (FLUARIX/FLULAVAL/FLUZONE QUAD) injection 0.5 mL  1 Each IntraMUSCular PRIOR TO DISCHARGE    acetaminophen (TYLENOL) tablet 650 mg  650 mg Oral Q6H PRN    bisacodyL (DULCOLAX) tablet 5 mg  5 mg Oral DAILY PRN    Or    bisacodyL (DULCOLAX) suppository 10 mg  10 mg Rectal DAILY PRN    Or    magnesium hydroxide (MILK OF MAGNESIA) 400 mg/5 mL oral suspension 30 mL  30 mL Oral DAILY PRN    traZODone (DESYREL) tablet 50 mg  50 mg Oral QHS PRN       Review of Systems:   Denies chest pain, shortness of breath, cough, headache, visual problems, abdominal pain, dysuria, calf pain. Pertinent positives are as noted in the HPI, ROS unremarkable otherwise. Visit Vitals  BP (!) 166/72 (BP Patient Position: Walking)   Pulse 69   Temp 97.9 °F (36.6 °C)   Resp 16   SpO2 97%        Physical Exam:   General: Alert and age appropriately oriented. No acute cardiorespiratory distress. HEENT: Normocephalic, no scleral icterus. Oral mucosa moist without cyanosis. Lungs: Clear to auscultation bilaterally. Respiration even and unlabored. Heart: Regular rate and rhythm, S1, S2. No murmurs, clicks, rub or gallops. Abdomen: Soft, non-tender, not distended. Bowel sounds normoactive. No organomegaly. obese   Genitourinary: Deferred. Neuromuscular:      No gross focal motor deficits noted. LEs 4-/5 hip flexion, KE 4, distally 5  Sensation intact  Dec recall, fcs well but vcs for multistep commands  Dec concentration   Skin/extremity: No rashes, no erythema. No calf tenderness B LE. Trace edema R>L.                                                                                Functional Assessment:          Balance  Sitting - Static: Good (unsupported) (10/29/21 1500)  Sitting - Dynamic: Fair (occasional) (10/29/21 1500)  Standing - Static: Fair (10/29/21 1500)  Standing - Dynamic : Impaired (10/29/21 1500)                     Isak Robert Fall Risk Assessment:  Isak Robert Fall Risk  Mobility: Ambulates or transfers with assist devices or assistance (10/29/21 2040)  Mobility Interventions: Patient to call before getting OOB (10/29/21 2040)  Mentation: Alert, oriented x 3 (10/29/21 2040)  Medication: Patient receiving anticonvulsants, sedatives(tranquilizers), psychotropics or hypnotics, hypoglycemics, narcotics, sleep aids, antihypertensives, laxatives, or diuretics (10/29/21 2040)  Medication Interventions: Patient to call before getting OOB (10/29/21 2040)  Elimination: Needs assistance with toileting (10/29/21 2040)  Elimination Interventions: Call light in reach (10/29/21 2040)  Prior Fall History: Before admission in past 12 months _home or previous inpatient care) (10/29/21 2040)  History of Falls Interventions: Evaluate medications/consider consulting pharmacy (10/29/21 2040)  Total Score: 4 (10/29/21 2040)  High Fall Risk: Yes (10/29/21 2040)     Speech Assessment:         Ambulation:  Gait  Distance (ft): 140 Feet (ft) (x2) (10/29/21 1500)  Assistive Device: Cane, straight (10/29/21 1500)  Rail Use: Both (10/29/21 1500)     Labs/Studies:  Recent Results (from the past 72 hour(s))   GLUCOSE, POC    Collection Time: 10/27/21 11:35 AM   Result Value Ref Range    Glucose (POC) 242 (H) 65 - 100 mg/dL    Performed by Sweetie)CNA    GLUCOSE, POC    Collection Time: 10/27/21  4:19 PM   Result Value Ref Range    Glucose (POC) 128 (H) 65 - 100 mg/dL    Performed by Sweetie)CNA    GLUCOSE, POC    Collection Time: 10/27/21  9:31 PM   Result Value Ref Range    Glucose (POC) 152 (H) 65 - 100 mg/dL    Performed by [address], POC    Collection Time: 10/28/21  8:00 AM   Result Value Ref Range    Glucose (POC) 155 (H) 65 - 100 mg/dL    Performed by Pasdelphine [id number], POC    Collection Time: 10/28/21 11:58 AM   Result Value Ref Range    Glucose (POC) 144 (H) 65 - 100 mg/dL    Performed by Pasdelphine [id number], POC    Collection Time: 10/28/21  4:49 PM   Result Value Ref Range    Glucose (POC) 195 (H) 65 - 100 mg/dL    Performed by Pasdelphine [id number], POC    Collection Time: 10/28/21  9:20 PM   Result Value Ref Range    Glucose (POC) 106 (H) 65 - 100 mg/dL    Performed by [address], POC    Collection Time: 10/29/21  7:18 AM   Result Value Ref Range    Glucose (POC) 151 (H) 65 - 100 mg/dL    Performed by Urvashi [id number], POC    Collection Time: 10/29/21 11:03 AM   Result Value Ref Range    Glucose (POC) 184 (H) 65 - 100 mg/dL    Performed by Sweetie)CNA    GLUCOSE, POC    Collection Time: 10/29/21  4:53 PM   Result Value Ref Range    Glucose (POC) 130 (H) 65 - 100 mg/dL    Performed by Sweetie)CNA    GLUCOSE, POC    Collection Time: 10/29/21  9:01 PM   Result Value Ref Range    Glucose (POC) 176 (H) 65 - 100 mg/dL    Performed by Katrina    GLUCOSE, POC    Collection Time: 10/30/21  7:09 AM   Result Value Ref Range    Glucose (POC) 148 (H) 65 - 100 mg/dL    Performed by Jennifer        Assessment:     Problem List as of 10/30/2021 Date Reviewed: 10/26/2021        Codes Class Noted - Resolved    * (Principal) Physical debility ICD-10-CM: R53.81  ICD-9-CM: 799.3  10/26/2021 - Present        E-coli UTI ICD-10-CM: N39.0, B96.20  ICD-9-CM: 599.0, 041.49  10/24/2021 - Present        Acute respiratory failure with hypoxia (HCC) ICD-10-CM: J96.01  ICD-9-CM: 518.81  10/23/2021 - Present        [facility]) [facility] ICD-10-CM: N17.9  ICD-9-CM: 584.9  10/22/2021 - Present        CKD (chronic kidney disease) stage 3, GFR 30-59 ml/min (HCC) ICD-10-CM: N18.30  ICD-9-CM: 585.3  10/22/2021 - Present        Pneumonia of right lower lobe due to infectious organism ICD-10-CM: J18.9  ICD-9-CM: 486  10/21/2021 - Present        Fluid overload ICD-10-CM: E87.70  ICD-9-CM: 276.69  10/21/2021 - Present        Hypoxia ICD-10-CM: R09.02  ICD-9-CM: 799.02  10/21/2021 - Present        CAP (community acquired pneumonia) ICD-10-CM: J18.9  ICD-9-CM: 118  10/21/2021 - Present        Thyroid nodule ICD-10-CM: E04.1  ICD-9-CM: 241.0  8/5/2021 - Present        Type 2 diabetes mellitus with hyperglycemia, without long-term current use of insulin (HCC) ICD-10-CM: E11.65  ICD-9-CM: 250.00, 790.29  2/26/2021 - Present        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  ICD-9-CM: 434.91  6/14/2020 - Present        Severe obesity ([facility] ICD-10-CM: E66.01  ICD-9-CM: 278.01  7/2/2019 - Present        Numbness and tingling in left hand ICD-10-CM: R20.0, R20.2  ICD-9-CM: 782.0  7/2/2019 - Present        Controlled type 2 diabetes mellitus without complication, without long-term current use of insulin ([facility] ICD-10-CM: E11.9  ICD-9-CM: 250.00  2/22/2019 - Present        Cerebrovascular accident (CVA) due to occlusion [facility]) ICD-10-CM: I63.9  ICD-9-CM: 434.91  2/12/2019 - Present        Essential hypertension, benign ICD-10-CM: I10  ICD-9-CM: 401.1  1/3/2014 - Present        Mixed hyperlipidemia ICD-10-CM: E78.2  ICD-9-CM: 272.2  1/3/2014 - Present        RESOLVED: Hypokalemia ICD-10-CM: E87.6  ICD-9-CM: 276.8  10/23/2021 - 10/25/2021        RESOLVED: Sepsis ([facility] ICD-10-CM: A41.9  ICD-9-CM: 038.9, 995.91  10/22/2021 - 10/25/2021        RESOLVED: Acute prostatitis without hematuria ICD-10-CM: N41.0  ICD-9-CM: 601.0  10/21/2021 - 10/25/2021        RESOLVED: Cerebellar stroke ([facility] ICD-10-CM: I63.9  ICD-9-CM: 434.91  6/14/2020 - 10/26/2021        RESOLVED: Urinary retention ICD-10-CM: R33.9  ICD-9-CM: 788.20  6/8/2020 - 9/17/2020        RESOLVED: Stroke (cerebrum) ([facility] ICD-10-CM: I63.9  ICD-9-CM: 434.91  6/8/2020 - 2/26/2021        RESOLVED: Right thalamic infarction [facility]) ICD-10-CM: I63.9  ICD-9-CM: 434.91  7/2/2019 - 10/26/2021            Plan / Recommendations / Medical Decision Making:      Continue daily physician / PA medical management:     Physical debility - admission for sepsis, acute prostatitis, E. coli UTI and RLL pneumonia; finished ABX but with residual weakness. Aggressive PT, OT.   -progressing well     Recent sepsis - completed IV ABX, WBC normalized, remains afebrile. Monitor.     GRACE, acute kidney injury - creatinine up to 2.54 (10/23), down to 1.04 by [facility] admission 10/26. Monitor and hold metformin ER if Cr trends upward.  -10/28 renal function normalized, Cr 0.99, BUN 21 on BMP yesterday     Pneumonia, recent RLL CAP - completed ceftriaxone / azithromycin.  O2 needs diminished; he has been weaned to room air. Incentive spirometer every hour while awake.     Prostatitis, E coli UTI - completed ceftriaxone / azithromycin. S/p UroLift procedure 9/2020. Continue on doxazosin, finasteride; monitor for urinary retention. Check post-void residual as needed; in-and-out catheter if post-void residual is more than 400ml.  -voiding well     Hypertension - BP fluctuating, managed medically on lisinopril, HCTZ with help from doxazosin. sBP excursions into 160-180s, dBP all <90. PRN hydralazine but may need to add another agent.  -10/27 132-187/62-75; on 10mg BID of lisinopril, 12.5mg of HCTZ, Cardura 8mg; add Norvasc 5mg  -10/28 /68 this AM before meds, dropped to 99/50 and very symptomatic later in AM; stop amlodipine, increase AM lisinopril to 15mg, leave PM dose at 10mg, other meds same; will go more slowly, as patient accustomed to sBP >150  -10/29 147/61, norvasc stopped due to hypotension; continue current meds  -10/30 166/72, 181/74 However; orthostatic with PT; 115/51 standing, 110/45 after walking, 105/52 sitting after walking, then 94/45 after walking 75ft, dropped to 85/49; TEDs when oob       Diabetes mellitus - most recent HgbA1c 8.1% (6/30/2021), poor glycemic control. Will require ACHS fasting glucose monitoring and medication adjustment to optimize glycemic control in view of recent acute illness and hospitalization. DM diet, Humalog SSI; metformin ER had been held in acute setting during GRACE but will resume since renal function has normalized. Monitor.   -10/27 -242; poorly controlled over time; just restarted metformin yesterday, cont SSI; adjust as needed  -10/28 BS <160, continue current regimen  -fair control. 10/29 bs 106 at bedtime, otherwise; 144-195; takes glipizide as well as the glucophage at home. Consider restarting it at 2.5mg daily. Typically his bs run in the 150-160 range at home.  Not ideal  -10/30 -184; cont glucophage and add glipizide 2.5mg to start     Hyperlipidemia - continue Lipitor 80mg daily.     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 Continue Plavix 75mg daily.     Pain control - stable, mild-to-moderate joint symptoms intermittently, reasonably well controlled by PRN meds. Will require regular pain assessment and comprehensive pain management. Has long-standing left shoulder / neck pain treated by chiropractor.     Electrolyte abnormality - hypoK+ to 3.0 (10/26); replace and monitor. Will give KCl 20mEq x 4 doses. -10/27 K+ 3.3 today, continue to monitor  -10/30 recheck 11/1     DVT risk / DVT prophylaxis - daily physician / PA exam to assess as patient is at increased risk for of thromboembolism. Mobilize as tolerated. Sequential pneumatic compression devices (SCDs) when in bed; thigh-high or knee-high thromboembolic deterrent hose when out of bed. Lovenox subcutaneously daily.      GI prophylaxis - consider PPI. At times may need additional antacids, Maalox prn.     Depression - continue on Zoloft; has good support system. Monitor.  -10/30 doing very well. Very pleasant and cooperative. Enjoying therapy and spirits bright     General skin care / wound prevention - monitor general skin wound status daily per staff and physician / PA. At risk for failure. Will require 24/7 rehab nursing.     Bowel program - at risk for constipation as a side effect of opioids, other medications, impaired mobility, etc. MiraLAX daily for regularity, Meghan-Colace for stool softener. PRN MOM, bisacodyl suppository or tablets for constipation.  -10/28 occasional loose stools, suspect this is from restarting metformin ER; PRN loperamide  -10/29 improved. One bm yesterday  -10/30 no loose stools     Time spent was 25 minutes with over 1/2 in direct patient care/examination, consultation and coordination of care.      Signed By: Lore House MD     October 30, 2021

## 2021-10-30 NOTE — PROGRESS NOTES
PHYSICAL THERAPY DAILY NOTE  Time In: 0855  Time Out: 0930    Subjective: \"I have been feeling pretty good this morning\"         Objective:  Vital Signs:  Visit Vitals  BP (!) 181/74 (BP 1 Location: Left upper arm, BP Patient Position: At rest)   Pulse 72   Temp 98 °F (36.7 °C)   Resp 18   SpO2 96%     Pain level:  Patient had no complaints of pain during therapy this morning    Patient education:  Discussed safety since his BP has been dropping with standing and gait    Interdisciplinary Communication:  Discussed with nursing and MD about low BP with standing and gait today    Other (comment) (fall precautions)    COGNITION Daily Assessment    Alert, oriented, kind and cooperative in therapy. Follows commands well and engages in conversation appropriatley     TRANSFERS Daily Assessment   Good safety awareness with SPT with and without use of his cane Transfer Type: SPT without device  Transfer Assistance : 5 (Supervision/setup)  Sit to Stand Assistance: Supervision     GAIT Daily Assessment   BP dropped today after standing and even more after gait. BP: 115/51 standing  BP:  110/45 after 1st walk  BP:  94/45 after 2nd walk  BP:  110/45 sitting after walking   Amount of Assistance: 4 (Contact guard assistance)  Distance (ft): 75 Feet (ft) (75' x 2)  Assistive Device: Cane, straight     BALANCE Daily Assessment    Sitting - Static: Good (unsupported)  Sitting - Dynamic: Fair (occasional)  Standing - Static: Fair  Standing - Dynamic : Impaired     LOWER EXTREMITY EXERCISES Daily Assessment    Extremity: Both  Exercise Type #1: Seated lower extremity strengthening (Motomed x 10 level 3)  Level of Assist: Supervision  Exercise Type #2: Seated lower extremity strengthening (1.5# used )  Sets Performed: 1  Reps Performed: 20  Level of Assist: Supervision          Assessment: Patient participated and tolerated therapy well today. His primary issue was with a drop in BP with standing and gait.   He physically did very well with his mobility today with gait using the cane and strength exercises. MD and nursing were made aware of his BPs. Very kind man to work with. Patient was taken back to his room in his w/c and he transferred to the bedside chair with SBA. He was positioned for comfort with his call light and personal items in reach. Plan of Care: Continue to treat and progress as indicated.       Marilu Jaimes  10/30/2021

## 2021-10-31 LAB
GLUCOSE BLD STRIP.AUTO-MCNC: 108 MG/DL (ref 65–100)
GLUCOSE BLD STRIP.AUTO-MCNC: 113 MG/DL (ref 65–100)
GLUCOSE BLD STRIP.AUTO-MCNC: 136 MG/DL (ref 65–100)
GLUCOSE BLD STRIP.AUTO-MCNC: 137 MG/DL (ref 65–100)
SERVICE CMNT-IMP: ABNORMAL

## 2021-10-31 PROCEDURE — 82962 GLUCOSE BLOOD TEST: CPT

## 2021-10-31 PROCEDURE — 74011250637 HC RX REV CODE- 250/637: Performed by: PHYSICAL MEDICINE & REHABILITATION

## 2021-10-31 PROCEDURE — 74011250636 HC RX REV CODE- 250/636: Performed by: PHYSICIAN ASSISTANT

## 2021-10-31 PROCEDURE — 74011250637 HC RX REV CODE- 250/637: Performed by: PHYSICIAN ASSISTANT

## 2021-10-31 PROCEDURE — 65310000000 HC RM PRIVATE REHAB

## 2021-10-31 RX ADMIN — METFORMIN HYDROCHLORIDE 1000 MG: 500 TABLET, EXTENDED RELEASE ORAL at 16:30

## 2021-10-31 RX ADMIN — HYDROCHLOROTHIAZIDE 12.5 MG: 12.5 CAPSULE ORAL at 08:45

## 2021-10-31 RX ADMIN — DOXAZOSIN 8 MG: 4 TABLET ORAL at 20:08

## 2021-10-31 RX ADMIN — CLOPIDOGREL BISULFATE 75 MG: 75 TABLET ORAL at 08:45

## 2021-10-31 RX ADMIN — SERTRALINE 50 MG: 50 TABLET, FILM COATED ORAL at 08:45

## 2021-10-31 RX ADMIN — LISINOPRIL 10 MG: 5 TABLET ORAL at 17:04

## 2021-10-31 RX ADMIN — ENOXAPARIN SODIUM 40 MG: 40 INJECTION SUBCUTANEOUS at 08:45

## 2021-10-31 RX ADMIN — FINASTERIDE 5 MG: 5 TABLET, FILM COATED ORAL at 08:45

## 2021-10-31 RX ADMIN — GLIPIZIDE 2.5 MG: 5 TABLET ORAL at 08:45

## 2021-10-31 RX ADMIN — LISINOPRIL 10 MG: 5 TABLET ORAL at 08:45

## 2021-10-31 RX ADMIN — ATORVASTATIN CALCIUM 80 MG: 80 TABLET, FILM COATED ORAL at 08:45

## 2021-10-31 NOTE — PROGRESS NOTES
Problem: Falls - Risk of  Goal: *Absence of Falls  Description: Document [place] Flow Fall Risk and appropriate interventions in the flowsheet. Outcome: Progressing Towards Goal  Note: Fall Risk Interventions:  Mobility Interventions: Communicate number of staff needed for ambulation/transfer, OT consult for ADLs, Patient to call before getting OOB, PT Consult for mobility concerns, PT Consult for assist device competence, Strengthening exercises (ROM-active/passive), Utilize walker, cane, or other assistive device         Medication Interventions: Assess postural VS orthostatic hypotension, Evaluate medications/consider consulting pharmacy, Patient to call before getting OOB, Teach patient to arise slowly    Elimination Interventions: Call light in reach, Patient to call for help with toileting needs, Stay With Me (per policy), Toilet paper/wipes in reach, Urinal in reach    History of Falls Interventions: Consult care management for discharge planning, Door open when patient unattended, Evaluate medications/consider consulting pharmacy         Problem: Pressure Injury - Risk of  Goal: *Prevention of pressure injury  Description: Document Asif Scale and appropriate interventions in the flowsheet. Outcome: Resolved/Met  Note: Pressure Injury Interventions:  Sensory Interventions: Assess changes in LOC    Moisture Interventions: Absorbent underpads    Activity Interventions: Assess need for specialty bed, Chair cushion, Increase time out of bed, Pressure redistribution bed/mattress(bed type), PT/OT evaluation    Mobility Interventions: Assess need for specialty bed, Chair cushion, Float heels, HOB 30 degrees or less, Pressure redistribution bed/mattress (bed type), PT/OT evaluation, Turn and reposition approx.  every two hours(pillow and wedges)    Nutrition Interventions: Document food/fluid/supplement intake, Offer support with meals,snacks and hydration    Friction and Shear Interventions: Lift sheet

## 2021-11-01 LAB
ANION GAP SERPL CALC-SCNC: 6 MMOL/L (ref 7–16)
BUN SERPL-MCNC: 17 MG/DL (ref 8–23)
CALCIUM SERPL-MCNC: 8.3 MG/DL (ref 8.3–10.4)
CHLORIDE SERPL-SCNC: 107 MMOL/L (ref 98–107)
CO2 SERPL-SCNC: 30 MMOL/L (ref 21–32)
CREAT SERPL-MCNC: 1 MG/DL (ref 0.8–1.5)
GLUCOSE BLD STRIP.AUTO-MCNC: 100 MG/DL (ref 65–100)
GLUCOSE BLD STRIP.AUTO-MCNC: 113 MG/DL (ref 65–100)
GLUCOSE BLD STRIP.AUTO-MCNC: 117 MG/DL (ref 65–100)
GLUCOSE BLD STRIP.AUTO-MCNC: 125 MG/DL (ref 65–100)
GLUCOSE SERPL-MCNC: 112 MG/DL (ref 65–100)
POTASSIUM SERPL-SCNC: 3.5 MMOL/L (ref 3.5–5.1)
SERVICE CMNT-IMP: ABNORMAL
SERVICE CMNT-IMP: NORMAL
SODIUM SERPL-SCNC: 143 MMOL/L (ref 136–145)

## 2021-11-01 PROCEDURE — 97530 THERAPEUTIC ACTIVITIES: CPT

## 2021-11-01 PROCEDURE — 99232 SBSQ HOSP IP/OBS MODERATE 35: CPT | Performed by: PHYSICAL MEDICINE & REHABILITATION

## 2021-11-01 PROCEDURE — 92523 SPEECH SOUND LANG COMPREHEN: CPT

## 2021-11-01 PROCEDURE — 74011250636 HC RX REV CODE- 250/636: Performed by: PHYSICIAN ASSISTANT

## 2021-11-01 PROCEDURE — 74011250637 HC RX REV CODE- 250/637: Performed by: PHYSICIAN ASSISTANT

## 2021-11-01 PROCEDURE — 74011250637 HC RX REV CODE- 250/637: Performed by: PHYSICAL MEDICINE & REHABILITATION

## 2021-11-01 PROCEDURE — 97110 THERAPEUTIC EXERCISES: CPT

## 2021-11-01 PROCEDURE — 65310000000 HC RM PRIVATE REHAB

## 2021-11-01 PROCEDURE — 82962 GLUCOSE BLOOD TEST: CPT

## 2021-11-01 PROCEDURE — 97535 SELF CARE MNGMENT TRAINING: CPT

## 2021-11-01 PROCEDURE — 80048 BASIC METABOLIC PNL TOTAL CA: CPT

## 2021-11-01 PROCEDURE — 97116 GAIT TRAINING THERAPY: CPT

## 2021-11-01 RX ORDER — DOXAZOSIN 4 MG/1
4 TABLET ORAL
Status: DISCONTINUED | OUTPATIENT
Start: 2021-11-01 | End: 2021-11-12 | Stop reason: HOSPADM

## 2021-11-01 RX ADMIN — HYDROCHLOROTHIAZIDE 12.5 MG: 12.5 CAPSULE ORAL at 08:02

## 2021-11-01 RX ADMIN — SERTRALINE 50 MG: 50 TABLET, FILM COATED ORAL at 08:03

## 2021-11-01 RX ADMIN — DOXAZOSIN 4 MG: 4 TABLET ORAL at 21:39

## 2021-11-01 RX ADMIN — ATORVASTATIN CALCIUM 80 MG: 80 TABLET, FILM COATED ORAL at 08:02

## 2021-11-01 RX ADMIN — LISINOPRIL 10 MG: 5 TABLET ORAL at 17:20

## 2021-11-01 RX ADMIN — GLIPIZIDE 2.5 MG: 5 TABLET ORAL at 08:02

## 2021-11-01 RX ADMIN — FINASTERIDE 5 MG: 5 TABLET, FILM COATED ORAL at 08:03

## 2021-11-01 RX ADMIN — ENOXAPARIN SODIUM 40 MG: 40 INJECTION SUBCUTANEOUS at 08:03

## 2021-11-01 RX ADMIN — LISINOPRIL 10 MG: 5 TABLET ORAL at 08:02

## 2021-11-01 RX ADMIN — METFORMIN HYDROCHLORIDE 1000 MG: 500 TABLET, EXTENDED RELEASE ORAL at 17:20

## 2021-11-01 RX ADMIN — CLOPIDOGREL BISULFATE 75 MG: 75 TABLET ORAL at 08:02

## 2021-11-01 NOTE — PROGRESS NOTES
Time In 0830   Time Out 0910     Mobility   Score Comments   Sit to Stand 4: Supervision or touching A Supervision   Transfer Assist 4: Supervision or touching A Transfer Type: SPT   Equipment: Cane   Comments: Supervision     Activities of Daily Living    Score Comments   Eating 6: Independent    Oral Hyigene 6: Independent    Bathing 5: S/U or clean-up assist Type of Shower: Shower  Position: Standing PRN and Unsupported Sitting   Adaptive  Equipment: Tub Transfer Bench, Grab Bars and Long Handled Sponge  Comments: Pt able to wash bottom using LHS and steadied self using grab bars   Upper Body  Dressing 5: S/U or clean-up assist Items Applied: Pullover  Position: Unsupported Sitting  Comments:    Lower Body Dressing 4: Supervision or touching A Items Applied: Underwear and Elastic pants  Position: Standing PRN and Unsupported Sitting  Adaptive Equipment: SPC  Comments: S   Donning/[place] Footwear 5: S/U or clean-up assist Items Applied: Slip-on shoes  Adaptive Equipment: N/A  Comments: Toilet Transfer 4: Supervision or touching A Transfer Type: SPT   Equipment: Grab Bars and Cane   Comments: S   Toileting Hygiene 3: Partial/Moderate A Output: BM and urine x1 each  Comments: partial assist for cleanup   Education  toileting hygiene strategies       S: \"I reached what I could but I can't get all of it [regarding toileting]. \" Agreeable to therapy. Patient was able to ambulate ~10 feet using a SPC with supervision. Pt completed morning ADLs with quality indicators reflected in chart above. Pain Pt reported no pain. Patient Vitals for the past 12 hrs:   Temp Pulse Resp BP SpO2   11/01/21 0715 97.9 °F (36.6 °C) 69 16 (!) 162/84 96 %       Collaborated with PTA and RN regarding patient's BP during PT session.     Patient tolerated session well, but activity tolerance, balance, functional mobility, strength, coordination, cognition are still below baseline and require skilled facilitation to successfully and safely complete ADLs and transfers. Patient ended session seated in recliner and call remote, phone, all needs within reach.       Antonio Mcdermott, OTR/L  11/1/2021

## 2021-11-01 NOTE — PROGRESS NOTES
Problem: Patient Education: Go to Patient Education Activity  Goal: Patient/Family Education  Outcome: Resolved/Met     Problem: Falls - Risk of  Goal: *Absence of Falls  Description: Document [place] Fall Risk and appropriate interventions in the flowsheet.   Outcome: Progressing Towards Goal  Note: Fall Risk Interventions:  Mobility Interventions: Communicate number of staff needed for ambulation/transfer, OT consult for ADLs, Patient to call before getting OOB, PT Consult for mobility concerns, PT Consult for assist device competence, Strengthening exercises (ROM-active/passive), Utilize walker, cane, or other assistive device         Medication Interventions: Assess postural VS orthostatic hypotension, Evaluate medications/consider consulting pharmacy, Patient to call before getting OOB, Teach patient to arise slowly    Elimination Interventions: Call light in reach, Patient to call for help with toileting needs, Stay With Me (per policy), Urinal in reach    History of Falls Interventions: Consult care management for discharge planning, Evaluate medications/consider consulting pharmacy, Investigate reason for fall

## 2021-11-01 NOTE — PROGRESS NOTES
CM reviewed pt chart for continued stay. Pt continues to improve and progress with therapy per notes. Will continue to follow pt plan of care and assist with supportive care needs as appropriate. Will confer with treatment team on 11/3/2021 at Interdisciplinary team meeting regarding an anticipated discharge date if known.

## 2021-11-01 NOTE — PROGRESS NOTES
PHYSICAL THERAPY DAILY NOTE  Time In: (P) 4475  Time Out: (P) 1001  Patient Seen For: (P) AM;Therapeutic exercise;Transfer training; Other (see progress notes);Gait training    Subjective: Patient complained of feeling lightheaded after nustep. Objective: No pain during treatment. When patient stood after nustep he lost his balance several times. BP taken in left arm 105/48 then right arm 93/34. Notified nurse Saima Rodriguez. Patient back to supine in bed and BP on left arm 127/64 and right arm 140/66. Other (comment) (fall precautions)  GROSS ASSESSMENT Daily Assessment            COGNITION Daily Assessment           BED/MAT MOBILITY Daily Assessment    Supine to Sit : (P) 4 (Minimal assistance)  Sit to Supine : (P) 4 (Minimal assistance)       TRANSFERS Daily Assessment    Other: (P) SPT with straight cane  Transfer Assistance : (P) 4 (Contact guard assistance)  Sit to Stand Assistance: (P) Contact guard assistance  Car Transfers: (P) Not tested       GAIT Daily Assessment    Amount of Assistance: (P) 4 (Contact guard assistance)  Distance (ft): (P) 50 Feet (ft)  Assistive Device: (P) Cane, straight;Gait belt       STEPS or STAIRS Daily Assessment    Level of Assist : (P) 0 (Not tested)       BALANCE Daily Assessment            WHEELCHAIR MOBILITY Daily Assessment            LOWER EXTREMITY EXERCISES Daily Assessment    Extremity: (P) Both  Exercise Type #1: (P) Other (comment) (nustep x 10 minutes)  Sets Performed: (P) 1  Reps Performed: (P) 10  Level of Assist: (P) Supervision  Exercise Type #2: (P) Other (comment) (standing exs at rail)  Sets Performed: (P) 1  Reps Performed: (P) 5  Level of Assist: (P) Moderate assistance          Assessment: Patient works hard with therapy. He is making progress with mobility. Plan of Care: Continue with plan of care.     Marcello Kendrick, PTA  11/1/2021

## 2021-11-01 NOTE — PROGRESS NOTES
Needs met during hourly rounds this shift. Continent voids on toilet, independent with clothing, hygiene. Supervision with transfers, ambulation. No complaints of pain voiced. Calls appropriately for assistance.

## 2021-11-01 NOTE — PROGRESS NOTES
April Aguilar MD  Medical Director  [address] [facility], [address] [facility]  Tel: [phone]       [facility] PROGRESS NOTE    Palmira Gunter  Admit Date: 10/26/2021  Admit Diagnosis:   Physical debility [R53.81]    Subjective     Patient seen and examined between therapies. Had presyncopal episode (described as \"lightheaded without feeling dizzy\") following stationary bike, BPs 102/50, 93/34; patient put to bed and BP rebounded within minutes to 127/64, 131/61. Told PT he has had similar episodes in AM at home in past. Reports nocturia q2h so sleeping poorly; only urinates 2-3x during waking hours. Suspect doxazosin 8mg at HS, known for causing OH and supporting urinary flow; will cut dose in 1/2.     Objective:     Current Facility-Administered Medications   Medication Dose Route Frequency    glipiZIDE (GLUCOTROL) tablet 2.5 mg  2.5 mg Oral ACB    lisinopriL (PRINIVIL, ZESTRIL) tablet 10 mg  10 mg Oral BID    artificial saliva (MOUTH KOTE) 1 Spray  1 Spray Oral PRN    loperamide (IMODIUM) capsule 2 mg  2 mg Oral PRN    atorvastatin (LIPITOR) tablet 80 mg  80 mg Oral DAILY    clopidogreL (PLAVIX) tablet 75 mg  75 mg Oral DAILY    doxazosin (CARDURA) tablet 8 mg  8 mg Oral QHS    finasteride (PROSCAR) tablet 5 mg  5 mg Oral DAILY    hydrALAZINE (APRESOLINE) tablet 25 mg  25 mg Oral Q6H PRN    hydroCHLOROthiazide (MICROZIDE) capsule 12.5 mg  12.5 mg Oral DAILY    insulin lispro (HUMALOG) injection   SubCUTAneous AC&HS    metFORMIN ER (GLUCOPHAGE XR) tablet 1,000 mg  1,000 mg Oral DAILY WITH DINNER    sertraline (ZOLOFT) tablet 50 mg  50 mg Oral DAILY    enoxaparin (LOVENOX) injection 40 mg  40 mg SubCUTAneous Q24H    influenza vaccine 2021-22 (6 mos+)(PF) (FLUARIX/FLULAVAL/FLUZONE QUAD) injection 0.5 mL  1 Each IntraMUSCular PRIOR TO DISCHARGE    acetaminophen (TYLENOL) tablet 650 mg  650 mg Oral Q6H PRN    bisacodyL (DULCOLAX) tablet 5 mg  5 mg Oral DAILY PRN    Or    bisacodyL (DULCOLAX) suppository 10 mg  10 mg Rectal DAILY PRN    Or    magnesium hydroxide (MILK OF MAGNESIA) 400 mg/5 mL oral suspension 30 mL  30 mL Oral DAILY PRN    traZODone (DESYREL) tablet 50 mg  50 mg Oral QHS PRN       Review of Systems:   Denies chest pain, shortness of breath, cough, headache, visual problems, abdominal pain, dysuria, calf pain. Pertinent positives are as noted in the HPI, ROS unremarkable otherwise. Visit Vitals  BP (!) 162/84   Pulse 69   Temp 97.9 °F (36.6 °C)   Resp 16   SpO2 96%        Physical Exam:   General: Alert, appropriately oriented. Lying in bed due to [facility], no longer symptomatic. HEENT: Normocephalic, EOM intact. Oral mucosa moist.   Lungs: Clear to auscultation bilaterally. Respirations even and unlabored. Heart: Regular rate and rhythm. No appreciable murmur. Abdomen: Soft, non-tender, obese and protuberant but not distended. Bowel sounds normoactive, no organomegaly but proper exam precluded by obesity. Genitourinary: Deferred. Neuromuscular:      No gross focal neuro deficits. UE strength at biceps, triceps, finger flexion 5/5 (B) and symmetric. B LE strength at hip flexion 4-/5, knee flexion 4/5, ankle DF/PF 4/5. Speech clear, thoughts cogent. Skin/extremity: No rashes, no erythema. Calves soft, non-tender B LE. Trace edema, R>L. Functional Assessment:  Balance  Sitting - Static: Good (unsupported) (10/30/21 0900)  Sitting - Dynamic: Fair (occasional) (10/30/21 0900)  Standing - Static: Fair (10/30/21 0900)  Standing - Dynamic : Impaired (10/30/21 0900)       Danne Lobe Fall Risk Assessment:  Danne Lobe Fall Risk  Mobility: Ambulates or transfers with assist devices or assistance (11/01/21 0720)  Mobility Interventions: Communicate number of staff needed for ambulation/transfer;OT consult for ADLs; Patient to call before getting OOB;PT Consult for mobility concerns;PT Consult for assist device competence;Strengthening exercises (ROM-active/passive); Utilize walker, cane, or other assistive device (10/31/21 2148)  Mentation: Alert, oriented x 3 (10/31/21 2148)  Medication: Patient receiving anticonvulsants, sedatives(tranquilizers), psychotropics or hypnotics, hypoglycemics, narcotics, sleep aids, antihypertensives, laxatives, or diuretics (10/31/21 2148)  Medication Interventions: Assess postural VS orthostatic hypotension; Evaluate medications/consider consulting pharmacy; Patient to call before getting OOB; Teach patient to arise slowly (10/31/21 2148)  Elimination: Needs assistance with toileting (10/31/21 2148)  Elimination Interventions: Call light in reach; Patient to call for help with toileting needs;Stay With Me (per policy); Urinal in reach (10/31/21 2148)  Prior Fall History: Before admission in past 12 months _home or previous inpatient care) (10/31/21 2148)  History of Falls Interventions: Consult care management for discharge planning;Evaluate medications/consider consulting pharmacy; Investigate reason for fall (10/31/21 2148)  Total Score: 4 (10/31/21 2148)  Standard Fall Precautions: Yes (10/31/21 2148)  High Fall Risk: Yes (10/31/21 2148)     Ambulation:  Gait  Distance (ft): (P) 50 Feet (ft) (11/01/21 1051)  Assistive Device: (P) Cane, straight;Gait belt (11/01/21 1051)  Rail Use: Both (10/29/21 1500)     Labs/Studies:  Recent Results (from the past 72 hour(s))   GLUCOSE, POC    Collection Time: 10/29/21  4:53 PM   Result Value Ref Range    Glucose (POC) 130 (H) 65 - 100 mg/dL    Performed by Tomas ([place])    GLUCOSE, POC    Collection Time: 10/29/21  9:01 PM   Result Value Ref Range    Glucose (POC) 176 (H) 65 - 100 mg/dL    Performed by Katrina    GLUCOSE, POC    Collection Time: 10/30/21  7:09 AM   Result Value Ref Range    Glucose (POC) 148 (H) 65 - 100 mg/dL    Performed by Jennifer    GLUCOSE, POC    Collection Time: 10/30/21 11:29 AM   Result Value Ref Range Glucose (POC) 180 (H) 65 - 100 mg/dL    Performed by BetseyChris    GLUCOSE, POC    Collection Time: 10/30/21  4:18 PM   Result Value Ref Range    Glucose (POC) 98 65 - 100 mg/dL    Performed by BetseyChris    GLUCOSE, POC    Collection Time: 10/30/21  7:39 PM   Result Value Ref Range    Glucose (POC) 150 (H) 65 - 100 mg/dL    Performed by Elodia Veliz. GLUCOSE, POC    Collection Time: 10/31/21  7:51 AM   Result Value Ref Range    Glucose (POC) 136 (H) 65 - 100 mg/dL    Performed by BetseyChris    GLUCOSE, POC    Collection Time: 10/31/21 11:27 AM   Result Value Ref Range    Glucose (POC) 137 (H) 65 - 100 mg/dL    Performed by BetseyChris    GLUCOSE, POC    Collection Time: 10/31/21  4:34 PM   Result Value Ref Range    Glucose (POC) 113 (H) 65 - 100 mg/dL    Performed by BetseyChris    GLUCOSE, POC    Collection Time: 10/31/21  8:04 PM   Result Value Ref Range    Glucose (POC) 108 (H) 65 - 100 mg/dL    Performed by Elodia Veliz.     METABOLIC PANEL, BASIC    Collection Time: 11/01/21  5:50 AM   Result Value Ref Range    Sodium 143 136 - 145 mmol/L    Potassium 3.5 3.5 - 5.1 mmol/L    Chloride 107 98 - 107 mmol/L    CO2 30 21 - 32 mmol/L    Anion gap 6 (L) 7 - 16 mmol/L    Glucose 112 (H) 65 - 100 mg/dL    BUN 17 8 - 23 MG/DL    Creatinine 1.00 0.8 - 1.5 MG/DL    GFR est AA >60 >60 ml/min/1.73m2    GFR est non-AA >60 >60 ml/min/1.73m2    Calcium 8.3 8.3 - 10.4 MG/DL   GLUCOSE, POC    Collection Time: 11/01/21  7:45 AM   Result Value Ref Range    Glucose (POC) 125 (H) 65 - 100 mg/dL    Performed by Carmina        Assessment:     Problem List as of 11/1/2021 Date Reviewed: 10/26/2021        Codes Class Noted - Resolved    * (Principal) Physical debility ICD-10-CM: R53.81  ICD-9-CM: 799.3  10/26/2021 - Present        E-coli UTI ICD-10-CM: N39.0, B96.20  ICD-9-CM: 599.0, 041.49  10/24/2021 - Present        [facility] ICD-10-CM: N17.9  ICD-9-CM: 584.9  10/22/2021 - Present Pneumonia of right lower lobe due to infectious organism ICD-10-CM: J18.9  ICD-9-CM: 486  10/21/2021 - Present        Controlled type 2 diabetes mellitus without complication, without long-term current use of insulin (HCC) ICD-10-CM: E11.9  ICD-9-CM: 250.00  2/22/2019 - Present        Essential hypertension, benign ICD-10-CM: I10  ICD-9-CM: 401.1  1/3/2014 - Present        Cerebrovascular accident (CVA) due to occlusion [facility]) ICD-10-CM: I63.9  ICD-9-CM: 434.91  2/12/2019 - Present        Mixed hyperlipidemia ICD-10-CM: E78.2  ICD-9-CM: 272.2  1/3/2014 - Present        Severe obesity ([facility] ICD-10-CM: E66.01  ICD-9-CM: 278.01  7/2/2019 - Present        Acute respiratory failure with hypoxia ([facility] ICD-10-CM: J96.01  ICD-9-CM: 518.81  10/23/2021 - Present        CKD (chronic kidney disease) stage 3, GFR 30-59 ml/min ([facility]) ICD-10-CM: N18.30  ICD-9-CM: 585.3  10/22/2021 - Present        Fluid overload ICD-10-CM: E87.70  ICD-9-CM: 276.69  10/21/2021 - Present        Hypoxia ICD-10-CM: R09.02  ICD-9-CM: 799.02  10/21/2021 - Present        CAP (community acquired pneumonia) ICD-10-CM: J18.9  ICD-9-CM: 915  10/21/2021 - Present        Thyroid nodule ICD-10-CM: E04.1  ICD-9-CM: 241.0  8/5/2021 - Present        Type 2 diabetes mellitus with hyperglycemia, without long-term current use of insulin (HCC) ICD-10-CM: E11.65  ICD-9-CM: 250.00, 790.29  2/26/2021 - Present        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  ICD-9-CM: 434.91  6/14/2020 - Present        Numbness and tingling in left hand ICD-10-CM: R20.0, R20.2  ICD-9-CM: 782.0  7/2/2019 - Present        RESOLVED: Hypokalemia ICD-10-CM: E87.6  ICD-9-CM: 276.8  10/23/2021 - 10/25/2021        RESOLVED: Sepsis ([facility] ICD-10-CM: A41.9  ICD-9-CM: 038.9, 995.91  10/22/2021 - 10/25/2021        RESOLVED: Acute prostatitis without hematuria ICD-10-CM: N41.0  ICD-9-CM: 601.0  10/21/2021 - 10/25/2021        RESOLVED: Cerebellar stroke ([facility] ICD-10-CM: I63.9  ICD-9-CM: 434.91  6/14/2020 - 10/26/2021        RESOLVED: Urinary retention ICD-10-CM: R33.9  ICD-9-CM: 788.20  6/8/2020 - 9/17/2020        RESOLVED: Stroke (cerebrum) ([facility] ICD-10-CM: I63.9  ICD-9-CM: 434.91  6/8/2020 - 2/26/2021        RESOLVED: Right thalamic infarction [facility]) ICD-10-CM: I63.9  ICD-9-CM: 434.91  7/2/2019 - 10/26/2021            Plan / Recommendations / Medical Decision Making:      Continue daily physician / PA medical management:     Physical debility - admission for sepsis, acute prostatitis, E. coli UTI and RLL pneumonia; finished ABX but with residual weakness. Aggressive PT, OT.   -progressing well     Recent sepsis - completed IV ABX, WBC normalized, remains afebrile. Monitor.     GRACE, acute kidney injury - creatinine up to 2.54 (10/23), down to 1.04 by [facility] admission 10/26. Monitor and hold metformin ER if Cr trends upward.  -10/28 renal function normalized, Cr 0.99, BUN 21 on BMP yesterday  -11/1 normal Cr, BUN on BMP this AM     Pneumonia, recent RLL CAP - completed ceftriaxone / azithromycin. O2 needs diminished; he has been weaned to room air. Incentive spirometer every hour while awake.     Prostatitis, E coli UTI - completed ceftriaxone / azithromycin. S/p UroLift procedure 9/2020. Continue on doxazosin, finasteride; monitor for urinary retention. Check post-void residual as needed; in-and-out catheter if post-void residual is more than 400ml. Voiding well on [facility] admission. -11/1 nocturia q2h, occasional AM OH; will decrease doxazosin to 4mg QHS     Hypertension - BP fluctuating, managed medically on lisinopril, HCTZ with help from doxazosin. sBP excursions into 160-180s, dBP all <90.  PRN hydralazine but may need to add another agent.  -10/27 132-187/62-75; on 10mg BID of lisinopril, 12.5mg of HCTZ, Cardura 8mg; add Norvasc 5mg  -10/28 /68 this AM before meds, dropped to 99/50 and very symptomatic later in AM; stop amlodipine, increase AM lisinopril to 15mg, leave PM dose at 10mg, other meds same; will go more slowly, as patient accustomed to sBP >150  -10/29 147/61, Norvasc stopped due to hypotension; continue current meds  -10/30 166/72, 181/74 However; orthostatic with PT; 115/51 standing, 110/45 after walking, 105/52 sitting after walking, then 94/45 after walking 75ft, dropped to 85/49; TEDs when OOB  -11/1 more OH this AM despite no change in BP meds; with nocturia vs normal daytime voiding, suspect doxazosin --> decrease to 4mg QHS, TEDs during AM     Diabetes mellitus - most recent HgbA1c 8.1% (6/30/2021), poor glycemic control. Will require ACHS fasting glucose monitoring and medication adjustment to optimize glycemic control in view of recent acute illness and hospitalization. DM diet, Humalog SSI; metformin ER had been held in acute setting during GRACE but will resume since renal function has normalized. Monitor.   -10/27 -242; poorly controlled over time; just restarted metformin yesterday, cont SSI; adjust as needed  -10/28 BS <160, continue current regimen  -10/29 fair control;  at bedtime, otherwise; 144-195; takes glipizide as well as the Glucophage at home. Consider restarting it at 2.5mg daily. Typically his BS run in the 150-160 range at home, not ideal  -10/30 -184; continue Glucophage and add glipizide 2.5mg to start  -11/1 BS remaining <140, continue current regimen     Hyperlipidemia - continue Lipitor 80mg daily.     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 Continue Plavix 75mg daily.     Pain control - stable, mild-to-moderate joint symptoms intermittently, reasonably well controlled by PRN meds. Will require regular pain assessment and comprehensive pain management. Has long-standing left shoulder / neck pain treated by chiropractor.     Electrolyte abnormality - hypoK+ to 3.0 (10/26); replace and monitor. Will give KCl 20mEq x 4 doses.   -10/27 K+ 3.3 today, continue to monitor  -10/30 recheck 11/1  -11/1 normal BMP     DVT risk / DVT prophylaxis - daily physician / PA exam to assess as patient is at increased risk for of thromboembolism. Mobilize as tolerated. Sequential pneumatic compression devices (SCDs) when in bed; thigh-high or knee-high thromboembolic deterrent hose when out of bed. Lovenox subcutaneously daily.      GI prophylaxis - consider PPI. At times may need additional antacids, Maalox prn.     Depression - continue on Zoloft; has good support system. Monitor.  -10/30 doing very well, very pleasant and cooperative; enjoying therapy and spirits bright     General skin care / wound prevention - monitor general skin wound status daily per staff and physician / PA. At risk for failure. Will require 24/7 rehab nursing.     Bowel program - at risk for constipation as a side effect of opioids, other medications, impaired mobility, etc. MiraLAX daily for regularity, Meghan-Colace for stool softener. PRN MOM, bisacodyl suppository or tablets for constipation.  -10/28 occasional loose stools, suspect this is from restarting metformin ER; PRN loperamide  -10/29 improved, one BM yesterday  -10/30 no loose stools  -11/1 no further loose stools        Time spent was 25 minutes with over 1/2 in direct patient care/examination, consultation and coordination of care. Signed By: Gonzales Weinberg PA-C    November 1, 2021      Physician Assistant with [facility]  Lilian Banegas MD, Medical Director

## 2021-11-01 NOTE — PROGRESS NOTES
PHYSICAL THERAPY DAILY NOTE  Time In: (P) 1301  Time Out: (P) 1341  Patient Seen For: (P) PM;Therapeutic exercise;Transfer training;Gait training; Other (see progress notes)    Subjective: \" I was hoping I was going to have a good day. I just have not felt steady today. \"         Objective: NO pain noted. Patient did not get light headed but standing balance required assistance with gait. Other (comment) (fall precautions)  GROSS ASSESSMENT Daily Assessment            COGNITION Daily Assessment           BED/MAT MOBILITY Daily Assessment    Supine to Sit : (P) 4 (Minimal assistance)  Sit to Supine : (P) 4 (Minimal assistance)       TRANSFERS Daily Assessment    Other: (P) SPT with straight cane  Transfer Assistance : (P) 4 (Minimal assistance)  Sit to Stand Assistance: (P) Contact guard assistance  Car Transfers: (P) Not tested       GAIT Daily Assessment    Amount of Assistance: (P) 3 (Moderate assistance)  Distance (ft): (P) 50 Feet (ft)  Assistive Device: (P) Gait belt;Cane, straight       STEPS or STAIRS Daily Assessment    Level of Assist : (P) 0 (Not tested)       BALANCE Daily Assessment            WHEELCHAIR MOBILITY Daily Assessment            LOWER EXTREMITY EXERCISES Daily Assessment   Used green swiss ball for core exs in supine. Extremity: (P) Both  Exercise Type #1: (P) Supine lower extremity strengthening  Sets Performed: (P) 1  Reps Performed: (P) 10  Level of Assist: (P) Minimal assistance  Exercise Type #2: Other (comment) (standing exs at rail)  Sets Performed: 1  Reps Performed: 5  Level of Assist: Moderate assistance          Assessment: Patient s step length is small today. Shuffling gait. Plan of Care: Continue with plan of care.     Mayur Ortega, MARY  11/1/2021

## 2021-11-01 NOTE — PROGRESS NOTES
OT Daily Note  Time In 1030   Time Out 1120     Subjective: \"I feel a little light headed. \" [after supine > sit]  Pain: No pain expressed. Education: Shoulder extension activities   Interdisciplinary Communication: Communicated with PA, PTA, and RN regarding BP  Precautions: Falls  Patient Vitals for the past 12 hrs:   Temp Pulse Resp BP SpO2   11/01/21 1040  72  (!) 124/53    11/01/21 1030  70  131/61    11/01/21 0715 97.9 °F (36.6 °C) 69 16 (!) 162/84 96 %         Mobility   Score Comments   Rolling 6: Independent Side: Right     Supine to Sit 4: Supervision or touching A vc's for hand placement    Sit to Stand 4: Supervision or touching A S   Transfer Assist 4: Supervision or touching A Transfer Type: SPT   Equipment: Cane   Comments: close SBA     Self-Care Daily Assessment   Total assist to don compression stockings per PA recommendations due to low BP this morning. Activity Tolerance and Anterior/Posterior Reaching Daily Assessment   Pt completed visual perceptual, reaching, and bilateral hand coordination task utilizing pipe tree while following visual design. Pt with good coordination skills noted while connecting pipe tree pieces onto design at midline. Pt reached posteriorly to grasp pieces from therapist to work on shoulder extension for integration into self care tasks. Pt independently matched visual design to constructed design. Pt completed activity while seated EOB due to low BP this morning. See chart above for vitals. Assessment: Pt completed anterior and posterior reaching tasks while seated EOB working on progressing bilateral shoulder extension ROM for improved reach during self care tasks (such as toileting and bathing). Pt reported light headedness decreased after several minutes of sitting EOB. Pt transitioned to recliner for rest break and reported no symptoms following 10 ft walk.  Recommend donning ARIANNA hose in the am.    Pt demonstrated good participation and engagement in OT session. Pt continues to benefit from skilled OT services to address remaining deficits and return back to baseline with improved independence and safety during ADLs and IADLs. Patient ended session seated in recliner and call remote, phone, all needs within reach  Plan: Continue OT POC with focus on ADL/IADL skills, functional transfers, functional mobility, coordination, strength, static and dynamic balance, and activity tolerance to maximize safety and independence with ADLs and functional transfers.      Antonio Mcdermott, OTR/L  11/1/2021

## 2021-11-01 NOTE — PROGRESS NOTES
STG: Patient will complete functional math calculations with 90% accuracy with minimal assistance. STG: Patient will complete functional attention tasks with 90% accuracy with minimal assistance. STG: Patient will complete mental manipulation tasks with 90% accuracy with minimal assistance. STG: Patient will complete divergent naming tasks with 90% accuracy with minimal assistance. STG: Patient will complete functional short-term memory tasks with 90% accuracy with minimal assistance  LTG: Patient will increase neuro-linguistic abilities to increase safety and awareness of deficits. INITIAL COGNITIVE ASSESSMENT     11/01/21 1141   Time Spent With Patient   Time In 1001   Time Out 1031   Mental Status   Neurologic State Alert   Orientation Level Oriented X4   Cognition Memory loss        11/01/21 1142   Verbal Expression   Primary Mode of Expression Verbal   Initiation No impairment   Automatic Speech Task No impairment   Repetition No impairment   Naming No impairment   Sentence Completion No impairment   Sentence Formulation No impairment   Conversation No impairment   Oral-Motor Structure/Motor Speech   Apraxic Characteristics None   Dysarthric Characteristics None   Neuro-Linguistics/Cognitive Function   Mathematics Addition, simple;Subtraction, simple   Memory  Immediate; Short-term   Pragmatics   Pragmatics Impairment No impairment   Pragmatics Impairment Severity None     Patient participated with a cognitive assessment with the [place]'s Cognitive Examination. ACEIII. Pt known to ST from previous admission for cerebellar CVA received treatment for cognitive impairments at that time. Reports that he has resumed some ADLs his wife was initially assisting with at home such as medication management and driving. Patient scored 83/100 on the ACE III with a norm score of 88 or greater and breakdown as follows:   Attention: 14/18 (decreased basic subtraction)  Memory: 25/26 (decreased immediate recall; required repetition x3 to retain but was able to recall with 10 minute delay)  Fluency: 8/14 (decreased abstract divergent naming)  Language: 23/26  Visuospatial: 13/16  Patient may benefit from further assessment and potential treatment addressing performance with higher level ADLs he was completing at home.     Conrad Dooley MS, CCC-SLP

## 2021-11-02 LAB
GLUCOSE BLD STRIP.AUTO-MCNC: 103 MG/DL (ref 65–100)
GLUCOSE BLD STRIP.AUTO-MCNC: 111 MG/DL (ref 65–100)
GLUCOSE BLD STRIP.AUTO-MCNC: 127 MG/DL (ref 65–100)
GLUCOSE BLD STRIP.AUTO-MCNC: 132 MG/DL (ref 65–100)
SERVICE CMNT-IMP: ABNORMAL

## 2021-11-02 PROCEDURE — 99232 SBSQ HOSP IP/OBS MODERATE 35: CPT | Performed by: PHYSICAL MEDICINE & REHABILITATION

## 2021-11-02 PROCEDURE — 97116 GAIT TRAINING THERAPY: CPT

## 2021-11-02 PROCEDURE — 74011250637 HC RX REV CODE- 250/637: Performed by: PHYSICAL MEDICINE & REHABILITATION

## 2021-11-02 PROCEDURE — 97530 THERAPEUTIC ACTIVITIES: CPT

## 2021-11-02 PROCEDURE — 82962 GLUCOSE BLOOD TEST: CPT

## 2021-11-02 PROCEDURE — 97110 THERAPEUTIC EXERCISES: CPT

## 2021-11-02 PROCEDURE — 65310000000 HC RM PRIVATE REHAB

## 2021-11-02 PROCEDURE — 97535 SELF CARE MNGMENT TRAINING: CPT

## 2021-11-02 PROCEDURE — 74011250637 HC RX REV CODE- 250/637: Performed by: PHYSICIAN ASSISTANT

## 2021-11-02 PROCEDURE — 92507 TX SP LANG VOICE COMM INDIV: CPT

## 2021-11-02 PROCEDURE — 74011250636 HC RX REV CODE- 250/636: Performed by: PHYSICIAN ASSISTANT

## 2021-11-02 RX ADMIN — GLIPIZIDE 2.5 MG: 5 TABLET ORAL at 08:02

## 2021-11-02 RX ADMIN — ATORVASTATIN CALCIUM 80 MG: 80 TABLET, FILM COATED ORAL at 08:02

## 2021-11-02 RX ADMIN — METFORMIN HYDROCHLORIDE 1000 MG: 500 TABLET, EXTENDED RELEASE ORAL at 17:59

## 2021-11-02 RX ADMIN — FINASTERIDE 5 MG: 5 TABLET, FILM COATED ORAL at 08:02

## 2021-11-02 RX ADMIN — ENOXAPARIN SODIUM 40 MG: 40 INJECTION SUBCUTANEOUS at 08:02

## 2021-11-02 RX ADMIN — LISINOPRIL 10 MG: 5 TABLET ORAL at 08:02

## 2021-11-02 RX ADMIN — LISINOPRIL 10 MG: 5 TABLET ORAL at 17:59

## 2021-11-02 RX ADMIN — HYDROCHLOROTHIAZIDE 12.5 MG: 12.5 CAPSULE ORAL at 08:01

## 2021-11-02 RX ADMIN — LOPERAMIDE HYDROCHLORIDE 2 MG: 2 CAPSULE ORAL at 21:58

## 2021-11-02 RX ADMIN — CLOPIDOGREL BISULFATE 75 MG: 75 TABLET ORAL at 08:02

## 2021-11-02 RX ADMIN — DOXAZOSIN 4 MG: 4 TABLET ORAL at 21:58

## 2021-11-02 RX ADMIN — SERTRALINE 50 MG: 50 TABLET, FILM COATED ORAL at 08:02

## 2021-11-02 NOTE — PROGRESS NOTES
PHYSICAL THERAPY DAILY NOTE  Time In: (P) 6868  Time Out: (P) 1046  Patient Seen For: (P) AM;Therapeutic exercise;Transfer training;Gait training; Other (see progress notes)    Subjective: Patient complained of being lightheaded. \" After I take my morning meds that's when I feel bad. \"         Objective: BP sitting 102/54. During treatment BP stayed around 100/56 114/52 102/54. Discussed with nurse Mariama Gonzalez concerning his BP. Other (comment) (fall precautions)  GROSS ASSESSMENT Daily Assessment            COGNITION Daily Assessment           BED/MAT MOBILITY Daily Assessment    Supine to Sit : (P) 4 (Minimal assistance)  Sit to Supine : (P) 4 (Minimal assistance)       TRANSFERS Daily Assessment    Other: (P) SPT with straight cane  Transfer Assistance : (P) 4 (Contact guard assistance)  Sit to Stand Assistance: (P) Minimal assistance  Car Transfers: (P) Not tested       GAIT Daily Assessment    Amount of Assistance: (P) 4 (Contact guard assistance)  Distance (ft): (P) 30 Feet (ft)  Assistive Device: (P) Cane, straight;Gait belt       STEPS or STAIRS Daily Assessment    Level of Assist : (P) 0 (Not tested)       BALANCE Daily Assessment            WHEELCHAIR MOBILITY Daily Assessment            LOWER EXTREMITY EXERCISES Daily Assessment    Extremity: (P) Both  Exercise Type #1: (P) Other (comment) (standing exs x 10 reps)  Sets Performed: (P) 1  Reps Performed: (P) 10  Level of Assist: (P) Minimal assistance          Assessment: Patient still feeling like he is lightheaded which is hindering his progress with gait. Plan of Care: Continue with plan of care.     Mindy Menendez, MARY  11/2/2021

## 2021-11-02 NOTE — PROGRESS NOTES
PHYSICAL THERAPY DAILY NOTE  Time In: (P) 1348  Time Out: (P) 1439  Patient Seen For: (P) PM;Therapeutic exercise;Transfer training;Gait training; Other (see progress notes)    Subjective: Patient stated that he felt much better this afternoon. \" I dont have that weird feeling in my head this afternoon. \"         Objective: No pain noted. /64 during therapy. Other (comment) (fall precautions)  GROSS ASSESSMENT Daily Assessment            COGNITION Daily Assessment           BED/MAT MOBILITY Daily Assessment    Supine to Sit : (P) 4 (Minimal assistance)  Sit to Supine : (P) 4 (Minimal assistance)       TRANSFERS Daily Assessment    Other: (P) SPT with straight cane  Transfer Assistance : (P) 4 (Minimal assistance)  Sit to Stand Assistance: (P) Minimal assistance  Car Transfers: (P) Not tested       GAIT Daily Assessment    Amount of Assistance: (P) 4 (Contact guard assistance)  Distance (ft): (P) 70 Feet (ft)  Assistive Device: (P) Gait belt;Cane, straight       STEPS or STAIRS Daily Assessment    Level of Assist : (P) 0 (Not tested)       BALANCE Daily Assessment            WHEELCHAIR MOBILITY Daily Assessment            LOWER EXTREMITY EXERCISES Daily Assessment    Extremity: (P) Both  Exercise Type #1: (P) Other (comment) (ambulated around obstacles)  Sets Performed: (P) 3  Reps Performed: (P) 0  Level of Assist: (P) Minimal assistance          Assessment: Patient did much better this afternoon. His balance was improved too. Plan of Care: Continue with plan of care.     Michael Wagner, PTA  11/2/2021

## 2021-11-02 NOTE — PROGRESS NOTES
OT Daily Note  Time In 1043   Time Out 1118     Subjective: \"I feel good. \" Pt was agreeable to tx. Pain: No pain expressed. Patient Vitals for the past 8 hrs:   Temp Pulse Resp BP SpO2   11/02/21 0900  79  (!) 111/52    11/02/21 0830  84  (!) 115/43    11/02/21 0731 98.5 °F (36.9 °C) 86 18 (!) 150/65 98 %           Functional Mobility      Score Comments   Transfer Assist 4: Supervision or touching A Transfer Type: SPT   Equipment: Cane   Comments: CGA         Coordination, Visual-Perceptual and Strengthening   Pt engaged in checkCLEAR game to address froward reaching, UE strength, cognition, and coordination. Pt had good ability to complete task however Pt started moving therapist's piece on board. Activity Tolerance, Coordination and Strengthening   Pt engaged with red putty with 18 items to find and remove. Pt demonstrated good quality during activity. Education   benefits of OT, safety awareness and functional transfers     Interdisciplinary Communication: Communicated with Routine OT to ensure progress towards POC and goals. Summary of session: Pt demonstrated good participation in session. Pt completed task with good strength and coordination. Pt was left seated in recliner with call bell within reach and all needs met. Plan: Continue with POC.      Alberta Powell MS OTR/L  11/2/2021

## 2021-11-02 NOTE — PROGRESS NOTES
Problem: Falls - Risk of  Goal: *Absence of Falls  Description: Document Fields Reason Fall Risk and appropriate interventions in the flowsheet.   Outcome: Progressing Towards Goal  Note: Fall Risk Interventions:  Mobility Interventions: Communicate number of staff needed for ambulation/transfer, Patient to call before getting OOB, Strengthening exercises (ROM-active/passive), Utilize walker, cane, or other assistive device         Medication Interventions: Evaluate medications/consider consulting pharmacy, Patient to call before getting OOB, Teach patient to arise slowly    Elimination Interventions: Call light in reach, Patient to call for help with toileting needs, Toilet paper/wipes in reach    History of Falls Interventions: Consult care management for discharge planning, Door open when patient unattended, Evaluate medications/consider consulting pharmacy

## 2021-11-02 NOTE — PROGRESS NOTES
OT Daily Note  Time In 1305   Time Out 1350     Subjective: \"I feel fine now. \"  Pain: No pain expressed. Education: endurance training  Interdisciplinary Communication: Discussed pt's BP with PTA  Precautions: Falls  Patient Vitals for the past 12 hrs:   Temp Pulse Resp BP SpO2   11/02/21 1300    129/64    11/02/21 0900  79  (!) 111/52    11/02/21 0830  80  (!) 115/43    11/02/21 0731 98.5 °F (36.9 °C) 86 18 (!) 150/65 98 %         Mobility   Score Comments   Sit to Stand 4: Supervision or touching A S   Transfer Assist 4: Supervision or touching A Transfer Type: SPT   Equipment: Cane   Comments:      Activity Tolerance and Reaching and Strengthening Daily Assessment   Pt engaged in blink card game working on attention, visual perceptual skills, problem solving, and STM skills. Pt was to match cards in hand to two center cards based on similarities in either color, shape, or number. Pt required demonstration for initially starting game. Pt only required min vc throughout game. Strengthening Daily Assessment   Patient completed x 10 minutes UBE on medium heavy  resistance using BUEs seated in wheelchair. Patient completed x 5 minutes forward rotation, x 5 minutes backward rotation. Assessment: Pt with stable BP while seated during table top activity tolerance building tasks. Pt demonstrated good participation and engagement in OT session. Pt continues to benefit from skilled OT services to address remaining deficits and return back to baseline with improved independence and safety during ADLs and IADLs. Patient ended session seated in  and call remote, phone, all needs within reach  Plan: Continue OT POC with focus on ADL/IADL skills, functional transfers, functional mobility, coordination, strength, static and dynamic balance, and activity tolerance to maximize safety and independence with ADLs and functional transfers.      Antonio Mcdermott, OTR/L  11/2/2021

## 2021-11-02 NOTE — PROGRESS NOTES
OT WEEKLY PROGRESS NOTE    Time In: 0830  Time Out: 0908    Mobility   Score Comments   Rolling 6: Independent Side: Bilateral     Supine to Sit 6: Independent    Sit to Supine 6: Independent    Sit to Stand 4: Supervision or touching A S   Transfer Assist 4: Supervision or touching A Transfer Type: SPT   Equipment: N/A   Comments: SBA     Activities of Daily Living    Score Comments   Eating 6: Independent    Oral Hyigene 6: Independent    Bathing 4: Supervision or touching A Type of Shower: Shower  Position: Standing PRN and Unsupported Sitting   Adaptive  Equipment: Tub Transfer Bench, Grab Bars and Long Handled Sponge  Comments: S   Upper Body  Dressing 5: S/U or clean-up assist Items Applied: Pullover  Position: Unsupported Sitting  Comments:    Lower Body Dressing 4: Supervision or touching A Items Applied: Underwear and Elastic pants  Position: Standing PRN and Unsupported Sitting  Adaptive Equipment: N/A  Comments: S   Donning/[place] Footwear 5: S/U or clean-up assist Items Applied: Slip-on shoes  Adaptive Equipment: N/A  Comments: Toilet Transfer 4: Supervision or touching A Transfer Type: SPT   Equipment: N/A   Comments: S   Toileting Hygiene 5: S/U or clean-up assist Output: urine x1  Comments:    Education  Energy conservation and BP indications       Plan of Care: Please see Care Plan for updated LTGs. Family Training: to be completed closer to patient's discharge date    Summary of Progress: Patient demonstrates good progress with functional mobility, progressing shoulder extension for posterior reaching, self care techniques, and activity tolerance for performance of ADL and functional transfers, see above for details. Patient continues to require skilled OT services to address deficits, provide education, and progress towards goals as stated in POC. Summary of Session: S: \"I don't feel light headed today. My my morning medicines did make me feel different. \" Agreeable to therapy.    Focus of session was on morning ADL routine and progress assessment. Patient was able to ambulate ~10 feet using a SPC or without a device with close supervision. Patient Vitals for the past 12 hrs:   Temp Pulse Resp BP SpO2   11/02/21 0900  79  (!) 111/52    11/02/21 0830  84  (!) 115/43    11/02/21 0731 98.5 °F (36.9 °C) 86 18 (!) 150/65 98 %         Pain not expressed. Collaborated with PTA, RN, and MD and confirmed patient is on track to reach goals as documented in the care plan. Continue OT POC with focus on ADL/IADL skills, functional transfers, functional mobility, coordination, strength, static and dynamic balance, and activity tolerance to maximize safety and independence with ADLs and functional transfers'  Patient ended session in recliner with compression stockings donned, feet elevated, and call remote and phone within reach.          Antonio Boyd, OTR/L  11/2/2021

## 2021-11-02 NOTE — PROGRESS NOTES
11/02/21 1231   Time Spent With Patient   Time In 1122   Time Out 1158   Patient Seen For: AM;Neuro-linguistics   Mental Status   Neurologic State Alert   Orientation Level Oriented X4   Cognition Follows commands   Perception Appears intact   Perseveration No perseveration noted   Safety/Judgement Awareness of environment        11/02/21 1232   Neuro-Linguistic Exercises   Verbal Problem Solving Mildly impaired; 80-90% accuracy with basic money management and functional reading comprehension  100% accuracy with basic medication set up   Verbal Organization Intact   Memory Impaired  Mild  Recalls names of staff and able to state medications that he takes at home      Patient participated with cognitive treatment focusing on functional money management, basic medication management, and functional reading comprehension. Patient recalls names of staff and is able to state recent events related to his blood pressure and names of medications that he takes at home. 80-90% accuracy with basic money management and functional reading comprehension related to appointments, safety instructions, etc.  100% accuracy interpreting basic medication labels and setting up in a simple planner. Patient with improved cognitive function compared with previous admission from [facility] but will follow to continue to address ADLs he had been completing at home.     Mely Vasquez MS, CCC-SLP

## 2021-11-02 NOTE — PROGRESS NOTES
Radha Cavazos MD  Medical Director  [address] [facility], [address] [facility]  Tel: [phone]       [facility] PROGRESS NOTE    [address] Date: 10/26/2021  Admit Diagnosis:   Physical debility [R53.81]    Subjective     Patient seen and examined. Had another episode of orthostatics in therapy yesterday. He calls them his \"spells\". Therapists document dizzy spell going from supine to sit but after doing activities EOB before standing, symptoms resolved.  Denies cp, sob, headache, fever    Objective:     Current Facility-Administered Medications   Medication Dose Route Frequency    doxazosin (CARDURA) tablet 4 mg  4 mg Oral QHS    glipiZIDE (GLUCOTROL) tablet 2.5 mg  2.5 mg Oral ACB    lisinopriL (PRINIVIL, ZESTRIL) tablet 10 mg  10 mg Oral BID    artificial saliva (MOUTH KOTE) 1 Spray  1 Spray Oral PRN    loperamide (IMODIUM) capsule 2 mg  2 mg Oral PRN    atorvastatin (LIPITOR) tablet 80 mg  80 mg Oral DAILY    clopidogreL (PLAVIX) tablet 75 mg  75 mg Oral DAILY    finasteride (PROSCAR) tablet 5 mg  5 mg Oral DAILY    hydrALAZINE (APRESOLINE) tablet 25 mg  25 mg Oral Q6H PRN    hydroCHLOROthiazide (MICROZIDE) capsule 12.5 mg  12.5 mg Oral DAILY    insulin lispro (HUMALOG) injection   SubCUTAneous AC&HS    metFORMIN ER (GLUCOPHAGE XR) tablet 1,000 mg  1,000 mg Oral DAILY WITH DINNER    sertraline (ZOLOFT) tablet 50 mg  50 mg Oral DAILY    enoxaparin (LOVENOX) injection 40 mg  40 mg SubCUTAneous Q24H    influenza vaccine 2021-22 (6 mos+)(PF) (FLUARIX/FLULAVAL/FLUZONE QUAD) injection 0.5 mL  1 Each IntraMUSCular PRIOR TO DISCHARGE    acetaminophen (TYLENOL) tablet 650 mg  650 mg Oral Q6H PRN    bisacodyL (DULCOLAX) tablet 5 mg  5 mg Oral DAILY PRN    Or    bisacodyL (DULCOLAX) suppository 10 mg  10 mg Rectal DAILY PRN    Or    magnesium hydroxide (MILK OF MAGNESIA) 400 mg/5 mL oral suspension 30 mL  30 mL Oral DAILY PRN    traZODone (DESYREL) tablet 50 mg  50 mg Oral QHS PRN       Review of Systems:   Denies chest pain, shortness of breath, cough, headache, visual problems, abdominal pain, dysuria, calf pain. Pertinent positives are as noted in the HPI, ROS unremarkable otherwise. Visit Vitals  BP (!) 150/65 (BP 1 Location: Left upper arm, BP Patient Position: Sitting)   Pulse 86   Temp 98.5 °F (36.9 °C)   Resp 18   SpO2 98%        Physical Exam:   General: Alert and age appropriately oriented. No acute cardiorespiratory distress. HEENT: Normocephalic, no scleral icterus. Oral mucosa moist without cyanosis. Lungs: Clear to auscultation bilaterally. Respiration even and unlabored. Heart: Regular rate and rhythm, S1, S2. No murmurs, clicks, rub or gallops. Abdomen: Soft, non-tender, not distended. Bowel sounds normoactive. No organomegaly. obese   Genitourinary: Deferred. Neuromuscular:      UE strength at biceps, triceps, finger flexion 5/5 (B) and symmetric. B LE strength at hip flexion 4-/5, knee flexion 4/5, ankle DF/PF 4/5. Skin/extremity: No rashes, no erythema. No calf tenderness B LE.   Trace edema LEs                                                                               Functional Assessment:          Balance  Sitting - Static: Good (unsupported) (10/30/21 0900)  Sitting - Dynamic: Fair (occasional) (10/30/21 0900)  Standing - Static: Fair (10/30/21 0900)  Standing - Dynamic : Impaired (10/30/21 0900)                     Med [place] Fall Risk Assessment:  Med [place] Fall Risk  Mobility: Ambulates or transfers with assist devices or assistance (11/02/21 0751)  Mobility Interventions: Communicate number of staff needed for ambulation/transfer (11/02/21 0751)  Mentation: Alert, oriented x 3 (11/02/21 0751)  Medication: Patient receiving anticonvulsants, sedatives(tranquilizers), psychotropics or hypnotics, hypoglycemics, narcotics, sleep aids, antihypertensives, laxatives, or diuretics (11/02/21 6398)  Medication Interventions: Evaluate medications/consider consulting pharmacy (11/02/21 0751)  Elimination: Needs assistance with toileting (11/02/21 0751)  Elimination Interventions: Call light in reach (11/02/21 0751)  Prior Fall History: Before admission in past 12 months _home or previous inpatient care) (11/02/21 0751)  History of Falls Interventions: Consult care management for discharge planning (11/02/21 0751)  Total Score: 4 (11/02/21 0751)  Standard Fall Precautions: Yes (10/31/21 2148)  High Fall Risk: Yes (11/02/21 0751)     Speech Assessment:         Ambulation:  Gait  Distance (ft): 50 Feet (ft) (11/01/21 1452)  Assistive Device: Gait belt;Cane, straight (11/01/21 1452)  Rail Use: Both (10/29/21 1500)     Labs/Studies:  Recent Results (from the past 72 hour(s))   GLUCOSE, POC    Collection Time: 10/30/21 11:29 AM   Result Value Ref Range    Glucose (POC) 180 (H) 65 - 100 mg/dL    Performed by [employer]    GLUCOSE, POC    Collection Time: 10/30/21  4:18 PM   Result Value Ref Range    Glucose (POC) 98 65 - 100 mg/dL    Performed by [employer]    GLUCOSE, POC    Collection Time: 10/30/21  7:39 PM   Result Value Ref Range    Glucose (POC) 150 (H) 65 - 100 mg/dL    Performed by [employer]. GLUCOSE, POC    Collection Time: 10/31/21  7:51 AM   Result Value Ref Range    Glucose (POC) 136 (H) 65 - 100 mg/dL    Performed by [employer]    GLUCOSE, POC    Collection Time: 10/31/21 11:27 AM   Result Value Ref Range    Glucose (POC) 137 (H) 65 - 100 mg/dL    Performed by [employer]    GLUCOSE, POC    Collection Time: 10/31/21  4:34 PM   Result Value Ref Range    Glucose (POC) 113 (H) 65 - 100 mg/dL    Performed by [employer]    GLUCOSE, POC    Collection Time: 10/31/21  8:04 PM   Result Value Ref Range    Glucose (POC) 108 (H) 65 - 100 mg/dL    Performed by [facility].     METABOLIC PANEL, BASIC    Collection Time: 11/01/21  5:50 AM   Result Value Ref Range    Sodium 143 136 - 145 mmol/L    Potassium 3.5 3.5 - 5.1 mmol/L    Chloride 107 98 - 107 mmol/L    CO2 30 21 - 32 mmol/L    Anion gap 6 (L) 7 - 16 mmol/L    Glucose 112 (H) 65 - 100 mg/dL    BUN 17 8 - 23 MG/DL    Creatinine 1.00 0.8 - 1.5 MG/DL    GFR est AA >60 >60 ml/min/1.73m2    GFR est non-AA >60 >60 ml/min/1.73m2    Calcium 8.3 8.3 - 10.4 MG/DL   GLUCOSE, POC    Collection Time: 11/01/21  7:45 AM   Result Value Ref Range    Glucose (POC) 125 (H) 65 - 100 mg/dL    Performed by [id number], POC    Collection Time: 11/01/21 11:46 AM   Result Value Ref Range    Glucose (POC) 113 (H) 65 - 100 mg/dL    Performed by [id number], POC    Collection Time: 11/01/21  4:36 PM   Result Value Ref Range    Glucose (POC) 100 65 - 100 mg/dL    Performed by [id number], POC    Collection Time: 11/01/21  9:39 PM   Result Value Ref Range    Glucose (POC) 117 (H) 65 - 100 mg/dL    Performed by Jessica    GLUCOSE, POC    Collection Time: 11/02/21  7:22 AM   Result Value Ref Range    Glucose (POC) 127 (H) 65 - 100 mg/dL    Performed by Jennifer        Assessment:     Problem List as of 11/2/2021 Date Reviewed: 10/26/2021        Codes Class Noted - Resolved    * (Principal) Physical debility ICD-10-CM: R53.81  ICD-9-CM: 799.3  10/26/2021 - Present        E-coli UTI ICD-10-CM: N39.0, B96.20  ICD-9-CM: 599.0, 041.49  10/24/2021 - Present        Acute respiratory failure with hypoxia (HCC) ICD-10-CM: J96.01  ICD-9-CM: 518.81  10/23/2021 - Present        GRACE (acute kidney injury) ([facility] ICD-10-CM: N17.9  ICD-9-CM: 584.9  10/22/2021 - Present        CKD (chronic kidney disease) stage 3, GFR 30-59 ml/min ([facility]) ICD-10-CM: N18.30  ICD-9-CM: 585.3  10/22/2021 - Present        Pneumonia of right lower lobe due to infectious organism ICD-10-CM: J18.9  ICD-9-CM: 486  10/21/2021 - Present        Fluid overload ICD-10-CM: E87.70  ICD-9-CM: 276.69  10/21/2021 - Present        Hypoxia ICD-10-CM: R09.02  ICD-9-CM: 799.02  10/21/2021 - Present        CAP (community acquired pneumonia) ICD-10-CM: J18.9  ICD-9-CM: 486  10/21/2021 - Present        Thyroid nodule ICD-10-CM: E04.1  ICD-9-CM: 241.0  8/5/2021 - Present        Type 2 diabetes mellitus with hyperglycemia, without long-term current use of insulin (HCC) ICD-10-CM: E11.65  ICD-9-CM: 250.00, 790.29  2/26/2021 - Present        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  ICD-9-CM: 434.91  6/14/2020 - Present        Severe obesity ([facility] ICD-10-CM: E66.01  ICD-9-CM: 278.01  7/2/2019 - Present        Numbness and tingling in left hand ICD-10-CM: R20.0, R20.2  ICD-9-CM: 782.0  7/2/2019 - Present        Controlled type 2 diabetes mellitus without complication, without long-term current use of insulin ([facility] ICD-10-CM: E11.9  ICD-9-CM: 250.00  2/22/2019 - Present        Cerebrovascular accident (CVA) due to occlusion [facility]) ICD-10-CM: I63.9  ICD-9-CM: 434.91  2/12/2019 - Present        Essential hypertension, benign ICD-10-CM: I10  ICD-9-CM: 401.1  1/3/2014 - Present        Mixed hyperlipidemia ICD-10-CM: E78.2  ICD-9-CM: 272.2  1/3/2014 - Present        RESOLVED: Hypokalemia ICD-10-CM: E87.6  ICD-9-CM: 276.8  10/23/2021 - 10/25/2021        RESOLVED: Sepsis ([facility] ICD-10-CM: A41.9  ICD-9-CM: 038.9, 995.91  10/22/2021 - 10/25/2021        RESOLVED: Acute prostatitis without hematuria ICD-10-CM: N41.0  ICD-9-CM: 601.0  10/21/2021 - 10/25/2021        RESOLVED: Cerebellar stroke ([facility] ICD-10-CM: I63.9  ICD-9-CM: 434.91  6/14/2020 - 10/26/2021        RESOLVED: Urinary retention ICD-10-CM: R33.9  ICD-9-CM: 788.20  6/8/2020 - 9/17/2020        RESOLVED: Stroke (cerebrum) ([facility] ICD-10-CM: I63.9  ICD-9-CM: 434.91  6/8/2020 - 2/26/2021        RESOLVED: Right thalamic infarction [facility]) ICD-10-CM: I63.9  ICD-9-CM: 434.91  7/2/2019 - 10/26/2021            Plan / Recommendations / Medical Decision Making:      Continue daily physician / PA medical management:     Physical debility - admission for sepsis, acute prostatitis, E. coli UTI and RLL pneumonia; finished ABX but with residual weakness. Aggressive PT, OT.   -progressing well     Recent sepsis - completed IV ABX, WBC normalized, remains afebrile. Monitor.     GRACE, acute kidney injury - creatinine up to 2.54 (10/23), down to 1.04 by [facility] admission 10/26. Monitor and hold metformin ER if Cr trends upward.  -10/28 renal function normalized, Cr 0.99, BUN 21 on BMP yesterday  -11/1 normal Cr, BUN on BMP this AM     Pneumonia, recent RLL CAP - completed ceftriaxone / azithromycin. O2 needs diminished; he has been weaned to room air. Incentive spirometer every hour while awake.     Prostatitis, E coli UTI - completed ceftriaxone / azithromycin. S/p UroLift procedure 9/2020. Continue on doxazosin, finasteride; monitor for urinary retention. Check post-void residual as needed; in-and-out catheter if post-void residual is more than 400ml. Voiding well on [facility] admission. -11/1 nocturia q2h, occasional AM OH; will decrease doxazosin to 4mg QHS  -11/2 didn't report nocturia this a.m.      Hypertension - BP fluctuating, managed medically on lisinopril, HCTZ with help from doxazosin. sBP excursions into 160-180s, dBP all <90.  PRN hydralazine but may need to add another agent.  -10/27 132-187/62-75; on 10mg BID of lisinopril, 12.5mg of HCTZ, Cardura 8mg; add Norvasc 5mg  -10/28 /68 this AM before meds, dropped to 99/50 and very symptomatic later in AM; stop amlodipine, increase AM lisinopril to 15mg, leave PM dose at 10mg, other meds same; will go more slowly, as patient accustomed to sBP >150  -10/29 147/61, Norvasc stopped due to hypotension; continue current meds  -10/30 166/72, 181/74 However; orthostatic with PT; 115/51 standing, 110/45 after walking, 105/52 sitting after walking, then 94/45 after walking 75ft, dropped to 85/49; TEDs when OOB  -11/1 more OH this AM despite no change in BP meds; with nocturia vs normal daytime voiding, suspect doxazosin --> decrease to 4mg QHS, TEDs during AM  -11/2 dizziness yesterday but no orthostatics     Diabetes mellitus - most recent HgbA1c 8.1% (6/30/2021), poor glycemic control. Will require ACHS fasting glucose monitoring and medication adjustment to optimize glycemic control in view of recent acute illness and hospitalization. DM diet, Humalog SSI; metformin ER had been held in acute setting during GRACE but will resume since renal function has normalized. Monitor.   -10/27 -242; poorly controlled over time; just restarted metformin yesterday, cont SSI; adjust as needed  -10/28 BS <160, continue current regimen  -10/29 fair control;  at bedtime, otherwise; 144-195; takes glipizide as well as the Glucophage at home. Consider restarting it at 2.5mg daily. Typically his BS run in the 150-160 range at home, not ideal  -10/30 -184; continue Glucophage and add glipizide 2.5mg to start  -11/1 BS remaining <140, continue current regimen  -BS fairly well controlled     Hyperlipidemia - continue Lipitor 80mg daily.     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 Continue Plavix 75mg daily.     Pain control - stable, mild-to-moderate joint symptoms intermittently, reasonably well controlled by PRN meds. Will require regular pain assessment and comprehensive pain management. Has long-standing left shoulder / neck pain treated by chiropractor.     Electrolyte abnormality - hypoK+ to 3.0 (10/26); replace and monitor. Will give KCl 20mEq x 4 doses. -10/27 K+ 3.3 today, continue to monitor  -10/30 recheck 11/1  -11/1 normal BMP     DVT risk / DVT prophylaxis - daily physician / PA exam to assess as patient is at increased risk for of thromboembolism. Mobilize as tolerated. Sequential pneumatic compression devices (SCDs) when in bed; thigh-high or knee-high thromboembolic deterrent hose when out of bed.  Lovenox subcutaneously daily.      GI prophylaxis - consider PPI. At times may need additional antacids, Maalox prn.     Depression - continue on Zoloft; has good support system. Monitor.  -10/30 doing very well, very pleasant and cooperative; enjoying therapy and spirits bright     General skin care / wound prevention - monitor general skin wound status daily per staff and physician / PA. At risk for failure. Will require 24/7 rehab nursing.     Bowel program - at risk for constipation as a side effect of opioids, other medications, impaired mobility, etc. MiraLAX daily for regularity, Meghan-Colace for stool softener. PRN MOM, bisacodyl suppository or tablets for constipation.  -10/28 occasional loose stools, suspect this is from restarting metformin ER; PRN loperamide  -10/29 improved, one BM yesterday  -10/30 no loose stools  -11/1 no further loose stools                        Time spent was 25 minutes with over 1/2 in direct patient care/examination, consultation and coordination of care.      Signed By: Khang Tucker MD     November 2, 2021

## 2021-11-03 ENCOUNTER — HOME HEALTH ADMISSION (OUTPATIENT)
Dept: HOME HEALTH SERVICES | Facility: HOME HEALTH | Age: 76
End: 2021-11-03

## 2021-11-03 LAB
GLUCOSE BLD STRIP.AUTO-MCNC: 107 MG/DL (ref 65–100)
GLUCOSE BLD STRIP.AUTO-MCNC: 113 MG/DL (ref 65–100)
GLUCOSE BLD STRIP.AUTO-MCNC: 131 MG/DL (ref 65–100)
GLUCOSE BLD STRIP.AUTO-MCNC: 95 MG/DL (ref 65–100)
SERVICE CMNT-IMP: ABNORMAL
SERVICE CMNT-IMP: NORMAL

## 2021-11-03 PROCEDURE — 99232 SBSQ HOSP IP/OBS MODERATE 35: CPT | Performed by: PHYSICAL MEDICINE & REHABILITATION

## 2021-11-03 PROCEDURE — 74011250637 HC RX REV CODE- 250/637: Performed by: PHYSICAL MEDICINE & REHABILITATION

## 2021-11-03 PROCEDURE — 97535 SELF CARE MNGMENT TRAINING: CPT

## 2021-11-03 PROCEDURE — 97112 NEUROMUSCULAR REEDUCATION: CPT

## 2021-11-03 PROCEDURE — 97530 THERAPEUTIC ACTIVITIES: CPT

## 2021-11-03 PROCEDURE — 74011250636 HC RX REV CODE- 250/636: Performed by: PHYSICIAN ASSISTANT

## 2021-11-03 PROCEDURE — 65310000000 HC RM PRIVATE REHAB

## 2021-11-03 PROCEDURE — 82962 GLUCOSE BLOOD TEST: CPT

## 2021-11-03 PROCEDURE — 92507 TX SP LANG VOICE COMM INDIV: CPT

## 2021-11-03 PROCEDURE — 97110 THERAPEUTIC EXERCISES: CPT

## 2021-11-03 PROCEDURE — 74011250637 HC RX REV CODE- 250/637: Performed by: PHYSICIAN ASSISTANT

## 2021-11-03 PROCEDURE — 97116 GAIT TRAINING THERAPY: CPT

## 2021-11-03 RX ORDER — FINASTERIDE 5 MG/1
5 TABLET, FILM COATED ORAL DAILY
Status: DISCONTINUED | OUTPATIENT
Start: 2021-11-04 | End: 2021-11-12 | Stop reason: HOSPADM

## 2021-11-03 RX ORDER — LISINOPRIL 20 MG/1
20 TABLET ORAL
Status: DISCONTINUED | OUTPATIENT
Start: 2021-11-03 | End: 2021-11-03

## 2021-11-03 RX ORDER — LISINOPRIL 5 MG/1
10 TABLET ORAL
Status: DISCONTINUED | OUTPATIENT
Start: 2021-11-03 | End: 2021-11-12 | Stop reason: HOSPADM

## 2021-11-03 RX ADMIN — GLIPIZIDE 2.5 MG: 5 TABLET ORAL at 08:57

## 2021-11-03 RX ADMIN — FINASTERIDE 5 MG: 5 TABLET, FILM COATED ORAL at 08:57

## 2021-11-03 RX ADMIN — METFORMIN HYDROCHLORIDE 1000 MG: 500 TABLET, EXTENDED RELEASE ORAL at 17:24

## 2021-11-03 RX ADMIN — ENOXAPARIN SODIUM 40 MG: 40 INJECTION SUBCUTANEOUS at 08:56

## 2021-11-03 RX ADMIN — HYDROCHLOROTHIAZIDE 12.5 MG: 12.5 CAPSULE ORAL at 09:45

## 2021-11-03 RX ADMIN — DOXAZOSIN 4 MG: 4 TABLET ORAL at 21:44

## 2021-11-03 RX ADMIN — SERTRALINE 50 MG: 50 TABLET, FILM COATED ORAL at 08:57

## 2021-11-03 RX ADMIN — LISINOPRIL 10 MG: 5 TABLET ORAL at 17:24

## 2021-11-03 RX ADMIN — CLOPIDOGREL BISULFATE 75 MG: 75 TABLET ORAL at 08:57

## 2021-11-03 RX ADMIN — ATORVASTATIN CALCIUM 80 MG: 80 TABLET, FILM COATED ORAL at 08:57

## 2021-11-03 NOTE — PROGRESS NOTES
Time In 0800   Time Out 0846     Mobility   Score Comments   Sit to Stand 4: Supervision or touching A S   Transfer Assist 4: Supervision or touching A Transfer Type: SPT   Equipment: Cane   Comments: Supervison     Activities of Daily Living    Score Comments   Eating 6: Independent    Oral Hyigene 6: Independent    Bathing 5: S/U or clean-up assist Type of Shower: Shower  Position: Standing PRN and Unsupported Sitting   Adaptive  Equipment: Tub Transfer Bench, Grab Bars and Long Handled Sponge  Comments:    Upper Body  Dressing 5: S/U or clean-up assist Items Applied: Pullover  Position: Unsupported Sitting  Comments:    Lower Body Dressing 4: Supervision or touching A Items Applied: Underwear and Elastic pants  Position: Standing PRN and Unsupported Sitting  Adaptive Equipment: SPC  Comments: S   Donning/[place] Footwear 3: Partial/Moderate A Items Applied: Slip-on shoes and bilateral ARIANNA hose  Adaptive Equipment: N/A  Comments: assist to don compression stockings   Toilet Transfer 4: Supervision or touching A Transfer Type: SPT   Equipment: Cane   Comments: S   Toileting Hygiene 3: Partial/Moderate A Output: BM   Comments: assist for thoroughness in cleanup   Education  energy conservation and compression stockings to aid in low BP       S: \"I had diarrhea last night and a little bit just now. \" Agreeable to therapy. Patient was able to ambulate ~10 feet using a SPC with SBA. Pt completed morning ADLs with quality indicators reflected in chart above. Pt dropped in BP following brushing teeth in stance at sink, pt reporting dizziness and light headedness. Pt reclined into supine with RN obtaining BP after recovery. Pain Pt reporting relief in abdomen following BM.   Patient Vitals for the past 12 hrs:   Temp Pulse Resp BP SpO2   11/03/21 0849  91  (!) 95/44    11/03/21 0744 97.5 °F (36.4 °C) 63 18 (!) 155/67 92 %   11/02/21 2158 97.8 °F (36.6 °C) 66 16 (!) 154/68 98 %       Collaborated with MD regarding patient performance and POC. Patient tolerated session well, but activity tolerance, balance, functional mobility, strength, coordination, cognition are still below baseline and require skilled facilitation to successfully and safely complete ADLs and transfers. Patient ended session seated in recliner, with RN assuming care and call remote, phone, all needs within reach.       Antonio Mcdermott, DANNYR/LUPE  11/3/2021

## 2021-11-03 NOTE — PROGRESS NOTES
CM met with care team in Interdisciplinary rounds this am to discuss care planning. Pt has been given an anticipated discharge date of 11/12/2021 Friday. Also recommended for [facility] RN, PT and OT for follow up care at discharge. CM spoke with spouse by phone with update about care planning. She has agreed to come in on Tuesday 11/9/2021 around 830am for training with therapy for transition home. Pt's daughter will be working that day so she may come in over this weekend to learn about pt care. Therapy staff updated about plan. Also discussed recommendation for home care follow up with spouse and she has a greed for pt to be referred back to [facility] for RN, PT and OT support. Referral placed today with anticipated discharge date 11/12/2021 for [facility] start of care after discharge. CM will remain available to assist further as needed. Care Management Interventions  PCP Verified by CM: Yes (Fran Gray MD)  Mode of Transport at Discharge:  (family)  Transition of Care Consult (CM Consult): Discharge Planning, [address] (Pt is insured by Medicare with AARP supplement and pharmacy benefits.  )  [facility] [place] - INPATIENT: Yes  Discharge Durable Medical Equipment:  (DME in the home includes cane, walker and shower chair)  Physical Therapy Consult: Yes  Occupational Therapy Consult: Yes  Speech Therapy Consult: No  Support Systems: Spouse/Significant Other (Pt lives with spouse in a one story home with a ramp.  )  Confirm Follow Up Transport: Family  The Plan for Transition of Care is Related to the Following Treatment Goals : Pt will need continued supportive care/therapy in the home to return to his fucntional baseline.   The Patient and/or Patient Representative was Provided with a Choice of Provider and Agrees with the Discharge Plan?: Yes  Name of the Patient Representative Who was Provided with a Choice of Provider and Agrees with the Discharge Plan: pt/spouse  Freedom of Choice List was Provided with Basic Dialogue that Supports the Patient's Individualized Plan of Care/Goals, Treatment Preferences and Shares the Quality Data Associated with the Providers?: Yes   Resource Information Provided?: No  Discharge Location  Discharge Placement: Home with home health (Home with spouse/daughter and [facility] RN, PT and OT)

## 2021-11-03 NOTE — PROGRESS NOTES
OT Daily Note  Time In 0914   Time Out 1000     Subjective: \"I feel okay right now. \"  Pain: No pain expressed. Education: energy conservation  Interdisciplinary Communication: Team Conference  Precautions: Falls, low BP in the morning  Patient Vitals for the past 12 hrs:   Temp Pulse Resp BP SpO2   11/03/21 0925  68  (!) 110/51    11/03/21 0915  66  (!) 123/52    11/03/21 0855    (!) 106/46    11/03/21 0849  91  (!) 95/44    11/03/21 0744 97.5 °F (36.4 °C) 63 18 (!) 155/67 92 %         Mobility   Score Comments   Sit to Stand 4: Supervision or touching A      Transfer Assist 4: Supervision or touching A Transfer Type: SPT   Equipment: Cane   Comments: SBA     Activity Tolerance, Visual-Perceptual, Strengthening and Cognition  Daily Assessment   Pt engaged in anterior and lateral reaching, activity tolerance, BUE strengthening, visual perceptual, and coordination activity utilizing shape board of the [employer]. Pt reached across board to shoulder height level to place various shaped objects into board. Pt completed activity while in seated position at table top and with 1 lb bilateral wrist weights donned for BUE strengthening. Pt required 1 to 2 verbal cues for visual perception of shapes. Required no rest breaks throughout activity. Assessment: Pt with continued drops in BP but not as low as during ADL session. RN educated pt on plans for medication changes this evening and tomorrow morning. Pt educated on slow positional changes to aide in blood pressure regulation. Deferred standing activities at this time and pt worked on BUE reaching, endurance, and UB strengthening. Pt demonstrated good participation and engagement in OT session. Pt continues to benefit from skilled OT services to address remaining deficits and return back to baseline with improved independence and safety during ADLs and IADLs.    Patient ended session seated in recliner and call remote, phone, all needs within reach  Plan: Continue OT POC with focus on ADL/IADL skills, functional transfers, functional mobility, coordination, strength, static and dynamic balance, and activity tolerance to maximize safety and independence with ADLs and functional transfers.      Antonio Mcdermott, OTR/L  11/3/2021

## 2021-11-03 NOTE — PROGRESS NOTES
PT WEEKLY PROGRESS NOTE   Time In: 8110   Time Out: 1431    Subjective: Patient reporting he feels better in the afternoon. Reports he is still getting dizzy and feeling light headed in the AM but not as bad today. Reports mild dizziness during supine to and when turning too fast during gait training. Reports he wants to be able to walk without a device and drive to gym again. Objective: Vital Signs:  Patient Vitals for the past 12 hrs:   Temp Pulse Resp BP SpO2   11/03/21 1550 97.8 °F (36.6 °C) 63 18 127/61 99 %   11/03/21 1300  79  (!) 118/53    11/03/21 0925  68  (!) 110/51    11/03/21 0915  66  (!) 123/52    11/03/21 0855    (!) 106/46    11/03/21 0849  91  (!) 95/44    11/03/21 0744 97.5 °F (36.4 °C) 63 18 (!) 155/67 92 %     Pain level:No c/o pain during treatment  Pain location:NA  Pain interventions:NA    Patient education:Bed mobility training,transfer training, gait training, balance training,fall precautions, body mechanics,activity pacing,orthostatic hypotension precautions, Patient verbalizing understanding and demonstrating  understanding of patient education. Recommend follow up education. Interdisciplinary Communication:spoke with OT regarding BP readings.  Spoke with PTA regarding progress towards goals and D/C plans    Other (comment) (fall precautions)    Outcome Measures:     Cognition: Orientation:A+O x 3  Communication:able to express needs verbally, able to follow multi step commands  Social Interaction:cooperating, appropriate with PT, participating, motivated to improve  Problem Solving:difficulty with managing body mechanics during functional mobility  Memory:occasional cues to recall safe body mechanics during functional mobility       BED/CHAIR/WHEELCHAIR TRANSFERS Initial Assessment Weekly Progress Assessment 11/3/2021   Rolling Right 5 (Supervision) 6 (Modified independent)   Rolling Left 5 (Supervision) 6 (Modified independent)   Supine to Sit 4 (Minimal assistance) 5 (Supervision)   Sit to Stand Minimal assistance Stand-by assistance   Sit to Supine 5 (Supervision) 6 (Modified independent)   Transfer Type SPT without device SPT without device   Comments Increased time and effort to complete bed mobility and transfers. Using bed rail for rolling and supine to sit. Increased time and effort to complete with cues for body mechanics  Increased sitting time after transitioning supine to sit due to mild c/o dizziness. Bed mobility assessed on mat with no bed rail and flat supine position   Car Transfer Not tested Not tested   Car Type         GROSS ASSESSMENT Weekly Progress Assessment 11/3/2021   AROM  Bilateral LE generally decreased, functional   Strength  Bilateral LE generally decreased, functional   Coordination  Gross motor intact for LEs, fine motor impaired   Tone  LE normal   Sensation  LE intact   PROM  Generally decreased, functional     POSTURE Weekly Progress Assessment 11/3/2021   Posture (WDL) Exceptions to WDL   Posture Assessment Forward head; Rounded shoulders; Trunk flexion; Lordosis, lumbar     WHEELCHAIR MOBILITY/MANAGEMENT Initial Assessment Weekly Progress Assessment 11/3/2021   Able to Propel 0 feet  NA   Curbs/ramps assistance required 0 (Not tested) 0 (Not tested)   Wheelchair set up assistance required 0 (Not tested)  NA   Wheelchair management    NA     WALKING INDEPENDENCE Initial Assessment Weekly Progress Assessment 11/3/2021   Assistive device Gait belt Cane, straight; Gait belt   Ambulation assistance - level surface 4 (Minimal assistance)  SBA   Distance 165 Feet (ft) 200 Feet (ft) with multiple turns to left and right   Comments Cont step through gait with mild trunk flexion with increased width of base of support with decrease in step length and ankel DF at initial contact. Occasional mild Ataxia.  Loss of balance x 1 with min assist to correct Gait training x 20 ft x 2 in figure 8 pattern with straight cane and SBA with one time cue to control gait speed while making multiple turns. No loss of balance    Gait training up/down 20 ft ramp with straight cane and SBA with no loss of balance    Gait training x 20 ft x 2 on unlevel grassy surface with straight cane and CGA. No loss of balance     GAIT Weekly Progress Assessment 11/3/2021   Gait Description (WDL)  cont step through gait pattern with trunk flexion, decreased step length and gait speed   Gait Abnormalities  decreased knee ext and ankle DF at initial contact     STEPS/STAIRS Initial Assessment Weekly Progress Assessment 11/3/2021   Steps/Stairs ambulated 4 8 (4 steps x 2 with 2 min rest break)   Rail Use Both Both   Comments slow reciprocating pattern going up steps and single step at a time going down steps. Increased time and effort to complete   Slow reciprocating pattern going up/down steps with increased time and effort to complete. Curbs/Ramps 0 (Not tested)  up/down 6 inch step with straight cane and SBA with cues for gait sequencing with straight cane. Assessment: Patient progressing towards goals but progress has been delayed due to fluctuating BP with limited tolerance to treatment at times due to orthostatic hypotension. Patient able to recognize symptoms of orthostatic hypotension and how to respond to symptoms. Orthostatic hypotension limiting progress towards goals at this time. As BP stabilize progress towards goals should improve. Overall functional status has improved but not able to progress to modified independent level due to BP. Recommend cont inpatient rehab to maximize rehab potential as BP improves. Recommending family training prior to [facility]       Patient returned to room at end of treatment and remained up in recliner with LEs elevated and with needs in reach    Plan of Care: weekly assessment completed. Goals updated and revised. Please see Care Plan for updated LTGs.     Family Training: Needs to be scheduled    Joel Magana, PT  11/3/2021

## 2021-11-03 NOTE — PROGRESS NOTES
OT Daily Note  Time In 1300   Time Out 1346     Subjective: \"My stomach is bothering me. \"  Pain: girgling noted in stomach, relieved after BM  Education: benefits of OT  Interdisciplinary Communication: Collaborated with PT to ensure progress towards POC and goals. Precautions: Falls  Patient Vitals for the past 12 hrs:   Temp Pulse Resp BP SpO2   11/03/21 1300  79  (!) 118/53    11/03/21 0925  68  (!) 110/51    11/03/21 0915  66  (!) 123/52    11/03/21 0855    (!) 106/46    11/03/21 0849  91  (!) 95/44    11/03/21 0744 97.5 °F (36.4 °C) 63 18 (!) 155/67 92 %         Mobility   Score Comments   Sit to Stand 4: Supervision or touching A    Transfer Assist 4: Supervision or touching A Transfer Type: SPT   Equipment: Cane   Comments: SBA     Self-Care Daily Assessment    Score Comments   Toilet Transfer 4: Supervision or touching A Transfer Type: SPT   Equipment: Cane   Comments: SBA   Toileting Hygiene 3: Partial/Moderate A Output: flatulence and diarrhea   Comments: assist for cleanup             Visual-Perceptual and Cognition Daily Assessment   Pt instructed to teach therapist how to play game of cards, required min prompting to teach all rules of game prior to playing. Good activity tolerance noted          Assessment: Pt expressing discomfort in abdomen and increased diarrhea the last few days. RN aware. Pt demonstrated good participation and engagement in OT session. Pt continues to benefit from skilled OT services to address remaining deficits and return back to baseline with improved independence and safety during ADLs and IADLs. Patient ended session seated in wc and with PT assuming care. Plan: Continue OT POC with focus on ADL/IADL skills, functional transfers, functional mobility, coordination, strength, static and dynamic balance, and activity tolerance to maximize safety and independence with ADLs and functional transfers.      Antonio Mcdermott, OTR/L  11/3/2021

## 2021-11-03 NOTE — PROGRESS NOTES
11/03/21 1050   Time Spent With Patient   Time In 1012   Time Out 1034   Patient Seen For: AM; Neuro-linguistics   Mental Status   Neurologic State Alert   Cognition Memory loss   Perception Appears intact   Perseveration No perseveration noted   Safety/Judgement Awareness of environment        11/03/21 1050   Neuro-Linguistic Exercises   Verbal Reasoning Tasks Impaired; completion of functional problem solving related to time management completed with 60% accuracy  Navigating basic directions on a map completed with 90% accuracy   Mathematical Impaired   Memory Impaired; mild short-term memory impairments noted     Patient participated with cognitive treatment focusing on problem solving. Pleasant and cooperative. Patient did fairly well with basic calendar and navigation tasks but required increased verbal cues for completion of calculations related to time management. Functional time management completed with 60% accuracy. Basic planning calendar task completed with 95% accuracy. Navigating directions on a map completed with 90% accuracy. Will continue to follow to address problem solving and short-term memory.     Pedro Chavez MS, CCC-SLP      Pedro Chavez MS, CCC-SLP

## 2021-11-03 NOTE — PROGRESS NOTES
PHYSICAL THERAPY DAILY NOTE  Time In: (P) 1034  Time Out: (P) 1146  Patient Seen For: (P) AM; Therapeutic exercise; Transfer training; Other (see progress notes); Gait training    Subjective: Patient had no complaints. Objective: NO pain noted. Other (comment) (fall precautions)  GROSS ASSESSMENT Daily Assessment            COGNITION Daily Assessment           BED/MAT MOBILITY Daily Assessment    Supine to Sit : (P) 3 (Moderate assistance)  Sit to Supine : (P) 5 (Supervision)       TRANSFERS Daily Assessment    Other: (P) SPT with straight cane  Transfer Assistance : (P) 4 (Minimal assistance)  Sit to Stand Assistance: (P) Contact guard assistance  Car Transfers: (P) Not tested       GAIT Daily Assessment    Amount of Assistance: (P) 3 (Moderate assistance)  Distance (ft): (P) 80 Feet (ft)  Assistive Device: (P) Cane, straight; Gait belt       STEPS or STAIRS Daily Assessment    Level of Assist : (P) 0 (Not tested)       BALANCE Daily Assessment            WHEELCHAIR MOBILITY Daily Assessment            LOWER EXTREMITY EXERCISES Daily Assessment    Extremity: (P) Both  Exercise Type #1: (P) Supine lower extremity strengthening  Sets Performed: (P) 1  Reps Performed: (P) 20  Level of Assist: (P) Minimal assistance          Assessment: Patient had no feelings of being lightheaded today . Plan of Care: Continue with plan of care.     Lorraine Chaparro, PTA  11/3/2021

## 2021-11-03 NOTE — PROGRESS NOTES
Eli Wilkinson MD  Medical Director  [address] [facility], [address] [facility]  Tel: [phone]       [facility] PROGRESS NOTE    [address] Date: 10/26/2021  Admit Diagnosis:   Physical debility [R53.81]    Subjective     Patient seen and examined. Much better yesterday. No wooziness or orthostatics per pt but therapists report that he continues to drop blood pressure and gets light headed.  Denies headache, cp, sob, n.  C/o a lot of gas , loose stool this am.  Objective:     Current Facility-Administered Medications   Medication Dose Route Frequency    doxazosin (CARDURA) tablet 4 mg  4 mg Oral QHS    glipiZIDE (GLUCOTROL) tablet 2.5 mg  2.5 mg Oral ACB    lisinopriL (PRINIVIL, ZESTRIL) tablet 10 mg  10 mg Oral BID    artificial saliva (MOUTH KOTE) 1 Spray  1 Spray Oral PRN    loperamide (IMODIUM) capsule 2 mg  2 mg Oral PRN    atorvastatin (LIPITOR) tablet 80 mg  80 mg Oral DAILY    clopidogreL (PLAVIX) tablet 75 mg  75 mg Oral DAILY    finasteride (PROSCAR) tablet 5 mg  5 mg Oral DAILY    hydrALAZINE (APRESOLINE) tablet 25 mg  25 mg Oral Q6H PRN    hydroCHLOROthiazide (MICROZIDE) capsule 12.5 mg  12.5 mg Oral DAILY    insulin lispro (HUMALOG) injection   SubCUTAneous AC&HS    metFORMIN ER (GLUCOPHAGE XR) tablet 1,000 mg  1,000 mg Oral DAILY WITH DINNER    sertraline (ZOLOFT) tablet 50 mg  50 mg Oral DAILY    enoxaparin (LOVENOX) injection 40 mg  40 mg SubCUTAneous Q24H    influenza vaccine 2021-22 (6 mos+)(PF) (FLUARIX/FLULAVAL/FLUZONE QUAD) injection 0.5 mL  1 Each IntraMUSCular PRIOR TO DISCHARGE    acetaminophen (TYLENOL) tablet 650 mg  650 mg Oral Q6H PRN    bisacodyL (DULCOLAX) tablet 5 mg  5 mg Oral DAILY PRN    Or    bisacodyL (DULCOLAX) suppository 10 mg  10 mg Rectal DAILY PRN    Or    magnesium hydroxide (MILK OF MAGNESIA) 400 mg/5 mL oral suspension 30 mL  30 mL Oral DAILY PRN    traZODone (DESYREL) tablet 50 mg  50 mg Oral QHS PRN       Review of Systems:   Denies chest pain, shortness of breath, cough, headache, visual problems, abdominal pain, dysuria, calf pain. Pertinent positives are as noted in the HPI, ROS unremarkable otherwise. Visit Vitals  BP (!) 154/68 (BP 1 Location: Left upper arm, BP Patient Position: Supine)   Pulse 66   Temp 97.8 °F (36.6 °C)   Resp 16   SpO2 98%        Physical Exam:   General: Alert and age appropriately oriented. No acute cardiorespiratory distress. HEENT: Normocephalic, no scleral icterus. Oral mucosa moist without cyanosis. Lungs: Clear to auscultation bilaterally. Respiration even and unlabored. Heart: Regular rate and rhythm, S1, S2. No murmurs, clicks, rub or gallops. Abdomen: Soft, non-tender, not distended. Bowel sounds normoactive. No organomegaly. Morbidly obese   Genitourinary: Deferred. Neuromuscular:      genralized prox>distal weakness  fcs well  Good attn, cog wfl   Skin/extremity: No rashes, no erythema. No calf tenderness B LE. Trace pedal edema                                                                              Functional Assessment:          Balance  Sitting - Static: Good (unsupported) (10/30/21 0900)  Sitting - Dynamic: Fair (occasional) (10/30/21 0900)  Standing - Static: Fair (10/30/21 0900)  Standing - Dynamic : Impaired (10/30/21 0900)                     [facility] Fall Risk Assessment:  [facility] Fall Risk  Mobility: Ambulates or transfers with assist devices or assistance (11/03/21 0327)  Mobility Interventions: Communicate number of staff needed for ambulation/transfer; Patient to call before getting OOB; Strengthening exercises (ROM-active/passive);  Utilize walker, cane, or other assistive device (11/03/21 0327)  Mentation: Alert, oriented x 3 (11/03/21 0327)  Medication: Patient receiving anticonvulsants, sedatives(tranquilizers), psychotropics or hypnotics, hypoglycemics, narcotics, sleep aids, antihypertensives, laxatives, or diuretics (11/03/21 0327)  Medication Interventions: Evaluate medications/consider consulting pharmacy; Patient to call before getting OOB; Teach patient to arise slowly (11/03/21 0327)  Elimination: Needs assistance with toileting (11/03/21 0327)  Elimination Interventions: Call light in reach; Patient to call for help with toileting needs; Toilet paper/wipes in reach; Urinal in reach (11/03/21 0327)  Prior Fall History: Before admission in past 12 months _home or previous inpatient care) (11/03/21 0327)  History of Falls Interventions: Consult care management for discharge planning; Door open when patient unattended; Evaluate medications/consider consulting pharmacy (11/03/21 0327)  Total Score: 4 (11/03/21 0327)  Standard Fall Precautions: Yes (10/31/21 2148)  High Fall Risk: Yes (11/03/21 0327)     Speech Assessment:         Ambulation:  Gait  Distance (ft): 70 Feet (ft) (11/02/21 1540)  Assistive Device: Gait belt; Cane, straight (11/02/21 1540)  Rail Use: Both (10/29/21 1500)     Labs/Studies:  Recent Results (from the past 72 hour(s))   GLUCOSE, POC    Collection Time: 10/31/21  7:51 AM   Result Value Ref Range    Glucose (POC) 136 (H) 65 - 100 mg/dL    Performed by Jennifer    GLUCOSE, POC    Collection Time: 10/31/21 11:27 AM   Result Value Ref Range    Glucose (POC) 137 (H) 65 - 100 mg/dL    Performed by Jennifer    GLUCOSE, POC    Collection Time: 10/31/21  4:34 PM   Result Value Ref Range    Glucose (POC) 113 (H) 65 - 100 mg/dL    Performed by BetseyChris    GLUCOSE, POC    Collection Time: 10/31/21  8:04 PM   Result Value Ref Range    Glucose (POC) 108 (H) 65 - 100 mg/dL    Performed by Marcela Batres.     METABOLIC PANEL, BASIC    Collection Time: 11/01/21  5:50 AM   Result Value Ref Range    Sodium 143 136 - 145 mmol/L    Potassium 3.5 3.5 - 5.1 mmol/L    Chloride 107 98 - 107 mmol/L    CO2 30 21 - 32 mmol/L    Anion gap 6 (L) 7 - 16 mmol/L    Glucose 112 (H) 65 - 100 mg/dL BUN 17 8 - 23 MG/DL    Creatinine 1.00 0.8 - 1.5 MG/DL    GFR est AA >60 >60 ml/min/1.73m2    GFR est non-AA >60 >60 ml/min/1.73m2    Calcium 8.3 8.3 - 10.4 MG/DL   GLUCOSE, POC    Collection Time: 11/01/21  7:45 AM   Result Value Ref Range    Glucose (POC) 125 (H) 65 - 100 mg/dL    Performed by [employer] [id number], POC    Collection Time: 11/01/21 11:46 AM   Result Value Ref Range    Glucose (POC) 113 (H) 65 - 100 mg/dL    Performed by [employer] [id number], POC    Collection Time: 11/01/21  4:36 PM   Result Value Ref Range    Glucose (POC) 100 65 - 100 mg/dL    Performed by [id number], POC    Collection Time: 11/01/21  9:39 PM   Result Value Ref Range    Glucose (POC) 117 (H) 65 - 100 mg/dL    Performed by Jessica    GLUCOSE, POC    Collection Time: 11/02/21  7:22 AM   Result Value Ref Range    Glucose (POC) 127 (H) 65 - 100 mg/dL    Performed by Jennifer    GLUCOSE, POC    Collection Time: 11/02/21 11:51 AM   Result Value Ref Range    Glucose (POC) 132 (H) 65 - 100 mg/dL    Performed by Jennifer    GLUCOSE, POC    Collection Time: 11/02/21  4:45 PM   Result Value Ref Range    Glucose (POC) 103 (H) 65 - 100 mg/dL    Performed by [employer]sey[employer]Chris    GLUCOSE, POC    Collection Time: 11/02/21  9:27 PM   Result Value Ref Range    Glucose (POC) 111 (H) 65 - 100 mg/dL    Performed by Eldora Litten        Assessment:     Problem List as of 11/3/2021 Date Reviewed: 10/26/2021          Codes Class Noted - Resolved    * (Principal) Physical debility ICD-10-CM: R53.81  ICD-9-CM: 799.3  10/26/2021 - Present        E-coli UTI ICD-10-CM: N39.0, B96.20  ICD-9-CM: 599.0, 041.49  10/24/2021 - Present        Acute respiratory failure with hypoxia (HCC) ICD-10-CM: J96.01  ICD-9-CM: 518.81  10/23/2021 - Present        GRACE (acute kidney injury) ([facility] ICD-10-CM: N17.9  ICD-9-CM: 584.9  10/22/2021 - Present        CKD (chronic kidney disease) stage 3, GFR 30-59 ml/min ([facility]) ICD-10-CM: N18.30  ICD-9-CM: 585.3  10/22/2021 - Present        Pneumonia of right lower lobe due to infectious organism ICD-10-CM: J18.9  ICD-9-CM: 486  10/21/2021 - Present        Fluid overload ICD-10-CM: E87.70  ICD-9-CM: 276.69  10/21/2021 - Present        Hypoxia ICD-10-CM: R09.02  ICD-9-CM: 799.02  10/21/2021 - Present        CAP (community acquired pneumonia) ICD-10-CM: J18.9  ICD-9-CM: 640  10/21/2021 - Present        Thyroid nodule ICD-10-CM: E04.1  ICD-9-CM: 241.0  8/5/2021 - Present        Type 2 diabetes mellitus with hyperglycemia, without long-term current use of insulin (HCC) ICD-10-CM: E11.65  ICD-9-CM: 250.00, 790.29  2/26/2021 - Present        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  ICD-9-CM: 434.91  6/14/2020 - Present        Severe obesity ([facility] ICD-10-CM: E66.01  ICD-9-CM: 278.01  7/2/2019 - Present        Numbness and tingling in left hand ICD-10-CM: R20.0, R20.2  ICD-9-CM: 782.0  7/2/2019 - Present        Controlled type 2 diabetes mellitus without complication, without long-term current use of insulin ([facility] [address] ICD-10-CM: E11.9  ICD-9-CM: 250.00  2/22/2019 - Present        Cerebrovascular accident (CVA) due to occlusion [facility]) ICD-10-CM: I63.9  ICD-9-CM: 434.91  2/12/2019 - Present        Essential hypertension, benign ICD-10-CM: I10  ICD-9-CM: 401.1  1/3/2014 - Present        Mixed hyperlipidemia ICD-10-CM: E78.2  ICD-9-CM: 272.2  1/3/2014 - Present        RESOLVED: Hypokalemia ICD-10-CM: E87.6  ICD-9-CM: 276.8  10/23/2021 - 10/25/2021        RESOLVED: Sepsis ([facility] ICD-10-CM: A41.9  ICD-9-CM: 038.9, 995.91  10/22/2021 - 10/25/2021        RESOLVED: Acute prostatitis without hematuria ICD-10-CM: N41.0  ICD-9-CM: 601.0  10/21/2021 - 10/25/2021        RESOLVED: Cerebellar stroke ([facility] ICD-10-CM: I63.9  ICD-9-CM: 434.91  6/14/2020 - 10/26/2021        RESOLVED: Urinary retention ICD-10-CM: R33.9  ICD-9-CM: 788.20  6/8/2020 - 9/17/2020        RESOLVED: Stroke (cerebrum) (HCC) ICD-10-CM: I63.9  ICD-9-CM: 434.91  6/8/2020 - 2/26/2021        RESOLVED: Right thalamic infarction [facility]) ICD-10-CM: I63.9  ICD-9-CM: 434.91  7/2/2019 - 10/26/2021              Plan / Recommendations / Medical Decision Making:      Continue daily physician / PA medical management:     Physical debility - admission for sepsis, acute prostatitis, E. coli UTI and RLL pneumonia; finished ABX but with residual weakness. Aggressive PT, OT.   -progressing well; hindered by orthostatics     Recent sepsis - completed IV ABX, WBC normalized, remains afebrile. Monitor.     GRACE, acute kidney injury - creatinine up to 2.54 (10/23), down to 1.04 by [facility] admission 10/26. Monitor and hold metformin ER if Cr trends upward.  -10/28 renal function normalized, Cr 0.99, BUN 21 on BMP yesterday  -11/1 normal Cr, BUN on BMP this AM     Pneumonia, recent RLL CAP - completed ceftriaxone / azithromycin. O2 needs diminished; he has been weaned to room air. Incentive spirometer every hour while awake.     Prostatitis, E coli UTI - completed ceftriaxone / azithromycin. S/p UroLift procedure 9/2020. Continue on doxazosin, finasteride; monitor for urinary retention. Check post-void residual as needed; in-and-out catheter if post-void residual is more than 400ml. Voiding well on C admission. -11/1 nocturia q2h, occasional AM OH; will decrease doxazosin to 4mg QHS  -11/2 didn't report nocturia this a.m.      Hypertension - BP fluctuating, managed medically on lisinopril, HCTZ with help from doxazosin. sBP excursions into 160-180s, dBP all <90.  PRN hydralazine but may need to add another agent.  -10/27 132-187/62-75; on 10mg BID of lisinopril, 12.5mg of HCTZ, Cardura 8mg; add Norvasc 5mg  -10/28 /68 this AM before meds, dropped to 99/50 and very symptomatic later in AM; stop amlodipine, increase AM lisinopril to 15mg, leave PM dose at 10mg, other meds same; will go more slowly, as patient accustomed to sBP >150  -10/29 147/61, Norvasc stopped due to hypotension; continue current meds  -10/30 166/72, 181/74 However; orthostatic with PT; 115/51 standing, 110/45 after walking, 105/52 sitting after walking, then 94/45 after walking 75ft, dropped to 85/49; TEDs when OOB  -11/1 more OH this AM despite no change in BP meds; with nocturia vs normal daytime voiding, suspect doxazosin --> decrease to 4mg QHS, TEDs during AM  -11/2 dizziness yesterday but no orthostatics  -11/3 dc a.m prinivil. Prinivil 10mg in the evening instead of full 20mg dose. Change proscar to be given late afternoon rather than a.m. to prevent his wooziness which usually effects him in a.m.     Diabetes mellitus - most recent HgbA1c 8.1% (6/30/2021), poor glycemic control. Will require ACHS fasting glucose monitoring and medication adjustment to optimize glycemic control in view of recent acute illness and hospitalization. DM diet, Humalog SSI; metformin ER had been held in acute setting during GRACE but will resume since renal function has normalized. Monitor.   -10/27 -242; poorly controlled over time; just restarted metformin yesterday, cont SSI; adjust as needed  -10/28 BS <160, continue current regimen  -10/29 fair control; BS 106 at bedtime, otherwise; 144-195; takes glipizide as well as the [address] home. Consider restarting it at 2.5mg daily. Typically his BS run in the 150-160 range at home, not ideal  -10/30 -184; continue Glucophage and add glipizide 2.5mg to start  -11/1 BS remaining <140, continue current regimen  -BS fairly well controlled  -11/3 -132; controlled     Hyperlipidemia - continue Lipitor 80mg daily.     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 Continue Plavix 75mg daily.     Pain control - stable, mild-to-moderate joint symptoms intermittently, reasonably well controlled by PRN meds.  Will require regular pain assessment and comprehensive pain management. Has long-standing left shoulder / neck pain treated by chiropractor.     Electrolyte abnormality - hypoK+ to 3.0 (10/26); replace and monitor. Will give KCl 20mEq x 4 doses. -10/27 K+ 3.3 today, continue to monitor  -10/30 recheck 11/1  -11/1 normal BMP     DVT risk / DVT prophylaxis - daily physician / PA exam to assess as patient is at increased risk for of thromboembolism. Mobilize as tolerated. Sequential pneumatic compression devices (SCDs) when in bed; thigh-high or knee-high thromboembolic deterrent hose when out of bed. Lovenox subcutaneously daily.      GI prophylaxis - consider PPI. At times may need additional antacids, Maalox prn.     Depression - continue on Zoloft; has good support system. Monitor.  -10/30 doing very well, very pleasant and cooperative; enjoying therapy and spirits bright     General skin care / wound prevention - monitor general skin wound status daily per staff and physician / PA. At risk for failure. Will require 24/7 rehab nursing.     Bowel program - at risk for constipation as a side effect of opioids, other medications, impaired mobility, etc. MiraLAX daily for regularity, Meghan-Colace for stool softener. PRN MOM, bisacodyl suppository or tablets for constipation.  -10/28 occasional loose stools, suspect this is from restarting metformin ER; PRN loperamide  -10/29 improved, one BM yesterday  -10/30 no loose stools  -11/1 no further loose stools     Time spent was 25 minutes with over 1/2 in direct patient care/examination, consultation and coordination of care.      Signed By: Angel Sexton MD     November 3, 2021

## 2021-11-04 LAB
GLUCOSE BLD STRIP.AUTO-MCNC: 127 MG/DL (ref 65–100)
GLUCOSE BLD STRIP.AUTO-MCNC: 129 MG/DL (ref 65–100)
GLUCOSE BLD STRIP.AUTO-MCNC: 79 MG/DL (ref 65–100)
GLUCOSE BLD STRIP.AUTO-MCNC: 99 MG/DL (ref 65–100)
SERVICE CMNT-IMP: ABNORMAL
SERVICE CMNT-IMP: ABNORMAL
SERVICE CMNT-IMP: NORMAL
SERVICE CMNT-IMP: NORMAL

## 2021-11-04 PROCEDURE — 74011250637 HC RX REV CODE- 250/637: Performed by: PHYSICIAN ASSISTANT

## 2021-11-04 PROCEDURE — 97530 THERAPEUTIC ACTIVITIES: CPT

## 2021-11-04 PROCEDURE — 82962 GLUCOSE BLOOD TEST: CPT

## 2021-11-04 PROCEDURE — 97110 THERAPEUTIC EXERCISES: CPT

## 2021-11-04 PROCEDURE — 74011250636 HC RX REV CODE- 250/636: Performed by: PHYSICIAN ASSISTANT

## 2021-11-04 PROCEDURE — 97116 GAIT TRAINING THERAPY: CPT

## 2021-11-04 PROCEDURE — 74011250637 HC RX REV CODE- 250/637: Performed by: PHYSICAL MEDICINE & REHABILITATION

## 2021-11-04 PROCEDURE — 97535 SELF CARE MNGMENT TRAINING: CPT

## 2021-11-04 PROCEDURE — 65310000000 HC RM PRIVATE REHAB

## 2021-11-04 PROCEDURE — 99232 SBSQ HOSP IP/OBS MODERATE 35: CPT | Performed by: PHYSICAL MEDICINE & REHABILITATION

## 2021-11-04 PROCEDURE — 92507 TX SP LANG VOICE COMM INDIV: CPT

## 2021-11-04 RX ADMIN — SERTRALINE 50 MG: 50 TABLET, FILM COATED ORAL at 09:51

## 2021-11-04 RX ADMIN — DOXAZOSIN 4 MG: 4 TABLET ORAL at 21:32

## 2021-11-04 RX ADMIN — ENOXAPARIN SODIUM 40 MG: 40 INJECTION SUBCUTANEOUS at 09:51

## 2021-11-04 RX ADMIN — LISINOPRIL 10 MG: 5 TABLET ORAL at 17:31

## 2021-11-04 RX ADMIN — FINASTERIDE 5 MG: 5 TABLET, FILM COATED ORAL at 17:31

## 2021-11-04 RX ADMIN — GLIPIZIDE 2.5 MG: 5 TABLET ORAL at 09:51

## 2021-11-04 RX ADMIN — ATORVASTATIN CALCIUM 80 MG: 80 TABLET, FILM COATED ORAL at 09:51

## 2021-11-04 RX ADMIN — METFORMIN HYDROCHLORIDE 1000 MG: 500 TABLET, EXTENDED RELEASE ORAL at 17:31

## 2021-11-04 RX ADMIN — CLOPIDOGREL BISULFATE 75 MG: 75 TABLET ORAL at 09:51

## 2021-11-04 NOTE — PROGRESS NOTES
PHYSICAL THERAPY DAILY NOTE  Time In: (P) 1030  Time Out: (P) 1117  Patient Seen For: (P) AM; Therapeutic exercise; Transfer training; Gait training; Other (see progress notes)    Subjective: Patient had no complaints. Objective: NO pain noted. /70 . NO symptoms this second treatment with low BP. Other (comment) (fall precautions)  GROSS ASSESSMENT Daily Assessment            COGNITION Daily Assessment           BED/MAT MOBILITY Daily Assessment    Supine to Sit : (P) 4 (Contact guard assistance)  Sit to Supine : (P) 0 (Not tested)       TRANSFERS Daily Assessment    Other: (P) SPT with straight cane  Transfer Assistance : (P) 4 (Contact guard assistance)  Sit to Stand Assistance: (P) Contact guard assistance  Car Transfers: (P) Not tested       GAIT Daily Assessment    Amount of Assistance: (P) 4 (Contact guard assistance)  Distance (ft): (P) 50 Feet (ft)  Assistive Device: (P) Cane, straight; Gait belt       STEPS or STAIRS Daily Assessment    Level of Assist : (P) 0 (Not tested)       BALANCE Daily Assessment            WHEELCHAIR MOBILITY Daily Assessment            LOWER EXTREMITY EXERCISES Daily Assessment    Extremity: (P) Both  Exercise Type #1: (P) Standing lower extremity strengthening  Sets Performed: (P) 1  Reps Performed: (P) 10  Level of Assist: (P) Contact guard assistance          Assessment: Patient is very motivated with therapy. Plan of Care: Continue with plan of care.     Sammy Ramos, PTA  11/4/2021

## 2021-11-04 NOTE — PROGRESS NOTES
PHYSICAL THERAPY DAILY NOTE  Time In: (P) 7244  Time Out: (P) 3077  Patient Seen For: (P) AM; Therapeutic exercise; Transfer training; Other (see progress notes)    Subjective: Patient had no complaints until middle of treatment then began to feel light headed. Objective: BP left arm 100/26 right arm 85/23 . These BP were taken after nustep. He felt 'weird in his head '. Changed BP machines and did take it manually. Still low. Patient reports at home he would get like this and just sit in his recliner til about 11: 00 before he started his day. Notified nurse Apolinar MARIANO of blood pressures. Other (comment) (fall precautions)  GROSS ASSESSMENT Daily Assessment            COGNITION Daily Assessment           BED/MAT MOBILITY Daily Assessment    Supine to Sit : (P) 0 (Not tested)  Sit to Supine : (P) 4 (Minimal assistance)       TRANSFERS Daily Assessment    Other: (P) SPT with straight cane  Transfer Assistance : (P) 4 (Contact guard assistance)  Sit to Stand Assistance: (P) Contact guard assistance  Car Transfers: (P) Not tested       GAIT Daily Assessment    Amount of Assistance: (P) 4 (Contact guard assistance)  Distance (ft): (P) 40 Feet (ft)  Assistive Device: (P) Cane, straight; Gait belt       STEPS or STAIRS Daily Assessment    Level of Assist : (P) 0 (Not tested)       BALANCE Daily Assessment            WHEELCHAIR MOBILITY Daily Assessment            LOWER EXTREMITY EXERCISES Daily Assessment    Extremity: (P) Both  Exercise Type #1: (P) Other (comment) (nustep x 10 minutes)  Sets Performed: (P) 1  Reps Performed: (P) 10  Level of Assist: (P) Supervision          Assessment:  Patient making progress but BP issues in the morning hindering progress and safety. Plan of Care: Continue with plan of care.     Obdulio Albert, PTA  11/4/2021

## 2021-11-04 NOTE — PROGRESS NOTES
OT Daily Note  Time In 1300   Time Out 1346     Subjective: \"I told my wife I learned how to play Sonu last week. \"  Pain: No pain expressed. Education: memory strategies  Interdisciplinary Communication: Collaborated with PT to ensure progress towards POC and goals. Precautions: Falls  Patient Vitals for the past 12 hrs:   Temp Pulse Resp BP SpO2   11/04/21 1300  68  (!) 110/45    11/04/21 0954  67  (!) 109/48    11/04/21 0952  68  (!) 118/50    11/04/21 0700 97.8 °F (36.6 °C) 66 18 138/64 97 %         Mobility   Score Comments   Sit to Stand 4: Supervision or touching A S   Transfer Assist 4: Supervision or touching A Transfer Type: SPT   Equipment: Cane   Comments: SBA     Self-Care Daily Assessment    Score Comments   Toilet Transfer 4: Supervision or touching A Transfer Type: SPT   Equipment: Cane   Comments: SBA   Toileting Hygiene 4: Supervision or touching A Output: urine  Comments: S             Activity Tolerance, Strengthening and Cognition Daily Assessment   Pt engaged in game of [place] with therapist while seated in wc at table top working on overall activity tolerance, bilateral hand coordination, and reaching across table. Pt with deficits in problem solving in simple instructions, requiring prompting 30% of the time to match colors or numbers as well as additional cues to recall rules of game/special cards after initial instruction. Strengthening Daily Assessment   Pt completed the following UB exercises using 3 lb free weights x15 reps each: chest press, chest pulls, overhead press, shoulder extension, bicep curls, tricep curls. Pt provided printed handout of HEP and verbalized/demonstrated understanding during performance of exercises. Assessment: Pt with decreased memory of card game previously taught, requires increased time for processing and problem solving. Pt demonstrated good participation and engagement in OT session.  Pt continues to benefit from skilled OT services to address remaining deficits and return back to baseline with improved independence and safety during ADLs and IADLs. Patient ended session seated in recliner  Plan: Continue OT POC with focus on ADL/IADL skills, functional transfers, functional mobility, coordination, strength, static and dynamic balance, and activity tolerance to maximize safety and independence with ADLs and functional transfers.      Antonio Mcdermott, OTR/L  11/4/2021

## 2021-11-04 NOTE — PROGRESS NOTES
Problem: Falls - Risk of  Goal: *Absence of Falls  Description: Document Leafy Schrader Fall Risk and appropriate interventions in the flowsheet. Outcome: Progressing Towards Goal  Note: Fall Risk Interventions:  Mobility Interventions: Patient to call before getting OOB, PT Consult for mobility concerns, OT consult for ADLs, Bed/chair exit alarm         Medication Interventions: Bed/chair exit alarm, Patient to call before getting OOB, Teach patient to arise slowly    Elimination Interventions: Call light in reach, Bed/chair exit alarm    History of Falls Interventions: Bed/chair exit alarm         Problem: Patient Education: Go to Patient Education Activity  Goal: Patient/Family Education  Outcome: Progressing Towards Goal     Problem: Inpatient Rehab (Adult)  Goal: *LTG: Avoids injury/falls 100% of time related to deficits  Outcome: Progressing Towards Goal  Goal: *LTG: Avoids infection 100% of time related to deficits  Outcome: Progressing Towards Goal  Goal: *LTG: Verbalize understanding of diagnosis and risk factors for recurring stroke  Outcome: Progressing Towards Goal  Goal: *LTG: Absence of DVT during hospitalization  Outcome: Progressing Towards Goal  Goal: *LTG: Maintains Skin Integrity With No Evidence of Pressure Injury 100% of Time  Outcome: Progressing Towards Goal  Goal: Interventions  Outcome: Progressing Towards Goal     Problem: Patient Education: Go to Patient Education Activity  Goal: Patient/Family Education  Outcome: Progressing Towards Goal     Problem: Patient Education: Go to Patient Education Activity  Goal: Patient/Family Education  Description: Patient / Patient's family will verbalize understanding of PT safety recommendations, demonstrate appropriate assist for current functional mobility status, safety, and home exercise program by time of discharge.     Outcome: Progressing Towards Goal     Problem: Patient Education: Go to Patient Education Activity  Goal: Patient/Family Education  Description: Pt/caregiver will demonstrate and verbalize good understanding of OT recommendations regarding ADL status, functional transfer status, home safety, DME, AE, energy conservation techniques, follow-up therapy, for increasing safety with functional tasks upon discharge.   Progressing 11/2/2021  Outcome: Progressing Towards Goal     Problem: Patient Education: Go to Patient Education Activity  Goal: Patient/Family Education  Outcome: Progressing Towards Goal

## 2021-11-04 NOTE — PROGRESS NOTES
Time In 0800   Time Out 0835     Mobility    Score Comments   Sit to Stand 4: Supervision or touching A S   Transfer Assist 4: Supervision or touching A Transfer Type: SPT   Equipment: Cane   Comments: Supervison      Activities of Daily Living     Score Comments   Eating 6: Independent     Oral Hyigene 6: Independent     Bathing 5: S/U or clean-up assist Type of Shower: Shower  Position: Standing PRN and Unsupported Sitting   Adaptive  Equipment: Tub Transfer Bench, Grab Bars and Long Handled Sponge  Comments:    Upper Body  Dressing 5: S/U or clean-up assist Items Applied: Pullover  Position: Unsupported Sitting  Comments:    Lower Body Dressing 4: Supervision or touching A Items Applied: Underwear and Elastic pants  Position: Standing PRN and Unsupported Sitting  Adaptive Equipment: SPC  Comments: S   Donning/[place] Footwear 3: Partial/Moderate A Items Applied: Slip-on shoes and bilateral ARIANNA hose  Adaptive Equipment: N/A  Comments: assist to don compression stockings   Toilet Transfer 4: Supervision or touching A Transfer Type: SPT   Equipment: Cane   Comments: S   Toileting Hygiene 3: Partial/Moderate A Output: flatulence and urine    Comments: assist for thoroughness in cleanup   Education   energy conservation and compression stockings to aid in low BP       S: \"I figured out what made my stomach hurt yesterday and the day before. I think it was the Halloween candy. \" Agreeable to therapy. Patient was able to ambulate ~10 feet using a SPC with SBA. Pt completed morning ADLs with quality indicators reflected in chart above. Pain No pain expressed. Patient Vitals for the past 12 hrs:   Temp Pulse Resp BP SpO2   11/04/21 0954  67  (!) 109/48    11/04/21 0952  68  (!) 118/50    11/04/21 0700 97.8 °F (36.6 °C) 66 18 138/64 97 %       Collaborated with PTA regarding patient's BP.    Patient tolerated session well, but activity tolerance, balance, functional mobility, strength, coordination, cognition are still below baseline and require skilled facilitation to successfully and safely complete ADLs and transfers. Patient ended session seated in wc and with PT assuming care.       Antonio Mcdermott, OTR/L  11/4/2021

## 2021-11-04 NOTE — PROGRESS NOTES
11/04/21 1233   Time Spent With Patient   Time In 1120   Time Out 1158   Patient Seen For: AM; Neuro-linguistics   Mental Status   Neurologic State Alert   Cognition Memory loss   Perception Appears intact   Perseveration No perseveration noted   Safety/Judgement Awareness of environment        11/04/21 1234   Neuro-Linguistic Exercises   Problem Solving Impaired; mild cueing for completion of basic check writing and balancing activity  Patient completed with 90% accuracy; typically uses a calculator at home and was provided during today's session  Basic money/time management completed with 85% accuracy   Memory Impaired; patient recalled activity completed previous session and recent events     Patient participated with cognitive treatment with mild cueing for completion of basic check writing and balancing activity. Patient completed with 90% accuracy initially and 100% with min cue; typically uses a calculator at home and was provided during today's session. Reports that he implements strategies at home such as marking completed items and re-checking work x2. Basic money/time management completed with 85% accuracy with additional time needed. Patient recalls names of staff and recent events. Will follow to address higher level ADLs patient was completing at home.       Margie Cerda MS, CCC-SLP

## 2021-11-05 LAB
EST. AVERAGE GLUCOSE BLD GHB EST-MCNC: 157 MG/DL
GLUCOSE BLD STRIP.AUTO-MCNC: 103 MG/DL (ref 65–100)
GLUCOSE BLD STRIP.AUTO-MCNC: 119 MG/DL (ref 65–100)
GLUCOSE BLD STRIP.AUTO-MCNC: 166 MG/DL (ref 65–100)
GLUCOSE BLD STRIP.AUTO-MCNC: 94 MG/DL (ref 65–100)
HBA1C MFR BLD: 7.1 % (ref 4.2–6.3)
SERVICE CMNT-IMP: ABNORMAL
SERVICE CMNT-IMP: NORMAL

## 2021-11-05 PROCEDURE — 65310000000 HC RM PRIVATE REHAB

## 2021-11-05 PROCEDURE — 82962 GLUCOSE BLOOD TEST: CPT

## 2021-11-05 PROCEDURE — 74011250636 HC RX REV CODE- 250/636: Performed by: PHYSICIAN ASSISTANT

## 2021-11-05 PROCEDURE — 97530 THERAPEUTIC ACTIVITIES: CPT

## 2021-11-05 PROCEDURE — 74011250637 HC RX REV CODE- 250/637: Performed by: PHYSICIAN ASSISTANT

## 2021-11-05 PROCEDURE — 97116 GAIT TRAINING THERAPY: CPT

## 2021-11-05 PROCEDURE — 99232 SBSQ HOSP IP/OBS MODERATE 35: CPT | Performed by: PHYSICAL MEDICINE & REHABILITATION

## 2021-11-05 PROCEDURE — 97110 THERAPEUTIC EXERCISES: CPT

## 2021-11-05 PROCEDURE — 74011250637 HC RX REV CODE- 250/637: Performed by: PHYSICAL MEDICINE & REHABILITATION

## 2021-11-05 PROCEDURE — 92507 TX SP LANG VOICE COMM INDIV: CPT

## 2021-11-05 PROCEDURE — 83036 HEMOGLOBIN GLYCOSYLATED A1C: CPT

## 2021-11-05 PROCEDURE — 74011636637 HC RX REV CODE- 636/637: Performed by: PHYSICIAN ASSISTANT

## 2021-11-05 PROCEDURE — 97535 SELF CARE MNGMENT TRAINING: CPT

## 2021-11-05 RX ADMIN — METFORMIN HYDROCHLORIDE 1000 MG: 500 TABLET, EXTENDED RELEASE ORAL at 17:32

## 2021-11-05 RX ADMIN — DOXAZOSIN 4 MG: 4 TABLET ORAL at 20:45

## 2021-11-05 RX ADMIN — LISINOPRIL 10 MG: 5 TABLET ORAL at 17:32

## 2021-11-05 RX ADMIN — HYDROCHLOROTHIAZIDE 12.5 MG: 12.5 CAPSULE ORAL at 08:32

## 2021-11-05 RX ADMIN — GLIPIZIDE 2.5 MG: 5 TABLET ORAL at 08:31

## 2021-11-05 RX ADMIN — INSULIN LISPRO 2 UNITS: 100 INJECTION, SOLUTION INTRAVENOUS; SUBCUTANEOUS at 20:45

## 2021-11-05 RX ADMIN — FINASTERIDE 5 MG: 5 TABLET, FILM COATED ORAL at 17:31

## 2021-11-05 RX ADMIN — ATORVASTATIN CALCIUM 80 MG: 80 TABLET, FILM COATED ORAL at 08:32

## 2021-11-05 RX ADMIN — CLOPIDOGREL BISULFATE 75 MG: 75 TABLET ORAL at 08:32

## 2021-11-05 RX ADMIN — ENOXAPARIN SODIUM 40 MG: 40 INJECTION SUBCUTANEOUS at 08:32

## 2021-11-05 RX ADMIN — SERTRALINE 50 MG: 50 TABLET, FILM COATED ORAL at 08:32

## 2021-11-05 NOTE — PROGRESS NOTES
11/05/21 1155   Time Spent With Patient   Time In 1025   Time Out 1100   Patient Seen For: AM; Neuro-linguistics   Mental Status   Neurologic State Alert   Cognition Decreased attention/concentration   Perseveration Perseverates during conversation   Safety/Judgement Home safety      11/05/21 1200   Neuro-Linguistic Exercises   Problem Solving/organization Impaired; slow processing with decreased problem solving/inferencing   Verbal Organization Impaired   Attention  Impaired; decreased alternating attention   Memory impaired     Patient participated with cognitive treatment focusing on problem solving and attention. Slow processing with reading comprehension with decreased attention to detail and verbal cues for inferencing required. Required mod-max cues for completion of moderately complex organizational task. Will continue to follow to address cognitive function for improved independence and safety at discharge.     Deniz Curran MS, CCC-SLP

## 2021-11-05 NOTE — PROGRESS NOTES
Lorraine Smith MD  Medical Director  [address] [facility], [address]  Tel: [phone]       [facility] PROGRESS NOTE    Catarina Alberto  Admit Date: 10/26/2021  Admit Diagnosis:   Physical debility [R53.81]    Subjective     Patient seen and examined between therapies. Discussed OH episode yesterday, troubleshooting for potential triggers, none identifiable so far. Doesn't appear to have changed / decreased with lower dose doxazosin (can resume 8mg if urinary symptoms worsen). Sleeping well, appetite good, bowels moving, voiding without issue. Denies palpitations, dyspnea, chest pain. BS all <130. Participating well in therapies.     Objective:     Current Facility-Administered Medications   Medication Dose Route Frequency    finasteride (PROSCAR) tablet 5 mg  5 mg Oral DAILY    lisinopriL (PRINIVIL, ZESTRIL) tablet 10 mg  10 mg Oral PCD    doxazosin (CARDURA) tablet 4 mg  4 mg Oral QHS    glipiZIDE (GLUCOTROL) tablet 2.5 mg  2.5 mg Oral ACB    artificial saliva (MOUTH KOTE) 1 Spray  1 Spray Oral PRN    loperamide (IMODIUM) capsule 2 mg  2 mg Oral PRN    atorvastatin (LIPITOR) tablet 80 mg  80 mg Oral DAILY    clopidogreL (PLAVIX) tablet 75 mg  75 mg Oral DAILY    hydrALAZINE (APRESOLINE) tablet 25 mg  25 mg Oral Q6H PRN    hydroCHLOROthiazide (MICROZIDE) capsule 12.5 mg  12.5 mg Oral DAILY    insulin lispro (HUMALOG) injection   SubCUTAneous AC&HS    metFORMIN ER (GLUCOPHAGE XR) tablet 1,000 mg  1,000 mg Oral DAILY WITH DINNER    sertraline (ZOLOFT) tablet 50 mg  50 mg Oral DAILY    enoxaparin (LOVENOX) injection 40 mg  40 mg SubCUTAneous Q24H    influenza vaccine 2021-22 (6 mos+)(PF) (FLUARIX/FLULAVAL/FLUZONE QUAD) injection 0.5 mL  1 Each IntraMUSCular PRIOR TO DISCHARGE    acetaminophen (TYLENOL) tablet 650 mg  650 mg Oral Q6H PRN    bisacodyL (DULCOLAX) tablet 5 mg  5 mg Oral DAILY PRN    Or    bisacodyL (DULCOLAX) suppository 10 mg  10 mg Rectal DAILY PRN    Or    magnesium hydroxide (MILK OF MAGNESIA) 400 mg/5 mL oral suspension 30 mL  30 mL Oral DAILY PRN    traZODone (DESYREL) tablet 50 mg  50 mg Oral QHS PRN       Review of Systems:   Denies cough, headache, visual problems, abdominal pain, dysuria, calf pain. Pertinent positives are as noted in the HPI, ROS unremarkable otherwise. Visit Vitals  BP (!) 157/69 (BP 1 Location: Right upper arm, BP Patient Position: At rest)   Pulse 98   Temp 98 °F (36.7 °C)   Resp 18   SpO2 96%        Physical Exam:   General: Alert, appropriately oriented. OOB in recliner with feet up. HEENT: Normocephalic, EOM intact. Oral mucosa moist.   Lungs: Clear to auscultation bilaterally. Respirations even and unlabored. Heart: Regular rate and rhythm. Soft, rolling systolic murmur. Abdomen: Soft, non-tender, obese and protuberant but not distended. Bowel sounds normoactive, no organomegaly but proper exam precluded by obesity. Genitourinary: Deferred. Neuromuscular:      Speech clear, thoughts cogent. No gross focal neuro deficits. UE strength at biceps, triceps, finger flexion 5/5 (B) and symmetric. B LE strength at hip flexion 4-/5, knee flexion 4/5, ankle DF/PF 4/5. Skin/extremity: No rashes, no erythema. Calves soft, non-tender B LE. Trace edema, R>L. Functional Assessment:  Balance  Sitting - Static: Good (unsupported) (11/03/21 1700)  Sitting - Dynamic: Fair (occasional) (11/03/21 1700)  Standing - Static: Fair (11/03/21 1700)  Standing - Dynamic : Impaired (11/03/21 1700)       [facility] Fall Risk Assessment:  [facility] Fall Risk  Mobility: Ambulates or transfers with assist devices or assistance (11/04/21 2137)  Mobility Interventions: Patient to call before getting OOB; Strengthening exercises (ROM-active/passive);  Utilize walker, cane, or other assistive device (11/04/21 2137)  Mentation: Alert, oriented x 3 (11/04/21 2137)  Medication: Patient receiving anticonvulsants, sedatives(tranquilizers), psychotropics or hypnotics, hypoglycemics, narcotics, sleep aids, antihypertensives, laxatives, or diuretics (11/04/21 2137)  Medication Interventions: Patient to call before getting OOB; Teach patient to arise slowly (11/04/21 2137)  Elimination: Needs assistance with toileting (11/04/21 2137)  Elimination Interventions: Call light in reach; Patient to call for help with toileting needs; Toileting schedule/hourly rounds; Urinal in reach (11/04/21 2137)  Prior Fall History: Before admission in past 12 months _home or previous inpatient care) (11/04/21 2137)  History of Falls Interventions: Bed/chair exit alarm;  Door open when patient unattended (11/04/21 2137)  Total Score: 4 (11/04/21 2137)  Standard Fall Precautions: Yes (10/31/21 2148)  High Fall Risk: Yes (11/04/21 2137)     Ambulation:  Gait  Distance (ft): 50 Feet (ft) (11/04/21 1221)  Assistive Device: [employer], straight; Gait belt (11/04/21 1221)  Rail Use: Both (11/03/21 1700)     Labs/Studies:  Recent Results (from the past 72 hour(s))   GLUCOSE, POC    Collection Time: 11/02/21 11:51 AM   Result Value Ref Range    Glucose (POC) 132 (H) 65 - 100 mg/dL    Performed by BetseyChris    GLUCOSE, POC    Collection Time: 11/02/21  4:45 PM   Result Value Ref Range    Glucose (POC) 103 (H) 65 - 100 mg/dL    Performed by Jennifer    GLUCOSE, POC    Collection Time: 11/02/21  9:27 PM   Result Value Ref Range    Glucose (POC) 111 (H) 65 - 100 mg/dL    Performed by Katrina    GLUCOSE, POC    Collection Time: 11/03/21  6:58 AM   Result Value Ref Range    Glucose (POC) 113 (H) 65 - 100 mg/dL    Performed by Tomas ([place])    GLUCOSE, POC    Collection Time: 11/03/21 11:07 AM   Result Value Ref Range    Glucose (POC) 131 (H) 65 - 100 mg/dL    [facility]    GLUCOSE, POC    Collection Time: 11/03/21  4:19 PM   Result Value Ref Range    Glucose (POC) 95 65 - 100 mg/dL    Performed by SosaDeneen)CNA    GLUCOSE, POC    Collection Time: 11/03/21  9:34 PM   Result Value Ref Range    Glucose (POC) 107 (H) 65 - 100 mg/dL    Performed by Alena Jernigan 473, POC    Collection Time: 11/04/21  7:50 AM   Result Value Ref Range    Glucose (POC) 129 (H) 65 - 100 mg/dL    Performed by [address] [place] [address], POC    Collection Time: 11/04/21 12:08 PM   Result Value Ref Range    Glucose (POC) 127 (H) 65 - 100 mg/dL    Performed by Lauren    GLUCOSE, POC    Collection Time: 11/04/21  4:55 PM   Result Value Ref Range    Glucose (POC) 79 65 - 100 mg/dL    Performed by [address] [place] [address], POC    Collection Time: 11/04/21  9:29 PM   Result Value Ref Range    Glucose (POC) 99 65 - 100 mg/dL    Performed by Alena Maria, POC    Collection Time: 11/05/21  7:25 AM   Result Value Ref Range    Glucose (POC) 119 (H) 65 - 100 mg/dL    Performed by Jennifer        Assessment:     Problem List as of 11/5/2021 Date Reviewed: 10/26/2021          Codes Class Noted - Resolved    * (Principal) Physical debility ICD-10-CM: R53.81  ICD-9-CM: 799.3  10/26/2021 - Present        E-coli UTI ICD-10-CM: N39.0, B96.20  ICD-9-CM: 599.0, 041.49  10/24/2021 - Present        [facility]) ([facility] ICD-10-CM: N17.9  ICD-9-CM: 584.9  10/22/2021 - Present        Pneumonia of right lower lobe due to infectious organism ICD-10-CM: J18.9  ICD-9-CM: 486  10/21/2021 - Present        Controlled type 2 diabetes mellitus without complication, without long-term current use of insulin (HCC) ICD-10-CM: E11.9  ICD-9-CM: 250.00  2/22/2019 - Present        Essential hypertension, benign ICD-10-CM: I10  ICD-9-CM: 401.1  1/3/2014 - Present        Cerebrovascular accident (CVA) due to occlusion [facility]) ICD-10-CM: I63.9  ICD-9-CM: 434.91  2/12/2019 - Present        Mixed hyperlipidemia ICD-10-CM: E78.2  ICD-9-CM: 272.2  1/3/2014 - Present        Severe obesity ([facility] ICD-10-CM: E66.01  ICD-9-CM: 278.01  7/2/2019 - Present        Acute respiratory failure with hypoxia [facility]) ICD-10-CM: J96.01  ICD-9-CM: 518.81  10/23/2021 - Present        CKD (chronic kidney disease) stage 3, GFR 30-59 ml/min ([facility]) ICD-10-CM: N18.30  ICD-9-CM: 585.3  10/22/2021 - Present        Fluid overload ICD-10-CM: E87.70  ICD-9-CM: 276.69  10/21/2021 - Present        Hypoxia ICD-10-CM: R09.02  ICD-9-CM: 799.02  10/21/2021 - Present        CAP (community acquired pneumonia) ICD-10-CM: J18.9  ICD-9-CM: 782  10/21/2021 - Present        Thyroid nodule ICD-10-CM: E04.1  ICD-9-CM: 241.0  8/5/2021 - Present        Type 2 diabetes mellitus with hyperglycemia, without long-term current use of insulin ([facility] ICD-10-CM: E11.65  ICD-9-CM: 250.00, 790.29  2/26/2021 - Present        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  ICD-9-CM: 434.91  6/14/2020 - Present        Numbness and tingling in left hand ICD-10-CM: R20.0, R20.2  ICD-9-CM: 782.0  7/2/2019 - Present        RESOLVED: Hypokalemia ICD-10-CM: E87.6  ICD-9-CM: 276.8  10/23/2021 - 10/25/2021        RESOLVED: Sepsis ([facility] ICD-10-CM: A41.9  ICD-9-CM: 038.9, 995.91  10/22/2021 - 10/25/2021        RESOLVED: Acute prostatitis without hematuria ICD-10-CM: N41.0  ICD-9-CM: 601.0  10/21/2021 - 10/25/2021        RESOLVED: Cerebellar stroke ([facility] ICD-10-CM: I63.9  ICD-9-CM: 434.91  6/14/2020 - 10/26/2021        RESOLVED: Urinary retention ICD-10-CM: R33.9  ICD-9-CM: 788.20  6/8/2020 - 9/17/2020        RESOLVED: Stroke (cerebrum) ([facility] ICD-10-CM: I63.9  ICD-9-CM: 434.91  6/8/2020 - 2/26/2021        RESOLVED: Right thalamic infarction [facility]) ICD-10-CM: I63.9  ICD-9-CM: 434.91  7/2/2019 - 10/26/2021            Plan / Recommendations / Medical Decision Making:      Continue daily physician / PA medical management:     Physical debility - admission for sepsis, acute prostatitis, E. coli UTI and RLL pneumonia; finished ABX but with residual weakness.  Aggressive PT, OT.   -progressing well; hindered by orthostatic hypotension     Recent sepsis - completed IV ABX, WBC normalized, remains afebrile. Monitor.     GRACE, acute kidney injury - creatinine up to 2.54 (10/23), down to 1.04 by [facility] admission 10/26. Monitor and hold metformin ER if Cr trends upward.  -10/28 renal function normalized, Cr 0.99, BUN 21 on BMP yesterday  -11/1 normal Cr, BUN on BMP this AM     Pneumonia, recent RLL CAP - completed ceftriaxone / azithromycin. O2 needs diminished; he has been weaned to room air. Incentive spirometer every hour while awake.     Prostatitis, E coli UTI - completed ceftriaxone / azithromycin. S/p UroLift procedure 9/2020. Continue on doxazosin, finasteride; monitor for urinary retention. Check post-void residual as needed; in-and-out catheter if post-void residual is more than 400ml. Voiding well on [facility] admission. -11/1 nocturia q2h, occasional AM OH; will decrease doxazosin to 4mg QHS  -11/2 didn't report nocturia this AM     Hypertension - BP fluctuating, managed medically on lisinopril, HCTZ with help from doxazosin. sBP excursions into 160-180s, dBP all <90.  PRN hydralazine but may need to add another agent.  -10/27 132-187/62-75; on 10mg BID of lisinopril, 12.5mg of HCTZ, Cardura 8mg; add Norvasc 5mg  -10/28 /68 this AM before meds, dropped to 99/50 and very symptomatic later in AM; stop amlodipine, increase AM lisinopril to 15mg, leave PM dose at 10mg, other meds same; will go more slowly, as patient accustomed to sBP >150  -10/29 147/61, Norvasc stopped due to hypotension; continue current meds  -10/30 166/72, 181/74 However; orthostatic with PT; 115/51 standing, 110/45 after walking, 105/52 sitting after walking, then 94/45 after walking 75ft, dropped to 85/49; TEDs when OOB  -11/1 more OH this AM despite no change in BP meds; with nocturia vs normal daytime voiding, suspect doxazosin --> decrease to 4mg QHS, TEDs during AM  -11/2 dizziness yesterday but no orthostatic hypotension  -11/3 d/c AM lisinopril, give 10mg in the evening instead of full 20mg dose; change Proscar to be given late afternoon rather than AM to prevent his wooziness, which usually effects him in AM  -11/4 BP 94//67, currently 138/64; no changed dosing times of meds; will see how that does today     Diabetes mellitus - most recent HgbA1c 8.1% (6/30/2021), poor glycemic control. [facility] and medication adjustment to optimize glycemic control in view of recent acute illness and hospitalization. DM diet, Humalog SSI; metformin ER had been held in acute setting during GRACE but will resume since renal function has normalized. Monitor.   -10/27 -242; poorly controlled over time; just restarted metformin yesterday, cont SSI; adjust as needed  -10/28 BS <160, continue current regimen  -10/29 fair control; BS 106 at bedtime, otherwise; 144-195; takes glipizide as well as the [address] home. Consider restarting it at 2.5mg daily. Typically his BS run in the 150-160 range at home, not ideal  -10/30 -184; continue Glucophage and add glipizide 2.5mg to start  -11/1 BS remaining <140, continue current regimen  -11/2 BS fairly well controlled  -11/3 -132; controlled  -11/4 BS  well controlled. Home health agency requesting a HbgA1c; last done in June and was 8.1%, 7.9% in Feb 2021 and 6.4% in Oct 2020  -11/5 BS all <130, no change in regimen; HgbA1c pending     Hyperlipidemia - continue Lipitor 80mg daily.     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 Continue Plavix 75mg daily.     Pain control - stable, mild-to-moderate joint symptoms intermittently, reasonably well controlled by PRN meds.  Will require regular pain assessment and comprehensive pain management. Has long-standing left shoulder / neck pain treated by chiropractor.     Electrolyte abnormality - hypoK+ to 3.0 (10/26); replace and monitor. Will give KCl 20mEq x 4 doses. -10/27 K+ 3.3 today, continue to monitor  -10/30 recheck 11/1  -11/1 normal BMP     DVT risk / DVT prophylaxis - daily physician / PA exam to assess as patient is at increased risk for of thromboembolism. Mobilize as tolerated. Sequential pneumatic compression devices (SCDs) when in bed; thigh-high or knee-high thromboembolic deterrent hose when out of bed. Lovenox subcutaneously daily.      GI prophylaxis - consider PPI. At times may need additional antacids, Maalox prn.     Depression - continue on Zoloft; has good support system. Monitor.  -10/30 doing very well, very pleasant and cooperative; enjoying therapy and spirits bright     General skin care / wound prevention - monitor general skin wound status daily per staff and physician / PA. At risk for failure. Will require 24/7 rehab nursing.     Bowel program - at risk for constipation as a side effect of opioids, other medications, impaired mobility, etc. MiraLAX daily for regularity, Meghan-Colace for stool softener. PRN MOM, bisacodyl suppository or tablets for constipation.  -10/28 occasional loose stools, suspect this is from restarting metformin ER; PRN loperamide  -10/29 improved, one BM yesterday  -10/30 no loose stools  -11/1 no further loose stools  -11/4 significant gas yesterday but no nausea or diarrhea; +BM          Time spent was 25 minutes with over 1/2 in direct patient care/examination, consultation and coordination of care. Signed By: Gonzales Weinberg PA-C    November 5, 2021      Physician Assistant with [facility]  Lilian Banegas MD, Medical Director

## 2021-11-05 NOTE — PROGRESS NOTES
Time In 0756   Time Out 0830     Mobility   Score Comments   Sit to Stand 4: Supervision or touching A S   Transfer Assist 4: Supervision or touching A Transfer Type: SPT   Equipment: Cane   Comments: SBA     Activities of Daily Living    Score Comments   Eating 6: Independent    Oral Hyigene 6: Independent    Bathing 5: S/U or clean-up assist Type of Shower: Shower  Position: Standing PRN and Unsupported Sitting   Adaptive  Equipment: Tub Transfer Bench, Grab Bars and Long Handled Sponge  Comments:    Upper Body  Dressing 5: S/U or clean-up assist Items Applied: Pullover  Position: Unsupported Sitting  Comments:    Lower Body Dressing 4: Supervision or touching A Items Applied: Underwear and Elastic pants  Position: Standing PRN and Unsupported Sitting  Adaptive Equipment: SPC  Comments: S   Donning/[place] Footwear 5: S/U or clean-up assist Items Applied: Slip-on shoes  Adaptive Equipment: N/A  Comments: Toilet Transfer 4: Supervision or touching A Transfer Type: SPT   Equipment: Cane   Comments: SBA   Toileting Hygiene 3: Partial/Moderate A Output: BM and urine x1 each  Comments: assist for cleanup due to decreased shoulder extension   Education  safety awareness and functional transfers       S: \"I think I need to go to the bathroom. \" Agreeable to therapy. Patient was able to ambulate ~10 feet using a SPC with SBA. Pt completed morning ADLs with quality indicators reflected in chart above. Pain Pt reported no pain. .  Patient Vitals for the past 12 hrs:   Temp Pulse Resp BP SpO2   11/05/21 0743 98 °F (36.7 °C) 98 18 (!) 157/69 96 %       Collaborated with PT regarding patient performance and POC. Patient tolerated session well, but activity tolerance, balance, functional mobility, strength, coordination, cognition are still below baseline and require skilled facilitation to successfully and safely complete ADLs and transfers.    Patient ended session seated in recliner and call remote, phone, all needs within reach.       Antonio Mcdermott, OTR/L  11/5/2021

## 2021-11-05 NOTE — PROGRESS NOTES
OT Daily Note  Time In 1348   Time Out 1433     Subjective: \"It was easier doing those arm exercises today. And I'm doing my stretches at night. \"  Pain: Pt reported no pain. Education: UB strengthening exercises  Interdisciplinary Communication: Collaborated with PT to ensure progress towards POC and goals. Precautions: Falls  Patient Vitals for the past 12 hrs:   Temp Pulse Resp BP SpO2   11/05/21 1348  75  133/68    11/05/21 1249  75  132/62    11/05/21 0743 98 °F (36.7 °C) 98 18 (!) 157/69 96 %         Mobility   Score Comments   Sit to Stand 4: Supervision or touching A S   Transfer Assist 4: Supervision or touching A Transfer Type: SPT   Equipment: Cane   Comments: SBA     Self-Care Daily Assessment    Score Comments   Toilet Transfer 4: Supervision or touching A Transfer Type: SPT   Equipment: Cane   Comments: SBA   Toileting Hygiene 5: S/U or clean-up assist Output: urine  Comments:            Strengthening Daily Assessment   Pt completed the following UB exercises using 3 lb free weights x15 reps each: chest press, chest pulls, butterfly wings, front raise, upright row, overhead press, bicep curls, tricep curls, ABC tracing. Pt provided printed handout of HEP and verbalized/demonstrated understanding during performance of exercises. While in standing position, pt completed x15 reps x2 sets of shoulder extension and IR exercises using red theraband working on posterior reaching for integration into self care tasks. Assessment: Pt continues to make small improvements in posterior reaching but still needs assistance with toileting following BM. Pt owns bottom buddies AE for toileting but would like to restore full shoulder extension/IR to make toileting cleanup more efficient. Pt continues to benefit from skilled OT services to address remaining deficits and return back to baseline with improved independence and safety during ADLs and IADLs.    Patient ended session seated in recliner and call remote, phone, all needs within reach. Plan: Continue OT POC with focus on ADL/IADL skills, functional transfers, functional mobility, coordination, strength, static and dynamic balance, and activity tolerance to maximize safety and independence with ADLs and functional transfers.      Antonio Mcdermott, OTR/L  11/5/2021

## 2021-11-05 NOTE — PROGRESS NOTES
PHYSICAL THERAPY DAILY NOTE  Time In: 3000  Time Out: 0382  Patient Seen For: PM; Family training; Therapeutic exercise; Transfer training; Gait training    Subjective: Pt's wife visiting during session. She is worried about being able to don/doff ARIANNA stockings @ home. Objective: Other (comment) (fall precautions)    COGNITION Daily Assessment    Pt. W/ decreased insight into deficits       BED/MAT MOBILITY Daily Assessment    Rolling Right : 0 (Not tested)  Rolling Left : 0 (Not tested)  Supine to Sit : 6 (Modified independent)  Sit to Supine : 6 (Modified independent)       TRANSFERS Daily Assessment    Transfer Type: Other  Other: SPT w/ SPC  Transfer Assistance : 4 (Contact guard assistance)  Sit to Stand Assistance: Supervision  Car Transfers: Not tested       GAIT Daily Assessment   Flexed posture, decreased saravanan, decreased arm swing Amount of Assistance: 4 (Contact guard assistance)  Distance (ft): 50 Feet (ft) (x1, 40'x2)  Assistive Device: Cane, straight; Gait belt       STEPS or STAIRS Daily Assessment   Pt. performed w/ step to gait pattern Steps/Stairs Ambulated (#): 8  Level of Assist : 4 (Contact guard assistance)  Rail Use: Both       BALANCE Daily Assessment    Sitting - Static: Good (unsupported)  Sitting - Dynamic: Good (unsupported)  Standing - Static: Good (w/ UE support)  Standing - Dynamic : Impaired       WHEELCHAIR MOBILITY Daily Assessment    Able to Propel (ft): 0 feet  Curbs/Ramps Assist Required (FIM Score): 0 (Not tested)  Wheelchair Setup Assist Required : 0 (Not tested)       LOWER EXTREMITY EXERCISES Daily Assessment    Pt. Performed NuStep @ resistance level 2 x12 minutes to increase strength & endurance B UEs & LEs     Vital Signs: /68 (BP 1 Location: Left upper arm, BP Patient Position: Sitting; Other (Comment)) Comment (BP Patient Position): post SPT  Pulse 75   Temp 98 °F (36.7 °C)   Resp 18   SpO2 96%     Pain level: no c/o pain    Patient education: demonstrated technique for donning & doffing ARIANNA hose to pt's wife; Also discussed BP management @ home, including:  Keeping a BP diary to share with PCP, paying attention to lightheaded symptoms & immediately sitting down, wearing ARIANNA stockings @ home    Interdisciplinary Communication: collaborated w/ PT regarding pt's progress    Pt. Left up in recliner in NAD, call bell in reach. Assessment: Pt. Able to manage stairs w/o difficulty this PM.  No c/o lightheadedness this PM, either. Will likely require close supervision @ home due to BP concerns as well as decreased balance. Benefits from cont. PT to address. Plan of Care: Continue with POC and progress as tolerated.      Maribel Hernández, PT  11/5/2021

## 2021-11-05 NOTE — PROGRESS NOTES
PHYSICAL THERAPY DAILY NOTE  Time In: 1120  Time Out: 1200  Patient Seen For: AM; Gait training; Therapeutic exercise; Transfer training    Subjective: \"I've just been relaxing this morning. \"         Objective: Other (comment) (fall precautions)  GROSS ASSESSMENT Daily Assessment    Pt. Up in recliner upon arrival       COGNITION Daily Assessment    Pt. W/ decreased insight into deficits & decreased short term recall       BED/MAT MOBILITY Daily Assessment    Rolling Right : 0 (Not tested)  Rolling Left : 0 (Not tested)  Supine to Sit : 0 (Not tested)  Sit to Supine : 0 (Not tested)       TRANSFERS Daily Assessment    Transfer Type: Other  Other: SPT w/ SPC  Transfer Assistance : 4 (Contact guard assistance)  Sit to Stand Assistance: Supervision  Car Transfers: Not tested       GAIT Daily Assessment   Pt. W/ flexed posture, decreased saravanan, decreased UE swing; With fatigue pt.  Exhibits decreased L LE foot clearance Amount of Assistance: 4 (Contact guard assistance)  Distance (ft): 150 Feet (ft) (x1, 140;x1)  Assistive Device: Cane, straight; Gait belt       STEPS or STAIRS Daily Assessment    Steps/Stairs Ambulated (#): 0  Level of Assist : 0 (Not tested)       BALANCE Daily Assessment    Sitting - Static: Good (unsupported)  Sitting - Dynamic: Good (unsupported)  Standing - Static: Good (w/ UE support)  Standing - Dynamic : Impaired       WHEELCHAIR MOBILITY Daily Assessment    Able to Propel (ft): 0 feet  Curbs/Ramps Assist Required (FIM Score): 0 (Not tested)  Wheelchair Setup Assist Required : 0 (Not tested)       LOWER EXTREMITY EXERCISES Daily Assessment   Performed w/ B UE support & 1 seated rest break Extremity: Both  Exercise Type #1: Standing lower extremity strengthening  Sets Performed: 1  Reps Performed: 10  Level of Assist: Contact guard assistance     STANDING EXERCISES Sets Reps Comments   Heel Raises 1 10    Toe Raises 1 10    Hip Flexion/Marching 1 10    Hamstring Curls 1 10    Hip Abduction 1 10    Hip Extension 1 10    Mini Squats 1 10      Vital Signs: Extended / Orthostatic Vitals:  Patient Position 2: Sitting (no ARIANNA Hose)  BP 2: 118/64  Pulse 2: 69  Patient Position 3: Sitting, Other (Comment) (with ARIANNA hose)  BP 3: 122/60  Pulse 3: 68  Patient Position 4: Standing  BP 4: 111/58  Pulse 4: 75   Patient Position 5:  Sitting after standing exercises  BP 5:  132/62   Pulse 5:  75    Pain level: no c/o pain    Patient education: reviewed standing exercises    Interdisciplinary Communication: collaborated w/ OT regarding pt's progress    Pt. Left up in recliner in NAD, call bell in reach. Assessment: Pt. Exhibited improved standing tolerance this AM, but did require ARIANNA stockings. Pt. Has decreased insight of fatigue levels & decreased safety awareness. Requires ongoing PT to address. Plan of Care: Continue with POC and progress as tolerated.      Daniel Casillas, PT  11/5/2021

## 2021-11-06 LAB
GLUCOSE BLD STRIP.AUTO-MCNC: 105 MG/DL (ref 65–100)
GLUCOSE BLD STRIP.AUTO-MCNC: 107 MG/DL (ref 65–100)
GLUCOSE BLD STRIP.AUTO-MCNC: 137 MG/DL (ref 65–100)
GLUCOSE BLD STRIP.AUTO-MCNC: 98 MG/DL (ref 65–100)
SERVICE CMNT-IMP: ABNORMAL
SERVICE CMNT-IMP: NORMAL

## 2021-11-06 PROCEDURE — 65310000000 HC RM PRIVATE REHAB

## 2021-11-06 PROCEDURE — 74011250637 HC RX REV CODE- 250/637: Performed by: PHYSICAL MEDICINE & REHABILITATION

## 2021-11-06 PROCEDURE — 97530 THERAPEUTIC ACTIVITIES: CPT

## 2021-11-06 PROCEDURE — 99232 SBSQ HOSP IP/OBS MODERATE 35: CPT | Performed by: PHYSICIAN ASSISTANT

## 2021-11-06 PROCEDURE — 82962 GLUCOSE BLOOD TEST: CPT

## 2021-11-06 PROCEDURE — 97116 GAIT TRAINING THERAPY: CPT

## 2021-11-06 PROCEDURE — 74011250636 HC RX REV CODE- 250/636: Performed by: PHYSICIAN ASSISTANT

## 2021-11-06 PROCEDURE — 74011250637 HC RX REV CODE- 250/637: Performed by: PHYSICIAN ASSISTANT

## 2021-11-06 PROCEDURE — 97110 THERAPEUTIC EXERCISES: CPT

## 2021-11-06 RX ADMIN — CLOPIDOGREL BISULFATE 75 MG: 75 TABLET ORAL at 08:29

## 2021-11-06 RX ADMIN — FINASTERIDE 5 MG: 5 TABLET, FILM COATED ORAL at 17:36

## 2021-11-06 RX ADMIN — ENOXAPARIN SODIUM 40 MG: 40 INJECTION SUBCUTANEOUS at 08:29

## 2021-11-06 RX ADMIN — SERTRALINE 50 MG: 50 TABLET, FILM COATED ORAL at 08:29

## 2021-11-06 RX ADMIN — LISINOPRIL 10 MG: 5 TABLET ORAL at 17:37

## 2021-11-06 RX ADMIN — DOXAZOSIN 4 MG: 4 TABLET ORAL at 20:58

## 2021-11-06 RX ADMIN — METFORMIN HYDROCHLORIDE 1000 MG: 500 TABLET, EXTENDED RELEASE ORAL at 17:36

## 2021-11-06 RX ADMIN — HYDROCHLOROTHIAZIDE 12.5 MG: 12.5 CAPSULE ORAL at 08:29

## 2021-11-06 RX ADMIN — ATORVASTATIN CALCIUM 80 MG: 80 TABLET, FILM COATED ORAL at 08:29

## 2021-11-06 RX ADMIN — GLIPIZIDE 2.5 MG: 5 TABLET ORAL at 08:08

## 2021-11-06 NOTE — PROGRESS NOTES
Elliott Izaguirre MD  Medical Director  [address] [facility], [address] [facility]  Tel: [phone]       [facility] PROGRESS NOTE    Brianna Felt  Admit Date: 10/26/2021  Admit Diagnosis:   Physical debility [R53.81]    Subjective     Patient seen and examined after therapies. No OH episodes today, did well in gym earlier. Some improvement in nocturia: went q3h last night, only once previous night. Sleeping well, appetite good, bowels moving. Denies palpitations, dyspnea, chest pain.  last night QHS, otherwise all BS <120 x 48h. Participating well in therapies.     Objective:     Current Facility-Administered Medications   Medication Dose Route Frequency    finasteride (PROSCAR) tablet 5 mg  5 mg Oral DAILY    lisinopriL (PRINIVIL, ZESTRIL) tablet 10 mg  10 mg Oral PCD    doxazosin (CARDURA) tablet 4 mg  4 mg Oral QHS    glipiZIDE (GLUCOTROL) tablet 2.5 mg  2.5 mg Oral ACB    artificial saliva (MOUTH KOTE) 1 Spray  1 Spray Oral PRN    loperamide (IMODIUM) capsule 2 mg  2 mg Oral PRN    atorvastatin (LIPITOR) tablet 80 mg  80 mg Oral DAILY    clopidogreL (PLAVIX) tablet 75 mg  75 mg Oral DAILY    hydrALAZINE (APRESOLINE) tablet 25 mg  25 mg Oral Q6H PRN    hydroCHLOROthiazide (MICROZIDE) capsule 12.5 mg  12.5 mg Oral DAILY    insulin lispro (HUMALOG) injection   SubCUTAneous AC&HS    metFORMIN ER (GLUCOPHAGE XR) tablet 1,000 mg  1,000 mg Oral DAILY WITH DINNER    sertraline (ZOLOFT) tablet 50 mg  50 mg Oral DAILY    enoxaparin (LOVENOX) injection 40 mg  40 mg SubCUTAneous Q24H    influenza vaccine 2021-22 (6 mos+)(PF) (FLUARIX/FLULAVAL/FLUZONE QUAD) injection 0.5 mL  1 Each IntraMUSCular PRIOR TO DISCHARGE    acetaminophen (TYLENOL) tablet 650 mg  650 mg Oral Q6H PRN    bisacodyL (DULCOLAX) tablet 5 mg  5 mg Oral DAILY PRN    Or    bisacodyL (DULCOLAX) suppository 10 mg  10 mg Rectal DAILY PRN    Or    magnesium hydroxide (MILK OF MAGNESIA) 400 mg/5 mL oral suspension 30 mL  30 mL Oral DAILY PRN    traZODone (DESYREL) tablet 50 mg  50 mg Oral QHS PRN       Review of Systems:   Denies cough, headache, visual problems, abdominal pain, dysuria, calf pain. Pertinent positives are as noted in the HPI, ROS unremarkable otherwise. Visit Vitals  BP (!) 156/70   Pulse 65   Temp 97.8 °F (36.6 °C)   Resp 18   SpO2 96%        Physical Exam:   General: Alert, appropriately oriented. OOB in recliner with feet up. HEENT: Normocephalic, EOM intact. Oral mucosa moist.   Lungs: Clear to auscultation bilaterally. Respirations even and unlabored. Heart: Regular rate and rhythm. Soft, rolling systolic murmur. Abdomen: Soft, non-tender, obese and protuberant but not distended. Bowel sounds normoactive, no organomegaly but proper exam precluded by obesity. Genitourinary: Deferred. Neuromuscular:      Speech clear, thoughts cogent. No gross focal neuro deficits. UE strength at biceps, triceps, finger flexion 5/5 (B) and symmetric. B LE strength at hip flexion 4-/5, knee flexion 4/5, ankle DF/PF 4/5. Skin/extremity: No rashes, no erythema. Calves soft, non-tender B LE. Trace edema, R>L.        Functional Assessment:  Balance  Sitting - Static: Good (unsupported) (11/05/21 1600)  Sitting - Dynamic: Good (unsupported) (11/05/21 1600)  Standing - Static: Good (w/ UE support) (11/05/21 1600)  Standing - Dynamic : Impaired (11/05/21 1600)       [facility] Fall Risk Assessment:  [facility] Fall Risk  Mobility: Ambulates or transfers with assist devices or assistance (11/06/21 0735)  Mobility Interventions: Patient to call before getting OOB (11/05/21 0949)  Mentation: Alert, oriented x 3 (11/05/21 0949)  Medication: Patient receiving anticonvulsants, sedatives(tranquilizers), psychotropics or hypnotics, hypoglycemics, narcotics, sleep aids, antihypertensives, laxatives, or diuretics (11/05/21 0949)  Medication Interventions: Patient to call before getting OOB (11/05/21 8545)  Elimination: Needs assistance with toileting (11/05/21 0949)  Elimination Interventions: Call light in reach (11/05/21 0949)  Prior Fall History: Before admission in past 12 months _home or previous inpatient care) (11/05/21 0949)  History of Falls Interventions: Bed/chair exit alarm (11/05/21 0949)  Total Score: 4 (11/05/21 0949)  Standard Fall Precautions: Yes (10/31/21 2148)  High Fall Risk: Yes (11/05/21 0949)     Ambulation:  Gait  Distance (ft): 50 Feet (ft) (x1, 40'x2) (11/05/21 1600)  Assistive Device: Cane, straight; Gait belt (11/05/21 1600)  Rail Use: Both (11/05/21 1600)     Labs/Studies:  Recent Results (from the past 72 hour(s))   GLUCOSE, POC    Collection Time: 11/03/21 11:07 AM   Result Value Ref Range    Glucose (POC) 131 (H) 65 - 100 mg/dL    Performed by Sweetie)DUTCH    GLUCOSE, POC    Collection Time: 11/03/21  4:19 PM   Result Value Ref Range    Glucose (POC) 95 65 - 100 mg/dL    Performed by Sweetie)THORA    GLUCOSE, POC    Collection Time: 11/03/21  9:34 PM   Result Value Ref Range    Glucose (POC) 107 (H) 65 - 100 mg/dL    Performed by Alena Maria, POC    Collection Time: 11/04/21  7:50 AM   Result Value Ref Range    Glucose (POC) 129 (H) 65 - 100 mg/dL    Performed by Lauren    GLUCOSE, POC    Collection Time: 11/04/21 12:08 PM   Result Value Ref Range    Glucose (POC) 127 (H) 65 - 100 mg/dL    Performed by Lauren    GLUCOSE, POC    Collection Time: 11/04/21  4:55 PM   Result Value Ref Range    Glucose (POC) 79 65 - 100 mg/dL    Performed by [address] [place] [address], POC    Collection Time: 11/04/21  9:29 PM   Result Value Ref Range    Glucose (POC) 99 65 - 100 mg/dL    Performed by Hayde    HEMOGLOBIN A1C WITH EAG    Collection Time: 11/05/21  5:15 AM   Result Value Ref Range    Hemoglobin A1c 7.1 (H) 4.20 - 6.30 %    Est. average glucose 157 mg/dL   GLUCOSE, POC    Collection Time: 11/05/21  7:25 AM   Result Value Ref Range    Glucose (POC) 119 (H) 65 - 100 mg/dL    Performed by Jennifer    GLUCOSE, POC    Collection Time: 11/05/21 11:14 AM   Result Value Ref Range    Glucose (POC) 103 (H) 65 - 100 mg/dL    Performed by Jennifer    GLUCOSE, POC    Collection Time: 11/05/21  4:57 PM   Result Value Ref Range    Glucose (POC) 94 65 - 100 mg/dL    Performed by Jennifer    GLUCOSE, POC    Collection Time: 11/05/21  8:11 PM   Result Value Ref Range    Glucose (POC) 166 (H) 65 - 100 mg/dL    Performed by Jessica    GLUCOSE, POC    Collection Time: 11/06/21  7:05 AM   Result Value Ref Range    Glucose (POC) 107 (H) 65 - 100 mg/dL    Performed by Sweetie)CNA        Assessment:     Problem List as of 11/6/2021 Date Reviewed: 10/26/2021          Codes Class Noted - Resolved    * (Principal) Physical debility ICD-10-CM: R53.81  ICD-9-CM: 799.3  10/26/2021 - Present        E-coli UTI ICD-10-CM: N39.0, B96.20  ICD-9-CM: 599.0, 041.49  10/24/2021 - Present        GRACE (acute kidney injury) ([facility] ICD-10-CM: N17.9  ICD-9-CM: 584.9  10/22/2021 - Present        Pneumonia of right lower lobe due to infectious organism ICD-10-CM: J18.9  ICD-9-CM: 486  10/21/2021 - Present        Controlled type 2 diabetes mellitus without complication, without long-term current use of insulin ([facility] ICD-10-CM: E11.9  ICD-9-CM: 250.00  2/22/2019 - Present        Essential hypertension, benign ICD-10-CM: I10  ICD-9-CM: 401.1  1/3/2014 - Present        Cerebrovascular accident (CVA) due to occlusion [facility]) ICD-10-CM: I63.9  ICD-9-CM: 434.91  2/12/2019 - Present        Mixed hyperlipidemia ICD-10-CM: E78.2  ICD-9-CM: 272.2  1/3/2014 - Present        Severe obesity ([facility] ICD-10-CM: E66.01  ICD-9-CM: 278.01  7/2/2019 - Present        Acute respiratory failure with hypoxia ([facility] ICD-10-CM: J96.01  ICD-9-CM: 518.81  10/23/2021 - Present        CKD (chronic kidney disease) stage 3, GFR 30-59 ml/min [facility]) ICD-10-CM: N18.30  ICD-9-CM: 585.3  10/22/2021 - Present        Fluid overload ICD-10-CM: E87.70  ICD-9-CM: 276.69  10/21/2021 - Present        Hypoxia ICD-10-CM: R09.02  ICD-9-CM: 799.02  10/21/2021 - Present        CAP (community acquired pneumonia) ICD-10-CM: J18.9  ICD-9-CM: 574  10/21/2021 - Present        Thyroid nodule ICD-10-CM: E04.1  ICD-9-CM: 241.0  8/5/2021 - Present        Type 2 diabetes mellitus with hyperglycemia, without long-term current use of insulin (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 ICD-10-CM: I63.9  ICD-9-CM: 434.91  6/14/2020 - 10/26/2021        RESOLVED: Urinary retention ICD-10-CM: R33.9  ICD-9-CM: 788.20  6/8/2020 - 9/17/2020        RESOLVED: Stroke (cerebrum) ([facility] ICD-10-CM: I63.9  ICD-9-CM: 434.91  6/8/2020 - 2/26/2021        RESOLVED: Right thalamic infarction [facility]) ICD-10-CM: I63.9  ICD-9-CM: 434.91  7/2/2019 - 10/26/2021            Plan / Recommendations / Medical Decision Making:      Continue daily physician / PA medical management:     Physical debility - admission for sepsis, acute prostatitis, E. coli UTI and RLL pneumonia; finished ABX but with residual weakness. Aggressive PT, OT.   -progressing well; hindered by orthostatic hypotension     Recent sepsis - completed IV ABX, WBC normalized, remains afebrile.  Monitor.     GRACE, acute kidney injury - creatinine up to 2.54 (10/23), down to 1.04 by [facility] admission 10/26. Monitor and hold metformin ER if Cr trends upward.  -10/28 renal function normalized, Cr 0.99, BUN 21 on BMP yesterday  -11/1 normal Cr, BUN on BMP this AM     Pneumonia, recent RLL CAP - completed ceftriaxone / azithromycin. O2 needs diminished; he has been weaned to room air. Incentive spirometer every hour while awake.     Prostatitis, E coli UTI - completed ceftriaxone / azithromycin. S/p UroLift procedure 9/2020. Continue on doxazosin, finasteride; monitor for urinary retention. Check post-void residual as needed; in-and-out catheter if post-void residual is more than 400ml. Voiding well on [facility] admission. -11/1 nocturia q2h, occasional AM OH; will decrease doxazosin to 4mg QHS  -11/2 didn't report nocturia this AM  -11/6 less nocturia, no urinary retention / hesitancy / incomplete voiding     Hypertension - BP fluctuating, managed medically on lisinopril, HCTZ with help from doxazosin. sBP excursions into 160-180s, dBP all <90.  PRN hydralazine but may need to add another agent.  -10/27 132-187/62-75; on 10mg BID of lisinopril, 12.5mg of HCTZ, Cardura 8mg; add Norvasc 5mg  -10/28 /68 this AM before meds, dropped to 99/50 and very symptomatic later in AM; stop amlodipine, increase AM lisinopril to 15mg, leave PM dose at 10mg, other meds same; will go more slowly, as patient accustomed to sBP >150  -10/29 147/61, Norvasc stopped due to hypotension; continue current meds  -10/30 166/72, 181/74 However; orthostatic with PT; 115/51 standing, 110/45 after walking, 105/52 sitting after walking, then 94/45 after walking 75ft, dropped to 85/49; TEDs when OOB  -11/1 more OH this AM despite no change in BP meds; with nocturia vs normal daytime voiding, suspect doxazosin --> decrease to 4mg QHS, TEDs during AM  -11/2 dizziness yesterday but no orthostatic hypotension  -11/3 d/c AM lisinopril, give 10mg in the evening instead of full 20mg dose; change Proscar to be given late afternoon rather than AM to prevent his wooziness, which usually effects him in AM  -11/4 BP 94//67, currently 138/64; no changed dosing times of meds; will see how that does today     Diabetes mellitus - most recent HgbA1c 8.1% (6/30/2021), poor glycemic control. Will require ACHS fasting glucose monitoring and medication adjustment to optimize glycemic control in view of recent acute illness and hospitalization. DM diet, Humalog SSI; metformin ER had been held in acute setting during GRACE but will resume since renal function has normalized. Monitor.   -10/27 -242; poorly controlled over time; just restarted metformin yesterday, cont SSI; adjust as needed  -10/28 BS <160, continue current regimen  -10/29 fair control; BS 106 at bedtime, otherwise; 144-195; takes glipizide as well as the [address] home. Consider restarting it at 2.5mg daily. Typically his BS run in the 150-160 range at home, not ideal  -10/30 -184; continue Glucophage and add glipizide 2.5mg to start  -11/1 BS remaining <140, continue current regimen  -11/2 BS fairly well controlled  -11/3 -132; controlled  -11/4 BS  well controlled. Home health agency requesting a HgbA1c; last done in June and was 8.1%, 7.9% in Feb 2021 and 6.4% in Oct 2020  -11/5 BS all <130, no change in regimen; HgbA1c pending  -11/6 HgbA1c 7.1%, improved from June     Hyperlipidemia - continue Lipitor 80mg daily.     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 Continue Plavix 75mg daily.     Pain control - stable, mild-to-moderate joint symptoms intermittently, reasonably well controlled by PRN meds. Will require regular pain assessment and comprehensive pain management. Has long-standing left shoulder / neck pain treated by chiropractor.     Electrolyte abnormality - hypoK+ to 3.0 (10/26); replace and monitor. Will give KCl 20mEq x 4 doses.   -10/27 K+ 3.3 today, continue to monitor  -10/30 recheck 11/1  -11/1 normal BMP     DVT risk / DVT prophylaxis - daily physician / PA exam to assess as patient is at increased risk for of thromboembolism. Mobilize as tolerated. Sequential pneumatic compression devices (SCDs) when in bed; thigh-high or knee-high thromboembolic deterrent hose when out of bed. Lovenox subcutaneously daily.      GI prophylaxis - consider PPI. At times may need additional antacids, Maalox prn.     Depression - continue on Zoloft; has good support system. Monitor.  -10/30 doing very well, very pleasant and cooperative; enjoying therapy and spirits bright     General skin care / wound prevention - monitor general skin wound status daily per staff and physician / PA. At risk for failure. Will require 24/7 rehab nursing.     Bowel program - at risk for constipation as a side effect of opioids, other medications, impaired mobility, etc. MiraLAX daily for regularity, Meghan-Colace for stool softener. PRN MOM, bisacodyl suppository or tablets for constipation.  -10/28 occasional loose stools, suspect this is from restarting metformin ER; PRN loperamide  -10/29 improved, one BM yesterday  -10/30 no loose stools  -11/1 no further loose stools  -11/4 significant gas yesterday but no nausea or diarrhea; +BM  -11/6 patient determined GI upset was from too much sugar-free Halloween candy; no issues since getting rid of it        Time spent was 25 minutes with over 1/2 in direct patient care/examination, consultation and coordination of care. Signed By: Bryce Soto PA-C    November 6, 2021      Physician Assistant with [facility]  Lilian Cherry MD, Medical Director

## 2021-11-06 NOTE — PROGRESS NOTES
PHYSICAL THERAPY DAILY NOTE  Time In: (P) 0889  Time Out: (P) 1443  Patient Seen For: (P) AM; Therapeutic exercise; Transfer training; Other (see progress notes); Gait training    Subjective: Patient had no complaints. Objective: NO pain noted. No light headedness. Other (comment) (fall precautions)  GROSS ASSESSMENT Daily Assessment            COGNITION Daily Assessment           BED/MAT MOBILITY Daily Assessment    Supine to Sit : (P) 5 (Stand-by assistance)  Sit to Supine : (P) 5 (Supervision)       TRANSFERS Daily Assessment    Other: (P) SPT with straight cane  Transfer Assistance : (P) 4 (Contact guard assistance)  Sit to Stand Assistance: (P) Contact guard assistance  Car Transfers: (P) Not tested       GAIT Daily Assessment    Amount of Assistance: (P) 4 (Contact guard assistance)  Distance (ft): (P) 125 Feet (ft)  Assistive Device: (P) Cane, straight; Gait belt       STEPS or STAIRS Daily Assessment    Steps/Stairs Ambulated (#): (P) 4  Level of Assist : (P) 4 (Contact guard assistance)  Rail Use: (P) Both       BALANCE Daily Assessment            WHEELCHAIR MOBILITY Daily Assessment            LOWER EXTREMITY EXERCISES Daily Assessment    Extremity: (P) Both  Exercise Type #1: (P) Other (comment) (nustep x 10 minutes)  Sets Performed: (P) 1  Reps Performed: (P) 10  Level of Assist: (P) Supervision          Assessment: Patient making good progress. Plan of Care: Continue with plan of care.     Sandra Pollock, PTA  11/6/2021

## 2021-11-06 NOTE — PROGRESS NOTES
Purposeful rounds completed this shift, needs met. Continent voids on toilet, independent with clothing, hygiene. Stand by assist with transfers, ambulation. Calls appropriately for assistance.

## 2021-11-07 LAB
GLUCOSE BLD STRIP.AUTO-MCNC: 105 MG/DL (ref 65–100)
GLUCOSE BLD STRIP.AUTO-MCNC: 130 MG/DL (ref 65–100)
GLUCOSE BLD STRIP.AUTO-MCNC: 141 MG/DL (ref 65–100)
GLUCOSE BLD STRIP.AUTO-MCNC: 90 MG/DL (ref 65–100)
SERVICE CMNT-IMP: ABNORMAL
SERVICE CMNT-IMP: NORMAL

## 2021-11-07 PROCEDURE — 65310000000 HC RM PRIVATE REHAB

## 2021-11-07 PROCEDURE — 74011250637 HC RX REV CODE- 250/637: Performed by: PHYSICAL MEDICINE & REHABILITATION

## 2021-11-07 PROCEDURE — 82962 GLUCOSE BLOOD TEST: CPT

## 2021-11-07 PROCEDURE — 74011250636 HC RX REV CODE- 250/636: Performed by: PHYSICIAN ASSISTANT

## 2021-11-07 PROCEDURE — 74011250637 HC RX REV CODE- 250/637: Performed by: PHYSICIAN ASSISTANT

## 2021-11-07 RX ADMIN — HYDROCHLOROTHIAZIDE 12.5 MG: 12.5 CAPSULE ORAL at 08:21

## 2021-11-07 RX ADMIN — ATORVASTATIN CALCIUM 80 MG: 80 TABLET, FILM COATED ORAL at 08:22

## 2021-11-07 RX ADMIN — FINASTERIDE 5 MG: 5 TABLET, FILM COATED ORAL at 17:05

## 2021-11-07 RX ADMIN — GLIPIZIDE 2.5 MG: 5 TABLET ORAL at 08:22

## 2021-11-07 RX ADMIN — LISINOPRIL 10 MG: 5 TABLET ORAL at 17:05

## 2021-11-07 RX ADMIN — CLOPIDOGREL BISULFATE 75 MG: 75 TABLET ORAL at 08:22

## 2021-11-07 RX ADMIN — METFORMIN HYDROCHLORIDE 1000 MG: 500 TABLET, EXTENDED RELEASE ORAL at 17:05

## 2021-11-07 RX ADMIN — ENOXAPARIN SODIUM 40 MG: 40 INJECTION SUBCUTANEOUS at 08:21

## 2021-11-07 RX ADMIN — SERTRALINE 50 MG: 50 TABLET, FILM COATED ORAL at 08:22

## 2021-11-07 RX ADMIN — DOXAZOSIN 4 MG: 4 TABLET ORAL at 20:32

## 2021-11-07 NOTE — PROGRESS NOTES
Needs met during hourly rounds. Showered this am with set up assist. Continent voids and bm on toilet.  Calls appropriately for assist.

## 2021-11-07 NOTE — PROGRESS NOTES
Fall time change for Daylight Savings occurred at 0100, November 7th. The documentation for this period is being entered following the guidelines as defined in the [facility] downtime policy by Anabella Hatfield RN

## 2021-11-08 LAB
GLUCOSE BLD STRIP.AUTO-MCNC: 102 MG/DL (ref 65–100)
GLUCOSE BLD STRIP.AUTO-MCNC: 127 MG/DL (ref 65–100)
GLUCOSE BLD STRIP.AUTO-MCNC: 165 MG/DL (ref 65–100)
SERVICE CMNT-IMP: ABNORMAL

## 2021-11-08 PROCEDURE — 65310000000 HC RM PRIVATE REHAB

## 2021-11-08 PROCEDURE — 97110 THERAPEUTIC EXERCISES: CPT

## 2021-11-08 PROCEDURE — 97530 THERAPEUTIC ACTIVITIES: CPT

## 2021-11-08 PROCEDURE — 82962 GLUCOSE BLOOD TEST: CPT

## 2021-11-08 PROCEDURE — 74011250637 HC RX REV CODE- 250/637: Performed by: PHYSICAL MEDICINE & REHABILITATION

## 2021-11-08 PROCEDURE — 97535 SELF CARE MNGMENT TRAINING: CPT

## 2021-11-08 PROCEDURE — 74011250637 HC RX REV CODE- 250/637: Performed by: PHYSICIAN ASSISTANT

## 2021-11-08 PROCEDURE — 92507 TX SP LANG VOICE COMM INDIV: CPT

## 2021-11-08 PROCEDURE — 97116 GAIT TRAINING THERAPY: CPT

## 2021-11-08 PROCEDURE — 74011250636 HC RX REV CODE- 250/636: Performed by: PHYSICIAN ASSISTANT

## 2021-11-08 PROCEDURE — 99232 SBSQ HOSP IP/OBS MODERATE 35: CPT | Performed by: PHYSICAL MEDICINE & REHABILITATION

## 2021-11-08 RX ORDER — INSULIN LISPRO 100 [IU]/ML
INJECTION, SOLUTION INTRAVENOUS; SUBCUTANEOUS
Status: DISCONTINUED | OUTPATIENT
Start: 2021-11-08 | End: 2021-11-12 | Stop reason: HOSPADM

## 2021-11-08 RX ADMIN — DOXAZOSIN 4 MG: 4 TABLET ORAL at 21:03

## 2021-11-08 RX ADMIN — ATORVASTATIN CALCIUM 80 MG: 80 TABLET, FILM COATED ORAL at 08:08

## 2021-11-08 RX ADMIN — METFORMIN HYDROCHLORIDE 1000 MG: 500 TABLET, EXTENDED RELEASE ORAL at 17:27

## 2021-11-08 RX ADMIN — GLIPIZIDE 2.5 MG: 5 TABLET ORAL at 08:08

## 2021-11-08 RX ADMIN — ENOXAPARIN SODIUM 40 MG: 40 INJECTION SUBCUTANEOUS at 08:08

## 2021-11-08 RX ADMIN — LISINOPRIL 10 MG: 5 TABLET ORAL at 17:27

## 2021-11-08 RX ADMIN — HYDROCHLOROTHIAZIDE 12.5 MG: 12.5 CAPSULE ORAL at 08:08

## 2021-11-08 RX ADMIN — SERTRALINE 50 MG: 50 TABLET, FILM COATED ORAL at 08:08

## 2021-11-08 RX ADMIN — CLOPIDOGREL BISULFATE 75 MG: 75 TABLET ORAL at 08:08

## 2021-11-08 RX ADMIN — FINASTERIDE 5 MG: 5 TABLET, FILM COATED ORAL at 17:27

## 2021-11-08 NOTE — PROGRESS NOTES
PHYSICAL THERAPY DAILY NOTE  Time In: (P) 1517  Time Out: (P) 1609  Patient Seen For: (P) PM; Therapeutic exercise; Transfer training; Gait training; Other (see progress notes)    Subjective: Patient had no complaints,         Objective: No pain noted. /77. Other (comment) (fall precautions)  GROSS ASSESSMENT Daily Assessment            COGNITION Daily Assessment           BED/MAT MOBILITY Daily Assessment    Rolling Right : (P) 5 (Stand-by assistance)  Rolling Left : 0 (Not tested)  Supine to Sit : (P) 4 (Contact guard assistance)  Sit to Supine : (P) 5 (Supervision)       TRANSFERS Daily Assessment    Other: (P) SPT with straight cane  Transfer Assistance : (P) 5 (Stand-by assistance)  Sit to Stand Assistance: (P) Stand-by assistance  Car Transfers: (P) Not tested       GAIT Daily Assessment    Amount of Assistance: (P) 5 (Stand-by assistance)  Distance (ft): (P) 100 Feet (ft)  Assistive Device: (P) Cane, straight       STEPS or STAIRS Daily Assessment    Steps/Stairs Ambulated (#): 4  Level of Assist : (P) 0 (Not tested)  Rail Use: Both       BALANCE Daily Assessment            WHEELCHAIR MOBILITY Daily Assessment            LOWER EXTREMITY EXERCISES Daily Assessment   Used blue swiss ball for core exs. Extremity: (P) Both  Exercise Type #1: (P) Supine lower extremity strengthening  Sets Performed: (P) 1  Reps Performed: (P) 20  Level of Assist: (P) Stand-by assistance          Assessment: Patients wife encouraged to remind patient to use straight cane at home when he is ambulating. Plan of Care: Continue with plan of care. Family training tomorrow with wife.     Laurel Busby, PTA  11/8/2021

## 2021-11-08 NOTE — PROGRESS NOTES
OT Daily Note  Time In 1303   Time Out 1348     Subjective: \"I was able to link my fingers behind my back. \" Pt was agreeable to tx. Pain: No pain expressed. Patient Vitals for the past 8 hrs:   Temp Pulse Resp BP SpO2   11/08/21 0659 97.8 °F (36.6 °C) 64 18 (!) 177/79 93 %           Functional Mobility      Score Comments   Transfer Assist 4: Supervision or touching A Transfer Type: SPT   Equipment: N/A   Comments: SBA         Activity Tolerance and Strengthening   Pt completed the following exercises to promote UB strength, activity tolerance, and shoulder stabilization for integration into functional transfers and ADLs:  Exercise Sets/Reps Device Comments   Overhead Press 2x10 5lb weighted dowel    Triceps Extension 2x10 5lb weighted dowel    Chest Press 2x10 5lb weighted dowel    Bicep Curls 2x10 5lb weighted dowel    Finger Link behind back 3x3 using Red theraband           Coordination, Visual-Perceptual and Cognition   Pt completed visual perceptual, reaching, strengthening, activity tolerance, and bilateral hand coordination task utilizing Rubber Band Board while following visual design. Pt with good coordination skills noted while placing rubber bands onto design at midline. Pt with good reaching skills as well. Pt required no throughout for accuracy in matching visual design to constructed design. Pt completed x2 designs (1 simple, 1 moderate complexity) with x0 errors in designs. Pt completed activity in seated position at table top. Education   benefits of OT, energy conservation, safety awareness and functional transfers     Interdisciplinary Communication: Communicated with Routine OT to ensure progress towards POC and goals. Summary of session: Pt demonstrated good participation in session. Pt demonstrated good ability to interlock fingers behind back demonstrating improvement with ROM for reaching to complete toileting tasks.  Pt was left seated in recliner with call bell within reach and all needs met. Plan: Continue with POC.      Kacy Fees MS OTR/L  11/8/2021

## 2021-11-08 NOTE — PROGRESS NOTES
Time In 0755   Time Out 0835     Mobility   Score Comments   Sit to Stand 4: Supervision or touching A S   Transfer Assist 4: Supervision or touching A Transfer Type: SPT   Equipment: Cane   Comments: SBA     Activities of Daily Living    Score Comments   Eating 6: Independent    Oral Hyigene 6: Independent    Bathing 5: S/U or clean-up assist Type of Shower: Shower  Position: Standing PRN and Unsupported Sitting   Adaptive  Equipment: Tub Transfer Bench, Grab Bars and Long Handled Sponge  Comments:    Upper Body  Dressing 5: S/U or clean-up assist Items Applied: Pullover  Position: Unsupported Sitting  Comments:    Lower Body Dressing 4: Supervision or touching A Items Applied: Underwear and Elastic pants  Position: Standing PRN and Unsupported Sitting  Adaptive Equipment: SPC  Comments: SBA   Donning/[place] Footwear 3: Partial/Moderate A Items Applied: Slip-on shoes and compression stockings  Adaptive Equipment: N/A  Comments: Toilet Transfer 4: Supervision or touching A Transfer Type: SPT   Equipment: Cane   Comments: SBA   Toileting Hygiene 5: S/U or clean-up assist Output: urine  Comments:    Education  donning compression stockings       S: \"I did my shoulder stretches over the weekend. I was able to touch my fingers behind my back too. \" Agreeable to therapy. Patient was able to ambulate ~10 feet using a SPC with SBA. Pt completed morning ADLs with quality indicators reflected in chart above. Pain Pt reported no pain. .  Patient Vitals for the past 12 hrs:   Temp Pulse Resp BP SpO2   11/08/21 0659 97.8 °F (36.6 °C) 64 18 (!) 177/79 93 %       Collaborated with RN regarding patient performance and POC. Patient tolerated session well, but activity tolerance, balance, functional mobility, strength, coordination, cognition are still below baseline and require skilled facilitation to successfully and safely complete ADLs and transfers.    Patient ended session seated in recliner and call remote, phone, all needs within reach.       Antonio Mcdermott, OTR/L  11/8/2021

## 2021-11-08 NOTE — PROGRESS NOTES
Chart reviewed. Pt set for d/c home Friday 11/12/2021. No DME needs at this time as pt already has DME at home. Family training set for tomorrow, 11/9/2021 at 0830. HH needs include PT/OT/SLP and referral already placed to [facility] [place], [facility] as pt/family requested. CM to continue to follow and monitor for any further needs.

## 2021-11-08 NOTE — PROGRESS NOTES
PHYSICAL THERAPY DAILY NOTE  Time In: (P) 1122  Time Out: (P) 1202  Patient Seen For: (P) AM; Therapeutic exercise; Transfer training; Other (see progress notes); Gait training    Subjective: Patient had no complaints. Objective: NO pain or light headedness reported today. Other (comment) (fall precautions)  GROSS ASSESSMENT Daily Assessment            COGNITION Daily Assessment           BED/MAT MOBILITY Daily Assessment    Rolling Right : (P) 0 (Not tested)  Rolling Left : (P) 0 (Not tested)  Supine to Sit : (P) 0 (Not tested)  Sit to Supine : (P) 0 (Not tested)       TRANSFERS Daily Assessment    Other: (P) SPT with straight cane  Transfer Assistance : (P) 5 (Stand-by assistance)  Sit to Stand Assistance: (P) Stand-by assistance  Car Transfers: (P) Not tested       GAIT Daily Assessment    Amount of Assistance: (P) 5 (Stand-by assistance)  Distance (ft): (P) 80 Feet (ft)  Assistive Device: (P) Cane, straight       STEPS or STAIRS Daily Assessment    Steps/Stairs Ambulated (#): (P) 4  Level of Assist : (P) 5 (Stand-by assistance)  Rail Use: (P) Both       BALANCE Daily Assessment            WHEELCHAIR MOBILITY Daily Assessment            LOWER EXTREMITY EXERCISES Daily Assessment    Extremity: (P) Both  Exercise Type #1: (P) Standing lower extremity strengthening  Sets Performed: (P) 1  Reps Performed: (P) 10  Level of Assist: (P) Stand-by assistance          Assessment: Patient making progress with all mobility. Plan of Care: Continue with plan of care.     Best Xiong, MARY  11/8/2021

## 2021-11-08 NOTE — PROGRESS NOTES
11/08/21 1303   Time Spent With Patient   Time In 2015 [facility]   Time Out 1117   Patient Seen For: AM; Neuro-linguistics   Mental Status   Neurologic State Alert   Cognition Memory loss; Decreased attention/concentration   Perception Appears intact   Safety/Judgement Awareness of environment; Home safety        11/08/21 1304   Neuro-Linguistic Exercises   Verbal Reasoning Tasks Impaired; 70% accuracy with mental flexibility activity to unscramble sentences   Memory Impaired; completed functional short-term memory tasks with Megha   Attention  Impaired     Patient participated with cognitive treatment focusing on problem solving and short-term memory. 70% accuracy with mental flexibility activity to unscramble words to form coherent sentences. Decreased attention demonstrated with pt re-using or negating words. Cues required to problem solve through method to improve ability to keep track of the words with improved performance with attention strategy applied. Completed functional short-term memory tasks with Megha overall. Will continue to follow to address cognitive function for improved independence at discharge.     Gerardo Clark MS, CCC-SLP

## 2021-11-08 NOTE — PROGRESS NOTES
Problem: Falls - Risk of  Goal: *Absence of Falls  Description: Document Philippe Rivas Fall Risk and appropriate interventions in the flowsheet. Outcome: Progressing Towards Goal  Note: Fall Risk Interventions:  Mobility Interventions: Bed/chair exit alarm, OT consult for ADLs, Patient to call before getting OOB, PT Consult for mobility concerns         Medication Interventions: Bed/chair exit alarm, Patient to call before getting OOB, Teach patient to arise slowly    Elimination Interventions: Bed/chair exit alarm, Call light in reach    History of Falls Interventions: Bed/chair exit alarm         Problem: Patient Education: Go to Patient Education Activity  Goal: Patient/Family Education  Outcome: Progressing Towards Goal     Problem: Inpatient Rehab (Adult)  Goal: *LTG: Avoids injury/falls 100% of time related to deficits  Outcome: Progressing Towards Goal  Goal: *LTG: Avoids infection 100% of time related to deficits  Outcome: Progressing Towards Goal  Goal: *LTG: Verbalize understanding of diagnosis and risk factors for recurring stroke  Outcome: Progressing Towards Goal  Goal: *LTG: Absence of DVT during hospitalization  Outcome: Progressing Towards Goal  Goal: *LTG: Maintains Skin Integrity With No Evidence of Pressure Injury 100% of Time  Outcome: Progressing Towards Goal  Goal: Interventions  Outcome: Progressing Towards Goal     Problem: Patient Education: Go to Patient Education Activity  Goal: Patient/Family Education  Outcome: Progressing Towards Goal     Problem: Patient Education: Go to Patient Education Activity  Goal: Patient/Family Education  Description: Patient / Patient's family will verbalize understanding of PT safety recommendations, demonstrate appropriate assist for current functional mobility status, safety, and home exercise program by time of discharge.     Outcome: Progressing Towards Goal     Problem: Patient Education: Go to Patient Education Activity  Goal: Patient/Family Education  Description: Pt/caregiver will demonstrate and verbalize good understanding of OT recommendations regarding ADL status, functional transfer status, home safety, DME, AE, energy conservation techniques, follow-up therapy, for increasing safety with functional tasks upon discharge. Progressing 11/2/2021  Outcome: Progressing Towards Goal     Problem: Patient Education: Go to Patient Education Activity  Goal: Patient/Family Education  Outcome: Progressing Towards Goal     Problem: Diabetes Self-Management  Goal: *Disease process and treatment process  Description: Define diabetes and identify own type of diabetes; list 3 options for treating diabetes. Outcome: Progressing Towards Goal  Goal: *Incorporating nutritional management into lifestyle  Description: Describe effect of type, amount and timing of food on blood glucose; list 3 methods for planning meals. Outcome: Progressing Towards Goal  Goal: *Incorporating physical activity into lifestyle  Description: State effect of exercise on blood glucose levels. Outcome: Progressing Towards Goal  Goal: *Developing strategies to promote health/change behavior  Description: Define the ABC's of diabetes; identify appropriate screenings, schedule and personal plan for screenings. Outcome: Progressing Towards Goal  Goal: *Using medications safely  Description: State effect of diabetes medications on diabetes; name diabetes medication taking, action and side effects. Outcome: Progressing Towards Goal  Goal: *Monitoring blood glucose, interpreting and using results  Description: Identify recommended blood glucose targets  and personal targets. Outcome: Progressing Towards Goal  Goal: *Prevention, detection, treatment of acute complications  Description: List symptoms of hyper- and hypoglycemia; describe how to treat low blood sugar and actions for lowering  high blood glucose level.   Outcome: Progressing Towards Goal  Goal: *Prevention, detection and treatment of chronic complications  Description: Define the natural course of diabetes and describe the relationship of blood glucose levels to long term complications of diabetes.   Outcome: Progressing Towards Goal  Goal: *Developing strategies to address psychosocial issues  Description: Describe feelings about living with diabetes; identify support needed and support network  Outcome: Progressing Towards Goal  Goal: *Insulin pump training  Outcome: Progressing Towards Goal  Goal: *Sick day guidelines  Outcome: Progressing Towards Goal  Goal: *Patient Specific Goal (EDIT GOAL, INSERT TEXT)  Outcome: Progressing Towards Goal     Problem: Patient Education: Go to Patient Education Activity  Goal: Patient/Family Education  Outcome: Progressing Towards Goal

## 2021-11-08 NOTE — PROGRESS NOTES
Nutrition LOS Note:   Assessment  . ADULT DIET Regular; 3 carb choices (45 gm/meal); Low Fat/Low Chol/High Fiber/2 gm Na    Food,Nutrition, and Pertinent History: H/O: HTN, HLD, DM2,MO, right thalamic stroke 7/2019, right cerebellar stroke 6/2020 and obesity. Presented to [facility] 10/21/2021 with relatively rapid onset of generalized weakness, fever / chills, urinary retention and dyspnea. U/A +infection, CXR with right lung base opacity. He was admitted for sepsis and started on IV ABX for likely acute prostatitis and RLL PNA. Discharged to [facility] 10/26  Anthropometrics: Height 5'9\", Bed scale weigh 125.9kg (10/21), BMI 41, BMI class of morbid obesity class III. Macronutrient Needs:  · EER:  4868-0267 kcal /day (11-14 kcal/kg ABW)  · EPR:  69-88 grams protein/day (20% of kcal/kg CBW)  Average intake for past 18 day(s)/18 recorded meal(s): %. Nutrition Diagnosis: No nutrition diagnosis at this time    Intervention:   Meals and snacks: Continue current diet. Discharge nutrition plan: Continue current diet. Consider nutrition education in outpatient setting. There are three methods available through [facility].   12 week Physician Guided Weight Loss program- [phone] ([facility] fax [phone])  Cleo Arthur, Individualized Outpatient Nutrition Counseling- [phone] (have your doctor send referral to Rachel White or fax [phone])    Bariatric Weight Loss program- [phone] or www. [facility]. Annalisa Kendrick, [address], [address] [place] [address], [address] [place] [address]

## 2021-11-08 NOTE — PROGRESS NOTES
Elliott Izaguirre MD  Medical Director  [address] [facility], [address] [facility]  Tel: [phone]       [facility] PROGRESS NOTE    [address] Date: 10/26/2021  Admit Diagnosis:   Physical debility [R53.81]    Subjective     Patient seen and examined. Feeling well , no complaints. No cp, sob, n/v. No further loose stools.  Good spirits  Less nocturia  Objective:     Current Facility-Administered Medications   Medication Dose Route Frequency    finasteride (PROSCAR) tablet 5 mg  5 mg Oral DAILY    lisinopriL (PRINIVIL, ZESTRIL) tablet 10 mg  10 mg Oral PCD    doxazosin (CARDURA) tablet 4 mg  4 mg Oral QHS    glipiZIDE (GLUCOTROL) tablet 2.5 mg  2.5 mg Oral ACB    artificial saliva (MOUTH KOTE) 1 Spray  1 Spray Oral PRN    loperamide (IMODIUM) capsule 2 mg  2 mg Oral PRN    atorvastatin (LIPITOR) tablet 80 mg  80 mg Oral DAILY    clopidogreL (PLAVIX) tablet 75 mg  75 mg Oral DAILY    hydrALAZINE (APRESOLINE) tablet 25 mg  25 mg Oral Q6H PRN    hydroCHLOROthiazide (MICROZIDE) capsule 12.5 mg  12.5 mg Oral DAILY    insulin lispro (HUMALOG) injection   SubCUTAneous AC&HS    metFORMIN ER (GLUCOPHAGE XR) tablet 1,000 mg  1,000 mg Oral DAILY WITH DINNER    sertraline (ZOLOFT) tablet 50 mg  50 mg Oral DAILY    enoxaparin (LOVENOX) injection 40 mg  40 mg SubCUTAneous Q24H    influenza vaccine 2021-22 (6 mos+)(PF) (FLUARIX/FLULAVAL/FLUZONE QUAD) injection 0.5 mL  1 Each IntraMUSCular PRIOR TO DISCHARGE    acetaminophen (TYLENOL) tablet 650 mg  650 mg Oral Q6H PRN    bisacodyL (DULCOLAX) tablet 5 mg  5 mg Oral DAILY PRN    Or    bisacodyL (DULCOLAX) suppository 10 mg  10 mg Rectal DAILY PRN    Or    magnesium hydroxide (MILK OF MAGNESIA) 400 mg/5 mL oral suspension 30 mL  30 mL Oral DAILY PRN    traZODone (DESYREL) tablet 50 mg  50 mg Oral QHS PRN       Review of Systems:   Denies chest pain, shortness of breath, cough, headache, visual problems, abdominal pain, dysuria, calf pain. Pertinent positives are as noted in the HPI, ROS unremarkable otherwise. Visit Vitals  BP (!) 155/75 (BP 1 Location: Right upper arm, BP Patient Position: Supine; At rest)   Pulse 74   Temp 98.1 °F (36.7 °C)   Resp 16   SpO2 97%        Physical Exam:   General: Alert and age appropriately oriented. No acute cardiorespiratory distress. HEENT: Normocephalic, no scleral icterus. Oral mucosa moist without cyanosis. Lungs: Clear to auscultation bilaterally. Respiration even and unlabored. Heart: Regular rate and rhythm, S1, S2. No murmurs, clicks, rub or gallops. Abdomen: Soft, non-tender, not distended. Bowel sounds normoactive. No organomegaly. obese   Genitourinary: Deferred. Neuromuscular:      No gross focal neuro deficits. slt hip flexion weakness 4-, 4+ distally and symm  UEs 5/5  No sensory deficits  Speech clear and conversation appropriate     Skin/extremity: No rashes, no erythema. No calf tenderness B LE. No edema                                                                              Functional Assessment:          Balance  Sitting - Static: Good (unsupported) (11/05/21 1600)  Sitting - Dynamic: Good (unsupported) (11/05/21 1600)  Standing - Static: Good (w/ UE support) (11/05/21 1600)  Standing - Dynamic : Impaired (11/05/21 1600)                     Claritza Gottlieb Fall Risk Assessment:  Claritza Gottlieb Fall Risk  Mobility: Ambulates or transfers with assist devices or assistance (11/07/21 2015)  Mobility Interventions: Patient to call before getting OOB; Strengthening exercises (ROM-active/passive);  Utilize walker, cane, or other assistive device (11/07/21 2015)  Mentation: Alert, oriented x 3 (11/07/21 2015)  Medication: Patient receiving anticonvulsants, sedatives(tranquilizers), psychotropics or hypnotics, hypoglycemics, narcotics, sleep aids, antihypertensives, laxatives, or diuretics (11/07/21 2015)  Medication Interventions: Patient to call before getting OOB (11/07/21 2015)  Elimination: Needs assistance with toileting (11/07/21 2015)  Elimination Interventions: Call light in reach; Patient to call for help with toileting needs; Toilet paper/wipes in reach;  Toileting schedule/hourly rounds (11/07/21 2015)  Prior Fall History: Before admission in past 12 months _home or previous inpatient care) (11/07/21 2015)  History of Falls Interventions: Door open when patient unattended; Consult care management for discharge planning (11/07/21 2015)  Total Score: 4 (11/07/21 2015)  Standard Fall Precautions: Yes (10/31/21 4799)  High Fall Risk: Yes (11/07/21 2015)     Speech Assessment:         Ambulation:  Gait  Distance (ft): 125 Feet (ft) (11/06/21 1334)  Assistive Device: Cane, straight; Gait belt (11/06/21 1334)  Rail Use: Both (11/06/21 1334)     Labs/Studies:  Recent Results (from the past 72 hour(s))   GLUCOSE, POC    Collection Time: 11/05/21  7:25 AM   Result Value Ref Range    Glucose (POC) 119 (H) 65 - 100 mg/dL    Performed by Jennifer    GLUCOSE, POC    Collection Time: 11/05/21 11:14 AM   Result Value Ref Range    Glucose (POC) 103 (H) 65 - 100 mg/dL    Performed by Jennifer    GLUCOSE, POC    Collection Time: 11/05/21  4:57 PM   Result Value Ref Range    Glucose (POC) 94 65 - 100 mg/dL    Performed by Jennifer    GLUCOSE, POC    Collection Time: 11/05/21  8:11 PM   Result Value Ref Range    Glucose (POC) 166 (H) 65 - 100 mg/dL    Performed by Jessica    GLUCOSE, POC    Collection Time: 11/06/21  7:05 AM   Result Value Ref Range    Glucose (POC) 107 (H) 65 - 100 mg/dL    Performed by Tomas ([place])    GLUCOSE, POC    Collection Time: 11/06/21 11:20 AM   Result Value Ref Range    Glucose (POC) 137 (H) 65 - 100 mg/dL    Performed by [place] ([place])CNA    GLUCOSE, POC    Collection Time: 11/06/21  3:51 PM   Result Value Ref Range    Glucose (POC) 98 65 - 100 mg/dL    Performed by [facility]THORA GLUCOSE, POC    Collection Time: 11/06/21  9:00 PM   Result Value Ref Range    Glucose (POC) 105 (H) 65 - 100 mg/dL    Performed by Alena Maria, POC    Collection Time: 11/07/21  7:27 AM   Result Value Ref Range    Glucose (POC) 130 (H) 65 - 100 mg/dL    Performed by Tasty[address]anao [id number], POC    Collection Time: 11/07/21 11:51 AM   Result Value Ref Range    Glucose (POC) 105 (H) 65 - 100 mg/dL    Performed by [id number], POC    Collection Time: 11/07/21  4:22 PM   Result Value Ref Range    Glucose (POC) 90 65 - 100 mg/dL    Performed by Shannon    GLUCOSE, POC    Collection Time: 11/07/21  8:27 PM   Result Value Ref Range    Glucose (POC) 141 (H) 65 - 100 mg/dL    Performed by Glynn Dudley        Assessment:     Problem List as of 11/8/2021 Date Reviewed: 10/26/2021          Codes Class Noted - Resolved    * (Principal) Physical debility ICD-10-CM: R53.81  ICD-9-CM: 799.3  10/26/2021 - Present        E-coli UTI ICD-10-CM: N39.0, B96.20  ICD-9-CM: 599.0, 041.49  10/24/2021 - Present        Acute respiratory failure with hypoxia (HCC) ICD-10-CM: J96.01  ICD-9-CM: 518.81  10/23/2021 - Present        GRACE (acute kidney injury) ([facility] ICD-10-CM: N17.9  ICD-9-CM: 584.9  10/22/2021 - Present        CKD (chronic kidney disease) stage 3, GFR 30-59 ml/min (HCC) ICD-10-CM: N18.30  ICD-9-CM: 585.3  10/22/2021 - Present        Pneumonia of right lower lobe due to infectious organism ICD-10-CM: J18.9  ICD-9-CM: 486  10/21/2021 - Present        Fluid overload ICD-10-CM: E87.70  ICD-9-CM: 276.69  10/21/2021 - Present        Hypoxia ICD-10-CM: R09.02  ICD-9-CM: 799.02  10/21/2021 - Present        CAP (community acquired pneumonia) ICD-10-CM: J18.9  ICD-9-CM: 486  10/21/2021 - Present        Thyroid nodule ICD-10-CM: E04.1  ICD-9-CM: 241.0  8/5/2021 - Present        Type 2 diabetes mellitus with hyperglycemia, without long-term current use of insulin (HCC) ICD-10-CM: E11.65  ICD-9-CM: 250.00, 790.29  2/26/2021 - Present        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  ICD-9-CM: 434.91  6/14/2020 - Present        Severe obesity ([facility] ICD-10-CM: E66.01  ICD-9-CM: 278.01  7/2/2019 - Present        Numbness and tingling in left hand ICD-10-CM: R20.0, R20.2  ICD-9-CM: 782.0  7/2/2019 - Present        Controlled type 2 diabetes mellitus without complication, without long-term current use of insulin ([facility] ICD-10-CM: E11.9  ICD-9-CM: 250.00  2/22/2019 - Present        Cerebrovascular accident (CVA) due to occlusion [facility]) ICD-10-CM: I63.9  ICD-9-CM: 434.91  2/12/2019 - Present        Essential hypertension, benign ICD-10-CM: I10  ICD-9-CM: 401.1  1/3/2014 - Present        Mixed hyperlipidemia ICD-10-CM: E78.2  ICD-9-CM: 272.2  1/3/2014 - Present        RESOLVED: Hypokalemia ICD-10-CM: E87.6  ICD-9-CM: 276.8  10/23/2021 - 10/25/2021        RESOLVED: Sepsis ([facility] ICD-10-CM: A41.9  ICD-9-CM: 038.9, 995.91  10/22/2021 - 10/25/2021        RESOLVED: Acute prostatitis without hematuria ICD-10-CM: N41.0  ICD-9-CM: 601.0  10/21/2021 - 10/25/2021        RESOLVED: Cerebellar stroke ([facility] ICD-10-CM: I63.9  ICD-9-CM: 434.91  6/14/2020 - 10/26/2021        RESOLVED: Urinary retention ICD-10-CM: R33.9  ICD-9-CM: 788.20  6/8/2020 - 9/17/2020        RESOLVED: Stroke (cerebrum) ([facility] ICD-10-CM: I63.9  ICD-9-CM: 434.91  6/8/2020 - 2/26/2021        RESOLVED: Right thalamic infarction [facility]) ICD-10-CM: I63.9  ICD-9-CM: 434.91  7/2/2019 - 10/26/2021              Plan / Recommendations / Medical Decision Making:      Continue daily physician / PA medical management:     Physical debility - admission for sepsis, acute prostatitis, E. coli UTI and RLL pneumonia; finished ABX but with residual weakness. Aggressive PT, OT.   -progressing well; hindered by orthostatic hypotension     Recent sepsis - completed IV ABX, WBC normalized, remains afebrile.  Monitor.     GRACE, acute kidney injury - creatinine up to 2.54 (10/23), down to 1.04 by [facility] admission 10/26. Monitor and hold metformin ER if Cr trends upward.  -10/28 renal function normalized, Cr 0.99, BUN 21 on BMP yesterday  -11/1 normal Cr, BUN on BMP this AM     Pneumonia, recent RLL CAP - completed ceftriaxone / azithromycin. O2 needs diminished; he has been weaned to room air. Incentive spirometer every hour while awake.     Prostatitis, E coli UTI - completed ceftriaxone / azithromycin. S/p UroLift procedure 9/2020. Continue on doxazosin, finasteride; monitor for urinary retention. Check post-void residual as needed; in-and-out catheter if post-void residual is more than 400ml. Voiding well on [facility] admission. -11/1 nocturia q2h, occasional AM OH; will decrease doxazosin to 4mg QHS  -11/2 didn't report nocturia this AM  -11/6 less nocturia, no urinary retention / hesitancy / incomplete voiding; 11/8 no issues, decreasing nocturia     Hypertension - BP fluctuating, managed medically on lisinopril, HCTZ with help from doxazosin. sBP excursions into 160-180s, dBP all <90.  PRN hydralazine but may need to add another agent.  -10/27 132-187/62-75; on 10mg BID of lisinopril, 12.5mg of HCTZ, Cardura 8mg; add Norvasc 5mg  -10/28 /68 this AM before meds, dropped to 99/50 and very symptomatic later in AM; stop amlodipine, increase AM lisinopril to 15mg, leave PM dose at 10mg, other meds same; will go more slowly, as patient accustomed to sBP >150  -10/29 147/61, Norvasc stopped due to hypotension; continue current meds  -10/30 166/72, 181/74 However; orthostatic with PT; 115/51 standing, 110/45 after walking, 105/52 sitting after walking, then 94/45 after walking 75ft, dropped to 85/49; TEDs when OOB  -11/1 more OH this AM despite no change in BP meds; with nocturia vs normal daytime voiding, suspect doxazosin --> decrease to 4mg QHS, TEDs during AM  -11/2 dizziness yesterday but no orthostatic hypotension  -11/3 d/c AM lisinopril, give 10mg in the evening instead of full 20mg dose; change Proscar to be given late afternoon rather than AM to prevent his wooziness, which usually effects him in AM  -11/4 BP 94//67, currently 138/64; no changed dosing times of meds; will see how that does today  -11/8; improving; but being Sunday, didn't do much but reports that he felt fine when getting up to use bathroom   -155/57-75 yesterday. He functions better with SBP on the higher side     Diabetes mellitus - most recent HgbA1c 8.1% (6/30/2021), poor glycemic control. [facility] and medication adjustment to optimize glycemic control in view of recent acute illness and hospitalization. DM diet, Humalog SSI; metformin ER had been held in acute setting during GRACE but will resume since renal function has normalized. Monitor.   -10/27 -242; poorly controlled over time; just restarted metformin yesterday, cont SSI; adjust as needed  -10/28 BS <160, continue current regimen  -10/29 fair control; BS 106 at bedtime, otherwise; 144-195; takes glipizide as well as the [address] home. Consider restarting it at 2.5mg daily. Typically his BS run in the 150-160 range at home, not ideal  -10/30 -184; continue Glucophage and add glipizide 2.5mg to start  -11/1 BS remaining <140, continue current regimen  -11/2 BS fairly well controlled  -11/3 -132; controlled  -11/4 BS  well controlled. Home health agency requesting a HgbA1c; last done in June and was 8.1%, 7.9% in Feb 2021 and 6.4% in Oct 2020  -11/5 BS all <130, no change in regimen; HgbA1c pending  -11/6 HgbA1c 7.1%, improved from June  -11/8 BS excellent; change to bid bs     Hyperlipidemia - continue Lipitor 80mg daily.     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  Continue Plavix 75mg daily.     Pain control - stable, mild-to-moderate joint symptoms intermittently, reasonably well controlled by PRN meds. Will require regular pain assessment and comprehensive pain management. Has long-standing left shoulder / neck pain treated by chiropractor.     Electrolyte abnormality - hypoK+ to 3.0 (10/26); replace and monitor. Will give KCl 20mEq x 4 doses. -10/27 K+ 3.3 today, continue to monitor  -10/30 recheck 11/1  -11/1 normal BMP     DVT risk / DVT prophylaxis - daily physician / PA exam to assess as patient is at increased risk for of thromboembolism. Mobilize as tolerated. Sequential pneumatic compression devices (SCDs) when in bed; thigh-high or knee-high thromboembolic deterrent hose when out of bed. Lovenox subcutaneously daily.      GI prophylaxis - consider PPI. At times may need additional antacids, Maalox prn.     Depression - continue on Zoloft; has good support system. Monitor.  -10/30 doing very well, very pleasant and cooperative; enjoying therapy and spirits bright     General skin care / wound prevention - monitor general skin wound status daily per staff and physician / PA. At risk for failure. Will require 24/7 rehab nursing.     Bowel program - at risk for constipation as a side effect of opioids, other medications, impaired mobility, etc. MiraLAX daily for regularity, Meghan-Colace for stool softener. PRN MOM, bisacodyl suppository or tablets for constipation.  -10/28 occasional loose stools, suspect this is from restarting metformin ER; PRN loperamide  -10/29 improved, one BM yesterday  -10/30 no loose stools  -11/1 no further loose stools  -11/4 significant gas yesterday but no nausea or diarrhea; +BM  -11/6 patient determined GI upset was from too much sugar-free Halloween candy; no issues since getting rid of it     Time spent was 25 minutes with over 1/2 in direct patient care/examination, consultation and coordination of care.      Signed By: Timothy Rooney MD     November 8, 2021

## 2021-11-09 LAB
GLUCOSE BLD STRIP.AUTO-MCNC: 121 MG/DL (ref 65–100)
GLUCOSE BLD STRIP.AUTO-MCNC: 98 MG/DL (ref 65–100)
SERVICE CMNT-IMP: ABNORMAL
SERVICE CMNT-IMP: NORMAL

## 2021-11-09 PROCEDURE — 65310000000 HC RM PRIVATE REHAB

## 2021-11-09 PROCEDURE — 74011250637 HC RX REV CODE- 250/637: Performed by: PHYSICIAN ASSISTANT

## 2021-11-09 PROCEDURE — 97530 THERAPEUTIC ACTIVITIES: CPT

## 2021-11-09 PROCEDURE — 97116 GAIT TRAINING THERAPY: CPT

## 2021-11-09 PROCEDURE — 82962 GLUCOSE BLOOD TEST: CPT

## 2021-11-09 PROCEDURE — 99232 SBSQ HOSP IP/OBS MODERATE 35: CPT | Performed by: PHYSICAL MEDICINE & REHABILITATION

## 2021-11-09 PROCEDURE — 97110 THERAPEUTIC EXERCISES: CPT

## 2021-11-09 PROCEDURE — 74011250636 HC RX REV CODE- 250/636: Performed by: PHYSICIAN ASSISTANT

## 2021-11-09 PROCEDURE — 97535 SELF CARE MNGMENT TRAINING: CPT

## 2021-11-09 PROCEDURE — 74011250637 HC RX REV CODE- 250/637: Performed by: PHYSICAL MEDICINE & REHABILITATION

## 2021-11-09 PROCEDURE — 92507 TX SP LANG VOICE COMM INDIV: CPT

## 2021-11-09 RX ADMIN — ENOXAPARIN SODIUM 40 MG: 40 INJECTION SUBCUTANEOUS at 08:49

## 2021-11-09 RX ADMIN — METFORMIN HYDROCHLORIDE 1000 MG: 500 TABLET, EXTENDED RELEASE ORAL at 18:03

## 2021-11-09 RX ADMIN — SERTRALINE 50 MG: 50 TABLET, FILM COATED ORAL at 08:50

## 2021-11-09 RX ADMIN — FINASTERIDE 5 MG: 5 TABLET, FILM COATED ORAL at 18:03

## 2021-11-09 RX ADMIN — ATORVASTATIN CALCIUM 80 MG: 80 TABLET, FILM COATED ORAL at 08:50

## 2021-11-09 RX ADMIN — CLOPIDOGREL BISULFATE 75 MG: 75 TABLET ORAL at 08:50

## 2021-11-09 RX ADMIN — HYDROCHLOROTHIAZIDE 12.5 MG: 12.5 CAPSULE ORAL at 08:49

## 2021-11-09 RX ADMIN — GLIPIZIDE 2.5 MG: 5 TABLET ORAL at 08:50

## 2021-11-09 RX ADMIN — DOXAZOSIN 4 MG: 4 TABLET ORAL at 22:11

## 2021-11-09 RX ADMIN — LISINOPRIL 10 MG: 5 TABLET ORAL at 18:03

## 2021-11-09 NOTE — PROGRESS NOTES
11/09/21 0950   Time Spent With Patient   Time In 0915   Time Out 0950   Patient Seen For: AM; Neuro-linguistics   Mental Status   Neurologic State Alert   Cognition Follows commands; Memory loss; Decreased attention/concentration   Safety/Judgement Home safety; Fall prevention        11/09/21 0951   Neuro-Linguistic Exercises   Problem Solving Impaired; fairly basic deductive reasoning activity completed with mod-max cues with decreased attention to detail with reading and following clues   Organization Impaired; functional activity to write down key points on calendar related to appointments etc and answering questions related to availability with >90% accuracy   Memory Impaired; pt recalled some activities from previous session and names of staff     Patient participated with cognitive treatment focusing on problem solving and immediate memory/attention. Patient's spouse was at bedside and acknowledged that cognitive status was impaired on admission feeling that patient is getting closer to baseline function with some residual deficits s/p CVA. Functional activity to write down key points on calendar related to appointments etc and answering questions related to availability with >90% accuracy. Intermediate level deductive reasoning activity completed with mod-max cues with decreased attention to detail demonstrated with reading and following instructions. Discussed recommendation to supervise with higher level ADLs such as medication management at home. Patient's wife already handles finances with patient then recording information in notebook and does all the cooking/cleaning. Discussed possibility of a life alert for times when she is outside of the home at part-time job.       Mely Vasquez MS, CCC-SLP

## 2021-11-09 NOTE — PROGRESS NOTES
OT WEEKLY PROGRESS NOTE    Time In: 0830  Time Out: 0914    Mobility   Score Comments   Rolling 6: Independent Side: Bilateral  Mod I   Supine to Sit 6: Independent Mod I   Sit to Supine 6: Independent Mod I   Sit to Stand 4: Supervision or touching A Supervision   Transfer Assist 4: Supervision or touching A Transfer Type: SPT   Equipment: Cane   Comments: SBA     Activities of Daily Living    Score Comments   Eating 6: Independent    Oral Hyigene 6: Independent    Bathing 5: S/U or clean-up assist Type of Shower: Shower  Position: Standing PRN and Unsupported Sitting   Adaptive  Equipment: Tub Transfer Bench, Grab Bars and Long Handled Sponge  Comments:    Upper Body  Dressing 5: S/U or clean-up assist Items Applied: Pullover  Position: Unsupported Sitting  Comments:    Lower Body Dressing 5: S/U or clean-up assist Items Applied: Underwear and Elastic pants  Position: Standing PRN and Unsupported Sitting  Adaptive Equipment: SPC  Comments:    Donning/[place] Footwear 3: Partial/Moderate A Items Applied: Slip-on shoes and compression stockings  Adaptive Equipment: Sock Aide  Comments: assist donning compression stockings   Toilet Transfer 4: Supervision or touching A Transfer Type: SPT   Equipment: Cane   Comments: SBA   Toileting Hygiene 6: Independent Output: urine x1  Comments:    Education  Family training completed with pt's wife, see details below       Plan of Care: Please see Care Plan for updated LTGs. Family Training: Pt's wife present 11/9/2021 for family training. Pt and wife educated on BP recommendations including donning compression stockings, slow change of position, elevating BLEs, and use of SPC for safety. Pt and wife with no additional questions. Summary of Progress: Patient demonstrates good progress with ADL techniques, standing balance and activity tolerance, UB strength, and shoulder extension for performance of ADL and functional transfers, see above for details.   Patient continues to require skilled OT services to address deficits, provide education, and progress towards goals as stated in POC. Summary of Session: S: \"We really like the sock aide, this helps. \" Agreeable to therapy. Focus of session was on morning ADL routine and progress assessment. Patient was able to ambulate ~10 feet using a SPC with SBA. Patient Vitals for the past 12 hrs:   Temp Pulse Resp BP SpO2   11/09/21 0830    (!) 117/48    11/09/21 0718 98 °F (36.7 °C) 69 18 (!) 148/68 95 %       Pain not expressed. Collaborated with RN and confirmed patient is on track to reach goals as documented in the care plan. Continue OT POC with focus on ADL/IADL skills, functional transfers, functional mobility, coordination, strength, static and dynamic balance, and activity tolerance to maximize safety and independence with ADLs and functional transfers. Patient ended session in recliner with call remote and phone within reach.        Antonio Posadas, OTR/L  11/9/2021

## 2021-11-09 NOTE — PROGRESS NOTES
Laureen Mahoney MD  Medical Director  [address] [facility], [address] [facility]  Tel: [phone]       [facility] PROGRESS NOTE    [address] Date: 10/26/2021  Admit Diagnosis:   Physical debility [R53.81]    Subjective     Patient seen and examined. Feeling well. No specific complaints.  Had a good day in therapies    Objective:     Current Facility-Administered Medications   Medication Dose Route Frequency    insulin lispro (HUMALOG) injection   SubCUTAneous ACB&D    lanolin alcohol-mineral oil-petrolatum (EUCERIN) topical cream   Topical PRN    finasteride (PROSCAR) tablet 5 mg  5 mg Oral DAILY    lisinopriL (PRINIVIL, ZESTRIL) tablet 10 mg  10 mg Oral PCD    doxazosin (CARDURA) tablet 4 mg  4 mg Oral QHS    glipiZIDE (GLUCOTROL) tablet 2.5 mg  2.5 mg Oral ACB    artificial saliva (MOUTH KOTE) 1 Spray  1 Spray Oral PRN    loperamide (IMODIUM) capsule 2 mg  2 mg Oral PRN    atorvastatin (LIPITOR) tablet 80 mg  80 mg Oral DAILY    clopidogreL (PLAVIX) tablet 75 mg  75 mg Oral DAILY    hydrALAZINE (APRESOLINE) tablet 25 mg  25 mg Oral Q6H PRN    hydroCHLOROthiazide (MICROZIDE) capsule 12.5 mg  12.5 mg Oral DAILY    metFORMIN ER (GLUCOPHAGE XR) tablet 1,000 mg  1,000 mg Oral DAILY WITH DINNER    sertraline (ZOLOFT) tablet 50 mg  50 mg Oral DAILY    enoxaparin (LOVENOX) injection 40 mg  40 mg SubCUTAneous Q24H    influenza vaccine 2021-22 (6 mos+)(PF) (FLUARIX/FLULAVAL/FLUZONE QUAD) injection 0.5 mL  1 Each IntraMUSCular PRIOR TO DISCHARGE    acetaminophen (TYLENOL) tablet 650 mg  650 mg Oral Q6H PRN    bisacodyL (DULCOLAX) tablet 5 mg  5 mg Oral DAILY PRN    Or    bisacodyL (DULCOLAX) suppository 10 mg  10 mg Rectal DAILY PRN    Or    magnesium hydroxide (MILK OF MAGNESIA) 400 mg/5 mL oral suspension 30 mL  30 mL Oral DAILY PRN    traZODone (DESYREL) tablet 50 mg  50 mg Oral QHS PRN       Review of Systems:   Denies chest pain, shortness of breath, cough, headache, visual problems, abdominal pain, dysuria, calf pain. Pertinent positives are as noted in the HPI, ROS unremarkable otherwise. Visit Vitals  BP (!) 148/68 (BP 1 Location: Left upper arm)   Pulse 69   Temp 98 °F (36.7 °C)   Resp 18   SpO2 95%        Physical Exam:   General: Alert and age appropriately oriented. No acute cardiorespiratory distress. HEENT: Normocephalic, no scleral icterus. Oral mucosa moist without cyanosis. Lungs: Clear to auscultation bilaterally. Respiration even and unlabored. Heart: Regular rate and rhythm, S1, S2. No murmurs, clicks, rub or gallops. Abdomen: Soft, non-tender, not distended. Bowel sounds normoactive. No organomegaly. Morbidly obese   Genitourinary: Deferred. Neuromuscular:      No gross focal neuro deficits. slt hip flexion weakness 4-, 4+ distally and symm  UEs 5/5  No sensory deficits  Speech clear and conversation appropriate   Skin/extremity: No rashes, no erythema. No calf tenderness B LE. Trace edema                                                                              Functional Assessment:          Balance  Sitting - Static: Good (unsupported) (11/05/21 1600)  Sitting - Dynamic: Good (unsupported) (11/05/21 1600)  Standing - Static: Good (w/ UE support) (11/05/21 1600)  Standing - Dynamic : Impaired (11/05/21 1600)                     [place] Flow Fall Risk Assessment:  [place] Flow Fall Risk  Mobility: Ambulates or transfers with assist devices or assistance (11/09/21 0700)  Mobility Interventions: Bed/chair exit alarm; OT consult for ADLs;  Patient to call before getting OOB; PT Consult for mobility concerns (11/08/21 1105)  Mentation: Alert, oriented x 3 (11/08/21 1105)  Medication: Patient receiving anticonvulsants, sedatives(tranquilizers), psychotropics or hypnotics, hypoglycemics, narcotics, sleep aids, antihypertensives, laxatives, or diuretics (11/08/21 1105)  Medication Interventions: Bed/chair exit alarm; Patient to call before getting OOB;  Teach patient to arise slowly (11/08/21 1105)  Elimination: Needs assistance with toileting (11/08/21 1105)  Elimination Interventions: Bed/chair exit alarm; Call light in reach (11/08/21 1105)  Prior Fall History: Before admission in past 12 months _home or previous inpatient care) (11/08/21 1105)  History of Falls Interventions: Bed/chair exit alarm (11/08/21 1105)  Total Score: 4 (11/08/21 1105)  Standard Fall Precautions: Yes (10/31/21 2148)  High Fall Risk: Yes (11/08/21 1105)     Speech Assessment:         Ambulation:  Gait  Distance (ft): 100 Feet (ft) (11/08/21 1644)  Assistive Device: Cane, straight (11/08/21 1644)  Rail Use: Both (11/08/21 1241)     Labs/Studies:  Recent Results (from the past 72 hour(s))   GLUCOSE, POC    Collection Time: 11/06/21 11:20 AM   Result Value Ref Range    Glucose (POC) 137 (H) 65 - 100 mg/dL    Performed by Sweetie)CNA    GLUCOSE, POC    Collection Time: 11/06/21  3:51 PM   Result Value Ref Range    Glucose (POC) 98 65 - 100 mg/dL    Performed by Sweetie)CNA    GLUCOSE, POC    Collection Time: 11/06/21  9:00 PM   Result Value Ref Range    Glucose (POC) 105 (H) 65 - 100 mg/dL    Performed by Alena Maria, POC    Collection Time: 11/07/21  7:27 AM   Result Value Ref Range    Glucose (POC) 130 (H) 65 - 100 mg/dL    Performed by Urvashi Cain [id number], POC    Collection Time: 11/07/21 11:51 AM   Result Value Ref Range    Glucose (POC) 105 (H) 65 - 100 mg/dL    Performed by Urvashi Cain [id number], POC    Collection Time: 11/07/21  4:22 PM   Result Value Ref Range    Glucose (POC) 90 65 - 100 mg/dL    Performed by Shannon    GLUCOSE, POC    Collection Time: 11/07/21  8:27 PM   Result Value Ref Range    Glucose (POC) 141 (H) 65 - 100 mg/dL    Performed by Alena Maria, POC    Collection Time: 11/08/21  7:21 AM   Result Value Ref Range    Glucose (POC) 127 (H) 65 - 100 mg/dL    Performed by Lauren    GLUCOSE, POC    Collection Time: 11/08/21 11:05 AM   Result Value Ref Range    Glucose (POC) 165 (H) 65 - 100 mg/dL    Performed by Sweetie)CNA    GLUCOSE, POC    Collection Time: 11/08/21  4:26 PM   Result Value Ref Range    Glucose (POC) 102 (H) 65 - 100 mg/dL    Performed by Sweetie)CNA    GLUCOSE, POC    Collection Time: 11/09/21  7:35 AM   Result Value Ref Range    Glucose (POC) 121 (H) 65 - 100 mg/dL    Performed by Merle        Assessment:     Problem List as of 11/9/2021 Date Reviewed: 10/26/2021          Codes Class Noted - Resolved    * (Principal) Physical debility ICD-10-CM: R53.81  ICD-9-CM: 799.3  10/26/2021 - Present        E-coli UTI ICD-10-CM: N39.0, B96.20  ICD-9-CM: 599.0, 041.49  10/24/2021 - Present        Acute respiratory failure with hypoxia (HCC) ICD-10-CM: J96.01  ICD-9-CM: 518.81  10/23/2021 - Present        GRACE (acute kidney [facility]) ([facility] ICD-10-CM: N17.9  ICD-9-CM: 584.9  10/22/2021 - Present        CKD (chronic kidney disease) stage 3, GFR 30-59 ml/min ([facility]) ICD-10-CM: N18.30  ICD-9-CM: 585.3  10/22/2021 - Present        Pneumonia of right lower lobe due to infectious organism ICD-10-CM: J18.9  ICD-9-CM: 486  10/21/2021 - Present        Fluid overload ICD-10-CM: E87.70  ICD-9-CM: 276.69  10/21/2021 - Present        Hypoxia ICD-10-CM: R09.02  ICD-9-CM: 799.02  10/21/2021 - Present        CAP (community acquired pneumonia) ICD-10-CM: J18.9  ICD-9-CM: 486  10/21/2021 - Present        Thyroid nodule ICD-10-CM: E04.1  ICD-9-CM: 241.0  8/5/2021 - Present        Type 2 diabetes mellitus with hyperglycemia, without long-term current use of insulin (HCC) ICD-10-CM: E11.65  ICD-9-CM: 250.00, 790.29  2/26/2021 - Present        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) ICD-10-CM: I63.9  ICD-9-CM: 434.91  2/12/2019 - Present        Essential hypertension, benign ICD-10-CM: I10  ICD-9-CM: 401.1  1/3/2014 - Present        Mixed hyperlipidemia ICD-10-CM: E78.2  ICD-9-CM: 272.2  1/3/2014 - Present        RESOLVED: Hypokalemia ICD-10-CM: E87.6  ICD-9-CM: 276.8  10/23/2021 - 10/25/2021        RESOLVED: Sepsis ([facility] ICD-10-CM: A41.9  ICD-9-CM: 038.9, 995.91  10/22/2021 - 10/25/2021        RESOLVED: Acute prostatitis without hematuria ICD-10-CM: N41.0  ICD-9-CM: 601.0  10/21/2021 - 10/25/2021        RESOLVED: Cerebellar stroke ([facility] ICD-10-CM: I63.9  ICD-9-CM: 434.91  6/14/2020 - 10/26/2021        RESOLVED: Urinary retention ICD-10-CM: R33.9  ICD-9-CM: 788.20  6/8/2020 - 9/17/2020        RESOLVED: Stroke (cerebrum) ([facility] ICD-10-CM: I63.9  ICD-9-CM: 434.91  6/8/2020 - 2/26/2021        RESOLVED: Right thalamic infarction [facility]) ICD-10-CM: I63.9  ICD-9-CM: 434.91  7/2/2019 - 10/26/2021            Plan / Recommendations / Medical Decision Making:      Continue daily physician / PA medical management:     Physical debility - admission for sepsis, acute prostatitis, E. coli UTI and RLL pneumonia; finished ABX but with residual weakness. Aggressive PT, OT.   -progressing well; hindered by orthostatic hypotension     Recent sepsis - completed IV ABX, WBC normalized, remains afebrile. Monitor.     GRACE, acute kidney injury - creatinine up to 2.54 (10/23), down to 1.04 by [facility] admission 10/26.  Monitor and hold metformin ER if Cr trends upward.  -10/28 renal function normalized, Cr 0.99, BUN 21 on BMP yesterday  -11/1 normal Cr, BUN on BMP this AM     Pneumonia, recent RLL CAP - completed ceftriaxone / azithromycin. O2 needs diminished; he has been weaned to room air. Incentive spirometer every hour while awake.     Prostatitis, E coli UTI - completed ceftriaxone / azithromycin. S/p UroLift procedure 9/2020. Continue on doxazosin, finasteride; monitor for urinary retention. Check post-void residual as needed; in-and-out catheter if post-void residual is more than 400ml. Voiding well on [facility] admission. -11/1 nocturia q2h, occasional AM OH; will decrease doxazosin to 4mg QHS  -11/2 didn't report nocturia this AM  -11/6 less nocturia, no urinary retention / hesitancy / incomplete voiding; 11/8 no issues, decreasing nocturia     Hypertension - BP fluctuating, managed medically on lisinopril, HCTZ with help from doxazosin. sBP excursions into 160-180s, dBP all <90.  PRN hydralazine but may need to add another agent.  -10/27 132-187/62-75; on 10mg BID of lisinopril, 12.5mg of HCTZ, Cardura 8mg; add Norvasc 5mg  -10/28 /68 this AM before meds, dropped to 99/50 and very symptomatic later in AM; stop amlodipine, increase AM lisinopril to 15mg, leave PM dose at 10mg, other meds same; will go more slowly, as patient accustomed to sBP >150  -10/29 147/61, Norvasc stopped due to hypotension; continue current meds  -10/30 166/72, 181/74 However; orthostatic with PT; 115/51 standing, 110/45 after walking, 105/52 sitting after walking, then 94/45 after walking 75ft, dropped to 85/49; TEDs when OOB  -11/1 more OH this AM despite no change in BP meds; with nocturia vs normal daytime voiding, suspect doxazosin --> decrease to 4mg QHS, TEDs during AM  -11/2 dizziness yesterday but no orthostatic hypotension  -11/3 d/c AM lisinopril, give 10mg in the evening instead of full 20mg dose; change Proscar to be given late afternoon rather than AM to prevent his wooziness, which usually effects him in AM  -11/4 BP 94//67, currently 138/64; no changed dosing times of meds; will see how that does today  -11/8; improving; but being Sunday, didn't do much but reports that he felt fine when getting up to use bathroom   -155/57-75 yesterday. He functions better with SBP on the higher side  -11/9 -976/60-77 acceptable. No orthostatics     Diabetes mellitus - most recent HgbA1c 8.1% (6/30/2021), poor glycemic control. Will require ACHS fasting glucose monitoring and medication adjustment to optimize glycemic control in view of recent acute illness and hospitalization. DM diet, Humalog SSI; metformin ER had been held in acute setting during GRACE but will resume since renal function has normalized. Monitor.   -10/27 -242; poorly controlled over time; just restarted metformin yesterday, cont SSI; adjust as needed  -10/28 BS <160, continue current regimen  -10/29 fair control; BS 106 at bedtime, otherwise; 144-195; takes glipizide as well as the [address] home. Consider restarting it at 2.5mg daily. Typically his BS run in the 150-160 range at home, not ideal  -10/30 -184; continue Glucophage and add glipizide 2.5mg to start  -11/1 BS remaining <140, continue current regimen  -11/2 BS fairly well controlled  -11/3 -132; controlled  -11/4 BS  well controlled. Home health agency requesting a HgbA1c; last done in June and was 8.1%, 7.9% in Feb 2021 and 6.4% in Oct 2020  -11/5 BS all <130, no change in regimen; HgbA1c pending  -11/6 HgbA1c 7.1%, improved from June  -11/8 BS excellent; change to bid bs  -BS controlled     Hyperlipidemia - continue Lipitor 80mg daily.     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 Continue Plavix 75mg daily.     Pain control - stable, mild-to-moderate joint symptoms intermittently, reasonably well controlled by PRN meds.  Will require regular pain assessment and comprehensive pain management. Has long-standing left shoulder / neck pain treated by chiropractor.     Electrolyte abnormality - hypoK+ to 3.0 (10/26); replace and monitor. Will give KCl 20mEq x 4 doses. -10/27 K+ 3.3 today, continue to monitor  -10/30 recheck 11/1  -11/1 normal BMP; check basic labs 11/10     DVT risk / DVT prophylaxis - daily physician / PA exam to assess as patient is at increased risk for of thromboembolism. Mobilize as tolerated. Sequential pneumatic compression devices (SCDs) when in bed; thigh-high or knee-high thromboembolic deterrent hose when out of bed. Lovenox subcutaneously daily.      GI prophylaxis - consider PPI. At times may need additional antacids, Maalox prn.     Depression - continue on Zoloft; has good support system. Monitor.  -10/30 doing very well, very pleasant and cooperative; enjoying therapy and spirits bright     General skin care / wound prevention - monitor general skin wound status daily per staff and physician / PA. At risk for failure. Will require 24/7 rehab nursing.     Bowel program - at risk for constipation as a side effect of opioids, other medications, impaired mobility, etc. MiraLAX daily for regularity, Meghan-Colace for stool softener. PRN MOM, bisacodyl suppository or tablets for constipation.  -10/28 occasional loose stools, suspect this is from restarting metformin ER; PRN loperamide  -10/29 improved, one BM yesterday  -10/30 no loose stools  -11/1 no further loose stools  -11/4 significant gas yesterday but no nausea or diarrhea; +BM  -11/6 patient determined GI upset was from too much sugar-free Halloween candy; no issues since getting rid of it      Time spent was 25 minutes with over 1/2 in direct patient care/examination, consultation and coordination of care.      Signed By: Laureen Mahoney MD     November 9, 2021

## 2021-11-09 NOTE — PROGRESS NOTES
PT WEEKLY PROGRESS NOTE   Time In: 6934   Time Out: 1511    Subjective: \"I feel like I'm a lot better than I was when I left the hospital after my stroke. \"         Objective: Other (comment) (fall precautions)    Outcome Measures: Vital Signs: BP (!) 117/48   Pulse 69   Temp 98 °F (36.7 °C)   Resp 18   SpO2 95%     Pain level: no c/o pain    Patient education: pt. Educated on importance of pacing & taking rest breaks when fatigued    Interdisciplinary Communication: discussed pt's progress w/ PTA in re-assessing goals    Cognition: Pt. Alert & follows commands consistently. Pt. Jn Debi decreased insight & decreased short term recall. BED/CHAIR/WHEELCHAIR TRANSFERS Initial Assessment Weekly Progress Assessment 11/9/2021   Rolling Right 5 (Supervision) 0 (Not tested)   Rolling Left 5 (Supervision) 0 (Not tested)   Supine to Sit 4 (Minimal assistance) 0 (Not tested)   Sit to Stand Minimal assistance Supervision   Sit to Supine 5 (Supervision) 0 (Not tested)   Transfer Type SPT without device Other (SPT w/ SPC)   Comments Increased time and effort to complete bed mobility and transfers. Using bed rail for rolling and supine to sit. Supervision for safety w/ balance   Car Transfer Not tested Not tested   Car Type         GROSS ASSESSMENT Weekly Progress Assessment 11/9/2021   AROM WFL   Strength Grossly decreased B LEs   Coordination intact   Tone normal   Sensation NT   PROM Limited by body habitus     POSTURE Weekly Progress Assessment 11/9/2021   Posture (WDL) Exceptions to WDL   Posture Assessment Trunk flexion; Rounded shoulders;  Forward head     WHEELCHAIR MOBILITY/MANAGEMENT Initial Assessment Weekly Progress Assessment 11/9/2021   Able to Propel 0 feet 0 feet   Curbs/ramps assistance required 0 (Not tested) 0 (Not tested)   Wheelchair set up assistance required 0 (Not tested) NT   Wheelchair management   NT     [facility]ENDENCE Initial Assessment Weekly Progress Assessment 11/9/2021   Assistive device Gait belt Cane, straight   Ambulation assistance - level surface 4 (Minimal assistance) CGA   Distance 165 Feet (ft) 150 Feet (ft) (x2)   Comments Cont step through gait with mild trunk flexion with increased width of base of support with decrease in step length and ankel DF at initial contact. Occasional mild Ataxia. Loss of balance x 1 with min assist to correct Pt. Ambulating outdoors on unlevel surfaces, requiring CGA for balance; Exhibits decreased step length, tendency to blaine COG w/ difficulty controlling saravanan     GAIT Weekly Progress Assessment 11/9/2021   Gait Description (WDL) Exceptions to WDL   Gait Abnormalities Altered arm swing; Decreased step clearance     STEPS/STAIRS Initial Assessment Weekly Progress Assessment 11/9/2021   Steps/Stairs ambulated 4 0   Rail Use Both Left    Comments slow reciprocating pattern going up steps and single step at a time going down steps. Increased time and effort to complete NT this session   Curbs/Ramps 0 (Not tested) NT     Therapeutic Exercise:  Pt. Performed NuStep @ resistance level 3 x10 minutes to increase strength & endurance B UEs & LEs    Pt. Left up in recliner in NAD, call bell in reach. Assessment: Pt. Able to increase gait distance this PM, but required increased assistance on unlevel surfaces & with fatigue. Pt. Also has decreased insight unto fatigue levels & ana to sit, so will require supervision to address safety & pacing needs. Pt. Has met 1/5 STGs, progress slowed due to hypotension during the las assessment period. Goals adjusted to reflect pt's progress. Pt. Requires cont. PT to address strength, balance & endurance impairments to increase independence w/ household & community mobility s well as to decrease falls risk. Plan of Care: Please see Care Plan for updated LTGs.     Family Training: Has taken place    Eric Koenig, PT  11/9/2021

## 2021-11-09 NOTE — PROGRESS NOTES
PHYSICAL THERAPY DAILY NOTE  Time In: (P) 1004  Time Out: (P) 1057  Patient Seen For: (P) AM; Therapeutic exercise; Transfer training; Gait training; Other (see progress notes); Family training    Subjective: Patient had no complaints. Through out treatment patient had no lightheaded feeling. Objective: NO pain noted. Wife present for family training. Other (comment) (fall precautions)  GROSS ASSESSMENT Daily Assessment            COGNITION Daily Assessment    intact       BED/MAT MOBILITY Daily Assessment    Supine to Sit : (P) 0 (Not tested)  Sit to Supine : (P) 0 (Not tested)       TRANSFERS Daily Assessment    Other: (P) SPT with straight cane  Transfer Assistance : (P) 5 (Supervision/setup)  Sit to Stand Assistance: (P) Supervision  Car Transfers: (P) Not tested       GAIT Daily Assessment    Amount of Assistance: (P) 5 (Stand-by assistance)  Distance (ft): (P) 150 Feet (ft)  Assistive Device: (P) Cane, straight       STEPS or STAIRS Daily Assessment    Steps/Stairs Ambulated (#): (P) 8  Level of Assist : (P) 5 (Stand-by assistance)  Rail Use: (P) Left        BALANCE Daily Assessment            WHEELCHAIR MOBILITY Daily Assessment            LOWER EXTREMITY EXERCISES Daily Assessment   Discussed with wife and patient a diabetic diet plan and gave them hand out on carb counting. Wife seemed very pleased that she has been trying to change their diet at home. Extremity: (P) Both  Exercise Type #1: (P) Standing lower extremity strengthening  Sets Performed: (P) 1  Reps Performed: (P) 10  Level of Assist: (P) Supervision  Exercise Type #2: (P) Other (comment) (with wife present instructed her to what instructions to give patient when he falls on floor to get up Patient practiced getting up and down off floor with CGA)  Sets Performed: (P) 1  Reps Performed: (P) 0  Level of Assist: (P) Contact guard assistance          Assessment: Patient making good progress .  He has straight cane and rolling walker at home.         Plan of Care: Continue with plan of care. No DME needs at discharge.   Tarun Dugan, PTA  11/9/2021

## 2021-11-09 NOTE — PROGRESS NOTES
OT Daily Note  Time In 1301   Time Out 1346     Pain: Patient had no complaint of pain. Functional Mobility      Score Comments   Sit to Stand 6: Independent I   Transfer Assist 4: Supervision or touching A Transfer Type: SPT   Equipment: Cane   Comments: S        Self-Care   Pt toileted I. Pt completed 40 reps of shoulder extension B to improve his ability to complete hygiene with good quality. Cognition   Pt completed pattern play with mod verbal cueing. Pt worked with Lawrence Almanza needing min verbal cueing for appropriate gameplay. Education   Problem solving strategies     Plan: Continue with POC. Pt was left in recliner with all needs within reach.      Davy Mcduffie OTR/L  11/9/2021

## 2021-11-10 LAB
ANION GAP SERPL CALC-SCNC: 4 MMOL/L (ref 7–16)
BUN SERPL-MCNC: 14 MG/DL (ref 8–23)
CALCIUM SERPL-MCNC: 8.7 MG/DL (ref 8.3–10.4)
CHLORIDE SERPL-SCNC: 106 MMOL/L (ref 98–107)
CO2 SERPL-SCNC: 31 MMOL/L (ref 21–32)
CREAT SERPL-MCNC: 0.96 MG/DL (ref 0.8–1.5)
GLUCOSE BLD STRIP.AUTO-MCNC: 123 MG/DL (ref 65–100)
GLUCOSE BLD STRIP.AUTO-MCNC: 97 MG/DL (ref 65–100)
GLUCOSE SERPL-MCNC: 114 MG/DL (ref 65–100)
POTASSIUM SERPL-SCNC: 3.5 MMOL/L (ref 3.5–5.1)
SERVICE CMNT-IMP: ABNORMAL
SERVICE CMNT-IMP: NORMAL
SODIUM SERPL-SCNC: 141 MMOL/L (ref 136–145)

## 2021-11-10 PROCEDURE — 74011250636 HC RX REV CODE- 250/636: Performed by: PHYSICIAN ASSISTANT

## 2021-11-10 PROCEDURE — 92507 TX SP LANG VOICE COMM INDIV: CPT

## 2021-11-10 PROCEDURE — 97535 SELF CARE MNGMENT TRAINING: CPT

## 2021-11-10 PROCEDURE — 99232 SBSQ HOSP IP/OBS MODERATE 35: CPT | Performed by: PHYSICAL MEDICINE & REHABILITATION

## 2021-11-10 PROCEDURE — 74011250637 HC RX REV CODE- 250/637: Performed by: PHYSICIAN ASSISTANT

## 2021-11-10 PROCEDURE — 74011250637 HC RX REV CODE- 250/637: Performed by: PHYSICAL MEDICINE & REHABILITATION

## 2021-11-10 PROCEDURE — 80048 BASIC METABOLIC PNL TOTAL CA: CPT

## 2021-11-10 PROCEDURE — 97116 GAIT TRAINING THERAPY: CPT

## 2021-11-10 PROCEDURE — 97110 THERAPEUTIC EXERCISES: CPT

## 2021-11-10 PROCEDURE — 65310000000 HC RM PRIVATE REHAB

## 2021-11-10 PROCEDURE — 97530 THERAPEUTIC ACTIVITIES: CPT

## 2021-11-10 PROCEDURE — 82962 GLUCOSE BLOOD TEST: CPT

## 2021-11-10 RX ADMIN — ATORVASTATIN CALCIUM 80 MG: 80 TABLET, FILM COATED ORAL at 09:00

## 2021-11-10 RX ADMIN — ENOXAPARIN SODIUM 40 MG: 40 INJECTION SUBCUTANEOUS at 08:58

## 2021-11-10 RX ADMIN — DOXAZOSIN 4 MG: 4 TABLET ORAL at 20:45

## 2021-11-10 RX ADMIN — SERTRALINE 50 MG: 50 TABLET, FILM COATED ORAL at 08:58

## 2021-11-10 RX ADMIN — LISINOPRIL 10 MG: 5 TABLET ORAL at 17:37

## 2021-11-10 RX ADMIN — GLIPIZIDE 2.5 MG: 5 TABLET ORAL at 08:59

## 2021-11-10 RX ADMIN — CLOPIDOGREL BISULFATE 75 MG: 75 TABLET ORAL at 08:58

## 2021-11-10 RX ADMIN — FINASTERIDE 5 MG: 5 TABLET, FILM COATED ORAL at 17:37

## 2021-11-10 RX ADMIN — HYDROCHLOROTHIAZIDE 12.5 MG: 12.5 CAPSULE ORAL at 08:58

## 2021-11-10 RX ADMIN — METFORMIN HYDROCHLORIDE 1000 MG: 500 TABLET, EXTENDED RELEASE ORAL at 17:37

## 2021-11-10 NOTE — PROGRESS NOTES
11/10/21 1247   Time Spent With Patient   Time In 1003   Time Out 1030   Patient Seen For: AM; Neuro-linguistics   Mental Status   Neurologic State Alert   Orientation Level Oriented X4   Cognition Memory loss; Decreased attention/concentration   Perception Appears intact   Perseveration No perseveration noted   Safety/Judgement Awareness of environment; Home safety        11/10/21 1252   Neuro-Linguistic Exercises   Verbal Problem Solving Impaired; abstract category exclusion activity completed 12/15 independently  Basic mental flexibility activity completed with SBA   Mathematical Impaired; 80% accuracy with functional math/time reasoning   Memory Impaired; pt recalled discussion with wife from previous session and recommendations for assistance with medications at discharge     Patient participated with cognitive treatment and recalled discussion with wife from previous session related to recommendations for assistance with medications at discharge. Abstract category exclusion activity completed 12/15 independently. Basic mental flexibility activity completed with SBA. 80% accuracy with basic functional math/time reasoning related to ADLs. Patient is approaching baseline level of function with some degree of pre-morbid deficits related to CVA.     Marcela Lambert MS, CCC-SLP

## 2021-11-10 NOTE — PROGRESS NOTES
OT Daily Note    Time In 0800   Time Out 0831     Subjective: \"I think I left my stockings on too long yesterday. \" Pt was agreeable to tx. Pain: Pt reported no pain. Patient Vitals for the past 8 hrs:   Temp Pulse Resp BP SpO2   11/10/21 0728 98 °F (36.7 °C) 66 18 (!) 153/77 96 %         Mobility   Score Comments   Sit to Stand 4: Supervision or touching A SBA   Transfer Assist 4: Supervision or touching A Transfer Type: SPT   Equipment: Cane   Comments: SBA     Activities of Daily Living    Score Comments   Eating 6: Independent    Oral Hygiene 6: Independent    Bathing 5: S/U or clean-up assist Type of Shower: Shower  Position: Standing PRN and Unsupported Sitting   Adaptive  Equipment: Tub Transfer Bench, Grab Bars and Long Handled Sponge  Comments:    Upper Body  Dressing 5: S/U or clean-up assist Items Applied: Pullover  Position: Unsupported Sitting  Comments:    Lower Body Dressing 4: Supervision or touching A Items Applied: Underwear and Elastic pants  Position: Standing PRN and Unsupported Sitting  Adaptive Equipment: N/A  Comments: Supervision   Donning/[place] Footwear 3: Partial/Moderate A Items Applied: Slip-on shoes and Compression Stockings  Adaptive Equipment: Sock Aide  Comments: Assist with compression stockings   Toilet Transfer 4: Supervision or touching A Transfer Type: SPT   Equipment: Cane   Comments: Supervision   Toileting Hygiene 4: Supervision or touching A Output: urine x 1  Comments: supervision/SBA   Education  AE/DME training and Donning compression stockings     Interdisciplinary Communication: Team Conference  Summary of Session: Pt demonstrated good participation in OT tasks during this session. Pt's performance with ADL is reflected in above chart. Pt was left seated in recliner with call bell within reach and all needs met. Plan: Continue per OT POC.      JEEVAN Sultana/LUPE   11/10/2021

## 2021-11-10 NOTE — PROGRESS NOTES
PHYSICAL THERAPY DAILY NOTE  Time In: (P) 1517  Time Out: (P) 1604  Patient Seen For: (P) PM; Transfer training; Gait training; Other (see progress notes)    Subjective: Patient had no complaints. Wife present during treatment. \" I am not out of breath anymore with walking. \"         Objective: NO pain noted. Other (comment) (fall precautions)  GROSS ASSESSMENT Daily Assessment            COGNITION Daily Assessment    intact       BED/MAT MOBILITY Daily Assessment    Rolling Right : (P) 0 (Not tested)  Rolling Left : (P) 0 (Not tested)  Supine to Sit : (P) 0 (Not tested)  Sit to Supine : (P) 0 (Not tested)       TRANSFERS Daily Assessment    Other: (P) SPT with straight cane  Transfer Assistance : (P) 5 (Supervision/setup)  Sit to Stand Assistance: (P) Supervision  Car Transfers: Not tested       GAIT Daily Assessment   With wife present ambulated outside on unlevel surfaces and up and down ramp with straight cane. Amount of Assistance: (P) 5 (Stand-by assistance)  Distance (ft): (P) 150 Feet (ft)  Assistive Device: (P) Cane, straight       STEPS or STAIRS Daily Assessment    Level of Assist : (P) 0 (Not tested)       BALANCE Daily Assessment            WHEELCHAIR MOBILITY Daily Assessment            LOWER EXTREMITY EXERCISES Daily Assessment              Assessment: Wife very pleased on how much stronger he is and how his endurance is better than prior to this admission. Plan of Care: Continue with plan of care .     Tarun Dugan, PTA  11/10/2021

## 2021-11-10 NOTE — PROGRESS NOTES
Team conference today with discharge set for Friday 11/12. BSN, SUZAN met with pt at bedside and updated on d/c date. No DME needs at this time as pt already has DME at home. HH needs include PT/OT/SLP and referral already placed to [facility] as pt/family requested. All questions answered. Pt aware primary RN will discuss home medications and provide list at time of d/c. Family training complete. Contacted to spouse, Padmini Henriquez, with no answer, v/m left. CM to continue to follow and monitor for any further needs.

## 2021-11-10 NOTE — PROGRESS NOTES
PHYSICAL THERAPY DAILY NOTE  Time In: (P) 1116  Time Out: (P) 1202  Patient Seen For: (P) AM; Therapeutic exercise; Transfer training; Gait training; Other (see progress notes)    Subjective: Patient had no complaints. Objective: No pain noted. Wife present. Other (comment) (fall precautions)  GROSS ASSESSMENT Daily Assessment            COGNITION Daily Assessment           BED/MAT MOBILITY Daily Assessment    Supine to Sit : (P) 5 (Stand-by assistance)  Sit to Supine : (P) 5 (Supervision)       TRANSFERS Daily Assessment    Other: (P) SPT with straight cane  Transfer Assistance : (P) 5 (Stand-by assistance)  Sit to Stand Assistance: (P) Stand-by assistance  Car Transfers: (P) Not tested       GAIT Daily Assessment    Amount of Assistance: (P) 5 (Stand-by assistance)  Distance (ft): (P) 150 Feet (ft)  Assistive Device: (P) Cane, straight       STEPS or STAIRS Daily Assessment    Level of Assist : (P) 0 (Not tested)       BALANCE Daily Assessment            WHEELCHAIR MOBILITY Daily Assessment            LOWER EXTREMITY EXERCISES Daily Assessment    Extremity: (P) Both  Exercise Type #1: (P) Standing lower extremity strengthening  Sets Performed: (P) 1  Reps Performed: (P) 10  Level of Assist: (P) Stand-by assistance          Assessment: Patient making progress and is increasing endurance. Plan of Care: Continue with plan of care.     Magdalena Castro, MARY  11/10/2021

## 2021-11-10 NOTE — PROGRESS NOTES
Lore House MD  Medical Director  [address] [facility], [address] [facility]  Tel: [phone]       [facility] PROGRESS NOTE    [address] Date: 10/26/2021  Admit Diagnosis:   Physical debility [R53.81]    Subjective     Patient seen and examined. Very pleased with his progress. Has not had any \"spells\" in 2d. No dizziness, orthostatics, weakness.  Feels he is in better shape than when he left after his CVA    Objective:     Current Facility-Administered Medications   Medication Dose Route Frequency    insulin lispro (HUMALOG) injection   SubCUTAneous ACB&D    lanolin alcohol-mineral oil-petrolatum (EUCERIN) topical cream   Topical PRN    finasteride (PROSCAR) tablet 5 mg  5 mg Oral DAILY    lisinopriL (PRINIVIL, ZESTRIL) tablet 10 mg  10 mg Oral PCD    doxazosin (CARDURA) tablet 4 mg  4 mg Oral QHS    glipiZIDE (GLUCOTROL) tablet 2.5 mg  2.5 mg Oral ACB    artificial saliva (MOUTH KOTE) 1 Spray  1 Spray Oral PRN    loperamide (IMODIUM) capsule 2 mg  2 mg Oral PRN    atorvastatin (LIPITOR) tablet 80 mg  80 mg Oral DAILY    clopidogreL (PLAVIX) tablet 75 mg  75 mg Oral DAILY    hydrALAZINE (APRESOLINE) tablet 25 mg  25 mg Oral Q6H PRN    hydroCHLOROthiazide (MICROZIDE) capsule 12.5 mg  12.5 mg Oral DAILY    metFORMIN ER (GLUCOPHAGE XR) tablet 1,000 mg  1,000 mg Oral DAILY WITH DINNER    sertraline (ZOLOFT) tablet 50 mg  50 mg Oral DAILY    enoxaparin (LOVENOX) injection 40 mg  40 mg SubCUTAneous Q24H    influenza vaccine 2021-22 (6 mos+)(PF) (FLUARIX/FLULAVAL/FLUZONE QUAD) injection 0.5 mL  1 Each IntraMUSCular PRIOR TO DISCHARGE    acetaminophen (TYLENOL) tablet 650 mg  650 mg Oral Q6H PRN    bisacodyL (DULCOLAX) tablet 5 mg  5 mg Oral DAILY PRN    Or    bisacodyL (DULCOLAX) suppository 10 mg  10 mg Rectal DAILY PRN    Or    magnesium hydroxide (MILK OF MAGNESIA) 400 mg/5 mL oral suspension 30 mL  30 mL Oral DAILY PRN    traZODone (DESYREL) tablet 50 mg  50 mg Oral QHS PRN       Review of Systems:   Denies chest pain, shortness of breath, cough, headache, visual problems, abdominal pain, dysuria, calf pain. Pertinent positives are as noted in the HPI, ROS unremarkable otherwise. Visit Vitals  BP (!) 152/72 (BP 1 Location: Right upper arm, BP Patient Position: At rest;Lying)   Pulse 68   Temp 98.2 °F (36.8 °C)   Resp 18   SpO2 96%        Physical Exam:   General: Alert and age appropriately oriented. No acute cardiorespiratory distress. HEENT: Normocephalic, no scleral icterus. Oral mucosa moist without cyanosis. Lungs: Clear to auscultation bilaterally. Respiration even and unlabored. Heart: Regular rate and rhythm, S1, S2. No murmurs, clicks, rub or gallops. Abdomen: Soft, non-tender, not distended. Bowel sounds normoactive. No organomegaly. Morbid obesity   Genitourinary: Deferred. Neuromuscular:      No gross focal motor deficits noted. Generalized LE weakness  No sensory deficits  Dec attn and concentration; compensates well \"I write a lot of things down \"    Skin/extremity: No rashes, no erythema. No calf tenderness B LE. Trace edema  Pulses 2+                                                                              Functional Assessment:          Balance  Sitting - Static: Good (unsupported) (11/09/21 1500)  Sitting - Dynamic: Good (unsupported) (11/09/21 1500)  Standing - Static: Good (w/ UE support) (11/09/21 1500)  Standing - Dynamic : Impaired (11/09/21 1500)                     Claritza Gottlieb Fall Risk Assessment:  Claritza Gottlieb Fall Risk  Mobility: Ambulates or transfers with assist devices or assistance (11/09/21 1920)  Mobility Interventions: Patient to call before getting OOB; Strengthening exercises (ROM-active/passive);  Utilize walker, cane, or other assistive device (11/09/21 1920)  Mentation: Alert, oriented x 3 (11/09/21 1920)  Medication: Patient receiving anticonvulsants, sedatives(tranquilizers), psychotropics or hypnotics, hypoglycemics, narcotics, sleep aids, antihypertensives, laxatives, or diuretics (11/09/21 1920)  Medication Interventions: Evaluate medications/consider consulting pharmacy; Patient to call before getting OOB; Teach patient to arise slowly (11/09/21 1920)  Elimination: Needs assistance with toileting (11/09/21 1920)  Elimination Interventions: Patient to call for help with toileting needs (11/09/21 1920)  Prior Fall History: Before admission in past 12 months _home or previous inpatient care) (11/09/21 1920)  History of Falls Interventions: Consult care management for discharge planning (11/09/21 1920)  Total Score: 4 (11/09/21 1920)  Standard Fall Precautions: Yes (10/31/21 2148)  High Fall Risk: Yes (11/09/21 1920)     Speech Assessment:         Ambulation:  Gait  Base of Support: Center of gravity altered; Narrowed (11/09/21 1500)  Speed/Landy: Slow; Shuffled (11/09/21 1500)  Step Length: Right shortened; Left shortened (11/09/21 1500)  Stance: Right increased;  Left increased (11/09/21 1500)  Gait Abnormalities: Altered arm swing; Decreased step clearance (11/09/21 1500)  Distance (ft): 150 Feet (ft) (x2) (11/09/21 1500)  Assistive Device: Cane, straight (11/09/21 1500)  Rail Use: Left  (11/09/21 1230)     Labs/Studies:  Recent Results (from the past 72 hour(s))   GLUCOSE, POC    Collection Time: 11/07/21  7:27 AM   Result Value Ref Range    Glucose (POC) 130 (H) 65 - 100 mg/dL    Performed by lucierna Cain [id number], POC    Collection Time: 11/07/21 11:51 AM   Result Value Ref Range    Glucose (POC) 105 (H) 65 - 100 mg/dL    Performed by [place] Cain [id number], POC    Collection Time: 11/07/21  4:22 PM   Result Value Ref Range    Glucose (POC) 90 65 - 100 mg/dL    Performed by Shannon    GLUCOSE, POC    Collection Time: 11/07/21  8:27 PM   Result Value Ref Range    Glucose (POC) 141 (H) 65 - 100 mg/dL    Performed by [facility] GLUCOSE, POC    Collection Time: 11/08/21  7:21 AM   Result Value Ref Range    Glucose (POC) 127 (H) 65 - 100 mg/dL    Performed by [address] [place] [address], POC    Collection Time: 11/08/21 11:05 AM   Result Value Ref Range    Glucose (POC) 165 (H) 65 - 100 mg/dL    Performed by Sweetie)CNA    GLUCOSE, POC    Collection Time: 11/08/21  4:26 PM   Result Value Ref Range    Glucose (POC) 102 (H) 65 - 100 mg/dL    Performed by Sweetie)CNA    GLUCOSE, POC    Collection Time: 11/09/21  7:35 AM   Result Value Ref Range    Glucose (POC) 121 (H) 65 - 100 mg/dL    Performed by Merle    GLUCOSE, POC    Collection Time: 11/09/21  4:48 PM   Result Value Ref Range    Glucose (POC) 98 65 - 100 mg/dL    Performed by Merle        Assessment:     Problem List as of 11/10/2021 Date Reviewed: 10/26/2021          Codes Class Noted - Resolved    * (Principal) Physical debility ICD-10-CM: R53.81  ICD-9-CM: 799.3  10/26/2021 - Present        E-coli UTI ICD-10-CM: N39.0, B96.20  ICD-9-CM: 599.0, 041.49  10/24/2021 - Present        Acute respiratory failure with hypoxia (HCC) ICD-10-CM: J96.01  ICD-9-CM: 518.81  10/23/2021 - Present        GRACE (acute kidney injury) ([facility] ICD-10-CM: N17.9  ICD-9-CM: 584.9  10/22/2021 - Present        CKD (chronic kidney disease) stage 3, GFR 30-59 ml/min (HCC) ICD-10-CM: N18.30  ICD-9-CM: 585.3  10/22/2021 - Present        Pneumonia of right lower lobe due to infectious organism ICD-10-CM: J18.9  ICD-9-CM: 486  10/21/2021 - Present        Fluid overload ICD-10-CM: E87.70  ICD-9-CM: 276.69  10/21/2021 - Present        Hypoxia ICD-10-CM: R09.02  ICD-9-CM: 799.02  10/21/2021 - Present        CAP (community acquired pneumonia) ICD-10-CM: J18.9  ICD-9-CM: 486  10/21/2021 - Present        Thyroid nodule ICD-10-CM: E04.1  ICD-9-CM: 241.0  8/5/2021 - Present        Type 2 diabetes mellitus with hyperglycemia, without long-term current use of insulin ([facility] ICD-10-CM: E11.65  ICD-9-CM: 250.00, 790.29  2/26/2021 - Present        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  ICD-9-CM: 434.91  6/14/2020 - Present        Severe obesity ([facility] ICD-10-CM: E66.01  ICD-9-CM: 278.01  7/2/2019 - Present        Numbness and tingling in left hand ICD-10-CM: R20.0, R20.2  ICD-9-CM: 782.0  7/2/2019 - Present        Controlled type 2 diabetes mellitus without complication, without long-term current use of insulin ([facility] ICD-10-CM: E11.9  ICD-9-CM: 250.00  2/22/2019 - Present        Cerebrovascular accident (CVA) due to occlusion [facility]) ICD-10-CM: I63.9  ICD-9-CM: 434.91  2/12/2019 - Present        Essential hypertension, benign ICD-10-CM: I10  ICD-9-CM: 401.1  1/3/2014 - Present        Mixed hyperlipidemia ICD-10-CM: E78.2  ICD-9-CM: 272.2  1/3/2014 - Present        RESOLVED: Hypokalemia ICD-10-CM: E87.6  ICD-9-CM: 276.8  10/23/2021 - 10/25/2021        RESOLVED: Sepsis ([facility] ICD-10-CM: A41.9  ICD-9-CM: 038.9, 995.91  10/22/2021 - 10/25/2021        RESOLVED: Acute prostatitis without hematuria ICD-10-CM: N41.0  ICD-9-CM: 601.0  10/21/2021 - 10/25/2021        RESOLVED: Cerebellar stroke ([facility] ICD-10-CM: I63.9  ICD-9-CM: 434.91  6/14/2020 - 10/26/2021        RESOLVED: Urinary retention ICD-10-CM: R33.9  ICD-9-CM: 788.20  6/8/2020 - 9/17/2020        RESOLVED: Stroke (cerebrum) ([facility] ICD-10-CM: I63.9  ICD-9-CM: 434.91  6/8/2020 - 2/26/2021        RESOLVED: Right thalamic infarction [facility]) ICD-10-CM: I63.9  ICD-9-CM: 434.91  7/2/2019 - 10/26/2021                Plan / Recommendations / Medical Decision Making:      Continue daily physician / PA medical management:     Physical debility - admission for sepsis, acute prostatitis, E. coli UTI and RLL pneumonia; finished ABX but with residual weakness. Aggressive PT, OT.   -progressing well; hindered by orthostatic hypotension     Recent sepsis - completed IV ABX, WBC normalized, remains afebrile.  Monitor.     GRACE, acute kidney injury - creatinine up to 2.54 (10/23), down to 1.04 by [facility] admission 10/26. Monitor and hold metformin ER if Cr trends upward.  -10/28 renal function normalized, Cr 0.99, BUN 21 on BMP yesterday  -11/1 normal Cr, BUN on BMP this AM; 11/10 bmp pending     Pneumonia, recent RLL CAP - completed ceftriaxone / azithromycin. O2 needs diminished; he has been weaned to room air. Incentive spirometer every hour while awake.     Prostatitis, E coli UTI - completed ceftriaxone / azithromycin. S/p UroLift procedure 9/2020. Continue on doxazosin, finasteride; monitor for urinary retention. Check post-void residual as needed; in-and-out catheter if post-void residual is more than 400ml. Voiding well on IRC admission. -11/1 nocturia q2h, occasional AM OH; will decrease doxazosin to 4mg QHS  -11/2 didn't report nocturia this AM  -11/6 less nocturia, no urinary retention / hesitancy / incomplete voiding; 11/8 no issues, decreasing nocturia     Hypertension - BP fluctuating, managed medically on lisinopril, HCTZ with help from doxazosin. sBP excursions into 160-180s, dBP all <90.  PRN hydralazine but may need to add another agent.  -10/27 132-187/62-75; on 10mg BID of lisinopril, 12.5mg of HCTZ, Cardura 8mg; add Norvasc 5mg  -10/28 /68 this AM before meds, dropped to 99/50 and very symptomatic later in AM; stop amlodipine, increase AM lisinopril to 15mg, leave PM dose at 10mg, other meds same; will go more slowly, as patient accustomed to sBP >150  -10/29 147/61, Norvasc stopped due to hypotension; continue current meds  -10/30 166/72, 181/74 However; orthostatic with PT; 115/51 standing, 110/45 after walking, 105/52 sitting after walking, then 94/45 after walking 75ft, dropped to 85/49; TEDs when OOB  -11/1 more OH this AM despite no change in BP meds; with nocturia vs normal daytime voiding, suspect doxazosin --> decrease to 4mg QHS, TEDs during AM  -11/2 dizziness yesterday but no orthostatic hypotension  -11/3 d/c AM lisinopril, give 10mg in the evening instead of full 20mg dose; change Proscar to be given late afternoon rather than AM to prevent his wooziness, which usually effects him in AM  -11/4 BP 94//67, currently 138/64; no changed dosing times of meds; will see how that does today  -11/8; improving; but being Sunday, didn't do much but reports that he felt fine when getting up to use bathroom   -155/57-75 yesterday. He functions better with SBP on the higher side  -11/9 -268/60-77 acceptable. No orthostatics  -11/10 152/72; cannot run him lower than 140s without him becoming symptomatic with weakness and dizziness.     Diabetes mellitus - most recent HgbA1c 8.1% (6/30/2021), poor glycemic control. Will require ACHS fasting glucose monitoring and medication adjustment to optimize glycemic control in view of recent acute illness and hospitalization. DM diet, Humalog SSI; metformin ER had been held in acute setting during GRACE but will resume since renal function has normalized. Monitor.   -10/27 -242; poorly controlled over time; just restarted metformin yesterday, cont SSI; adjust as needed  -10/28 BS <160, continue current regimen  -10/29 fair control; BS 106 at bedtime, otherwise; 144-195; takes glipizide as well as the [address] home. Consider restarting it at 2.5mg daily. Typically his BS run in the 150-160 range at home, not ideal  -10/30 -184; continue Glucophage and add glipizide 2.5mg to start  -11/1 BS remaining <140, continue current regimen  -11/2 BS fairly well controlled  -11/3 -132; controlled  -11/4 BS  well controlled.  Home health agency requesting a HgbA1c; last done in June and was 8.1%, 7.9% in Feb 2021 and 6.4% in Oct 2020  -11/5 BS all <130, no change in regimen; HgbA1c pending  -11/6 HgbA1c 7.1%, improved from June  -11/8 BS excellent; change to bid bs  -BS controlled     Hyperlipidemia - continue Lipitor 80mg daily.     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 Continue Plavix 75mg daily.     Pain control - stable, mild-to-moderate joint symptoms intermittently, reasonably well controlled by PRN meds. Will require regular pain assessment and comprehensive pain management. Has long-standing left shoulder / neck pain treated by chiropractor.     Electrolyte abnormality - hypoK+ to 3.0 (10/26); replace and monitor. Will give KCl 20mEq x 4 doses. -10/27 K+ 3.3 today, continue to monitor  -10/30 recheck 11/1  -11/1 normal BMP; check basic labs 11/10; pending     DVT risk / DVT prophylaxis - daily physician / PA exam to assess as patient is at increased risk for of thromboembolism. Mobilize as tolerated. Sequential pneumatic compression devices (SCDs) when in bed; thigh-high or knee-high thromboembolic deterrent hose when out of bed. Lovenox subcutaneously daily.      GI prophylaxis - consider PPI. At times may need additional antacids, Maalox prn.     Depression - continue on Zoloft; has good support system. Monitor.  -10/30 doing very well, very pleasant and cooperative; enjoying therapy and spirits bright     General skin care / wound prevention - monitor general skin wound status daily per staff and physician / PA. At risk for failure. Will require 24/7 rehab nursing.     Bowel program - at risk for constipation as a side effect of opioids, other medications, impaired mobility, etc. MiraLAX daily for regularity, Meghan-Colace for stool softener.  PRN MOM, bisacodyl suppository or tablets for constipation.  -10/28 occasional loose stools, suspect this is from restarting metformin ER; PRN loperamide  -10/29 improved, one BM yesterday  -10/30 no loose stools  -11/1 no further loose stools  -11/4 significant gas yesterday but no nausea or diarrhea; +BM  -11/6 patient determined GI upset was from too much sugar-free Halloween candy; no issues since getting rid of it       Time spent was 25 minutes with over 1/2 in direct patient care/examination, consultation and coordination of care.      Signed By: Alen Cornejo MD     November 10, 2021

## 2021-11-10 NOTE — PROGRESS NOTES
OT Daily Note  Time In 1030   Time Out 1116     Subjective: \"I think when I go home I'll be able to reach all the way backwards. \"  Pain: Pt reported no pain. Education: sitting and standing balance posture  Interdisciplinary Communication: discussed pt's BP with PTA  Precautions: Falls  Patient Vitals for the past 12 hrs:   Temp Pulse Resp BP SpO2   11/10/21 1100    105/63    11/10/21 0728 98 °F (36.7 °C) 66 18 (!) 153/77 96 %         Mobility   Score Comments   Sit to Stand 4: Supervision or touching A SBA   Transfer Assist 4: Supervision or touching A Transfer Type: SPT   Equipment: Cane   Comments: SBA     Strengthening Daily Assessment   Patient completed x 10 minutes UBE on medium heavy resistance using BUEs seated in wheelchair. Patient completed x 5 minutes forward rotation, x 5 minutes backward rotation. Activity Tolerance, Cognition and Balance Daily Assessment   Pt engaged in game of left, right, center working on activity tolerance in stance, reaching skills, upright posture, working attention, and STM. Pt required min prompting for STM to follow rules of game as well as reminders for upright midline posture. Pt stood for 14 minutes with occasional hand support on table with SBA. Shoulder extension Daily Assessment   Pt completed x10 reps x2 sets each of posterior reaching using red medium resistance theraband. Pt required cues to align shoulders with slowly improving shoulder extension AROM. Assessment: Progressing well in posterior reaching with use of theraband. Able to recall steps of games with repetition and min cueing. Pt demonstrated good participation and engagement in OT session. Pt continues to benefit from skilled OT services to address remaining deficits and return back to baseline with improved independence and safety during ADLs and IADLs. Patient ended session seated in wc with PTA assuming care.    Plan: Continue OT POC with focus on ADL/IADL skills, functional transfers, functional mobility, coordination, strength, static and dynamic balance, and activity tolerance to maximize safety and independence with ADLs and functional transfers.      Antonio Mcdermott, OTR/LUPE  11/10/2021

## 2021-11-11 LAB
GLUCOSE BLD STRIP.AUTO-MCNC: 126 MG/DL (ref 65–100)
GLUCOSE BLD STRIP.AUTO-MCNC: 87 MG/DL (ref 65–100)
SERVICE CMNT-IMP: ABNORMAL
SERVICE CMNT-IMP: NORMAL

## 2021-11-11 PROCEDURE — 97535 SELF CARE MNGMENT TRAINING: CPT

## 2021-11-11 PROCEDURE — 65310000000 HC RM PRIVATE REHAB

## 2021-11-11 PROCEDURE — 74011250637 HC RX REV CODE- 250/637: Performed by: PHYSICAL MEDICINE & REHABILITATION

## 2021-11-11 PROCEDURE — 82962 GLUCOSE BLOOD TEST: CPT

## 2021-11-11 PROCEDURE — 99232 SBSQ HOSP IP/OBS MODERATE 35: CPT | Performed by: PHYSICAL MEDICINE & REHABILITATION

## 2021-11-11 PROCEDURE — 97110 THERAPEUTIC EXERCISES: CPT

## 2021-11-11 PROCEDURE — 97530 THERAPEUTIC ACTIVITIES: CPT

## 2021-11-11 PROCEDURE — 74011250637 HC RX REV CODE- 250/637: Performed by: PHYSICIAN ASSISTANT

## 2021-11-11 PROCEDURE — 74011250636 HC RX REV CODE- 250/636: Performed by: PHYSICIAN ASSISTANT

## 2021-11-11 PROCEDURE — 92507 TX SP LANG VOICE COMM INDIV: CPT

## 2021-11-11 PROCEDURE — 97116 GAIT TRAINING THERAPY: CPT

## 2021-11-11 RX ORDER — METFORMIN HYDROCHLORIDE 500 MG/1
1000 TABLET, EXTENDED RELEASE ORAL
Qty: 60 TABLET | Refills: 3 | Status: SHIPPED | OUTPATIENT
Start: 2021-11-11 | End: 2022-03-21 | Stop reason: SDUPTHER

## 2021-11-11 RX ORDER — DOXAZOSIN 4 MG/1
4 TABLET ORAL
Qty: 90 TABLET | Refills: 4 | Status: SHIPPED | OUTPATIENT
Start: 2021-11-11

## 2021-11-11 RX ORDER — HYDROCHLOROTHIAZIDE 12.5 MG/1
12.5 TABLET ORAL DAILY
Qty: 90 TABLET | Refills: 4 | Status: SHIPPED | OUTPATIENT
Start: 2021-11-11

## 2021-11-11 RX ORDER — GLIPIZIDE 5 MG/1
2.5 TABLET ORAL
Qty: 30 TABLET | Refills: 0 | Status: SHIPPED | OUTPATIENT
Start: 2021-11-12 | End: 2021-11-12

## 2021-11-11 RX ORDER — LISINOPRIL 10 MG/1
10 TABLET ORAL
Qty: 90 TABLET | Refills: 3 | Status: SHIPPED | OUTPATIENT
Start: 2021-11-11 | End: 2021-11-12

## 2021-11-11 RX ORDER — FINASTERIDE 5 MG/1
5 TABLET, FILM COATED ORAL DAILY
Qty: 90 TABLET | Refills: 3 | Status: SHIPPED | OUTPATIENT
Start: 2021-11-11

## 2021-11-11 RX ADMIN — FINASTERIDE 5 MG: 5 TABLET, FILM COATED ORAL at 16:23

## 2021-11-11 RX ADMIN — METFORMIN HYDROCHLORIDE 1000 MG: 500 TABLET, EXTENDED RELEASE ORAL at 16:23

## 2021-11-11 RX ADMIN — ENOXAPARIN SODIUM 40 MG: 40 INJECTION SUBCUTANEOUS at 09:05

## 2021-11-11 RX ADMIN — GLIPIZIDE 2.5 MG: 5 TABLET ORAL at 09:05

## 2021-11-11 RX ADMIN — SERTRALINE 50 MG: 50 TABLET, FILM COATED ORAL at 09:05

## 2021-11-11 RX ADMIN — ATORVASTATIN CALCIUM 80 MG: 80 TABLET, FILM COATED ORAL at 09:05

## 2021-11-11 RX ADMIN — HYDROCHLOROTHIAZIDE 12.5 MG: 12.5 CAPSULE ORAL at 09:05

## 2021-11-11 RX ADMIN — LISINOPRIL 10 MG: 5 TABLET ORAL at 17:04

## 2021-11-11 RX ADMIN — DOXAZOSIN 4 MG: 4 TABLET ORAL at 20:23

## 2021-11-11 RX ADMIN — CLOPIDOGREL BISULFATE 75 MG: 75 TABLET ORAL at 09:05

## 2021-11-11 NOTE — PROGRESS NOTES
STG: Patient will complete functional math calculations with 90% accuracy with minimal assistance. (goal met at basic level)  STG: Patient will complete functional attention tasks with 90% accuracy with minimal assistance. (goal not met)  STG: Patient will complete mental manipulation tasks with 90% accuracy with minimal assistance. (goal not met)  STG: Patient will complete divergent naming tasks with 90% accuracy with minimal assistance. (goal met)  STG: Patient will complete functional short-term memory tasks with 90% accuracy with minimal assistance (goal met with use of compensatory strategies)  LTG: Patient will increase neuro-linguistic abilities to increase safety and awareness of deficits. DISCHARGE SUMMARY     11/11/21 1239   Time Spent With Patient   Time In 1003   Time Out 1030   Patient Seen For: AM; Neuro-linguistics   Mental Status   Neurologic State Alert   Cognition Memory loss; Decreased attention/concentration   Safety/Judgement Home safety        11/11/21 1241   Neuro-Linguistic Exercises   Verbal Problem Solving Impaired; basic level verbal reasoning related to familiar situations completed with SBA  Word deductions completed with 90% accuracy  Basic inferencing related to brief narratives to determine if related statements are true,false, unknown completed with 80% accuracy   Memory Impaired; requires repetition of instructions for new learning  Immediate recall to write down key points related to narratives completed with min-mod cues   Attention  Impaired; decreased selective/divided attention     Patient participated with cognitive treatment focusing on basic problem solving, attention, and immediate/short-term memory. Basic level verbal reasoning related to familiar situations completed with SBA. Basic word deductions completed with 90% accuracy.   Simple inferencing related to brief narratives to determine if related statements are true, false, unknown completed with 80% accuracy. Requires repetition of instructions for new learning. Immediate recall to write down key points related to narratives completed with min-mod cues. Progress with STGs outlined above. Patient near cognitive baseline from previous CVA and will be discharging home with supportive wife. No further ST indicated at this time.     Dmitri Amin MS, CCC-SLP

## 2021-11-11 NOTE — PROGRESS NOTES
PHYSICAL THERAPY DAILY NOTE  Time In: (P) 1300  Time Out: (P) 1346  Patient Seen For: (P) PM; Other (see progress notes); Gait training; Transfer training; Therapeutic exercise    Subjective: Patient had no complaints. Objective: No pain noted. Wife present during treatment and voiced how well he is moving. Other (comment) (fall precautions)  GROSS ASSESSMENT Daily Assessment            COGNITION Daily Assessment           BED/MAT MOBILITY Daily Assessment    Rolling Right : (P) 5 (Supervision)  Rolling Left : (P) 5 (Supervision)  Supine to Sit : (P) 5 (Supervision)  Sit to Supine : (P) 5 (Supervision)       TRANSFERS Daily Assessment    Other: (P) SPT with straight cane  Transfer Assistance : (P) 5 (Supervision/setup)  Sit to Stand Assistance: (P) Supervision  Car Transfers: (P) Not tested       GAIT Daily Assessment    Amount of Assistance: (P) 5 (Supervision/setup)  Distance (ft): (P) 150 Feet (ft)  Assistive Device: (P) Cane, straight       STEPS or STAIRS Daily Assessment    Steps/Stairs Ambulated (#): 8  Level of Assist : (P) 0 (Not tested)  Rail Use: Right  (and cane on other side)       BALANCE Daily Assessment            WHEELCHAIR MOBILITY Daily Assessment            LOWER EXTREMITY EXERCISES Daily Assessment    Extremity: (P) Both  Exercise Type #1: (P) Standing lower extremity strengthening  Sets Performed: (P) 1  Reps Performed: (P) 10  Level of Assist: (P) Supervision          Assessment: Patient making good progress. Plan of Care: Continue with plan of care.     Edwina Bolivar, PTA  11/11/2021

## 2021-11-11 NOTE — PROGRESS NOTES
PHYSICAL THERAPY DAILY NOTE  Time In: (P) 0187  Time Out: (P) 8939  Patient Seen For: (P) AM; Transfer training; Therapeutic exercise; Gait training; Other (see progress notes)    Subjective: Patient had no complaints. Objective: No pain noted. Patient could recall all exs on HEP. Other (comment) (fall precautions)  GROSS ASSESSMENT Daily Assessment            COGNITION Daily Assessment           BED/MAT MOBILITY Daily Assessment    Supine to Sit : (P) 5 (Supervision)  Sit to Supine : (P) 5 (Supervision)       TRANSFERS Daily Assessment    Other: (P) SPT with straight cane  Transfer Assistance : (P) 5 (Supervision/setup)  Sit to Stand Assistance: (P) Supervision  Car Transfers: (P) Not tested       GAIT Daily Assessment    Amount of Assistance: (P) 5 (Supervision/setup)  Distance (ft): (P) 125 Feet (ft)  Assistive Device: (P) Cane, straight       STEPS or STAIRS Daily Assessment    Steps/Stairs Ambulated (#): (P) 8  Level of Assist : (P) 5 (Stand-by assistance)  Rail Use: (P) Right  (and cane on other side)       BALANCE Daily Assessment            WHEELCHAIR MOBILITY Daily Assessment            LOWER EXTREMITY EXERCISES Daily Assessment    Extremity: (P) Both  Exercise Type #1: (P) Standing lower extremity strengthening  Sets Performed: (P) 1  Reps Performed: (P) 10  Level of Assist: (P) Supervision          Assessment: Patient making good progress. Plan of Care: Continue with plan of care.     Mark Astorga, PTA  11/11/2021

## 2021-11-11 NOTE — PROGRESS NOTES
Time In 0756   Time Out 0830     Mobility   Score Comments   Sit to Stand 4: Supervision or touching A S   Transfer Assist 4: Supervision or touching A Transfer Type: SPT   Equipment: Cane   Comments: S     Activities of Daily Living    Score Comments   Eating 6: Independent    Oral Hyigene 6: Independent    Bathing 5: S/U or clean-up assist Type of Shower: Shower  Position: Standing PRN and Unsupported Sitting   Adaptive  Equipment: Tub Transfer Bench, Grab Bars and Long Handled Sponge  Comments:    Upper Body  Dressing 5: S/U or clean-up assist Items Applied: Pullover  Position: Unsupported Sitting  Comments:    Lower Body Dressing 4: Supervision or touching A Items Applied: Underwear and Elastic pants  Position: Standing PRN and Unsupported Sitting  Adaptive Equipment: N/A  Comments: S   Donning/[place] Footwear 4: Supervision or touching A Items Applied: Slip-on shoes and compression stockings  Adaptive Equipment: Sock Aide  Comments:    Education  AE/DME training       S: \"I slept really well last night. I feel good this morning. \" Agreeable to therapy. Patient was able to ambulate ~10 feet using a SPC with supervision. Pt completed morning ADLs with quality indicators reflected in chart above. Pain No pain expressed. Patient Vitals for the past 12 hrs:   Temp Pulse Resp BP SpO2   11/11/21 0738 97.6 °F (36.4 °C) 66 16 (!) 145/71 97 %       Collaborated with RN regarding patient performance and POC. Patient tolerated session well, but activity tolerance, balance, functional mobility, strength, coordination, cognition are still below baseline and require skilled facilitation to successfully and safely complete ADLs and transfers. Patient ended session seated in wc and with PT assuming care.       Antonio Mcdermott, DANNYR/LUPE  11/11/2021

## 2021-11-12 VITALS
HEART RATE: 63 BPM | OXYGEN SATURATION: 93 % | TEMPERATURE: 97.6 F | DIASTOLIC BLOOD PRESSURE: 72 MMHG | RESPIRATION RATE: 16 BRPM | SYSTOLIC BLOOD PRESSURE: 169 MMHG

## 2021-11-12 LAB
GLUCOSE BLD STRIP.AUTO-MCNC: 120 MG/DL (ref 65–100)
SERVICE CMNT-IMP: ABNORMAL

## 2021-11-12 PROCEDURE — 97530 THERAPEUTIC ACTIVITIES: CPT

## 2021-11-12 PROCEDURE — 97110 THERAPEUTIC EXERCISES: CPT

## 2021-11-12 PROCEDURE — 90686 IIV4 VACC NO PRSV 0.5 ML IM: CPT | Performed by: PHYSICAL MEDICINE & REHABILITATION

## 2021-11-12 PROCEDURE — 97535 SELF CARE MNGMENT TRAINING: CPT

## 2021-11-12 PROCEDURE — 74011250636 HC RX REV CODE- 250/636: Performed by: PHYSICAL MEDICINE & REHABILITATION

## 2021-11-12 PROCEDURE — 90471 IMMUNIZATION ADMIN: CPT

## 2021-11-12 PROCEDURE — 82962 GLUCOSE BLOOD TEST: CPT

## 2021-11-12 PROCEDURE — 74011250636 HC RX REV CODE- 250/636: Performed by: PHYSICIAN ASSISTANT

## 2021-11-12 PROCEDURE — 74011250637 HC RX REV CODE- 250/637: Performed by: PHYSICAL MEDICINE & REHABILITATION

## 2021-11-12 PROCEDURE — 97116 GAIT TRAINING THERAPY: CPT

## 2021-11-12 PROCEDURE — 99239 HOSP IP/OBS DSCHRG MGMT >30: CPT | Performed by: PHYSICAL MEDICINE & REHABILITATION

## 2021-11-12 PROCEDURE — 74011250637 HC RX REV CODE- 250/637: Performed by: PHYSICIAN ASSISTANT

## 2021-11-12 RX ORDER — LISINOPRIL 10 MG/1
10 TABLET ORAL
Qty: 90 TABLET | Refills: 3 | Status: SHIPPED | OUTPATIENT
Start: 2021-11-12 | End: 2022-03-21 | Stop reason: SDUPTHER

## 2021-11-12 RX ORDER — GLIPIZIDE 5 MG/1
2.5 TABLET ORAL
Qty: 30 TABLET | Refills: 0 | Status: SHIPPED | OUTPATIENT
Start: 2021-11-12

## 2021-11-12 RX ADMIN — GLIPIZIDE 2.5 MG: 5 TABLET ORAL at 09:00

## 2021-11-12 RX ADMIN — HYDROCHLOROTHIAZIDE 12.5 MG: 12.5 CAPSULE ORAL at 09:00

## 2021-11-12 RX ADMIN — SERTRALINE 50 MG: 50 TABLET, FILM COATED ORAL at 09:00

## 2021-11-12 RX ADMIN — INFLUENZA VIRUS VACCINE 0.5 ML: 15; 15; 15; 15 SUSPENSION INTRAMUSCULAR at 09:59

## 2021-11-12 RX ADMIN — ENOXAPARIN SODIUM 40 MG: 40 INJECTION SUBCUTANEOUS at 09:00

## 2021-11-12 RX ADMIN — CLOPIDOGREL BISULFATE 75 MG: 75 TABLET ORAL at 09:00

## 2021-11-12 RX ADMIN — ATORVASTATIN CALCIUM 80 MG: 80 TABLET, FILM COATED ORAL at 09:00

## 2021-11-12 NOTE — PROGRESS NOTES
Problem: Falls - Risk of  Goal: *Absence of Falls  Description: Document Mar Blood Fall Risk and appropriate interventions in the flowsheet.   Outcome: Progressing Towards Goal  Note: Fall Risk Interventions:  Mobility Interventions: Communicate number of staff needed for ambulation/transfer, Patient to call before getting OOB, Utilize walker, cane, or other assistive device         Medication Interventions: Assess postural VS orthostatic hypotension, Evaluate medications/consider consulting pharmacy, Patient to call before getting OOB, Teach patient to arise slowly    Elimination Interventions: Call light in reach, Patient to call for help with toileting needs, Toilet paper/wipes in reach    History of Falls Interventions: Consult care management for discharge planning, Door open when patient unattended, Evaluate medications/consider consulting pharmacy

## 2021-11-12 NOTE — PROGRESS NOTES
Problem: Self Care Deficits Care Plan (Adult)  Goal: *Therapy Goal (Edit Goal, Insert Text)  Description: LTG 1: Patient will complete UB dressing with independence using AE/DME PRN within 10 days. Progressing 11/2/2021, 11/9/2021, GOAL MET 11/12/2021        Outcome: Resolved/Met  Goal: *Therapy Goal (Edit Goal, Insert Text)  Description: LTG 2: Patient will complete LB dressing with independence using AE/DME PRN within 10 days. Progressing 11/2/2021, 11/9/2021, GOAL MET 11/12/2021          Outcome: Resolved/Met  Goal: *Therapy Goal (Edit Goal, Insert Text)  Description: LTG 3: Patient will don footwear with independence using AE/DME PRN within 10 days. Progressing 11/2/2021, 11/9/2021, GOAL MET 11/12/2021      Outcome: Resolved/Met  Goal: *Therapy Goal (Edit Goal, Insert Text)  Description: LTG 4: Patient will complete bathing with independence using AE/DME PRN within 10 days. Progressing 11/2/2021, 11/9/2021, GOAL MET 11/12/2021      Outcome: Resolved/Met  Goal: *Therapy Goal (Edit Goal, Insert Text)  Description: LTG 5: Patient will complete toileting with independence using AE/DME PRN within 10 days. Progressing 11/2/2021, 11/9/2021, GOAL MET 11/12/2021      Outcome: Resolved/Met  Goal: Interventions  Outcome: Resolved/Met     Problem: Patient Education: Go to Patient Education Activity  Goal: Patient/Family Education  Description: Pt/caregiver will demonstrate and verbalize good understanding of OT recommendations regarding ADL status, functional transfer status, home safety, DME, AE, energy conservation techniques, follow-up therapy, for increasing safety with functional tasks upon discharge.   Progressing 11/2/2021, 11/9/2021, GOAL MET 11/12/2021      Outcome: Resolved/Met    JEEVAN Aburto/LUPE  11/12/2021

## 2021-11-12 NOTE — PROGRESS NOTES
PHYSICAL THERAPY DISCHARGE SUMMARY     Precautions at discharge: Other (comment) (fall)    Problem List:    Decreased strength B LE  []     Decreased strength trunk/core  []     Decreased AROM   []     Decreased PROM  []     Decreased balance sitting  []     Decreased balance standing  [x]     Decreased endurance  [x]     Pain  []       Functional Limitations:   Decreased independence with bed mobility  []     Decreased independence with functional transfers  []     Decreased independence with ambulation  [x]     Decreased independence with stair negotiation  [x]            Outcome Measures: Vital Signs:    Visit Vitals  BP (!) 169/72   Pulse 63   Temp 97.6 °F (36.4 °C)   Resp 16   SpO2 93%     Pain level:  Patient had no complaints of pain during therapy this morning. Patient education: Fall prevention and activity pacing     Interdisciplinary Communication:  OT for discharge instructions    Cognition: Alert, oriented and able to follow commands well. Engages in conversation appropriately and stays on task well during therapy. Very kind and cooperative with therapy.      MMT Initial Assessment   Right Lower Extremity Left Lower Extremity   Hip Flexion 5 4+   Knee Extension 5 5   Knee Flexion 5 5   Ankle Dorsiflexion 5 5      MMT Discharge Assessment   Right Lower Extremity Left Lower Extremity   Hip Flexion 5 4+   Knee Extension 5 5   Knee Flexion 5 5   Ankle Dorsiflexion 5 5   0/5 No palpable muscle contraction  1/5 Palpable muscle contraction, no joint movement  2-/5 Less than full range of motion in gravity eliminated position  2/5 Able to complete full range of motion in gravity eliminated position  2+/5 Able to initiate movement against gravity  3-/5 More than half but not full range of motion against gravity  3/5 Able to complete full range of motion against gravity  3+/5 Completes full range of motion against gravity with minimal resistance  4-/5 Completes full range of motion against gravity with minimal-moderate resistance  4/5 Completes full range of motion against gravity with moderate resistance  4+/5 Completes full range of motion against gravity with moderate-maximum resistance  5/5 Completes full range of motion against gravity with maximum resistance     AROM: BLE AROM WFLs    PRIMARY MODE OF LOCOMOTION: Ambulatory with use of a straight cane  Please see [facility] Interdisciplinary Eval: Coordination/Balance Section for details regarding FIM score description. BED/CHAIR/WHEELCHAIR TRANSFERS Initial Assessment Discharge Assessment   Rolling Right 5 (Supervision) 7 (Independent)   Rolling Left 5 (Supervision) 7 (Independent)   Supine to Sit 4 (Minimal assistance) 7 (Independent)   Sit to Stand Minimal assistance Modified independent   Sit to Supine 5 (Supervision) 7 (Independent)   Transfer Type SPT without device SPT without device   Comments Increased time and effort to complete bed mobility and transfers. Using bed rail for rolling and supine to sit. Patient is steady and controlled with SPT with and without use of the cane and demonstrates good safety precautions    Car Transfer Not tested Supervision   Car Type           WHEELCHAIR MOBILITY/MANAGEMENT Initial Assessment Discharge Assessment   Able to Propel 0 feet  0   Functional Level       Curbs/ramps assistance required 0 (Not tested)  0   Wheelchair set up assistance required 0 (Not tested)  0   Wheelchair management           WALKING INDEPENDENCE Initial Assessment Discharge Assessment   Assistive device Gait belt Cane, straight   Ambulation assistance - level surface 4 (Minimal assistance) 5 (Supervision)   Distance 165 Feet (ft) 250 Feet (ft)   Comments Cont step through gait with mild trunk flexion with increased width of base of support with decrease in step length and ankel DF at initial contact. Occasional mild Ataxia.  Loss of balance x 1 with min assist to correct Patient demonstrates improved quality of gait with use of his straight cane with improved stability and control  and endurance. Ambulation assistance - unlevel surface 5 (Supervision/setup) 5 (Supervision/setup)       STEPS/STAIRS Initial Assessment Discharge Assessment   Steps/Stairs ambulated 4 8   Rail Use Both Both   Functional Level       Comments slow reciprocating pattern going up steps and single step at a time going down steps. Increased time and effort to complete  Patient manages step over step going up and down stairs with use of bilateral rails. Curbs/Ramps 0 (Not tested)  0       QUALITY INDICATOR ASSIST COMMENTS   Roll right (&return to back) 6: Independent    Roll left (& return to back) 6: Independent    Supine to sit 6: Independent    Sit to stand 6: Independent    Chair/bed-to-chair transfer 6: Independent    Walk 10 feet 4: Supervision or touching A    Walk 50 feet with 2 turns 4: Supervision or touching A    Walk 150 feet 4: Supervision or touching A    Walk 10 feet on uneven  4: Supervision or touching A    1 step/curb 4: Supervision or touching A    4 steps 4: Supervision or touching A    12 steps 4: Supervision or touching A     object 4: Supervision or touching A    Wheel 50' w/2 turns Not Tested: Not applicable secondary to pt not completing activity previously    Wheel 150' Not Tested: Not applicable secondary to pt not completing activity previously    Car Transfer 4: Supervision or touching A             PHYSICAL THERAPY PLAN OF CARE    LTGs: All patient's LTGs have been met. Pt would benefit from continued skilled physical therapy in order to improve independent functional mobility within the home with use of least restrictive device. Interventions may include range of motion (AROM, PROM B LE/trunk), motor function (B LE/trunk strengthening/coordination), activity tolerance (vitals, oxygen saturation levels), bed mobility training, balance activities, gait training (progressive ambulation program), and functional transfer training. Patient reviewed over fall precautions and safety measures for discharge home. Therapy Recommendations upon discharge: Candace Eason needs at discharge: None       Please see [facility]; Interdisciplinary Eval, Care Plan, and Patient Education for further information regarding physical therapy discharge summary and plan of care.      Marilu Mercedes  11/12/2021

## 2021-11-12 NOTE — PROGRESS NOTES
OT DISCHARGE REPORT    Time In: 5528  Time Out: 0825  Mobility   Usual Performance Score Comments   Rolling 6: Independent Side: Bilateral     Supine to Sit 6: Independent    Sit to Supine 6: Independent    Sit to Stand 6: Independent    Transfer Assist 6: Independent Transfer Type: SPT   Equipment: Cane   Comments:      Activities of Daily Living    Usual Performance Score Comments   Eating 6: Independent    Oral Hygiene 6: Independent    Bathing 6: Independent Type of Shower: Shower  Position: Standing PRN and Unsupported Sitting   Adaptive  Equipment: Tub Transfer Bench, Grab Bars and Long Handled Sponge  Comments:    Upper Body  Dressing 6: Independent Items Applied: Pullover  Position: Unsupported Sitting  Comments:    Lower Body Dressing 6: Independent Items Applied: Underwear and Elastic pants  Position: Standing PRN and Unsupported Sitting  Adaptive Equipment: N/A  Comments:    Donning/[place] Footwear 6: Independent Items Applied: Slip-on shoes and compression stockings  Adaptive Equipment: Sock Aide  Comments: Toilet Transfer 6: Independent Transfer Type: SPT   Equipment: Cane   Comments: Toileting Hygiene 6: Independent Output:   Comments:    Education  Home safety recommendations     Plan of Care: Please see Care Plan for progress towards goals during stay  Precautions at Discharge: Falls  Discharge Location: home with spouse  Safety/Supervision Recommendations/Functional Level: Pt completes ADLs independently and will require supervision with ambulation as well as blood pressure checks due to occasional orthostatic hypotension. Pt will require min to mod assist with IADLs and home management tasks. Family Training: completed previously with pt's wife during pt's stay.      Recommended Continuing Therapy: HHOT  Residual Deficits: decreased dynamic balance, orthostatic hypotension, decreased posterior reaching during self care tasks  Progress over LOS: Patient demonstrates great progress with ADL techniques, transfer training, donning compression stockings, posterior reaching skills, and UB strength for performance of ADL and functional transfers, see above for details. Patient continues to require skilled [address] [place] [address] services to address remaining deficits stated above. Summary of Session: S: \"I am so excited to go home. \" Agreeable to therapy. Focus of session was on morning ADL routine and discharge assessment. Patient was able to ambulate ~10 feet using a SPC with supervision. Pain not expressed. Collaborated with PT and confirmed patient is ready for discharge. Patient ended session in wc with call remote and phone within reach.      Antonio Bliss, OTR/L   11/12/2021

## 2021-11-12 NOTE — PROGRESS NOTES
Pt to discharge home today. No DME needs at this time as pt already has DME at home. HH needs include PT/OT/RN and referral already placed to [facility] as pt/family requested. Liaison aware. Family training complete. Spouse to transport home. All needs met. CM available if any new needs arise. Care Management Interventions  PCP Verified by CM: Yes (Yoel Colin MD)  Mode of Transport at Discharge: Other (see comment) (family)  Transition of Care Consult (CM Consult): Discharge Planning,  Place [facility] (Pt is insured by Medicare with AARP supplement and pharmacy benefits.  )  [address]: Yes  Discharge Durable Medical Equipment: No (DME in the home includes cane, walker and shower chair)  Physical Therapy Consult: Yes  Occupational Therapy Consult: Yes  Speech Therapy Consult: No  Support Systems: Spouse/Significant Other (Pt lives with spouse in a one story home with a ramp.  )  Confirm Follow Up Transport: Family  The Plan for Transition of Care is Related to the Following Treatment Goals : Pt will need continued supportive care/therapy in the home to return to his fucntional baseline.   The Patient and/or Patient Representative was Provided with a Choice of Provider and Agrees with the Discharge Plan?: Yes  Name of the Patient Representative Who was Provided with a Choice of Provider and Agrees with the Discharge Plan: pt/spouse  Freedom of Choice List was Provided with Basic Dialogue that Supports the Patient's Individualized Plan of Care/Goals, Treatment Preferences and Shares the Quality Data Associated with the Providers?: Yes   Resource Information Provided?: No  Discharge Location  Discharge Placement: Home with home health (Home with spouse/daughter and [facility] RN, PT and OT)

## 2021-11-12 NOTE — DISCHARGE SUMMARY
Jose G Jimenez MD  Medical Director  [address] [facility], [address] [facility], [place]  Tel: [phone]       PHYSICAL MEDICINE & REHABILITATION DISCHARGE SUMMARY     Date: 11/12/2021  Admission Date: 10/26/2021  Discharge Date: 11/12/2021      Primary Care Provider: Hilda Alex MD    Admission Condition: good  Discharged Condition: good    Admitting Diagnosis:   Principal Problem:    Physical debility (10/26/2021)  Active Problems:    Pneumonia of right lower lobe due to infectious organism (10/21/2021)    GRACE (acute kidney injury) ([address] (10/22/2021)    E. coli UTI (10/24/2021)    Essential hypertension, benign (1/3/2014)    Controlled type 2 diabetes mellitus without complication, without long-term current use of insulin ([address] (2/22/2019)    Mixed hyperlipidemia (1/3/2014)    Cerebrovascular accident (CVA) due to occlusion ([address] (2/12/2019)    Severe obesity ([address] (7/2/2019)     Acute Rehab Dx:  Gait impairment / gait dysfunction  Debility  Deconditioning  Mobility and ambulation deficits  Self care / ADL deficits    Hospital Course: The patient was admitted to [address] [place] [address] by Lilian Zacarias MD on 10/26/2021 s/p sepsis with physical debility    HPI: This patient is a right-hand dominant 68yo WM with PMH including HTN, HL, DM2, right thalamic stroke 7/2019, right cerebellar stroke 6/2020 and obesity; he is known to our practice from [facility] treatment 6/14 to 7/1/2020 and successful completion of outpatient follow-up through 9/2021. He presented to the ED 10/21/2021 with relatively rapid onset of generalized weakness, fever / chills, urinary retention and dyspnea. U/A +infection, CXR with right lung base opacity. He was admitted for sepsis and started on IV ABX for likely acute prostatitis and RLL PNA. He initially required O2 via NC. UCx grew pansensitive E. coli. He had GRACE with creatinine up to 2.54 (10/23), down to 1.04 on HD #6.  He completed ceftriaxone / azithromycin. O2 needs diminished, and he was weaned to room air. Physical and occupational therapies were initiated, and patient was found to have significant mobility and self-care deficits. Physical Medicine and Rehab service was consulted, and patient's EMR was initially evaluated by Dr. Alie Peters on 10/25. REHABILITATION COURSE:        Plan / Recommendations / Medical Decision Making:      Continue daily physician / PA medical management:     Physical debility - admission for sepsis, acute prostatitis, E. coli UTI and RLL pneumonia; finished ABX but with residual weakness. Aggressive PT, OT.   -progressing well; hindered by orthostatic hypotension     Recent sepsis - completed IV ABX, WBC normalized, remains afebrile. Monitor.     GRACE, acute kidney injury - creatinine up to 2.54 (10/23), down to 1.04 by [facility] admission 10/26. Monitor and hold metformin ER if Cr trends upward.  -10/28 renal function normalized, Cr 0.99, BUN 21 on BMP yesterday  -11/1 normal Cr, BUN on BMP this AM; 11/10 bun14/creat 0.96     Pneumonia, recent RLL CAP - completed ceftriaxone / azithromycin. O2 needs diminished; he has been weaned to room air. Incentive spirometer every hour while awake.     Prostatitis, E coli UTI - completed ceftriaxone / azithromycin. S/p UroLift procedure 9/2020. Continue on doxazosin, finasteride; monitor for urinary retention. Check post-void residual as needed; in-and-out catheter if post-void residual is more than 400ml. Voiding well on [facility] admission. -11/1 nocturia q2h, occasional AM OH; will decrease doxazosin to 4mg QHS  -11/2 didn't report nocturia this AM  -11/6 less nocturia, no urinary retention / hesitancy / incomplete voiding; 11/8 no issues, decreasing nocturia     Hypertension - BP fluctuating, managed medically on lisinopril, HCTZ with help from doxazosin. sBP excursions into 160-180s, dBP all <90.  PRN hydralazine but may need to add another agent.  -10/27 132-187/62-75; on 10mg BID of lisinopril, 12.5mg of HCTZ, Cardura 8mg; add Norvasc 5mg  -10/28 /68 this AM before meds, dropped to 99/50 and very symptomatic later in AM; stop amlodipine, increase AM lisinopril to 15mg, leave PM dose at 10mg, other meds same; will go more slowly, as patient accustomed to sBP >150  -10/29 147/61, Norvasc stopped due to hypotension; continue current meds  -10/30 166/72, 181/74 However; orthostatic with PT; 115/51 standing, 110/45 after walking, 105/52 sitting after walking, then 94/45 after walking 75ft, dropped to 85/49; TEDs when OOB  -11/1 more OH this AM despite no change in BP meds; with nocturia vs normal daytime voiding, suspect doxazosin --> decrease to 4mg QHS, TEDs during AM  -11/2 dizziness yesterday but no orthostatic hypotension  -11/3 d/c AM lisinopril, give 10mg in the evening instead of full 20mg dose; change Proscar to be given late afternoon rather than AM to prevent his wooziness, which usually effects him in AM  -11/4 BP 94//67, currently 138/64; no changed dosing times of meds; will see how that does today  -11/8; improving; but being Sunday, didn't do much but reports that he felt fine when getting up to use bathroom   -155/57-75 yesterday. He functions better with SBP on the higher side  -11/9 -056/60-77 acceptable. No orthostatics  -11/10 152/72; cannot run him lower than 140s without him becoming symptomatic with weakness and dizziness. -11/11 105-153/63-77 cont current meds. No dizziness for past 3 d     Diabetes mellitus - most recent HgbA1c 8.1% (6/30/2021), poor glycemic control. Will require ACHS fasting glucose monitoring and medication adjustment to optimize glycemic control in view of recent acute illness and hospitalization. DM diet, Humalog SSI; metformin ER had been held in acute setting during GRACE but will resume since renal function has normalized.  Monitor.   -10/27 -242; poorly controlled over time; just restarted metformin yesterday, cont SSI; adjust as needed  -10/28 BS <160, continue current regimen  -10/29 fair control; BS 106 at bedtime, otherwise; 144-195; takes glipizide as well as the [address] home. Consider restarting it at 2.5mg daily. Typically his BS run in the 150-160 range at home, not ideal  -10/30 -184; continue Glucophage and add glipizide 2.5mg to start  -11/1 BS remaining <140, continue current regimen  -11/2 BS fairly well controlled  -11/3 -132; controlled  -11/4 BS  well controlled. Home health agency requesting a HgbA1c; last done in June and was 8.1%, 7.9% in Feb 2021 and 6.4% in Oct 2020  -11/5 BS all <130, no change in regimen; HgbA1c pending  -11/6 HgbA1c 7.1%, improved from June  -11/8 BS excellent; change to bid bs  -BS controlled     Hyperlipidemia - continue Lipitor 80mg daily.     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 Continue Plavix 75mg daily.     Pain control - stable, mild-to-moderate joint symptoms intermittently, reasonably well controlled by PRN meds. Will require regular pain assessment and comprehensive pain management. Has long-standing left shoulder / neck pain treated by chiropractor.     Electrolyte abnormality - hypoK+ to 3.0 (10/26); replace and monitor. Will give KCl 20mEq x 4 doses. -10/27 K+ 3.3 today, continue to monitor  -10/30 recheck 11/1  -11/1 normal BMP; check basic labs 11/10;K 3.5, will place on 10meq daily     DVT risk / DVT prophylaxis - daily physician / PA exam to assess as patient is at increased risk for of thromboembolism. Mobilize as tolerated. Sequential pneumatic compression devices (SCDs) when in bed; thigh-high or knee-high thromboembolic deterrent hose when out of bed. Lovenox subcutaneously daily.      GI prophylaxis - consider PPI.  At times may need additional antacids, Maalox prn.     Depression - continue on Zoloft; has good support system. Monitor.  -10/30 doing very well, very pleasant and cooperative; enjoying therapy and spirits bright     General skin care / wound prevention - monitor general skin wound status daily per staff and physician / PA. At risk for failure. Will require 24/7 rehab nursing.     Bowel program - at risk for constipation as a side effect of opioids, other medications, impaired mobility, etc. MiraLAX daily for regularity, Meghan-Colace for stool softener. PRN MOM, bisacodyl suppository or tablets for constipation.  -10/28 occasional loose stools, suspect this is from restarting metformin ER; PRN loperamide  -10/29 improved, one BM yesterday  -10/30 no loose stools  -11/1 no further loose stools  -11/4 significant gas yesterday but no nausea or diarrhea; +BM  -11/6 patient determined GI upset was from too much sugar-free Halloween candy; no issues since getting rid of it   -11/11 no issues       FUNCTIONAL LEVEL ON ADMISSION:  Per PT: performed bed mobility with min-mod A x 2 and additional time. He transferred using RW and min A x 2, demonstrating fair standing balance. Pt ambulated in room with min A x 2 / RW and hunched posture requiring cueing for RW placement. Per OT: practiced using reacher and sock aid to increase independence with LB dressing. Pt did very well with using AE. Pt instructed in UE exercises to increase strength and activity tolerance to perform mobility and ADLs.     FUNCTIONAL LEVEL AT DISCHARGE:  SPEECH THERAPY  DISCHARGE SUMMARY       11/11/21 1239   Time Spent With Patient   Time In 1003   Time Out 1030   Patient Seen For: AM; Neuro-linguistics   Mental Status   Neurologic State Alert   Cognition Memory loss; Decreased attention/concentration   Safety/Judgement Home safety           11/11/21 1241   Neuro-Linguistic Exercises   Verbal Problem Solving Impaired; basic level verbal reasoning related to familiar situations completed with SBA  Word deductions completed with 90% accuracy  Basic inferencing related to brief narratives to determine if related statements are true,false, unknown completed with 80% accuracy   Memory Impaired; requires repetition of instructions for new learning  Immediate recall to write down key points related to narratives completed with min-mod cues   Attention  Impaired; decreased selective/divided attention      Patient participated with cognitive treatment focusing on basic problem solving, attention, and immediate/short-term memory. Basic level verbal reasoning related to familiar situations completed with SBA. Basic word deductions completed with 90% accuracy. Simple inferencing related to brief narratives to determine if related statements are true, false, unknown completed with 80% accuracy. Requires repetition of instructions for new learning. Immediate recall to write down key points related to narratives completed with min-mod cues. Progress with STGs outlined above. Patient near cognitive baseline from previous CVA and will be discharging home with supportive wife. No further ST indicated at this time.     Armen Lovett MS, CCC-SLP           PHYSICAL THERAPY DAILY NOTE  Time In: (P) 1300  Time Out: (P) 1346  Patient Seen For: (P) PM; Other (see progress notes); Gait training; Transfer training; Therapeutic exercise     Subjective: Patient had no complaints.            Objective: No pain noted. Wife present during treatment and voiced how well he is moving.   Other (comment) (fall precautions)  GROSS ASSESSMENT Daily Assessment              COGNITION Daily Assessment               BED/MAT MOBILITY Daily Assessment     Rolling Right : (P) 5 (Supervision)  Rolling Left : (P) 5 (Supervision)  Supine to Sit : (P) 5 (Supervision)  Sit to Supine : (P) 5 (Supervision)         TRANSFERS Daily Assessment     Other: (P) SPT with straight cane  Transfer Assistance : (P) 5 (Supervision/setup)  Sit to Stand Assistance: (P) Supervision  Car Transfers: (P) Not tested         GAIT Daily Assessment     Amount of Assistance: (P) 5 (Supervision/setup)  Distance (ft): (P) 150 Feet (ft)  Assistive Device: (P) Cane, straight         STEPS or STAIRS Daily Assessment     Steps/Stairs Ambulated (#): 8  Level of Assist : (P) 0 (Not tested)  Rail Use: Right  (and cane on other side)         BALANCE Daily Assessment              WHEELCHAIR MOBILITY Daily Assessment              LOWER EXTREMITY EXERCISES Daily Assessment     Extremity: (P) Both  Exercise Type #1: (P) Standing lower extremity strengthening  Sets Performed: (P) 1  Reps Performed: (P) 10  Level of Assist: (P) Supervision             Assessment: Patient making good progress.            Plan of Care: Continue with plan of care.     Fili Luciano, PTA  11/11/2021              OCCUPATIONAL THERAPY    Time In 0756   Time Out 0830      Mobility    Score Comments   Sit to Stand 4: Supervision or touching A S   Transfer Assist 4: Supervision or touching A Transfer Type: SPT   Equipment: Cane   Comments: S      Activities of Daily Living     Score Comments   Eating 6: Independent     Oral Hyigene 6: Independent     Bathing 5: S/U or clean-up assist Type of Shower: Shower  Position: Standing PRN and Unsupported Sitting   Adaptive  Equipment: Tub Transfer Bench, Grab Bars and Long Handled Sponge  Comments:    Upper Body  Dressing 5: S/U or clean-up assist Items Applied: Pullover  Position: Unsupported Sitting  Comments:    Lower Body Dressing 4: Supervision or touching A Items Applied: Underwear and Elastic pants  Position: Standing PRN and Unsupported Sitting  Adaptive Equipment: N/A  Comments: S   Donning/Brooten Footwear 4: Supervision or touching A Items Applied: Slip-on shoes and compression stockings  Adaptive Equipment: Sock Aide  Comments:    Education   AE/DME training         S: \"I slept really well last night. I feel good this morning. \" Agreeable to therapy.    Patient was able to ambulate ~10 feet using a SPC with supervision. Pt completed morning ADLs with quality indicators reflected in chart above. Pain No pain expressed. Patient Vitals for the past 12 hrs:    Temp Pulse Resp BP SpO2   11/11/21 0738 97.6 °F (36.4 °C) 66 16 (!) 145/71 97 %         Collaborated with RN regarding patient performance and POC. Patient tolerated session well, but activity tolerance, balance, functional mobility, strength, coordination, cognition are still below baseline and require skilled facilitation to successfully and safely complete ADLs and transfers. Patient ended session seated in wc and with PT assuming care.        Antonio Mcdermott OTR/LUPE  11/11/2021                          HOME ARCHITECTURE:  Home Environment: Private residence  # Steps to Enter: 0  One/Two Story Residence: One story  Living Alone: No  Support Systems: Spouse/Significant Other/Partner  Patient Expects to be Discharged to[de-identified] Private residence  Current DME Used/Available at Home: None  Tub or Shower Type: Shower     Previous Level of Function / Work / Activity:   Despite recent strokes, patient had successfully completed all skilled therapies and had return to independence for ADLs, IADLs and mobility. He had started exercising regularly at a local fitness facility 3x/wk with goal of modest weight loss.   Past Medical History:   Diagnosis Date    Cerebellar stroke ([address] 6/14/2020    Essential hypertension     Mixed hyperlipidemia     Right thalamic infarction ([address] 7/2/2019    Right thalamic infarction ([address] 7/2/2019    Stroke [facility] 06/08/2020    Type 2 diabetes mellitus [facility])       Past Surgical History:   Procedure Laterality Date    HX CATARACT REMOVAL  January and February 2021    HX HEENT      sinus surgery    HX MALIGNANT SKIN LESION EXCISION      HX UROLOGICAL  09/2020    prostate surgery      Family History   Problem Relation Age of Onset    No Known Problems Other     Heart Disease Mother     Thyroid Disease Mother         goiter    Heart Disease Father     Stomach Cancer Brother     Thyroid Cancer Neg Hx       Social History     Tobacco Use    Smoking status: Never Smoker    Smokeless tobacco: Never Used   Substance Use Topics    Alcohol use: Never     No Known Allergies   Prior to Admission medications    Medication Sig Start Date End Date Taking? Authorizing Provider   doxazosin (CARDURA) 4 mg tablet Take 1 Tablet by mouth nightly. 11/11/21  Yes Lilian Pace MD   finasteride (PROSCAR) 5 mg tablet Take 1 Tablet by mouth daily. Take at 6pm 11/11/21  Yes Lilian Pace MD   glipiZIDE (GLUCOTROL) 5 mg tablet Take 0.5 Tablets by mouth Daily (before breakfast). 11/12/21  Yes Lilian Pace MD   lisinopriL (PRINIVIL, ZESTRIL) 10 mg tablet Take 1 Tablet by mouth daily (after dinner). 11/11/21  Yes Lilian Pace MD   metFORMIN ER (GLUCOPHAGE XR) 500 mg tablet Take 2 Tablets by mouth daily (with dinner). 11/11/21  Yes Lilian Pace MD   hydroCHLOROthiazide (HYDRODIURIL) 12.5 mg tablet Take 1 Tablet by mouth daily. 11/11/21  Yes Lilian Pace MD   finasteride (PROSCAR) 5 mg tablet Take 1 Tablet by mouth daily for 30 days. 10/27/21 11/26/21  Kailash Bautista MD   ondansetron (Zofran ODT) 4 mg disintegrating tablet Take 4 mg by mouth every eight (8) hours as needed for Nausea or Vomiting. Provider, Historical   carboxymethylcellulose sodium (REFRESH LIQUIGEL) 1 % dlgl ophthalmic solution Administer 1 Drop to right eye daily. Provider, Historical   metFORMIN ER (GLUCOPHAGE XR) 500 mg tablet Take 2 Tablets by mouth daily (with dinner). Patient not taking: Reported on 10/21/2021 7/9/21   Bulmaro Stauffer MD   glipiZIDE (GLUCOTROL) 10 mg tablet Take 1 Tablet by mouth Daily (before breakfast). 6/30/21   Bulmaro Stauffer MD   loratadine (Claritin) 10 mg tablet Take 1 Tablet by mouth daily. 6/30/21   Bulmaro Stauffer MD   sertraline (ZOLOFT) 50 mg tablet Take 1 Tablet by mouth daily. 6/30/21   Juanpablo Schroeder MD   atorvastatin (LIPITOR) 80 mg tablet Take 1 Tablet by mouth daily. 6/30/21   Juanpablo Schroeder MD   lisinopriL (PRINIVIL, ZESTRIL) 10 mg tablet Take 1 Tablet by mouth two (2) times a day. Patient taking differently: Take 5 mg by mouth two (2) times a day. 6/30/21   Juanpablo Schroeder MD   clopidogreL (Plavix) 75 mg tab Take 1 Tablet by mouth daily. 6/30/21   Juanpablo Schroeder MD   doxazosin (CARDURA) 8 mg tablet Take 1 Tablet by mouth nightly. 6/30/21   Juanpablo Schroeder MD   DISABLED PLACARD (DISABLED PLACARD) DMV As directed 6/30/21   Juanpablo Schroeder MD   OneTouch Ultra2 Meter misc by Does Not Apply route. Provider, Historical   OneTouch Ultra Blue Test Strip strip by Does Not Apply route See Admin Instructions. Provider, Historical   OneTouch Delica Lancets 30 gauge misc by Does Not Apply route.     Provider, Historical       Lab Review:   Recent Results (from the past 72 hour(s))   GLUCOSE, POC    Collection Time: 11/09/21  7:35 AM   Result Value Ref Range    Glucose (POC) 121 (H) 65 - 100 mg/dL    Performed by Merle    GLUCOSE, POC    Collection Time: 11/09/21  4:48 PM   Result Value Ref Range    Glucose (POC) 98 65 - 100 mg/dL    Performed by Merle    METABOLIC PANEL, BASIC    Collection Time: 11/10/21  6:30 AM   Result Value Ref Range    Sodium 141 136 - 145 mmol/L    Potassium 3.5 3.5 - 5.1 mmol/L    Chloride 106 98 - 107 mmol/L    CO2 31 21 - 32 mmol/L    Anion gap 4 (L) 7 - 16 mmol/L    Glucose 114 (H) 65 - 100 mg/dL    BUN 14 8 - 23 MG/DL    Creatinine 0.96 0.8 - 1.5 MG/DL    GFR est AA >60 >60 ml/min/1.73m2    GFR est non-AA >60 >60 ml/min/1.73m2    Calcium 8.7 8.3 - 10.4 MG/DL   GLUCOSE, POC    Collection Time: 11/10/21  7:23 AM   Result Value Ref Range    Glucose (POC) 123 (H) 65 - 100 mg/dL    Performed by Merle    GLUCOSE, POC    Collection Time: 11/10/21  4:37 PM   Result Value Ref Range    Glucose (POC) 97 65 - 100 mg/dL    Performed by Merle    GLUCOSE, POC    Collection Time: 11/11/21  7:30 AM   Result Value Ref Range    Glucose (POC) 126 (H) 65 - 100 mg/dL    Performed by Jennifer    GLUCOSE, POC    Collection Time: 11/11/21  4:29 PM   Result Value Ref Range    Glucose (POC) 87 65 - 100 mg/dL    Performed by Jennifer        Functional Assessment:          Balance  Sitting - Static: Good (unsupported) (11/09/21 1500)  Sitting - Dynamic: Good (unsupported) (11/09/21 1500)  Standing - Static: Good (w/ UE support) (11/09/21 1500)  Standing - Dynamic : Impaired (11/09/21 1500)                     Rekha Donaldson Fall Risk Assessment:  Rekha Donaldson Fall Risk  Mobility: Ambulates or transfers with assist devices or assistance (11/12/21 0323)  Mobility Interventions: Communicate number of staff needed for ambulation/transfer; Patient to call before getting OOB; Strengthening exercises (ROM-active/passive); Utilize walker, cane, or other assistive device (11/12/21 0323)  Mentation: Alert, oriented x 3 (11/12/21 0323)  Medication: Patient receiving anticonvulsants, sedatives(tranquilizers), psychotropics or hypnotics, hypoglycemics, narcotics, sleep aids, antihypertensives, laxatives, or diuretics (11/12/21 0323)  Medication Interventions: Assess postural VS orthostatic hypotension; Evaluate medications/consider consulting pharmacy; Patient to call before getting OOB; Teach patient to arise slowly (11/12/21 9861)  Elimination: Needs assistance with toileting (11/12/21 0323)  Elimination Interventions: Call light in reach; Patient to call for help with toileting needs;  Toilet paper/wipes in reach (11/12/21 0323)  Prior Fall History: Before admission in past 12 months _home or previous inpatient care) (11/12/21 0323)  History of Falls Interventions: Consult care management for discharge planning; Door open when patient unattended; Evaluate medications/consider consulting pharmacy (11/12/21 0323)  Total Score: 4 (11/12/21 0323)  Standard Fall Precautions: Yes (10/31/21 2148)  High Fall Risk: Yes (11/12/21 3812)     Speech Assessment:         Ambulation:  Gait  Base of Support: Center of gravity altered; Narrowed (11/09/21 1500)  Speed/Landy: Slow; Shuffled (11/09/21 1500)  Step Length: Right shortened; Left shortened (11/09/21 1500)  Stance: Right increased; Left increased (11/09/21 1500)  Gait Abnormalities: Altered arm swing; Decreased step clearance (11/09/21 1500)  Distance (ft): 150 Feet (ft) (11/11/21 1540)  Assistive Device: Cane, straight (11/11/21 1540)  Rail Use: Right  (and cane on other side) (11/11/21 1015)     Visit Vitals  BP (!) 156/70 (BP 1 Location: Right upper arm, BP Patient Position: At rest)   Pulse 71   Temp 98.9 °F (37.2 °C)   Resp 18   SpO2 96%      Intake and Output:    No intake/output data recorded. Discharge Exam:  General: Alert and age appropriately oriented. No acute cardiorespiratory distress. HEENT: Normocephalic, no scleral icterus. Oral mucosa moist without cyanosis. Lungs: Clear to auscultation bilaterally. Respiration even and unlabored. Heart: Regular rate and rhythm, S1, S2. No murmurs, clicks, rub or gallops. Abdomen: Soft, non-tender, not distended. Bowel sounds normoactive. No organomegaly. Protuberant    Genitourinary: Deferred. Neuromuscular:        No gross focal motor deficits noted. Generalized LE weakness  No sensory deficits  Dec attn and concentration   Skin/extremity: No rashes, no erythema. No calf tenderness B LE.   Trace edema, pulses 2+         Problem List as of 11/12/2021 Date Reviewed: 10/26/2021          Codes Class Noted - Resolved    * (Principal) Physical debility ICD-10-CM: R53.81  ICD-9-CM: 799.3  10/26/2021 - Present        E-coli UTI ICD-10-CM: N39.0, B96.20  ICD-9-CM: 599.0, 041.49  10/24/2021 - Present        Acute respiratory failure with hypoxia (HCC) ICD-10-CM: J96.01  ICD-9-CM: 518.81  10/23/2021 - Present        GRACE (acute kidney injury) ([facility] ICD-10-CM: N17.9  ICD-9-CM: 584.9  10/22/2021 - Present        CKD (chronic kidney disease) stage 3, GFR 30-59 ml/min (HCC) ICD-10-CM: N18.30  ICD-9-CM: 585.3  10/22/2021 - Present        Pneumonia of right lower lobe due to infectious organism ICD-10-CM: J18.9  ICD-9-CM: 486  10/21/2021 - Present        Fluid overload ICD-10-CM: E87.70  ICD-9-CM: 276.69  10/21/2021 - Present        Hypoxia ICD-10-CM: R09.02  ICD-9-CM: 799.02  10/21/2021 - Present        CAP (community acquired pneumonia) ICD-10-CM: J18.9  ICD-9-CM: 486  10/21/2021 - Present        Thyroid nodule ICD-10-CM: E04.1  ICD-9-CM: 241.0  8/5/2021 - Present        Type 2 diabetes mellitus with hyperglycemia, without long-term current use of insulin ([facility]) ICD-10-CM: E11.65  ICD-9-CM: 250.00, 790.29  2/26/2021 - Present        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  ICD-9-CM: 434.91  6/14/2020 - Present        Severe obesity ([facility] ICD-10-CM: E66.01  ICD-9-CM: 278.01  7/2/2019 - Present        Numbness and tingling in left hand ICD-10-CM: R20.0, R20.2  ICD-9-CM: 782.0  7/2/2019 - Present        Controlled type 2 diabetes mellitus without complication, without long-term current use of insulin ([facility] ICD-10-CM: E11.9  ICD-9-CM: 250.00  2/22/2019 - Present        Cerebrovascular accident (CVA) due to occlusion [facility]) ICD-10-CM: I63.9  ICD-9-CM: 434.91  2/12/2019 - Present        Essential hypertension, benign ICD-10-CM: I10  ICD-9-CM: 401.1  1/3/2014 - Present        Mixed hyperlipidemia ICD-10-CM: E78.2  ICD-9-CM: 272.2  1/3/2014 - Present        RESOLVED: Hypokalemia ICD-10-CM: E87.6  ICD-9-CM: 276.8  10/23/2021 - 10/25/2021        RESOLVED: Sepsis ([facility] ICD-10-CM: A41.9  ICD-9-CM: 038.9, 995.91  10/22/2021 - 10/25/2021        RESOLVED: Acute prostatitis without hematuria ICD-10-CM: N41.0  ICD-9-CM: 601.0  10/21/2021 - 10/25/2021        RESOLVED: Cerebellar stroke ([facility] ICD-10-CM: I63.9  ICD-9-CM: 434.91  6/14/2020 - 10/26/2021        RESOLVED: Urinary retention ICD-10-CM: R33.9  ICD-9-CM: 788.20  6/8/2020 - 9/17/2020        RESOLVED: Stroke (cerebrum) ([facility] ICD-10-CM: I63.9  ICD-9-CM: 434.91  6/8/2020 - 2/26/2021        RESOLVED: Right thalamic infarction ([facility] ICD-10-CM: I63.9  ICD-9-CM: 434.91  7/2/2019 - 10/26/2021              DISCHARGE INSTRUCTIONS:   1. Diet:diabetic   2. Activity: as tolerated. No driving      Current Discharge Medication List      CONTINUE these medications which have CHANGED    Details   doxazosin (CARDURA) 4 mg tablet Take 1 Tablet by mouth nightly. Qty: 90 Tablet, Refills: 4      finasteride (PROSCAR) 5 mg tablet Take 1 Tablet by mouth daily. Take at 6pm  Qty: 90 Tablet, Refills: 3      glipiZIDE (GLUCOTROL) 5 mg tablet Take 0.5 Tablets by mouth Daily (before breakfast). Qty: 30 Tablet, Refills: 0      lisinopriL (PRINIVIL, ZESTRIL) 10 mg tablet Take 1 Tablet by mouth daily (after dinner). Qty: 90 Tablet, Refills: 3      metFORMIN ER (GLUCOPHAGE XR) 500 mg tablet Take 2 Tablets by mouth daily (with dinner). Qty: 60 Tablet, Refills: 3      hydroCHLOROthiazide (HYDRODIURIL) 12.5 mg tablet Take 1 Tablet by mouth daily. Qty: 90 Tablet, Refills: 4    Associated Diagnoses: Essential hypertension, benign         CONTINUE these medications which have NOT CHANGED    Details   ondansetron (Zofran ODT) 4 mg disintegrating tablet Take 4 mg by mouth every eight (8) hours as needed for Nausea or Vomiting. carboxymethylcellulose sodium (REFRESH LIQUIGEL) 1 % dlgl ophthalmic solution Administer 1 Drop to right eye daily. loratadine (Claritin) 10 mg tablet Take 1 Tablet by mouth daily. Qty: 90 Tablet, Refills: 4    Associated Diagnoses: Allergic rhinitis, unspecified seasonality, unspecified trigger      sertraline (ZOLOFT) 50 mg tablet Take 1 Tablet by mouth daily. Qty: 90 Tablet, Refills: 4    Associated Diagnoses: Anxiety      atorvastatin (LIPITOR) 80 mg tablet Take 1 Tablet by mouth daily.   Qty: 90 Tablet, Refills: 4    Comments: Requesting 1 year supply  Associated Diagnoses: Mixed hyperlipidemia      clopidogreL (Plavix) 75 mg tab Take 1 Tablet by mouth daily. Qty: 90 Tablet, Refills: 4    Associated Diagnoses: Cerebellar stroke ([address]      DISABLED PLACARD (DISABLED PLACARD) DMV As directed  Qty: 1 Each, Refills: 0    Associated Diagnoses: Controlled type 2 diabetes mellitus without complication, without long-term current use of insulin (HCC)      OneTouch Ultra2 Meter misc by Does Not Apply route. OneTouch Ultra Blue Test Strip strip by Does Not Apply route See Admin Instructions. OneTouch Delica Lancets 30 gauge misc by Does Not Apply route. STOP taking these medications       cefpodoxime (VANTIN) 200 mg tablet Comments:   Reason for Stopping:               I have reviewed the patients controlled substance prescription history as maintained in the [place] prescription monitoring program () so that prescription(s) for controlled substance, if any provided, could be given. REHABILITATION MANAGEMENT:   1. Devices: none; pt has all DME needed  2. Consult:PT/OT/ST/CM  DISPOSITION: home with [facility] needs include PT/OT/SLP     Follow-up Information     Follow up With Specialties Details Why Contact Info    Laureen Rey MD Internal Medicine In 1 week  [address]  [address] [facility]  [phone]               Time spent in patient discharge planning and coordination: >35 minutes. Lilian Gannon MD, Medical Director  [address] [facility]

## 2021-11-13 ENCOUNTER — HOME CARE VISIT (OUTPATIENT)
Dept: SCHEDULING | Facility: HOME HEALTH | Age: 76
End: 2021-11-13

## 2021-11-17 ENCOUNTER — HOSPITAL ENCOUNTER (OUTPATIENT)
Dept: GENERAL RADIOLOGY | Age: 76
Discharge: HOME | End: 2021-11-17

## 2021-11-17 DIAGNOSIS — J18.9 PNEUMONIA OF RIGHT LOWER LOBE DUE TO INFECTIOUS ORGANISM: ICD-10-CM

## 2022-03-18 PROBLEM — N18.30 CKD (CHRONIC KIDNEY DISEASE) STAGE 3, GFR 30-59 ML/MIN (HCC): Status: ACTIVE | Noted: 2021-10-22

## 2022-03-18 PROBLEM — E66.01 SEVERE OBESITY (HCC): Status: ACTIVE | Noted: 2019-07-02

## 2022-03-19 PROBLEM — J96.01 ACUTE RESPIRATORY FAILURE WITH HYPOXIA (HCC): Status: ACTIVE | Noted: 2021-10-23

## 2022-03-19 PROBLEM — R53.81 PHYSICAL DEBILITY: Status: ACTIVE | Noted: 2021-10-26

## 2022-03-19 PROBLEM — I63.9 CEREBROVASCULAR ACCIDENT (CVA) DUE TO OCCLUSION (HCC): Status: ACTIVE | Noted: 2019-02-12

## 2022-03-19 PROBLEM — E11.65 TYPE 2 DIABETES MELLITUS WITH HYPERGLYCEMIA, WITHOUT LONG-TERM CURRENT USE OF INSULIN (HCC): Status: ACTIVE | Noted: 2021-02-26

## 2022-03-19 PROBLEM — N17.9 AKI (ACUTE KIDNEY INJURY) (HCC): Status: ACTIVE | Noted: 2021-10-22

## 2022-03-19 PROBLEM — J18.9 CAP (COMMUNITY ACQUIRED PNEUMONIA): Status: ACTIVE | Noted: 2021-10-21

## 2022-03-19 PROBLEM — J18.9 PNEUMONIA OF RIGHT LOWER LOBE DUE TO INFECTIOUS ORGANISM: Status: ACTIVE | Noted: 2021-10-21

## 2022-03-19 PROBLEM — N39.0 E-COLI UTI: Status: ACTIVE | Noted: 2021-10-24

## 2022-03-19 PROBLEM — G46.4: Status: ACTIVE | Noted: 2020-06-14

## 2022-03-19 PROBLEM — B96.20 E-COLI UTI: Status: ACTIVE | Noted: 2021-10-24

## 2022-03-20 PROBLEM — R20.2 NUMBNESS AND TINGLING IN LEFT HAND: Status: ACTIVE | Noted: 2019-07-02

## 2022-03-20 PROBLEM — E04.1 THYROID NODULE: Status: ACTIVE | Noted: 2021-08-05

## 2022-03-20 PROBLEM — R09.02 HYPOXIA: Status: ACTIVE | Noted: 2021-10-21

## 2022-03-20 PROBLEM — E11.9 CONTROLLED TYPE 2 DIABETES MELLITUS WITHOUT COMPLICATION, WITHOUT LONG-TERM CURRENT USE OF INSULIN (HCC): Status: ACTIVE | Noted: 2019-02-22

## 2022-03-20 PROBLEM — E87.70 FLUID OVERLOAD: Status: ACTIVE | Noted: 2021-10-21

## 2022-03-20 PROBLEM — R20.0 NUMBNESS AND TINGLING IN LEFT HAND: Status: ACTIVE | Noted: 2019-07-02

## 2022-03-21 ENCOUNTER — HOSPITAL ENCOUNTER (OUTPATIENT)
Dept: LAB | Age: 77
Discharge: HOME | End: 2022-03-21
Payer: MEDICARE

## 2022-03-21 DIAGNOSIS — E11.9 CONTROLLED TYPE 2 DIABETES MELLITUS WITHOUT COMPLICATION, WITHOUT LONG-TERM CURRENT USE OF INSULIN (HCC): ICD-10-CM

## 2022-03-21 PROBLEM — N39.0 E-COLI UTI: Status: RESOLVED | Noted: 2021-10-24 | Resolved: 2022-03-21

## 2022-03-21 PROBLEM — N18.30 CKD (CHRONIC KIDNEY DISEASE) STAGE 3, GFR 30-59 ML/MIN (HCC): Status: RESOLVED | Noted: 2021-10-22 | Resolved: 2022-03-21

## 2022-03-21 PROBLEM — J18.9 PNEUMONIA OF RIGHT LOWER LOBE DUE TO INFECTIOUS ORGANISM: Status: RESOLVED | Noted: 2021-10-21 | Resolved: 2022-03-21

## 2022-03-21 PROBLEM — N17.9 AKI (ACUTE KIDNEY INJURY) (HCC): Status: RESOLVED | Noted: 2021-10-22 | Resolved: 2022-03-21

## 2022-03-21 PROBLEM — E11.22 TYPE 2 DIABETES MELLITUS WITH CHRONIC KIDNEY DISEASE (HCC): Status: RESOLVED | Noted: 2022-03-21 | Resolved: 2022-03-21

## 2022-03-21 PROBLEM — J96.01 ACUTE RESPIRATORY FAILURE WITH HYPOXIA (HCC): Status: RESOLVED | Noted: 2021-10-23 | Resolved: 2022-03-21

## 2022-03-21 PROBLEM — E87.70 FLUID OVERLOAD: Status: RESOLVED | Noted: 2021-10-21 | Resolved: 2022-03-21

## 2022-03-21 PROBLEM — R53.81 PHYSICAL DEBILITY: Status: RESOLVED | Noted: 2021-10-26 | Resolved: 2022-03-21

## 2022-03-21 PROBLEM — B96.20 E-COLI UTI: Status: RESOLVED | Noted: 2021-10-24 | Resolved: 2022-03-21

## 2022-03-21 PROBLEM — J18.9 CAP (COMMUNITY ACQUIRED PNEUMONIA): Status: RESOLVED | Noted: 2021-10-21 | Resolved: 2022-03-21

## 2022-03-21 PROBLEM — E11.22 TYPE 2 DIABETES MELLITUS WITH CHRONIC KIDNEY DISEASE (HCC): Status: ACTIVE | Noted: 2022-03-21

## 2022-03-21 LAB
ALBUMIN SERPL-MCNC: 3.2 G/DL (ref 3.2–4.6)
ALBUMIN/GLOB SERPL: 0.7 {RATIO} (ref 1.2–3.5)
ALP SERPL-CCNC: 89 U/L (ref 50–136)
ALT SERPL-CCNC: 19 U/L (ref 12–65)
ANION GAP SERPL CALC-SCNC: 2 MMOL/L (ref 7–16)
AST SERPL-CCNC: 8 U/L (ref 15–37)
BILIRUB SERPL-MCNC: 0.2 MG/DL (ref 0.2–1.1)
BUN SERPL-MCNC: 16 MG/DL (ref 8–23)
CALCIUM SERPL-MCNC: 8.6 MG/DL (ref 8.3–10.4)
CHLORIDE SERPL-SCNC: 106 MMOL/L (ref 98–107)
CO2 SERPL-SCNC: 35 MMOL/L (ref 21–32)
CREAT SERPL-MCNC: 0.9 MG/DL (ref 0.8–1.5)
EST. AVERAGE GLUCOSE BLD GHB EST-MCNC: 151 MG/DL
GLOBULIN SER CALC-MCNC: 4.3 G/DL (ref 2.3–3.5)
GLUCOSE SERPL-MCNC: 145 MG/DL (ref 65–100)
HBA1C MFR BLD: 6.9 % (ref 4.2–6.3)
POTASSIUM SERPL-SCNC: 3.7 MMOL/L (ref 3.5–5.1)
PROT SERPL-MCNC: 7.5 G/DL (ref 6.3–8.2)
SODIUM SERPL-SCNC: 143 MMOL/L (ref 138–145)

## 2022-03-21 PROCEDURE — 82043 UR ALBUMIN QUANTITATIVE: CPT

## 2022-03-21 PROCEDURE — 36415 COLL VENOUS BLD VENIPUNCTURE: CPT

## 2022-03-21 PROCEDURE — 83036 HEMOGLOBIN GLYCOSYLATED A1C: CPT

## 2022-03-21 PROCEDURE — 80053 COMPREHEN METABOLIC PANEL: CPT

## 2022-03-22 LAB
CREAT UR-MCNC: 113 MG/DL
MICROALBUMIN UR-MCNC: 5.91 MG/DL
MICROALBUMIN/CREAT UR-RTO: 52 MG/G

## 2022-03-24 PROBLEM — B96.20 E-COLI UTI: Status: RESOLVED | Noted: 2021-10-24 | Resolved: 2022-03-21

## 2022-03-24 PROBLEM — N18.30 CKD (CHRONIC KIDNEY DISEASE) STAGE 3, GFR 30-59 ML/MIN (HCC): Status: RESOLVED | Noted: 2021-10-22 | Resolved: 2022-03-21

## 2022-03-24 PROBLEM — J96.01 ACUTE RESPIRATORY FAILURE WITH HYPOXIA (HCC): Status: RESOLVED | Noted: 2021-10-23 | Resolved: 2022-03-21

## 2022-03-24 PROBLEM — E87.70 FLUID OVERLOAD: Status: RESOLVED | Noted: 2021-10-21 | Resolved: 2022-03-21

## 2022-03-24 PROBLEM — E11.22 TYPE 2 DIABETES MELLITUS WITH CHRONIC KIDNEY DISEASE (HCC): Status: ACTIVE | Noted: 2022-03-21

## 2022-03-24 PROBLEM — J18.9 CAP (COMMUNITY ACQUIRED PNEUMONIA): Status: RESOLVED | Noted: 2021-10-21 | Resolved: 2022-03-21

## 2022-03-24 PROBLEM — N17.9 AKI (ACUTE KIDNEY INJURY) (HCC): Status: RESOLVED | Noted: 2021-10-22 | Resolved: 2022-03-21

## 2022-03-24 PROBLEM — N39.0 E-COLI UTI: Status: RESOLVED | Noted: 2021-10-24 | Resolved: 2022-03-21

## 2022-03-24 PROBLEM — J18.9 PNEUMONIA OF RIGHT LOWER LOBE DUE TO INFECTIOUS ORGANISM: Status: RESOLVED | Noted: 2021-10-21 | Resolved: 2022-03-21

## 2022-03-24 PROBLEM — R53.81 PHYSICAL DEBILITY: Status: RESOLVED | Noted: 2021-10-26 | Resolved: 2022-03-21

## 2022-03-24 PROBLEM — E11.22 TYPE 2 DIABETES MELLITUS WITH CHRONIC KIDNEY DISEASE (HCC): Status: RESOLVED | Noted: 2022-03-21 | Resolved: 2022-03-21

## 2022-07-22 ENCOUNTER — OFFICE VISIT (OUTPATIENT)
Dept: INTERNAL MEDICINE CLINIC | Facility: CLINIC | Age: 77
End: 2022-07-22
Payer: MEDICARE

## 2022-07-22 VITALS
DIASTOLIC BLOOD PRESSURE: 83 MMHG | WEIGHT: 270 LBS | BODY MASS INDEX: 39.99 KG/M2 | HEIGHT: 69 IN | SYSTOLIC BLOOD PRESSURE: 136 MMHG

## 2022-07-22 DIAGNOSIS — E66.01 SEVERE OBESITY (HCC): ICD-10-CM

## 2022-07-22 DIAGNOSIS — I69.30 HISTORY OF CVA WITH RESIDUAL DEFICIT: ICD-10-CM

## 2022-07-22 DIAGNOSIS — I10 ESSENTIAL HYPERTENSION, BENIGN: ICD-10-CM

## 2022-07-22 DIAGNOSIS — E11.9 CONTROLLED TYPE 2 DIABETES MELLITUS WITHOUT COMPLICATION, WITHOUT LONG-TERM CURRENT USE OF INSULIN (HCC): ICD-10-CM

## 2022-07-22 DIAGNOSIS — K59.00 CONSTIPATION, UNSPECIFIED CONSTIPATION TYPE: ICD-10-CM

## 2022-07-22 DIAGNOSIS — E11.9 CONTROLLED TYPE 2 DIABETES MELLITUS WITHOUT COMPLICATION, WITHOUT LONG-TERM CURRENT USE OF INSULIN (HCC): Primary | ICD-10-CM

## 2022-07-22 DIAGNOSIS — R05.9 COUGH: ICD-10-CM

## 2022-07-22 PROBLEM — I63.9 CEREBROVASCULAR ACCIDENT (CVA) DUE TO OCCLUSION (HCC): Status: RESOLVED | Noted: 2019-02-12 | Resolved: 2022-07-22

## 2022-07-22 PROBLEM — E11.65 TYPE 2 DIABETES MELLITUS WITH HYPERGLYCEMIA, WITHOUT LONG-TERM CURRENT USE OF INSULIN (HCC): Status: RESOLVED | Noted: 2021-02-26 | Resolved: 2022-07-22

## 2022-07-22 LAB
CHOLEST SERPL-MCNC: 161 MG/DL
HDLC SERPL-MCNC: 33 MG/DL (ref 40–60)
HDLC SERPL: 4.9 {RATIO}
LDLC SERPL CALC-MCNC: 104.6 MG/DL
TRIGL SERPL-MCNC: 117 MG/DL (ref 35–150)
VLDLC SERPL CALC-MCNC: 23.4 MG/DL (ref 6–23)

## 2022-07-22 PROCEDURE — 1123F ACP DISCUSS/DSCN MKR DOCD: CPT | Performed by: INTERNAL MEDICINE

## 2022-07-22 PROCEDURE — 99214 OFFICE O/P EST MOD 30 MIN: CPT | Performed by: INTERNAL MEDICINE

## 2022-07-22 PROCEDURE — G8427 DOCREV CUR MEDS BY ELIG CLIN: HCPCS | Performed by: INTERNAL MEDICINE

## 2022-07-22 PROCEDURE — 2022F DILAT RTA XM EVC RTNOPTHY: CPT | Performed by: INTERNAL MEDICINE

## 2022-07-22 PROCEDURE — 3017F COLORECTAL CA SCREEN DOC REV: CPT | Performed by: INTERNAL MEDICINE

## 2022-07-22 PROCEDURE — G8417 CALC BMI ABV UP PARAM F/U: HCPCS | Performed by: INTERNAL MEDICINE

## 2022-07-22 PROCEDURE — 3044F HG A1C LEVEL LT 7.0%: CPT | Performed by: INTERNAL MEDICINE

## 2022-07-22 PROCEDURE — 1036F TOBACCO NON-USER: CPT | Performed by: INTERNAL MEDICINE

## 2022-07-22 RX ORDER — POLYETHYLENE GLYCOL 3350 17 G/17G
17 POWDER, FOR SOLUTION ORAL DAILY
COMMUNITY

## 2022-07-22 ASSESSMENT — PATIENT HEALTH QUESTIONNAIRE - PHQ9
1. LITTLE INTEREST OR PLEASURE IN DOING THINGS: 0
2. FEELING DOWN, DEPRESSED OR HOPELESS: 0
SUM OF ALL RESPONSES TO PHQ QUESTIONS 1-9: 0
SUM OF ALL RESPONSES TO PHQ QUESTIONS 1-9: 0
SUM OF ALL RESPONSES TO PHQ9 QUESTIONS 1 & 2: 0
SUM OF ALL RESPONSES TO PHQ QUESTIONS 1-9: 0
SUM OF ALL RESPONSES TO PHQ QUESTIONS 1-9: 0

## 2022-07-22 ASSESSMENT — ENCOUNTER SYMPTOMS
SHORTNESS OF BREATH: 0
ABDOMINAL PAIN: 0
CONSTIPATION: 1
COUGH: 1

## 2022-07-22 NOTE — PROGRESS NOTES
SUBJECTIVE:   Nestor Yoder is a 78 y.o. male seen for a visit regarding No chief complaint on file. Hypertension  This is a chronic problem. The current episode started more than 1 year ago. The problem has been gradually worsening since onset. Pertinent negatives include no chest pain or shortness of breath. Treatments tried: was on 10 mg lisinopril last winter-in spring BP at 152 and dose increased to 20 mg --checks at home were ok until recent to 180 range -increased the cardura from 4 to 8 mg week ago. The current treatment provides mild (today was 116/62) improvement. There are no compliance problems. Diabetes  He presents for his follow-up diabetic visit. He has type 2 diabetes mellitus. His disease course has been stable. There are no hypoglycemic associated symptoms. Pertinent negatives for hypoglycemia include no nervousness/anxiousness. Pertinent negatives for diabetes include no chest pain. Home blood sugar record trend: checks 2x per week -ac supper --value 98 to 122 since May. Past Medical History, Past Surgical History, Family history, Social History, and Medications were all reviewed with the patient today and updated as necessary.        Current Outpatient Medications   Medication Sig Dispense Refill    polyethylene glycol (GLYCOLAX) 17 GM/SCOOP powder Take 17 g by mouth daily      atorvastatin (LIPITOR) 80 MG tablet Take 80 mg by mouth daily      clopidogrel (PLAVIX) 75 MG tablet Take 75 mg by mouth daily      doxazosin (CARDURA) 4 MG tablet Take 8 mg by mouth nightly      finasteride (PROSCAR) 5 MG tablet Take 5 mg by mouth daily      glipiZIDE (GLUCOTROL) 5 MG tablet Take 2.5 mg by mouth every morning (before breakfast)      hydroCHLOROthiazide (HYDRODIURIL) 12.5 MG tablet Take 12.5 mg by mouth daily      lisinopril (PRINIVIL;ZESTRIL) 20 MG tablet Take 20 mg by mouth daily      metFORMIN (GLUCOPHAGE-XR) 500 MG extended release tablet Take 1,000 mg by mouth Daily with supper sertraline (ZOLOFT) 50 MG tablet Take 25 mg by mouth in the morning. No current facility-administered medications for this visit. No Known Allergies  Patient Active Problem List   Diagnosis    Severe obesity ([address]    Essential hypertension, benign    Dorsolateral medullary syndrome    Mixed hyperlipidemia    Thyroid nodule    Controlled type 2 diabetes mellitus without complication, without long-term current use of insulin (HCC)    Hypoxia    Numbness and tingling in left hand    History of CVA with residual deficit     Social History     Tobacco Use    Smoking status: Never    Smokeless tobacco: Never   Substance Use Topics    Alcohol use: Never          Review of Systems   Constitutional:  Positive for unexpected weight change. Respiratory:  Positive for cough (notes cough for months -unclear when started). Negative for shortness of breath. Cardiovascular:  Negative for chest pain and leg swelling. Gastrointestinal:  Positive for constipation (more constipation 6 mo-uses miralax qd). Negative for abdominal pain. Genitourinary:  Negative for difficulty urinating and frequency. Neurological:  Negative for numbness. Psychiatric/Behavioral:  Negative for dysphoric mood. The patient is not nervous/anxious. In zoloft since 12/2020-had anxiety and crying -had the stroke months before-cut from 50mg to 25 mg couple weeks        OBJECTIVE:  /83   Ht 5' 9\" (1.753 m)   Wt 270 lb (122.5 kg)   BMI 39.87 kg/m²      Physical Exam  Constitutional:       General: He is not in acute distress. Appearance: He is obese. Cardiovascular:      Rate and Rhythm: Normal rate and regular rhythm. Pulmonary:      Breath sounds: No wheezing or rales. Abdominal:      General: There is distension (protuberant abdomen). Tenderness: There is no abdominal tenderness. Musculoskeletal:      Right lower leg: No edema. Left lower leg: No edema.           Medical problems and test results were reviewed with the patient today. ASSESSMENT and PLAN     1. Controlled type 2 diabetes mellitus without complication, without long-term current use of insulin (HCC)  -     Lipid Panel; Future  -     Hemoglobin A1C; Future  2. Essential hypertension, benign  3. Cough  4. Constipation, unspecified constipation type  -     [facility] - [facility]  5. Severe obesity ([facility]  6. History of CVA with residual deficit     the following changes in treatment are made can wean zoloft after mid Aug if ok; lisinopril can stop for 10 d for cough to see -sugars good -constipation seems new and long time for last check so see GI  lab results and schedule for future lab studies reviewed with patient  reviewed medications and side effects in detail     Return in about 4 months (around 11/22/2022).

## 2022-07-23 LAB
EST. AVERAGE GLUCOSE BLD GHB EST-MCNC: 146 MG/DL
HBA1C MFR BLD: 6.7 % (ref 4.8–5.6)

## 2022-08-30 ENCOUNTER — TELEPHONE (OUTPATIENT)
Dept: INTERNAL MEDICINE CLINIC | Facility: CLINIC | Age: 77
End: 2022-08-30

## 2022-08-30 RX ORDER — METFORMIN HYDROCHLORIDE 500 MG/1
1000 TABLET, EXTENDED RELEASE ORAL
Qty: 180 TABLET | Refills: 3 | Status: SHIPPED | OUTPATIENT
Start: 2022-08-30

## 2022-08-30 RX ORDER — FINASTERIDE 5 MG/1
5 TABLET, FILM COATED ORAL DAILY
Qty: 90 TABLET | Refills: 3 | Status: SHIPPED | OUTPATIENT
Start: 2022-08-30

## 2022-08-30 RX ORDER — CLOPIDOGREL BISULFATE 75 MG/1
75 TABLET ORAL DAILY
Qty: 90 TABLET | Refills: 3 | Status: SHIPPED | OUTPATIENT
Start: 2022-08-30

## 2022-08-30 RX ORDER — LISINOPRIL 10 MG/1
10 TABLET ORAL DAILY
Qty: 90 TABLET | Refills: 3 | Status: SHIPPED | OUTPATIENT
Start: 2022-08-30

## 2022-08-30 RX ORDER — ATORVASTATIN CALCIUM 80 MG/1
TABLET, FILM COATED ORAL
Qty: 90 TABLET | Refills: 3 | Status: SHIPPED | OUTPATIENT
Start: 2022-08-30

## 2022-08-30 RX ORDER — DOXAZOSIN MESYLATE 4 MG/1
4 TABLET ORAL 2 TIMES DAILY
Qty: 180 TABLET | Refills: 3 | Status: SHIPPED | OUTPATIENT
Start: 2022-08-30

## 2022-08-30 RX ORDER — GLIPIZIDE 5 MG/1
2.5 TABLET ORAL
Qty: 90 TABLET | Refills: 3 | Status: SHIPPED | OUTPATIENT
Start: 2022-08-30

## 2022-08-30 NOTE — TELEPHONE ENCOUNTER
The patient stopped taking Zoloft. He stated that he consulted with Dr. Jairo Staples about it. Renewals needed on the following medications.       Needing:   - Doxazosin--4 mg/still has one week supply/90 days supply/[facility]    - Glipizide--5 mg/still has one week supply/90 days supply/same pharmacy    - Clopidogrel--75 mg/still has one week supply/90 days supply/same pharmacy    - Lisinopril--10 mg/still has one week supply/90 days supply/same pharmacy    - Finasteride--5 mg/still has one week supply/90 days supply/same pharmacy    - Metformin--500 mg/still has one week supply/90 days supply/same pharmacy    - Atorvastatin--80 mg/still has one week supply/90 days supply/same pharmacy

## 2022-11-22 ENCOUNTER — OFFICE VISIT (OUTPATIENT)
Dept: INTERNAL MEDICINE CLINIC | Facility: CLINIC | Age: 77
End: 2022-11-22
Payer: MEDICARE

## 2022-11-22 VITALS
DIASTOLIC BLOOD PRESSURE: 84 MMHG | WEIGHT: 283 LBS | SYSTOLIC BLOOD PRESSURE: 144 MMHG | BODY MASS INDEX: 41.92 KG/M2 | HEIGHT: 69 IN

## 2022-11-22 DIAGNOSIS — E11.9 CONTROLLED TYPE 2 DIABETES MELLITUS WITHOUT COMPLICATION, WITHOUT LONG-TERM CURRENT USE OF INSULIN (HCC): ICD-10-CM

## 2022-11-22 DIAGNOSIS — E11.9 CONTROLLED TYPE 2 DIABETES MELLITUS WITHOUT COMPLICATION, WITHOUT LONG-TERM CURRENT USE OF INSULIN (HCC): Primary | ICD-10-CM

## 2022-11-22 DIAGNOSIS — I10 ESSENTIAL HYPERTENSION, BENIGN: ICD-10-CM

## 2022-11-22 DIAGNOSIS — Z23 NEEDS FLU SHOT: ICD-10-CM

## 2022-11-22 DIAGNOSIS — E78.2 MIXED HYPERLIPIDEMIA: ICD-10-CM

## 2022-11-22 DIAGNOSIS — K59.00 CONSTIPATION, UNSPECIFIED CONSTIPATION TYPE: ICD-10-CM

## 2022-11-22 LAB
CREAT UR-MCNC: 107 MG/DL
MICROALBUMIN UR-MCNC: 1.73 MG/DL (ref 0–3)
MICROALBUMIN/CREAT UR-RTO: 16 MG/G (ref 0–30)

## 2022-11-22 PROCEDURE — 1036F TOBACCO NON-USER: CPT | Performed by: INTERNAL MEDICINE

## 2022-11-22 PROCEDURE — 90694 VACC AIIV4 NO PRSRV 0.5ML IM: CPT | Performed by: INTERNAL MEDICINE

## 2022-11-22 PROCEDURE — 2022F DILAT RTA XM EVC RTNOPTHY: CPT | Performed by: INTERNAL MEDICINE

## 2022-11-22 PROCEDURE — 3074F SYST BP LT 130 MM HG: CPT | Performed by: INTERNAL MEDICINE

## 2022-11-22 PROCEDURE — G8427 DOCREV CUR MEDS BY ELIG CLIN: HCPCS | Performed by: INTERNAL MEDICINE

## 2022-11-22 PROCEDURE — 3078F DIAST BP <80 MM HG: CPT | Performed by: INTERNAL MEDICINE

## 2022-11-22 PROCEDURE — G0008 ADMIN INFLUENZA VIRUS VAC: HCPCS | Performed by: INTERNAL MEDICINE

## 2022-11-22 PROCEDURE — 1123F ACP DISCUSS/DSCN MKR DOCD: CPT | Performed by: INTERNAL MEDICINE

## 2022-11-22 PROCEDURE — 3044F HG A1C LEVEL LT 7.0%: CPT | Performed by: INTERNAL MEDICINE

## 2022-11-22 PROCEDURE — 3017F COLORECTAL CA SCREEN DOC REV: CPT | Performed by: INTERNAL MEDICINE

## 2022-11-22 PROCEDURE — G8417 CALC BMI ABV UP PARAM F/U: HCPCS | Performed by: INTERNAL MEDICINE

## 2022-11-22 PROCEDURE — G8484 FLU IMMUNIZE NO ADMIN: HCPCS | Performed by: INTERNAL MEDICINE

## 2022-11-22 PROCEDURE — 99214 OFFICE O/P EST MOD 30 MIN: CPT | Performed by: INTERNAL MEDICINE

## 2022-11-22 RX ORDER — LISINOPRIL 20 MG/1
20 TABLET ORAL DAILY
Qty: 90 TABLET | Refills: 3 | Status: SHIPPED | OUTPATIENT
Start: 2022-11-22

## 2022-11-22 RX ORDER — HYDROCHLOROTHIAZIDE 12.5 MG/1
12.5 TABLET ORAL DAILY
Qty: 90 TABLET | Refills: 3 | Status: SHIPPED | OUTPATIENT
Start: 2022-11-22

## 2022-11-22 ASSESSMENT — ENCOUNTER SYMPTOMS
SHORTNESS OF BREATH: 0
COUGH: 0
ABDOMINAL PAIN: 0
CONSTIPATION: 1

## 2022-11-22 NOTE — PROGRESS NOTES
SUBJECTIVE:   Satish Davis is a 78 y.o. male seen for a visit regarding   Chief Complaint   Patient presents with    Diabetes     Pt ethan for 4 month f/u     Hypertension    Medication Refill        Diabetes  He presents for his follow-up diabetic visit. He has type 2 diabetes mellitus. His disease course has been stable. Pertinent negatives for diabetes include no chest pain. Symptoms are stable. Current diabetic treatment includes oral agent (dual therapy). Home blood sugar record trend: checked qd -values under 120--A1C 6.7 done 7/2022-MA ratio 52 in March. Hypertension  This is a chronic problem. The current episode started more than 1 year ago. The problem is unchanged. Condition status: home in 145-146/70's. Pertinent negatives include no chest pain or shortness of breath. Medication Refill  Pertinent negatives include no abdominal pain, chest pain, coughing or myalgias. Hyperlipidemia  This is a chronic problem. The current episode started more than 1 year ago. The problem is controlled. Lipid results:  done 7/2022--on 80mg lipitor. Pertinent negatives include no chest pain, myalgias or shortness of breath. Past Medical History, Past Surgical History, Family history, Social History, and Medications were all reviewed with the patient today and updated as necessary.        Current Outpatient Medications   Medication Sig Dispense Refill    hydroCHLOROthiazide (HYDRODIURIL) 12.5 MG tablet Take 1 tablet by mouth daily 90 tablet 3    lisinopril (PRINIVIL;ZESTRIL) 20 MG tablet Take 1 tablet by mouth daily 90 tablet 3    atorvastatin (LIPITOR) 80 MG tablet TAKE 1 TABLET BY MOUTH  DAILY (Patient taking differently: Take 80 mg by mouth every evening) 90 tablet 3    metFORMIN (GLUCOPHAGE-XR) 500 MG extended release tablet Take 2 tablets by mouth Daily with supper 180 tablet 3    finasteride (PROSCAR) 5 MG tablet Take 1 tablet by mouth daily 90 tablet 3    glipiZIDE (GLUCOTROL) 5 MG tablet Take 0.5 tablets by mouth every morning (before breakfast) (Patient taking differently: Take 5 mg by mouth every morning (before breakfast)) 90 tablet 3    doxazosin (CARDURA) 4 MG tablet Take 1 tablet by mouth in the morning and at bedtime 180 tablet 3    clopidogrel (PLAVIX) 75 MG tablet Take 1 tablet by mouth daily 90 tablet 3    polyethylene glycol (GLYCOLAX) 17 GM/SCOOP powder Take 17 g by mouth daily       No current facility-administered medications for this visit. No Known Allergies  Patient Active Problem List   Diagnosis    Severe obesity ([address]    Essential hypertension, benign    Dorsolateral medullary syndrome    Mixed hyperlipidemia    Thyroid nodule    Controlled type 2 diabetes mellitus without complication, without long-term current use of insulin ([facility])    Hypoxia    Numbness and tingling in left hand    History of CVA with residual deficit     Social History     Tobacco Use    Smoking status: Never    Smokeless tobacco: Never   Substance Use Topics    Alcohol use: Never          Review of Systems   Respiratory:  Negative for cough and shortness of breath. Cardiovascular:  Negative for chest pain. Gastrointestinal:  Positive for constipation. Negative for abdominal pain. Musculoskeletal:  Negative for myalgias. OBJECTIVE:  BP (!) 144/84   Ht 5' 9\" (1.753 m)   Wt 283 lb (128.4 kg)   BMI 41.79 kg/m²    I had 150/84  Physical Exam  Constitutional:       General: He is not in acute distress. Appearance: Normal appearance. Cardiovascular:      Rate and Rhythm: Normal rate and regular rhythm. Heart sounds: No murmur heard. Pulmonary:      Breath sounds: No wheezing or rhonchi. Medical problems and test results were reviewed with the patient today. ASSESSMENT and PLAN     1. Controlled type 2 diabetes mellitus without complication, without long-term current use of insulin ([facility])  -     Microalbumin / Creatinine Urine Ratio; Future  -     Hemoglobin A1C; Future  2. Essential hypertension, benign  -     hydroCHLOROthiazide (HYDRODIURIL) 12.5 MG tablet; Take 1 tablet by mouth daily, Disp-90 tablet, R-3Normal  -     lisinopril (PRINIVIL;ZESTRIL) 20 MG tablet; Take 1 tablet by mouth daily, Disp-90 tablet, R-3Normal  3. Mixed hyperlipidemia  4. Needs flu shot  -     Influenza, FLUAD, (age 72 y+), IM, Preservative Free, 0.5 mL  5. Constipation, unspecified constipation type     Last A1C good--use miralax 3-4 x per week-lipid was ok; increase lisinonpril to 20mg for BP  lab results and schedule for future lab studies reviewed with patient  reviewed medications and side effects in detail     No follow-ups on file.

## 2022-11-23 LAB
EST. AVERAGE GLUCOSE BLD GHB EST-MCNC: 174 MG/DL
HBA1C MFR BLD: 7.7 % (ref 4.8–5.6)

## 2022-12-02 DIAGNOSIS — E11.9 CONTROLLED TYPE 2 DIABETES MELLITUS WITHOUT COMPLICATION, WITHOUT LONG-TERM CURRENT USE OF INSULIN (HCC): Primary | ICD-10-CM

## 2022-12-02 RX ORDER — GLIPIZIDE 5 MG/1
7.5 TABLET ORAL
Qty: 135 TABLET | Refills: 3 | Status: SHIPPED | OUTPATIENT
Start: 2022-12-02

## 2022-12-02 NOTE — TELEPHONE ENCOUNTER
----- Message from Juan Francisco Bryson MD sent at 11/23/2022  1:22 PM EST -----  A1C up to 7.7 --use 1.5 tabs of the 5 mg glipizide dose

## 2022-12-02 NOTE — TELEPHONE ENCOUNTER
Pt notified of lab results per Dr. Joseph Hutchinson. Pt agreed to increase glipizide to 1.5 tabs daily.   Rx pended for Dr. Joseph Hutchinson to send to [facility] per pt request.
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
## 2023-03-24 ENCOUNTER — OFFICE VISIT (OUTPATIENT)
Dept: INTERNAL MEDICINE CLINIC | Facility: CLINIC | Age: 78
End: 2023-03-24
Payer: MEDICARE

## 2023-03-24 VITALS
DIASTOLIC BLOOD PRESSURE: 65 MMHG | WEIGHT: 284 LBS | SYSTOLIC BLOOD PRESSURE: 120 MMHG | HEIGHT: 69 IN | BODY MASS INDEX: 42.06 KG/M2

## 2023-03-24 DIAGNOSIS — T46.4X5A COUGH DUE TO ACE INHIBITOR: ICD-10-CM

## 2023-03-24 DIAGNOSIS — E11.9 CONTROLLED TYPE 2 DIABETES MELLITUS WITHOUT COMPLICATION, WITHOUT LONG-TERM CURRENT USE OF INSULIN (HCC): ICD-10-CM

## 2023-03-24 DIAGNOSIS — E66.01 OBESITY, CLASS III, BMI 40-49.9 (MORBID OBESITY) (HCC): ICD-10-CM

## 2023-03-24 DIAGNOSIS — I10 ESSENTIAL HYPERTENSION, BENIGN: Primary | ICD-10-CM

## 2023-03-24 DIAGNOSIS — R05.8 COUGH DUE TO ACE INHIBITOR: ICD-10-CM

## 2023-03-24 DIAGNOSIS — Z23 NEED FOR VACCINATION FOR STREP PNEUMONIAE: ICD-10-CM

## 2023-03-24 LAB
ALBUMIN SERPL-MCNC: 3.7 G/DL (ref 3.2–4.6)
ALBUMIN/GLOB SERPL: 1.2 (ref 0.4–1.6)
ALP SERPL-CCNC: 93 U/L (ref 50–136)
ALT SERPL-CCNC: 14 U/L (ref 12–65)
ANION GAP SERPL CALC-SCNC: 5 MMOL/L (ref 2–11)
AST SERPL-CCNC: 10 U/L (ref 15–37)
BASOPHILS # BLD: 0 K/UL (ref 0–0.2)
BASOPHILS NFR BLD: 1 % (ref 0–2)
BILIRUB SERPL-MCNC: 0.5 MG/DL (ref 0.2–1.1)
BUN SERPL-MCNC: 22 MG/DL (ref 8–23)
CALCIUM SERPL-MCNC: 9.7 MG/DL (ref 8.3–10.4)
CHLORIDE SERPL-SCNC: 105 MMOL/L (ref 101–110)
CO2 SERPL-SCNC: 30 MMOL/L (ref 21–32)
CREAT SERPL-MCNC: 1.1 MG/DL (ref 0.8–1.5)
DIFFERENTIAL METHOD BLD: NORMAL
EOSINOPHIL # BLD: 0.1 K/UL (ref 0–0.8)
EOSINOPHIL NFR BLD: 2 % (ref 0.5–7.8)
ERYTHROCYTE [DISTWIDTH] IN BLOOD BY AUTOMATED COUNT: 14.2 % (ref 11.9–14.6)
EST. AVERAGE GLUCOSE BLD GHB EST-MCNC: 174 MG/DL
GLOBULIN SER CALC-MCNC: 3.1 G/DL (ref 2.8–4.5)
GLUCOSE SERPL-MCNC: 163 MG/DL (ref 65–100)
HBA1C MFR BLD: 7.7 % (ref 4.8–5.6)
HCT VFR BLD AUTO: 45.6 % (ref 41.1–50.3)
HGB BLD-MCNC: 14.5 G/DL (ref 13.6–17.2)
IMM GRANULOCYTES # BLD AUTO: 0 K/UL (ref 0–0.5)
IMM GRANULOCYTES NFR BLD AUTO: 0 % (ref 0–5)
LYMPHOCYTES # BLD: 1.8 K/UL (ref 0.5–4.6)
LYMPHOCYTES NFR BLD: 24 % (ref 13–44)
MCH RBC QN AUTO: 29.5 PG (ref 26.1–32.9)
MCHC RBC AUTO-ENTMCNC: 31.8 G/DL (ref 31.4–35)
MCV RBC AUTO: 92.9 FL (ref 82–102)
MONOCYTES # BLD: 0.6 K/UL (ref 0.1–1.3)
MONOCYTES NFR BLD: 8 % (ref 4–12)
NEUTS SEG # BLD: 4.9 K/UL (ref 1.7–8.2)
NEUTS SEG NFR BLD: 65 % (ref 43–78)
NRBC # BLD: 0 K/UL (ref 0–0.2)
PLATELET # BLD AUTO: 267 K/UL (ref 150–450)
PMV BLD AUTO: 11.8 FL (ref 9.4–12.3)
POTASSIUM SERPL-SCNC: 4 MMOL/L (ref 3.5–5.1)
PROT SERPL-MCNC: 6.8 G/DL (ref 6.3–8.2)
RBC # BLD AUTO: 4.91 M/UL (ref 4.23–5.6)
SODIUM SERPL-SCNC: 140 MMOL/L (ref 133–143)
WBC # BLD AUTO: 7.6 K/UL (ref 4.3–11.1)

## 2023-03-24 PROCEDURE — 90677 PCV20 VACCINE IM: CPT | Performed by: INTERNAL MEDICINE

## 2023-03-24 PROCEDURE — G0009 ADMIN PNEUMOCOCCAL VACCINE: HCPCS | Performed by: INTERNAL MEDICINE

## 2023-03-24 PROCEDURE — 3078F DIAST BP <80 MM HG: CPT | Performed by: INTERNAL MEDICINE

## 2023-03-24 PROCEDURE — 3074F SYST BP LT 130 MM HG: CPT | Performed by: INTERNAL MEDICINE

## 2023-03-24 PROCEDURE — 1123F ACP DISCUSS/DSCN MKR DOCD: CPT | Performed by: INTERNAL MEDICINE

## 2023-03-24 PROCEDURE — 99214 OFFICE O/P EST MOD 30 MIN: CPT | Performed by: INTERNAL MEDICINE

## 2023-03-24 SDOH — ECONOMIC STABILITY: HOUSING INSECURITY
IN THE LAST 12 MONTHS, WAS THERE A TIME WHEN YOU DID NOT HAVE A STEADY PLACE TO SLEEP OR SLEPT IN A SHELTER (INCLUDING NOW)?: NO

## 2023-03-24 SDOH — ECONOMIC STABILITY: FOOD INSECURITY: WITHIN THE PAST 12 MONTHS, THE FOOD YOU BOUGHT JUST DIDN'T LAST AND YOU DIDN'T HAVE MONEY TO GET MORE.: NEVER TRUE

## 2023-03-24 SDOH — ECONOMIC STABILITY: INCOME INSECURITY: HOW HARD IS IT FOR YOU TO PAY FOR THE VERY BASICS LIKE FOOD, HOUSING, MEDICAL CARE, AND HEATING?: NOT HARD AT ALL

## 2023-03-24 SDOH — ECONOMIC STABILITY: FOOD INSECURITY: WITHIN THE PAST 12 MONTHS, YOU WORRIED THAT YOUR FOOD WOULD RUN OUT BEFORE YOU GOT MONEY TO BUY MORE.: NEVER TRUE

## 2023-03-24 ASSESSMENT — ENCOUNTER SYMPTOMS
WHEEZING: 0
SHORTNESS OF BREATH: 0
COUGH: 1

## 2023-03-24 ASSESSMENT — PATIENT HEALTH QUESTIONNAIRE - PHQ9
SUM OF ALL RESPONSES TO PHQ QUESTIONS 1-9: 0
SUM OF ALL RESPONSES TO PHQ QUESTIONS 1-9: 0
2. FEELING DOWN, DEPRESSED OR HOPELESS: 0
SUM OF ALL RESPONSES TO PHQ9 QUESTIONS 1 & 2: 0
SUM OF ALL RESPONSES TO PHQ QUESTIONS 1-9: 0
1. LITTLE INTEREST OR PLEASURE IN DOING THINGS: 0
SUM OF ALL RESPONSES TO PHQ QUESTIONS 1-9: 0

## 2023-03-24 NOTE — PROGRESS NOTES
(CARDURA) 4 MG tablet Take 1 tablet by mouth in the morning and at bedtime 180 tablet 3    clopidogrel (PLAVIX) 75 MG tablet Take 1 tablet by mouth daily 90 tablet 3    polyethylene glycol (GLYCOLAX) 17 GM/SCOOP powder Take 17 g by mouth daily       No current facility-administered medications for this visit. No Known Allergies  Patient Active Problem List   Diagnosis    Severe obesity ([facility] [address]    Essential hypertension, benign    Dorsolateral medullary syndrome    Mixed hyperlipidemia    Thyroid nodule    Controlled type 2 diabetes mellitus without complication, without long-term current use of insulin (HCC)    Hypoxia    Numbness and tingling in left hand    History of CVA with residual deficit     Social History     Tobacco Use    Smoking status: Never    Smokeless tobacco: Never   Substance Use Topics    Alcohol use: Never          Review of Systems   Respiratory:  Positive for cough. Negative for shortness of breath and wheezing. Cardiovascular:         EF 65-70-done 2021       OBJECTIVE:  /65   Ht 5' 9\" (1.753 m)   Wt 284 lb (128.8 kg)   BMI 41.94 kg/m²      Physical Exam  Constitutional:       General: He is not in acute distress. Appearance: Normal appearance. Cardiovascular:      Rate and Rhythm: Normal rate and regular rhythm. Pulmonary:      Breath sounds: No wheezing, rhonchi or rales. Medical problems and test results were reviewed with the patient today. ASSESSMENT and PLAN     1. Essential hypertension, benign  2. Obesity, Class III, BMI 40-49.9 (morbid obesity) ([facility] [address]  3. Controlled type 2 diabetes mellitus without complication, without long-term current use of insulin (HCC)  -     Hemoglobin A1C; Future  -     Comprehensive Metabolic Panel; Future  -     CBC with Auto Differential; Future  4. Cough due to ACE inhibitor  5.  Need for vaccination for Strep pneumoniae  -     Pneumococcal, PCV20, PREVNAR 21, (age 25 yrs+), IM, PF   Stop lisinopril for the cough

## 2023-06-05 NOTE — PROGRESS NOTES
From: Gilbert Horton  To: Carolyn Lopez  Sent: 6/5/2023 12:04 PM CDT  Subject: Blood work     Scheduled for labs tomorrow at noon would it be possible for you to place an order for my 6mon lab work foe my panels and A1C as well so I cab do it all at once?   Problem: Falls - Risk of  Goal: *Absence of Falls  Description: Document [place] Flow Fall Risk and appropriate interventions in the flowsheet.   Outcome: Progressing Towards Goal  Note: Fall Risk Interventions:  Mobility Interventions: Communicate number of staff needed for ambulation/transfer, Patient to call before getting OOB, Strengthening exercises (ROM-active/passive), Utilize walker, cane, or other assistive device         Medication Interventions: Evaluate medications/consider consulting pharmacy, Patient to call before getting OOB, Teach patient to arise slowly    Elimination Interventions: Call light in reach, Patient to call for help with toileting needs, Toilet paper/wipes in reach, Urinal in reach    History of Falls Interventions: Consult care management for discharge planning, Door open when patient unattended, Evaluate medications/consider consulting pharmacy

## 2023-07-02 DIAGNOSIS — R33.8 BENIGN PROSTATIC HYPERPLASIA WITH URINARY RETENTION: ICD-10-CM

## 2023-07-02 DIAGNOSIS — N40.1 BENIGN PROSTATIC HYPERPLASIA WITH URINARY RETENTION: ICD-10-CM

## 2023-07-03 RX ORDER — METFORMIN HYDROCHLORIDE 500 MG/1
1000 TABLET, EXTENDED RELEASE ORAL
Qty: 180 TABLET | Refills: 3 | Status: SHIPPED | OUTPATIENT
Start: 2023-07-03

## 2023-07-03 RX ORDER — DOXAZOSIN MESYLATE 4 MG/1
4 TABLET ORAL 2 TIMES DAILY
Qty: 180 TABLET | Refills: 3 | Status: SHIPPED | OUTPATIENT
Start: 2023-07-03

## 2023-07-26 ENCOUNTER — OFFICE VISIT (OUTPATIENT)
Dept: INTERNAL MEDICINE CLINIC | Facility: CLINIC | Age: 78
End: 2023-07-26
Payer: MEDICARE

## 2023-07-26 VITALS
HEIGHT: 69 IN | HEART RATE: 69 BPM | BODY MASS INDEX: 42.21 KG/M2 | SYSTOLIC BLOOD PRESSURE: 164 MMHG | OXYGEN SATURATION: 95 % | DIASTOLIC BLOOD PRESSURE: 76 MMHG | WEIGHT: 285 LBS

## 2023-07-26 DIAGNOSIS — I10 ESSENTIAL HYPERTENSION, BENIGN: ICD-10-CM

## 2023-07-26 DIAGNOSIS — I69.30 HISTORY OF CVA WITH RESIDUAL DEFICIT: ICD-10-CM

## 2023-07-26 DIAGNOSIS — E11.9 CONTROLLED TYPE 2 DIABETES MELLITUS WITHOUT COMPLICATION, WITHOUT LONG-TERM CURRENT USE OF INSULIN (HCC): Primary | ICD-10-CM

## 2023-07-26 DIAGNOSIS — E78.2 MIXED HYPERLIPIDEMIA: ICD-10-CM

## 2023-07-26 DIAGNOSIS — E11.9 CONTROLLED TYPE 2 DIABETES MELLITUS WITHOUT COMPLICATION, WITHOUT LONG-TERM CURRENT USE OF INSULIN (HCC): ICD-10-CM

## 2023-07-26 LAB
CHOLEST SERPL-MCNC: 166 MG/DL
HDLC SERPL-MCNC: 35 MG/DL (ref 40–60)
HDLC SERPL: 4.7
LDLC SERPL CALC-MCNC: 99.4 MG/DL
TRIGL SERPL-MCNC: 158 MG/DL (ref 35–150)
VLDLC SERPL CALC-MCNC: 31.6 MG/DL (ref 6–23)

## 2023-07-26 PROCEDURE — 3077F SYST BP >= 140 MM HG: CPT | Performed by: INTERNAL MEDICINE

## 2023-07-26 PROCEDURE — 3051F HG A1C>EQUAL 7.0%<8.0%: CPT | Performed by: INTERNAL MEDICINE

## 2023-07-26 PROCEDURE — 3078F DIAST BP <80 MM HG: CPT | Performed by: INTERNAL MEDICINE

## 2023-07-26 PROCEDURE — 1123F ACP DISCUSS/DSCN MKR DOCD: CPT | Performed by: INTERNAL MEDICINE

## 2023-07-26 PROCEDURE — 99214 OFFICE O/P EST MOD 30 MIN: CPT | Performed by: INTERNAL MEDICINE

## 2023-07-26 RX ORDER — GLIPIZIDE 5 MG/1
7.5 TABLET ORAL
Qty: 135 TABLET | Refills: 3 | Status: SHIPPED | OUTPATIENT
Start: 2023-07-26

## 2023-07-26 RX ORDER — CLOPIDOGREL BISULFATE 75 MG/1
75 TABLET ORAL DAILY
Qty: 90 TABLET | Refills: 3 | Status: SHIPPED | OUTPATIENT
Start: 2023-07-26

## 2023-07-26 RX ORDER — ERYTHROMYCIN 5 MG/G
OINTMENT OPHTHALMIC
COMMUNITY
Start: 2023-07-20

## 2023-07-26 RX ORDER — HYDROCHLOROTHIAZIDE 25 MG/1
25 TABLET ORAL DAILY
Qty: 90 TABLET | Refills: 3 | Status: SHIPPED | OUTPATIENT
Start: 2023-07-26

## 2023-07-26 RX ORDER — ATORVASTATIN CALCIUM 80 MG/1
80 TABLET, FILM COATED ORAL EVERY EVENING
Qty: 90 TABLET | Refills: 3 | Status: SHIPPED | OUTPATIENT
Start: 2023-07-26

## 2023-07-26 RX ORDER — VALSARTAN 80 MG/1
80 TABLET ORAL DAILY
Qty: 90 TABLET | Refills: 3 | Status: SHIPPED | OUTPATIENT
Start: 2023-07-26

## 2023-07-26 RX ORDER — LISINOPRIL 20 MG/1
20 TABLET ORAL DAILY
Qty: 90 TABLET | Refills: 3 | Status: CANCELLED | OUTPATIENT
Start: 2023-07-26

## 2023-07-26 ASSESSMENT — PATIENT HEALTH QUESTIONNAIRE - PHQ9
SUM OF ALL RESPONSES TO PHQ QUESTIONS 1-9: 0
SUM OF ALL RESPONSES TO PHQ QUESTIONS 1-9: 0
1. LITTLE INTEREST OR PLEASURE IN DOING THINGS: 0
SUM OF ALL RESPONSES TO PHQ QUESTIONS 1-9: 0
SUM OF ALL RESPONSES TO PHQ QUESTIONS 1-9: 0
2. FEELING DOWN, DEPRESSED OR HOPELESS: 0
SUM OF ALL RESPONSES TO PHQ9 QUESTIONS 1 & 2: 0

## 2023-07-27 LAB
EST. AVERAGE GLUCOSE BLD GHB EST-MCNC: 194 MG/DL
HBA1C MFR BLD: 8.4 % (ref 4.8–5.6)

## 2023-08-11 DIAGNOSIS — R33.8 BENIGN PROSTATIC HYPERPLASIA WITH URINARY RETENTION: ICD-10-CM

## 2023-08-11 DIAGNOSIS — N40.1 BENIGN PROSTATIC HYPERPLASIA WITH URINARY RETENTION: ICD-10-CM

## 2023-08-14 RX ORDER — FINASTERIDE 5 MG/1
5 TABLET, FILM COATED ORAL DAILY
Qty: 90 TABLET | Refills: 3 | OUTPATIENT
Start: 2023-08-14

## 2023-11-20 ENCOUNTER — TELEPHONE (OUTPATIENT)
Dept: INTERNAL MEDICINE CLINIC | Facility: CLINIC | Age: 78
End: 2023-11-20

## 2023-11-20 DIAGNOSIS — E78.2 MIXED HYPERLIPIDEMIA: ICD-10-CM

## 2023-11-20 DIAGNOSIS — E11.65 CONTROLLED TYPE 2 DIABETES MELLITUS WITH HYPERGLYCEMIA, WITHOUT LONG-TERM CURRENT USE OF INSULIN (HCC): ICD-10-CM

## 2023-11-20 DIAGNOSIS — I10 ESSENTIAL HYPERTENSION, BENIGN: Primary | ICD-10-CM

## 2023-11-20 NOTE — TELEPHONE ENCOUNTER
----- Message from Renéeestefanyvale Wolfmargaret sent at 11/20/2023  8:40 AM EST -----  Subject: Referral Request    Reason for referral request? Reinaldo Hawk prior to 11/28 appt. Provider patient wants to be referred to(if known):     Provider Phone Number(if known):     Additional Information for Provider?   ---------------------------------------------------------------------------  --------------  [address]    [phone]; OK to leave message on voicemail  ---------------------------------------------------------------------------  --------------

## 2023-11-21 DIAGNOSIS — E78.2 MIXED HYPERLIPIDEMIA: ICD-10-CM

## 2023-11-21 DIAGNOSIS — E11.65 CONTROLLED TYPE 2 DIABETES MELLITUS WITH HYPERGLYCEMIA, WITHOUT LONG-TERM CURRENT USE OF INSULIN (HCC): ICD-10-CM

## 2023-11-21 DIAGNOSIS — I10 ESSENTIAL HYPERTENSION, BENIGN: ICD-10-CM

## 2023-11-21 LAB
ALBUMIN SERPL-MCNC: 3.6 G/DL (ref 3.2–4.6)
ALBUMIN/GLOB SERPL: 1 (ref 0.4–1.6)
ALP SERPL-CCNC: 115 U/L (ref 50–136)
ALT SERPL-CCNC: 15 U/L (ref 12–65)
ANION GAP SERPL CALC-SCNC: 9 MMOL/L (ref 2–11)
APPEARANCE UR: CLEAR
AST SERPL-CCNC: 4 U/L (ref 15–37)
BASOPHILS # BLD: 0 K/UL (ref 0–0.2)
BASOPHILS NFR BLD: 1 % (ref 0–2)
BILIRUB SERPL-MCNC: 0.5 MG/DL (ref 0.2–1.1)
BILIRUB UR QL: NEGATIVE
BUN SERPL-MCNC: 18 MG/DL (ref 8–23)
CALCIUM SERPL-MCNC: 9.5 MG/DL (ref 8.3–10.4)
CHLORIDE SERPL-SCNC: 103 MMOL/L (ref 101–110)
CHOLEST SERPL-MCNC: 199 MG/DL
CO2 SERPL-SCNC: 30 MMOL/L (ref 21–32)
COLOR UR: NORMAL
CREAT SERPL-MCNC: 1.3 MG/DL (ref 0.8–1.5)
CREAT UR-MCNC: 147 MG/DL
DIFFERENTIAL METHOD BLD: ABNORMAL
EOSINOPHIL # BLD: 0.1 K/UL (ref 0–0.8)
EOSINOPHIL NFR BLD: 2 % (ref 0.5–7.8)
ERYTHROCYTE [DISTWIDTH] IN BLOOD BY AUTOMATED COUNT: 14.6 % (ref 11.9–14.6)
GLOBULIN SER CALC-MCNC: 3.6 G/DL (ref 2.8–4.5)
GLUCOSE SERPL-MCNC: 199 MG/DL (ref 65–100)
GLUCOSE UR STRIP.AUTO-MCNC: NEGATIVE MG/DL
HCT VFR BLD AUTO: 46.8 % (ref 41.1–50.3)
HDLC SERPL-MCNC: 35 MG/DL (ref 40–60)
HDLC SERPL: 5.7
HGB BLD-MCNC: 14.6 G/DL (ref 13.6–17.2)
HGB UR QL STRIP: NEGATIVE
IMM GRANULOCYTES # BLD AUTO: 0 K/UL (ref 0–0.5)
IMM GRANULOCYTES NFR BLD AUTO: 0 % (ref 0–5)
KETONES UR QL STRIP.AUTO: NEGATIVE MG/DL
LDLC SERPL CALC-MCNC: 128.4 MG/DL
LEUKOCYTE ESTERASE UR QL STRIP.AUTO: NEGATIVE
LYMPHOCYTES # BLD: 2.2 K/UL (ref 0.5–4.6)
LYMPHOCYTES NFR BLD: 32 % (ref 13–44)
MCH RBC QN AUTO: 29.1 PG (ref 26.1–32.9)
MCHC RBC AUTO-ENTMCNC: 31.2 G/DL (ref 31.4–35)
MCV RBC AUTO: 93.2 FL (ref 82–102)
MICROALBUMIN UR-MCNC: 2.1 MG/DL
MICROALBUMIN/CREAT UR-RTO: 14 MG/G (ref 0–30)
MONOCYTES # BLD: 0.5 K/UL (ref 0.1–1.3)
MONOCYTES NFR BLD: 7 % (ref 4–12)
NEUTS SEG # BLD: 4.1 K/UL (ref 1.7–8.2)
NEUTS SEG NFR BLD: 58 % (ref 43–78)
NITRITE UR QL STRIP.AUTO: NEGATIVE
NRBC # BLD: 0 K/UL (ref 0–0.2)
PH UR STRIP: 5 (ref 5–9)
PLATELET # BLD AUTO: 288 K/UL (ref 150–450)
PMV BLD AUTO: 11.9 FL (ref 9.4–12.3)
POTASSIUM SERPL-SCNC: 3.8 MMOL/L (ref 3.5–5.1)
PROT SERPL-MCNC: 7.2 G/DL (ref 6.3–8.2)
PROT UR STRIP-MCNC: NEGATIVE MG/DL
RBC # BLD AUTO: 5.02 M/UL (ref 4.23–5.6)
SODIUM SERPL-SCNC: 142 MMOL/L (ref 133–143)
SP GR UR REFRACTOMETRY: 1.01 (ref 1–1.02)
TRIGL SERPL-MCNC: 178 MG/DL (ref 35–150)
TSH W FREE THYROID IF ABNORMAL: 2.13 UIU/ML (ref 0.36–3.74)
UROBILINOGEN UR QL STRIP.AUTO: 1 EU/DL (ref 0.2–1)
VLDLC SERPL CALC-MCNC: 35.6 MG/DL (ref 6–23)
WBC # BLD AUTO: 7.1 K/UL (ref 4.3–11.1)

## 2023-11-22 LAB
EST. AVERAGE GLUCOSE BLD GHB EST-MCNC: 203 MG/DL
HBA1C MFR BLD: 8.7 % (ref 4.8–5.6)

## 2023-11-28 ENCOUNTER — OFFICE VISIT (OUTPATIENT)
Dept: INTERNAL MEDICINE CLINIC | Facility: CLINIC | Age: 78
End: 2023-11-28
Payer: MEDICARE

## 2023-11-28 VITALS
HEART RATE: 74 BPM | WEIGHT: 287 LBS | HEIGHT: 69 IN | RESPIRATION RATE: 20 BRPM | SYSTOLIC BLOOD PRESSURE: 138 MMHG | OXYGEN SATURATION: 94 % | BODY MASS INDEX: 42.51 KG/M2 | DIASTOLIC BLOOD PRESSURE: 80 MMHG

## 2023-11-28 DIAGNOSIS — I69.30 HISTORY OF CVA WITH RESIDUAL DEFICIT: ICD-10-CM

## 2023-11-28 DIAGNOSIS — I10 ESSENTIAL HYPERTENSION, BENIGN: ICD-10-CM

## 2023-11-28 DIAGNOSIS — L98.9 SKIN LESIONS: ICD-10-CM

## 2023-11-28 DIAGNOSIS — E11.65 TYPE 2 DIABETES MELLITUS WITH HYPERGLYCEMIA, WITHOUT LONG-TERM CURRENT USE OF INSULIN (HCC): ICD-10-CM

## 2023-11-28 DIAGNOSIS — Z00.00 MEDICARE ANNUAL WELLNESS VISIT, SUBSEQUENT: Primary | ICD-10-CM

## 2023-11-28 DIAGNOSIS — R14.0 ABDOMINAL DISTENSION: ICD-10-CM

## 2023-11-28 DIAGNOSIS — E04.1 THYROID NODULE: ICD-10-CM

## 2023-11-28 DIAGNOSIS — R79.89 ELEVATED SERUM CREATININE: ICD-10-CM

## 2023-11-28 PROCEDURE — 1123F ACP DISCUSS/DSCN MKR DOCD: CPT | Performed by: STUDENT IN AN ORGANIZED HEALTH CARE EDUCATION/TRAINING PROGRAM

## 2023-11-28 PROCEDURE — 3079F DIAST BP 80-89 MM HG: CPT | Performed by: STUDENT IN AN ORGANIZED HEALTH CARE EDUCATION/TRAINING PROGRAM

## 2023-11-28 PROCEDURE — 3052F HG A1C>EQUAL 8.0%<EQUAL 9.0%: CPT | Performed by: STUDENT IN AN ORGANIZED HEALTH CARE EDUCATION/TRAINING PROGRAM

## 2023-11-28 PROCEDURE — 3075F SYST BP GE 130 - 139MM HG: CPT | Performed by: STUDENT IN AN ORGANIZED HEALTH CARE EDUCATION/TRAINING PROGRAM

## 2023-11-28 PROCEDURE — G0439 PPPS, SUBSEQ VISIT: HCPCS | Performed by: STUDENT IN AN ORGANIZED HEALTH CARE EDUCATION/TRAINING PROGRAM

## 2023-11-28 RX ORDER — LOSARTAN POTASSIUM 25 MG/1
25 TABLET ORAL DAILY
Qty: 90 TABLET | Refills: 1 | Status: SHIPPED | OUTPATIENT
Start: 2023-11-28

## 2023-11-28 RX ORDER — ORAL SEMAGLUTIDE 3 MG/1
3 TABLET ORAL DAILY
Qty: 30 TABLET | Refills: 0 | Status: SHIPPED | OUTPATIENT
Start: 2023-11-28

## 2023-11-28 RX ORDER — METFORMIN HYDROCHLORIDE 500 MG/1
TABLET, EXTENDED RELEASE ORAL
Qty: 90 TABLET | Refills: 0
Start: 2023-11-28

## 2023-11-28 RX ORDER — GLIPIZIDE 5 MG/1
10 TABLET ORAL
Qty: 135 TABLET | Refills: 3
Start: 2023-11-28

## 2023-11-28 ASSESSMENT — PATIENT HEALTH QUESTIONNAIRE - PHQ9
SUM OF ALL RESPONSES TO PHQ QUESTIONS 1-9: 0
2. FEELING DOWN, DEPRESSED OR HOPELESS: 0
1. LITTLE INTEREST OR PLEASURE IN DOING THINGS: 0
SUM OF ALL RESPONSES TO PHQ QUESTIONS 1-9: 0
SUM OF ALL RESPONSES TO PHQ9 QUESTIONS 1 & 2: 0
SUM OF ALL RESPONSES TO PHQ QUESTIONS 1-9: 0
SUM OF ALL RESPONSES TO PHQ QUESTIONS 1-9: 0

## 2023-11-28 ASSESSMENT — LIFESTYLE VARIABLES: HOW OFTEN DO YOU HAVE A DRINK CONTAINING ALCOHOL: NEVER

## 2023-11-28 NOTE — PROGRESS NOTES
distress  Skin: warm and dry, no rash or erythema  Head: normocephalic and atraumatic  Eyes: pupils equal, round, and reactive to light, extraocular eye movements intact, conjunctivae normal  ENT: Nose without deformity, nasal mucosa and turbinates normal without polyps  Neck: supple and non-tender without mass, no thyromegaly or thyroid nodules, no cervical lymphadenopathy  Pulmonary/Chest: clear to auscultation bilaterally- no wheezes, rales or rhonchi, normal air movement, no respiratory distress  Cardiovascular: normal rate, regular rhythm, normal S1 and S2, no murmurs, rubs, clicks, or gallops, distal pulses intact, no carotid bruits  Abdomen: soft, non-tender, mildly distended with tympany, normal bowel sounds, no masses or organomegaly  Extremities: no cyanosis, clubbing or edema  Musculoskeletal: normal range of motion, no joint swelling, deformity or tenderness        Allergies   Allergen Reactions    Lisinopril Cough     Prior to Visit Medications    Medication Sig Taking?  Authorizing Provider   losartan (COZAAR) 25 MG tablet Take 1 tablet by mouth daily Yes Deon Cedillo MD   Semaglutide (RYBELSUS) 3 MG TABS Take 3 mg by mouth daily Yes Deon Cedillo MD   metFORMIN (GLUCOPHAGE-XR) 500 MG extended release tablet 1 tab qam and 2 qhs Yes Didi Cedillo MD   glipiZIDE (GLUCOTROL) 5 MG tablet Take 2 tablets by mouth every morning (before breakfast) Yes Deon Cedillo MD   erythromycin (ROMYCIN) 5 MG/GM ophthalmic ointment APPLY A THIN LAYER TO BOTH EYELIDS AT BEDTIME Yes Anton Velasco MD   atorvastatin (LIPITOR) 80 MG tablet Take 1 tablet by mouth every evening Yes Jane Littlejohn MD   clopidogrel (PLAVIX) 75 MG tablet Take 1 tablet by mouth daily Yes Jane Littlejohn MD   hydroCHLOROthiazide (HYDRODIURIL) 25 MG tablet Take 1 tablet by mouth daily Yes Lin Harvey MD   doxazosin (CARDURA) 4 MG tablet TAKE 1 TABLET BY MOUTH IN  THE MORNING AND AT BEDTIME Yes Jane Littlejohn MD

## 2023-11-30 ENCOUNTER — HOSPITAL ENCOUNTER (OUTPATIENT)
Dept: ULTRASOUND IMAGING | Age: 78
Discharge: HOME | End: 2023-12-02
Payer: MEDICARE

## 2023-11-30 DIAGNOSIS — R14.0 ABDOMINAL DISTENSION: ICD-10-CM

## 2023-11-30 PROCEDURE — 76700 US EXAM ABDOM COMPLETE: CPT

## 2024-01-30 ENCOUNTER — OFFICE VISIT (OUTPATIENT)
Dept: FAMILY MEDICINE CLINIC | Facility: CLINIC | Age: 79
End: 2024-01-30
Payer: MEDICARE

## 2024-01-30 VITALS
DIASTOLIC BLOOD PRESSURE: 80 MMHG | HEART RATE: 73 BPM | TEMPERATURE: 97.5 F | HEIGHT: 69 IN | WEIGHT: 282.6 LBS | OXYGEN SATURATION: 96 % | BODY MASS INDEX: 41.86 KG/M2 | SYSTOLIC BLOOD PRESSURE: 148 MMHG | RESPIRATION RATE: 16 BRPM

## 2024-01-30 DIAGNOSIS — Z12.83 SKIN CANCER SCREENING: Primary | ICD-10-CM

## 2024-01-30 DIAGNOSIS — E78.2 MIXED HYPERLIPIDEMIA: ICD-10-CM

## 2024-01-30 DIAGNOSIS — E66.01 OBESITY, CLASS III, BMI 40-49.9 (MORBID OBESITY) (HCC): ICD-10-CM

## 2024-01-30 DIAGNOSIS — I10 ESSENTIAL HYPERTENSION, BENIGN: ICD-10-CM

## 2024-01-30 DIAGNOSIS — I69.30 HISTORY OF CVA WITH RESIDUAL DEFICIT: ICD-10-CM

## 2024-01-30 DIAGNOSIS — E11.65 TYPE 2 DIABETES MELLITUS WITH HYPERGLYCEMIA, WITHOUT LONG-TERM CURRENT USE OF INSULIN (HCC): ICD-10-CM

## 2024-01-30 DIAGNOSIS — R33.8 BENIGN PROSTATIC HYPERPLASIA WITH URINARY RETENTION: ICD-10-CM

## 2024-01-30 DIAGNOSIS — N40.1 BENIGN PROSTATIC HYPERPLASIA WITH URINARY RETENTION: ICD-10-CM

## 2024-01-30 PROCEDURE — 3077F SYST BP >= 140 MM HG: CPT | Performed by: NURSE PRACTITIONER

## 2024-01-30 PROCEDURE — 99204 OFFICE O/P NEW MOD 45 MIN: CPT | Performed by: NURSE PRACTITIONER

## 2024-01-30 PROCEDURE — 1123F ACP DISCUSS/DSCN MKR DOCD: CPT | Performed by: NURSE PRACTITIONER

## 2024-01-30 PROCEDURE — 3079F DIAST BP 80-89 MM HG: CPT | Performed by: NURSE PRACTITIONER

## 2024-01-30 RX ORDER — HYDROCHLOROTHIAZIDE 25 MG/1
25 TABLET ORAL DAILY
Qty: 90 TABLET | Refills: 1 | Status: SHIPPED | OUTPATIENT
Start: 2024-01-30

## 2024-01-30 RX ORDER — ATORVASTATIN CALCIUM 80 MG/1
80 TABLET, FILM COATED ORAL EVERY EVENING
Qty: 90 TABLET | Refills: 1 | Status: SHIPPED | OUTPATIENT
Start: 2024-01-30

## 2024-01-30 RX ORDER — CLOPIDOGREL BISULFATE 75 MG/1
75 TABLET ORAL DAILY
Qty: 90 TABLET | Refills: 1 | Status: SHIPPED | OUTPATIENT
Start: 2024-01-30

## 2024-01-30 RX ORDER — DOXAZOSIN MESYLATE 4 MG/1
4 TABLET ORAL NIGHTLY
Qty: 90 TABLET | Refills: 1 | Status: SHIPPED | OUTPATIENT
Start: 2024-01-30

## 2024-01-30 RX ORDER — LOSARTAN POTASSIUM 25 MG/1
25 TABLET ORAL DAILY
Qty: 90 TABLET | Refills: 1 | Status: SHIPPED | OUTPATIENT
Start: 2024-01-30

## 2024-01-30 RX ORDER — GLIPIZIDE 5 MG/1
10 TABLET ORAL
Qty: 135 TABLET | Refills: 1 | Status: SHIPPED | OUTPATIENT
Start: 2024-01-30

## 2024-01-30 RX ORDER — METFORMIN HYDROCHLORIDE 500 MG/1
1000 TABLET, EXTENDED RELEASE ORAL
Qty: 90 TABLET | Refills: 1 | Status: SHIPPED | OUTPATIENT
Start: 2024-01-30

## 2024-01-30 SDOH — ECONOMIC STABILITY: INCOME INSECURITY: HOW HARD IS IT FOR YOU TO PAY FOR THE VERY BASICS LIKE FOOD, HOUSING, MEDICAL CARE, AND HEATING?: NOT HARD AT ALL

## 2024-01-30 SDOH — ECONOMIC STABILITY: FOOD INSECURITY: WITHIN THE PAST 12 MONTHS, YOU WORRIED THAT YOUR FOOD WOULD RUN OUT BEFORE YOU GOT MONEY TO BUY MORE.: NEVER TRUE

## 2024-01-30 SDOH — ECONOMIC STABILITY: FOOD INSECURITY: WITHIN THE PAST 12 MONTHS, THE FOOD YOU BOUGHT JUST DIDN'T LAST AND YOU DIDN'T HAVE MONEY TO GET MORE.: NEVER TRUE

## 2024-01-30 ASSESSMENT — PATIENT HEALTH QUESTIONNAIRE - PHQ9
SUM OF ALL RESPONSES TO PHQ QUESTIONS 1-9: 0
2. FEELING DOWN, DEPRESSED OR HOPELESS: 0
SUM OF ALL RESPONSES TO PHQ QUESTIONS 1-9: 0
SUM OF ALL RESPONSES TO PHQ QUESTIONS 1-9: 0
SUM OF ALL RESPONSES TO PHQ9 QUESTIONS 1 & 2: 0
1. LITTLE INTEREST OR PLEASURE IN DOING THINGS: 0
SUM OF ALL RESPONSES TO PHQ QUESTIONS 1-9: 0

## 2024-01-30 NOTE — PROGRESS NOTES
Luis Alfredo Mckenzie (: 1945) presents today to establish care and for other conditions. He has history of high cholesterol, CVA , BPH, HTN, type 2 diabetes.    Type 2 diabetes- checks bs at home- below 150 most of the time. On glipizide 5 mg BID, metformin 1000 mg XR daily. Prescribed rybelsus- made him feel bad so he stopped taking it.     Sees urology yearly- had a urolift put in. Also on Proscar.     CVA- on Plavix. Affected his balance and walking. Walks good but can not walk long distances or stand for long periods of time.     Cholesterol- on 80 mg Lipitor.    Htn- on doxazosin 4 mg daily, HCTZ 25 MG, losartan 25 mg.     Every other night uses erythromycin on his eyes- when he had his stroke his right eye was swollen shut- saw his ophthalmologist. Has also had cataract surgery.     Constipation- miralax daily.    Blood work done last 2023 -    A1c 8.7. normal thyroid. GFR 57. Elevated cholesterol with , trig 178.     Would like to see dermatology for skin screen.         Chief Complaint   Patient presents with    New Patient     Establish care      Patient Active Problem List   Diagnosis    Severe obesity (HCC)    Essential hypertension, benign    Dorsolateral medullary syndrome    Mixed hyperlipidemia    Thyroid nodule    Controlled type 2 diabetes mellitus without complication, without long-term current use of insulin (HCC)    Hypoxia    Numbness and tingling in left hand    History of CVA with residual deficit    Type 2 diabetes mellitus with hyperglycemia    Skin lesions     Past Medical History:   Diagnosis Date    Cerebellar stroke (HCC) 2020    Essential hypertension     Mixed hyperlipidemia     Right thalamic infarction (HCC) 2019    Right thalamic infarction (HCC) 2019    Stroke (HCC) 2020    Type 2 diabetes mellitus (HCC)      Past Surgical History:   Procedure Laterality Date

## 2024-02-12 ENCOUNTER — HOSPITAL ENCOUNTER (OUTPATIENT)
Age: 79
Discharge: HOME | End: 2024-02-15

## 2024-02-12 ENCOUNTER — NURSE ONLY (OUTPATIENT)
Dept: FAMILY MEDICINE CLINIC | Facility: CLINIC | Age: 79
End: 2024-02-12

## 2024-02-12 DIAGNOSIS — E11.65 TYPE 2 DIABETES MELLITUS WITH HYPERGLYCEMIA, WITHOUT LONG-TERM CURRENT USE OF INSULIN (HCC): ICD-10-CM

## 2024-02-12 DIAGNOSIS — E78.2 MIXED HYPERLIPIDEMIA: ICD-10-CM

## 2024-02-12 LAB
ALBUMIN SERPL-MCNC: 3.4 G/DL (ref 3.2–4.6)
ALBUMIN/GLOB SERPL: 1 (ref 0.4–1.6)
ALP SERPL-CCNC: 89 U/L (ref 50–136)
ALT SERPL-CCNC: 18 U/L (ref 12–65)
ANION GAP SERPL CALC-SCNC: 5 MMOL/L (ref 2–11)
AST SERPL-CCNC: 10 U/L (ref 15–37)
BASOPHILS # BLD: 0.1 K/UL (ref 0–0.2)
BASOPHILS NFR BLD: 1 % (ref 0–2)
BILIRUB SERPL-MCNC: 0.4 MG/DL (ref 0.2–1.1)
BUN SERPL-MCNC: 25 MG/DL (ref 8–23)
CALCIUM SERPL-MCNC: 8.8 MG/DL (ref 8.3–10.4)
CHLORIDE SERPL-SCNC: 105 MMOL/L (ref 103–113)
CHOLEST SERPL-MCNC: 149 MG/DL
CO2 SERPL-SCNC: 33 MMOL/L (ref 21–32)
CREAT SERPL-MCNC: 1.1 MG/DL (ref 0.8–1.5)
DIFFERENTIAL METHOD BLD: NORMAL
EOSINOPHIL # BLD: 0.1 K/UL (ref 0–0.8)
EOSINOPHIL NFR BLD: 2 % (ref 0.5–7.8)
ERYTHROCYTE [DISTWIDTH] IN BLOOD BY AUTOMATED COUNT: 14.2 % (ref 11.9–14.6)
EST. AVERAGE GLUCOSE BLD GHB EST-MCNC: 183 MG/DL
GLOBULIN SER CALC-MCNC: 3.4 G/DL (ref 2.8–4.5)
GLUCOSE SERPL-MCNC: 175 MG/DL (ref 65–100)
HBA1C MFR BLD: 8 % (ref 4.8–5.6)
HCT VFR BLD AUTO: 44.3 % (ref 41.1–50.3)
HDLC SERPL-MCNC: 33 MG/DL (ref 40–60)
HDLC SERPL: 4.5
HGB BLD-MCNC: 13.9 G/DL (ref 13.6–17.2)
IMM GRANULOCYTES # BLD AUTO: 0 K/UL (ref 0–0.5)
IMM GRANULOCYTES NFR BLD AUTO: 0 % (ref 0–5)
LDLC SERPL CALC-MCNC: 89.2 MG/DL
LYMPHOCYTES # BLD: 1.8 K/UL (ref 0.5–4.6)
LYMPHOCYTES NFR BLD: 27 % (ref 13–44)
MCH RBC QN AUTO: 29.1 PG (ref 26.1–32.9)
MCHC RBC AUTO-ENTMCNC: 31.4 G/DL (ref 31.4–35)
MCV RBC AUTO: 92.9 FL (ref 82–102)
MONOCYTES # BLD: 0.6 K/UL (ref 0.1–1.3)
MONOCYTES NFR BLD: 9 % (ref 4–12)
NEUTS SEG # BLD: 3.9 K/UL (ref 1.7–8.2)
NEUTS SEG NFR BLD: 61 % (ref 43–78)
NRBC # BLD: 0 K/UL (ref 0–0.2)
PLATELET # BLD AUTO: 269 K/UL (ref 150–450)
PMV BLD AUTO: 11.5 FL (ref 9.4–12.3)
POTASSIUM SERPL-SCNC: 3.1 MMOL/L (ref 3.5–5.1)
PROT SERPL-MCNC: 6.8 G/DL (ref 6.3–8.2)
RBC # BLD AUTO: 4.77 M/UL (ref 4.23–5.6)
SODIUM SERPL-SCNC: 143 MMOL/L (ref 136–146)
TRIGL SERPL-MCNC: 134 MG/DL (ref 35–150)
VLDLC SERPL CALC-MCNC: 26.8 MG/DL (ref 6–23)
WBC # BLD AUTO: 6.4 K/UL (ref 4.3–11.1)

## 2024-02-13 ENCOUNTER — TELEPHONE (OUTPATIENT)
Dept: FAMILY MEDICINE CLINIC | Facility: CLINIC | Age: 79
End: 2024-02-13

## 2024-02-13 DIAGNOSIS — E87.6 HYPOKALEMIA: Primary | ICD-10-CM

## 2024-02-13 DIAGNOSIS — E87.6 HYPOKALEMIA: ICD-10-CM

## 2024-02-13 RX ORDER — POTASSIUM CHLORIDE 750 MG/1
10 TABLET, EXTENDED RELEASE ORAL DAILY
Qty: 90 TABLET | OUTPATIENT
Start: 2024-02-13

## 2024-02-13 RX ORDER — POTASSIUM CHLORIDE 750 MG/1
10 TABLET, EXTENDED RELEASE ORAL DAILY
Qty: 30 TABLET | Refills: 0 | Status: SHIPPED | OUTPATIENT
Start: 2024-02-13 | End: 2024-03-20 | Stop reason: SDUPTHER

## 2024-02-13 NOTE — TELEPHONE ENCOUNTER
Pt. Advised of lab results, he states he is not taking any potassium, I informed pt. We will call him back in regards to if he needs to take Potassium

## 2024-02-28 ENCOUNTER — APPOINTMENT (RX ONLY)
Dept: URBAN - METROPOLITAN AREA CLINIC 24 | Facility: CLINIC | Age: 79
Setting detail: DERMATOLOGY
End: 2024-02-28

## 2024-02-28 DIAGNOSIS — L57.0 ACTINIC KERATOSIS: ICD-10-CM

## 2024-02-28 DIAGNOSIS — D22 MELANOCYTIC NEVI: ICD-10-CM

## 2024-02-28 DIAGNOSIS — L21.8 OTHER SEBORRHEIC DERMATITIS: ICD-10-CM

## 2024-02-28 DIAGNOSIS — L82.1 OTHER SEBORRHEIC KERATOSIS: ICD-10-CM

## 2024-02-28 DIAGNOSIS — L57.8 OTHER SKIN CHANGES DUE TO CHRONIC EXPOSURE TO NONIONIZING RADIATION: ICD-10-CM

## 2024-02-28 DIAGNOSIS — D18.0 HEMANGIOMA: ICD-10-CM

## 2024-02-28 DIAGNOSIS — Z71.89 OTHER SPECIFIED COUNSELING: ICD-10-CM

## 2024-02-28 DIAGNOSIS — Z85.828 PERSONAL HISTORY OF OTHER MALIGNANT NEOPLASM OF SKIN: ICD-10-CM

## 2024-02-28 DIAGNOSIS — D485 NEOPLASM OF UNCERTAIN BEHAVIOR OF SKIN: ICD-10-CM

## 2024-02-28 DIAGNOSIS — L81.4 OTHER MELANIN HYPERPIGMENTATION: ICD-10-CM

## 2024-02-28 PROBLEM — D22.5 MELANOCYTIC NEVI OF TRUNK: Status: ACTIVE | Noted: 2024-02-28

## 2024-02-28 PROBLEM — D48.5 NEOPLASM OF UNCERTAIN BEHAVIOR OF SKIN: Status: ACTIVE | Noted: 2024-02-28

## 2024-02-28 PROBLEM — D18.01 HEMANGIOMA OF SKIN AND SUBCUTANEOUS TISSUE: Status: ACTIVE | Noted: 2024-02-28

## 2024-02-28 PROCEDURE — ? PRESCRIPTION MEDICATION MANAGEMENT

## 2024-02-28 PROCEDURE — ? COUNSELING

## 2024-02-28 PROCEDURE — 17000 DESTRUCT PREMALG LESION: CPT | Mod: 59

## 2024-02-28 PROCEDURE — 11102 TANGNTL BX SKIN SINGLE LES: CPT

## 2024-02-28 PROCEDURE — ? BIOPSY BY SHAVE METHOD

## 2024-02-28 PROCEDURE — 17003 DESTRUCT PREMALG LES 2-14: CPT

## 2024-02-28 PROCEDURE — 11103 TANGNTL BX SKIN EA SEP/ADDL: CPT

## 2024-02-28 PROCEDURE — ? OTHER

## 2024-02-28 PROCEDURE — ? LIQUID NITROGEN

## 2024-02-28 PROCEDURE — 99204 OFFICE O/P NEW MOD 45 MIN: CPT | Mod: 25

## 2024-02-28 PROCEDURE — ? PRESCRIPTION

## 2024-02-28 RX ORDER — KETOCONAZOLE 20 MG/G
CREAM TOPICAL BID
Qty: 60 | Refills: 11 | Status: ERX | COMMUNITY
Start: 2024-02-28

## 2024-02-28 RX ADMIN — KETOCONAZOLE: 20 CREAM TOPICAL at 00:00

## 2024-02-28 ASSESSMENT — LOCATION DETAILED DESCRIPTION DERM
LOCATION DETAILED: RIGHT PROXIMAL DORSAL FOREARM
LOCATION DETAILED: LEFT SUPERIOR PREAURICULAR CHEEK
LOCATION DETAILED: INFERIOR THORACIC SPINE
LOCATION DETAILED: RIGHT DISTAL RADIAL DORSAL FOREARM
LOCATION DETAILED: RIGHT DISTAL DORSAL FOREARM
LOCATION DETAILED: RIGHT CENTRAL MALAR CHEEK
LOCATION DETAILED: RIGHT RADIAL DORSAL HAND
LOCATION DETAILED: NASAL TIP
LOCATION DETAILED: STERNAL NOTCH
LOCATION DETAILED: LEFT CENTRAL MALAR CHEEK
LOCATION DETAILED: LEFT SUPERIOR LATERAL BUCCAL CHEEK
LOCATION DETAILED: SUPERIOR THORACIC SPINE
LOCATION DETAILED: GLABELLA

## 2024-02-28 ASSESSMENT — LOCATION SIMPLE DESCRIPTION DERM
LOCATION SIMPLE: UPPER BACK
LOCATION SIMPLE: RIGHT CHEEK
LOCATION SIMPLE: GLABELLA
LOCATION SIMPLE: CHEST
LOCATION SIMPLE: RIGHT FOREARM
LOCATION SIMPLE: NOSE
LOCATION SIMPLE: RIGHT HAND
LOCATION SIMPLE: LEFT CHEEK

## 2024-02-28 ASSESSMENT — LOCATION ZONE DERM
LOCATION ZONE: NOSE
LOCATION ZONE: ARM
LOCATION ZONE: TRUNK
LOCATION ZONE: FACE
LOCATION ZONE: HAND

## 2024-02-28 NOTE — PROCEDURE: PRESCRIPTION MEDICATION MANAGEMENT
Detail Level: Zone
Initiate Treatment: ketoconazole 2 % topical cream BID (Apply thin layer twice daily to AA)
Render In Strict Bullet Format?: No

## 2024-02-28 NOTE — PROCEDURE: OTHER
Note Text (......Xxx Chief Complaint.): This diagnosis correlates with the
Detail Level: Zone
Render Risk Assessment In Note?: no
Other (Free Text): Patient reports history of numerous BCCs\\nDiscussed plan to address the larger BCCs on the face today, then continue to follow closely with skin checks every 3-4 months to address additional small BCCs and actinic keratoses \\nPatient and wife in agreement

## 2024-02-28 NOTE — PROCEDURE: LIQUID NITROGEN
Duration Of Freeze Thaw-Cycle (Seconds): 15
Post-Care Instructions: I reviewed with the patient in detail post-care instructions. Patient is to wear sunprotection, and avoid picking at any of the treated lesions. Pt may apply Vaseline to crusted or scabbing areas.
Consent: The patient's consent was obtained including but not limited to risks of crusting, scabbing, blistering, scarring, darker or lighter pigmentary change, recurrence, incomplete removal and infection.
Render Note In Bullet Format When Appropriate: No
Number Of Freeze-Thaw Cycles: 2 freeze-thaw cycles
Show Applicator Variable?: Yes
Detail Level: Detailed

## 2024-03-19 ENCOUNTER — OFFICE VISIT (OUTPATIENT)
Dept: FAMILY MEDICINE CLINIC | Facility: CLINIC | Age: 79
End: 2024-03-19
Payer: MEDICARE

## 2024-03-19 VITALS
HEIGHT: 69 IN | TEMPERATURE: 97.6 F | DIASTOLIC BLOOD PRESSURE: 80 MMHG | RESPIRATION RATE: 16 BRPM | HEART RATE: 68 BPM | WEIGHT: 283.7 LBS | OXYGEN SATURATION: 97 % | SYSTOLIC BLOOD PRESSURE: 148 MMHG | BODY MASS INDEX: 42.02 KG/M2

## 2024-03-19 DIAGNOSIS — E87.6 HYPOKALEMIA: ICD-10-CM

## 2024-03-19 DIAGNOSIS — E11.65 TYPE 2 DIABETES MELLITUS WITH HYPERGLYCEMIA, WITHOUT LONG-TERM CURRENT USE OF INSULIN (HCC): ICD-10-CM

## 2024-03-19 DIAGNOSIS — Z23 NEED FOR VACCINATION: ICD-10-CM

## 2024-03-19 DIAGNOSIS — J30.1 SEASONAL ALLERGIC RHINITIS DUE TO POLLEN: ICD-10-CM

## 2024-03-19 DIAGNOSIS — Z71.89 ADVANCE CARE PLANNING: ICD-10-CM

## 2024-03-19 DIAGNOSIS — Z00.00 MEDICARE ANNUAL WELLNESS VISIT, SUBSEQUENT: Primary | ICD-10-CM

## 2024-03-19 DIAGNOSIS — Z86.73 HISTORY OF STROKE: ICD-10-CM

## 2024-03-19 LAB — POTASSIUM SERPL-SCNC: 3.8 MMOL/L (ref 3.5–5.1)

## 2024-03-19 PROCEDURE — 1123F ACP DISCUSS/DSCN MKR DOCD: CPT | Performed by: NURSE PRACTITIONER

## 2024-03-19 PROCEDURE — 3079F DIAST BP 80-89 MM HG: CPT | Performed by: NURSE PRACTITIONER

## 2024-03-19 PROCEDURE — 3052F HG A1C>EQUAL 8.0%<EQUAL 9.0%: CPT | Performed by: NURSE PRACTITIONER

## 2024-03-19 PROCEDURE — G0439 PPPS, SUBSEQ VISIT: HCPCS | Performed by: NURSE PRACTITIONER

## 2024-03-19 PROCEDURE — 99214 OFFICE O/P EST MOD 30 MIN: CPT | Performed by: NURSE PRACTITIONER

## 2024-03-19 PROCEDURE — 3077F SYST BP >= 140 MM HG: CPT | Performed by: NURSE PRACTITIONER

## 2024-03-19 RX ORDER — GLIPIZIDE 10 MG/1
10 TABLET, FILM COATED, EXTENDED RELEASE ORAL DAILY
Qty: 90 TABLET | Refills: 2 | Status: SHIPPED | OUTPATIENT
Start: 2024-03-19 | End: 2024-12-14

## 2024-03-19 RX ORDER — MONTELUKAST SODIUM 10 MG/1
10 TABLET ORAL DAILY
Qty: 90 TABLET | Refills: 1 | Status: SHIPPED | OUTPATIENT
Start: 2024-03-19

## 2024-03-19 SDOH — ECONOMIC STABILITY: INCOME INSECURITY: HOW HARD IS IT FOR YOU TO PAY FOR THE VERY BASICS LIKE FOOD, HOUSING, MEDICAL CARE, AND HEATING?: NOT HARD AT ALL

## 2024-03-19 SDOH — ECONOMIC STABILITY: FOOD INSECURITY: WITHIN THE PAST 12 MONTHS, YOU WORRIED THAT YOUR FOOD WOULD RUN OUT BEFORE YOU GOT MONEY TO BUY MORE.: NEVER TRUE

## 2024-03-19 SDOH — ECONOMIC STABILITY: FOOD INSECURITY: WITHIN THE PAST 12 MONTHS, THE FOOD YOU BOUGHT JUST DIDN'T LAST AND YOU DIDN'T HAVE MONEY TO GET MORE.: NEVER TRUE

## 2024-03-19 ASSESSMENT — PATIENT HEALTH QUESTIONNAIRE - PHQ9
SUM OF ALL RESPONSES TO PHQ QUESTIONS 1-9: 0
SUM OF ALL RESPONSES TO PHQ QUESTIONS 1-9: 0
2. FEELING DOWN, DEPRESSED OR HOPELESS: NOT AT ALL
SUM OF ALL RESPONSES TO PHQ QUESTIONS 1-9: 0
SUM OF ALL RESPONSES TO PHQ QUESTIONS 1-9: 0
1. LITTLE INTEREST OR PLEASURE IN DOING THINGS: NOT AT ALL
SUM OF ALL RESPONSES TO PHQ9 QUESTIONS 1 & 2: 0

## 2024-03-19 NOTE — PROGRESS NOTES
Medicare Annual Wellness Visit    Luis Alfredo Mckenzie is here for Medicare AWV (AWV and would like allergy meds )    Assessment & Plan   Medicare annual wellness visit, subsequent  Hypokalemia  -     Potassium; Future  Type 2 diabetes mellitus with hyperglycemia, without long-term current use of insulin (HCC)  -     glipiZIDE (GLUCOTROL XL) 10 MG extended release tablet; Take 1 tablet by mouth daily, Disp-90 tablet, R-2Normal  History of stroke  Advance care planning  -     [facility] - Referral to [facility] Clinical Specialist  Need for vaccination  -     Tdap (ADACEL) 5-2-15.5 LF-MCG/0.5 injection; Inject 0.5 mLs into the muscle once for 1 dose, Disp-0.5 mL, R-0Normal  Seasonal allergic rhinitis due to pollen  -     montelukast (SINGULAIR) 10 MG tablet; Take 1 tablet by mouth daily, Disp-90 tablet, R-1Normal    Reviewed all blood work- rechecking potassium today- if still low will start him on daily potassium changed glipizide to 10 mg XR from 5 BID. Starting on singulair to help with allergy symptoms. Hx of CVA- does not see neurology anymore- on plavix.     Patient instructed on diabetes treatment-keep your blood sugar levels within your goal range and treat other medical conditions that go along with diabetes (like high blood pressure). Patient instructed on importance of blood sugar control to prevent long-term complications that can result from poorly controlled blood sugar (including problems affecting the eyes, kidney, nervous system, and cardiovascular system). Encouraged to do home blood sugar testing. Encouraged to keep A1C below 7. Encouraged to work on diet and exercise. Limit consumption of sugary beverages such as soft drinks or juice (even natural juice) or stop drinking them completely.     The treatment of allergic rhinitis includes reducing exposure to allergens and other triggers in combination with medication therapy. In most people, this combined approach can effectively control symptoms. Patient encouraged

## 2024-03-20 ENCOUNTER — CLINICAL DOCUMENTATION (OUTPATIENT)
Dept: SPIRITUAL SERVICES | Age: 79
End: 2024-03-20

## 2024-03-20 RX ORDER — POTASSIUM CHLORIDE 750 MG/1
10 TABLET, EXTENDED RELEASE ORAL DAILY
Qty: 90 TABLET | Refills: 1 | Status: SHIPPED | OUTPATIENT
Start: 2024-03-20

## 2024-03-20 NOTE — PROGRESS NOTES
Advance Care Planning   Ambulatory ACP Specialist Patient Outreach    Date:  3/20/2024    ACP Specialist:  Dominique Bower    Outreach call to patient in follow-up to ACP Specialist referral from:Leora Navas APRN - CNP    [x] PCP  [] Provider   [] Ambulatory Care Management [] Other     For:                  [x] Advance Directive Assistance              [] Complete Portable DNR order              [] Complete POST/POLST/MOST              [] Code Status Discussion             [] Discuss Goals of Care             [] Early ACP Decision-Making              [] Other (Specify)    Date Referral Received: 3/19/20    Next Step:   [x] ACP scheduled conversation  [] Outreach again in one week               [x] Email / Mail ACP Info Sheets  [x] Email / Mail Advance Directive   [] Closing referral.  Routing closure to referring provider/staff and to ACP Specialist .    [] Closure letter mailed to patient with invitation to contact ACP Specialist if / when ready.   [] Other (Specify here):         [x] At this time, Healthcare Decision Maker Is: Advance Care Planning   Healthcare Decision Maker:    Primary Decision Maker: JonahBrie - Spouse - [phone]    Secondary Decision Maker: Ruiz,April  Child - [phone]          [] Primary agent named in scanned advance directive.    [x] Legal Next of Kin.     [] Unable to determine legal decision maker at this time.       Outreaches:         [x] 1st -  Date:  3/20/24               Intervention:  [x] Spoke with Patient   [] Left Voice mail [] Email / Mail    [] MyChart  [] Other (Specify) :     Outcomes:  Spoke with patient on mobile phone number which is also listed as the home number scheduling a telephone conversation with ACP Specialist Latosha Momin on Monday, 3/25/24 at 11:00 a.m.             [] 2nd -  Date:                 Intervention:  [] Spoke with Patient  [] Left Voice mail [] Email / Mail    [] MyChart  [] Other (Specify) :

## 2024-03-21 ENCOUNTER — CLINICAL DOCUMENTATION (OUTPATIENT)
Dept: CASE MANAGEMENT | Age: 79
End: 2024-03-21

## 2024-03-21 NOTE — ACP (ADVANCE CARE PLANNING)
Advance Care Planning   Ambulatory ACP Specialist Patient Outreach    Date:  3/21/2024    ACP Specialist:  Latosha Momin, RN, BSN, CHPN    Outreach call to patient in follow-up to ACP Specialist referral from:Leora Navas APRN - CNP    [x] PCP  [] Provider   [] Ambulatory Care Management [] Other     For:                  [x] Advance Directive Assistance              [] Complete Portable DNR order              [] Complete POST/POLST/MOST              [] Code Status Discussion             [] Discuss Goals of Care             [] Early ACP Decision-Making              [] Other (Specify)    Date Referral Received:3/19/2024    Next Step:   [x] ACP scheduled conversation  [] Outreach again in one week               [] Email / Mail ACP Info Sheets  [] Email / Mail Advance Directive   [] Closing referral.  Routing closure to referring provider/staff and to ACP Specialist .    [] Closure letter mailed to patient with invitation to contact ACP Specialist if / when ready.   [] Other (Specify here):         [x] At this time, Healthcare Decision Maker Is:        [] Primary agent named in scanned advance directive.    [x] Legal Next of Kin.     [] Unable to determine legal decision maker at this time.         Outreaches:         [x]  Additional Outreach -  Date:  3/21/2024   (Specify Dates & special circumstances):    Outcomes: Call placed to pt's cell. HIPAA compliant message left stating this was a reminder for Monday's phone appointment to discuss AMD. Indicated I would call from the same number.        Thank you for this referral.

## 2024-03-25 ENCOUNTER — CLINICAL DOCUMENTATION (OUTPATIENT)
Dept: CASE MANAGEMENT | Age: 79
End: 2024-03-25

## 2024-03-25 NOTE — ACP (ADVANCE CARE PLANNING)
Unfortunately, CPR does not typically restart the heart for people who have serious health conditions or who are very sick.\"    \"In the event your heart stopped as a result of an underlying serious health condition, would you want attempts to be made to restart your heart (answer \"yes\" for attempt to resuscitate) or would you prefer a natural death (answer \"no\" for do not attempt to resuscitate)?\"  \"No, again unless someone saw me choke on food. I don't want to die from something that could easily be fixed like that.\"       [x] Yes   [] No   Educated Patient / Decision Maker regarding differences between Advance Directives and portable DNR orders. Pt would like a DNR order. We dicussed how the [place] document speaks of having a terminal illness and that I would reach out to the provider for her input, as I cannot promise he is \"sick enough\" for warrant this document at this time. Much discussion about communication with family about his wishes.     Length of ACP Conversation in minutes:  45    Conversation Outcomes:  ACP discussion completed and New Advance Directive completed. This date we talked through the [facility] document first. Pt selected he wants agent discretion for life sustaining treatment and therefore, we did not fill out the Declaration for the desire for a natural death.    Section E5. ORGAN DONATION   My agent may not consent to the donation of all or any of my tissue or organs for purposes of transplantation.     Section E7. STATEMENT OF DESIRES CONCERNING LIFE-SUSTAINING TREATMENT   1) JORGE OF DISCRETION TO AGENT. I do not want my life to be prolonged nor do I want life-sustaining treatment to be provided or continued if my agent believes the burdens of the treatment outweigh the expected benefits. I want my agent to consider the relief of suffering, my personal beliefs, the expense involved and the quality as well as the possible extension of my life in making decisions concerning

## 2024-03-28 NOTE — PROGRESS NOTES
OT Daily Note  Time In 1116   Time Out 1202     Subjective: \"I can definitely tell that my arms get sore after these exercises. And I think that's a good thing. \"  Pain: Pt reported no pain. Education:  BUE strengthening HEP  Interdisciplinary Communication: Collaborated with PT to ensure progress towards POC and goals. Precautions: Falls  Patient Vitals for the past 12 hrs:   Temp Pulse Resp BP SpO2   11/11/21 0738 97.6 °F (36.4 °C) 66 16 (!) 145/71 97 %         Mobility   Score Comments   Sit to Stand 4: Supervision or touching A S   Transfer Assist 4: Supervision or touching A Transfer Type: SPT   Equipment: Cane   Comments: SBA     Activity Tolerance and Strengthening Daily Assessment   Pt completed 10 minutes on the ergometer frontwards and backwards with heavy resistance to increase UB strength and activity tolerance for integration into functional activities. Pt completed the following UB exercises using 3 lb free weights x15 reps each: chest press, chest pulls, butterfly wings, front raise, upright row, overhead press, bicep curls, tricep curls, ABC tracing. Pt provided printed handout of HEP and verbalized/demonstrated understanding during performance of exercises. Assessment: Pt has made excellent progress, continues to require prompting for midline posture both in sitting and standing impacting safety. Recommend supervision with ambulatory tasks and IADLs upon discharge home. Pt is ready for discharge tomorrow. Patient ended session seated in recliner and call remote, phone, all needs within reach. Plan: Continue OT POC with focus on ADL/IADL skills, functional transfers, functional mobility, coordination, strength, static and dynamic balance, and activity tolerance to maximize safety and independence with ADLs and functional transfers.      JEEVAN Olivia/LUPE  11/11/2021 leg

## 2024-04-09 ENCOUNTER — TELEPHONE (OUTPATIENT)
Dept: UROLOGY | Age: 79
End: 2024-04-09

## 2024-04-10 ENCOUNTER — APPOINTMENT (RX ONLY)
Dept: URBAN - METROPOLITAN AREA CLINIC 24 | Facility: CLINIC | Age: 79
Setting detail: DERMATOLOGY
End: 2024-04-10

## 2024-04-10 PROBLEM — C44.311 BASAL CELL CARCINOMA OF SKIN OF NOSE: Status: ACTIVE | Noted: 2024-04-10

## 2024-04-10 PROCEDURE — ? MOHS SURGERY

## 2024-04-10 PROCEDURE — 17311 MOHS 1 STAGE H/N/HF/G: CPT

## 2024-04-10 NOTE — PROCEDURE: MOHS SURGERY
Mohs Case Number: [id number]
Previous Accession (Optional): [id number]
Biopsy Photograph Reviewed: Yes
Referring Physician (Optional): Alexx
Consent Type: Consent 1 (Standard)
Eye Shield Used: No
Initial Size Of Lesion: 0.6
X Size Of Lesion In Cm (Optional): 0
Number Of Stages: 1
Primary Defect Length In Cm (Final Defect Size - Required For Flaps/Grafts): 0.7
Repair Type: No repair - secondary intention
Which Instrument Did You Use For Dermabrasion?: Wire Brush
Which Eyelid Repair Cpt Are You Using?: 38222
Oculoplastic Surgeon Procedure Text (A): After obtaining clear surgical margins the patient was sent to oculoplastics for surgical repair.  The patient understands they will receive post-surgical care and follow-up from the referring physician's office.
Otolaryngologist Procedure Text (A): After obtaining clear surgical margins the patient was sent to otolaryngology for surgical repair.  The patient understands they will receive post-surgical care and follow-up from the referring physician's office.
Plastic Surgeon Procedure Text (A): After obtaining clear surgical margins the patient was sent to plastics for surgical repair.  The patient understands they will receive post-surgical care and follow-up from the referring physician's office.
Mid-Level Procedure Text (A): After obtaining clear surgical margins the patient was sent to a mid-level provider for surgical repair.  The patient understands they will receive post-surgical care and follow-up from the mid-level provider.
Provider Procedure Text (A): After obtaining clear surgical margins the defect was repaired by another provider.
Asc Procedure Text (A): After obtaining clear surgical margins the patient was sent to an ASC for surgical repair.  The patient understands they will receive post-surgical care and follow-up from the ASC physician.
Suturegard Retention Suture: 2-0 Nylon
Retention Suture Bite Size: 3 mm
Length To Time In Minutes Device Was In Place: 10
Undermining Type: Entire Wound
Debridement Text: The wound edges were debrided prior to proceeding with the closure to facilitate wound healing.
Helical Rim Text: The closure involved the helical rim.
Vermilion Border Text: The closure involved the vermilion border.
Nostril Rim Text: The closure involved the nostril rim.
Retention Suture Text: Retention sutures were placed to support the closure and prevent dehiscence.
Location Indication Override (Is Already Calculated Based On Selected Body Location): Area H
Area H Indication Text: Tumors in this location are included in Area H (eyelids, eyebrows, nose, lips, chin, ear, pre-auricular, post-auricular, temple, genitalia, hands, feet, ankles and areola).  Tissue conservation is critical in these anatomic locations.
Area M Indication Text: Tumors in this location are included in Area M (cheek, forehead, scalp, neck, jawline and pretibial skin).  Mohs surgery is indicated for tumors in these anatomic locations.
Area L Indication Text: Tumors in this location are included in Area L (trunk and extremities).  Mohs surgery is indicated for larger tumors, or tumors with aggressive histologic features, in these anatomic locations.
Tumor Debulked?: curette
Depth Of Tumor Invasion (For Histology): tumor not visualized
Perineural Invasion (For Histology - Be Specific If Possible): absent
Special Stains Stage 1 - Results: Base On Clearance Noted Above
Stage 2: Additional Anesthesia Type: 1% lidocaine with epinephrine
Staging Info: By selecting yes to the question above you will include information on AJCC 8 tumor staging in your Mohs note. Information on tumor staging will be automatically added for SCCs on the head and neck. AJCC 8 includes tumor size, tumor depth, perineural involvement and bone invasion.
Tumor Depth: Less than 6mm from granular layer and no invasion beyond the subcutaneous fat
Was The Patient On Physician Recommended Anticoagulation Therapy?: Please Select the Appropriate Response
Medical Necessity Statement: Based on my medical judgement, Mohs surgery is the most appropriate treatment for this cancer compared to other treatments.
Alternatives Discussed Intro (Do Not Add Period): I discussed alternative treatments to Mohs surgery and specifically discussed the risks and benefits of
Consent 1/Introductory Paragraph: The rationale for Mohs was explained to the patient and consent was obtained. The risks, benefits and alternatives to therapy were discussed in detail. Specifically, the risks of infection, scarring, bleeding, prolonged wound healing, incomplete removal, allergy to anesthesia, nerve injury(both sensory and motor which can be permanent), and recurrence were addressed. Prior to the procedure, the treatment site was clearly identified and confirmed by the patient. All components of Universal Protocol/PAUSE Rule completed.
Consent 2/Introductory Paragraph: Mohs surgery was explained to the patient and consent was obtained. The risks, benefits and alternatives to therapy were discussed in detail. Specifically, the risks of infection, scarring, bleeding, prolonged wound healing, incomplete removal, allergy to anesthesia, nerve injury and recurrence were addressed. Prior to the procedure, the treatment site was clearly identified and confirmed by the patient. All components of Universal Protocol/PAUSE Rule completed.
Consent 3/Introductory Paragraph: I gave the patient a chance to ask questions they had about the procedure.  Following this I explained the Mohs procedure and consent was obtained. The risks, benefits and alternatives to therapy were discussed in detail. Specifically, the risks of infection, scarring, bleeding, prolonged wound healing, incomplete removal, allergy to anesthesia, nerve injury and recurrence were addressed. Prior to the procedure, the treatment site was clearly identified and confirmed by the patient. All components of Universal Protocol/PAUSE Rule completed.
Consent (Temporal Branch)/Introductory Paragraph: The rationale for Mohs was explained to the patient and consent was obtained. The risks, benefits and alternatives to therapy were discussed in detail. Specifically, the risks of damage to the temporal branch of the facial nerve, infection, scarring, bleeding, prolonged wound healing, incomplete removal, allergy to anesthesia, and recurrence were addressed. Prior to the procedure, the treatment site was clearly identified and confirmed by the patient. All components of Universal Protocol/PAUSE Rule completed.
Consent (Marginal Mandibular)/Introductory Paragraph: The rationale for Mohs was explained to the patient and consent was obtained. The risks, benefits and alternatives to therapy were discussed in detail. Specifically, the risks of damage to the marginal mandibular branch of the facial nerve, infection, scarring, bleeding, prolonged wound healing, incomplete removal, allergy to anesthesia, and recurrence were addressed. Prior to the procedure, the treatment site was clearly identified and confirmed by the patient. All components of Universal Protocol/PAUSE Rule completed.
Consent (Spinal Accessory)/Introductory Paragraph: The rationale for Mohs was explained to the patient and consent was obtained. The risks, benefits and alternatives to therapy were discussed in detail. Specifically, the risks of damage to the spinal accessory nerve, infection, scarring, bleeding, prolonged wound healing, incomplete removal, allergy to anesthesia, and recurrence were addressed. Prior to the procedure, the treatment site was clearly identified and confirmed by the patient. All components of Universal Protocol/PAUSE Rule completed.
Consent (Near Eyelid Margin)/Introductory Paragraph: The rationale for Mohs was explained to the patient and consent was obtained. The risks, benefits and alternatives to therapy were discussed in detail. Specifically, the risks of ectropion or eyelid deformity, infection, scarring, bleeding, prolonged wound healing, incomplete removal, allergy to anesthesia, nerve injury and recurrence were addressed. Prior to the procedure, the treatment site was clearly identified and confirmed by the patient. All components of Universal Protocol/PAUSE Rule completed.
Consent (Ear)/Introductory Paragraph: The rationale for Mohs was explained to the patient and consent was obtained. The risks, benefits and alternatives to therapy were discussed in detail. Specifically, the risks of ear deformity, infection, scarring, bleeding, prolonged wound healing, incomplete removal, allergy to anesthesia, nerve injury and recurrence were addressed. Prior to the procedure, the treatment site was clearly identified and confirmed by the patient. All components of Universal Protocol/PAUSE Rule completed.
Consent (Nose)/Introductory Paragraph: The rationale for Mohs was explained to the patient and consent was obtained. The risks, benefits and alternatives to therapy were discussed in detail. Specifically, the risks of nasal deformity, changes in the flow of air through the nose, infection, scarring, bleeding, prolonged wound healing, incomplete removal, allergy to anesthesia, nerve injury and recurrence were addressed. Prior to the procedure, the treatment site was clearly identified and confirmed by the patient. All components of Universal Protocol/PAUSE Rule completed.
Consent (Lip)/Introductory Paragraph: The rationale for Mohs was explained to the patient and consent was obtained. The risks, benefits and alternatives to therapy were discussed in detail. Specifically, the risks of lip deformity, changes in the oral aperture, infection, scarring, bleeding, prolonged wound healing, incomplete removal, allergy to anesthesia, nerve injury and recurrence were addressed. Prior to the procedure, the treatment site was clearly identified and confirmed by the patient. All components of Universal Protocol/PAUSE Rule completed.
Consent (Scalp)/Introductory Paragraph: The rationale for Mohs was explained to the patient and consent was obtained. The risks, benefits and alternatives to therapy were discussed in detail. Specifically, the risks of changes in hair growth pattern secondary to repair, infection, scarring, bleeding, prolonged wound healing, incomplete removal, allergy to anesthesia, nerve injury and recurrence were addressed. Prior to the procedure, the treatment site was clearly identified and confirmed by the patient. All components of Universal Protocol/PAUSE Rule completed.
Detail Level: Detailed
Postop Diagnosis: same
Anesthesia Type: 2% lidocaine with epinephrine and a 2:1 solution of Sodium Bicarbonate 8.4%
Anesthesia Volume In Cc: 0.8
Hemostasis: Electrocautery
Estimated Blood Loss (Cc): minimal
Repair Anesthesia Method: local infiltration
Brow Lift Text: A midfrontal incision was made medially to the defect to allow access to the tissues just superior to the left eyebrow. Following careful dissection inferiorly in a supraperiosteal plane to the level of the left eyebrow, several 3-0 monocryl sutures were used to resuspend the eyebrow orbicularis oculi muscular unit to the superior frontal bone periosteum. This resulted in an appropriate reapproximation of static eyebrow symmetry and correction of the left brow ptosis.
Deep Sutures: 4-0 Vicryl
Epidermal Sutures: 5-0 Plain Gut
Epidermal Closure: running and interrupted
Suturegard Intro: Intraoperative tissue expansion was performed, utilizing the SUTUREGARD device, in order to reduce wound tension.
Suturegard Body: The suture ends were repeatedly re-tightened and re-clamped to achieve the desired tissue expansion.
Hemigard Intro: Due to skin fragility and wound tension, it was decided to use HEMIGARD adhesive retention suture devices to permit a linear closure. The skin was cleaned and dried for a 6cm distance away from the wound. Excessive hair, if present, was removed to allow for adhesion.
Hemigard Postcare Instructions: The HEMIGARD strips are to remain completely dry for at least 5-7 days.
Donor Site Anesthesia Type: same as repair anesthesia
Epidermal Closure Graft Donor Site (Optional): simple interrupted
Graft Donor Site Bandage (Optional-Leave Blank If You Don't Want In Note): Steri-strips and a pressure bandage were applied to the donor site.
Closure 2 Information: This tab is for additional flaps and grafts, including complex repair and grafts and complex repair and flaps. You can also specify a different location for the additional defect, if the location is the same you do not need to select a new one. We will insert the automated text for the repair you select below just as we do for solitary flaps and grafts. Please note that at this time if you select a location with a different insurance zone you will need to override the ICD10 and CPT if appropriate.
Closure 3 Information: This tab is for additional flaps and grafts above and beyond our usual structured repairs.  Please note if you enter information here it will not currently bill and you will need to add the billing information manually.
Wound Care: Petrolatum
Dressing: pressure dressing
Dressing (No Sutures): dry sterile dressing
Unna Boot Text: An Unna boot was placed to help immobilize the limb and facilitate more rapid healing.
Home Suture Removal Text: Patient was provided instructions on removing sutures and will remove their sutures at home.  If they have any questions or difficulties they will call the office.
Post-Care Instructions: I reviewed with the patient in detail post-care instructions. Patient is not to engage in any heavy lifting, exercise, or swimming for the next 14 days. Should the patient develop any fevers, chills, bleeding, severe pain patient will contact the office immediately.
Pain Refusal Text: I offered to prescribe pain medication but the patient refused to take this medication.
Mauc Instructions: By selecting yes to the question below the MAUC number will be added into the note.  This will be calculated automatically based on the diagnosis chosen, the size entered, the body zone selected (H,M,L) and the specific indications you chose. You will also have the option to override the Mohs AUC if you disagree with the automatically calculated number and this option is found in the Case Summary tab.
Where Do You Want The Question To Include Opioid Counseling Located?: Case Summary Tab
Eye Protection Verbiage: Before proceeding with the stage, a plastic scleral shield was inserted. The globe was anesthetized with a few drops of 1% lidocaine with 1:100,000 epinephrine. Then, an appropriate sized scleral shield was chosen and coated with lacrilube ointment. The shield was gently inserted and left in place for the duration of each stage. After the stage was completed, the shield was gently removed.
Mohs Method Verbiage: An incision at a 45 degree angle following the standard Mohs approach was done and the specimen was harvested as a microscopic controlled layer.
Surgeon/Pathologist Verbiage (Will Incorporate Name Of Surgeon From Intro If Not Blank): operated in two distinct and integrated capacities as the surgeon and pathologist.
Mohs Histo Method Verbiage: Each section was then chromacoded and processed in the Mohs lab using the Mohs protocol and submitted for frozen section.
Subsequent Stages Histo Method Verbiage: Using a similar technique to that described above, a thin layer of tissue was removed from all areas where tumor was visible on the previous stage.  The tissue was again oriented, mapped, dyed, and processed as above.
Mohs Rapid Report Verbiage: The area of clinically evident tumor was marked with skin marking ink and appropriately hatched.  The initial incision was made following the Mohs approach through the skin.  The specimen was taken to the lab, divided into the necessary number of pieces, chromacoded and processed according to the Mohs protocol.  This was repeated in successive stages until a tumor free defect was achieved.
Complex Repair Preamble Text (Leave Blank If You Do Not Want): Extensive wide undermining was performed.
Intermediate Repair Preamble Text (Leave Blank If You Do Not Want): Undermining was performed with blunt dissection.
Non-Graft Cartilage Fenestration Text: The cartilage was fenestrated with a 2mm punch biopsy to help facilitate healing.
Graft Cartilage Fenestration Text: The cartilage was fenestrated with a 2mm punch biopsy to help facilitate graft survival and healing.
Secondary Intention Text (Leave Blank If You Do Not Want): The defect will heal with secondary intention.
No Repair - Repaired With Adjacent Surgical Defect Text (Leave Blank If You Do Not Want): After obtaining clear surgical margins the defect was repaired concurrently with another surgical defect which was in close approximation.
Adjacent Tissue Transfer Text: The defect edges were debeveled with a #15 scalpel blade.  Given the location of the defect and the proximity to free margins an adjacent tissue transfer was deemed most appropriate.  Using a sterile surgical marker, an appropriate flap was drawn incorporating the defect and placing the expected incisions within the relaxed skin tension lines where possible.    The area thus outlined was incised deep to adipose tissue with a #15 scalpel blade.  The skin margins were undermined to an appropriate distance in all directions utilizing iris scissors.
Advancement Flap (Single) Text: The defect edges were debeveled with a #15 scalpel blade.  Given the location of the defect and the proximity to free margins a single advancement flap was deemed most appropriate.  Using a sterile surgical marker, an appropriate advancement flap was drawn incorporating the defect and placing the expected incisions within the relaxed skin tension lines where possible.    The area thus outlined was incised deep to adipose tissue with a #15 scalpel blade.  The skin margins were undermined to an appropriate distance in all directions utilizing iris scissors.
Advancement Flap (Double) Text: The defect edges were debeveled with a #15 scalpel blade.  Given the location of the defect and the proximity to free margins a double advancement flap was deemed most appropriate.  Using a sterile surgical marker, the appropriate advancement flaps were drawn incorporating the defect and placing the expected incisions within the relaxed skin tension lines where possible.    The area thus outlined was incised deep to adipose tissue with a #15 scalpel blade.  The skin margins were undermined to an appropriate distance in all directions utilizing iris scissors.
Burow's Advancement Flap Text: The defect edges were debeveled with a #15 scalpel blade.  Given the location of the defect and the proximity to free margins a Burow's advancement flap was deemed most appropriate.  Using a sterile surgical marker, the appropriate advancement flap was drawn incorporating the defect and placing the expected incisions within the relaxed skin tension lines where possible.    The area thus outlined was incised deep to adipose tissue with a #15 scalpel blade.  The skin margins were undermined to an appropriate distance in all directions utilizing iris scissors.
Chonodrocutaneous Helical Advancement Flap Text: The defect edges were debeveled with a #15 scalpel blade.  Given the location of the defect and the proximity to free margins a chondrocutaneous helical advancement flap was deemed most appropriate.  Using a sterile surgical marker, the appropriate advancement flap was drawn incorporating the defect and placing the expected incisions within the relaxed skin tension lines where possible.    The area thus outlined was incised deep to adipose tissue with a #15 scalpel blade.  The skin margins were undermined to an appropriate distance in all directions utilizing iris scissors.
Crescentic Advancement Flap Text: The defect edges were debeveled with a #15 scalpel blade.  Given the location of the defect and the proximity to free margins a crescentic advancement flap was deemed most appropriate.  Using a sterile surgical marker, the appropriate advancement flap was drawn incorporating the defect and placing the expected incisions within the relaxed skin tension lines where possible.    The area thus outlined was incised deep to adipose tissue with a #15 scalpel blade.  The skin margins were undermined to an appropriate distance in all directions utilizing iris scissors.
A-T Advancement Flap Text: The defect edges were debeveled with a #15 scalpel blade.  Given the location of the defect, shape of the defect and the proximity to free margins an A-T advancement flap was deemed most appropriate.  Using a sterile surgical marker, an appropriate advancement flap was drawn incorporating the defect and placing the expected incisions within the relaxed skin tension lines where possible.    The area thus outlined was incised deep to adipose tissue with a #15 scalpel blade.  The skin margins were undermined to an appropriate distance in all directions utilizing iris scissors.
O-T Advancement Flap Text: The defect edges were debeveled with a #15 scalpel blade.  Given the location of the defect, shape of the defect and the proximity to free margins an O-T advancement flap was deemed most appropriate.  Using a sterile surgical marker, an appropriate advancement flap was drawn incorporating the defect and placing the expected incisions within the relaxed skin tension lines where possible.    The area thus outlined was incised deep to adipose tissue with a #15 scalpel blade.  The skin margins were undermined to an appropriate distance in all directions utilizing iris scissors.
O-L Flap Text: The defect edges were debeveled with a #15 scalpel blade.  Given the location of the defect, shape of the defect and the proximity to free margins an O-L flap was deemed most appropriate.  Using a sterile surgical marker, an appropriate advancement flap was drawn incorporating the defect and placing the expected incisions within the relaxed skin tension lines where possible.    The area thus outlined was incised deep to adipose tissue with a #15 scalpel blade.  The skin margins were undermined to an appropriate distance in all directions utilizing iris scissors.
O-Z Flap Text: The defect edges were debeveled with a #15 scalpel blade.  Given the location of the defect, shape of the defect and the proximity to free margins an O-Z flap was deemed most appropriate.  Using a sterile surgical marker, an appropriate transposition flap was drawn incorporating the defect and placing the expected incisions within the relaxed skin tension lines where possible. The area thus outlined was incised deep to adipose tissue with a #15 scalpel blade.  The skin margins were undermined to an appropriate distance in all directions utilizing iris scissors.
Double O-Z Flap Text: The defect edges were debeveled with a #15 scalpel blade.  Given the location of the defect, shape of the defect and the proximity to free margins a Double O-Z flap was deemed most appropriate.  Using a sterile surgical marker, an appropriate transposition flap was drawn incorporating the defect and placing the expected incisions within the relaxed skin tension lines where possible. The area thus outlined was incised deep to adipose tissue with a #15 scalpel blade.  The skin margins were undermined to an appropriate distance in all directions utilizing iris scissors.
V-Y Flap Text: The defect edges were debeveled with a #15 scalpel blade.  Given the location of the defect, shape of the defect and the proximity to free margins a V-Y flap was deemed most appropriate.  Using a sterile surgical marker, an appropriate advancement flap was drawn incorporating the defect and placing the expected incisions within the relaxed skin tension lines where possible.    The area thus outlined was incised deep to adipose tissue with a #15 scalpel blade.  The skin margins were undermined to an appropriate distance in all directions utilizing iris scissors.
Advancement-Rotation Flap Text: The defect edges were debeveled with a #15 scalpel blade.  Given the location of the defect, shape of the defect and the proximity to free margins an advancement-rotation flap was deemed most appropriate.  Using a sterile surgical marker, an appropriate flap was drawn incorporating the defect and placing the expected incisions within the relaxed skin tension lines where possible. The area thus outlined was incised deep to adipose tissue with a #15 scalpel blade.  The skin margins were undermined to an appropriate distance in all directions utilizing iris scissors.
Mercedes Flap Text: The defect edges were debeveled with a #15 scalpel blade.  Given the location of the defect, shape of the defect and the proximity to free margins a Mercedes flap was deemed most appropriate.  Using a sterile surgical marker, an appropriate advancement flap was drawn incorporating the defect and placing the expected incisions within the relaxed skin tension lines where possible. The area thus outlined was incised deep to adipose tissue with a #15 scalpel blade.  The skin margins were undermined to an appropriate distance in all directions utilizing iris scissors.
Modified Advancement Flap Text: The defect edges were debeveled with a #15 scalpel blade.  Given the location of the defect, shape of the defect and the proximity to free margins a modified advancement flap was deemed most appropriate.  Using a sterile surgical marker, an appropriate advancement flap was drawn incorporating the defect and placing the expected incisions within the relaxed skin tension lines where possible.    The area thus outlined was incised deep to adipose tissue with a #15 scalpel blade.  The skin margins were undermined to an appropriate distance in all directions utilizing iris scissors.
Mucosal Advancement Flap Text: Given the location of the defect, shape of the defect and the proximity to free margins a mucosal advancement flap was deemed most appropriate. Incisions were made with a 15 blade scalpel in the appropriate fashion along the cutaneous vermilion border and the mucosal lip. The remaining actinically damaged mucosal tissue was excised.  The mucosal advancement flap was then elevated to the gingival sulcus with care taken to preserve the neurovascular structures and advanced into the primary defect. Care was taken to ensure that precise realignment of the vermilion border was achieved.
Peng Advancement Flap Text: The defect edges were debeveled with a #15 scalpel blade.  Given the location of the defect, shape of the defect and the proximity to free margins a Peng advancement flap was deemed most appropriate.  Using a sterile surgical marker, an appropriate advancement flap was drawn incorporating the defect and placing the expected incisions within the relaxed skin tension lines where possible. The area thus outlined was incised deep to adipose tissue with a #15 scalpel blade.  The skin margins were undermined to an appropriate distance in all directions utilizing iris scissors.
Hatchet Flap Text: The defect edges were debeveled with a #15 scalpel blade.  Given the location of the defect, shape of the defect and the proximity to free margins a hatchet flap was deemed most appropriate.  Using a sterile surgical marker, an appropriate hatchet flap was drawn incorporating the defect and placing the expected incisions within the relaxed skin tension lines where possible.    The area thus outlined was incised deep to adipose tissue with a #15 scalpel blade.  The skin margins were undermined to an appropriate distance in all directions utilizing iris scissors.
Rotation Flap Text: The defect edges were debeveled with a #15 scalpel blade.  Given the location of the defect, shape of the defect and the proximity to free margins a rotation flap was deemed most appropriate.  Using a sterile surgical marker, an appropriate rotation flap was drawn incorporating the defect and placing the expected incisions within the relaxed skin tension lines where possible.    The area thus outlined was incised deep to adipose tissue with a #15 scalpel blade.  The skin margins were undermined to an appropriate distance in all directions utilizing iris scissors.
Bilateral Rotation Flap Text: The defect edges were debeveled with a #15 scalpel blade. Given the location of the defect, shape of the defect and the proximity to free margins a bilateral rotation flap was deemed most appropriate. Using a sterile surgical marker, an appropriate rotation flap was drawn incorporating the defect and placing the expected incisions within the relaxed skin tension lines where possible. The area thus outlined was incised deep to adipose tissue with a #15 scalpel blade. The skin margins were undermined to an appropriate distance in all directions utilizing iris scissors. Following this, the designed flap was carried over into the primary defect and sutured into place.
Spiral Flap Text: The defect edges were debeveled with a #15 scalpel blade.  Given the location of the defect, shape of the defect and the proximity to free margins a spiral flap was deemed most appropriate.  Using a sterile surgical marker, an appropriate rotation flap was drawn incorporating the defect and placing the expected incisions within the relaxed skin tension lines where possible. The area thus outlined was incised deep to adipose tissue with a #15 scalpel blade.  The skin margins were undermined to an appropriate distance in all directions utilizing iris scissors.
Staged Advancement Flap Text: The defect edges were debeveled with a #15 scalpel blade.  Given the location of the defect, shape of the defect and the proximity to free margins a staged advancement flap was deemed most appropriate.  Using a sterile surgical marker, an appropriate advancement flap was drawn incorporating the defect and placing the expected incisions within the relaxed skin tension lines where possible. The area thus outlined was incised deep to adipose tissue with a #15 scalpel blade.  The skin margins were undermined to an appropriate distance in all directions utilizing iris scissors.
Star Wedge Flap Text: The defect edges were debeveled with a #15 scalpel blade.  Given the location of the defect, shape of the defect and the proximity to free margins a star wedge flap was deemed most appropriate.  Using a sterile surgical marker, an appropriate rotation flap was drawn incorporating the defect and placing the expected incisions within the relaxed skin tension lines where possible. The area thus outlined was incised deep to adipose tissue with a #15 scalpel blade.  The skin margins were undermined to an appropriate distance in all directions utilizing iris scissors.
Transposition Flap Text: The defect edges were debeveled with a #15 scalpel blade.  Given the location of the defect and the proximity to free margins a transposition flap was deemed most appropriate.  Using a sterile surgical marker, an appropriate transposition flap was drawn incorporating the defect.    The area thus outlined was incised deep to adipose tissue with a #15 scalpel blade.  The skin margins were undermined to an appropriate distance in all directions utilizing iris scissors.
Muscle Hinge Flap Text: The defect edges were debeveled with a #15 scalpel blade.  Given the size, depth and location of the defect and the proximity to free margins a muscle hinge flap was deemed most appropriate.  Using a sterile surgical marker, an appropriate hinge flap was drawn incorporating the defect. The area thus outlined was incised with a #15 scalpel blade.  The skin margins were undermined to an appropriate distance in all directions utilizing iris scissors.
Mustarde Flap Text: The defect edges were debeveled with a #15 scalpel blade.  Given the size, depth and location of the defect and the proximity to free margins a Mustarde flap was deemed most appropriate.  Using a sterile surgical marker, an appropriate flap was drawn incorporating the defect. The area thus outlined was incised with a #15 scalpel blade.  The skin margins were undermined to an appropriate distance in all directions utilizing iris scissors.
Nasal Turnover Hinge Flap Text: The defect edges were debeveled with a #15 scalpel blade.  Given the size, depth, location of the defect and the defect being full thickness a nasal turnover hinge flap was deemed most appropriate.  Using a sterile surgical marker, an appropriate hinge flap was drawn incorporating the defect. The area thus outlined was incised with a #15 scalpel blade. The flap was designed to recreate the nasal mucosal lining and the alar rim. The skin margins were undermined to an appropriate distance in all directions utilizing iris scissors.
Nasalis-Muscle-Based Myocutaneous Island Pedicle Flap Text: Using a #15 blade, an incision was made around the donor flap to the level of the nasalis muscle. Wide lateral undermining was then performed in both the subcutaneous plane above the nasalis muscle, and in a submuscular plane just above periosteum. This allowed the formation of a free nasalis muscle axial pedicle (based on the angular artery) which was still attached to the actual cutaneous flap, increasing its mobility and vascular viability. Hemostasis was obtained with pinpoint electrocoagulation. The flap was mobilized into position and the pivotal anchor points positioned and stabilized with buried interrupted sutures. Subcutaneous and dermal tissues were closed in a multilayered fashion with sutures. Tissue redundancies were excised, and the epidermal edges were apposed without significant tension and sutured with sutures.
Nasalis Myocutaneous Flap Text: Using a #15 blade, an incision was made around the donor flap to the level of the nasalis muscle. Wide lateral undermining was then performed in both the subcutaneous plane above the nasalis muscle, and in a submuscular plane just above periosteum. This allowed the formation of a free nasalis muscle axial pedicle which was still attached to the actual cutaneous flap, increasing its mobility and vascular viability. Hemostasis was obtained with pinpoint electrocoagulation. The flap was mobilized into position and the pivotal anchor points positioned and stabilized with buried interrupted sutures. Subcutaneous and dermal tissues were closed in a multilayered fashion with sutures. Tissue redundancies were excised, and the epidermal edges were apposed without significant tension and sutured with sutures.
Orbicularis Oris Muscle Flap Text: The defect edges were debeveled with a #15 scalpel blade.  Given that the defect affected the competency of the oral sphincter an orbicularis oris muscle flap was deemed most appropriate to restore this competency and normal muscle function.  Using a sterile surgical marker, an appropriate flap was drawn incorporating the defect. The area thus outlined was incised with a #15 scalpel blade.
Melolabial Transposition Flap Text: The defect edges were debeveled with a #15 scalpel blade.  Given the location of the defect and the proximity to free margins a melolabial flap was deemed most appropriate.  Using a sterile surgical marker, an appropriate melolabial transposition flap was drawn incorporating the defect.    The area thus outlined was incised deep to adipose tissue with a #15 scalpel blade.  The skin margins were undermined to an appropriate distance in all directions utilizing iris scissors.
Rectangular Flap Text: The defect edges were debeveled with a #15 scalpel blade. Given the location of the defect and the proximity to free margins a rectangular flap was deemed most appropriate. Using a sterile surgical marker, an appropriate rectangular flap was drawn incorporating the defect. The area thus outlined was incised deep to adipose tissue with a #15 scalpel blade. The skin margins were undermined to an appropriate distance in all directions utilizing iris scissors. Following this, the designed flap was carried over into the primary defect and sutured into place.
Rhombic Flap Text: The defect edges were debeveled with a #15 scalpel blade.  Given the location of the defect and the proximity to free margins a rhombic flap was deemed most appropriate.  Using a sterile surgical marker, an appropriate rhombic flap was drawn incorporating the defect.    The area thus outlined was incised deep to adipose tissue with a #15 scalpel blade.  The skin margins were undermined to an appropriate distance in all directions utilizing iris scissors.
Rhomboid Transposition Flap Text: The defect edges were debeveled with a #15 scalpel blade.  Given the location of the defect and the proximity to free margins a rhomboid transposition flap was deemed most appropriate.  Using a sterile surgical marker, an appropriate rhomboid flap was drawn incorporating the defect.    The area thus outlined was incised deep to adipose tissue with a #15 scalpel blade.  The skin margins were undermined to an appropriate distance in all directions utilizing iris scissors.
Bi-Rhombic Flap Text: The defect edges were debeveled with a #15 scalpel blade.  Given the location of the defect and the proximity to free margins a bi-rhombic flap was deemed most appropriate.  Using a sterile surgical marker, an appropriate rhombic flap was drawn incorporating the defect. The area thus outlined was incised deep to adipose tissue with a #15 scalpel blade.  The skin margins were undermined to an appropriate distance in all directions utilizing iris scissors.
Helical Rim Advancement Flap Text: The defect edges were debeveled with a #15 blade scalpel.  Given the location of the defect and the proximity to free margins (helical rim) a double helical rim advancement flap was deemed most appropriate.  Using a sterile surgical marker, the appropriate advancement flaps were drawn incorporating the defect and placing the expected incisions between the helical rim and antihelix where possible.  The area thus outlined was incised through and through with a #15 scalpel blade.  With a skin hook and iris scissors, the flaps were gently and sharply undermined and freed up.
Bilateral Helical Rim Advancement Flap Text: The defect edges were debeveled with a #15 blade scalpel.  Given the location of the defect and the proximity to free margins (helical rim) a bilateral helical rim advancement flap was deemed most appropriate.  Using a sterile surgical marker, the appropriate advancement flaps were drawn incorporating the defect and placing the expected incisions between the helical rim and antihelix where possible.  The area thus outlined was incised through and through with a #15 scalpel blade.  With a skin hook and iris scissors, the flaps were gently and sharply undermined and freed up.
Ear Star Wedge Flap Text: The defect edges were debeveled with a #15 blade scalpel.  Given the location of the defect and the proximity to free margins (helical rim) an ear star wedge flap was deemed most appropriate.  Using a sterile surgical marker, the appropriate flap was drawn incorporating the defect and placing the expected incisions between the helical rim and antihelix where possible.  The area thus outlined was incised through and through with a #15 scalpel blade.
Banner Transposition Flap Text: The defect edges were debeveled with a #15 scalpel blade.  Given the location of the defect and the proximity to free margins a Banner transposition flap was deemed most appropriate.  Using a sterile surgical marker, an appropriate flap drawn around the defect. The area thus outlined was incised deep to adipose tissue with a #15 scalpel blade.  The skin margins were undermined to an appropriate distance in all directions utilizing iris scissors.
Bilobed Flap Text: The defect edges were debeveled with a #15 scalpel blade.  Given the location of the defect and the proximity to free margins a bilobe flap was deemed most appropriate.  Using a sterile surgical marker, an appropriate bilobe flap drawn around the defect.    The area thus outlined was incised deep to adipose tissue with a #15 scalpel blade.  The skin margins were undermined to an appropriate distance in all directions utilizing iris scissors.
Bilobed Transposition Flap Text: The defect edges were debeveled with a #15 scalpel blade.  Given the location of the defect and the proximity to free margins a bilobed transposition flap was deemed most appropriate.  Using a sterile surgical marker, an appropriate bilobe flap drawn around the defect.    The area thus outlined was incised deep to adipose tissue with a #15 scalpel blade.  The skin margins were undermined to an appropriate distance in all directions utilizing iris scissors.
Trilobed Flap Text: The defect edges were debeveled with a #15 scalpel blade.  Given the location of the defect and the proximity to free margins a trilobed flap was deemed most appropriate.  Using a sterile surgical marker, an appropriate trilobed flap drawn around the defect.    The area thus outlined was incised deep to adipose tissue with a #15 scalpel blade.  The skin margins were undermined to an appropriate distance in all directions utilizing iris scissors.
Dorsal Nasal Flap Text: The defect edges were debeveled with a #15 scalpel blade.  Given the location of the defect and the proximity to free margins a dorsal nasal flap was deemed most appropriate.  Using a sterile surgical marker, an appropriate dorsal nasal flap was drawn around the defect.    The area thus outlined was incised deep to adipose tissue with a #15 scalpel blade.  The skin margins were undermined to an appropriate distance in all directions utilizing iris scissors.
Island Pedicle Flap Text: The defect edges were debeveled with a #15 scalpel blade.  Given the location of the defect, shape of the defect and the proximity to free margins an island pedicle advancement flap was deemed most appropriate.  Using a sterile surgical marker, an appropriate advancement flap was drawn incorporating the defect, outlining the appropriate donor tissue and placing the expected incisions within the relaxed skin tension lines where possible.    The area thus outlined was incised deep to adipose tissue with a #15 scalpel blade.  The skin margins were undermined to an appropriate distance in all directions around the primary defect and laterally outward around the island pedicle utilizing iris scissors.  There was minimal undermining beneath the pedicle flap.
Island Pedicle Flap With Canthal Suspension Text: The defect edges were debeveled with a #15 scalpel blade.  Given the location of the defect, shape of the defect and the proximity to free margins an island pedicle advancement flap was deemed most appropriate.  Using a sterile surgical marker, an appropriate advancement flap was drawn incorporating the defect, outlining the appropriate donor tissue and placing the expected incisions within the relaxed skin tension lines where possible. The area thus outlined was incised deep to adipose tissue with a #15 scalpel blade.  The skin margins were undermined to an appropriate distance in all directions around the primary defect and laterally outward around the island pedicle utilizing iris scissors.  There was minimal undermining beneath the pedicle flap. A suspension suture was placed in the canthal tendon to prevent tension and prevent ectropion.
Alar Island Pedicle Flap Text: The defect edges were debeveled with a #15 scalpel blade.  Given the location of the defect, shape of the defect and the proximity to the alar rim an island pedicle advancement flap was deemed most appropriate.  Using a sterile surgical marker, an appropriate advancement flap was drawn incorporating the defect, outlining the appropriate donor tissue and placing the expected incisions within the nasal ala running parallel to the alar rim. The area thus outlined was incised with a #15 scalpel blade.  The skin margins were undermined minimally to an appropriate distance in all directions around the primary defect and laterally outward around the island pedicle utilizing iris scissors.  There was minimal undermining beneath the pedicle flap.
Double Island Pedicle Flap Text: The defect edges were debeveled with a #15 scalpel blade.  Given the location of the defect, shape of the defect and the proximity to free margins a double island pedicle advancement flap was deemed most appropriate.  Using a sterile surgical marker, an appropriate advancement flap was drawn incorporating the defect, outlining the appropriate donor tissue and placing the expected incisions within the relaxed skin tension lines where possible.    The area thus outlined was incised deep to adipose tissue with a #15 scalpel blade.  The skin margins were undermined to an appropriate distance in all directions around the primary defect and laterally outward around the island pedicle utilizing iris scissors.  There was minimal undermining beneath the pedicle flap.
Island Pedicle Flap-Requiring Vessel Identification Text: The defect edges were debeveled with a #15 scalpel blade.  Given the location of the defect, shape of the defect and the proximity to free margins an island pedicle advancement flap was deemed most appropriate.  Using a sterile surgical marker, an appropriate advancement flap was drawn, based on the axial vessel mentioned above, incorporating the defect, outlining the appropriate donor tissue and placing the expected incisions within the relaxed skin tension lines where possible.    The area thus outlined was incised deep to adipose tissue with a #15 scalpel blade.  The skin margins were undermined to an appropriate distance in all directions around the primary defect and laterally outward around the island pedicle utilizing iris scissors.  There was minimal undermining beneath the pedicle flap.
Keystone Flap Text: The defect edges were debeveled with a #15 scalpel blade.  Given the location of the defect, shape of the defect a keystone flap was deemed most appropriate.  Using a sterile surgical marker, an appropriate keystone flap was drawn incorporating the defect, outlining the appropriate donor tissue and placing the expected incisions within the relaxed skin tension lines where possible. The area thus outlined was incised deep to adipose tissue with a #15 scalpel blade.  The skin margins were undermined to an appropriate distance in all directions around the primary defect and laterally outward around the flap utilizing iris scissors.
O-T Plasty Text: The defect edges were debeveled with a #15 scalpel blade.  Given the location of the defect, shape of the defect and the proximity to free margins an O-T plasty was deemed most appropriate.  Using a sterile surgical marker, an appropriate O-T plasty was drawn incorporating the defect and placing the expected incisions within the relaxed skin tension lines where possible.    The area thus outlined was incised deep to adipose tissue with a #15 scalpel blade.  The skin margins were undermined to an appropriate distance in all directions utilizing iris scissors.
O-Z Plasty Text: The defect edges were debeveled with a #15 scalpel blade.  Given the location of the defect, shape of the defect and the proximity to free margins an O-Z plasty (double transposition flap) was deemed most appropriate.  Using a sterile surgical marker, the appropriate transposition flaps were drawn incorporating the defect and placing the expected incisions within the relaxed skin tension lines where possible.    The area thus outlined was incised deep to adipose tissue with a #15 scalpel blade.  The skin margins were undermined to an appropriate distance in all directions utilizing iris scissors.  Hemostasis was achieved with electrocautery.  The flaps were then transposed into place, one clockwise and the other counterclockwise, and anchored with interrupted buried subcutaneous sutures.
Double O-Z Plasty Text: The defect edges were debeveled with a #15 scalpel blade.  Given the location of the defect, shape of the defect and the proximity to free margins a Double O-Z plasty (double transposition flap) was deemed most appropriate.  Using a sterile surgical marker, the appropriate transposition flaps were drawn incorporating the defect and placing the expected incisions within the relaxed skin tension lines where possible. The area thus outlined was incised deep to adipose tissue with a #15 scalpel blade.  The skin margins were undermined to an appropriate distance in all directions utilizing iris scissors.  Hemostasis was achieved with electrocautery.  The flaps were then transposed into place, one clockwise and the other counterclockwise, and anchored with interrupted buried subcutaneous sutures.
V-Y Plasty Text: The defect edges were debeveled with a #15 scalpel blade.  Given the location of the defect, shape of the defect and the proximity to free margins an V-Y advancement flap was deemed most appropriate.  Using a sterile surgical marker, an appropriate advancement flap was drawn incorporating the defect and placing the expected incisions within the relaxed skin tension lines where possible.    The area thus outlined was incised deep to adipose tissue with a #15 scalpel blade.  The skin margins were undermined to an appropriate distance in all directions utilizing iris scissors.
H Plasty Text: Given the location of the defect, shape of the defect and the proximity to free margins a H-plasty was deemed most appropriate for repair.  Using a sterile surgical marker, the appropriate advancement arms of the H-plasty were drawn incorporating the defect and placing the expected incisions within the relaxed skin tension lines where possible. The area thus outlined was incised deep to adipose tissue with a #15 scalpel blade. The skin margins were undermined to an appropriate distance in all directions utilizing iris scissors.  The opposing advancement arms were then advanced into place in opposite direction and anchored with interrupted buried subcutaneous sutures.
W Plasty Text: The lesion was extirpated to the level of the fat with a #15 scalpel blade.  Given the location of the defect, shape of the defect and the proximity to free margins a W-plasty was deemed most appropriate for repair.  Using a sterile surgical marker, the appropriate transposition arms of the W-plasty were drawn incorporating the defect and placing the expected incisions within the relaxed skin tension lines where possible.    The area thus outlined was incised deep to adipose tissue with a #15 scalpel blade.  The skin margins were undermined to an appropriate distance in all directions utilizing iris scissors.  The opposing transposition arms were then transposed into place in opposite direction and anchored with interrupted buried subcutaneous sutures.
Z Plasty Text: The lesion was extirpated to the level of the fat with a #15 scalpel blade.  Given the location of the defect, shape of the defect and the proximity to free margins a Z-plasty was deemed most appropriate for repair.  Using a sterile surgical marker, the appropriate transposition arms of the Z-plasty were drawn incorporating the defect and placing the expected incisions within the relaxed skin tension lines where possible.    The area thus outlined was incised deep to adipose tissue with a #15 scalpel blade.  The skin margins were undermined to an appropriate distance in all directions utilizing iris scissors.  The opposing transposition arms were then transposed into place in opposite direction and anchored with interrupted buried subcutaneous sutures.
Double Z Plasty Text: The lesion was extirpated to the level of the fat with a #15 scalpel blade. Given the location of the defect, shape of the defect and the proximity to free margins a double Z-plasty was deemed most appropriate for repair. Using a sterile surgical marker, the appropriate transposition arms of the double Z-plasty were drawn incorporating the defect and placing the expected incisions within the relaxed skin tension lines where possible. The area thus outlined was incised deep to adipose tissue with a #15 scalpel blade. The skin margins were undermined to an appropriate distance in all directions utilizing iris scissors. The opposing transposition arms were then transposed and carried over into place in opposite direction and anchored with interrupted buried subcutaneous sutures.
Zygomaticofacial Flap Text: Given the location of the defect, shape of the defect and the proximity to free margins a zygomaticofacial flap was deemed most appropriate for repair.  Using a sterile surgical marker, the appropriate flap was drawn incorporating the defect and placing the expected incisions within the relaxed skin tension lines where possible. The area thus outlined was incised deep to adipose tissue with a #15 scalpel blade with preservation of a vascular pedicle.  The skin margins were undermined to an appropriate distance in all directions utilizing iris scissors.  The flap was then placed into the defect and anchored with interrupted buried subcutaneous sutures.
Cheek Interpolation Flap Text: A decision was made to reconstruct the defect utilizing an interpolation axial flap and a staged reconstruction.  A telfa template was made of the defect.  This telfa template was then used to outline the Cheek Interpolation flap.  The donor area for the pedicle flap was then injected with anesthesia.  The flap was excised through the skin and subcutaneous tissue down to the layer of the underlying musculature.  The interpolation flap was carefully excised within this deep plane to maintain its blood supply.  The edges of the donor site were undermined.   The donor site was closed in a primary fashion.  The pedicle was then rotated into position and sutured.  Once the tube was sutured into place, adequate blood supply was confirmed with blanching and refill.  The pedicle was then wrapped with xeroform gauze and dressed appropriately with a telfa and gauze bandage to ensure continued blood supply and protect the attached pedicle.
Cheek-To-Nose Interpolation Flap Text: A decision was made to reconstruct the defect utilizing an interpolation axial flap and a staged reconstruction.  A telfa template was made of the defect.  This telfa template was then used to outline the Cheek-To-Nose Interpolation flap.  The donor area for the pedicle flap was then injected with anesthesia.  The flap was excised through the skin and subcutaneous tissue down to the layer of the underlying musculature.  The interpolation flap was carefully excised within this deep plane to maintain its blood supply.  The edges of the donor site were undermined.   The donor site was closed in a primary fashion.  The pedicle was then rotated into position and sutured.  Once the tube was sutured into place, adequate blood supply was confirmed with blanching and refill.  The pedicle was then wrapped with xeroform gauze and dressed appropriately with a telfa and gauze bandage to ensure continued blood supply and protect the attached pedicle.
Interpolation Flap Text: A decision was made to reconstruct the defect utilizing an interpolation axial flap and a staged reconstruction.  A telfa template was made of the defect.  This telfa template was then used to outline the interpolation flap.  The donor area for the pedicle flap was then injected with anesthesia.  The flap was excised through the skin and subcutaneous tissue down to the layer of the underlying musculature.  The interpolation flap was carefully excised within this deep plane to maintain its blood supply.  The edges of the donor site were undermined.   The donor site was closed in a primary fashion.  The pedicle was then rotated into position and sutured.  Once the tube was sutured into place, adequate blood supply was confirmed with blanching and refill.  The pedicle was then wrapped with xeroform gauze and dressed appropriately with a telfa and gauze bandage to ensure continued blood supply and protect the attached pedicle.
Melolabial Interpolation Flap Text: A decision was made to reconstruct the defect utilizing an interpolation axial flap and a staged reconstruction.  A telfa template was made of the defect.  This telfa template was then used to outline the melolabial interpolation flap.  The donor area for the pedicle flap was then injected with anesthesia.  The flap was excised through the skin and subcutaneous tissue down to the layer of the underlying musculature.  The pedicle flap was carefully excised within this deep plane to maintain its blood supply.  The edges of the donor site were undermined.   The donor site was closed in a primary fashion.  The pedicle was then rotated into position and sutured.  Once the tube was sutured into place, adequate blood supply was confirmed with blanching and refill.  The pedicle was then wrapped with xeroform gauze and dressed appropriately with a telfa and gauze bandage to ensure continued blood supply and protect the attached pedicle.
Mastoid Interpolation Flap Text: A decision was made to reconstruct the defect utilizing an interpolation axial flap and a staged reconstruction.  A telfa template was made of the defect.  This telfa template was then used to outline the mastoid interpolation flap.  The donor area for the pedicle flap was then injected with anesthesia.  The flap was excised through the skin and subcutaneous tissue down to the layer of the underlying musculature.  The pedicle flap was carefully excised within this deep plane to maintain its blood supply.  The edges of the donor site were undermined.   The donor site was closed in a primary fashion.  The pedicle was then rotated into position and sutured.  Once the tube was sutured into place, adequate blood supply was confirmed with blanching and refill.  The pedicle was then wrapped with xeroform gauze and dressed appropriately with a telfa and gauze bandage to ensure continued blood supply and protect the attached pedicle.
Posterior Auricular Interpolation Flap Text: A decision was made to reconstruct the defect utilizing an interpolation axial flap and a staged reconstruction.  A telfa template was made of the defect.  This telfa template was then used to outline the posterior auricular interpolation flap.  The donor area for the pedicle flap was then injected with anesthesia.  The flap was excised through the skin and subcutaneous tissue down to the layer of the underlying musculature.  The pedicle flap was carefully excised within this deep plane to maintain its blood supply.  The edges of the donor site were undermined.   The donor site was closed in a primary fashion.  The pedicle was then rotated into position and sutured.  Once the tube was sutured into place, adequate blood supply was confirmed with blanching and refill.  The pedicle was then wrapped with xeroform gauze and dressed appropriately with a telfa and gauze bandage to ensure continued blood supply and protect the attached pedicle.
Paramedian Forehead Flap Text: A decision was made to reconstruct the defect utilizing an interpolation axial flap and a staged reconstruction.  A telfa template was made of the defect.  This telfa template was then used to outline the paramedian forehead pedicle flap.  The donor area for the pedicle flap was then injected with anesthesia.  The flap was excised through the skin and subcutaneous tissue down to the layer of the underlying musculature.  The pedicle flap was carefully excised within this deep plane to maintain its blood supply.  The edges of the donor site were undermined.   The donor site was closed in a primary fashion.  The pedicle was then rotated into position and sutured.  Once the tube was sutured into place, adequate blood supply was confirmed with blanching and refill.  The pedicle was then wrapped with xeroform gauze and dressed appropriately with a telfa and gauze bandage to ensure continued blood supply and protect the attached pedicle.
Abbe Flap (Upper To Lower Lip) Text: The defect of the lower lip was assessed and measured.  Given the location and size of the defect, an Abbe flap was deemed most appropriate.  Using a sterile surgical marker, an appropriate Abbe flap was measured and drawn on the upper lip. Local anesthesia was then infiltrated.  A scalpel was then used to incise the upper lip through and through the skin, vermilion, muscle and mucosa, leaving the flap pedicled on the opposite side.  The flap was then rotated and transferred to the lower lip defect.  The flap was then sutured into place with a three layer technique, closing the orbicularis oris muscle layer with subcutaneous buried sutures, followed by a mucosal layer and an epidermal layer.
Abbe Flap (Lower To Upper Lip) Text: The defect of the upper lip was assessed and measured.  Given the location and size of the defect, an Abbe flap was deemed most appropriate.  Using a sterile surgical marker, an appropriate Abbe flap was measured and drawn on the lower lip. Local anesthesia was then infiltrated. A scalpel was then used to incise the upper lip through and through the skin, vermilion, muscle and mucosa, leaving the flap pedicled on the opposite side.  The flap was then rotated and transferred to the lower lip defect.  The flap was then sutured into place with a three layer technique, closing the orbicularis oris muscle layer with subcutaneous buried sutures, followed by a mucosal layer and an epidermal layer.
Estlander Flap (Upper To Lower Lip) Text: The defect of the lower lip was assessed and measured.  Given the location and size of the defect, an Estlander flap was deemed most appropriate.  Using a sterile surgical marker, an appropriate Estlander flap was measured and drawn on the upper lip. Local anesthesia was then infiltrated. A scalpel was then used to incise the lateral aspect of the flap, through skin, muscle and mucosa, leaving the flap pedicled medially.  The flap was then rotated and positioned to fill the lower lip defect.  The flap was then sutured into place with a three layer technique, closing the orbicularis oris muscle layer with subcutaneous buried sutures, followed by a mucosal layer and an epidermal layer.
Cheiloplasty (Less Than 50%) Text: A decision was made to reconstruct the defect with a  cheiloplasty.  The defect was undermined extensively.  Additional orbicularis oris muscle was excised with a 15 blade scalpel.  The defect was converted into a full thickness wedge, of less than 50% of the vertical height of the lip, to facilite a better cosmetic result.  Small vessels were then tied off with 5-0 monocyrl. The orbicularis oris, superficial fascia, adipose and dermis were then reapproximated.  After the deeper layers were approximated the epidermis was reapproximated with particular care given to realign the vermilion border.
Cheiloplasty (Complex) Text: A decision was made to reconstruct the defect with a  cheiloplasty.  The defect was undermined extensively.  Additional orbicularis oris muscle was excised with a 15 blade scalpel.  The defect was converted into a full thickness wedge to facilite a better cosmetic result.  Small vessels were then tied off with 5-0 monocyrl. The orbicularis oris, superficial fascia, adipose and dermis were then reapproximated.  After the deeper layers were approximated the epidermis was reapproximated with particular care given to realign the vermilion border.
Ear Wedge Repair Text: A wedge excision was completed by carrying down an excision through the full thickness of the ear and cartilage with an inward facing Burow's triangle. The wound was then closed in a layered fashion.
Full Thickness Lip Wedge Repair (Flap) Text: Given the location of the defect and the proximity to free margins a full thickness wedge repair was deemed most appropriate.  Using a sterile surgical marker, the appropriate repair was drawn incorporating the defect and placing the expected incisions perpendicular to the vermilion border.  The vermilion border was also meticulously outlined to ensure appropriate reapproximation during the repair.  The area thus outlined was incised through and through with a #15 scalpel blade.  The muscularis and dermis were reaproximated with deep sutures following hemostasis. Care was taken to realign the vermilion border before proceeding with the superficial closure.  Once the vermilion was realigned the superfical and mucosal closure was finished.
Ftsg Text: The defect edges were debeveled with a #15 scalpel blade.  Given the location of the defect, shape of the defect and the proximity to free margins a full thickness skin graft was deemed most appropriate.  Using a sterile surgical marker, the primary defect shape was transferred to the donor site. The area thus outlined was incised deep to adipose tissue with a #15 scalpel blade.  The harvested graft was then trimmed of adipose tissue until only dermis and epidermis was left.  The skin margins of the secondary defect were undermined to an appropriate distance in all directions utilizing iris scissors.  The secondary defect was closed with interrupted buried subcutaneous sutures.  The skin edges were then re-apposed with running  sutures.  The skin graft was then placed in the primary defect and oriented appropriately.
Split-Thickness Skin Graft Text: The defect edges were debeveled with a #15 scalpel blade.  Given the location of the defect, shape of the defect and the proximity to free margins a split thickness skin graft was deemed most appropriate.  Using a sterile surgical marker, the primary defect shape was transferred to the donor site. The split thickness graft was then harvested.  The skin graft was then placed in the primary defect and oriented appropriately.
Pinch Graft Text: The defect edges were debeveled with a #15 scalpel blade. Given the location of the defect, shape of the defect and the proximity to free margins a pinch graft was deemed most appropriate. Using a sterile surgical marker, the primary defect shape was transferred to the donor site. The area thus outlined was incised deep to adipose tissue with a #15 scalpel blade.  The harvested graft was then trimmed of adipose tissue until only dermis and epidermis was left. The skin margins of the secondary defect were undermined to an appropriate distance in all directions utilizing iris scissors.  The secondary defect was closed with interrupted buried subcutaneous sutures.  The skin edges were then re-apposed with running  sutures.  The skin graft was then placed in the primary defect and oriented appropriately.
Burow's Graft Text: The defect edges were debeveled with a #15 scalpel blade.  Given the location of the defect, shape of the defect, the proximity to free margins and the presence of a standing cone deformity a Burow's skin graft was deemed most appropriate. The standing cone was removed and this tissue was then trimmed to the shape of the primary defect. The adipose tissue was also removed until only dermis and epidermis were left.  The skin margins of the secondary defect were undermined to an appropriate distance in all directions utilizing iris scissors.  The secondary defect was closed with interrupted buried subcutaneous sutures.  The skin edges were then re-apposed with running  sutures.  The skin graft was then placed in the primary defect and oriented appropriately.
Cartilage Graft Text: The defect edges were debeveled with a #15 scalpel blade.  Given the location of the defect, shape of the defect, the fact the defect involved a full thickness cartilage defect a cartilage graft was deemed most appropriate.  An appropriate donor site was identified, cleansed, and anesthetized. The cartilage graft was then harvested and transferred to the recipient site, oriented appropriately and then sutured into place.  The secondary defect was then repaired using a primary closure.
Composite Graft Text: The defect edges were debeveled with a #15 scalpel blade.  Given the location of the defect, shape of the defect, the proximity to free margins and the fact the defect was full thickness a composite graft was deemed most appropriate.  The defect was outline and then transferred to the donor site.  A full thickness graft was then excised from the donor site. The graft was then placed in the primary defect, oriented appropriately and then sutured into place.  The secondary defect was then repaired using a primary closure.
Epidermal Autograft Text: The defect edges were debeveled with a #15 scalpel blade.  Given the location of the defect, shape of the defect and the proximity to free margins an epidermal autograft was deemed most appropriate.  Using a sterile surgical marker, the primary defect shape was transferred to the donor site. The epidermal graft was then harvested.  The skin graft was then placed in the primary defect and oriented appropriately.
Dermal Autograft Text: The defect edges were debeveled with a #15 scalpel blade.  Given the location of the defect, shape of the defect and the proximity to free margins a dermal autograft was deemed most appropriate.  Using a sterile surgical marker, the primary defect shape was transferred to the donor site. The area thus outlined was incised deep to adipose tissue with a #15 scalpel blade.  The harvested graft was then trimmed of adipose and epidermal tissue until only dermis was left.  The skin graft was then placed in the primary defect and oriented appropriately.
Skin Substitute Text: The defect edges were debeveled with a #15 scalpel blade.  Given the location of the defect, shape of the defect and the proximity to free margins a skin substitute graft was deemed most appropriate.  The graft material was trimmed to fit the size of the defect. The graft was then placed in the primary defect and oriented appropriately.
Tissue Cultured Epidermal Autograft Text: The defect edges were debeveled with a #15 scalpel blade.  Given the location of the defect, shape of the defect and the proximity to free margins a tissue cultured epidermal autograft was deemed most appropriate.  The graft was then trimmed to fit the size of the defect.  The graft was then placed in the primary defect and oriented appropriately.
Xenograft Text: The defect edges were debeveled with a #15 scalpel blade.  Given the location of the defect, shape of the defect and the proximity to free margins a xenograft was deemed most appropriate.  The graft was then trimmed to fit the size of the defect.  The graft was then placed in the primary defect and oriented appropriately.
Purse String (Simple) Text: Given the location of the defect and the characteristics of the surrounding skin a purse string closure was deemed most appropriate.  Undermining was performed circumferentially around the surgical defect.  A purse string suture was then placed and tightened.
Purse String (Intermediate) Text: Given the location of the defect and the characteristics of the surrounding skin a purse string intermediate closure was deemed most appropriate.  Undermining was performed circumferentially around the surgical defect.  A purse string suture was then placed and tightened.
Partial Purse String (Simple) Text: Given the location of the defect and the characteristics of the surrounding skin a simple purse string closure was deemed most appropriate.  Undermining was performed circumferentially around the surgical defect.  A purse string suture was then placed and tightened. Wound tension only allowed a partial closure of the circular defect.
Partial Purse String (Intermediate) Text: Given the location of the defect and the characteristics of the surrounding skin an intermediate purse string closure was deemed most appropriate.  Undermining was performed circumferentially around the surgical defect.  A purse string suture was then placed and tightened. Wound tension only allowed a partial closure of the circular defect.
Localized Dermabrasion With Wire Brush Text: The patient was draped in routine manner.  Localized dermabrasion using 3 x 17 mm wire brush was performed in routine manner to papillary dermis. This spot dermabrasion is being performed to complete skin cancer reconstruction. It also will eliminate the other sun damaged precancerous cells that are known to be part of the regional effect of a lifetime's worth of sun exposure. This localized dermabrasion is therapeutic and should not be considered cosmetic in any regard.
Localized Dermabrasion With Sand Papertext: The patient was draped in routine manner.  Localized dermabrasion using sterile sand paper was performed in routine manner to papillary dermis. This spot dermabrasion is being performed to complete skin cancer reconstruction. It also will eliminate the other sun damaged precancerous cells that are known to be part of the regional effect of a lifetime's worth of sun exposure. This localized dermabrasion is therapeutic and should not be considered cosmetic in any regard.
Localized Dermabrasion With 15 Blade Text: The patient was draped in routine manner.  Localized dermabrasion using a 15 blade was performed in routine manner to papillary dermis. This spot dermabrasion is being performed to complete skin cancer reconstruction. It also will eliminate the other sun damaged precancerous cells that are known to be part of the regional effect of a lifetime's worth of sun exposure. This localized dermabrasion is therapeutic and should not be considered cosmetic in any regard.
Tarsorrhaphy Text: A tarsorrhaphy was performed using Frost sutures.
Intermediate Repair And Flap Additional Text (Will Appearing After The Standard Complex Repair Text): The intermediate repair was not sufficient to completely close the primary defect. The remaining additional defect was repaired with the flap mentioned below.
Intermediate Repair And Graft Additional Text (Will Appearing After The Standard Complex Repair Text): The intermediate repair was not sufficient to completely close the primary defect. The remaining additional defect was repaired with the graft mentioned below.
Complex Repair And Flap Additional Text (Will Appearing After The Standard Complex Repair Text): The complex repair was not sufficient to completely close the primary defect. The remaining additional defect was repaired with the flap mentioned below.
Complex Repair And Graft Additional Text (Will Appearing After The Standard Complex Repair Text): The complex repair was not sufficient to completely close the primary defect. The remaining additional defect was repaired with the graft mentioned below.
Eyelid Full Thickness Repair - 55294: The eyelid defect was full thickness which required a wedge repair of the eyelid. Special care was taken to ensure that the eyelid margin was realligned when placing sutures.
Eyelid Partial Thickness Repair - 81996: The eyelid defect was partial thickness which required a wedge repair of the eyelid. Special care was taken to ensure that the eyelid margin was realligned when placing sutures.
Manual Repair Warning Statement: We plan on removing the manually selected variable below in favor of our much easier automatic structured text blocks found in the previous tab. We decided to do this to help make the flow better and give you the full power of structured data. Manual selection is never going to be ideal in our platform and I would encourage you to avoid using manual selection from this point on, especially since I will be sunsetting this feature. It is important that you do one of two things with the customized text below. First, you can save all of the text in a word file so you can have it for future reference. Second, transfer the text to the appropriate area in the Library tab. Lastly, if there is a flap or graft type which we do not have you need to let us know right away so I can add it in before the variable is hidden. No need to panic, we plan to give you roughly 6 months to make the change.
Same Histology In Subsequent Stages Text: The pattern and morphology of the tumor is as described in the first stage.
No Residual Tumor Seen Histology Text: There were no malignant cells seen in the sections examined.
Inflammation Suggestive Of Cancer Camouflage Histology Text: There was a dense lymphocytic infiltrate which prevented adequate histologic evaluation of adjacent structures.
Incidental Superficial Basal Cell Carcinoma Histology Text: Incidental superficial basaloid proliferation emanating from the epidermis noted
Incidental Squamous Cell Carcinoma In Situ Histology Text: Incidental full thickness keratinocytic atypia of epidermis noted
Incidental Actinic Keratosis Histology Text: Area of epidermal basilar layer atypia, parakeratosis, and solar elastosis
Bcc Histology Text: There were numerous aggregates of basaloid cells.
Bcc Infiltrative Histology Text: There were numerous aggregates of basaloid cells demonstrating an infiltrative pattern.
Bcc Micronodular Histology Text: There were numerous aggregates of small, micronodular basaloid proliferations
Bcc  Morpheaform/Sclerosing Histology Text: There are thin basaloid strands of epithelial cells often seen in a fibrous storm
Bcc  Nodular Histology Text: Nodular aggregates of basaloid proliferations
Bcc Pigmented Histology Text: Aggregates of basaloid proliferations with some pigment
Bcc Superficial Histology Text: superficial basaloid proliferation emanating from the epidermis noted
Mixed Superficial And Nodular Bcc Histology Text: Areas of nodular basaloid proliferations mixed with superficial basaloid proliferations emanating from the epidermis
Mixed Nodular And Infiltrative Bcc Histology Text: Areas of nodular basaloid proliferations mixed with thin strands of basaloid proliferations with an infiltrative growth pattern
Mixed Nodular And Micronodular Bcc Histology Text: Areas of nodular basaloid proliferations mixed with micronodular aggregates of basaloid proliferations
Scc Histology Text: Nests of atypical squamous keratinocytes arising from the epidermis with growth into the dermis
Scc Well Differentiated Histology Text: Nests of well differentiated atypical squamous keratinocytes arising from the epidermis with growth into the dermis
Scc Moderately Differentiated Histology Text: Nests of moderately differentiated atypical squamous keratinocytes arising from the epidermis with growth into the dermis
Scc In Situ Histology Text: Full thickness epidermal squamous atypia noted
Mart-1 - Positive Histology Text: MART-1 staining demonstrates areas of higher density and clustering of melanocytes with Pagetoid spread upwards within the epidermis. The surgical margins are positive for tumor cells.
Mart-1 - Negative Histology Text: MART-1 staining demonstrates a normal density and pattern of melanocytes along the dermal-epidermal junction. The surgical margins are negative for tumor cells.
Information: Selecting Yes will display possible errors in your note based on the variables you have selected. This validation is only offered as a suggestion for you. PLEASE NOTE THAT THE VALIDATION TEXT WILL BE REMOVED WHEN YOU FINALIZE YOUR NOTE. IF YOU WANT TO FAX A PRELIMINARY NOTE YOU WILL NEED TO TOGGLE THIS TO 'NO' IF YOU DO NOT WANT IT IN YOUR FAXED NOTE.

## 2024-04-16 ENCOUNTER — CLINICAL DOCUMENTATION (OUTPATIENT)
Dept: CASE MANAGEMENT | Age: 79
End: 2024-04-16

## 2024-04-17 DIAGNOSIS — E11.65 TYPE 2 DIABETES MELLITUS WITH HYPERGLYCEMIA, WITHOUT LONG-TERM CURRENT USE OF INSULIN (HCC): ICD-10-CM

## 2024-04-18 RX ORDER — METFORMIN HYDROCHLORIDE 500 MG/1
1000 TABLET, EXTENDED RELEASE ORAL
Qty: 90 TABLET | Refills: 7 | OUTPATIENT
Start: 2024-04-18

## 2024-05-01 ENCOUNTER — APPOINTMENT (RX ONLY)
Dept: URBAN - METROPOLITAN AREA CLINIC 24 | Facility: CLINIC | Age: 79
Setting detail: DERMATOLOGY
End: 2024-05-01

## 2024-05-01 PROBLEM — C44.319 BASAL CELL CARCINOMA OF SKIN OF OTHER PARTS OF FACE: Status: ACTIVE | Noted: 2024-05-01

## 2024-05-01 PROCEDURE — 17311 MOHS 1 STAGE H/N/HF/G: CPT | Mod: 76

## 2024-05-01 PROCEDURE — ? MOHS SURGERY

## 2024-05-01 PROCEDURE — 17312 MOHS ADDL STAGE: CPT

## 2024-05-01 PROCEDURE — 13132 CMPLX RPR F/C/C/M/N/AX/G/H/F: CPT | Mod: 59

## 2024-05-01 PROCEDURE — 14040 TIS TRNFR F/C/C/M/N/A/G/H/F: CPT

## 2024-05-01 PROCEDURE — 17311 MOHS 1 STAGE H/N/HF/G: CPT

## 2024-05-01 NOTE — PROCEDURE: MOHS SURGERY
Mohs Case Number: [id number]
Previous Accession (Optional): [id number]
Biopsy Photograph Reviewed: Yes
Referring Physician (Optional): Alexx
Consent Type: Consent 1 (Standard)
Eye Shield Used: No
Initial Size Of Lesion: 0.8
X Size Of Lesion In Cm (Optional): 0
Number Of Stages: 1
Primary Defect Length In Cm (Final Defect Size - Required For Flaps/Grafts): 1.4
Repair Type: Flap
Which Instrument Did You Use For Dermabrasion?: Wire Brush
Which Eyelid Repair Cpt Are You Using?: 51216
Oculoplastic Surgeon Procedure Text (A): After obtaining clear surgical margins the patient was sent to oculoplastics for surgical repair.  The patient understands they will receive post-surgical care and follow-up from the referring physician's office.
Otolaryngologist Procedure Text (A): After obtaining clear surgical margins the patient was sent to otolaryngology for surgical repair.  The patient understands they will receive post-surgical care and follow-up from the referring physician's office.
Plastic Surgeon Procedure Text (A): After obtaining clear surgical margins the patient was sent to plastics for surgical repair.  The patient understands they will receive post-surgical care and follow-up from the referring physician's office.
Mid-Level Procedure Text (A): After obtaining clear surgical margins the patient was sent to a mid-level provider for surgical repair.  The patient understands they will receive post-surgical care and follow-up from the mid-level provider.
Provider Procedure Text (A): After obtaining clear surgical margins the defect was repaired by another provider.
Asc Procedure Text (A): After obtaining clear surgical margins the patient was sent to an ASC for surgical repair.  The patient understands they will receive post-surgical care and follow-up from the ASC physician.
Suturegard Retention Suture: 2-0 Nylon
Retention Suture Bite Size: 3 mm
Length To Time In Minutes Device Was In Place: 10
Undermining Type: Entire Wound
Debridement Text: The wound edges were debrided prior to proceeding with the closure to facilitate wound healing.
Helical Rim Text: The closure involved the helical rim.
Vermilion Border Text: The closure involved the vermilion border.
Nostril Rim Text: The closure involved the nostril rim.
Retention Suture Text: Retention sutures were placed to support the closure and prevent dehiscence.
Flap Type: Burow's Advancement Flap
Secondary Defect Length In Cm (Required For Flaps): 4
Location Indication Override (Is Already Calculated Based On Selected Body Location): Area H
Area H Indication Text: Tumors in this location are included in Area H (eyelids, eyebrows, nose, lips, chin, ear, pre-auricular, post-auricular, temple, genitalia, hands, feet, ankles and areola).  Tissue conservation is critical in these anatomic locations.
Area M Indication Text: Tumors in this location are included in Area M (cheek, forehead, scalp, neck, jawline and pretibial skin).  Mohs surgery is indicated for tumors in these anatomic locations.
Area L Indication Text: Tumors in this location are included in Area L (trunk and extremities).  Mohs surgery is indicated for larger tumors, or tumors with aggressive histologic features, in these anatomic locations.
Tumor Debulked?: curette
Depth Of Tumor Invasion (For Histology): tumor not visualized (deep and peripheral margins are clear of tumor)
Perineural Invasion (For Histology - Be Specific If Possible): absent
Special Stains Stage 1 - Results: Base On Clearance Noted Above
Stage 2: Additional Anesthesia Type: 1% lidocaine with epinephrine
Staging Info: By selecting yes to the question above you will include information on AJCC 8 tumor staging in your Mohs note. Information on tumor staging will be automatically added for SCCs on the head and neck. AJCC 8 includes tumor size, tumor depth, perineural involvement and bone invasion.
Tumor Depth: Less than 6mm from granular layer and no invasion beyond the subcutaneous fat
Was The Patient On Physician Recommended Anticoagulation Therapy?: Please Select the Appropriate Response
Medical Necessity Statement: Based on my medical judgement, Mohs surgery is the most appropriate treatment for this cancer compared to other treatments.
Alternatives Discussed Intro (Do Not Add Period): I discussed alternative treatments to Mohs surgery and specifically discussed the risks and benefits of
Consent 1/Introductory Paragraph: The rationale for Mohs was explained to the patient and consent was obtained. The risks, benefits and alternatives to therapy were discussed in detail. Specifically, the risks of infection, scarring, bleeding, prolonged wound healing, incomplete removal, allergy to anesthesia, nerve injury(both sensory and motor which can be permanent), and recurrence were addressed. Prior to the procedure, the treatment site was clearly identified and confirmed by the patient. All components of Universal Protocol/PAUSE Rule completed.
Consent 2/Introductory Paragraph: Mohs surgery was explained to the patient and consent was obtained. The risks, benefits and alternatives to therapy were discussed in detail. Specifically, the risks of infection, scarring, bleeding, prolonged wound healing, incomplete removal, allergy to anesthesia, nerve injury and recurrence were addressed. Prior to the procedure, the treatment site was clearly identified and confirmed by the patient. All components of Universal Protocol/PAUSE Rule completed.
Consent 3/Introductory Paragraph: I gave the patient a chance to ask questions they had about the procedure.  Following this I explained the Mohs procedure and consent was obtained. The risks, benefits and alternatives to therapy were discussed in detail. Specifically, the risks of infection, scarring, bleeding, prolonged wound healing, incomplete removal, allergy to anesthesia, nerve injury and recurrence were addressed. Prior to the procedure, the treatment site was clearly identified and confirmed by the patient. All components of Universal Protocol/PAUSE Rule completed.
Consent (Temporal Branch)/Introductory Paragraph: The rationale for Mohs was explained to the patient and consent was obtained. The risks, benefits and alternatives to therapy were discussed in detail. Specifically, the risks of damage to the temporal branch of the facial nerve, infection, scarring, bleeding, prolonged wound healing, incomplete removal, allergy to anesthesia, and recurrence were addressed. Prior to the procedure, the treatment site was clearly identified and confirmed by the patient. All components of Universal Protocol/PAUSE Rule completed.
Consent (Marginal Mandibular)/Introductory Paragraph: The rationale for Mohs was explained to the patient and consent was obtained. The risks, benefits and alternatives to therapy were discussed in detail. Specifically, the risks of damage to the marginal mandibular branch of the facial nerve, infection, scarring, bleeding, prolonged wound healing, incomplete removal, allergy to anesthesia, and recurrence were addressed. Prior to the procedure, the treatment site was clearly identified and confirmed by the patient. All components of Universal Protocol/PAUSE Rule completed.
Consent (Spinal Accessory)/Introductory Paragraph: The rationale for Mohs was explained to the patient and consent was obtained. The risks, benefits and alternatives to therapy were discussed in detail. Specifically, the risks of damage to the spinal accessory nerve, infection, scarring, bleeding, prolonged wound healing, incomplete removal, allergy to anesthesia, and recurrence were addressed. Prior to the procedure, the treatment site was clearly identified and confirmed by the patient. All components of Universal Protocol/PAUSE Rule completed.
Consent (Near Eyelid Margin)/Introductory Paragraph: The rationale for Mohs was explained to the patient and consent was obtained. The risks, benefits and alternatives to therapy were discussed in detail. Specifically, the risks of ectropion or eyelid deformity, infection, scarring, bleeding, prolonged wound healing, incomplete removal, allergy to anesthesia, nerve injury and recurrence were addressed. Prior to the procedure, the treatment site was clearly identified and confirmed by the patient. All components of Universal Protocol/PAUSE Rule completed.
Consent (Ear)/Introductory Paragraph: The rationale for Mohs was explained to the patient and consent was obtained. The risks, benefits and alternatives to therapy were discussed in detail. Specifically, the risks of ear deformity, infection, scarring, bleeding, prolonged wound healing, incomplete removal, allergy to anesthesia, nerve injury and recurrence were addressed. Prior to the procedure, the treatment site was clearly identified and confirmed by the patient. All components of Universal Protocol/PAUSE Rule completed.
Consent (Nose)/Introductory Paragraph: The rationale for Mohs was explained to the patient and consent was obtained. The risks, benefits and alternatives to therapy were discussed in detail. Specifically, the risks of nasal deformity, changes in the flow of air through the nose, infection, scarring, bleeding, prolonged wound healing, incomplete removal, allergy to anesthesia, nerve injury and recurrence were addressed. Prior to the procedure, the treatment site was clearly identified and confirmed by the patient. All components of Universal Protocol/PAUSE Rule completed.
Consent (Lip)/Introductory Paragraph: The rationale for Mohs was explained to the patient and consent was obtained. The risks, benefits and alternatives to therapy were discussed in detail. Specifically, the risks of lip deformity, changes in the oral aperture, infection, scarring, bleeding, prolonged wound healing, incomplete removal, allergy to anesthesia, nerve injury and recurrence were addressed. Prior to the procedure, the treatment site was clearly identified and confirmed by the patient. All components of Universal Protocol/PAUSE Rule completed.
Consent (Scalp)/Introductory Paragraph: The rationale for Mohs was explained to the patient and consent was obtained. The risks, benefits and alternatives to therapy were discussed in detail. Specifically, the risks of changes in hair growth pattern secondary to repair, infection, scarring, bleeding, prolonged wound healing, incomplete removal, allergy to anesthesia, nerve injury and recurrence were addressed. Prior to the procedure, the treatment site was clearly identified and confirmed by the patient. All components of Universal Protocol/PAUSE Rule completed.
Detail Level: Detailed
Postop Diagnosis: same
Anesthesia Type: 2% lidocaine with epinephrine and a 2:1 solution of Sodium Bicarbonate 8.4%
Anesthesia Volume In Cc: 1.2
Hemostasis: Electrocautery
Estimated Blood Loss (Cc): minimal
Repair Anesthesia Method: local infiltration
Anesthesia Volume In Cc: 4.5
Brow Lift Text: A midfrontal incision was made medially to the defect to allow access to the tissues just superior to the left eyebrow. Following careful dissection inferiorly in a supraperiosteal plane to the level of the left eyebrow, several 3-0 monocryl sutures were used to resuspend the eyebrow orbicularis oculi muscular unit to the superior frontal bone periosteum. This resulted in an appropriate reapproximation of static eyebrow symmetry and correction of the left brow ptosis.
Deep Sutures: 4-0 Vicryl
Epidermal Sutures: 5-0 Plain Gut
Epidermal Closure: running and interrupted
Suturegard Intro: Intraoperative tissue expansion was performed, utilizing the SUTUREGARD device, in order to reduce wound tension.
Suturegard Body: The suture ends were repeatedly re-tightened and re-clamped to achieve the desired tissue expansion.
Hemigard Intro: Due to skin fragility and wound tension, it was decided to use HEMIGARD adhesive retention suture devices to permit a linear closure. The skin was cleaned and dried for a 6cm distance away from the wound. Excessive hair, if present, was removed to allow for adhesion.
Hemigard Postcare Instructions: The HEMIGARD strips are to remain completely dry for at least 5-7 days.
Donor Site Anesthesia Type: same as repair anesthesia
Epidermal Closure Graft Donor Site (Optional): simple interrupted
Graft Donor Site Bandage (Optional-Leave Blank If You Don't Want In Note): Steri-strips and a pressure bandage were applied to the donor site.
Closure 2 Information: This tab is for additional flaps and grafts, including complex repair and grafts and complex repair and flaps. You can also specify a different location for the additional defect, if the location is the same you do not need to select a new one. We will insert the automated text for the repair you select below just as we do for solitary flaps and grafts. Please note that at this time if you select a location with a different insurance zone you will need to override the ICD10 and CPT if appropriate.
Closure 3 Information: This tab is for additional flaps and grafts above and beyond our usual structured repairs.  Please note if you enter information here it will not currently bill and you will need to add the billing information manually.
Wound Care: Petrolatum
Dressing: pressure dressing
Dressing (No Sutures): dry sterile dressing
Unna Boot Text: An Unna boot was placed to help immobilize the limb and facilitate more rapid healing.
Home Suture Removal Text: Patient was provided instructions on removing sutures and will remove their sutures at home.  If they have any questions or difficulties they will call the office.
Post-Care Instructions: I reviewed with the patient in detail post-care instructions. Patient is not to engage in any heavy lifting, exercise, or swimming for the next 14 days. Should the patient develop any fevers, chills, bleeding, severe pain patient will contact the office immediately.
Pain Refusal Text: I offered to prescribe pain medication but the patient refused to take this medication.
Mauc Instructions: By selecting yes to the question below the MAUC number will be added into the note.  This will be calculated automatically based on the diagnosis chosen, the size entered, the body zone selected (H,M,L) and the specific indications you chose. You will also have the option to override the Mohs AUC if you disagree with the automatically calculated number and this option is found in the Case Summary tab.
Where Do You Want The Question To Include Opioid Counseling Located?: Case Summary Tab
Eye Protection Verbiage: Before proceeding with the stage, a plastic scleral shield was inserted. The globe was anesthetized with a few drops of 1% lidocaine with 1:100,000 epinephrine. Then, an appropriate sized scleral shield was chosen and coated with lacrilube ointment. The shield was gently inserted and left in place for the duration of each stage. After the stage was completed, the shield was gently removed.
Mohs Method Verbiage: An incision at a 45 degree angle following the standard Mohs approach was done and the specimen was harvested as a microscopic controlled layer.
Surgeon/Pathologist Verbiage (Will Incorporate Name Of Surgeon From Intro If Not Blank): operated in two distinct and integrated capacities as the surgeon and pathologist.
Mohs Histo Method Verbiage: Each section was then chromacoded and processed in the Mohs lab using the Mohs protocol and submitted for frozen section.
Subsequent Stages Histo Method Verbiage: Using a similar technique to that described above, a thin layer of tissue was removed from all areas where tumor was visible on the previous stage.  The tissue was again oriented, mapped, dyed, and processed as above.
Mohs Rapid Report Verbiage: The area of clinically evident tumor was marked with skin marking ink and appropriately hatched.  The initial incision was made following the Mohs approach through the skin.  The specimen was taken to the lab, divided into the necessary number of pieces, chromacoded and processed according to the Mohs protocol.  This was repeated in successive stages until a tumor free defect was achieved.
Complex Repair Preamble Text (Leave Blank If You Do Not Want): Extensive wide undermining was performed.
Intermediate Repair Preamble Text (Leave Blank If You Do Not Want): Undermining was performed with blunt dissection.
Non-Graft Cartilage Fenestration Text: The cartilage was fenestrated with a 2mm punch biopsy to help facilitate healing.
Graft Cartilage Fenestration Text: The cartilage was fenestrated with a 2mm punch biopsy to help facilitate graft survival and healing.
Secondary Intention Text (Leave Blank If You Do Not Want): The defect will heal with secondary intention.
No Repair - Repaired With Adjacent Surgical Defect Text (Leave Blank If You Do Not Want): After obtaining clear surgical margins the defect was repaired concurrently with another surgical defect which was in close approximation.
Adjacent Tissue Transfer Text: The defect edges were debeveled with a #15 scalpel blade.  Given the location of the defect and the proximity to free margins an adjacent tissue transfer was deemed most appropriate.  Using a sterile surgical marker, an appropriate flap was drawn incorporating the defect and placing the expected incisions within the relaxed skin tension lines where possible.    The area thus outlined was incised deep to adipose tissue with a #15 scalpel blade.  The skin margins were undermined to an appropriate distance in all directions utilizing iris scissors.
Advancement Flap (Single) Text: The defect edges were debeveled with a #15 scalpel blade.  Given the location of the defect and the proximity to free margins a single advancement flap was deemed most appropriate.  Using a sterile surgical marker, an appropriate advancement flap was drawn incorporating the defect and placing the expected incisions within the relaxed skin tension lines where possible.    The area thus outlined was incised deep to adipose tissue with a #15 scalpel blade.  The skin margins were undermined to an appropriate distance in all directions utilizing iris scissors.
Advancement Flap (Double) Text: The defect edges were debeveled with a #15 scalpel blade.  Given the location of the defect and the proximity to free margins a double advancement flap was deemed most appropriate.  Using a sterile surgical marker, the appropriate advancement flaps were drawn incorporating the defect and placing the expected incisions within the relaxed skin tension lines where possible.    The area thus outlined was incised deep to adipose tissue with a #15 scalpel blade.  The skin margins were undermined to an appropriate distance in all directions utilizing iris scissors.
Burow's Advancement Flap Text: The defect edges were debeveled with a #15 scalpel blade.  Given the location of the defect and the proximity to free margins a Burow's advancement flap was deemed most appropriate.  Using a sterile surgical marker, the appropriate advancement flap was drawn incorporating the defect and placing the expected incisions within the relaxed skin tension lines where possible.    The area thus outlined was incised deep to adipose tissue with a #15 scalpel blade.  The skin margins were undermined to an appropriate distance in all directions utilizing iris scissors.
Chonodrocutaneous Helical Advancement Flap Text: The defect edges were debeveled with a #15 scalpel blade.  Given the location of the defect and the proximity to free margins a chondrocutaneous helical advancement flap was deemed most appropriate.  Using a sterile surgical marker, the appropriate advancement flap was drawn incorporating the defect and placing the expected incisions within the relaxed skin tension lines where possible.    The area thus outlined was incised deep to adipose tissue with a #15 scalpel blade.  The skin margins were undermined to an appropriate distance in all directions utilizing iris scissors.
Crescentic Advancement Flap Text: The defect edges were debeveled with a #15 scalpel blade.  Given the location of the defect and the proximity to free margins a crescentic advancement flap was deemed most appropriate.  Using a sterile surgical marker, the appropriate advancement flap was drawn incorporating the defect and placing the expected incisions within the relaxed skin tension lines where possible.    The area thus outlined was incised deep to adipose tissue with a #15 scalpel blade.  The skin margins were undermined to an appropriate distance in all directions utilizing iris scissors.
A-T Advancement Flap Text: The defect edges were debeveled with a #15 scalpel blade.  Given the location of the defect, shape of the defect and the proximity to free margins an A-T advancement flap was deemed most appropriate.  Using a sterile surgical marker, an appropriate advancement flap was drawn incorporating the defect and placing the expected incisions within the relaxed skin tension lines where possible.    The area thus outlined was incised deep to adipose tissue with a #15 scalpel blade.  The skin margins were undermined to an appropriate distance in all directions utilizing iris scissors.
O-T Advancement Flap Text: The defect edges were debeveled with a #15 scalpel blade.  Given the location of the defect, shape of the defect and the proximity to free margins an O-T advancement flap was deemed most appropriate.  Using a sterile surgical marker, an appropriate advancement flap was drawn incorporating the defect and placing the expected incisions within the relaxed skin tension lines where possible.    The area thus outlined was incised deep to adipose tissue with a #15 scalpel blade.  The skin margins were undermined to an appropriate distance in all directions utilizing iris scissors.
O-L Flap Text: The defect edges were debeveled with a #15 scalpel blade.  Given the location of the defect, shape of the defect and the proximity to free margins an O-L flap was deemed most appropriate.  Using a sterile surgical marker, an appropriate advancement flap was drawn incorporating the defect and placing the expected incisions within the relaxed skin tension lines where possible.    The area thus outlined was incised deep to adipose tissue with a #15 scalpel blade.  The skin margins were undermined to an appropriate distance in all directions utilizing iris scissors.
O-Z Flap Text: The defect edges were debeveled with a #15 scalpel blade.  Given the location of the defect, shape of the defect and the proximity to free margins an O-Z flap was deemed most appropriate.  Using a sterile surgical marker, an appropriate transposition flap was drawn incorporating the defect and placing the expected incisions within the relaxed skin tension lines where possible. The area thus outlined was incised deep to adipose tissue with a #15 scalpel blade.  The skin margins were undermined to an appropriate distance in all directions utilizing iris scissors.
Double O-Z Flap Text: The defect edges were debeveled with a #15 scalpel blade.  Given the location of the defect, shape of the defect and the proximity to free margins a Double O-Z flap was deemed most appropriate.  Using a sterile surgical marker, an appropriate transposition flap was drawn incorporating the defect and placing the expected incisions within the relaxed skin tension lines where possible. The area thus outlined was incised deep to adipose tissue with a #15 scalpel blade.  The skin margins were undermined to an appropriate distance in all directions utilizing iris scissors.
V-Y Flap Text: The defect edges were debeveled with a #15 scalpel blade.  Given the location of the defect, shape of the defect and the proximity to free margins a V-Y flap was deemed most appropriate.  Using a sterile surgical marker, an appropriate advancement flap was drawn incorporating the defect and placing the expected incisions within the relaxed skin tension lines where possible.    The area thus outlined was incised deep to adipose tissue with a #15 scalpel blade.  The skin margins were undermined to an appropriate distance in all directions utilizing iris scissors.
Advancement-Rotation Flap Text: The defect edges were debeveled with a #15 scalpel blade.  Given the location of the defect, shape of the defect and the proximity to free margins an advancement-rotation flap was deemed most appropriate.  Using a sterile surgical marker, an appropriate flap was drawn incorporating the defect and placing the expected incisions within the relaxed skin tension lines where possible. The area thus outlined was incised deep to adipose tissue with a #15 scalpel blade.  The skin margins were undermined to an appropriate distance in all directions utilizing iris scissors.
Mercedes Flap Text: The defect edges were debeveled with a #15 scalpel blade.  Given the location of the defect, shape of the defect and the proximity to free margins a Mercedes flap was deemed most appropriate.  Using a sterile surgical marker, an appropriate advancement flap was drawn incorporating the defect and placing the expected incisions within the relaxed skin tension lines where possible. The area thus outlined was incised deep to adipose tissue with a #15 scalpel blade.  The skin margins were undermined to an appropriate distance in all directions utilizing iris scissors.
Modified Advancement Flap Text: The defect edges were debeveled with a #15 scalpel blade.  Given the location of the defect, shape of the defect and the proximity to free margins a modified advancement flap was deemed most appropriate.  Using a sterile surgical marker, an appropriate advancement flap was drawn incorporating the defect and placing the expected incisions within the relaxed skin tension lines where possible.    The area thus outlined was incised deep to adipose tissue with a #15 scalpel blade.  The skin margins were undermined to an appropriate distance in all directions utilizing iris scissors.
Mucosal Advancement Flap Text: Given the location of the defect, shape of the defect and the proximity to free margins a mucosal advancement flap was deemed most appropriate. Incisions were made with a 15 blade scalpel in the appropriate fashion along the cutaneous vermilion border and the mucosal lip. The remaining actinically damaged mucosal tissue was excised.  The mucosal advancement flap was then elevated to the gingival sulcus with care taken to preserve the neurovascular structures and advanced into the primary defect. Care was taken to ensure that precise realignment of the vermilion border was achieved.
Peng Advancement Flap Text: The defect edges were debeveled with a #15 scalpel blade.  Given the location of the defect, shape of the defect and the proximity to free margins a Peng advancement flap was deemed most appropriate.  Using a sterile surgical marker, an appropriate advancement flap was drawn incorporating the defect and placing the expected incisions within the relaxed skin tension lines where possible. The area thus outlined was incised deep to adipose tissue with a #15 scalpel blade.  The skin margins were undermined to an appropriate distance in all directions utilizing iris scissors.
Hatchet Flap Text: The defect edges were debeveled with a #15 scalpel blade.  Given the location of the defect, shape of the defect and the proximity to free margins a hatchet flap was deemed most appropriate.  Using a sterile surgical marker, an appropriate hatchet flap was drawn incorporating the defect and placing the expected incisions within the relaxed skin tension lines where possible.    The area thus outlined was incised deep to adipose tissue with a #15 scalpel blade.  The skin margins were undermined to an appropriate distance in all directions utilizing iris scissors.
Rotation Flap Text: The defect edges were debeveled with a #15 scalpel blade.  Given the location of the defect, shape of the defect and the proximity to free margins a rotation flap was deemed most appropriate.  Using a sterile surgical marker, an appropriate rotation flap was drawn incorporating the defect and placing the expected incisions within the relaxed skin tension lines where possible.    The area thus outlined was incised deep to adipose tissue with a #15 scalpel blade.  The skin margins were undermined to an appropriate distance in all directions utilizing iris scissors.
Bilateral Rotation Flap Text: The defect edges were debeveled with a #15 scalpel blade. Given the location of the defect, shape of the defect and the proximity to free margins a bilateral rotation flap was deemed most appropriate. Using a sterile surgical marker, an appropriate rotation flap was drawn incorporating the defect and placing the expected incisions within the relaxed skin tension lines where possible. The area thus outlined was incised deep to adipose tissue with a #15 scalpel blade. The skin margins were undermined to an appropriate distance in all directions utilizing iris scissors. Following this, the designed flap was carried over into the primary defect and sutured into place.
Spiral Flap Text: The defect edges were debeveled with a #15 scalpel blade.  Given the location of the defect, shape of the defect and the proximity to free margins a spiral flap was deemed most appropriate.  Using a sterile surgical marker, an appropriate rotation flap was drawn incorporating the defect and placing the expected incisions within the relaxed skin tension lines where possible. The area thus outlined was incised deep to adipose tissue with a #15 scalpel blade.  The skin margins were undermined to an appropriate distance in all directions utilizing iris scissors.
Staged Advancement Flap Text: The defect edges were debeveled with a #15 scalpel blade.  Given the location of the defect, shape of the defect and the proximity to free margins a staged advancement flap was deemed most appropriate.  Using a sterile surgical marker, an appropriate advancement flap was drawn incorporating the defect and placing the expected incisions within the relaxed skin tension lines where possible. The area thus outlined was incised deep to adipose tissue with a #15 scalpel blade.  The skin margins were undermined to an appropriate distance in all directions utilizing iris scissors.
Star Wedge Flap Text: The defect edges were debeveled with a #15 scalpel blade.  Given the location of the defect, shape of the defect and the proximity to free margins a star wedge flap was deemed most appropriate.  Using a sterile surgical marker, an appropriate rotation flap was drawn incorporating the defect and placing the expected incisions within the relaxed skin tension lines where possible. The area thus outlined was incised deep to adipose tissue with a #15 scalpel blade.  The skin margins were undermined to an appropriate distance in all directions utilizing iris scissors.
Transposition Flap Text: The defect edges were debeveled with a #15 scalpel blade.  Given the location of the defect and the proximity to free margins a transposition flap was deemed most appropriate.  Using a sterile surgical marker, an appropriate transposition flap was drawn incorporating the defect.    The area thus outlined was incised deep to adipose tissue with a #15 scalpel blade.  The skin margins were undermined to an appropriate distance in all directions utilizing iris scissors.
Muscle Hinge Flap Text: The defect edges were debeveled with a #15 scalpel blade.  Given the size, depth and location of the defect and the proximity to free margins a muscle hinge flap was deemed most appropriate.  Using a sterile surgical marker, an appropriate hinge flap was drawn incorporating the defect. The area thus outlined was incised with a #15 scalpel blade.  The skin margins were undermined to an appropriate distance in all directions utilizing iris scissors.
Mustarde Flap Text: The defect edges were debeveled with a #15 scalpel blade.  Given the size, depth and location of the defect and the proximity to free margins a Mustarde flap was deemed most appropriate.  Using a sterile surgical marker, an appropriate flap was drawn incorporating the defect. The area thus outlined was incised with a #15 scalpel blade.  The skin margins were undermined to an appropriate distance in all directions utilizing iris scissors.
Nasal Turnover Hinge Flap Text: The defect edges were debeveled with a #15 scalpel blade.  Given the size, depth, location of the defect and the defect being full thickness a nasal turnover hinge flap was deemed most appropriate.  Using a sterile surgical marker, an appropriate hinge flap was drawn incorporating the defect. The area thus outlined was incised with a #15 scalpel blade. The flap was designed to recreate the nasal mucosal lining and the alar rim. The skin margins were undermined to an appropriate distance in all directions utilizing iris scissors.
Nasalis-Muscle-Based Myocutaneous Island Pedicle Flap Text: Using a #15 blade, an incision was made around the donor flap to the level of the nasalis muscle. Wide lateral undermining was then performed in both the subcutaneous plane above the nasalis muscle, and in a submuscular plane just above periosteum. This allowed the formation of a free nasalis muscle axial pedicle (based on the angular artery) which was still attached to the actual cutaneous flap, increasing its mobility and vascular viability. Hemostasis was obtained with pinpoint electrocoagulation. The flap was mobilized into position and the pivotal anchor points positioned and stabilized with buried interrupted sutures. Subcutaneous and dermal tissues were closed in a multilayered fashion with sutures. Tissue redundancies were excised, and the epidermal edges were apposed without significant tension and sutured with sutures.
Nasalis Myocutaneous Flap Text: Using a #15 blade, an incision was made around the donor flap to the level of the nasalis muscle. Wide lateral undermining was then performed in both the subcutaneous plane above the nasalis muscle, and in a submuscular plane just above periosteum. This allowed the formation of a free nasalis muscle axial pedicle which was still attached to the actual cutaneous flap, increasing its mobility and vascular viability. Hemostasis was obtained with pinpoint electrocoagulation. The flap was mobilized into position and the pivotal anchor points positioned and stabilized with buried interrupted sutures. Subcutaneous and dermal tissues were closed in a multilayered fashion with sutures. Tissue redundancies were excised, and the epidermal edges were apposed without significant tension and sutured with sutures.
Orbicularis Oris Muscle Flap Text: The defect edges were debeveled with a #15 scalpel blade.  Given that the defect affected the competency of the oral sphincter an orbicularis oris muscle flap was deemed most appropriate to restore this competency and normal muscle function.  Using a sterile surgical marker, an appropriate flap was drawn incorporating the defect. The area thus outlined was incised with a #15 scalpel blade.
Melolabial Transposition Flap Text: The defect edges were debeveled with a #15 scalpel blade.  Given the location of the defect and the proximity to free margins a melolabial flap was deemed most appropriate.  Using a sterile surgical marker, an appropriate melolabial transposition flap was drawn incorporating the defect.    The area thus outlined was incised deep to adipose tissue with a #15 scalpel blade.  The skin margins were undermined to an appropriate distance in all directions utilizing iris scissors.
Rectangular Flap Text: The defect edges were debeveled with a #15 scalpel blade. Given the location of the defect and the proximity to free margins a rectangular flap was deemed most appropriate. Using a sterile surgical marker, an appropriate rectangular flap was drawn incorporating the defect. The area thus outlined was incised deep to adipose tissue with a #15 scalpel blade. The skin margins were undermined to an appropriate distance in all directions utilizing iris scissors. Following this, the designed flap was carried over into the primary defect and sutured into place.
Rhombic Flap Text: The defect edges were debeveled with a #15 scalpel blade.  Given the location of the defect and the proximity to free margins a rhombic flap was deemed most appropriate.  Using a sterile surgical marker, an appropriate rhombic flap was drawn incorporating the defect.    The area thus outlined was incised deep to adipose tissue with a #15 scalpel blade.  The skin margins were undermined to an appropriate distance in all directions utilizing iris scissors.
Rhomboid Transposition Flap Text: The defect edges were debeveled with a #15 scalpel blade.  Given the location of the defect and the proximity to free margins a rhomboid transposition flap was deemed most appropriate.  Using a sterile surgical marker, an appropriate rhomboid flap was drawn incorporating the defect.    The area thus outlined was incised deep to adipose tissue with a #15 scalpel blade.  The skin margins were undermined to an appropriate distance in all directions utilizing iris scissors.
Bi-Rhombic Flap Text: The defect edges were debeveled with a #15 scalpel blade.  Given the location of the defect and the proximity to free margins a bi-rhombic flap was deemed most appropriate.  Using a sterile surgical marker, an appropriate rhombic flap was drawn incorporating the defect. The area thus outlined was incised deep to adipose tissue with a #15 scalpel blade.  The skin margins were undermined to an appropriate distance in all directions utilizing iris scissors.
Helical Rim Advancement Flap Text: The defect edges were debeveled with a #15 blade scalpel.  Given the location of the defect and the proximity to free margins (helical rim) a double helical rim advancement flap was deemed most appropriate.  Using a sterile surgical marker, the appropriate advancement flaps were drawn incorporating the defect and placing the expected incisions between the helical rim and antihelix where possible.  The area thus outlined was incised through and through with a #15 scalpel blade.  With a skin hook and iris scissors, the flaps were gently and sharply undermined and freed up.
Bilateral Helical Rim Advancement Flap Text: The defect edges were debeveled with a #15 blade scalpel.  Given the location of the defect and the proximity to free margins (helical rim) a bilateral helical rim advancement flap was deemed most appropriate.  Using a sterile surgical marker, the appropriate advancement flaps were drawn incorporating the defect and placing the expected incisions between the helical rim and antihelix where possible.  The area thus outlined was incised through and through with a #15 scalpel blade.  With a skin hook and iris scissors, the flaps were gently and sharply undermined and freed up.
Ear Star Wedge Flap Text: The defect edges were debeveled with a #15 blade scalpel.  Given the location of the defect and the proximity to free margins (helical rim) an ear star wedge flap was deemed most appropriate.  Using a sterile surgical marker, the appropriate flap was drawn incorporating the defect and placing the expected incisions between the helical rim and antihelix where possible.  The area thus outlined was incised through and through with a #15 scalpel blade.
Banner Transposition Flap Text: The defect edges were debeveled with a #15 scalpel blade.  Given the location of the defect and the proximity to free margins a Banner transposition flap was deemed most appropriate.  Using a sterile surgical marker, an appropriate flap drawn around the defect. The area thus outlined was incised deep to adipose tissue with a #15 scalpel blade.  The skin margins were undermined to an appropriate distance in all directions utilizing iris scissors.
Bilobed Flap Text: The defect edges were debeveled with a #15 scalpel blade.  Given the location of the defect and the proximity to free margins a bilobe flap was deemed most appropriate.  Using a sterile surgical marker, an appropriate bilobe flap drawn around the defect.    The area thus outlined was incised deep to adipose tissue with a #15 scalpel blade.  The skin margins were undermined to an appropriate distance in all directions utilizing iris scissors.
Bilobed Transposition Flap Text: The defect edges were debeveled with a #15 scalpel blade.  Given the location of the defect and the proximity to free margins a bilobed transposition flap was deemed most appropriate.  Using a sterile surgical marker, an appropriate bilobe flap drawn around the defect.    The area thus outlined was incised deep to adipose tissue with a #15 scalpel blade.  The skin margins were undermined to an appropriate distance in all directions utilizing iris scissors.
Trilobed Flap Text: The defect edges were debeveled with a #15 scalpel blade.  Given the location of the defect and the proximity to free margins a trilobed flap was deemed most appropriate.  Using a sterile surgical marker, an appropriate trilobed flap drawn around the defect.    The area thus outlined was incised deep to adipose tissue with a #15 scalpel blade.  The skin margins were undermined to an appropriate distance in all directions utilizing iris scissors.
Dorsal Nasal Flap Text: The defect edges were debeveled with a #15 scalpel blade.  Given the location of the defect and the proximity to free margins a dorsal nasal flap was deemed most appropriate.  Using a sterile surgical marker, an appropriate dorsal nasal flap was drawn around the defect.    The area thus outlined was incised deep to adipose tissue with a #15 scalpel blade.  The skin margins were undermined to an appropriate distance in all directions utilizing iris scissors.
Island Pedicle Flap Text: The defect edges were debeveled with a #15 scalpel blade.  Given the location of the defect, shape of the defect and the proximity to free margins an island pedicle advancement flap was deemed most appropriate.  Using a sterile surgical marker, an appropriate advancement flap was drawn incorporating the defect, outlining the appropriate donor tissue and placing the expected incisions within the relaxed skin tension lines where possible.    The area thus outlined was incised deep to adipose tissue with a #15 scalpel blade.  The skin margins were undermined to an appropriate distance in all directions around the primary defect and laterally outward around the island pedicle utilizing iris scissors.  There was minimal undermining beneath the pedicle flap.
Island Pedicle Flap With Canthal Suspension Text: The defect edges were debeveled with a #15 scalpel blade.  Given the location of the defect, shape of the defect and the proximity to free margins an island pedicle advancement flap was deemed most appropriate.  Using a sterile surgical marker, an appropriate advancement flap was drawn incorporating the defect, outlining the appropriate donor tissue and placing the expected incisions within the relaxed skin tension lines where possible. The area thus outlined was incised deep to adipose tissue with a #15 scalpel blade.  The skin margins were undermined to an appropriate distance in all directions around the primary defect and laterally outward around the island pedicle utilizing iris scissors.  There was minimal undermining beneath the pedicle flap. A suspension suture was placed in the canthal tendon to prevent tension and prevent ectropion.
Alar Island Pedicle Flap Text: The defect edges were debeveled with a #15 scalpel blade.  Given the location of the defect, shape of the defect and the proximity to the alar rim an island pedicle advancement flap was deemed most appropriate.  Using a sterile surgical marker, an appropriate advancement flap was drawn incorporating the defect, outlining the appropriate donor tissue and placing the expected incisions within the nasal ala running parallel to the alar rim. The area thus outlined was incised with a #15 scalpel blade.  The skin margins were undermined minimally to an appropriate distance in all directions around the primary defect and laterally outward around the island pedicle utilizing iris scissors.  There was minimal undermining beneath the pedicle flap.
Double Island Pedicle Flap Text: The defect edges were debeveled with a #15 scalpel blade.  Given the location of the defect, shape of the defect and the proximity to free margins a double island pedicle advancement flap was deemed most appropriate.  Using a sterile surgical marker, an appropriate advancement flap was drawn incorporating the defect, outlining the appropriate donor tissue and placing the expected incisions within the relaxed skin tension lines where possible.    The area thus outlined was incised deep to adipose tissue with a #15 scalpel blade.  The skin margins were undermined to an appropriate distance in all directions around the primary defect and laterally outward around the island pedicle utilizing iris scissors.  There was minimal undermining beneath the pedicle flap.
Island Pedicle Flap-Requiring Vessel Identification Text: The defect edges were debeveled with a #15 scalpel blade.  Given the location of the defect, shape of the defect and the proximity to free margins an island pedicle advancement flap was deemed most appropriate.  Using a sterile surgical marker, an appropriate advancement flap was drawn, based on the axial vessel mentioned above, incorporating the defect, outlining the appropriate donor tissue and placing the expected incisions within the relaxed skin tension lines where possible.    The area thus outlined was incised deep to adipose tissue with a #15 scalpel blade.  The skin margins were undermined to an appropriate distance in all directions around the primary defect and laterally outward around the island pedicle utilizing iris scissors.  There was minimal undermining beneath the pedicle flap.
Keystone Flap Text: The defect edges were debeveled with a #15 scalpel blade.  Given the location of the defect, shape of the defect a keystone flap was deemed most appropriate.  Using a sterile surgical marker, an appropriate keystone flap was drawn incorporating the defect, outlining the appropriate donor tissue and placing the expected incisions within the relaxed skin tension lines where possible. The area thus outlined was incised deep to adipose tissue with a #15 scalpel blade.  The skin margins were undermined to an appropriate distance in all directions around the primary defect and laterally outward around the flap utilizing iris scissors.
O-T Plasty Text: The defect edges were debeveled with a #15 scalpel blade.  Given the location of the defect, shape of the defect and the proximity to free margins an O-T plasty was deemed most appropriate.  Using a sterile surgical marker, an appropriate O-T plasty was drawn incorporating the defect and placing the expected incisions within the relaxed skin tension lines where possible.    The area thus outlined was incised deep to adipose tissue with a #15 scalpel blade.  The skin margins were undermined to an appropriate distance in all directions utilizing iris scissors.
O-Z Plasty Text: The defect edges were debeveled with a #15 scalpel blade.  Given the location of the defect, shape of the defect and the proximity to free margins an O-Z plasty (double transposition flap) was deemed most appropriate.  Using a sterile surgical marker, the appropriate transposition flaps were drawn incorporating the defect and placing the expected incisions within the relaxed skin tension lines where possible.    The area thus outlined was incised deep to adipose tissue with a #15 scalpel blade.  The skin margins were undermined to an appropriate distance in all directions utilizing iris scissors.  Hemostasis was achieved with electrocautery.  The flaps were then transposed into place, one clockwise and the other counterclockwise, and anchored with interrupted buried subcutaneous sutures.
Double O-Z Plasty Text: The defect edges were debeveled with a #15 scalpel blade.  Given the location of the defect, shape of the defect and the proximity to free margins a Double O-Z plasty (double transposition flap) was deemed most appropriate.  Using a sterile surgical marker, the appropriate transposition flaps were drawn incorporating the defect and placing the expected incisions within the relaxed skin tension lines where possible. The area thus outlined was incised deep to adipose tissue with a #15 scalpel blade.  The skin margins were undermined to an appropriate distance in all directions utilizing iris scissors.  Hemostasis was achieved with electrocautery.  The flaps were then transposed into place, one clockwise and the other counterclockwise, and anchored with interrupted buried subcutaneous sutures.
V-Y Plasty Text: The defect edges were debeveled with a #15 scalpel blade.  Given the location of the defect, shape of the defect and the proximity to free margins an V-Y advancement flap was deemed most appropriate.  Using a sterile surgical marker, an appropriate advancement flap was drawn incorporating the defect and placing the expected incisions within the relaxed skin tension lines where possible.    The area thus outlined was incised deep to adipose tissue with a #15 scalpel blade.  The skin margins were undermined to an appropriate distance in all directions utilizing iris scissors.
H Plasty Text: Given the location of the defect, shape of the defect and the proximity to free margins a H-plasty was deemed most appropriate for repair.  Using a sterile surgical marker, the appropriate advancement arms of the H-plasty were drawn incorporating the defect and placing the expected incisions within the relaxed skin tension lines where possible. The area thus outlined was incised deep to adipose tissue with a #15 scalpel blade. The skin margins were undermined to an appropriate distance in all directions utilizing iris scissors.  The opposing advancement arms were then advanced into place in opposite direction and anchored with interrupted buried subcutaneous sutures.
W Plasty Text: The lesion was extirpated to the level of the fat with a #15 scalpel blade.  Given the location of the defect, shape of the defect and the proximity to free margins a W-plasty was deemed most appropriate for repair.  Using a sterile surgical marker, the appropriate transposition arms of the W-plasty were drawn incorporating the defect and placing the expected incisions within the relaxed skin tension lines where possible.    The area thus outlined was incised deep to adipose tissue with a #15 scalpel blade.  The skin margins were undermined to an appropriate distance in all directions utilizing iris scissors.  The opposing transposition arms were then transposed into place in opposite direction and anchored with interrupted buried subcutaneous sutures.
Z Plasty Text: The lesion was extirpated to the level of the fat with a #15 scalpel blade.  Given the location of the defect, shape of the defect and the proximity to free margins a Z-plasty was deemed most appropriate for repair.  Using a sterile surgical marker, the appropriate transposition arms of the Z-plasty were drawn incorporating the defect and placing the expected incisions within the relaxed skin tension lines where possible.    The area thus outlined was incised deep to adipose tissue with a #15 scalpel blade.  The skin margins were undermined to an appropriate distance in all directions utilizing iris scissors.  The opposing transposition arms were then transposed into place in opposite direction and anchored with interrupted buried subcutaneous sutures.
Double Z Plasty Text: The lesion was extirpated to the level of the fat with a #15 scalpel blade. Given the location of the defect, shape of the defect and the proximity to free margins a double Z-plasty was deemed most appropriate for repair. Using a sterile surgical marker, the appropriate transposition arms of the double Z-plasty were drawn incorporating the defect and placing the expected incisions within the relaxed skin tension lines where possible. The area thus outlined was incised deep to adipose tissue with a #15 scalpel blade. The skin margins were undermined to an appropriate distance in all directions utilizing iris scissors. The opposing transposition arms were then transposed and carried over into place in opposite direction and anchored with interrupted buried subcutaneous sutures.
Zygomaticofacial Flap Text: Given the location of the defect, shape of the defect and the proximity to free margins a zygomaticofacial flap was deemed most appropriate for repair.  Using a sterile surgical marker, the appropriate flap was drawn incorporating the defect and placing the expected incisions within the relaxed skin tension lines where possible. The area thus outlined was incised deep to adipose tissue with a #15 scalpel blade with preservation of a vascular pedicle.  The skin margins were undermined to an appropriate distance in all directions utilizing iris scissors.  The flap was then placed into the defect and anchored with interrupted buried subcutaneous sutures.
Cheek Interpolation Flap Text: A decision was made to reconstruct the defect utilizing an interpolation axial flap and a staged reconstruction.  A telfa template was made of the defect.  This telfa template was then used to outline the Cheek Interpolation flap.  The donor area for the pedicle flap was then injected with anesthesia.  The flap was excised through the skin and subcutaneous tissue down to the layer of the underlying musculature.  The interpolation flap was carefully excised within this deep plane to maintain its blood supply.  The edges of the donor site were undermined.   The donor site was closed in a primary fashion.  The pedicle was then rotated into position and sutured.  Once the tube was sutured into place, adequate blood supply was confirmed with blanching and refill.  The pedicle was then wrapped with xeroform gauze and dressed appropriately with a telfa and gauze bandage to ensure continued blood supply and protect the attached pedicle.
Cheek-To-Nose Interpolation Flap Text: A decision was made to reconstruct the defect utilizing an interpolation axial flap and a staged reconstruction.  A telfa template was made of the defect.  This telfa template was then used to outline the Cheek-To-Nose Interpolation flap.  The donor area for the pedicle flap was then injected with anesthesia.  The flap was excised through the skin and subcutaneous tissue down to the layer of the underlying musculature.  The interpolation flap was carefully excised within this deep plane to maintain its blood supply.  The edges of the donor site were undermined.   The donor site was closed in a primary fashion.  The pedicle was then rotated into position and sutured.  Once the tube was sutured into place, adequate blood supply was confirmed with blanching and refill.  The pedicle was then wrapped with xeroform gauze and dressed appropriately with a telfa and gauze bandage to ensure continued blood supply and protect the attached pedicle.
Interpolation Flap Text: A decision was made to reconstruct the defect utilizing an interpolation axial flap and a staged reconstruction.  A telfa template was made of the defect.  This telfa template was then used to outline the interpolation flap.  The donor area for the pedicle flap was then injected with anesthesia.  The flap was excised through the skin and subcutaneous tissue down to the layer of the underlying musculature.  The interpolation flap was carefully excised within this deep plane to maintain its blood supply.  The edges of the donor site were undermined.   The donor site was closed in a primary fashion.  The pedicle was then rotated into position and sutured.  Once the tube was sutured into place, adequate blood supply was confirmed with blanching and refill.  The pedicle was then wrapped with xeroform gauze and dressed appropriately with a telfa and gauze bandage to ensure continued blood supply and protect the attached pedicle.
Melolabial Interpolation Flap Text: A decision was made to reconstruct the defect utilizing an interpolation axial flap and a staged reconstruction.  A telfa template was made of the defect.  This telfa template was then used to outline the melolabial interpolation flap.  The donor area for the pedicle flap was then injected with anesthesia.  The flap was excised through the skin and subcutaneous tissue down to the layer of the underlying musculature.  The pedicle flap was carefully excised within this deep plane to maintain its blood supply.  The edges of the donor site were undermined.   The donor site was closed in a primary fashion.  The pedicle was then rotated into position and sutured.  Once the tube was sutured into place, adequate blood supply was confirmed with blanching and refill.  The pedicle was then wrapped with xeroform gauze and dressed appropriately with a telfa and gauze bandage to ensure continued blood supply and protect the attached pedicle.
Mastoid Interpolation Flap Text: A decision was made to reconstruct the defect utilizing an interpolation axial flap and a staged reconstruction.  A telfa template was made of the defect.  This telfa template was then used to outline the mastoid interpolation flap.  The donor area for the pedicle flap was then injected with anesthesia.  The flap was excised through the skin and subcutaneous tissue down to the layer of the underlying musculature.  The pedicle flap was carefully excised within this deep plane to maintain its blood supply.  The edges of the donor site were undermined.   The donor site was closed in a primary fashion.  The pedicle was then rotated into position and sutured.  Once the tube was sutured into place, adequate blood supply was confirmed with blanching and refill.  The pedicle was then wrapped with xeroform gauze and dressed appropriately with a telfa and gauze bandage to ensure continued blood supply and protect the attached pedicle.
Posterior Auricular Interpolation Flap Text: A decision was made to reconstruct the defect utilizing an interpolation axial flap and a staged reconstruction.  A telfa template was made of the defect.  This telfa template was then used to outline the posterior auricular interpolation flap.  The donor area for the pedicle flap was then injected with anesthesia.  The flap was excised through the skin and subcutaneous tissue down to the layer of the underlying musculature.  The pedicle flap was carefully excised within this deep plane to maintain its blood supply.  The edges of the donor site were undermined.   The donor site was closed in a primary fashion.  The pedicle was then rotated into position and sutured.  Once the tube was sutured into place, adequate blood supply was confirmed with blanching and refill.  The pedicle was then wrapped with xeroform gauze and dressed appropriately with a telfa and gauze bandage to ensure continued blood supply and protect the attached pedicle.
Paramedian Forehead Flap Text: A decision was made to reconstruct the defect utilizing an interpolation axial flap and a staged reconstruction.  A telfa template was made of the defect.  This telfa template was then used to outline the paramedian forehead pedicle flap.  The donor area for the pedicle flap was then injected with anesthesia.  The flap was excised through the skin and subcutaneous tissue down to the layer of the underlying musculature.  The pedicle flap was carefully excised within this deep plane to maintain its blood supply.  The edges of the donor site were undermined.   The donor site was closed in a primary fashion.  The pedicle was then rotated into position and sutured.  Once the tube was sutured into place, adequate blood supply was confirmed with blanching and refill.  The pedicle was then wrapped with xeroform gauze and dressed appropriately with a telfa and gauze bandage to ensure continued blood supply and protect the attached pedicle.
Abbe Flap (Upper To Lower Lip) Text: The defect of the lower lip was assessed and measured.  Given the location and size of the defect, an Abbe flap was deemed most appropriate.  Using a sterile surgical marker, an appropriate Abbe flap was measured and drawn on the upper lip. Local anesthesia was then infiltrated.  A scalpel was then used to incise the upper lip through and through the skin, vermilion, muscle and mucosa, leaving the flap pedicled on the opposite side.  The flap was then rotated and transferred to the lower lip defect.  The flap was then sutured into place with a three layer technique, closing the orbicularis oris muscle layer with subcutaneous buried sutures, followed by a mucosal layer and an epidermal layer.
Abbe Flap (Lower To Upper Lip) Text: The defect of the upper lip was assessed and measured.  Given the location and size of the defect, an Abbe flap was deemed most appropriate.  Using a sterile surgical marker, an appropriate Abbe flap was measured and drawn on the lower lip. Local anesthesia was then infiltrated. A scalpel was then used to incise the upper lip through and through the skin, vermilion, muscle and mucosa, leaving the flap pedicled on the opposite side.  The flap was then rotated and transferred to the lower lip defect.  The flap was then sutured into place with a three layer technique, closing the orbicularis oris muscle layer with subcutaneous buried sutures, followed by a mucosal layer and an epidermal layer.
Estlander Flap (Upper To Lower Lip) Text: The defect of the lower lip was assessed and measured.  Given the location and size of the defect, an Estlander flap was deemed most appropriate.  Using a sterile surgical marker, an appropriate Estlander flap was measured and drawn on the upper lip. Local anesthesia was then infiltrated. A scalpel was then used to incise the lateral aspect of the flap, through skin, muscle and mucosa, leaving the flap pedicled medially.  The flap was then rotated and positioned to fill the lower lip defect.  The flap was then sutured into place with a three layer technique, closing the orbicularis oris muscle layer with subcutaneous buried sutures, followed by a mucosal layer and an epidermal layer.
Cheiloplasty (Less Than 50%) Text: A decision was made to reconstruct the defect with a  cheiloplasty.  The defect was undermined extensively.  Additional orbicularis oris muscle was excised with a 15 blade scalpel.  The defect was converted into a full thickness wedge, of less than 50% of the vertical height of the lip, to facilite a better cosmetic result.  Small vessels were then tied off with 5-0 monocyrl. The orbicularis oris, superficial fascia, adipose and dermis were then reapproximated.  After the deeper layers were approximated the epidermis was reapproximated with particular care given to realign the vermilion border.
Cheiloplasty (Complex) Text: A decision was made to reconstruct the defect with a  cheiloplasty.  The defect was undermined extensively.  Additional orbicularis oris muscle was excised with a 15 blade scalpel.  The defect was converted into a full thickness wedge to facilite a better cosmetic result.  Small vessels were then tied off with 5-0 monocyrl. The orbicularis oris, superficial fascia, adipose and dermis were then reapproximated.  After the deeper layers were approximated the epidermis was reapproximated with particular care given to realign the vermilion border.
Ear Wedge Repair Text: A wedge excision was completed by carrying down an excision through the full thickness of the ear and cartilage with an inward facing Burow's triangle. The wound was then closed in a layered fashion.
Full Thickness Lip Wedge Repair (Flap) Text: Given the location of the defect and the proximity to free margins a full thickness wedge repair was deemed most appropriate.  Using a sterile surgical marker, the appropriate repair was drawn incorporating the defect and placing the expected incisions perpendicular to the vermilion border.  The vermilion border was also meticulously outlined to ensure appropriate reapproximation during the repair.  The area thus outlined was incised through and through with a #15 scalpel blade.  The muscularis and dermis were reaproximated with deep sutures following hemostasis. Care was taken to realign the vermilion border before proceeding with the superficial closure.  Once the vermilion was realigned the superfical and mucosal closure was finished.
Ftsg Text: The defect edges were debeveled with a #15 scalpel blade.  Given the location of the defect, shape of the defect and the proximity to free margins a full thickness skin graft was deemed most appropriate.  Using a sterile surgical marker, the primary defect shape was transferred to the donor site. The area thus outlined was incised deep to adipose tissue with a #15 scalpel blade.  The harvested graft was then trimmed of adipose tissue until only dermis and epidermis was left.  The skin margins of the secondary defect were undermined to an appropriate distance in all directions utilizing iris scissors.  The secondary defect was closed with interrupted buried subcutaneous sutures.  The skin edges were then re-apposed with running  sutures.  The skin graft was then placed in the primary defect and oriented appropriately.
Split-Thickness Skin Graft Text: The defect edges were debeveled with a #15 scalpel blade.  Given the location of the defect, shape of the defect and the proximity to free margins a split thickness skin graft was deemed most appropriate.  Using a sterile surgical marker, the primary defect shape was transferred to the donor site. The split thickness graft was then harvested.  The skin graft was then placed in the primary defect and oriented appropriately.
Pinch Graft Text: The defect edges were debeveled with a #15 scalpel blade. Given the location of the defect, shape of the defect and the proximity to free margins a pinch graft was deemed most appropriate. Using a sterile surgical marker, the primary defect shape was transferred to the donor site. The area thus outlined was incised deep to adipose tissue with a #15 scalpel blade.  The harvested graft was then trimmed of adipose tissue until only dermis and epidermis was left. The skin margins of the secondary defect were undermined to an appropriate distance in all directions utilizing iris scissors.  The secondary defect was closed with interrupted buried subcutaneous sutures.  The skin edges were then re-apposed with running  sutures.  The skin graft was then placed in the primary defect and oriented appropriately.
Burow's Graft Text: The defect edges were debeveled with a #15 scalpel blade.  Given the location of the defect, shape of the defect, the proximity to free margins and the presence of a standing cone deformity a Burow's skin graft was deemed most appropriate. The standing cone was removed and this tissue was then trimmed to the shape of the primary defect. The adipose tissue was also removed until only dermis and epidermis were left.  The skin margins of the secondary defect were undermined to an appropriate distance in all directions utilizing iris scissors.  The secondary defect was closed with interrupted buried subcutaneous sutures.  The skin edges were then re-apposed with running  sutures.  The skin graft was then placed in the primary defect and oriented appropriately.
Cartilage Graft Text: The defect edges were debeveled with a #15 scalpel blade.  Given the location of the defect, shape of the defect, the fact the defect involved a full thickness cartilage defect a cartilage graft was deemed most appropriate.  An appropriate donor site was identified, cleansed, and anesthetized. The cartilage graft was then harvested and transferred to the recipient site, oriented appropriately and then sutured into place.  The secondary defect was then repaired using a primary closure.
Composite Graft Text: The defect edges were debeveled with a #15 scalpel blade.  Given the location of the defect, shape of the defect, the proximity to free margins and the fact the defect was full thickness a composite graft was deemed most appropriate.  The defect was outline and then transferred to the donor site.  A full thickness graft was then excised from the donor site. The graft was then placed in the primary defect, oriented appropriately and then sutured into place.  The secondary defect was then repaired using a primary closure.
Epidermal Autograft Text: The defect edges were debeveled with a #15 scalpel blade.  Given the location of the defect, shape of the defect and the proximity to free margins an epidermal autograft was deemed most appropriate.  Using a sterile surgical marker, the primary defect shape was transferred to the donor site. The epidermal graft was then harvested.  The skin graft was then placed in the primary defect and oriented appropriately.
Dermal Autograft Text: The defect edges were debeveled with a #15 scalpel blade.  Given the location of the defect, shape of the defect and the proximity to free margins a dermal autograft was deemed most appropriate.  Using a sterile surgical marker, the primary defect shape was transferred to the donor site. The area thus outlined was incised deep to adipose tissue with a #15 scalpel blade.  The harvested graft was then trimmed of adipose and epidermal tissue until only dermis was left.  The skin graft was then placed in the primary defect and oriented appropriately.
Skin Substitute Text: The defect edges were debeveled with a #15 scalpel blade.  Given the location of the defect, shape of the defect and the proximity to free margins a skin substitute graft was deemed most appropriate.  The graft material was trimmed to fit the size of the defect. The graft was then placed in the primary defect and oriented appropriately.
Tissue Cultured Epidermal Autograft Text: The defect edges were debeveled with a #15 scalpel blade.  Given the location of the defect, shape of the defect and the proximity to free margins a tissue cultured epidermal autograft was deemed most appropriate.  The graft was then trimmed to fit the size of the defect.  The graft was then placed in the primary defect and oriented appropriately.
Xenograft Text: The defect edges were debeveled with a #15 scalpel blade.  Given the location of the defect, shape of the defect and the proximity to free margins a xenograft was deemed most appropriate.  The graft was then trimmed to fit the size of the defect.  The graft was then placed in the primary defect and oriented appropriately.
Purse String (Simple) Text: Given the location of the defect and the characteristics of the surrounding skin a purse string closure was deemed most appropriate.  Undermining was performed circumferentially around the surgical defect.  A purse string suture was then placed and tightened.
Purse String (Intermediate) Text: Given the location of the defect and the characteristics of the surrounding skin a purse string intermediate closure was deemed most appropriate.  Undermining was performed circumferentially around the surgical defect.  A purse string suture was then placed and tightened.
Partial Purse String (Simple) Text: Given the location of the defect and the characteristics of the surrounding skin a simple purse string closure was deemed most appropriate.  Undermining was performed circumferentially around the surgical defect.  A purse string suture was then placed and tightened. Wound tension only allowed a partial closure of the circular defect.
Partial Purse String (Intermediate) Text: Given the location of the defect and the characteristics of the surrounding skin an intermediate purse string closure was deemed most appropriate.  Undermining was performed circumferentially around the surgical defect.  A purse string suture was then placed and tightened. Wound tension only allowed a partial closure of the circular defect.
Localized Dermabrasion With Wire Brush Text: The patient was draped in routine manner.  Localized dermabrasion using 3 x 17 mm wire brush was performed in routine manner to papillary dermis. This spot dermabrasion is being performed to complete skin cancer reconstruction. It also will eliminate the other sun damaged precancerous cells that are known to be part of the regional effect of a lifetime's worth of sun exposure. This localized dermabrasion is therapeutic and should not be considered cosmetic in any regard.
Localized Dermabrasion With Sand Papertext: The patient was draped in routine manner.  Localized dermabrasion using sterile sand paper was performed in routine manner to papillary dermis. This spot dermabrasion is being performed to complete skin cancer reconstruction. It also will eliminate the other sun damaged precancerous cells that are known to be part of the regional effect of a lifetime's worth of sun exposure. This localized dermabrasion is therapeutic and should not be considered cosmetic in any regard.
Localized Dermabrasion With 15 Blade Text: The patient was draped in routine manner.  Localized dermabrasion using a 15 blade was performed in routine manner to papillary dermis. This spot dermabrasion is being performed to complete skin cancer reconstruction. It also will eliminate the other sun damaged precancerous cells that are known to be part of the regional effect of a lifetime's worth of sun exposure. This localized dermabrasion is therapeutic and should not be considered cosmetic in any regard.
Tarsorrhaphy Text: A tarsorrhaphy was performed using Frost sutures.
Intermediate Repair And Flap Additional Text (Will Appearing After The Standard Complex Repair Text): The intermediate repair was not sufficient to completely close the primary defect. The remaining additional defect was repaired with the flap mentioned below.
Intermediate Repair And Graft Additional Text (Will Appearing After The Standard Complex Repair Text): The intermediate repair was not sufficient to completely close the primary defect. The remaining additional defect was repaired with the graft mentioned below.
Complex Repair And Flap Additional Text (Will Appearing After The Standard Complex Repair Text): The complex repair was not sufficient to completely close the primary defect. The remaining additional defect was repaired with the flap mentioned below.
Complex Repair And Graft Additional Text (Will Appearing After The Standard Complex Repair Text): The complex repair was not sufficient to completely close the primary defect. The remaining additional defect was repaired with the graft mentioned below.
Eyelid Full Thickness Repair - 88729: The eyelid defect was full thickness which required a wedge repair of the eyelid. Special care was taken to ensure that the eyelid margin was realligned when placing sutures.
Eyelid Partial Thickness Repair - 18630: The eyelid defect was partial thickness which required a wedge repair of the eyelid. Special care was taken to ensure that the eyelid margin was realligned when placing sutures.
Manual Repair Warning Statement: We plan on removing the manually selected variable below in favor of our much easier automatic structured text blocks found in the previous tab. We decided to do this to help make the flow better and give you the full power of structured data. Manual selection is never going to be ideal in our platform and I would encourage you to avoid using manual selection from this point on, especially since I will be sunsetting this feature. It is important that you do one of two things with the customized text below. First, you can save all of the text in a word file so you can have it for future reference. Second, transfer the text to the appropriate area in the Library tab. Lastly, if there is a flap or graft type which we do not have you need to let us know right away so I can add it in before the variable is hidden. No need to panic, we plan to give you roughly 6 months to make the change.
Same Histology In Subsequent Stages Text: The pattern and morphology of the tumor is as described in the first stage.
No Residual Tumor Seen Histology Text: There were no malignant cells seen in the sections examined.
Inflammation Suggestive Of Cancer Camouflage Histology Text: There was a dense lymphocytic infiltrate which prevented adequate histologic evaluation of adjacent structures.
Incidental Superficial Basal Cell Carcinoma Histology Text: Incidental superficial basaloid proliferation emanating from the epidermis noted
Incidental Squamous Cell Carcinoma In Situ Histology Text: Incidental full thickness keratinocytic atypia of epidermis noted
Incidental Actinic Keratosis Histology Text: Area of epidermal basilar layer atypia, parakeratosis, and solar elastosis
Bcc Histology Text: There were numerous aggregates of basaloid cells.
Bcc Infiltrative Histology Text: There were numerous aggregates of basaloid cells demonstrating an infiltrative pattern.
Bcc Micronodular Histology Text: There were numerous aggregates of small, micronodular basaloid proliferations
Bcc  Morpheaform/Sclerosing Histology Text: There are thin basaloid strands of epithelial cells often seen in a fibrous storm
Bcc  Nodular Histology Text: Nodular aggregates of basaloid proliferations
Bcc Pigmented Histology Text: Aggregates of basaloid proliferations with some pigment
Bcc Superficial Histology Text: superficial basaloid proliferation emanating from the epidermis noted
Mixed Superficial And Nodular Bcc Histology Text: Areas of nodular basaloid proliferations mixed with superficial basaloid proliferations emanating from the epidermis
Mixed Nodular And Infiltrative Bcc Histology Text: Areas of nodular basaloid proliferations mixed with thin strands of basaloid proliferations with an infiltrative growth pattern
Mixed Nodular And Micronodular Bcc Histology Text: Areas of nodular basaloid proliferations mixed with micronodular aggregates of basaloid proliferations
Scc Histology Text: Nests of atypical squamous keratinocytes arising from the epidermis with growth into the dermis
Scc Well Differentiated Histology Text: Nests of well differentiated atypical squamous keratinocytes arising from the epidermis with growth into the dermis
Scc Moderately Differentiated Histology Text: Nests of moderately differentiated atypical squamous keratinocytes arising from the epidermis with growth into the dermis
Scc In Situ Histology Text: Full thickness epidermal squamous atypia noted
Mart-1 - Positive Histology Text: MART-1 staining demonstrates areas of higher density and clustering of melanocytes with Pagetoid spread upwards within the epidermis. The surgical margins are positive for tumor cells.
Mart-1 - Negative Histology Text: MART-1 staining demonstrates a normal density and pattern of melanocytes along the dermal-epidermal junction. The surgical margins are negative for tumor cells.
Information: Selecting Yes will display possible errors in your note based on the variables you have selected. This validation is only offered as a suggestion for you. PLEASE NOTE THAT THE VALIDATION TEXT WILL BE REMOVED WHEN YOU FINALIZE YOUR NOTE. IF YOU WANT TO FAX A PRELIMINARY NOTE YOU WILL NEED TO TOGGLE THIS TO 'NO' IF YOU DO NOT WANT IT IN YOUR FAXED NOTE.
Bill 59 Modifier?: No - Continue to Bill 79 Modifier
Mohs Case Number: [id number]
Number Of Stages: 2
Primary Defect Length In Cm (Final Defect Size - Required For Flaps/Grafts): 1.5
Repair Type: Complex Repair
Depth Of Tumor Invasion (For Histology): dermis

## 2024-05-21 ENCOUNTER — OFFICE VISIT (OUTPATIENT)
Dept: FAMILY MEDICINE CLINIC | Facility: CLINIC | Age: 79
End: 2024-05-21
Payer: MEDICARE

## 2024-05-21 VITALS
SYSTOLIC BLOOD PRESSURE: 138 MMHG | OXYGEN SATURATION: 93 % | DIASTOLIC BLOOD PRESSURE: 70 MMHG | TEMPERATURE: 98 F | BODY MASS INDEX: 41.92 KG/M2 | HEIGHT: 69 IN | HEART RATE: 75 BPM | RESPIRATION RATE: 16 BRPM | WEIGHT: 283 LBS

## 2024-05-21 DIAGNOSIS — J02.9 SORE THROAT: ICD-10-CM

## 2024-05-21 DIAGNOSIS — J06.9 UPPER RESPIRATORY TRACT INFECTION, UNSPECIFIED TYPE: ICD-10-CM

## 2024-05-21 DIAGNOSIS — R05.9 COUGH, UNSPECIFIED TYPE: Primary | ICD-10-CM

## 2024-05-21 DIAGNOSIS — R09.89 CHEST CONGESTION: ICD-10-CM

## 2024-05-21 LAB
GROUP A STREP ANTIGEN, POC: NEGATIVE
INFLUENZA A ANTIGEN, POC: NEGATIVE
INFLUENZA B ANTIGEN, POC: NEGATIVE
LOT EXPIRE DATE: NORMAL
LOT KIT NUMBER: NORMAL
RSV, POC: NEGATIVE
SARS-COV-2, POC: NORMAL
VALID INTERNAL CONTROL, POC: YES
VALID INTERNAL CONTROL, POC: YES
VALID INTERNAL CONTROL: YES
VALID INTERNAL CONTROL: YES
VENDOR AND KIT NAME POC: NORMAL

## 2024-05-21 PROCEDURE — 87426 SARSCOV CORONAVIRUS AG IA: CPT | Performed by: NURSE PRACTITIONER

## 2024-05-21 PROCEDURE — 99214 OFFICE O/P EST MOD 30 MIN: CPT | Performed by: NURSE PRACTITIONER

## 2024-05-21 PROCEDURE — 87420 RESP SYNCYTIAL VIRUS AG IA: CPT | Performed by: NURSE PRACTITIONER

## 2024-05-21 PROCEDURE — 1123F ACP DISCUSS/DSCN MKR DOCD: CPT | Performed by: NURSE PRACTITIONER

## 2024-05-21 PROCEDURE — 3075F SYST BP GE 130 - 139MM HG: CPT | Performed by: NURSE PRACTITIONER

## 2024-05-21 PROCEDURE — 87804 INFLUENZA ASSAY W/OPTIC: CPT | Performed by: NURSE PRACTITIONER

## 2024-05-21 PROCEDURE — 87880 STREP A ASSAY W/OPTIC: CPT | Performed by: NURSE PRACTITIONER

## 2024-05-21 PROCEDURE — 3078F DIAST BP <80 MM HG: CPT | Performed by: NURSE PRACTITIONER

## 2024-05-21 RX ORDER — AMOXICILLIN AND CLAVULANATE POTASSIUM 875; 125 MG/1; MG/1
1 TABLET, FILM COATED ORAL 2 TIMES DAILY
Qty: 20 TABLET | Refills: 0 | Status: SHIPPED | OUTPATIENT
Start: 2024-05-21 | End: 2024-05-31

## 2024-05-21 RX ORDER — HYDROCODONE BITARTRATE AND HOMATROPINE METHYLBROMIDE ORAL SOLUTION 5; 1.5 MG/5ML; MG/5ML
5 LIQUID ORAL EVERY 6 HOURS PRN
Qty: 60 ML | Refills: 0 | Status: SHIPPED | OUTPATIENT
Start: 2024-05-21 | End: 2024-05-24

## 2024-05-21 RX ORDER — KETOCONAZOLE 20 MG/G
CREAM TOPICAL
COMMUNITY
Start: 2024-02-28

## 2024-05-21 SDOH — ECONOMIC STABILITY: FOOD INSECURITY: WITHIN THE PAST 12 MONTHS, THE FOOD YOU BOUGHT JUST DIDN'T LAST AND YOU DIDN'T HAVE MONEY TO GET MORE.: NEVER TRUE

## 2024-05-21 SDOH — ECONOMIC STABILITY: FOOD INSECURITY: WITHIN THE PAST 12 MONTHS, YOU WORRIED THAT YOUR FOOD WOULD RUN OUT BEFORE YOU GOT MONEY TO BUY MORE.: NEVER TRUE

## 2024-05-21 SDOH — ECONOMIC STABILITY: INCOME INSECURITY: HOW HARD IS IT FOR YOU TO PAY FOR THE VERY BASICS LIKE FOOD, HOUSING, MEDICAL CARE, AND HEATING?: NOT HARD AT ALL

## 2024-05-21 ASSESSMENT — PATIENT HEALTH QUESTIONNAIRE - PHQ9
SUM OF ALL RESPONSES TO PHQ9 QUESTIONS 1 & 2: 0
1. LITTLE INTEREST OR PLEASURE IN DOING THINGS: NOT AT ALL
SUM OF ALL RESPONSES TO PHQ QUESTIONS 1-9: 0
SUM OF ALL RESPONSES TO PHQ QUESTIONS 1-9: 0
2. FEELING DOWN, DEPRESSED OR HOPELESS: NOT AT ALL
SUM OF ALL RESPONSES TO PHQ QUESTIONS 1-9: 0
SUM OF ALL RESPONSES TO PHQ QUESTIONS 1-9: 0

## 2024-05-21 NOTE — PROGRESS NOTES
Luis Alfredo Mckenzie (: 1945) presents today for sick symptoms that started Friday- cough and since then has become worse. He has been coughing all night long and now is losing his voice. Cough is productive. No trouble breathing. Sore throat this morning. No fever, vomiting. Some nausea today. Using Flonase and taking allergy medicine now. If he does not use Flonase he gets congested. No ear ache. On Thursday he let all of his windows open and thinks this irritated his sinuses. Took some dextromorphan cough syrup and Nyquil last night- neither are helping with symptoms.     Chief Complaint   Patient presents with    Cough    Congestion     Cough congestion and sore throat a lot of drainage      Patient Active Problem List   Diagnosis    Severe obesity (HCC)    Essential hypertension, benign    Dorsolateral medullary syndrome    Mixed hyperlipidemia    Thyroid nodule    Controlled type 2 diabetes mellitus without complication, without long-term current use of insulin (HCC)    Hypoxia    Numbness and tingling in left hand    History of CVA with residual deficit    Type 2 diabetes mellitus with hyperglycemia    Skin lesions    History of stroke     Past Medical History:   Diagnosis Date    Cerebellar stroke (HCC) 2020    Essential hypertension     Mixed hyperlipidemia     Right thalamic infarction (HCC) 2019    Right thalamic infarction (HCC) 2019    Stroke (HCC) 2020    Type 2 diabetes mellitus (HCC)      Past Surgical History:   Procedure Laterality Date    CATARACT REMOVAL  January and 2021    HEENT      sinus surgery    MALIGNANT SKIN LESION EXCISION      UROLOGICAL SURGERY  2020    prostate surgery     Family History   Problem Relation Age of Onset    Heart Disease Father     Stomach Cancer Brother     Thyroid Cancer Neg Hx     No Known Problems Other     Heart Disease Mother     Thyroid Disease

## 2024-05-29 DIAGNOSIS — E11.65 TYPE 2 DIABETES MELLITUS WITH HYPERGLYCEMIA, WITHOUT LONG-TERM CURRENT USE OF INSULIN (HCC): ICD-10-CM

## 2024-06-04 ENCOUNTER — HOSPITAL ENCOUNTER (EMERGENCY)
Age: 79
Discharge: HOME | End: 2024-06-04
Payer: MEDICARE

## 2024-06-04 ENCOUNTER — APPOINTMENT (OUTPATIENT)
Dept: GENERAL RADIOLOGY | Age: 79
End: 2024-06-04
Payer: MEDICARE

## 2024-06-04 VITALS
SYSTOLIC BLOOD PRESSURE: 145 MMHG | DIASTOLIC BLOOD PRESSURE: 81 MMHG | HEART RATE: 81 BPM | OXYGEN SATURATION: 93 % | TEMPERATURE: 98.2 F | RESPIRATION RATE: 16 BRPM

## 2024-06-04 DIAGNOSIS — R05.1 ACUTE COUGH: Primary | ICD-10-CM

## 2024-06-04 LAB
ALBUMIN SERPL-MCNC: 3.8 G/DL (ref 3.2–4.6)
ALBUMIN/GLOB SERPL: 1.2 (ref 0.4–1.6)
ALP SERPL-CCNC: 99 U/L (ref 45–117)
ALT SERPL-CCNC: 9 U/L (ref 13–61)
ANION GAP SERPL CALC-SCNC: 14 MMOL/L (ref 2–11)
AST SERPL-CCNC: 20 U/L (ref 15–37)
BASOPHILS # BLD: 0.1 K/UL (ref 0–0.2)
BASOPHILS NFR BLD: 1 % (ref 0–2)
BILIRUB SERPL-MCNC: 0.3 MG/DL (ref 0.2–1.1)
BUN SERPL-MCNC: 21 MG/DL (ref 8–23)
CALCIUM SERPL-MCNC: 8.7 MG/DL (ref 8.3–10.4)
CHLORIDE SERPL-SCNC: 101 MMOL/L (ref 98–107)
CO2 SERPL-SCNC: 27 MMOL/L (ref 21–32)
CREAT SERPL-MCNC: 1.09 MG/DL (ref 0.8–1.5)
DIFFERENTIAL METHOD BLD: ABNORMAL
EOSINOPHIL # BLD: 0.3 K/UL (ref 0–0.8)
EOSINOPHIL NFR BLD: 3 % (ref 0.5–7.8)
ERYTHROCYTE [DISTWIDTH] IN BLOOD BY AUTOMATED COUNT: 14.3 % (ref 11.9–14.6)
GLOBULIN SER CALC-MCNC: 3.1 G/DL (ref 2.8–4.5)
GLUCOSE SERPL-MCNC: 195 MG/DL (ref 65–100)
HCT VFR BLD AUTO: 38.5 % (ref 41.1–50.3)
HGB BLD-MCNC: 12.4 G/DL (ref 13.6–17.2)
IMM GRANULOCYTES # BLD AUTO: 0.1 K/UL (ref 0–0.5)
IMM GRANULOCYTES NFR BLD AUTO: 1 % (ref 0–5)
LACTATE SERPL-SCNC: 1.5 MMOL/L (ref 0.5–2)
LYMPHOCYTES # BLD: 1.6 K/UL (ref 0.5–4.6)
LYMPHOCYTES NFR BLD: 17 % (ref 13–44)
MCH RBC QN AUTO: 29.4 PG (ref 26.1–32.9)
MCHC RBC AUTO-ENTMCNC: 32.2 G/DL (ref 31.4–35)
MCV RBC AUTO: 91.2 FL (ref 82–102)
MONOCYTES # BLD: 0.7 K/UL (ref 0.1–1.3)
MONOCYTES NFR BLD: 7 % (ref 4–12)
NEUTS SEG # BLD: 7 K/UL (ref 1.7–8.2)
NEUTS SEG NFR BLD: 73 % (ref 43–78)
NRBC # BLD: 0 K/UL (ref 0–0.2)
PLATELET # BLD AUTO: 333 K/UL (ref 150–450)
PMV BLD AUTO: 10.6 FL (ref 9.4–12.3)
POTASSIUM SERPL-SCNC: 4 MMOL/L (ref 3.5–5.1)
PROT SERPL-MCNC: 6.9 G/DL (ref 6.4–8.2)
RBC # BLD AUTO: 4.22 M/UL (ref 4.23–5.6)
SODIUM SERPL-SCNC: 142 MMOL/L (ref 133–143)
WBC # BLD AUTO: 9.6 K/UL (ref 4.3–11.1)

## 2024-06-04 PROCEDURE — 99284 EMERGENCY DEPT VISIT MOD MDM: CPT

## 2024-06-04 PROCEDURE — 6370000000 HC RX 637 (ALT 250 FOR IP)

## 2024-06-04 PROCEDURE — 71045 X-RAY EXAM CHEST 1 VIEW: CPT

## 2024-06-04 PROCEDURE — 83605 ASSAY OF LACTIC ACID: CPT

## 2024-06-04 PROCEDURE — 80053 COMPREHEN METABOLIC PANEL: CPT

## 2024-06-04 PROCEDURE — 85025 COMPLETE CBC W/AUTO DIFF WBC: CPT

## 2024-06-04 RX ORDER — DOXYCYCLINE HYCLATE 100 MG
100 TABLET ORAL 2 TIMES DAILY
Qty: 14 TABLET | Refills: 0 | Status: SHIPPED | OUTPATIENT
Start: 2024-06-04 | End: 2024-06-11

## 2024-06-04 RX ORDER — BENZONATATE 100 MG/1
100 CAPSULE ORAL 3 TIMES DAILY PRN
Qty: 20 CAPSULE | Refills: 0 | Status: SHIPPED | OUTPATIENT
Start: 2024-06-04 | End: 2024-06-11

## 2024-06-04 RX ORDER — ALBUTEROL SULFATE 90 UG/1
2 AEROSOL, METERED RESPIRATORY (INHALATION) 4 TIMES DAILY
Qty: 1 EACH | Refills: 0 | Status: SHIPPED | OUTPATIENT
Start: 2024-06-04

## 2024-06-04 RX ORDER — IPRATROPIUM BROMIDE AND ALBUTEROL SULFATE 2.5; .5 MG/3ML; MG/3ML
1 SOLUTION RESPIRATORY (INHALATION)
Status: COMPLETED | OUTPATIENT
Start: 2024-06-04 | End: 2024-06-04

## 2024-06-04 RX ADMIN — IPRATROPIUM BROMIDE AND ALBUTEROL SULFATE 1 DOSE: .5; 3 SOLUTION RESPIRATORY (INHALATION) at 20:29

## 2024-06-04 ASSESSMENT — ENCOUNTER SYMPTOMS
WHEEZING: 0
SHORTNESS OF BREATH: 1
STRIDOR: 0
EYES NEGATIVE: 1
CHEST TIGHTNESS: 0
ALLERGIC/IMMUNOLOGIC NEGATIVE: 1
COUGH: 1
ABDOMINAL DISTENTION: 0
RHINORRHEA: 1
TROUBLE SWALLOWING: 1

## 2024-06-04 NOTE — ED PROVIDER NOTES
Emergency Department Provider Note       PCP: Leora Navas, RADHA - CNP   Age: 78 y.o.   Sex: male     DISPOSITION Discharge - Pending Orders Complete 06/04/2024 08:26:50 PM       ICD-10-CM    1. Acute cough  R05.1           Medical Decision Making     78-year-old male presented to the emergency department for continued mild cough, rhinorrhea, congestion which has been present for 2 to 3 weeks.  His exam is reassuring at this time.  Presentation not consistent with acute cardiac etiologies including ACS, CHF, pericardial effusion/tamponade, volume overload or PE.  White blood cell 9.6, lactic 1.5, H&H 12.4/38.5 respectively.  As in HPI, patient was evaluated by PCP for same symptoms and was prescribed Augmentin and Hycodan with vast improvement of symptoms although, as above, he arrived today for mild continuation of above symptoms.  O2 sats upon ambulation were 95 to 96% on room air.  Right upper and lower airspace disease noted on plain film.  He is not a smoker.  We will give a DuoNeb treatment here.  Patient will be discharged with inhaler with spacer to use 4 times a day. I will send him with a course of doxycycline to take after 3 days from this visit if symptoms have not improved. Tessalon provided as needed as he is out of other cough suppressants. He will continue to take Mucinex at home.  Patient has been given strict return precautions which she is in agreement with and verbalizes understanding. He will continue with Flonase and allergy medication at home.  ED Course as of 06/04/24 2034 Tue Jun 04, 2024   1853 Chest x-ray showing right upper and lower airspace disease.  Will obtain basic lab work at this time. [TC]   1903 Ambulatory sats were 95% on ambulation trial. [TC]      ED Course User Index  [TC] Harshal Sommer APRN - NP     1 chronic illness with exacerbation.      I independently ordered and reviewed each unique test.  I reviewed external records: provider visit note from PCP.

## 2024-06-04 NOTE — PROGRESS NOTES
Received message from Silva at  at [facility]    MR MARGRET JAY'S WIFE JUST CALLED   HE WAS SEEN 05-  HE IS STILL SICK   CAN YOU CALL HIM SOMETHING IN  SOMETHING FOR THE COUGH AND DRAINAGE.  IT'S NOT GETTING BETTER   [id number]     [phone]            Seen 5/21- flu, covid, strep, rsv negative. Started on Augmentin at that time.

## 2024-06-04 NOTE — ED TRIAGE NOTES
Pt arrives via POV after being told to come to the ER for a chest X ray. Pt has had congestion and couple for about 3 wks. RA sat of 93%

## 2024-06-05 NOTE — DISCHARGE INSTRUCTIONS
As we discussed, your exam today was reassuring.  I do want you to follow-up with your PCP in 1 to 2 weeks for recheck or if symptoms have not improved.  As we discussed, I will send you home with albuterol inhaler for use 4 times a day.  Tessalon will be used as needed for cough suppression.  Doxycycline is the antibiotic we discussed, and we will have you begin course in 3 days from this visit if symptoms have NOT improved.  As we discussed, please return for any new or worsening symptoms.

## 2024-06-05 NOTE — ED NOTES
Patient mobility status  with no difficulty. Provider aware     I have reviewed discharge instructions with the patient and spouse.  The patient and spouse verbalized understanding.    Patient left ED via Discharge Method: ambulatory to Home with Significant Other.    Opportunity for questions and clarification provided.     Patient given 3 scripts.

## 2024-06-07 RX ORDER — METFORMIN HYDROCHLORIDE 500 MG/1
1000 TABLET, EXTENDED RELEASE ORAL
Qty: 90 TABLET | Refills: 7 | Status: SHIPPED | OUTPATIENT
Start: 2024-06-07 | End: 2024-06-25 | Stop reason: SDUPTHER

## 2024-06-18 DIAGNOSIS — E78.2 MIXED HYPERLIPIDEMIA: ICD-10-CM

## 2024-06-19 ENCOUNTER — TELEPHONE (OUTPATIENT)
Dept: PHARMACY | Facility: CLINIC | Age: 79
End: 2024-06-19

## 2024-06-19 RX ORDER — ATORVASTATIN CALCIUM 80 MG/1
80 TABLET, FILM COATED ORAL EVERY EVENING
Qty: 90 TABLET | Refills: 3 | OUTPATIENT
Start: 2024-06-19

## 2024-06-19 NOTE — TELEPHONE ENCOUNTER
[facility] CLINICAL PHARMACY: ADHERENCE REVIEW  Identified care gap per [employer]: ACE/ARB, Diabetes, and Statin adherence    ASSESSMENT    ACE/ARB ADHERENCE    Insurance Records claims through 2024 (Prior Year PDC = 100% - PASSED ; YTD PDC = FIRST FILL; Potential Fail Date: 24):   Losartan 25 mg tab last filled on 24 for 90 day supply. Next refill due: 24 max refill due date 24 - previously filled lisinopril 20 mg tab, valsartan 80 mg tab in   Per  records lat fill of losartan 25 mg was 11/28/23 x 90 day supply, refill due 24  Adjusted refill due date ~24    Per 23 OV: lisinopril stopped due to cough, changed to valsartan  Per 23 OV: started losartan (appears changed from valsartan - had been marked as not taking, per note had dizziness and low BP)  Established care with Leora Navas APRN - CNP 24, losartan reordered at visit  Recent ER visit for cough    Prescribed si tablet/capsule daily    Per Reconcile Dispense History:  LOSARTAN POTASSIUM  25 MG TABS 2024 90 90 tablet Leora Navas APRN - CNP [facility]   Last Rx 1/30/24 x 90 day supply 1 refill - believe has refill at pharmacy    BP Readings from Last 3 Encounters:   24 (!) 145/81   24 138/70   24 (!) 148/80     Estimated Creatinine Clearance: 76 mL/min (based on SCr of 1.09 mg/dL).  Lab Results   Component Value Date    CREATININE 1.09 2024     Lab Results   Component Value Date    K 4.0 2024     DIABETES ADHERENCE    Insurance Records claims through 2024 (Prior Year PDC = 100% - PASSED ; YTD PDC = 100%; Potential Fail Date: 10/8/24):   Metformin 500 mg tab last filled on 24 for 45 day supply.  Glipizide ER 10 mg tab last filled 6/10/24 for 90 day supply (per payer portal)  Max refill due: 24    Per Reconcile Dispense History:   GLIPIZIDE ER  10 MG TB24 06/10/2024 90 90 tablet Leora Navas,

## 2024-06-20 ENCOUNTER — OFFICE VISIT (OUTPATIENT)
Dept: FAMILY MEDICINE CLINIC | Facility: CLINIC | Age: 79
End: 2024-06-20
Payer: MEDICARE

## 2024-06-20 VITALS
OXYGEN SATURATION: 96 % | TEMPERATURE: 97.7 F | HEIGHT: 69 IN | DIASTOLIC BLOOD PRESSURE: 80 MMHG | BODY MASS INDEX: 41.92 KG/M2 | RESPIRATION RATE: 18 BRPM | WEIGHT: 283 LBS | SYSTOLIC BLOOD PRESSURE: 134 MMHG | HEART RATE: 72 BPM

## 2024-06-20 DIAGNOSIS — E11.65 TYPE 2 DIABETES MELLITUS WITH HYPERGLYCEMIA, WITHOUT LONG-TERM CURRENT USE OF INSULIN (HCC): ICD-10-CM

## 2024-06-20 DIAGNOSIS — R79.89 ELEVATED SERUM CREATININE: ICD-10-CM

## 2024-06-20 DIAGNOSIS — E78.2 MIXED HYPERLIPIDEMIA: ICD-10-CM

## 2024-06-20 DIAGNOSIS — I10 ESSENTIAL HYPERTENSION, BENIGN: ICD-10-CM

## 2024-06-20 DIAGNOSIS — N40.1 BENIGN PROSTATIC HYPERPLASIA WITH URINARY RETENTION: ICD-10-CM

## 2024-06-20 DIAGNOSIS — L02.91 ABSCESS: Primary | ICD-10-CM

## 2024-06-20 DIAGNOSIS — R33.8 BENIGN PROSTATIC HYPERPLASIA WITH URINARY RETENTION: ICD-10-CM

## 2024-06-20 DIAGNOSIS — I69.30 HISTORY OF CVA WITH RESIDUAL DEFICIT: ICD-10-CM

## 2024-06-20 DIAGNOSIS — I10 ESSENTIAL HYPERTENSION, BENIGN: Primary | ICD-10-CM

## 2024-06-20 DIAGNOSIS — R05.3 CHRONIC COUGH: ICD-10-CM

## 2024-06-20 LAB
EST. AVERAGE GLUCOSE BLD GHB EST-MCNC: 199 MG/DL
HBA1C MFR BLD: 8.6 % (ref 0–5.6)

## 2024-06-20 PROCEDURE — 3079F DIAST BP 80-89 MM HG: CPT | Performed by: NURSE PRACTITIONER

## 2024-06-20 PROCEDURE — 1123F ACP DISCUSS/DSCN MKR DOCD: CPT | Performed by: NURSE PRACTITIONER

## 2024-06-20 PROCEDURE — 99214 OFFICE O/P EST MOD 30 MIN: CPT | Performed by: NURSE PRACTITIONER

## 2024-06-20 PROCEDURE — 3052F HG A1C>EQUAL 8.0%<EQUAL 9.0%: CPT | Performed by: NURSE PRACTITIONER

## 2024-06-20 PROCEDURE — 3075F SYST BP GE 130 - 139MM HG: CPT | Performed by: NURSE PRACTITIONER

## 2024-06-20 PROCEDURE — G2211 COMPLEX E/M VISIT ADD ON: HCPCS | Performed by: NURSE PRACTITIONER

## 2024-06-20 RX ORDER — CLOPIDOGREL BISULFATE 75 MG/1
75 TABLET ORAL DAILY
Qty: 90 TABLET | Refills: 1 | Status: CANCELLED | OUTPATIENT
Start: 2024-06-20

## 2024-06-20 RX ORDER — METFORMIN HYDROCHLORIDE 500 MG/1
1000 TABLET, EXTENDED RELEASE ORAL
Qty: 180 TABLET | Refills: 3 | Status: CANCELLED | OUTPATIENT
Start: 2024-06-20

## 2024-06-20 RX ORDER — HYDROCHLOROTHIAZIDE 25 MG/1
25 TABLET ORAL DAILY
Qty: 90 TABLET | Refills: 1 | Status: CANCELLED | OUTPATIENT
Start: 2024-06-20

## 2024-06-20 RX ORDER — LOSARTAN POTASSIUM 25 MG/1
25 TABLET ORAL DAILY
Qty: 90 TABLET | Refills: 1 | Status: CANCELLED | OUTPATIENT
Start: 2024-06-20

## 2024-06-20 RX ORDER — ALBUTEROL SULFATE 90 UG/1
2 AEROSOL, METERED RESPIRATORY (INHALATION) 4 TIMES DAILY
Qty: 1 EACH | Refills: 0 | Status: CANCELLED | OUTPATIENT
Start: 2024-06-20

## 2024-06-20 RX ORDER — POTASSIUM CHLORIDE 750 MG/1
10 TABLET, EXTENDED RELEASE ORAL DAILY
Qty: 90 TABLET | Refills: 1 | Status: CANCELLED | OUTPATIENT
Start: 2024-06-20

## 2024-06-20 RX ORDER — GLIPIZIDE 10 MG/1
10 TABLET, FILM COATED, EXTENDED RELEASE ORAL DAILY
Qty: 90 TABLET | Refills: 2 | Status: CANCELLED | OUTPATIENT
Start: 2024-06-20 | End: 2025-03-17

## 2024-06-20 RX ORDER — FINASTERIDE 5 MG/1
5 TABLET, FILM COATED ORAL DAILY
Qty: 90 TABLET | Refills: 3 | Status: CANCELLED | OUTPATIENT
Start: 2024-06-20

## 2024-06-20 RX ORDER — CEPHALEXIN 500 MG/1
500 CAPSULE ORAL 3 TIMES DAILY
Qty: 21 CAPSULE | Refills: 0 | Status: SHIPPED | OUTPATIENT
Start: 2024-06-20

## 2024-06-20 RX ORDER — BENZONATATE 100 MG/1
CAPSULE ORAL
Qty: 50 CAPSULE | Refills: 0 | Status: SHIPPED | OUTPATIENT
Start: 2024-06-20

## 2024-06-20 RX ORDER — MONTELUKAST SODIUM 10 MG/1
10 TABLET ORAL DAILY
Qty: 90 TABLET | Refills: 1 | Status: CANCELLED | OUTPATIENT
Start: 2024-06-20

## 2024-06-20 RX ORDER — DOXAZOSIN MESYLATE 4 MG/1
4 TABLET ORAL NIGHTLY
Qty: 90 TABLET | Refills: 1 | Status: CANCELLED | OUTPATIENT
Start: 2024-06-20

## 2024-06-20 RX ORDER — ATORVASTATIN CALCIUM 80 MG/1
80 TABLET, FILM COATED ORAL EVERY EVENING
Qty: 90 TABLET | Refills: 1 | Status: CANCELLED | OUTPATIENT
Start: 2024-06-20

## 2024-06-20 SDOH — ECONOMIC STABILITY: INCOME INSECURITY: HOW HARD IS IT FOR YOU TO PAY FOR THE VERY BASICS LIKE FOOD, HOUSING, MEDICAL CARE, AND HEATING?: NOT HARD AT ALL

## 2024-06-20 SDOH — ECONOMIC STABILITY: FOOD INSECURITY: WITHIN THE PAST 12 MONTHS, YOU WORRIED THAT YOUR FOOD WOULD RUN OUT BEFORE YOU GOT MONEY TO BUY MORE.: NEVER TRUE

## 2024-06-20 SDOH — ECONOMIC STABILITY: FOOD INSECURITY: WITHIN THE PAST 12 MONTHS, THE FOOD YOU BOUGHT JUST DIDN'T LAST AND YOU DIDN'T HAVE MONEY TO GET MORE.: NEVER TRUE

## 2024-06-20 ASSESSMENT — PATIENT HEALTH QUESTIONNAIRE - PHQ9
2. FEELING DOWN, DEPRESSED OR HOPELESS: NOT AT ALL
1. LITTLE INTEREST OR PLEASURE IN DOING THINGS: NOT AT ALL
SUM OF ALL RESPONSES TO PHQ QUESTIONS 1-9: 0
SUM OF ALL RESPONSES TO PHQ9 QUESTIONS 1 & 2: 0
SUM OF ALL RESPONSES TO PHQ QUESTIONS 1-9: 0

## 2024-06-20 NOTE — PROGRESS NOTES
Numbness and tingling in left hand    History of CVA with residual deficit    Type 2 diabetes mellitus with hyperglycemia    Skin lesions    History of stroke         Review of Systems   Constitutional:  Negative for fatigue, fever and malaise/fatigue.   HENT:  Positive for rhinorrhea. Negative for congestion, hearing loss and postnasal drip.    Eyes:  Negative for pain, discharge and visual disturbance.   Respiratory:  Positive for cough. Negative for chest tightness, shortness of breath and wheezing.         Persistant dry cough.  Was seen in Er and had CXR.  No pneumonia.  Did not go away with antibiotic.    Cardiovascular:  Negative for chest pain, palpitations and leg swelling.        Has CAD--is on cardura and lanoxin.  Has had no chest pain.  BP has been good at home.    Gastrointestinal:  Negative for abdominal pain, blood in stool, constipation, diarrhea and vomiting.   Endocrine: Negative for polydipsia.        Checking blood glucose levels once a week. Usually in the 110's.  Has had no hypoglycemia.  Is as active as he can be since his stroke.  Is tryng to eat healthy.  Has lost some weight.    Genitourinary:  Negative for difficulty urinating, frequency and urgency.   Musculoskeletal:  Positive for arthralgias and gait problem. Negative for myalgias.        CVS affected balance and walking.  Cannot walk long distances.    Skin:  Negative for rash.        Had abscess on back that ruptured and drained.  The drainage was foul smelling.  Patient states he can still smell it.  Has had no pain.     Neurological:  Negative for dizziness, tremors, seizures and headaches.        Previous stroke.  Gets fatigued easily.  Cannot walk long distances due to weakness in his legs.  Walking without aid.     Psychiatric/Behavioral:  Negative for decreased concentration and sleep disturbance. The patient is not nervous/anxious.       /80   Pulse 72   Temp 97.7 °F (36.5 °C) (Temporal)   Resp 18   Ht 1.753 m (5'

## 2024-06-21 NOTE — TELEPHONE ENCOUNTER
Pt had 24 OV; note currently in progress but losartan marked as taking and appears on medication list to continue.    Called to [facility] home delivery automated line to refill losartan per previous discussion with patient. No charge for. Should receive between 24-24. Conf #[id number].    For Pharmacy Admin Tracking Only    Program: [employer]  CPA in place:  No  Recommendation Provided To: Patient/Caregiver: 1 via Telephone and Pharmacy: 1  Intervention Detail: Adherence Monitorin  Intervention Accepted By: Patient/Caregiver: 1 and Pharmacy: 1  Gap Closed?: Yes   Time Spent (min): 20

## 2024-06-25 RX ORDER — ATORVASTATIN CALCIUM 80 MG/1
80 TABLET, FILM COATED ORAL EVERY EVENING
Qty: 90 TABLET | Refills: 1 | Status: SHIPPED | OUTPATIENT
Start: 2024-06-25

## 2024-06-25 RX ORDER — LOSARTAN POTASSIUM 25 MG/1
25 TABLET ORAL DAILY
Qty: 90 TABLET | Refills: 1 | Status: SHIPPED | OUTPATIENT
Start: 2024-06-25

## 2024-06-25 RX ORDER — METFORMIN HYDROCHLORIDE 500 MG/1
1000 TABLET, EXTENDED RELEASE ORAL
Qty: 180 TABLET | Refills: 1 | Status: SHIPPED | OUTPATIENT
Start: 2024-06-25 | End: 2024-06-27 | Stop reason: SDUPTHER

## 2024-06-25 RX ORDER — DOXAZOSIN MESYLATE 4 MG/1
4 TABLET ORAL NIGHTLY
Qty: 90 TABLET | Refills: 1 | Status: SHIPPED | OUTPATIENT
Start: 2024-06-25 | End: 2024-06-27 | Stop reason: SDUPTHER

## 2024-06-25 RX ORDER — CLOPIDOGREL BISULFATE 75 MG/1
75 TABLET ORAL DAILY
Qty: 90 TABLET | Refills: 1 | Status: SHIPPED | OUTPATIENT
Start: 2024-06-25

## 2024-06-25 ASSESSMENT — ENCOUNTER SYMPTOMS
RHINORRHEA: 1
VISUAL CHANGE: 0
COUGH: 1

## 2024-06-27 ENCOUNTER — OFFICE VISIT (OUTPATIENT)
Dept: UROLOGY | Age: 79
End: 2024-06-27
Payer: MEDICARE

## 2024-06-27 DIAGNOSIS — R33.8 BENIGN PROSTATIC HYPERPLASIA WITH URINARY RETENTION: ICD-10-CM

## 2024-06-27 DIAGNOSIS — E11.65 TYPE 2 DIABETES MELLITUS WITH HYPERGLYCEMIA, WITHOUT LONG-TERM CURRENT USE OF INSULIN (HCC): ICD-10-CM

## 2024-06-27 DIAGNOSIS — R35.0 BENIGN PROSTATIC HYPERPLASIA WITH URINARY FREQUENCY: Primary | ICD-10-CM

## 2024-06-27 DIAGNOSIS — N40.1 BENIGN PROSTATIC HYPERPLASIA WITH URINARY FREQUENCY: Primary | ICD-10-CM

## 2024-06-27 DIAGNOSIS — N40.1 BENIGN PROSTATIC HYPERPLASIA WITH URINARY RETENTION: ICD-10-CM

## 2024-06-27 DIAGNOSIS — Z12.5 SCREENING PSA (PROSTATE SPECIFIC ANTIGEN): ICD-10-CM

## 2024-06-27 LAB
BILIRUBIN, URINE, POC: NEGATIVE
BLOOD URINE, POC: NEGATIVE
GLUCOSE URINE, POC: NEGATIVE
KETONES, URINE, POC: NEGATIVE
LEUKOCYTE ESTERASE, URINE, POC: NEGATIVE
NITRITE, URINE, POC: NEGATIVE
PH, URINE, POC: 6 (ref 4.6–8)
PROTEIN,URINE, POC: NEGATIVE
PVR, POC: 121 CC
SPECIFIC GRAVITY, URINE, POC: 1.01 (ref 1–1.03)
URINALYSIS CLARITY, POC: NORMAL
URINALYSIS COLOR, POC: NORMAL
UROBILINOGEN, POC: NORMAL

## 2024-06-27 PROCEDURE — 81003 URINALYSIS AUTO W/O SCOPE: CPT | Performed by: UROLOGY

## 2024-06-27 PROCEDURE — 99213 OFFICE O/P EST LOW 20 MIN: CPT | Performed by: UROLOGY

## 2024-06-27 PROCEDURE — 1123F ACP DISCUSS/DSCN MKR DOCD: CPT | Performed by: UROLOGY

## 2024-06-27 PROCEDURE — 51798 US URINE CAPACITY MEASURE: CPT | Performed by: UROLOGY

## 2024-06-27 RX ORDER — DOXAZOSIN MESYLATE 4 MG/1
4 TABLET ORAL NIGHTLY
Qty: 90 TABLET | Refills: 3 | Status: SHIPPED | OUTPATIENT
Start: 2024-06-27

## 2024-06-27 RX ORDER — METFORMIN HYDROCHLORIDE 500 MG/1
1000 TABLET, EXTENDED RELEASE ORAL
Qty: 200 TABLET | Refills: 1 | Status: SHIPPED | OUTPATIENT
Start: 2024-06-27

## 2024-06-27 RX ORDER — FINASTERIDE 5 MG/1
5 TABLET, FILM COATED ORAL DAILY
Qty: 90 TABLET | Refills: 3 | Status: SHIPPED | OUTPATIENT
Start: 2024-06-27

## 2024-06-27 ASSESSMENT — ENCOUNTER SYMPTOMS: NAUSEA: 0

## 2024-06-27 NOTE — PROGRESS NOTES
[facility]  [address] [place] [id number]  [phone]    Luis Alfredo Mckenzie  : 1945    Chief Complaint   Patient presents with    Follow-up          HPI     Luis Alfredo Mckenzie is a 78 y.o.  male with a history of BPH/retention who is s/p urolift on 20.  Returns for follow up today.      Today, he is voiding well and has no complaints.  He is very pleased with the results.  He is back on finasteride and cardura for HTN and BPH.  He is doing well on this regimen and wants to continue.  Needs refills.     Denies urgency, frequency, urinary retention, urinary incontinence or other concerns.       PVR: 121 cc  IPSS: 5    No longer screening for CAP based on age.       Past Medical History:   Diagnosis Date    Cerebellar stroke (HCC) 2020    Essential hypertension     Mixed hyperlipidemia     Right thalamic infarction (HCC) 2019    Right thalamic infarction (HCC) 2019    Stroke (HCC) 2020    Type 2 diabetes mellitus (HCC)      Past Surgical History:   Procedure Laterality Date    CATARACT REMOVAL  January and 2021    HEENT      sinus surgery    MALIGNANT SKIN LESION EXCISION      UROLOGICAL SURGERY  2020    prostate surgery     Current Outpatient Medications   Medication Sig Dispense Refill    doxazosin (CARDURA) 4 MG tablet Take 1 tablet by mouth nightly 90 tablet 3    finasteride (PROSCAR) 5 MG tablet Take 1 tablet by mouth daily 90 tablet 3    atorvastatin (LIPITOR) 80 MG tablet Take 1 tablet by mouth every evening 90 tablet 1    losartan (COZAAR) 25 MG tablet Take 1 tablet by mouth daily 90 tablet 1    clopidogrel (PLAVIX) 75 MG tablet Take 1 tablet by mouth daily 90 tablet 1    benzonatate (TESSALON) 100 MG capsule One or two po three times a day for cough 50 capsule 0    Handicap Placard MISC by Does not apply route History of stroke--cannot walk longer than 100 feet due to the changes from the stroke:  needs two placards. 2

## 2024-07-02 ENCOUNTER — APPOINTMENT (RX ONLY)
Dept: URBAN - METROPOLITAN AREA CLINIC 24 | Facility: CLINIC | Age: 79
Setting detail: DERMATOLOGY
End: 2024-07-02

## 2024-07-02 DIAGNOSIS — L57.8 OTHER SKIN CHANGES DUE TO CHRONIC EXPOSURE TO NONIONIZING RADIATION: ICD-10-CM

## 2024-07-02 DIAGNOSIS — L72.8 OTHER FOLLICULAR CYSTS OF THE SKIN AND SUBCUTANEOUS TISSUE: ICD-10-CM | Status: RESOLVING

## 2024-07-02 DIAGNOSIS — Z71.89 OTHER SPECIFIED COUNSELING: ICD-10-CM

## 2024-07-02 DIAGNOSIS — L57.0 ACTINIC KERATOSIS: ICD-10-CM

## 2024-07-02 DIAGNOSIS — D485 NEOPLASM OF UNCERTAIN BEHAVIOR OF SKIN: ICD-10-CM

## 2024-07-02 DIAGNOSIS — L82.0 INFLAMED SEBORRHEIC KERATOSIS: ICD-10-CM

## 2024-07-02 PROBLEM — D48.5 NEOPLASM OF UNCERTAIN BEHAVIOR OF SKIN: Status: ACTIVE | Noted: 2024-07-02

## 2024-07-02 PROCEDURE — 17003 DESTRUCT PREMALG LES 2-14: CPT | Mod: 59,79

## 2024-07-02 PROCEDURE — 17110 DESTRUCTION B9 LES UP TO 14: CPT | Mod: 79

## 2024-07-02 PROCEDURE — 11103 TANGNTL BX SKIN EA SEP/ADDL: CPT | Mod: 79,59

## 2024-07-02 PROCEDURE — 99213 OFFICE O/P EST LOW 20 MIN: CPT | Mod: 25,24

## 2024-07-02 PROCEDURE — 11102 TANGNTL BX SKIN SINGLE LES: CPT | Mod: 59,79

## 2024-07-02 PROCEDURE — ? PRESCRIPTION MEDICATION MANAGEMENT

## 2024-07-02 PROCEDURE — 17000 DESTRUCT PREMALG LESION: CPT | Mod: 79,59

## 2024-07-02 PROCEDURE — ? COUNSELING

## 2024-07-02 PROCEDURE — ? BIOPSY BY SHAVE METHOD

## 2024-07-02 PROCEDURE — ? LIQUID NITROGEN

## 2024-07-02 ASSESSMENT — LOCATION DETAILED DESCRIPTION DERM
LOCATION DETAILED: RIGHT SUPERIOR LATERAL BUCCAL CHEEK
LOCATION DETAILED: RIGHT UPPER CUTANEOUS LIP
LOCATION DETAILED: RIGHT SUPERIOR LATERAL LOWER BACK
LOCATION DETAILED: RIGHT DORSAL INDEX METACARPOPHALANGEAL JOINT
LOCATION DETAILED: RIGHT LOWER CUTANEOUS LIP
LOCATION DETAILED: RIGHT MEDIAL TEMPLE
LOCATION DETAILED: LEFT ULNAR DORSAL HAND
LOCATION DETAILED: LEFT PROXIMAL DORSAL INDEX FINGER
LOCATION DETAILED: RIGHT INFERIOR MEDIAL UPPER BACK
LOCATION DETAILED: 1ST WEB SPACE RIGHT HAND
LOCATION DETAILED: LEFT RADIAL DORSAL HAND

## 2024-07-02 ASSESSMENT — LOCATION ZONE DERM
LOCATION ZONE: FACE
LOCATION ZONE: HAND
LOCATION ZONE: LIP
LOCATION ZONE: TRUNK
LOCATION ZONE: FINGER

## 2024-07-02 ASSESSMENT — LOCATION SIMPLE DESCRIPTION DERM
LOCATION SIMPLE: RIGHT TEMPLE
LOCATION SIMPLE: RIGHT LOWER BACK
LOCATION SIMPLE: RIGHT UPPER BACK
LOCATION SIMPLE: RIGHT LIP
LOCATION SIMPLE: LEFT HAND
LOCATION SIMPLE: RIGHT HAND
LOCATION SIMPLE: RIGHT CHEEK
LOCATION SIMPLE: RIGHT THUMB
LOCATION SIMPLE: LEFT INDEX FINGER

## 2024-07-02 NOTE — PROCEDURE: PRESCRIPTION MEDICATION MANAGEMENT
Detail Level: Zone
Continue Regimen: Doxycycline 100mg - patient had on hand from PCP
Render In Strict Bullet Format?: No

## 2024-07-02 NOTE — PROCEDURE: LIQUID NITROGEN
Medical Necessity Clause: This procedure was medically necessary because the lesions that were treated were:
Spray Paint Technique: No
Number Of Freeze-Thaw Cycles: 2 freeze-thaw cycles
Show Aperture Variable?: Yes
Consent: The patient's consent was obtained including but not limited to risks of crusting, scabbing, blistering, scarring, darker or lighter pigmentary change, recurrence, incomplete removal and infection.
Medical Necessity Information: It is in your best interest to select a reason for this procedure from the list below. All of these items fulfill various CMS LCD requirements except the new and changing color options.
Spray Paint Text: The liquid nitrogen was applied to the skin utilizing a spray paint frosting technique.
Detail Level: Detailed
Duration Of Freeze Thaw-Cycle (Seconds): 15-20
Post-Care Instructions: I reviewed with the patient in detail post-care instructions. Patient is to wear sunprotection, and avoid picking at any of the treated lesions. Pt may apply Vaseline to crusted or scabbing areas.
Duration Of Freeze Thaw-Cycle (Seconds): 15

## 2024-07-31 ENCOUNTER — OFFICE VISIT (OUTPATIENT)
Dept: FAMILY MEDICINE CLINIC | Facility: CLINIC | Age: 79
End: 2024-07-31
Payer: MEDICARE

## 2024-07-31 VITALS
SYSTOLIC BLOOD PRESSURE: 118 MMHG | HEIGHT: 69 IN | RESPIRATION RATE: 17 BRPM | BODY MASS INDEX: 41.44 KG/M2 | WEIGHT: 279.8 LBS | HEART RATE: 59 BPM | TEMPERATURE: 97.4 F | DIASTOLIC BLOOD PRESSURE: 70 MMHG | OXYGEN SATURATION: 96 %

## 2024-07-31 DIAGNOSIS — E11.65 TYPE 2 DIABETES MELLITUS WITH HYPERGLYCEMIA, WITHOUT LONG-TERM CURRENT USE OF INSULIN (HCC): Primary | ICD-10-CM

## 2024-07-31 DIAGNOSIS — I10 ESSENTIAL HYPERTENSION, BENIGN: ICD-10-CM

## 2024-07-31 DIAGNOSIS — E66.01 SEVERE OBESITY (HCC): ICD-10-CM

## 2024-07-31 DIAGNOSIS — J30.1 SEASONAL ALLERGIC RHINITIS DUE TO POLLEN: ICD-10-CM

## 2024-07-31 DIAGNOSIS — E87.6 HYPOKALEMIA: ICD-10-CM

## 2024-07-31 PROCEDURE — 1123F ACP DISCUSS/DSCN MKR DOCD: CPT | Performed by: FAMILY MEDICINE

## 2024-07-31 PROCEDURE — 99204 OFFICE O/P NEW MOD 45 MIN: CPT | Performed by: FAMILY MEDICINE

## 2024-07-31 PROCEDURE — 3052F HG A1C>EQUAL 8.0%<EQUAL 9.0%: CPT | Performed by: FAMILY MEDICINE

## 2024-07-31 PROCEDURE — 3074F SYST BP LT 130 MM HG: CPT | Performed by: FAMILY MEDICINE

## 2024-07-31 PROCEDURE — 3078F DIAST BP <80 MM HG: CPT | Performed by: FAMILY MEDICINE

## 2024-07-31 RX ORDER — MONTELUKAST SODIUM 10 MG/1
10 TABLET ORAL DAILY
Qty: 90 TABLET | Refills: 1 | Status: SHIPPED | OUTPATIENT
Start: 2024-07-31

## 2024-07-31 RX ORDER — POTASSIUM CHLORIDE 750 MG/1
10 TABLET, EXTENDED RELEASE ORAL DAILY
Qty: 90 TABLET | Refills: 1 | Status: SHIPPED | OUTPATIENT
Start: 2024-07-31

## 2024-07-31 ASSESSMENT — PATIENT HEALTH QUESTIONNAIRE - PHQ9
2. FEELING DOWN, DEPRESSED OR HOPELESS: NOT AT ALL
SUM OF ALL RESPONSES TO PHQ QUESTIONS 1-9: 0
SUM OF ALL RESPONSES TO PHQ9 QUESTIONS 1 & 2: 0
1. LITTLE INTEREST OR PLEASURE IN DOING THINGS: NOT AT ALL

## 2024-07-31 NOTE — PROGRESS NOTES
No focal deficits.   Psychiatric: AAO X3, Appropriate judgment and insight, normal mood and affect.            An electronic signature was used to authenticate this note.    --Fito Vivar, DO

## 2024-08-22 DIAGNOSIS — I10 ESSENTIAL HYPERTENSION, BENIGN: ICD-10-CM

## 2024-08-23 RX ORDER — HYDROCHLOROTHIAZIDE 25 MG/1
25 TABLET ORAL DAILY
Qty: 90 TABLET | Refills: 3 | OUTPATIENT
Start: 2024-08-23

## 2024-09-06 DIAGNOSIS — I10 ESSENTIAL HYPERTENSION, BENIGN: ICD-10-CM

## 2024-09-09 RX ORDER — HYDROCHLOROTHIAZIDE 25 MG/1
25 TABLET ORAL DAILY
Qty: 90 TABLET | Refills: 3 | OUTPATIENT
Start: 2024-09-09

## 2024-09-12 ENCOUNTER — OFFICE VISIT (OUTPATIENT)
Dept: FAMILY MEDICINE CLINIC | Facility: CLINIC | Age: 79
End: 2024-09-12
Payer: MEDICARE

## 2024-09-12 VITALS
HEART RATE: 63 BPM | BODY MASS INDEX: 41.32 KG/M2 | HEIGHT: 69 IN | OXYGEN SATURATION: 97 % | DIASTOLIC BLOOD PRESSURE: 82 MMHG | WEIGHT: 279 LBS | RESPIRATION RATE: 18 BRPM | TEMPERATURE: 97.1 F | SYSTOLIC BLOOD PRESSURE: 126 MMHG

## 2024-09-12 DIAGNOSIS — I10 ESSENTIAL HYPERTENSION, BENIGN: ICD-10-CM

## 2024-09-12 DIAGNOSIS — E11.65 TYPE 2 DIABETES MELLITUS WITH HYPERGLYCEMIA, WITHOUT LONG-TERM CURRENT USE OF INSULIN (HCC): ICD-10-CM

## 2024-09-12 LAB
EST. AVERAGE GLUCOSE BLD GHB EST-MCNC: 178 MG/DL
HBA1C MFR BLD: 7.8 % (ref 0–5.6)

## 2024-09-12 PROCEDURE — 3079F DIAST BP 80-89 MM HG: CPT | Performed by: NURSE PRACTITIONER

## 2024-09-12 PROCEDURE — 3074F SYST BP LT 130 MM HG: CPT | Performed by: NURSE PRACTITIONER

## 2024-09-12 PROCEDURE — 99214 OFFICE O/P EST MOD 30 MIN: CPT | Performed by: NURSE PRACTITIONER

## 2024-09-12 PROCEDURE — 3051F HG A1C>EQUAL 7.0%<8.0%: CPT | Performed by: NURSE PRACTITIONER

## 2024-09-12 PROCEDURE — 1123F ACP DISCUSS/DSCN MKR DOCD: CPT | Performed by: NURSE PRACTITIONER

## 2024-09-12 RX ORDER — GLIPIZIDE 10 MG/1
10 TABLET, FILM COATED, EXTENDED RELEASE ORAL DAILY
Qty: 90 TABLET | Refills: 2 | Status: SHIPPED | OUTPATIENT
Start: 2024-09-12 | End: 2025-06-09

## 2024-09-12 RX ORDER — METFORMIN HCL 500 MG
1000 TABLET, EXTENDED RELEASE 24 HR ORAL
Qty: 200 TABLET | Refills: 1 | Status: SHIPPED | OUTPATIENT
Start: 2024-09-12

## 2024-09-12 RX ORDER — HYDROCHLOROTHIAZIDE 25 MG/1
25 TABLET ORAL DAILY
Qty: 90 TABLET | Refills: 1 | Status: SHIPPED | OUTPATIENT
Start: 2024-09-12

## 2024-09-12 ASSESSMENT — ENCOUNTER SYMPTOMS
SHORTNESS OF BREATH: 0
DIARRHEA: 0
VOMITING: 0
VISUAL CHANGE: 0
ABDOMINAL PAIN: 0
CHEST TIGHTNESS: 0
COUGH: 0
EYE DISCHARGE: 0
RHINORRHEA: 0
BLOOD IN STOOL: 0
EYE PAIN: 0
WHEEZING: 0
CONSTIPATION: 0

## 2024-12-02 DIAGNOSIS — E78.2 MIXED HYPERLIPIDEMIA: ICD-10-CM

## 2024-12-03 RX ORDER — ATORVASTATIN CALCIUM 80 MG/1
80 TABLET, FILM COATED ORAL EVERY EVENING
Qty: 90 TABLET | Refills: 3 | OUTPATIENT
Start: 2024-12-03

## 2024-12-11 ENCOUNTER — OFFICE VISIT (OUTPATIENT)
Dept: FAMILY MEDICINE CLINIC | Facility: CLINIC | Age: 79
End: 2024-12-11

## 2024-12-11 VITALS
OXYGEN SATURATION: 96 % | RESPIRATION RATE: 16 BRPM | DIASTOLIC BLOOD PRESSURE: 68 MMHG | HEART RATE: 72 BPM | WEIGHT: 275 LBS | HEIGHT: 69 IN | BODY MASS INDEX: 40.73 KG/M2 | TEMPERATURE: 97 F | SYSTOLIC BLOOD PRESSURE: 130 MMHG

## 2024-12-11 DIAGNOSIS — E78.2 MIXED HYPERLIPIDEMIA: ICD-10-CM

## 2024-12-11 DIAGNOSIS — E87.6 HYPOKALEMIA: ICD-10-CM

## 2024-12-11 DIAGNOSIS — J30.1 SEASONAL ALLERGIC RHINITIS DUE TO POLLEN: ICD-10-CM

## 2024-12-11 DIAGNOSIS — E11.65 TYPE 2 DIABETES MELLITUS WITH HYPERGLYCEMIA, WITHOUT LONG-TERM CURRENT USE OF INSULIN (HCC): ICD-10-CM

## 2024-12-11 DIAGNOSIS — I69.30 HISTORY OF CVA WITH RESIDUAL DEFICIT: ICD-10-CM

## 2024-12-11 DIAGNOSIS — Z23 NEED FOR VACCINATION: Primary | ICD-10-CM

## 2024-12-11 DIAGNOSIS — I10 ESSENTIAL HYPERTENSION, BENIGN: ICD-10-CM

## 2024-12-11 LAB
ALBUMIN SERPL-MCNC: 3.5 G/DL (ref 3.2–4.6)
ALBUMIN/GLOB SERPL: 1 (ref 1–1.9)
ALP SERPL-CCNC: 84 U/L (ref 40–129)
ALT SERPL-CCNC: 8 U/L (ref 8–55)
ANION GAP SERPL CALC-SCNC: 12 MMOL/L (ref 7–16)
AST SERPL-CCNC: 15 U/L (ref 15–37)
BASOPHILS # BLD: 0.1 K/UL (ref 0–0.2)
BASOPHILS NFR BLD: 1 % (ref 0–2)
BILIRUB SERPL-MCNC: 0.3 MG/DL (ref 0–1.2)
BUN SERPL-MCNC: 20 MG/DL (ref 8–23)
CALCIUM SERPL-MCNC: 8.8 MG/DL (ref 8.8–10.2)
CHLORIDE SERPL-SCNC: 103 MMOL/L (ref 98–107)
CHOLEST SERPL-MCNC: 159 MG/DL (ref 0–200)
CO2 SERPL-SCNC: 28 MMOL/L (ref 20–29)
CREAT SERPL-MCNC: 1.05 MG/DL (ref 0.8–1.3)
DIFFERENTIAL METHOD BLD: ABNORMAL
EOSINOPHIL # BLD: 0.2 K/UL (ref 0–0.8)
EOSINOPHIL NFR BLD: 2 % (ref 0.5–7.8)
ERYTHROCYTE [DISTWIDTH] IN BLOOD BY AUTOMATED COUNT: 15.1 % (ref 11.9–14.6)
EST. AVERAGE GLUCOSE BLD GHB EST-MCNC: 181 MG/DL
GLOBULIN SER CALC-MCNC: 3.4 G/DL (ref 2.3–3.5)
GLUCOSE SERPL-MCNC: 160 MG/DL (ref 70–99)
HBA1C MFR BLD: 7.9 % (ref 0–5.6)
HCT VFR BLD AUTO: 43.4 % (ref 41.1–50.3)
HDLC SERPL-MCNC: 27 MG/DL (ref 40–60)
HDLC SERPL: 5.9 (ref 0–5)
HGB BLD-MCNC: 13.7 G/DL (ref 13.6–17.2)
IMM GRANULOCYTES # BLD AUTO: 0 K/UL (ref 0–0.5)
IMM GRANULOCYTES NFR BLD AUTO: 0 % (ref 0–5)
LDLC SERPL CALC-MCNC: 103 MG/DL (ref 0–100)
LYMPHOCYTES # BLD: 1.7 K/UL (ref 0.5–4.6)
LYMPHOCYTES NFR BLD: 19 % (ref 13–44)
MCH RBC QN AUTO: 29 PG (ref 26.1–32.9)
MCHC RBC AUTO-ENTMCNC: 31.6 G/DL (ref 31.4–35)
MCV RBC AUTO: 91.8 FL (ref 82–102)
MONOCYTES # BLD: 0.6 K/UL (ref 0.1–1.3)
MONOCYTES NFR BLD: 7 % (ref 4–12)
NEUTS SEG # BLD: 6.3 K/UL (ref 1.7–8.2)
NEUTS SEG NFR BLD: 71 % (ref 43–78)
NRBC # BLD: 0 K/UL (ref 0–0.2)
PLATELET # BLD AUTO: 260 K/UL (ref 150–450)
PMV BLD AUTO: 11.7 FL (ref 9.4–12.3)
POTASSIUM SERPL-SCNC: 3.6 MMOL/L (ref 3.5–5.1)
PROT SERPL-MCNC: 6.9 G/DL (ref 6.3–8.2)
RBC # BLD AUTO: 4.73 M/UL (ref 4.23–5.6)
SODIUM SERPL-SCNC: 143 MMOL/L (ref 136–145)
TRIGL SERPL-MCNC: 145 MG/DL (ref 0–150)
VLDLC SERPL CALC-MCNC: 29 MG/DL (ref 6–23)
WBC # BLD AUTO: 8.9 K/UL (ref 4.3–11.1)

## 2024-12-11 RX ORDER — ATORVASTATIN CALCIUM 80 MG/1
80 TABLET, FILM COATED ORAL EVERY EVENING
Qty: 90 TABLET | Refills: 3 | Status: SHIPPED | OUTPATIENT
Start: 2024-12-11

## 2024-12-11 RX ORDER — CLOPIDOGREL BISULFATE 75 MG/1
75 TABLET ORAL DAILY
Qty: 90 TABLET | Refills: 3 | Status: SHIPPED | OUTPATIENT
Start: 2024-12-11

## 2024-12-11 RX ORDER — MONTELUKAST SODIUM 10 MG/1
10 TABLET ORAL DAILY
Qty: 90 TABLET | Refills: 3 | Status: SHIPPED | OUTPATIENT
Start: 2024-12-11

## 2024-12-11 RX ORDER — POTASSIUM CHLORIDE 750 MG/1
10 TABLET, EXTENDED RELEASE ORAL DAILY
Qty: 90 TABLET | Refills: 3 | Status: SHIPPED | OUTPATIENT
Start: 2024-12-11 | End: 2025-12-06

## 2024-12-11 RX ORDER — METFORMIN HYDROCHLORIDE 500 MG/1
1000 TABLET, EXTENDED RELEASE ORAL
Qty: 180 TABLET | Refills: 3 | Status: SHIPPED | OUTPATIENT
Start: 2024-12-11 | End: 2025-12-06

## 2024-12-11 RX ORDER — HYDROCHLOROTHIAZIDE 25 MG/1
25 TABLET ORAL DAILY
Qty: 90 TABLET | Refills: 3 | Status: SHIPPED | OUTPATIENT
Start: 2024-12-11

## 2024-12-11 RX ORDER — LOSARTAN POTASSIUM 25 MG/1
25 TABLET ORAL DAILY
Qty: 90 TABLET | Refills: 3 | Status: SHIPPED | OUTPATIENT
Start: 2024-12-11

## 2024-12-11 RX ORDER — GLIPIZIDE 10 MG/1
10 TABLET, FILM COATED, EXTENDED RELEASE ORAL DAILY
Qty: 90 TABLET | Refills: 3 | Status: SHIPPED | OUTPATIENT
Start: 2024-12-11 | End: 2025-12-06

## 2024-12-11 SDOH — ECONOMIC STABILITY: FOOD INSECURITY: WITHIN THE PAST 12 MONTHS, THE FOOD YOU BOUGHT JUST DIDN'T LAST AND YOU DIDN'T HAVE MONEY TO GET MORE.: NEVER TRUE

## 2024-12-11 SDOH — ECONOMIC STABILITY: INCOME INSECURITY: HOW HARD IS IT FOR YOU TO PAY FOR THE VERY BASICS LIKE FOOD, HOUSING, MEDICAL CARE, AND HEATING?: NOT HARD AT ALL

## 2024-12-11 SDOH — ECONOMIC STABILITY: FOOD INSECURITY: WITHIN THE PAST 12 MONTHS, YOU WORRIED THAT YOUR FOOD WOULD RUN OUT BEFORE YOU GOT MONEY TO BUY MORE.: NEVER TRUE

## 2024-12-11 ASSESSMENT — PATIENT HEALTH QUESTIONNAIRE - PHQ9
SUM OF ALL RESPONSES TO PHQ QUESTIONS 1-9: 0
1. LITTLE INTEREST OR PLEASURE IN DOING THINGS: NOT AT ALL
SUM OF ALL RESPONSES TO PHQ QUESTIONS 1-9: 0
2. FEELING DOWN, DEPRESSED OR HOPELESS: NOT AT ALL
SUM OF ALL RESPONSES TO PHQ9 QUESTIONS 1 & 2: 0

## 2024-12-11 ASSESSMENT — ENCOUNTER SYMPTOMS
GASTROINTESTINAL NEGATIVE: 1
RESPIRATORY NEGATIVE: 1
SHORTNESS OF BREATH: 0
EYES NEGATIVE: 1

## 2024-12-11 NOTE — ASSESSMENT & PLAN NOTE
Chronic, at goal (stable), continue current treatment plan, medication adherence emphasized, and lifestyle modifications recommended    Orders:    losartan (COZAAR) 25 MG tablet; Take 1 tablet by mouth daily    hydroCHLOROthiazide (HYDRODIURIL) 25 MG tablet; Take 1 tablet by mouth daily

## 2024-12-11 NOTE — ASSESSMENT & PLAN NOTE
Chronic, at goal (stable), continue current treatment plan, medication adherence emphasized, and lifestyle modifications recommended    Orders:    atorvastatin (LIPITOR) 80 MG tablet; Take 1 tablet by mouth every evening    Lipid Panel; Future

## 2024-12-11 NOTE — ASSESSMENT & PLAN NOTE
Chronic, at goal (stable), continue current treatment plan    Orders:    clopidogrel (PLAVIX) 75 MG tablet; Take 1 tablet by mouth daily

## 2024-12-11 NOTE — PROGRESS NOTES
Luis Alfredo Mckenzie (:  1945) is a 79 y.o. male,Established patient, here for evaluation of the following chief complaint(s):  Follow-up         Assessment & Plan  Hypokalemia   Chronic, at goal (stable), continue current treatment plan    Orders:    potassium chloride (KLOR-CON M) 10 MEQ extended release tablet; Take 1 tablet by mouth daily    Seasonal allergic rhinitis due to pollen   Chronic, at goal (stable), continue current treatment plan    Orders:    montelukast (SINGULAIR) 10 MG tablet; Take 1 tablet by mouth daily    Type 2 diabetes mellitus with hyperglycemia, without long-term current use of insulin (HCC)   Chronic, not at goal (unstable), continue current treatment plan, medication adherence emphasized, and lifestyle modifications recommended    Orders:    metFORMIN (GLUCOPHAGE-XR) 500 MG extended release tablet; Take 2 tablets by mouth daily (with breakfast)    glipiZIDE (GLUCOTROL XL) 10 MG extended release tablet; Take 1 tablet by mouth daily    Microalbumin / creatinine urine ratio; Future    CBC with Auto Differential; Future    Comprehensive Metabolic Panel; Future    Hemoglobin A1C; Future    Essential hypertension, benign   Chronic, at goal (stable), continue current treatment plan, medication adherence emphasized, and lifestyle modifications recommended    Orders:    losartan (COZAAR) 25 MG tablet; Take 1 tablet by mouth daily    hydroCHLOROthiazide (HYDRODIURIL) 25 MG tablet; Take 1 tablet by mouth daily    History of CVA with residual deficit   Chronic, at goal (stable), continue current treatment plan    Orders:    clopidogrel (PLAVIX) 75 MG tablet; Take 1 tablet by mouth daily    Mixed hyperlipidemia   Chronic, at goal (stable), continue current treatment plan, medication adherence emphasized, and lifestyle modifications recommended    Orders:    atorvastatin (LIPITOR) 80 MG tablet; Take 1 tablet by mouth every evening    Lipid Panel; Future    Need for vaccination       Orders:

## 2024-12-11 NOTE — ASSESSMENT & PLAN NOTE
Chronic, not at goal (unstable), continue current treatment plan, medication adherence emphasized, and lifestyle modifications recommended    Orders:    metFORMIN (GLUCOPHAGE-XR) 500 MG extended release tablet; Take 2 tablets by mouth daily (with breakfast)    glipiZIDE (GLUCOTROL XL) 10 MG extended release tablet; Take 1 tablet by mouth daily    Microalbumin / creatinine urine ratio; Future    CBC with Auto Differential; Future    Comprehensive Metabolic Panel; Future    Hemoglobin A1C; Future

## 2024-12-12 DIAGNOSIS — E78.2 MIXED HYPERLIPIDEMIA: Primary | ICD-10-CM

## 2024-12-12 RX ORDER — EZETIMIBE 10 MG/1
10 TABLET ORAL DAILY
Qty: 90 TABLET | Refills: 1 | Status: SHIPPED | OUTPATIENT
Start: 2024-12-12

## 2025-03-28 ENCOUNTER — TELEPHONE (OUTPATIENT)
Dept: FAMILY MEDICINE CLINIC | Facility: CLINIC | Age: 80
End: 2025-03-28

## 2025-03-28 DIAGNOSIS — E87.6 HYPOKALEMIA: ICD-10-CM

## 2025-03-28 DIAGNOSIS — I10 ESSENTIAL HYPERTENSION, BENIGN: ICD-10-CM

## 2025-03-28 RX ORDER — LOSARTAN POTASSIUM 25 MG/1
25 TABLET ORAL DAILY
Qty: 90 TABLET | Refills: 3 | Status: SHIPPED | OUTPATIENT
Start: 2025-03-28

## 2025-03-28 RX ORDER — POTASSIUM CHLORIDE 750 MG/1
10 TABLET, EXTENDED RELEASE ORAL DAILY
Qty: 90 TABLET | Refills: 3 | Status: SHIPPED | OUTPATIENT
Start: 2025-03-28 | End: 2026-03-23

## 2025-03-28 NOTE — TELEPHONE ENCOUNTER
Mr. Mckenzie is out of two medications and needs refills. He needs the pharmacy changed so he can pick them up in person.     potassium chloride (KLOR-CON M) 10 MEQ extended release tablet     losartan (COZAAR) 25 MG tablet     [address] [address]

## 2025-05-04 DIAGNOSIS — N40.1 BENIGN PROSTATIC HYPERPLASIA WITH URINARY RETENTION: ICD-10-CM

## 2025-05-04 DIAGNOSIS — R33.8 BENIGN PROSTATIC HYPERPLASIA WITH URINARY RETENTION: ICD-10-CM

## 2025-05-05 RX ORDER — FINASTERIDE 5 MG/1
5 TABLET, FILM COATED ORAL DAILY
Qty: 90 TABLET | Refills: 3 | Status: SHIPPED | OUTPATIENT
Start: 2025-05-05

## 2025-06-27 ENCOUNTER — OFFICE VISIT (OUTPATIENT)
Dept: UROLOGY | Age: 80
End: 2025-06-27

## 2025-06-27 DIAGNOSIS — R33.8 BENIGN PROSTATIC HYPERPLASIA WITH URINARY RETENTION: Primary | ICD-10-CM

## 2025-06-27 DIAGNOSIS — N40.1 BENIGN PROSTATIC HYPERPLASIA WITH URINARY RETENTION: Primary | ICD-10-CM

## 2025-06-27 LAB
BILIRUBIN, URINE, POC: NEGATIVE
BLOOD URINE, POC: NORMAL
GLUCOSE URINE, POC: 100 MG/DL
KETONES, URINE, POC: NEGATIVE MG/DL
LEUKOCYTE ESTERASE, URINE, POC: NEGATIVE
NITRITE, URINE, POC: NEGATIVE
PH, URINE, POC: 5.5 (ref 4.6–8)
PROTEIN,URINE, POC: NORMAL MG/DL
PVR, POC: 3 CC
SPECIFIC GRAVITY, URINE, POC: 1.02 (ref 1–1.03)
URINALYSIS CLARITY, POC: NORMAL
URINALYSIS COLOR, POC: NORMAL
UROBILINOGEN, POC: NORMAL MG/DL

## 2025-06-28 ASSESSMENT — ENCOUNTER SYMPTOMS
CONSTIPATION: 0
VOMITING: 0
NAUSEA: 0
BLOOD IN STOOL: 0
HEARTBURN: 0
DIARRHEA: 0
EYE DISCHARGE: 0
WHEEZING: 0
INDIGESTION: 0
SHORTNESS OF BREATH: 0
BACK PAIN: 0
SKIN LESIONS: 0
ABDOMINAL PAIN: 0
EYE PAIN: 0
COUGH: 0

## 2025-06-28 NOTE — PROGRESS NOTES
slower stream, urgency, frequent urination and incomplete emptying. Negative for urinary burning, hematuria, flank pain, recurrent UTIs, history of urolithiasis, nocturia, straining, leakage w/ urge, erectile dysfunction, testicular pain, sexually transmitted disease, discharge and urethral stricture.  Musculoskeletal:  Negative for back pain, bone pain, arthralgias, tenderness, muscle weakness and neck pain.  Neurological:  Negative for dizziness, focal weakness, numbness, seizures and tremors.  Psychological:  Negative for depression and psychiatric problem.  Endocrine:  Negative for cold intolerance, thirst, excessive urination, fatigue and heat intolerance.  Hem/Lymphatic:  Negative for easy bleeding, easy bruising and frequent infections.      Urinalysis  UA - Dipstick  Results for orders placed or performed in visit on 06/27/25   AMB POC URINALYSIS DIP STICK AUTO W/O MICRO    Collection Time: 06/27/25 11:36 AM   Result Value Ref Range    Color (UA POC)      Clarity (UA POC)      Glucose, Urine,  Negative mg/dL    Bilirubin, Urine, POC Negative Negative    KETONES, Urine, POC Negative Negative mg/dL    Specific Gravity, Urine, POC 1.025 1.001 - 1.035    Blood (UA POC) Trace-intact     pH, Urine, POC 5.5 4.6 - 8.0    Protein, Urine, POC Trace Negative mg/dL    Urobilinogen, POC 0.2 mg/dL <1.1 mg/dL    Nitrite, Urine, POC Negative Negative    Leukocyte Esterase, Urine, POC Negative Negative       UA - Micro  WBC - 0  RBC - 0  Bacteria - 0  Epith - 0    Physical Exam  There were no vitals taken for this visit.     GENERAL: No acute distress, Awake, Alert, Oriented X 3, Gait normal  CARDIAC: regular rate and rhythm  CHEST AND LUNG: Easy work of breathing, clear to auscultation bilaterally, no cyanosis  ABDOMEN: soft, non tender, non-distended, positive bowel sounds, no organomegaly, no palpable masses, no guarding, no rebound tenderness  SKIN: No rash, no erythema, no lacerations or abrasions, no

## 2025-07-01 ENCOUNTER — OFFICE VISIT (OUTPATIENT)
Dept: FAMILY MEDICINE CLINIC | Facility: CLINIC | Age: 80
End: 2025-07-01

## 2025-07-01 VITALS
HEART RATE: 78 BPM | DIASTOLIC BLOOD PRESSURE: 89 MMHG | HEIGHT: 69 IN | WEIGHT: 277 LBS | TEMPERATURE: 97.1 F | OXYGEN SATURATION: 96 % | RESPIRATION RATE: 18 BRPM | SYSTOLIC BLOOD PRESSURE: 139 MMHG | BODY MASS INDEX: 41.03 KG/M2

## 2025-07-01 DIAGNOSIS — I10 ESSENTIAL HYPERTENSION, BENIGN: ICD-10-CM

## 2025-07-01 DIAGNOSIS — E11.65 TYPE 2 DIABETES MELLITUS WITH HYPERGLYCEMIA, WITHOUT LONG-TERM CURRENT USE OF INSULIN (HCC): ICD-10-CM

## 2025-07-01 DIAGNOSIS — Z00.00 MEDICARE ANNUAL WELLNESS VISIT, SUBSEQUENT: Primary | ICD-10-CM

## 2025-07-01 DIAGNOSIS — I69.30 HISTORY OF CVA WITH RESIDUAL DEFICIT: ICD-10-CM

## 2025-07-01 DIAGNOSIS — L02.212 BACK ABSCESS: ICD-10-CM

## 2025-07-01 DIAGNOSIS — E87.6 HYPOKALEMIA: ICD-10-CM

## 2025-07-01 DIAGNOSIS — E78.2 MIXED HYPERLIPIDEMIA: ICD-10-CM

## 2025-07-01 LAB
ALBUMIN SERPL-MCNC: 3.5 G/DL (ref 3.2–4.6)
ALBUMIN/GLOB SERPL: 1.1 (ref 1–1.9)
ALP SERPL-CCNC: 107 U/L (ref 40–129)
ALT SERPL-CCNC: 16 U/L (ref 8–55)
ANION GAP SERPL CALC-SCNC: 14 MMOL/L (ref 7–16)
AST SERPL-CCNC: 15 U/L (ref 15–37)
BASOPHILS # BLD: 0.05 K/UL (ref 0–0.2)
BASOPHILS NFR BLD: 0.7 % (ref 0–2)
BILIRUB SERPL-MCNC: 0.3 MG/DL (ref 0–1.2)
BUN SERPL-MCNC: 25 MG/DL (ref 8–23)
CALCIUM SERPL-MCNC: 9.7 MG/DL (ref 8.8–10.2)
CHLORIDE SERPL-SCNC: 100 MMOL/L (ref 98–107)
CHOLEST SERPL-MCNC: 149 MG/DL (ref 0–200)
CO2 SERPL-SCNC: 27 MMOL/L (ref 20–29)
CREAT SERPL-MCNC: 1.2 MG/DL (ref 0.8–1.3)
CREAT UR-MCNC: 116 MG/DL (ref 39–259)
DIFFERENTIAL METHOD BLD: NORMAL
EOSINOPHIL # BLD: 0.18 K/UL (ref 0–0.8)
EOSINOPHIL NFR BLD: 2.4 % (ref 0.5–7.8)
ERYTHROCYTE [DISTWIDTH] IN BLOOD BY AUTOMATED COUNT: 14.3 % (ref 11.9–14.6)
EST. AVERAGE GLUCOSE BLD GHB EST-MCNC: 226 MG/DL
GLOBULIN SER CALC-MCNC: 3.1 G/DL (ref 2.3–3.5)
GLUCOSE SERPL-MCNC: 206 MG/DL (ref 70–99)
HBA1C MFR BLD: 9.5 % (ref 0–5.6)
HCT VFR BLD AUTO: 45.3 % (ref 41.1–50.3)
HDLC SERPL-MCNC: 32 MG/DL (ref 40–60)
HDLC SERPL: 4.6 (ref 0–5)
HGB BLD-MCNC: 14.3 G/DL (ref 13.6–17.2)
IMM GRANULOCYTES # BLD AUTO: 0.03 K/UL (ref 0–0.5)
IMM GRANULOCYTES NFR BLD AUTO: 0.4 % (ref 0–5)
LDLC SERPL CALC-MCNC: 93 MG/DL (ref 0–100)
LYMPHOCYTES # BLD: 2.06 K/UL (ref 0.5–4.6)
LYMPHOCYTES NFR BLD: 27.9 % (ref 13–44)
MCH RBC QN AUTO: 29.6 PG (ref 26.1–32.9)
MCHC RBC AUTO-ENTMCNC: 31.6 G/DL (ref 31.4–35)
MCV RBC AUTO: 93.8 FL (ref 82–102)
MICROALBUMIN UR-MCNC: <1.2 MG/DL (ref 0–20)
MICROALBUMIN/CREAT UR-RTO: NORMAL MG/G (ref 0–30)
MONOCYTES # BLD: 0.65 K/UL (ref 0.1–1.3)
MONOCYTES NFR BLD: 8.8 % (ref 4–12)
NEUTS SEG # BLD: 4.41 K/UL (ref 1.7–8.2)
NEUTS SEG NFR BLD: 59.8 % (ref 43–78)
NRBC # BLD: 0 K/UL (ref 0–0.2)
PLATELET # BLD AUTO: 281 K/UL (ref 150–450)
PMV BLD AUTO: 11.8 FL (ref 9.4–12.3)
POTASSIUM SERPL-SCNC: 3.8 MMOL/L (ref 3.5–5.1)
PROT SERPL-MCNC: 6.7 G/DL (ref 6.3–8.2)
RBC # BLD AUTO: 4.83 M/UL (ref 4.23–5.6)
SODIUM SERPL-SCNC: 141 MMOL/L (ref 136–145)
TRIGL SERPL-MCNC: 118 MG/DL (ref 0–150)
TSH, 3RD GENERATION: 1.62 UIU/ML (ref 0.27–4.2)
VLDLC SERPL CALC-MCNC: 24 MG/DL (ref 6–23)
WBC # BLD AUTO: 7.4 K/UL (ref 4.3–11.1)

## 2025-07-01 RX ORDER — ATORVASTATIN CALCIUM 80 MG/1
80 TABLET, FILM COATED ORAL EVERY EVENING
Qty: 90 TABLET | Refills: 3 | Status: SHIPPED | OUTPATIENT
Start: 2025-07-01

## 2025-07-01 RX ORDER — DOXYCYCLINE 100 MG/1
CAPSULE ORAL
COMMUNITY
Start: 2025-06-25

## 2025-07-01 RX ORDER — LOSARTAN POTASSIUM 25 MG/1
25 TABLET ORAL DAILY
Qty: 90 TABLET | Refills: 3 | Status: SHIPPED | OUTPATIENT
Start: 2025-07-01

## 2025-07-01 RX ORDER — HYDROCHLOROTHIAZIDE 25 MG/1
25 TABLET ORAL DAILY
Qty: 90 TABLET | Refills: 3 | Status: SHIPPED | OUTPATIENT
Start: 2025-07-01

## 2025-07-01 RX ORDER — GLIPIZIDE 10 MG/1
10 TABLET, FILM COATED, EXTENDED RELEASE ORAL DAILY
Qty: 90 TABLET | Refills: 3 | Status: SHIPPED | OUTPATIENT
Start: 2025-07-01 | End: 2026-06-26

## 2025-07-01 RX ORDER — METFORMIN HYDROCHLORIDE 500 MG/1
1000 TABLET, EXTENDED RELEASE ORAL
Qty: 180 TABLET | Refills: 3 | Status: SHIPPED | OUTPATIENT
Start: 2025-07-01 | End: 2026-06-26

## 2025-07-01 RX ORDER — POTASSIUM CHLORIDE 750 MG/1
10 TABLET, EXTENDED RELEASE ORAL DAILY
Qty: 90 TABLET | Refills: 3 | Status: SHIPPED | OUTPATIENT
Start: 2025-07-01 | End: 2026-06-26

## 2025-07-01 RX ORDER — CLOPIDOGREL BISULFATE 75 MG/1
75 TABLET ORAL DAILY
Qty: 90 TABLET | Refills: 3 | Status: SHIPPED | OUTPATIENT
Start: 2025-07-01

## 2025-07-01 SDOH — ECONOMIC STABILITY: FOOD INSECURITY: WITHIN THE PAST 12 MONTHS, THE FOOD YOU BOUGHT JUST DIDN'T LAST AND YOU DIDN'T HAVE MONEY TO GET MORE.: NEVER TRUE

## 2025-07-01 SDOH — ECONOMIC STABILITY: FOOD INSECURITY: WITHIN THE PAST 12 MONTHS, YOU WORRIED THAT YOUR FOOD WOULD RUN OUT BEFORE YOU GOT MONEY TO BUY MORE.: NEVER TRUE

## 2025-07-01 ASSESSMENT — LIFESTYLE VARIABLES
HOW MANY STANDARD DRINKS CONTAINING ALCOHOL DO YOU HAVE ON A TYPICAL DAY: 1 OR 2
HOW OFTEN DO YOU HAVE A DRINK CONTAINING ALCOHOL: MONTHLY OR LESS

## 2025-07-01 ASSESSMENT — PATIENT HEALTH QUESTIONNAIRE - PHQ9
SUM OF ALL RESPONSES TO PHQ QUESTIONS 1-9: 0
2. FEELING DOWN, DEPRESSED OR HOPELESS: NOT AT ALL
1. LITTLE INTEREST OR PLEASURE IN DOING THINGS: NOT AT ALL

## 2025-07-01 ASSESSMENT — ENCOUNTER SYMPTOMS: RESPIRATORY NEGATIVE: 1

## 2025-07-01 NOTE — ASSESSMENT & PLAN NOTE
Chronic, not at goal (unstable), medication adherence emphasized and lifestyle modifications recommended    Orders:    metFORMIN (GLUCOPHAGE-XR) 500 MG extended release tablet; Take 2 tablets by mouth daily (with breakfast)    glipiZIDE (GLUCOTROL XL) 10 MG extended release tablet; Take 1 tablet by mouth daily    CBC with Auto Differential; Future    Comprehensive Metabolic Panel; Future    Hemoglobin A1C; Future    Lipid Panel; Future    Albumin/Creatinine Ratio, Urine; Future

## 2025-07-01 NOTE — ASSESSMENT & PLAN NOTE
Orders:    losartan (COZAAR) 25 MG tablet; Take 1 tablet by mouth daily    hydroCHLOROthiazide (HYDRODIURIL) 25 MG tablet; Take 1 tablet by mouth daily    TSH; Future

## 2025-07-01 NOTE — PROGRESS NOTES
complete Health Risk Assessment and screening values have been reviewed and are found in Flowsheets. The following problems were reviewed today and where indicated follow up appointments were made and/or referrals ordered.    Positive Risk Factor Screenings with Interventions:    Fall Risk:  Do you feel unsteady or are you worried about falling? : (!) yes  2 or more falls in past year?: no  Fall with injury in past year?: no     Interventions:    Reviewed medications, home hazards, visual acuity, and co-morbidities that can increase risk for falls  See AVS for additional education material               Abnormal BMI (obese):  Body mass index is 40.91 kg/m². (!) Abnormal  Interventions:  low carbohydrate diet    Obesity Counseling: Patient was asked about his current diet and exercise habits, and personalized advice was provided regarding recommended lifestyle changes. Patient's comorbid health conditions associated with elevated BMI were discussed, as well as the likely benefits of weight loss. Based upon patient's motivation to change his behavior, the following plan was agreed upon to work toward a weight loss goal of 10 pounds: lower carbohydrate diet and increase physical activity, as tolerated. Educational materials for weight loss were provided.  Patient will follow-up in 6 month(s) with PCP. Time spent counseling patient: 8 minutes         Advanced Directives:  Do you have a Living Will?: (!) No    Intervention:  see ACP note                     Objective   Vitals:    07/01/25 1114 07/01/25 1219   BP: (!) 160/72 139/89   Pulse: 78    Resp: 18    Temp: 97.1 °F (36.2 °C)    TempSrc: Temporal    SpO2: 96%    Weight: 125.6 kg (277 lb)    Height: 1.753 m (5' 9\")       Body mass index is 40.91 kg/m².                    Allergies   Allergen Reactions    Lisinopril Cough     Prior to Visit Medications    Medication Sig Taking? Authorizing Provider   doxycycline hyclate (VIBRAMYCIN) 100 MG capsule TAKE 1 CAPSULE BY

## 2025-07-02 ENCOUNTER — RESULTS FOLLOW-UP (OUTPATIENT)
Dept: FAMILY MEDICINE CLINIC | Facility: CLINIC | Age: 80
End: 2025-07-02

## 2025-07-02 DIAGNOSIS — N40.1 BENIGN PROSTATIC HYPERPLASIA WITH URINARY RETENTION: ICD-10-CM

## 2025-07-02 DIAGNOSIS — R33.8 BENIGN PROSTATIC HYPERPLASIA WITH URINARY RETENTION: ICD-10-CM

## 2025-07-02 NOTE — PROGRESS NOTES
aDate: 2025      Name: Luis Alfredo Mckenzie      MRN: [id number]       : 1945       Age: 79 y.o.    Sex: male        Leora Navas APRN - CNP       CC:  No chief complaint on file.      HPI:     Luis Alfredo Mckenzie is a 79 y.o. male who presents for evaluation of a back soft tissue mass as a referral from Leora Navas NP.    7/3/25: The patient has a mid-thoracic back soft tissue mass that gets infected every 3-4 years. It was lanced at an urgent care center 8 days ago. He has been on Doxycycline for the past 8 days. The drainage has nearly stopped, but there is a \"terrible odor\" coming from the wound. No fever or chills.     PMH:    Past Medical History:   Diagnosis Date    Cerebellar stroke (HCC) 2020    Essential hypertension     Mixed hyperlipidemia     Right thalamic infarction (HCC) 2019    Right thalamic infarction (HCC) 2019    Stroke (HCC) 2020    Type 2 diabetes mellitus (HCC)        PSH:    Past Surgical History:   Procedure Laterality Date    CATARACT REMOVAL  January and 2021    HEENT      sinus surgery    MALIGNANT SKIN LESION EXCISION      UROLOGICAL SURGERY  2020    prostate surgery       MEDS:    Prior to Visit Medications    Medication Sig Taking? Authorizing Provider   doxycycline hyclate (VIBRAMYCIN) 100 MG capsule TAKE 1 CAPSULE BY MOUTH TWICE DAILY FOR 10 DAYS  Provider, MD Anton   potassium chloride (KLOR-CON M) 10 MEQ extended release tablet Take 1 tablet by mouth daily  Leora Navas APRN - CNP   metFORMIN (GLUCOPHAGE-XR) 500 MG extended release tablet Take 2 tablets by mouth daily (with breakfast)  Leora Navas APRN - CNP   losartan (COZAAR) 25 MG tablet Take 1 tablet by mouth daily  Leora Navas APRN - CNP   hydroCHLOROthiazide (HYDRODIURIL) 25 MG tablet Take 1 tablet by mouth daily  Leora Navas APRN - CNP   glipiZIDE (GLUCOTROL XL) 10 MG extended release tablet Take 1 tablet by mouth daily  Leora Navas

## 2025-07-03 ENCOUNTER — PREP FOR PROCEDURE (OUTPATIENT)
Dept: SURGERY | Age: 80
End: 2025-07-03

## 2025-07-03 ENCOUNTER — OFFICE VISIT (OUTPATIENT)
Dept: SURGERY | Age: 80
End: 2025-07-03
Payer: MEDICARE

## 2025-07-03 VITALS
BODY MASS INDEX: 40.61 KG/M2 | HEIGHT: 69 IN | WEIGHT: 274.2 LBS | DIASTOLIC BLOOD PRESSURE: 61 MMHG | SYSTOLIC BLOOD PRESSURE: 155 MMHG | HEART RATE: 81 BPM

## 2025-07-03 DIAGNOSIS — E66.01 MORBID OBESITY (HCC): ICD-10-CM

## 2025-07-03 DIAGNOSIS — Z79.01 CHRONIC ANTICOAGULATION: ICD-10-CM

## 2025-07-03 DIAGNOSIS — M79.89 SOFT TISSUE MASS: Primary | ICD-10-CM

## 2025-07-03 DIAGNOSIS — M79.89 SOFT TISSUE MASS: ICD-10-CM

## 2025-07-03 PROCEDURE — 1159F MED LIST DOCD IN RCRD: CPT | Performed by: SURGERY

## 2025-07-03 PROCEDURE — 1123F ACP DISCUSS/DSCN MKR DOCD: CPT | Performed by: SURGERY

## 2025-07-03 PROCEDURE — 99203 OFFICE O/P NEW LOW 30 MIN: CPT | Performed by: SURGERY

## 2025-07-03 PROCEDURE — 3077F SYST BP >= 140 MM HG: CPT | Performed by: SURGERY

## 2025-07-03 PROCEDURE — 3078F DIAST BP <80 MM HG: CPT | Performed by: SURGERY

## 2025-07-05 RX ORDER — DOXAZOSIN 4 MG/1
4 TABLET ORAL NIGHTLY
Qty: 90 TABLET | Refills: 3 | Status: SHIPPED | OUTPATIENT
Start: 2025-07-05

## 2025-07-06 NOTE — H&P
aDate: 2025      Name: Luis Alfredo Mckenzie      MRN: [id number]       : 1945       Age: 79 y.o.    Sex: male        Leora Navas APRN - CNP       CC:  No chief complaint on file.      HPI:     Luis Alfredo Mckenzie is a 79 y.o. male who presents for evaluation of a back soft tissue mass as a referral from Leora Navas NP.    7/3/25: The patient has a mid-thoracic back soft tissue mass that gets infected every 3-4 years. It was lanced at an urgent care center 8 days ago. He has been on Doxycycline for the past 8 days. The drainage has nearly stopped, but there is a \"terrible odor\" coming from the wound. No fever or chills.     PMH:    Past Medical History:   Diagnosis Date    Cerebellar stroke (HCC) 2020    Essential hypertension     Mixed hyperlipidemia     Right thalamic infarction (HCC) 2019    Right thalamic infarction (HCC) 2019    Stroke (HCC) 2020    Type 2 diabetes mellitus (HCC)        PSH:    Past Surgical History:   Procedure Laterality Date    CATARACT REMOVAL  January and 2021    HEENT      sinus surgery    MALIGNANT SKIN LESION EXCISION      UROLOGICAL SURGERY  2020    prostate surgery       MEDS:    Prior to Visit Medications    Medication Sig Taking? Authorizing Provider   doxazosin (CARDURA) 4 MG tablet TAKE 1 TABLET BY MOUTH EVERY  NIGHT  Serge Smith MD   doxycycline hyclate (VIBRAMYCIN) 100 MG capsule TAKE 1 CAPSULE BY MOUTH TWICE DAILY FOR 10 DAYS  ProviderAnton MD   potassium chloride (KLOR-CON M) 10 MEQ extended release tablet Take 1 tablet by mouth daily  Leora Navas APRN - CNP   metFORMIN (GLUCOPHAGE-XR) 500 MG extended release tablet Take 2 tablets by mouth daily (with breakfast)  Leora Navas APRN - CNP   losartan (COZAAR) 25 MG tablet Take 1 tablet by mouth daily  Leora Navas APRN - CNP   hydroCHLOROthiazide (HYDRODIURIL) 25 MG tablet Take 1 tablet by mouth daily  Leora Navas APRN - CNP   glipiZIDE

## 2025-07-08 NOTE — PERIOP NOTE
Patient verified name and .  Order for consent  was found in EHR and does not match case posting; patient verifies procedure.     Consent order: \"excision of a mid-thoracic back soft tissue mass.\"    Case posting: \"EXCISIONOF RIGHT MID-THORACIC BACK SOFT TISSUE MASS - Right\"    Laterality is missing in consent order. CM sent to scheduling.     Type 1B surgery, Phone assessment complete.  Orders received.  Labs per surgeon: None  Labs per anesthesia protocol: K+ 3.8 on 25    Dr. Conteh's assistant, Tiffanie Conteh, made aware A1c 9.5 on 25 along with laterality missing from consent order via case message.     Patient answered medical/surgical history questions at their best of ability. All prior to admission medications documented in EPIC.    Patient instructed to continue taking all prescription medications up to the day of surgery but to take only the following medications the day of surgery according to anesthesia guidelines with a small sip of water: Metformin. Also, patient is requested to take 2 Tylenol in the morning and then again before bed on the day before surgery. Regular or extra strength may be used.       Patient informed that all vitamins and supplements should be held 7 days prior to surgery and NSAIDS 5 days prior to surgery. Prescription meds to hold: Follow surgeon's instructions regarding Plavix. Patient reports his last dose was on 25.     Patient instructed on the following:    > [place] [facility], time of arrival to be called the day before by 1700  > No food after midnight, patient may drink clear liquids up until 2 hours prior to arrival. No gum, candy, mints.   > Responsible adult must drive patient to the hospital, stay during surgery, and patient will need supervision 24 hours after anesthesia  > Use non moisturizing soap in shower the night before surgery and on the morning of surgery  > All piercings must be removed prior to arrival.    > Leave all valuables

## 2025-07-11 ENCOUNTER — HOSPITAL ENCOUNTER (OUTPATIENT)
Age: 80
Setting detail: OUTPATIENT SURGERY
Discharge: HOME | End: 2025-07-11
Attending: SURGERY | Admitting: SURGERY
Payer: MEDICARE

## 2025-07-11 ENCOUNTER — ANESTHESIA EVENT (OUTPATIENT)
Dept: SURGERY | Age: 80
End: 2025-07-11
Payer: MEDICARE

## 2025-07-11 ENCOUNTER — ANESTHESIA (OUTPATIENT)
Dept: SURGERY | Age: 80
End: 2025-07-11
Payer: MEDICARE

## 2025-07-11 VITALS
HEART RATE: 85 BPM | HEIGHT: 69 IN | TEMPERATURE: 98.1 F | BODY MASS INDEX: 41.52 KG/M2 | DIASTOLIC BLOOD PRESSURE: 80 MMHG | OXYGEN SATURATION: 92 % | SYSTOLIC BLOOD PRESSURE: 143 MMHG | WEIGHT: 280.3 LBS | RESPIRATION RATE: 20 BRPM

## 2025-07-11 DIAGNOSIS — M79.89 SOFT TISSUE MASS: Primary | ICD-10-CM

## 2025-07-11 DIAGNOSIS — I69.30 HISTORY OF CVA WITH RESIDUAL DEFICIT: ICD-10-CM

## 2025-07-11 LAB
GLUCOSE BLD STRIP.AUTO-MCNC: 190 MG/DL (ref 65–100)
POTASSIUM BLD-SCNC: 3.5 MMOL/L (ref 3.5–5.1)
SERVICE CMNT-IMP: ABNORMAL

## 2025-07-11 PROCEDURE — 3600000002 HC SURGERY LEVEL 2 BASE: Performed by: SURGERY

## 2025-07-11 PROCEDURE — 3700000001 HC ADD 15 MINUTES (ANESTHESIA): Performed by: SURGERY

## 2025-07-11 PROCEDURE — 84132 ASSAY OF SERUM POTASSIUM: CPT

## 2025-07-11 PROCEDURE — 2500000003 HC RX 250 WO HCPCS: Performed by: NURSE ANESTHETIST, CERTIFIED REGISTERED

## 2025-07-11 PROCEDURE — 11406 EXC TR-EXT B9+MARG >4.0 CM: CPT | Performed by: SURGERY

## 2025-07-11 PROCEDURE — 6360000002 HC RX W HCPCS: Performed by: NURSE ANESTHETIST, CERTIFIED REGISTERED

## 2025-07-11 PROCEDURE — 7100000001 HC PACU RECOVERY - ADDTL 15 MIN: Performed by: SURGERY

## 2025-07-11 PROCEDURE — 3600000012 HC SURGERY LEVEL 2 ADDTL 15MIN: Performed by: SURGERY

## 2025-07-11 PROCEDURE — 6370000000 HC RX 637 (ALT 250 FOR IP): Performed by: ANESTHESIOLOGY

## 2025-07-11 PROCEDURE — 2580000003 HC RX 258: Performed by: ANESTHESIOLOGY

## 2025-07-11 PROCEDURE — 7100000010 HC PHASE II RECOVERY - FIRST 15 MIN: Performed by: SURGERY

## 2025-07-11 PROCEDURE — 88304 TISSUE EXAM BY PATHOLOGIST: CPT

## 2025-07-11 PROCEDURE — 7100000011 HC PHASE II RECOVERY - ADDTL 15 MIN: Performed by: SURGERY

## 2025-07-11 PROCEDURE — 88307 TISSUE EXAM BY PATHOLOGIST: CPT

## 2025-07-11 PROCEDURE — 6360000002 HC RX W HCPCS: Performed by: SURGERY

## 2025-07-11 PROCEDURE — 2709999900 HC NON-CHARGEABLE SUPPLY: Performed by: SURGERY

## 2025-07-11 PROCEDURE — 3700000000 HC ANESTHESIA ATTENDED CARE: Performed by: SURGERY

## 2025-07-11 PROCEDURE — 82962 GLUCOSE BLOOD TEST: CPT

## 2025-07-11 PROCEDURE — 7100000000 HC PACU RECOVERY - FIRST 15 MIN: Performed by: SURGERY

## 2025-07-11 PROCEDURE — 12032 INTMD RPR S/A/T/EXT 2.6-7.5: CPT | Performed by: SURGERY

## 2025-07-11 RX ORDER — ONDANSETRON 2 MG/ML
4 INJECTION INTRAMUSCULAR; INTRAVENOUS
Status: DISCONTINUED | OUTPATIENT
Start: 2025-07-11 | End: 2025-07-11 | Stop reason: HOSPADM

## 2025-07-11 RX ORDER — EPHEDRINE SULFATE 5 MG/ML
INJECTION INTRAVENOUS
Status: DISCONTINUED | OUTPATIENT
Start: 2025-07-11 | End: 2025-07-11 | Stop reason: SDUPTHER

## 2025-07-11 RX ORDER — OXYCODONE HYDROCHLORIDE 5 MG/1
10 TABLET ORAL PRN
Status: DISCONTINUED | OUTPATIENT
Start: 2025-07-11 | End: 2025-07-11 | Stop reason: HOSPADM

## 2025-07-11 RX ORDER — SODIUM CHLORIDE 9 MG/ML
INJECTION, SOLUTION INTRAVENOUS PRN
Status: DISCONTINUED | OUTPATIENT
Start: 2025-07-11 | End: 2025-07-11 | Stop reason: HOSPADM

## 2025-07-11 RX ORDER — DIPHENHYDRAMINE HYDROCHLORIDE 50 MG/ML
12.5 INJECTION, SOLUTION INTRAMUSCULAR; INTRAVENOUS
Status: DISCONTINUED | OUTPATIENT
Start: 2025-07-11 | End: 2025-07-11 | Stop reason: HOSPADM

## 2025-07-11 RX ORDER — SODIUM CHLORIDE 9 MG/ML
INJECTION, SOLUTION INTRAVENOUS CONTINUOUS
Status: DISCONTINUED | OUTPATIENT
Start: 2025-07-11 | End: 2025-07-11 | Stop reason: HOSPADM

## 2025-07-11 RX ORDER — SODIUM CHLORIDE, SODIUM LACTATE, POTASSIUM CHLORIDE, CALCIUM CHLORIDE 600; 310; 30; 20 MG/100ML; MG/100ML; MG/100ML; MG/100ML
INJECTION, SOLUTION INTRAVENOUS CONTINUOUS
Status: DISCONTINUED | OUTPATIENT
Start: 2025-07-11 | End: 2025-07-11 | Stop reason: HOSPADM

## 2025-07-11 RX ORDER — PROPOFOL 10 MG/ML
INJECTION, EMULSION INTRAVENOUS
Status: DISCONTINUED | OUTPATIENT
Start: 2025-07-11 | End: 2025-07-11 | Stop reason: SDUPTHER

## 2025-07-11 RX ORDER — LIDOCAINE HYDROCHLORIDE 10 MG/ML
INJECTION, SOLUTION INFILTRATION; PERINEURAL PRN
Status: DISCONTINUED | OUTPATIENT
Start: 2025-07-11 | End: 2025-07-11 | Stop reason: ALTCHOICE

## 2025-07-11 RX ORDER — LIDOCAINE HYDROCHLORIDE 10 MG/ML
1 INJECTION, SOLUTION INFILTRATION; PERINEURAL
Status: DISCONTINUED | OUTPATIENT
Start: 2025-07-11 | End: 2025-07-11 | Stop reason: HOSPADM

## 2025-07-11 RX ORDER — LIDOCAINE HYDROCHLORIDE 20 MG/ML
INJECTION, SOLUTION EPIDURAL; INFILTRATION; INTRACAUDAL; PERINEURAL
Status: DISCONTINUED | OUTPATIENT
Start: 2025-07-11 | End: 2025-07-11 | Stop reason: SDUPTHER

## 2025-07-11 RX ORDER — MIDAZOLAM HYDROCHLORIDE 1 MG/ML
INJECTION, SOLUTION INTRAMUSCULAR; INTRAVENOUS
Status: DISCONTINUED | OUTPATIENT
Start: 2025-07-11 | End: 2025-07-11 | Stop reason: SDUPTHER

## 2025-07-11 RX ORDER — MIDAZOLAM HYDROCHLORIDE 2 MG/2ML
2 INJECTION, SOLUTION INTRAMUSCULAR; INTRAVENOUS
Status: DISCONTINUED | OUTPATIENT
Start: 2025-07-11 | End: 2025-07-11 | Stop reason: HOSPADM

## 2025-07-11 RX ORDER — OXYCODONE AND ACETAMINOPHEN 5; 325 MG/1; MG/1
1 TABLET ORAL EVERY 6 HOURS PRN
Qty: 20 TABLET | Refills: 0 | Status: SHIPPED | OUTPATIENT
Start: 2025-07-11 | End: 2025-07-16

## 2025-07-11 RX ORDER — FAMOTIDINE 20 MG/1
20 TABLET, FILM COATED ORAL ONCE
Status: COMPLETED | OUTPATIENT
Start: 2025-07-11 | End: 2025-07-11

## 2025-07-11 RX ORDER — ACETAMINOPHEN 500 MG
1000 TABLET ORAL ONCE
Status: COMPLETED | OUTPATIENT
Start: 2025-07-11 | End: 2025-07-11

## 2025-07-11 RX ORDER — FENTANYL CITRATE 50 UG/ML
100 INJECTION, SOLUTION INTRAMUSCULAR; INTRAVENOUS
Status: DISCONTINUED | OUTPATIENT
Start: 2025-07-11 | End: 2025-07-11 | Stop reason: HOSPADM

## 2025-07-11 RX ORDER — FENTANYL CITRATE 50 UG/ML
INJECTION, SOLUTION INTRAMUSCULAR; INTRAVENOUS
Status: DISCONTINUED | OUTPATIENT
Start: 2025-07-11 | End: 2025-07-11 | Stop reason: SDUPTHER

## 2025-07-11 RX ORDER — OXYCODONE HYDROCHLORIDE 5 MG/1
5 TABLET ORAL PRN
Status: DISCONTINUED | OUTPATIENT
Start: 2025-07-11 | End: 2025-07-11 | Stop reason: HOSPADM

## 2025-07-11 RX ORDER — SODIUM CHLORIDE 0.9 % (FLUSH) 0.9 %
5-40 SYRINGE (ML) INJECTION PRN
Status: DISCONTINUED | OUTPATIENT
Start: 2025-07-11 | End: 2025-07-11 | Stop reason: HOSPADM

## 2025-07-11 RX ORDER — SODIUM CHLORIDE 0.9 % (FLUSH) 0.9 %
5-40 SYRINGE (ML) INJECTION EVERY 12 HOURS SCHEDULED
Status: DISCONTINUED | OUTPATIENT
Start: 2025-07-11 | End: 2025-07-11 | Stop reason: HOSPADM

## 2025-07-11 RX ORDER — BUPIVACAINE HYDROCHLORIDE 5 MG/ML
INJECTION, SOLUTION EPIDURAL; INTRACAUDAL; PERINEURAL PRN
Status: DISCONTINUED | OUTPATIENT
Start: 2025-07-11 | End: 2025-07-11 | Stop reason: ALTCHOICE

## 2025-07-11 RX ORDER — SUCCINYLCHOLINE/SOD CL,ISO/PF 200MG/10ML
SYRINGE (ML) INTRAVENOUS
Status: DISCONTINUED | OUTPATIENT
Start: 2025-07-11 | End: 2025-07-11 | Stop reason: SDUPTHER

## 2025-07-11 RX ADMIN — SODIUM CHLORIDE, SODIUM LACTATE, POTASSIUM CHLORIDE, AND CALCIUM CHLORIDE: .6; .31; .03; .02 INJECTION, SOLUTION INTRAVENOUS at 08:43

## 2025-07-11 RX ADMIN — EPHEDRINE SULFATE 10 MG: 5 INJECTION INTRAVENOUS at 10:27

## 2025-07-11 RX ADMIN — PHENYLEPHRINE HYDROCHLORIDE 150 MCG: 0.1 INJECTION, SOLUTION INTRAVENOUS at 10:29

## 2025-07-11 RX ADMIN — PHENYLEPHRINE HYDROCHLORIDE 100 MCG: 0.1 INJECTION, SOLUTION INTRAVENOUS at 10:27

## 2025-07-11 RX ADMIN — Medication 1000 MG: at 10:16

## 2025-07-11 RX ADMIN — Medication 2000 MG: at 10:13

## 2025-07-11 RX ADMIN — PROPOFOL 150 MG: 10 INJECTION, EMULSION INTRAVENOUS at 10:02

## 2025-07-11 RX ADMIN — EPHEDRINE SULFATE 10 MG: 5 INJECTION INTRAVENOUS at 10:24

## 2025-07-11 RX ADMIN — FAMOTIDINE 20 MG: 20 TABLET, FILM COATED ORAL at 08:43

## 2025-07-11 RX ADMIN — LIDOCAINE HYDROCHLORIDE 60 MG: 20 INJECTION, SOLUTION EPIDURAL; INFILTRATION; INTRACAUDAL; PERINEURAL at 10:02

## 2025-07-11 RX ADMIN — MIDAZOLAM 1 MG: 1 INJECTION INTRAMUSCULAR; INTRAVENOUS at 09:50

## 2025-07-11 RX ADMIN — Medication 120 MG: at 10:03

## 2025-07-11 RX ADMIN — PHENYLEPHRINE HYDROCHLORIDE 100 MCG: 0.1 INJECTION, SOLUTION INTRAVENOUS at 10:24

## 2025-07-11 RX ADMIN — EPHEDRINE SULFATE 5 MG: 5 INJECTION INTRAVENOUS at 10:15

## 2025-07-11 RX ADMIN — FENTANYL CITRATE 50 MCG: 50 INJECTION, SOLUTION INTRAMUSCULAR; INTRAVENOUS at 10:02

## 2025-07-11 RX ADMIN — ACETAMINOPHEN 1000 MG: 500 TABLET, FILM COATED ORAL at 08:43

## 2025-07-11 RX ADMIN — EPHEDRINE SULFATE 7.5 MG: 5 INJECTION INTRAVENOUS at 10:20

## 2025-07-11 RX ADMIN — PHENYLEPHRINE HYDROCHLORIDE 100 MCG: 0.1 INJECTION, SOLUTION INTRAVENOUS at 10:20

## 2025-07-11 ASSESSMENT — PAIN - FUNCTIONAL ASSESSMENT: PAIN_FUNCTIONAL_ASSESSMENT: 0-10

## 2025-07-11 NOTE — INTERVAL H&P NOTE
Update History & Physical    The patient's History and Physical of 7/6/25 was reviewed with the patient and I examined the patient. There was no change. The surgical site was confirmed by the patient and me.     Plan: The risks, benefits, expected outcome, and alternative to the recommended procedure have been discussed with the patient. Patient understands and wants to proceed with the procedure.     Electronically signed by GASPER AWAD MD on 7/11/2025 at 8:07 AM

## 2025-07-11 NOTE — BRIEF OP NOTE
Brief Postoperative Note      Patient: Luis Alfredo Mckenzie  YOB: 1945  MRN: [id number]    Date of Procedure: 7/11/2025    Pre-Op Diagnosis Codes:      * Soft tissue mass [M79.89]    Post-Op Diagnosis: Probable epidermal inclusion cyst       Procedure(s):  EXCISIONOF RIGHT MID-THORACIC BACK SOFT TISSUE MASS MEASURING 5 CM X 3 CM X 2 CM    Surgeon(s):  Emiliano Awad MD    Assistant:  * No surgical staff found *    Anesthesia: General plus local    Estimated Blood Loss (mL): Minimal    Complications: None    Specimens:   ID Type Source Tests Collected by Time Destination   A : Right mid thoracic soft tissue mass Tissue Tissue SURGICAL PATHOLOGY Emiliano Awad MD 7/11/2025 1022        Implants:  * No implants in log *      Drains: * No LDAs found *    Findings:  Present At Time Of Surgery (PATOS) (choose all levels that have infection present):  This procedure was not performed to treat primary cutaneous melanoma through wide local excision    Electronically signed by EMILIANO AWAD MD on 7/11/2025 at 10:43 AM

## 2025-07-11 NOTE — OP NOTE
[facility] [address]  [address]                            OPERATIVE REPORT      PATIENT NAME: MARGRET JAY             : 1945  MED REC NO: [id number]                       [place] [facility] [place]  ACCOUNT NO: [id number]                       ADMIT DATE: 2025  PROVIDER: Emiliano Conteh MD    DATE OF SERVICE:  2025    PREOPERATIVE DIAGNOSES:  Right midthoracic soft tissue mass as well as obesity.    POSTOPERATIVE DIAGNOSES:  Probable epidermal inclusion cyst.    PROCEDURES PERFORMED:  Excision of right midthoracic soft tissue mass measuring 5 cm x 3 cm x 2 cm.    SURGEON:  Emiliano Conteh MD    ASSISTANT:  Nadia Miller, 1st assistant.    ANESTHESIA:  General endotracheal anesthesia plus 30 mL of 0.5 % Marcaine plain used as local anesthesia at the beginning of the procedure.    ESTIMATED BLOOD LOSS:  10 cc's    SPECIMENS REMOVED:  Right mid-thoracic soft tissue mass    INTRAOPERATIVE FINDINGS:  Epidermal inclusion cyst.     COMPLICATIONS:  None.    IMPLANTS:  None.    INDICATIONS:  This is a 79-year-old male was referred by Leora Navas, nurse practitioner.  He has had several infections of soft tissue mass on the right midthoracic area.  The patient has been treated with antibiotics.  After the last infection, he was referred to my office.  He had a palpable soft tissue mass in the right midthoracic area.  There was no infection at the time that I saw arm.  He has been on Plavix.  I asked him to hold the Plavix for 5 days and then we would schedule his surgery.  He came into the [facility] on 2025, for the procedure.  He was seen in the preop area.  The area was marked with his consent.  I spoke to his wife and daughter as well in the preop area and spoke to them after surgery.    DESCRIPTION OF PROCEDURE:  The patient was taken to room #2 [facility] Operating Room, placed on the operating table in

## 2025-07-11 NOTE — ANESTHESIA PRE PROCEDURE
Department of Anesthesiology  Preprocedure Note       Name:  Luis Alfredo Mckenzie   Age:  79 y.o.  :  1945                                          MRN:  [id number]         Date:  2025      Surgeon: Surgeon(s):  Emiliano Conteh MD    Procedure: Procedure(s):  EXCISIONOF RIGHT MID-THORACIC BACK SOFT TISSUE MASS    Medications prior to admission:   Prior to Admission medications    Medication Sig Start Date End Date Taking? Authorizing Provider   MAGNESIUM PO Take by mouth daily   Yes Anton Velasco MD   doxazosin (CARDURA) 4 MG tablet TAKE 1 TABLET BY MOUTH EVERY  NIGHT 25  Yes Serge Smith MD   potassium chloride (KLOR-CON M) 10 MEQ extended release tablet Take 1 tablet by mouth daily 25 Yes Leora Navas APRN - CNP   metFORMIN (GLUCOPHAGE-XR) 500 MG extended release tablet Take 2 tablets by mouth daily (with breakfast) 25 Yes Leora Navas APRN - CNP   losartan (COZAAR) 25 MG tablet Take 1 tablet by mouth daily 25  Yes Leora Navas APRN - CNP   hydroCHLOROthiazide (HYDRODIURIL) 25 MG tablet Take 1 tablet by mouth daily 25  Yes Leora Navas APRN - CNP   glipiZIDE (GLUCOTROL XL) 10 MG extended release tablet Take 1 tablet by mouth daily 25 Yes Leora Navas APRN - CNP   clopidogrel (PLAVIX) 75 MG tablet Take 1 tablet by mouth daily  Patient taking differently: Take 1 tablet by mouth daily Last dose 25--pt instructed to hold by surgeon's office 25  Yes Leora Navas APRN - CNP   atorvastatin (LIPITOR) 80 MG tablet Take 1 tablet by mouth every evening 25  Yes Leora Navas APRN - CNP   finasteride (PROSCAR) 5 MG tablet TAKE 1 TABLET BY MOUTH DAILY  Patient taking differently: Take 1 tablet by mouth at bedtime 25  Yes Serge Smith MD   erythromycin (ROMYCIN) 5 MG/GM ophthalmic ointment APPLY A THIN LAYER TO BOTH EYELIDS AT BEDTIME 23  Yes Anton Velasco MD   polyethylene

## 2025-07-11 NOTE — ANESTHESIA POSTPROCEDURE EVALUATION
Department of Anesthesiology  Postprocedure Note    Patient: Luis Alfredo Mckenzie  MRN: [id number]  YOB: 1945  Date of evaluation: 7/11/2025    Procedure Summary       Date: 07/11/25 [place] [facility] [place] [facility] [place] [facility] [place] [facility] [place]    Anesthesia Start: 0947 Anesthesia Stop: 1053    Procedure: EXCISIONOF RIGHT MID-THORACIC BACK SOFT TISSUE MASS (Right: Back) Diagnosis:       Soft tissue mass      (Soft tissue mass [M79.89])    Surgeons: Emiliano Conteh MD Responsible Provider: Carl Burk MD    Anesthesia Type: General ASA Status: 3            Anesthesia Type: General    Marisol Phase I: Marisol Score: 10    Marisol Phase II:      Anesthesia Post Evaluation    Patient location during evaluation: PACU  Patient participation: complete - patient participated  Level of consciousness: awake and alert  Airway patency: patent  Nausea & Vomiting: no nausea and no vomiting  Cardiovascular status: hemodynamically stable  Respiratory status: acceptable, nonlabored ventilation and spontaneous ventilation  Hydration status: euvolemic  Comments: BP (!) 143/80   Pulse 85   Temp 98.1 °F (36.7 °C) (Temporal)   Resp 20   Ht 1.753 m (5' 9\")   Wt 127.1 kg (280 lb 4.8 oz)   SpO2 92%   BMI 41.39 kg/m²     Multimodal analgesia pain management approach  Pain management: adequate and satisfactory to patient    No notable events documented.

## 2025-07-11 NOTE — DISCHARGE INSTRUCTIONS
1. Diet as tolerated except for a  low fat diet after laparoscopic cholecystectomy.    2. Showering is allowed, but no tub baths, hot tubs or swimming.    3. Drainage is common from the wounds. Change the dressings as needed. Call our office if the wounds become reddened, tender, feel warm to the touch or pus starts to drain from the wound.    4. Take prescribed pain medication as directed, usually Percocet, Norco, Ultram or Dilaudid. Take over the counter medication for minor pain.    5. Ice may be applied intermittently to the surgical site or sites.    6. Call or office, [phone], if problems arise.    7. Follow up in the office at the assigned time.    8. Resume all medications as taken per surgery, unless specifically instructed not to take certain ones.    9. No lifting more than 25 pounds until told otherwise.    10. Driving is allowed 3 days after surgery as long as you feel comfortable enough to drive and have not taken any prescription pain medication prior to driving.    11. Follow up the week of 7/21/25, [phone].

## 2025-07-23 ENCOUNTER — OFFICE VISIT (OUTPATIENT)
Dept: SURGERY | Age: 80
End: 2025-07-23

## 2025-07-23 VITALS — BODY MASS INDEX: 41.39 KG/M2 | HEIGHT: 69 IN

## 2025-07-23 DIAGNOSIS — M79.89 SOFT TISSUE MASS: Primary | ICD-10-CM

## 2025-07-23 PROCEDURE — 99024 POSTOP FOLLOW-UP VISIT: CPT

## 2025-07-23 NOTE — PROGRESS NOTES
poDate: 2025      Name: Luis Alfredo Mckenzie      MRN: [id number]       : 1945       Age: 79 y.o.    Sex: male        Leora Navas, APRN - CNP       CC:  No chief complaint on file.      HPI: The patient presents for a post-op visit s/p excision of right back mass.  Patient reports that she has been doing well.  No significant drainage.  No fevers or chills.  No nausea or vomiting.  No additional concerns.  Patient is otherwise doing great.    Final Pathologic Diagnosis     A: Right mid thoracic soft tissue mass, excision:   Epidermoid cyst.        Physical Exam:     There were no vitals taken for this visit.    General: Alert, oriented, cooperative in no acute distress.     Neck: Supple, trachea midline, no appreciable thyromegaly  Resp: Breathing is  non-labored. Lungs clear to auscultation without wheezing or rhonchi   CV: RRR. No murmurs, rubs or gallops appreciated.  Abd: soft non-tender and non-distended without peritoneal signs. +bs    Skin/incision: No obvious drainage or cellulitis. Some dried pus along the incision.      Assessment/Plan:  Luis Alfredo Mckenzie is a 79 y.o. male who is s/p excision of right back mass.    - Staples were removed. Sutures to be removed next week.  - avoid heavy lifting for 4-6 weeks.  - Avoid submerging in water for 3 weeks post op.  - Call with any questions or concerns.  - FUV in one week.      DEEPA Caballero   2025  11:40 AM

## 2025-07-29 ENCOUNTER — OFFICE VISIT (OUTPATIENT)
Dept: SURGERY | Age: 80
End: 2025-07-29

## 2025-07-29 DIAGNOSIS — M79.89 SOFT TISSUE MASS: Primary | ICD-10-CM

## 2025-07-29 PROCEDURE — 99024 POSTOP FOLLOW-UP VISIT: CPT

## 2025-07-29 NOTE — PROGRESS NOTES
poDate: 2025      Name: Luis Alfredo Mckenzie      MRN: [id number]       : 1945       Age: 79 y.o.    Sex: male        Leora Navas, APRN - CNP       CC:  No chief complaint on file.      HPI: The patient presents for a post-op visit s/p excision of right back mass.  Patient reports that she has been doing well.  No significant drainage.  No fevers or chills.  No nausea or vomiting.  No additional concerns.  Patient is otherwise doing great.    Final Pathologic Diagnosis     A: Right mid thoracic soft tissue mass, excision:   Epidermoid cyst.     2025: .  Patient reports that she has been doing well.  No significant drainage.  No fevers or chills.  No nausea or vomiting.  No additional concerns.        Physical Exam:     There were no vitals taken for this visit.    General: Alert, oriented, cooperative in no acute distress.     Neck: Supple, trachea midline, no appreciable thyromegaly  Resp: Breathing is  non-labored. Lungs clear to auscultation without wheezing or rhonchi   CV: RRR. No murmurs, rubs or gallops appreciated.  Abd: soft non-tender and non-distended without peritoneal signs. +bs    Skin/incision: No obvious drainage or cellulitis. Some dried pus along the incision.      Assessment/Plan:  Luis Alfredo Mckenzie is a 79 y.o. male who is s/p excision of right back mass.    - Sutures were removed.  - avoid heavy lifting for 4-6 weeks.  - Avoid submerging in water for 3 weeks post op.  - Call with any questions or concerns.  - FUV as needed.      DEEPA Caballero   2025  9:19 AM

## (undated) DEVICE — Device

## (undated) DEVICE — NEEDLE HYPO 21GA L1.5IN INTRAMUSCULAR S STL LATCH BVL UP

## (undated) DEVICE — GARMENT,MEDLINE,DVT,INT,CALF,MED, GEN2: Brand: MEDLINE

## (undated) DEVICE — SUTURE ETHILON SZ 2-0 L18IN NONABSORBABLE BLK L26MM PS 3/8 585H

## (undated) DEVICE — SYRINGE MED 10ML LUERLOCK TIP W/O SFTY DISP

## (undated) DEVICE — PACK SURGICAL PROCEDURE KIT CYSTOSCOPY TOTE

## (undated) DEVICE — SOLUTION IRRIG 1000ML H2O PIC PLAS SHATTERPROOF CONTAINER

## (undated) DEVICE — SOLUTION IRRIG 3000ML H2O STRL BAG

## (undated) DEVICE — SOLUTION IRRIG 1000ML 0.9% SOD CHL USP POUR PLAS BTL

## (undated) DEVICE — CYSTO/BLADDER IRRIGATION SET, REGULATING CLAMP

## (undated) DEVICE — SUTURE VICRYL + SZ 2-0 L27IN ABSRB UD CT-2 L26MM 1/2 CIR TAPR VCP269H

## (undated) DEVICE — BASIC SINGLE BASIN 2-LF: Brand: MEDLINE INDUSTRIES, INC.

## (undated) DEVICE — MINOR SPLIT GENERAL: Brand: MEDLINE INDUSTRIES, INC.

## (undated) DEVICE — NEEDLE,HYPODERM,SAFETY,18GX1.5: Brand: MEDLINE

## (undated) DEVICE — SUTURE VICRYL SZ 2-0 L27IN ABSRB VLT L26MM CT-2 1/2 CIR J333H

## (undated) DEVICE — GAUZE,SPONGE,4"X4",16PLY,STRL,LF,10/TRAY: Brand: MEDLINE

## (undated) DEVICE — SYRINGE MED 30ML STD CLR PLAS LUERLOCK TIP N CTRL DISP

## (undated) DEVICE — TRAY PREP DRY W/ PREM GLV 2 APPL 6 SPNG 2 UNDPD 1 OVERWRAP

## (undated) DEVICE — GLOVE SURG SZ 75 CRM LTX FREE POLYISOPRENE POLYMER BEAD ANTI